# Patient Record
Sex: MALE | Race: WHITE
[De-identification: names, ages, dates, MRNs, and addresses within clinical notes are randomized per-mention and may not be internally consistent; named-entity substitution may affect disease eponyms.]

---

## 2014-06-05 RX ORDER — FLUOXETINE HCL 10 MG
2 CAPSULE ORAL
Qty: 0 | Refills: 0 | COMMUNITY
Start: 2014-06-05

## 2014-06-05 RX ORDER — FLUOXETINE HCL 10 MG
2 CAPSULE ORAL
Qty: 0 | Refills: 0 | DISCHARGE
Start: 2014-06-05

## 2014-06-05 RX ORDER — LEVETIRACETAM 250 MG/1
1 TABLET, FILM COATED ORAL
Qty: 0 | Refills: 0 | DISCHARGE
Start: 2014-06-05

## 2014-06-05 RX ORDER — CARBAMAZEPINE 200 MG
1 TABLET ORAL
Qty: 0 | Refills: 0 | DISCHARGE
Start: 2014-06-05

## 2017-01-13 ENCOUNTER — NON-APPOINTMENT (OUTPATIENT)
Age: 62
End: 2017-01-13

## 2017-01-13 ENCOUNTER — APPOINTMENT (OUTPATIENT)
Dept: ELECTROPHYSIOLOGY | Facility: CLINIC | Age: 62
End: 2017-01-13

## 2017-01-13 VITALS — SYSTOLIC BLOOD PRESSURE: 130 MMHG | DIASTOLIC BLOOD PRESSURE: 86 MMHG | OXYGEN SATURATION: 98 % | HEART RATE: 79 BPM

## 2017-03-02 ENCOUNTER — APPOINTMENT (OUTPATIENT)
Dept: CARDIOLOGY | Facility: HOSPITAL | Age: 62
End: 2017-03-02

## 2017-03-02 ENCOUNTER — OUTPATIENT (OUTPATIENT)
Dept: OUTPATIENT SERVICES | Facility: HOSPITAL | Age: 62
LOS: 1 days | End: 2017-03-02
Payer: MEDICAID

## 2017-03-02 ENCOUNTER — NON-APPOINTMENT (OUTPATIENT)
Age: 62
End: 2017-03-02

## 2017-03-02 VITALS
BODY MASS INDEX: 28.32 KG/M2 | DIASTOLIC BLOOD PRESSURE: 72 MMHG | SYSTOLIC BLOOD PRESSURE: 107 MMHG | HEART RATE: 102 BPM | WEIGHT: 170 LBS | HEIGHT: 65 IN

## 2017-03-02 VITALS — HEART RATE: 74 BPM

## 2017-03-02 DIAGNOSIS — I25.10 ATHEROSCLEROTIC HEART DISEASE OF NATIVE CORONARY ARTERY WITHOUT ANGINA PECTORIS: ICD-10-CM

## 2017-03-02 DIAGNOSIS — Z98.89 OTHER SPECIFIED POSTPROCEDURAL STATES: Chronic | ICD-10-CM

## 2017-03-02 DIAGNOSIS — I20.8 OTHER FORMS OF ANGINA PECTORIS: ICD-10-CM

## 2017-03-02 DIAGNOSIS — Z95.0 PRESENCE OF CARDIAC PACEMAKER: Chronic | ICD-10-CM

## 2017-03-02 PROCEDURE — 93005 ELECTROCARDIOGRAM TRACING: CPT

## 2017-03-02 PROCEDURE — G0463: CPT

## 2017-03-03 DIAGNOSIS — Z01.818 ENCOUNTER FOR OTHER PREPROCEDURAL EXAMINATION: ICD-10-CM

## 2017-04-18 ENCOUNTER — APPOINTMENT (OUTPATIENT)
Dept: ELECTROPHYSIOLOGY | Facility: CLINIC | Age: 62
End: 2017-04-18

## 2017-04-18 ENCOUNTER — NON-APPOINTMENT (OUTPATIENT)
Age: 62
End: 2017-04-18

## 2017-04-18 VITALS
HEIGHT: 65 IN | OXYGEN SATURATION: 99 % | SYSTOLIC BLOOD PRESSURE: 134 MMHG | WEIGHT: 170 LBS | BODY MASS INDEX: 28.32 KG/M2 | HEART RATE: 88 BPM | DIASTOLIC BLOOD PRESSURE: 87 MMHG

## 2017-06-01 ENCOUNTER — APPOINTMENT (OUTPATIENT)
Dept: CARDIOLOGY | Facility: HOSPITAL | Age: 62
End: 2017-06-01

## 2017-06-01 ENCOUNTER — NON-APPOINTMENT (OUTPATIENT)
Age: 62
End: 2017-06-01

## 2017-06-01 ENCOUNTER — OUTPATIENT (OUTPATIENT)
Dept: OUTPATIENT SERVICES | Facility: HOSPITAL | Age: 62
LOS: 1 days | End: 2017-06-01
Payer: MEDICAID

## 2017-06-01 VITALS — SYSTOLIC BLOOD PRESSURE: 148 MMHG | HEART RATE: 102 BPM | DIASTOLIC BLOOD PRESSURE: 84 MMHG

## 2017-06-01 DIAGNOSIS — Z95.0 PRESENCE OF CARDIAC PACEMAKER: Chronic | ICD-10-CM

## 2017-06-01 DIAGNOSIS — I25.10 ATHEROSCLEROTIC HEART DISEASE OF NATIVE CORONARY ARTERY WITHOUT ANGINA PECTORIS: ICD-10-CM

## 2017-06-01 DIAGNOSIS — Z98.89 OTHER SPECIFIED POSTPROCEDURAL STATES: Chronic | ICD-10-CM

## 2017-06-01 PROCEDURE — 93005 ELECTROCARDIOGRAM TRACING: CPT

## 2017-06-01 PROCEDURE — G0463: CPT

## 2017-06-09 DIAGNOSIS — Z01.810 ENCOUNTER FOR PREPROCEDURAL CARDIOVASCULAR EXAMINATION: ICD-10-CM

## 2017-09-07 ENCOUNTER — APPOINTMENT (OUTPATIENT)
Dept: CARDIOLOGY | Facility: HOSPITAL | Age: 62
End: 2017-09-07

## 2017-09-07 ENCOUNTER — NON-APPOINTMENT (OUTPATIENT)
Age: 62
End: 2017-09-07

## 2017-09-07 ENCOUNTER — OUTPATIENT (OUTPATIENT)
Dept: OUTPATIENT SERVICES | Facility: HOSPITAL | Age: 62
LOS: 1 days | End: 2017-09-07
Payer: MEDICAID

## 2017-09-07 ENCOUNTER — APPOINTMENT (OUTPATIENT)
Dept: ELECTROPHYSIOLOGY | Facility: CLINIC | Age: 62
End: 2017-09-07
Payer: MEDICAID

## 2017-09-07 VITALS
HEIGHT: 65 IN | BODY MASS INDEX: 28.32 KG/M2 | SYSTOLIC BLOOD PRESSURE: 106 MMHG | OXYGEN SATURATION: 97 % | DIASTOLIC BLOOD PRESSURE: 71 MMHG | WEIGHT: 170 LBS | HEART RATE: 70 BPM

## 2017-09-07 DIAGNOSIS — I25.10 ATHEROSCLEROTIC HEART DISEASE OF NATIVE CORONARY ARTERY WITHOUT ANGINA PECTORIS: ICD-10-CM

## 2017-09-07 DIAGNOSIS — Z98.89 OTHER SPECIFIED POSTPROCEDURAL STATES: Chronic | ICD-10-CM

## 2017-09-07 DIAGNOSIS — Z95.0 PRESENCE OF CARDIAC PACEMAKER: Chronic | ICD-10-CM

## 2017-09-07 PROCEDURE — 93005 ELECTROCARDIOGRAM TRACING: CPT

## 2017-09-07 PROCEDURE — G0463: CPT

## 2017-09-07 PROCEDURE — 93280 PM DEVICE PROGR EVAL DUAL: CPT

## 2017-09-14 DIAGNOSIS — I45.9 CONDUCTION DISORDER, UNSPECIFIED: ICD-10-CM

## 2017-12-14 ENCOUNTER — OUTPATIENT (OUTPATIENT)
Dept: OUTPATIENT SERVICES | Facility: HOSPITAL | Age: 62
LOS: 1 days | End: 2017-12-14
Payer: MEDICAID

## 2017-12-14 ENCOUNTER — APPOINTMENT (OUTPATIENT)
Dept: CARDIOLOGY | Facility: HOSPITAL | Age: 62
End: 2017-12-14

## 2017-12-14 VITALS
HEART RATE: 83 BPM | BODY MASS INDEX: 28.82 KG/M2 | WEIGHT: 173 LBS | SYSTOLIC BLOOD PRESSURE: 120 MMHG | DIASTOLIC BLOOD PRESSURE: 75 MMHG | OXYGEN SATURATION: 97 % | HEIGHT: 65 IN

## 2017-12-14 DIAGNOSIS — Z98.89 OTHER SPECIFIED POSTPROCEDURAL STATES: Chronic | ICD-10-CM

## 2017-12-14 DIAGNOSIS — I25.10 ATHEROSCLEROTIC HEART DISEASE OF NATIVE CORONARY ARTERY WITHOUT ANGINA PECTORIS: ICD-10-CM

## 2017-12-14 DIAGNOSIS — Z95.0 PRESENCE OF CARDIAC PACEMAKER: Chronic | ICD-10-CM

## 2017-12-14 PROCEDURE — G0463: CPT

## 2017-12-15 DIAGNOSIS — I49.5 SICK SINUS SYNDROME: ICD-10-CM

## 2018-03-08 ENCOUNTER — APPOINTMENT (OUTPATIENT)
Dept: CARDIOLOGY | Facility: HOSPITAL | Age: 63
End: 2018-03-08

## 2018-03-08 ENCOUNTER — APPOINTMENT (OUTPATIENT)
Dept: ELECTROPHYSIOLOGY | Facility: CLINIC | Age: 63
End: 2018-03-08
Payer: MEDICAID

## 2018-03-08 ENCOUNTER — OUTPATIENT (OUTPATIENT)
Dept: OUTPATIENT SERVICES | Facility: HOSPITAL | Age: 63
LOS: 1 days | End: 2018-03-08
Payer: MEDICAID

## 2018-03-08 VITALS
SYSTOLIC BLOOD PRESSURE: 115 MMHG | WEIGHT: 173 LBS | OXYGEN SATURATION: 96 % | BODY MASS INDEX: 28.82 KG/M2 | HEIGHT: 65 IN | HEART RATE: 86 BPM | DIASTOLIC BLOOD PRESSURE: 81 MMHG

## 2018-03-08 DIAGNOSIS — Z95.0 PRESENCE OF CARDIAC PACEMAKER: Chronic | ICD-10-CM

## 2018-03-08 DIAGNOSIS — Z98.89 OTHER SPECIFIED POSTPROCEDURAL STATES: Chronic | ICD-10-CM

## 2018-03-08 DIAGNOSIS — I25.10 ATHEROSCLEROTIC HEART DISEASE OF NATIVE CORONARY ARTERY WITHOUT ANGINA PECTORIS: ICD-10-CM

## 2018-03-08 PROCEDURE — G0463: CPT

## 2018-03-08 PROCEDURE — 93280 PM DEVICE PROGR EVAL DUAL: CPT

## 2018-03-09 DIAGNOSIS — R00.1 BRADYCARDIA, UNSPECIFIED: ICD-10-CM

## 2018-03-09 DIAGNOSIS — I49.5 SICK SINUS SYNDROME: ICD-10-CM

## 2018-04-03 ENCOUNTER — EMERGENCY (EMERGENCY)
Facility: HOSPITAL | Age: 63
LOS: 1 days | Discharge: HOME | End: 2018-04-03
Admitting: EMERGENCY MEDICINE
Payer: MEDICAID

## 2018-04-03 DIAGNOSIS — Z79.899 OTHER LONG TERM (CURRENT) DRUG THERAPY: ICD-10-CM

## 2018-04-03 DIAGNOSIS — Z98.89 OTHER SPECIFIED POSTPROCEDURAL STATES: Chronic | ICD-10-CM

## 2018-04-03 DIAGNOSIS — Z79.82 LONG TERM (CURRENT) USE OF ASPIRIN: ICD-10-CM

## 2018-04-03 DIAGNOSIS — J02.9 ACUTE PHARYNGITIS, UNSPECIFIED: ICD-10-CM

## 2018-04-03 DIAGNOSIS — R56.9 UNSPECIFIED CONVULSIONS: ICD-10-CM

## 2018-04-03 DIAGNOSIS — Z95.0 PRESENCE OF CARDIAC PACEMAKER: Chronic | ICD-10-CM

## 2018-04-03 PROCEDURE — 99283 EMERGENCY DEPT VISIT LOW MDM: CPT

## 2018-07-06 ENCOUNTER — OUTPATIENT (OUTPATIENT)
Dept: OUTPATIENT SERVICES | Facility: HOSPITAL | Age: 63
LOS: 1 days | End: 2018-07-06
Payer: MEDICAID

## 2018-07-06 ENCOUNTER — APPOINTMENT (OUTPATIENT)
Dept: CT IMAGING | Facility: HOSPITAL | Age: 63
End: 2018-07-06

## 2018-07-06 DIAGNOSIS — Z95.0 PRESENCE OF CARDIAC PACEMAKER: Chronic | ICD-10-CM

## 2018-07-06 DIAGNOSIS — Z98.89 OTHER SPECIFIED POSTPROCEDURAL STATES: Chronic | ICD-10-CM

## 2018-07-06 DIAGNOSIS — G93.9 DISORDER OF BRAIN, UNSPECIFIED: ICD-10-CM

## 2018-07-06 DIAGNOSIS — I63.9 CEREBRAL INFARCTION, UNSPECIFIED: ICD-10-CM

## 2018-07-06 PROCEDURE — 70496 CT ANGIOGRAPHY HEAD: CPT | Mod: 26

## 2018-07-06 PROCEDURE — 70496 CT ANGIOGRAPHY HEAD: CPT

## 2018-07-24 ENCOUNTER — APPOINTMENT (OUTPATIENT)
Dept: NEUROSURGERY | Facility: CLINIC | Age: 63
End: 2018-07-24
Payer: MEDICAID

## 2018-07-24 VITALS
HEART RATE: 92 BPM | WEIGHT: 172 LBS | BODY MASS INDEX: 28.66 KG/M2 | HEIGHT: 65 IN | TEMPERATURE: 98.2 F | SYSTOLIC BLOOD PRESSURE: 112 MMHG | DIASTOLIC BLOOD PRESSURE: 72 MMHG | RESPIRATION RATE: 16 BRPM | OXYGEN SATURATION: 95 %

## 2018-07-24 PROCEDURE — 99244 OFF/OP CNSLTJ NEW/EST MOD 40: CPT

## 2018-07-31 ENCOUNTER — OUTPATIENT (OUTPATIENT)
Dept: OUTPATIENT SERVICES | Facility: HOSPITAL | Age: 63
LOS: 1 days | End: 2018-07-31
Payer: MEDICAID

## 2018-07-31 VITALS
OXYGEN SATURATION: 99 % | DIASTOLIC BLOOD PRESSURE: 84 MMHG | HEART RATE: 61 BPM | RESPIRATION RATE: 14 BRPM | WEIGHT: 171.08 LBS | SYSTOLIC BLOOD PRESSURE: 135 MMHG | HEIGHT: 65 IN | TEMPERATURE: 98 F

## 2018-07-31 DIAGNOSIS — Z98.89 OTHER SPECIFIED POSTPROCEDURAL STATES: Chronic | ICD-10-CM

## 2018-07-31 DIAGNOSIS — K21.9 GASTRO-ESOPHAGEAL REFLUX DISEASE WITHOUT ESOPHAGITIS: ICD-10-CM

## 2018-07-31 DIAGNOSIS — H40.9 UNSPECIFIED GLAUCOMA: ICD-10-CM

## 2018-07-31 DIAGNOSIS — Z01.818 ENCOUNTER FOR OTHER PREPROCEDURAL EXAMINATION: ICD-10-CM

## 2018-07-31 DIAGNOSIS — Z78.9 OTHER SPECIFIED HEALTH STATUS: ICD-10-CM

## 2018-07-31 DIAGNOSIS — Z95.0 PRESENCE OF CARDIAC PACEMAKER: Chronic | ICD-10-CM

## 2018-07-31 DIAGNOSIS — G40.909 EPILEPSY, UNSPECIFIED, NOT INTRACTABLE, WITHOUT STATUS EPILEPTICUS: ICD-10-CM

## 2018-07-31 DIAGNOSIS — F32.9 MAJOR DEPRESSIVE DISORDER, SINGLE EPISODE, UNSPECIFIED: ICD-10-CM

## 2018-07-31 DIAGNOSIS — I67.1 CEREBRAL ANEURYSM, NONRUPTURED: ICD-10-CM

## 2018-07-31 DIAGNOSIS — R00.1 BRADYCARDIA, UNSPECIFIED: ICD-10-CM

## 2018-07-31 LAB
ANION GAP SERPL CALC-SCNC: 11 MMOL/L — SIGNIFICANT CHANGE UP (ref 5–17)
BLD GP AB SCN SERPL QL: NEGATIVE — SIGNIFICANT CHANGE UP
BUN SERPL-MCNC: 19 MG/DL — SIGNIFICANT CHANGE UP (ref 7–23)
CALCIUM SERPL-MCNC: 8.7 MG/DL — SIGNIFICANT CHANGE UP (ref 8.4–10.5)
CHLORIDE SERPL-SCNC: 95 MMOL/L — LOW (ref 96–108)
CO2 SERPL-SCNC: 23 MMOL/L — SIGNIFICANT CHANGE UP (ref 22–31)
CREAT SERPL-MCNC: 1.12 MG/DL — SIGNIFICANT CHANGE UP (ref 0.5–1.3)
GLUCOSE SERPL-MCNC: 111 MG/DL — HIGH (ref 70–99)
HCT VFR BLD CALC: 35.9 % — LOW (ref 39–50)
HGB BLD-MCNC: 12.5 G/DL — LOW (ref 13–17)
MCHC RBC-ENTMCNC: 32.6 PG — SIGNIFICANT CHANGE UP (ref 27–34)
MCHC RBC-ENTMCNC: 34.8 GM/DL — SIGNIFICANT CHANGE UP (ref 32–36)
MCV RBC AUTO: 93.7 FL — SIGNIFICANT CHANGE UP (ref 80–100)
PLATELET # BLD AUTO: 174 K/UL — SIGNIFICANT CHANGE UP (ref 150–400)
POTASSIUM SERPL-MCNC: 4.1 MMOL/L — SIGNIFICANT CHANGE UP (ref 3.5–5.3)
POTASSIUM SERPL-SCNC: 4.1 MMOL/L — SIGNIFICANT CHANGE UP (ref 3.5–5.3)
RBC # BLD: 3.83 M/UL — LOW (ref 4.2–5.8)
RBC # FLD: 11.9 % — SIGNIFICANT CHANGE UP (ref 10.3–14.5)
RH IG SCN BLD-IMP: POSITIVE — SIGNIFICANT CHANGE UP
SODIUM SERPL-SCNC: 129 MMOL/L — LOW (ref 135–145)
WBC # BLD: 3.89 K/UL — SIGNIFICANT CHANGE UP (ref 3.8–10.5)
WBC # FLD AUTO: 3.89 K/UL — SIGNIFICANT CHANGE UP (ref 3.8–10.5)

## 2018-07-31 PROCEDURE — 86901 BLOOD TYPING SEROLOGIC RH(D): CPT

## 2018-07-31 PROCEDURE — G0463: CPT

## 2018-07-31 PROCEDURE — 80048 BASIC METABOLIC PNL TOTAL CA: CPT

## 2018-07-31 PROCEDURE — 86850 RBC ANTIBODY SCREEN: CPT

## 2018-07-31 PROCEDURE — 85027 COMPLETE CBC AUTOMATED: CPT

## 2018-07-31 PROCEDURE — 86900 BLOOD TYPING SEROLOGIC ABO: CPT

## 2018-07-31 NOTE — H&P PST ADULT - PSH
Pacemaker  placed in 2007 originally, 2016 most recent placement  S/P bunionectomy    S/P craniotomy  for RCMA 1998

## 2018-07-31 NOTE — H&P PST ADULT - NEGATIVE ALLERGY TYPES
no outdoor environmental allergies/no indoor environmental allergies/no reactions to medicines/no reactions to food/no reactions to animals

## 2018-07-31 NOTE — H&P PST ADULT - PMH
Aneurysm of [place]    Bradycardia    Cerebral aneurysm without rupture  diagnosed 2017 had cerebral angio at [facility]  CVA (cerebral vascular accident)  left arm weakness, no sensation in left side in 1998  Depression    Dysrhythmia, cardiac    Epilepsy    Gastroesophageal reflux disease, esophagitis presence not specified    Glaucoma of both eyes, unspecified glaucoma type    HLD (hyperlipidemia)    Language barrier  Patient has a  who translates as needed.  Renal agenesis, unilateral

## 2018-07-31 NOTE — H&P PST ADULT - PROBLEM SELECTOR PLAN 6
PPM last interrogated on 3/8/2018, report in chart  Last cardio note in chart from 3/8 2018  Echo and Stress test results in chart

## 2018-07-31 NOTE — H&P PST ADULT - MOTOR
RUE 4/5 proximally 5/5 distally,  4/5; RLE 5/5 ileopsoas, 4/5 distally, brace on RLE (states he has foot drop)

## 2018-07-31 NOTE — H&P PST ADULT - HISTORY OF PRESENT ILLNESS
Mr. Rios is a 62 year old [place]ian speaking man with PMH of SAH when he was 32 years old s/p clipping done at [place], he states that he was in a coma for 2 months after suffering a stroke with residual Left hemiplegia.  last year he had brain imaging for unclear reason and an incidental aneurysm was found and he was referred to Dr. Ibarra for evaluation.  CTA revealed superolaterally projection left proximal supraclinoid ICA aneurysm with the dome measuring 7-8mm and the neck measuring 2mm in size, additionally a 2 mm focus of flow related enhancement in the right perisylvian aneurysm clip.  He is now scheduled for cerebral angiography with future plan for endovascular treatment.    Patient came in with a , Juju Damico, who translated in Algerian as needed.

## 2018-07-31 NOTE — H&P PST ADULT - NSANTHOSAYNRD_GEN_A_CORE
No. ASYA screening performed.  STOP BANG Legend: 0-2 = LOW Risk; 3-4 = INTERMEDIATE Risk; 5-8 = HIGH Risk

## 2018-08-01 ENCOUNTER — APPOINTMENT (OUTPATIENT)
Dept: CARDIOLOGY | Facility: HOSPITAL | Age: 63
End: 2018-08-01

## 2018-08-08 ENCOUNTER — APPOINTMENT (OUTPATIENT)
Dept: CARDIOLOGY | Facility: HOSPITAL | Age: 63
End: 2018-08-08

## 2018-08-08 ENCOUNTER — OUTPATIENT (OUTPATIENT)
Dept: OUTPATIENT SERVICES | Facility: HOSPITAL | Age: 63
LOS: 1 days | End: 2018-08-08
Payer: MEDICAID

## 2018-08-08 VITALS
SYSTOLIC BLOOD PRESSURE: 119 MMHG | OXYGEN SATURATION: 98 % | DIASTOLIC BLOOD PRESSURE: 78 MMHG | HEIGHT: 65 IN | HEART RATE: 67 BPM

## 2018-08-08 DIAGNOSIS — Z95.0 PRESENCE OF CARDIAC PACEMAKER: Chronic | ICD-10-CM

## 2018-08-08 DIAGNOSIS — Z98.89 OTHER SPECIFIED POSTPROCEDURAL STATES: Chronic | ICD-10-CM

## 2018-08-08 DIAGNOSIS — I25.10 ATHEROSCLEROTIC HEART DISEASE OF NATIVE CORONARY ARTERY WITHOUT ANGINA PECTORIS: ICD-10-CM

## 2018-08-08 PROBLEM — I67.1 CEREBRAL ANEURYSM, NONRUPTURED: Chronic | Status: ACTIVE | Noted: 2018-07-31

## 2018-08-08 PROBLEM — R00.1 BRADYCARDIA, UNSPECIFIED: Chronic | Status: ACTIVE | Noted: 2018-07-31

## 2018-08-08 PROBLEM — K21.9 GASTRO-ESOPHAGEAL REFLUX DISEASE WITHOUT ESOPHAGITIS: Chronic | Status: ACTIVE | Noted: 2018-07-31

## 2018-08-08 PROBLEM — H40.9 UNSPECIFIED GLAUCOMA: Chronic | Status: ACTIVE | Noted: 2018-07-31

## 2018-08-08 PROCEDURE — 93005 ELECTROCARDIOGRAM TRACING: CPT

## 2018-08-08 PROCEDURE — G0463: CPT

## 2018-08-09 DIAGNOSIS — Z01.818 ENCOUNTER FOR OTHER PREPROCEDURAL EXAMINATION: ICD-10-CM

## 2018-08-09 DIAGNOSIS — I49.5 SICK SINUS SYNDROME: ICD-10-CM

## 2018-08-14 ENCOUNTER — APPOINTMENT (OUTPATIENT)
Dept: ELECTROPHYSIOLOGY | Facility: CLINIC | Age: 63
End: 2018-08-14
Payer: MEDICAID

## 2018-08-14 VITALS
OXYGEN SATURATION: 96 % | SYSTOLIC BLOOD PRESSURE: 100 MMHG | HEIGHT: 65 IN | DIASTOLIC BLOOD PRESSURE: 67 MMHG | HEART RATE: 64 BPM

## 2018-08-14 PROCEDURE — 93280 PM DEVICE PROGR EVAL DUAL: CPT

## 2018-08-14 RX ORDER — METOPROLOL TARTRATE 25 MG/1
25 TABLET, FILM COATED ORAL TWICE DAILY
Qty: 60 | Refills: 2 | Status: ACTIVE | COMMUNITY
Start: 2018-08-14

## 2018-08-28 ENCOUNTER — FORM ENCOUNTER (OUTPATIENT)
Age: 63
End: 2018-08-28

## 2018-08-29 ENCOUNTER — OUTPATIENT (OUTPATIENT)
Dept: OUTPATIENT SERVICES | Facility: HOSPITAL | Age: 63
LOS: 1 days | End: 2018-08-29
Payer: MEDICAID

## 2018-08-29 ENCOUNTER — APPOINTMENT (OUTPATIENT)
Dept: NEUROLOGY | Facility: CLINIC | Age: 63
End: 2018-08-29

## 2018-08-29 ENCOUNTER — RX RENEWAL (OUTPATIENT)
Age: 63
End: 2018-08-29

## 2018-08-29 DIAGNOSIS — Z98.89 OTHER SPECIFIED POSTPROCEDURAL STATES: Chronic | ICD-10-CM

## 2018-08-29 DIAGNOSIS — I67.1 CEREBRAL ANEURYSM, NONRUPTURED: ICD-10-CM

## 2018-08-29 DIAGNOSIS — Z95.0 PRESENCE OF CARDIAC PACEMAKER: Chronic | ICD-10-CM

## 2018-08-29 LAB
ANION GAP SERPL CALC-SCNC: 8 MMOL/L — SIGNIFICANT CHANGE UP (ref 5–17)
BASOPHILS # BLD AUTO: 0 K/UL — SIGNIFICANT CHANGE UP (ref 0–0.2)
BASOPHILS NFR BLD AUTO: 0.8 % — SIGNIFICANT CHANGE UP (ref 0–2)
BLD GP AB SCN SERPL QL: NEGATIVE — SIGNIFICANT CHANGE UP
BUN SERPL-MCNC: 15 MG/DL — SIGNIFICANT CHANGE UP (ref 7–23)
CALCIUM SERPL-MCNC: 8.5 MG/DL — SIGNIFICANT CHANGE UP (ref 8.4–10.5)
CHLORIDE SERPL-SCNC: 96 MMOL/L — SIGNIFICANT CHANGE UP (ref 96–108)
CO2 SERPL-SCNC: 27 MMOL/L — SIGNIFICANT CHANGE UP (ref 22–31)
CREAT SERPL-MCNC: 1.05 MG/DL — SIGNIFICANT CHANGE UP (ref 0.5–1.3)
EOSINOPHIL # BLD AUTO: 0.1 K/UL — SIGNIFICANT CHANGE UP (ref 0–0.5)
EOSINOPHIL NFR BLD AUTO: 2.1 % — SIGNIFICANT CHANGE UP (ref 0–6)
GLUCOSE SERPL-MCNC: 123 MG/DL — HIGH (ref 70–99)
HCT VFR BLD CALC: 39 % — SIGNIFICANT CHANGE UP (ref 39–50)
HGB BLD-MCNC: 13 G/DL — SIGNIFICANT CHANGE UP (ref 13–17)
LYMPHOCYTES # BLD AUTO: 0.9 K/UL — LOW (ref 1–3.3)
LYMPHOCYTES # BLD AUTO: 24.4 % — SIGNIFICANT CHANGE UP (ref 13–44)
MCHC RBC-ENTMCNC: 31.5 PG — SIGNIFICANT CHANGE UP (ref 27–34)
MCHC RBC-ENTMCNC: 33.3 GM/DL — SIGNIFICANT CHANGE UP (ref 32–36)
MCV RBC AUTO: 94.6 FL — SIGNIFICANT CHANGE UP (ref 80–100)
MONOCYTES # BLD AUTO: 0.4 K/UL — SIGNIFICANT CHANGE UP (ref 0–0.9)
MONOCYTES NFR BLD AUTO: 11 % — SIGNIFICANT CHANGE UP (ref 2–14)
NEUTROPHILS # BLD AUTO: 2.3 K/UL — SIGNIFICANT CHANGE UP (ref 1.8–7.4)
NEUTROPHILS NFR BLD AUTO: 61.7 % — SIGNIFICANT CHANGE UP (ref 43–77)
PLATELET # BLD AUTO: 199 K/UL — SIGNIFICANT CHANGE UP (ref 150–400)
POTASSIUM SERPL-MCNC: 4.4 MMOL/L — SIGNIFICANT CHANGE UP (ref 3.5–5.3)
POTASSIUM SERPL-SCNC: 4.4 MMOL/L — SIGNIFICANT CHANGE UP (ref 3.5–5.3)
RBC # BLD: 4.12 M/UL — LOW (ref 4.2–5.8)
RBC # FLD: 11.9 % — SIGNIFICANT CHANGE UP (ref 10.3–14.5)
RH IG SCN BLD-IMP: POSITIVE — SIGNIFICANT CHANGE UP
SODIUM SERPL-SCNC: 131 MMOL/L — LOW (ref 135–145)
WBC # BLD: 3.8 K/UL — SIGNIFICANT CHANGE UP (ref 3.8–10.5)
WBC # FLD AUTO: 3.8 K/UL — SIGNIFICANT CHANGE UP (ref 3.8–10.5)

## 2018-08-29 PROCEDURE — C1769: CPT

## 2018-08-29 PROCEDURE — 85027 COMPLETE CBC AUTOMATED: CPT

## 2018-08-29 PROCEDURE — 76377 3D RENDER W/INTRP POSTPROCES: CPT

## 2018-08-29 PROCEDURE — 36223 PLACE CATH CAROTID/INOM ART: CPT | Mod: 50

## 2018-08-29 PROCEDURE — 36226 PLACE CATH VERTEBRAL ART: CPT | Mod: 50

## 2018-08-29 PROCEDURE — 86850 RBC ANTIBODY SCREEN: CPT

## 2018-08-29 PROCEDURE — 86900 BLOOD TYPING SEROLOGIC ABO: CPT

## 2018-08-29 PROCEDURE — 36226 PLACE CATH VERTEBRAL ART: CPT

## 2018-08-29 PROCEDURE — C1887: CPT

## 2018-08-29 PROCEDURE — 86901 BLOOD TYPING SEROLOGIC RH(D): CPT

## 2018-08-29 PROCEDURE — C1894: CPT

## 2018-08-29 PROCEDURE — 76377 3D RENDER W/INTRP POSTPROCES: CPT | Mod: 26,59

## 2018-08-29 PROCEDURE — 80048 BASIC METABOLIC PNL TOTAL CA: CPT

## 2018-08-29 PROCEDURE — 36223 PLACE CATH CAROTID/INOM ART: CPT

## 2018-08-29 RX ORDER — SODIUM CHLORIDE 9 MG/ML
1000 INJECTION INTRAMUSCULAR; INTRAVENOUS; SUBCUTANEOUS
Qty: 0 | Refills: 0 | Status: DISCONTINUED | OUTPATIENT
Start: 2018-08-29 | End: 2018-09-13

## 2018-08-29 NOTE — CHART NOTE - NSCHARTNOTEFT_GEN_A_CORE
Interventional Neuro- Radiology   Procedure Note ROS    Procedure: Selective Cerebral Angiography   Pre- Procedure Diagnosis:  Post- Procedure Diagnosis:    : Dr Tanner Sinha    Physician Assistant: Claire Polo PA-C    RN:    Anesthesia: (MAC)   (general anesthesia)    Sheath:    I/Os:  Estimated blood loss less than 10cc  IV fluids:     cc     Urine output     cc        Contrast Omnipaque 240      cc         Antibiotics:    Vitals: BP         HR      Sp    Preliminary Report:    Using a 4 Fr short/long sheath to the right groin under MAC sedation via   left vertebral artery,  left common carotid artery, left external carotid artey, right vertebral arter,  right common carotid artery, right external carotid artery  a selective cerebral angiography was performed and  demonstrates ________. ( Official note to follow).  Patient tolerated procedure well, hemodynamically stable, no change in neurological status compared to baseline.  Results discussed with neurosurgery, patient and patient's  family.  Groin sheath was removed,  manual compression held to hemostasis  for  21 minutes, no active bleeding, no hematoma, avitene applied,  quick clot and safeguard balloon dressing applied at _____h.  STAT labs:  CBC BMP  ____h.  Patient transfered to Recovery Room Interventional Neuro- Radiology   Procedure Note PA-C    Procedure: Selective Cerebral Angiography   Pre- Procedure Diagnosis:  Post- Procedure Diagnosis:    : Dr Tanner Sinha    Physician Assistant: Claire Polo PA-C  Nurse:                       Sera Sanz   radiology tech:          Shahriar Goodson  Anesthesiologist:        Dr arlen Vazquez   Sheath:    I/Os: Estimated blood loss less than 10cc  IV fluids:     cc     Urine output due to void Contrast Omnipaque 240      cc         Antibiotics:    Vitals: /79     HR 60    SpO2 100    Preliminary Report:  Using a 4 Thai short sheath to the right groin under MAC sedation via left vertebral artery,  left common carotid artery, left external carotid artery, right vertebral artery  right common carotid artery, right external carotid artery  a selective cerebral angiography was performed and  demonstrates ________. ( Official note to follow).  Patient tolerated procedure well, hemodynamically stable, no change in neurological status compared to baseline.  Results discussed with neurosurgery, patient and patient's  family.  Groin sheath was removed,  manual compression held to hemostasis  for  21 minutes, no active bleeding, no hematoma, quick clot and safeguard balloon dressing applied at _____h. STAT labs:  CBC BMP  ____h. Patient transported  to Recovery Room Interventional Neuro- Radiology   Procedure Note PA-ISABELLA    Procedure: Selective Cerebral Angiography   Pre- Procedure Diagnosis:  Post- Procedure Diagnosis:    : Dr Tanner Sinha    Physician Assistant: Claire Polo PA-C  Nurse:                       Sera Sanz   radiology tech:          Shahriar Goodson  Anesthesiologist:        Dr Ruth Ann Vazquez   Sheath:                       4     I/Os: Estimated blood loss less than 10cc  IV fluids:     cc     Urine output due to void Contrast Omnipaque 240      cc         Antibiotics:    Vitals: /79     HR 60    SpO2 100    Preliminary Report:  Using a 4 Tongan short sheath to the right groin under MAC sedation via left vertebral artery,  left common carotid artery, left external carotid artery, right vertebral artery  right common carotid artery, right external carotid artery  a selective cerebral angiography was performed and  demonstrated             official note to follow  Patient tolerated procedure well, hemodynamically stable, no change in neurological status compared to baseline.  Results discussed with neurosurgery, patient and patient's  family.  Groin sheath was removed, manual compression held to hemostasis for 21 minutes, no active bleeding, no hematoma, quick clot and safeguard balloon dressing applied at _____h. STAT labs:  CBC BMP  ____h. Patient transported  to Recovery Room Interventional Neuro- Radiology   Procedure Note PA-ISABELLA    Procedure: Selective Cerebral Angiography   Pre- Procedure Diagnosis:  Post- Procedure Diagnosis:    : Dr Tanner Sinha    Physician Assistant: Claire Polo PA-C  Nurse:                       Sera Sanz   radiology tech:          Shahriar Goodson  Anesthesiologist:        Dr Ruth Ann Vazquez   Sheath:                       4 Montenegrin short sheath     I/Os: Estimated blood loss less than 10cc IV fluids:     cc     Urine output due to void Contrast Omnipaque 240      cc           Vitals: /79     HR 60    SpO2 100    Preliminary Report:  Using a 4 Montenegrin short sheath to the right groin under MAC sedation via left vertebral artery, right vertebral artery, right common carotid artery,  a selective cerebral angiography was performed and demonstrated                   official note to follow  Patient tolerated procedure well, hemodynamically stable, no change in neurological status compared to baseline. Results discussed with neurosurgery, patient and patient's  family.  Right groin sheath was removed, manual compression held to hemostasis for 21 minutes, no active bleeding, no hematoma, quick clot and safeguard balloon dressing applied at _____h. STAT labs:  CBC BMP  ____h. Patient transported  to Recovery Room Interventional Neuro- Radiology   Procedure Note PA-C    Procedure: Selective Cerebral Angiography   Pre- Procedure Diagnosis:  Post- Procedure Diagnosis:    : Dr Tanner Sinha    Physician Assistant: Claire Polo PA-C  Nurse:                       Sera Sanz   radiology tech:          Shahriar Goodson  Anesthesiologist:       Dr Ruth Ann Chua CRNA  Sheath:                      4 Mohawk short sheath     I/Os: Estimated blood loss less than 10cc IV fluids:100cc Urine output due to void Contrast Omnipaque 240   cc           Vitals: /61   HR 59    SpO2 100    Preliminary Report:  Using a 4 Mohawk short sheath to the right groin under MAC sedation via left vertebral artery, right vertebral artery, right common carotid artery,  a selective cerebral angiography was performed and demonstrated                   official note to follow  Patient tolerated procedure well, hemodynamically stable, no change in neurological status compared to baseline. Results discussed with neurosurgery, patient and patient's  family.  Right groin sheath was removed, manual compression held to hemostasis for 21 minutes, no active bleeding, no hematoma, quick clot and safeguard balloon dressing applied at _____h. STAT labs:  CBC BMP at 1330 hours. Patient transported  to Recovery Room Interventional Neuro- Radiology   Procedure Note PA-ISABELLA    Procedure: Selective Cerebral Angiography   Pre- Procedure Diagnosis:  Post- Procedure Diagnosis:    : Dr Tanner Sinha    Physician Assistant: Claire Polo PA-C  Nurse:                       Sera Sanz   radiology tech:          Shahriar Goodson  Anesthesiologist:       Dr Ruth Ann Chua CRNA  Sheath:                      4 Welsh short sheath     I/Os: Estimated blood loss less than 10cc IV fluids:100cc Urine output due to void Contrast Omnipaque 240 204 cc           Vitals: /61   HR 59    SpO2 100    Preliminary Report:  Using a 4 Welsh short sheath to the right groin under MAC sedation via left vertebral artery, right vertebral artery, right common carotid artery,  a selective cerebral angiography was performed and demonstrated                   official note to follow  Patient tolerated procedure well, hemodynamically stable, no change in neurological status compared to baseline. Results discussed with neurosurgery, patient and patient's  family.  Right groin sheath was removed, manual compression held to hemostasis for 21 minutes, no active bleeding, no hematoma, quick clot and safeguard balloon dressing applied at _____h. STAT labs:  CBC BMP at 1330 hours. 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    : Dr Tanner Sinha    Physician Assistant: Claire Polo PA-C  Nurse:                       Sera Sanz   radiology tech:          Shahriar Goodson  Anesthesiologist:       Dr Ruth Ann Chua CRNA  Sheath:                      4 Turkmen short sheath     I/Os: Estimated blood loss less than 10cc IV fluids:100cc Urine output due to void Contrast Omnipaque 240 204 cc           Vitals: /61  HR 59  SpO2 100    Preliminary Report:  Using a 4 Turkmen short sheath to the right groin under MAC sedation via left vertebral artery, right vertebral artery, right common carotid artery, left common carotid artery, bilateral 3Ds a selective cerebral angiography was performed and demonstrated complete persistent occlusion of right MCA aneurysm. A second aneurysm, a left ophthalmic artery aneurysm measuring approximately 8.2mm by 4.3mm, with a 2.8mm neck. Ophthalmic artery projecting from superior neck. Official note to follow.  Patient tolerated procedure well, hemodynamically stable, no change in neurological status compared to baseline. Results discussed with patient.   Right groin sheath was removed, manual compression held to hemostasis for 21 minutes, no active bleeding, no hematoma, quick clot and safeguard balloon dressing applied at 10am. STAT labs: CBC BMP at 1330 hours. Patient transported to Recovery Room 1st floor. 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    : Dr Tanner Sinha    Physician Assistant: Claire Polo PA-C  Nurse:                       Sera Sanz   radiology tech:          Shahriar Goodson  Anesthesiologist:       Dr Ruth Ann Chua CRNA  Sheath:                      4 Slovenian short sheath     I/Os: Estimated blood loss less than 10cc IV fluids:100cc Urine output due to void Contrast Omnipaque 240 204 cc           Vitals: /61  HR 59  SpO2 100    Preliminary Report:  Using a 4 Slovenian short sheath to the right groin under MAC sedation via left vertebral artery, right vertebral artery, right common carotid artery, left common carotid artery, bilateral 3Ds a selective cerebral angiography was performed and demonstrated complete persistent occlusion of right MCA aneurysm. A second aneurysm, a left ophthalmic artery aneurysm measuring approximately 8.2mm by 4.3mm, with a 2.8mm neck. Ophthalmic artery projecting from superior neck. Official note to follow.  Patient tolerated procedure well, hemodynamically stable, no change in neurological status compared to baseline. Results discussed with patient.   Right groin sheath was removed, manual compression held to hemostasis for 21 minutes, no active bleeding, no hematoma, quick clot and safeguard balloon dressing applied at 10am. STAT labs: CBC BMP at 1330 hours. Patient transported to Recovery Room 1st floor. 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    : Dr Tanner Sinha    Physician Assistant: Claire Polo PA-C  Nurse:                       Sera Sanz   radiology tech:          Shahriar Goodson  Anesthesiologist:       Dr Ruth Ann Chua CRNA  Sheath:                      4 Korean short sheath     I/Os: Estimated blood loss less than 10cc IV fluids:100cc Urine output due to void Contrast Omnipaque 240 204 cc           Vitals: /61  HR 59  SpO2 100    Preliminary Report:  Using a 4 Korean short sheath to the right groin under MAC sedation via left vertebral artery, right vertebral artery, right common carotid artery, left common carotid artery, bilateral 3Ds a selective cerebral angiography was performed and demonstrated complete persistent occlusion of right MCA aneurysm. A second aneurysm, a left ophthalmic artery aneurysm measuring approximately 8.2mm by 4.3mm, with a 2.8mm neck. Ophthalmic artery projecting from superior neck. Official note to follow.  Patient tolerated procedure well, hemodynamically stable, no change in neurological status compared to baseline. Results discussed with patient. Right groin sheath was removed, manual compression held to hemostasis for 21 minutes, no active bleeding, no hematoma, quick clot and safeguard balloon dressing applied at 10am. STAT labs: CBC BMP at 1330 hours. Patient transported to Recovery Room 1st floor. Interventional Neuro- Radiology   Procedure Note PA-C    Procedure: Selective Cerebral Angiography   Pre- Procedure Diagnosis: s/post right craniotomy for microsurgical clipping of right MCA aneurysm  Post- Procedure Diagnosis: persistent occlusion of right MCA aneurysm. 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    : Dr Tanner Sinha    Physician Assistant: Claire Polo PA-C  Nurse:                       Sera Sanz   radiology tech:          Shahriar Goodson  Anesthesiologist:       Dr Ruth Ann Chua CRNA  Sheath:                      4 Jamaican short sheath     I/Os: Estimated blood loss less than 10cc IV fluids:100cc Urine output due to void Contrast Omnipaque 240 204 cc           Vitals: /61  HR 59  SpO2 100    Preliminary Report:  Using a 4 Jamaican short sheath to the right groin under MAC sedation via left vertebral artery, right vertebral artery, right common carotid artery, left common carotid artery, bilateral 3Ds a selective cerebral angiography was performed and demonstrated complete persistent occlusion of right MCA aneurysm. A second aneurysm, a left ophthalmic artery aneurysm measuring approximately 8.2mm by 4.3mm, with a 2.8mm neck. Ophthalmic artery projecting from proximal neck. Official note to follow.  Patient tolerated procedure well, hemodynamically stable, no change in neurological status compared to baseline. Results discussed with patient. Right groin sheath was removed, manual compression held to hemostasis for 21 minutes, no active bleeding, no hematoma, quick clot and safeguard balloon dressing applied at 10am. STAT labs: CBC BMP at 1330 hours. Patient transported to Recovery Room 1st floor.

## 2018-08-29 NOTE — CHART NOTE - NSCHARTNOTEFT_GEN_A_CORE
Interventional Neuro Radiology  Pre-Procedure Note PA-C    This is a 62 year old right hand dominant male          Allergies: No Known Allergies  PMHX: bradycardia glaucoma of both eyes,   Gastroesophageal reflux disease, esophagitis presence not specified  Cerebral aneurysm without rupture: diagnosed 2017 had cerebral angio at [facility]  Depression  Renal agenesis, unilateral  Epilepsy  HLD (hyperlipidemia)  Dysrhythmia, cardiac  Aneurysm of [place] of [place]  CVA (cerebral vascular accident): left arm weakness, no sensation in left side in 1998  S/P bunionectomy  Pacemaker: placed in 2007 originally, 2016 most recent placement  S/P craniotomy: for RCMA 1998  Social History:   FAMILY HISTORY: family history of stomach cancer  Current Medications:     Complete Blood Count     WBC Count: 3.89 K/uL    Hemoglobin: 12.5     Hematocrit: 35.9    Platelet Count  174     Basic Metabolic Panel     Sodium, Serum: 129 mmol/L    Potassium, Serum: 4.1 mmol/L    Chloride, Serum: 95 mmol/L    Carbon Dioxide, Serum: 23 mmol/L    Anion Gap, Serum: 11 mmol/L    Blood Urea Nitrogen, Serum: 19 mg/dL    Creatinine, Serum: 1.12 mg/dL    Glucose, Serum: 111 mg/dL    Blood Bank: O positive available     Assessment/Plan:   This is a 62 year old right hand dominant male with   Procedure, goals, risks, benefits and alternatives were discussed with patient and patient's family. All questions were answered. Risks include but are not limited to stroke, vessel injury, hemorrhage, and or right groin hematoma. Patient demonstrates understanding of all risks involved with this procedure and wishes to continue.   Appropriate  content was obtained from patient and consent is in the patient's chart. Interventional Neuro Radiology  Pre-Procedure Note PA-C    This is a 62 year old right hand dominant male          Allergies: No Known Allergies  PMHX: bradycardia glaucoma of both eyes,   Gastroesophageal reflux disease, esophagitis presence not specified  Cerebral aneurysm without rupture: diagnosed 2017 had cerebral angio at [facility]  depression  renal agenesis  Epilepsy, hyperlipidemia      CVA (cerebral vascular accident): left arm weakness, no sensation in left side in 1998  S/P bunionectomy  Pacemaker: placed in 2007 originally, 2016 most recent placement  S/P craniotomy: for RCMA 1998  Social History:   FAMILY HISTORY: family history of stomach cancer  Current Medications:     Complete Blood Count     WBC Count: 3.89 K/uL    Hemoglobin: 12.5     Hematocrit: 35.9    Platelet Count  174     Basic Metabolic Panel     Sodium, Serum: 129 mmol/L    Potassium, Serum: 4.1 mmol/L    Chloride, Serum: 95 mmol/L    Carbon Dioxide, Serum: 23 mmol/L    Anion Gap, Serum: 11 mmol/L    Blood Urea Nitrogen, Serum: 19 mg/dL    Creatinine, Serum: 1.12 mg/dL    Glucose, Serum: 111 mg/dL    Blood Bank: O positive available     Assessment/Plan:   This is a 62 year old right hand dominant male with   Procedure, goals, risks, benefits and alternatives were discussed with patient and patient's family. All questions were answered. Risks include but are not limited to stroke, vessel injury, hemorrhage, and or right groin hematoma. Patient demonstrates understanding of all risks involved with this procedure and wishes to continue.   Appropriate  content was obtained from patient and consent is in the patient's chart. Interventional Neuro Radiology  Pre-Procedure Note PA-C    This is a 62 year old right hand dominant male s/post right craniotomy for microsurgical clipping of right MCA aneurysm at [place] who presents to Neuro IR for a selective cerebral angiogram to monitor aneurysm.     Allergies: No Known Drug Allergies  PMHX: bradycardia, glaucoma OU, gastroesophageal reflux disease, cerebral aneurysm, depression, renal agenesis, epilepsy, hyperlipidemia, CVA left arm weakness  PSHX: pacemaker: placed in 2007 originally, 2016 most recent placement, last interrogation date 3-8-2018, right craniotomy for microsurgical clipping of right MCA aneurysm 1998  Social History: , non-smoker   FAMILY HISTORY: non-contributory   Current Medications: carbamazepine 200mg, fluoxetine 40mg, ASA 81mg, Lipitor 80mg, pantoprazole 40mg     Complete Blood Count     WBC Count: 3.89     Hemoglobin: 12.5     Hematocrit: 35.9    Platelet Count  174     Basic Metabolic Panel     Sodium, Serum: 129 mmol/L    Potassium, Serum: 4.1 mmol/L    Chloride, Serum: 95 mmol/L    Carbon Dioxide, Serum: 23 mmol/L    Anion Gap, Serum: 11 mmol/L    Blood Urea Nitrogen, Serum: 19 mg/dL    Creatinine, Serum: 1.12 mg/dL    Glucose, Serum: 111 mg/dL    Blood Bank: O positive available     Assessment/Plan:   This is a 62 year old right hand dominant male s/post right craniotomy for microsurgical clipping of right MCA aneurysm. Patient presents to Neuro IR for a selective cerebral angiogram to monitor aneurysm. Procedure, goals, risks, benefits and alternatives were discussed with patient and patient's family. All questions were answered. Risks include but are not limited to stroke, vessel injury, hemorrhage, and or right groin hematoma. Patient demonstrates understanding of all risks involved with this procedure and wishes to continue. Appropriate content was obtained from patient and consent is in the patient's chart. Interventional Neuro Radiology  Pre-Procedure Note PA-C    This is a 62 year old right hand dominant male s/post right craniotomy for microsurgical clipping of right MCA aneurysm at [place] who presents to Neuro IR for a selective cerebral angiogram to monitor aneurysm.     Allergies: No Known Drug Allergies  PMHX: bradycardia, glaucoma OU, gastroesophageal reflux disease, cerebral aneurysm, depression, renal agenesis, epilepsy, hyperlipidemia, CVA left arm weakness  PSHX: pacemaker: placed in 2007 originally, 2016 most recent placement, last interrogation date 3-8-2018, right craniotomy for microsurgical clipping of right MCA aneurysm 1998  Social History: , non-smoker   FAMILY HISTORY: non-contributory   Current Medications: carbamazepine 200mg, fluoxetine 40mg, ASA 81mg, Lipitor 80mg, pantoprazole 40mg     Complete Blood Count     WBC Count: 3.89     Hemoglobin: 12.5     Hematocrit: 35.9    Platelet Count  174     Basic Metabolic Panel     Sodium, Serum: 129 mmol/L    Potassium, Serum: 4.1 mmol/L    Chloride, Serum: 95 mmol/L    Carbon Dioxide, Serum: 23 mmol/L    Anion Gap, Serum: 11 mmol/L    Blood Urea Nitrogen, Serum: 19 mg/dL    Creatinine, Serum: 1.12 mg/dL    Glucose, Serum: 111 mg/dL    Blood Bank: O positive available     Assessment/Plan:   This is a 62 year old right hand dominant male s/post right craniotomy for microsurgical clipping of right MCA aneurysm was found to have a new left ICA aneurysm. Patient presents to Neuro IR for a selective cerebral angiogram to evaluate the new aneurysm. Procedure, goals, risks, benefits and alternatives were discussed with patient and patient's family. All questions were answered. Risks include but are not limited to stroke, vessel injury, hemorrhage, and or right groin hematoma. Patient demonstrates understanding of all risks involved with this procedure and wishes to continue. Appropriate content was obtained from patient and consent is in the patient's chart.

## 2018-08-31 ENCOUNTER — OUTPATIENT (OUTPATIENT)
Dept: OUTPATIENT SERVICES | Facility: HOSPITAL | Age: 63
LOS: 1 days | End: 2018-08-31
Payer: MEDICAID

## 2018-08-31 VITALS
HEIGHT: 65 IN | OXYGEN SATURATION: 99 % | SYSTOLIC BLOOD PRESSURE: 144 MMHG | WEIGHT: 175.05 LBS | DIASTOLIC BLOOD PRESSURE: 82 MMHG | HEART RATE: 61 BPM | TEMPERATURE: 98 F

## 2018-08-31 DIAGNOSIS — I67.1 CEREBRAL ANEURYSM, NONRUPTURED: ICD-10-CM

## 2018-08-31 DIAGNOSIS — Z98.89 OTHER SPECIFIED POSTPROCEDURAL STATES: Chronic | ICD-10-CM

## 2018-08-31 DIAGNOSIS — Z98.49 CATARACT EXTRACTION STATUS, UNSPECIFIED EYE: Chronic | ICD-10-CM

## 2018-08-31 DIAGNOSIS — Z01.818 ENCOUNTER FOR OTHER PREPROCEDURAL EXAMINATION: ICD-10-CM

## 2018-08-31 DIAGNOSIS — G40.909 EPILEPSY, UNSPECIFIED, NOT INTRACTABLE, WITHOUT STATUS EPILEPTICUS: ICD-10-CM

## 2018-08-31 DIAGNOSIS — Z95.0 PRESENCE OF CARDIAC PACEMAKER: Chronic | ICD-10-CM

## 2018-08-31 DIAGNOSIS — I10 ESSENTIAL (PRIMARY) HYPERTENSION: ICD-10-CM

## 2018-08-31 DIAGNOSIS — K21.9 GASTRO-ESOPHAGEAL REFLUX DISEASE WITHOUT ESOPHAGITIS: ICD-10-CM

## 2018-08-31 LAB
ANION GAP SERPL CALC-SCNC: 14 MMOL/L — SIGNIFICANT CHANGE UP (ref 5–17)
BLD GP AB SCN SERPL QL: NEGATIVE — SIGNIFICANT CHANGE UP
BUN SERPL-MCNC: 18 MG/DL — SIGNIFICANT CHANGE UP (ref 7–23)
CALCIUM SERPL-MCNC: 9.6 MG/DL — SIGNIFICANT CHANGE UP (ref 8.4–10.5)
CHLORIDE SERPL-SCNC: 94 MMOL/L — LOW (ref 96–108)
CO2 SERPL-SCNC: 25 MMOL/L — SIGNIFICANT CHANGE UP (ref 22–31)
CREAT SERPL-MCNC: 1.04 MG/DL — SIGNIFICANT CHANGE UP (ref 0.5–1.3)
GLUCOSE SERPL-MCNC: 115 MG/DL — HIGH (ref 70–99)
HCT VFR BLD CALC: 37 % — LOW (ref 39–50)
HGB BLD-MCNC: 12.7 G/DL — LOW (ref 13–17)
MCHC RBC-ENTMCNC: 32.2 PG — SIGNIFICANT CHANGE UP (ref 27–34)
MCHC RBC-ENTMCNC: 34.3 GM/DL — SIGNIFICANT CHANGE UP (ref 32–36)
MCV RBC AUTO: 93.7 FL — SIGNIFICANT CHANGE UP (ref 80–100)
PLATELET # BLD AUTO: 186 K/UL — SIGNIFICANT CHANGE UP (ref 150–400)
POTASSIUM SERPL-MCNC: 4.5 MMOL/L — SIGNIFICANT CHANGE UP (ref 3.5–5.3)
POTASSIUM SERPL-SCNC: 4.5 MMOL/L — SIGNIFICANT CHANGE UP (ref 3.5–5.3)
RBC # BLD: 3.95 M/UL — LOW (ref 4.2–5.8)
RBC # FLD: 12.8 % — SIGNIFICANT CHANGE UP (ref 10.3–14.5)
RH IG SCN BLD-IMP: POSITIVE — SIGNIFICANT CHANGE UP
SODIUM SERPL-SCNC: 133 MMOL/L — LOW (ref 135–145)
WBC # BLD: 3.2 K/UL — LOW (ref 3.8–10.5)
WBC # FLD AUTO: 3.2 K/UL — LOW (ref 3.8–10.5)

## 2018-08-31 PROCEDURE — G0463: CPT

## 2018-08-31 PROCEDURE — 93010 ELECTROCARDIOGRAM REPORT: CPT

## 2018-08-31 PROCEDURE — 86900 BLOOD TYPING SEROLOGIC ABO: CPT

## 2018-08-31 PROCEDURE — 86901 BLOOD TYPING SEROLOGIC RH(D): CPT

## 2018-08-31 PROCEDURE — 80048 BASIC METABOLIC PNL TOTAL CA: CPT

## 2018-08-31 PROCEDURE — 85027 COMPLETE CBC AUTOMATED: CPT

## 2018-08-31 PROCEDURE — 86850 RBC ANTIBODY SCREEN: CPT

## 2018-08-31 PROCEDURE — 93005 ELECTROCARDIOGRAM TRACING: CPT

## 2018-08-31 RX ORDER — SOD,AMMONIUM,POTASSIUM LACTATE
1 CREAM (GRAM) TOPICAL
Qty: 0 | Refills: 0 | COMMUNITY

## 2018-08-31 RX ORDER — PANTOPRAZOLE SODIUM 20 MG/1
1 TABLET, DELAYED RELEASE ORAL
Qty: 0 | Refills: 0 | COMMUNITY

## 2018-08-31 RX ORDER — CICLOPIROX OLAMINE 7.7 MG/G
1 CREAM TOPICAL
Qty: 0 | Refills: 0 | COMMUNITY

## 2018-08-31 RX ORDER — ATORVASTATIN CALCIUM 80 MG/1
1 TABLET, FILM COATED ORAL
Qty: 0 | Refills: 0 | COMMUNITY

## 2018-08-31 NOTE — H&P PST ADULT - HISTORY OF PRESENT ILLNESS
Mr. Rios is a 62 year old Georgian speaking man with PMH of SAH when he was 32 years old s/p clipping done at [facility], he states that he was in a coma for 2 months after suffering a stroke with residual Left hemiplegia.  last year he had brain imaging for unclear reason and an incidental aneurysm was found and he was referred to Dr. Ibarra for evaluation.  CTA revealed superolaterally projection left proximal supraclinoid ICA aneurysm with the dome measuring 7-8mm and the neck measuring 2mm in size, additionally a 2 mm focus of flow related enhancement in the right perisylvian aneurysm clip.  He is now scheduled for cerebral angiography with future plan for endovascular treatment.    Patient came in with a , Juju Damico, who translated in Georgian as needed. 63 yo East Timorese speaking male from [place], [facility] cerebral aneurysm s/p clipping 1998, SAH and was in a coma for 2 months, he suffered a stroke and has left sided weakness, wears a brace on left lower extremity, symptomatic bradycardia s/p PPM 2007 with new generator 2016, HTN, HLD, seizures, last one 2016. A new aneurysm was found about 5 years ago and was being followed at [facility]. Pt. is s/p diagnostic ceebral angiogram on 8/29/18 and presents to PST for Cerebral angiogram with embolization on 9/5/18. Pt. denies recent fever, chills, chest pain, SOB, changes in bowel/urinary habits. Pt. takes care of self, has HHA for 3.5 hours daily.  Pt. will continue aspirin and Plavix during mehreen-op period 61 yo Azerbaijani speaking male from [place], [facility] cerebral aneurysm s/p clipping 1998, SAH and was in a coma for 2 months, he suffered a stroke and has left sided weakness, wears a brace on left lower extremity and cane to ambulate, symptomatic bradycardia s/p PPM 2007 with new generator 2016, HTN, HLD, seizures, last one 2016. A new aneurysm was found about 5 years ago and was being followed at [facility]. Pt. is s/p diagnostic ceebral angiogram on 8/29/18 and presents to PST for Cerebral angiogram with embolization on 9/5/18. Pt. denies recent fever, chills, chest pain, SOB, changes in bowel/urinary habits. Pt. takes care of self, has HHA for 3.5 hours daily.  Pt. will continue aspirin and Plavix during mehreen-op period

## 2018-08-31 NOTE — H&P PST ADULT - PSH
Pacemaker  placed in 2007 originally, 2016 most recent placement  S/P bunionectomy    S/P cataract surgery  with lens implants  S/P craniotomy  for RCMA 1998 Pacemaker  MedPurchext Serial [id number]/Model [id number]  Implant 6/29/07, new generator 12/23/2016  S/P bunionectomy    S/P cataract surgery  with lens implants  S/P craniotomy  for RCMA 1998

## 2018-08-31 NOTE — H&P PST ADULT - PROBLEM SELECTOR PLAN 4
Patient instructed the may take pantoprazole DOS with small sips of water Continue Pepcid as directed, including am of procedure with sip of water

## 2018-08-31 NOTE — H&P PST ADULT - MUSCULOSKELETAL
detailed exam no strength/except noted above in neuro exam, R hemiparesis/ROM intact details… no joint swelling/no strength/no calf tenderness

## 2018-08-31 NOTE — H&P PST ADULT - PROBLEM SELECTOR PLAN 1
Cerebral angiogram  Medical evaluation 8/1/18  Pending cardio appointment (as per PCP) Cerebral angiogram with embolization  Pre-op education provided - all questions answered  Labs repeated at [facility] s/p cerebral angiogram 8/29  pt. will continue Plavix and aspiring during mehreen-op period  EKG done at PST

## 2018-08-31 NOTE — H&P PST ADULT - PMH
Aneurysm of [place] of [place]    Bradycardia    Cerebral aneurysm without rupture  diagnosed 2017 had cerebral angio at [facility]  CVA (cerebral vascular accident)  left arm weakness, no sensation in left side in 1998  Depression    Dysrhythmia, cardiac    Epilepsy    Gastroesophageal reflux disease, esophagitis presence not specified    Glaucoma of both eyes, unspecified glaucoma type    HLD (hyperlipidemia)    Language barrier  Patient has a  who translates as needed.  Renal agenesis, unilateral Aneurysm of [place] of [place]    Bradycardia    Cerebral aneurysm without rupture  diagnosed 2017 had cerebral angio at [facility]  CVA (cerebral vascular accident)  left arm weakness, no sensation in left side in 1998  Depression    Epilepsy  last seizure 2016  Gastroesophageal reflux disease, esophagitis presence not specified    Glaucoma of both eyes, unspecified glaucoma type    HLD (hyperlipidemia)    Language barrier  Lao speaking  Renal agenesis, unilateral

## 2018-08-31 NOTE — H&P PST ADULT - PROBLEM SELECTOR PLAN 3
Instructed to continue taking eye gtts and to take am doses DOS Continue BP meds as directed, including am of procedure with sip of water

## 2018-08-31 NOTE — H&P PST ADULT - PROBLEM SELECTOR PROBLEM 4
Gastroesophageal reflux disease, esophagitis presence not specified GERD (gastroesophageal reflux disease)

## 2018-08-31 NOTE — H&P PST ADULT - MOTOR
RUE 4/5 proximally 5/5 distally,  4/5; RLE 5/5 ileopsoas, 4/5 distally, brace on RLE (states he has foot drop) 4/5 limbs on left side  5/5 limbs on right side

## 2018-08-31 NOTE — H&P PST ADULT - SENSORY
Diminished sensation of R side of face, no extinction Diminished sensation of R side of face, upper and lower extremities

## 2018-08-31 NOTE — H&P PST ADULT - NEUROLOGICAL DETAILS
responds to verbal commands/alert and oriented x 3/responds to pain alert and oriented x 3/responds to verbal commands

## 2018-09-01 PROBLEM — Z78.9 OTHER SPECIFIED HEALTH STATUS: Chronic | Status: ACTIVE | Noted: 2018-07-31

## 2018-09-04 ENCOUNTER — APPOINTMENT (OUTPATIENT)
Dept: NEUROLOGY | Facility: CLINIC | Age: 63
End: 2018-09-04
Payer: MEDICAID

## 2018-09-04 VITALS
BODY MASS INDEX: 28.32 KG/M2 | DIASTOLIC BLOOD PRESSURE: 74 MMHG | TEMPERATURE: 97.9 F | HEIGHT: 65 IN | HEART RATE: 83 BPM | WEIGHT: 170 LBS | SYSTOLIC BLOOD PRESSURE: 112 MMHG | OXYGEN SATURATION: 96 %

## 2018-09-04 LAB — PA ADP PRP-ACNC: 7 PRU

## 2018-09-04 PROCEDURE — 99205 OFFICE O/P NEW HI 60 MIN: CPT

## 2018-09-04 PROCEDURE — 99215 OFFICE O/P EST HI 40 MIN: CPT

## 2018-09-05 ENCOUNTER — TRANSCRIPTION ENCOUNTER (OUTPATIENT)
Age: 63
End: 2018-09-05

## 2018-09-05 ENCOUNTER — INPATIENT (INPATIENT)
Facility: HOSPITAL | Age: 63
LOS: 1 days | Discharge: HOME | DRG: 26 | End: 2018-09-07
Attending: NEUROLOGICAL SURGERY | Admitting: PSYCHIATRY & NEUROLOGY
Payer: MEDICAID

## 2018-09-05 ENCOUNTER — APPOINTMENT (OUTPATIENT)
Dept: NEUROLOGY | Facility: CLINIC | Age: 63
End: 2018-09-05

## 2018-09-05 VITALS
HEART RATE: 61 BPM | HEIGHT: 65 IN | TEMPERATURE: 98 F | WEIGHT: 177.91 LBS | RESPIRATION RATE: 12 BRPM | OXYGEN SATURATION: 100 %

## 2018-09-05 DIAGNOSIS — Z98.49 CATARACT EXTRACTION STATUS, UNSPECIFIED EYE: Chronic | ICD-10-CM

## 2018-09-05 DIAGNOSIS — I67.1 CEREBRAL ANEURYSM, NONRUPTURED: ICD-10-CM

## 2018-09-05 DIAGNOSIS — Z98.89 OTHER SPECIFIED POSTPROCEDURAL STATES: Chronic | ICD-10-CM

## 2018-09-05 DIAGNOSIS — Z95.0 PRESENCE OF CARDIAC PACEMAKER: Chronic | ICD-10-CM

## 2018-09-05 LAB
ANION GAP SERPL CALC-SCNC: 10 MMOL/L — SIGNIFICANT CHANGE UP (ref 5–17)
APTT BLD: 49.2 SEC — HIGH (ref 27.5–37.4)
BASOPHILS # BLD AUTO: 0 K/UL — SIGNIFICANT CHANGE UP (ref 0–0.2)
BASOPHILS NFR BLD AUTO: 0.2 % — SIGNIFICANT CHANGE UP (ref 0–2)
BUN SERPL-MCNC: 12 MG/DL — SIGNIFICANT CHANGE UP (ref 7–23)
CALCIUM SERPL-MCNC: 7.9 MG/DL — LOW (ref 8.4–10.5)
CHLORIDE SERPL-SCNC: 99 MMOL/L — SIGNIFICANT CHANGE UP (ref 96–108)
CO2 SERPL-SCNC: 21 MMOL/L — LOW (ref 22–31)
CREAT SERPL-MCNC: 0.92 MG/DL — SIGNIFICANT CHANGE UP (ref 0.5–1.3)
EOSINOPHIL # BLD AUTO: 0.1 K/UL — SIGNIFICANT CHANGE UP (ref 0–0.5)
EOSINOPHIL NFR BLD AUTO: 2.3 % — SIGNIFICANT CHANGE UP (ref 0–6)
GLUCOSE SERPL-MCNC: 116 MG/DL — HIGH (ref 70–99)
HCT VFR BLD CALC: 35.2 % — LOW (ref 39–50)
HGB BLD-MCNC: 12.1 G/DL — LOW (ref 13–17)
INR BLD: 1.08 RATIO — SIGNIFICANT CHANGE UP (ref 0.88–1.16)
LYMPHOCYTES # BLD AUTO: 1 K/UL — SIGNIFICANT CHANGE UP (ref 1–3.3)
LYMPHOCYTES # BLD AUTO: 22.1 % — SIGNIFICANT CHANGE UP (ref 13–44)
MAGNESIUM SERPL-MCNC: 2 MG/DL — SIGNIFICANT CHANGE UP (ref 1.6–2.6)
MCHC RBC-ENTMCNC: 32.4 PG — SIGNIFICANT CHANGE UP (ref 27–34)
MCHC RBC-ENTMCNC: 34.4 GM/DL — SIGNIFICANT CHANGE UP (ref 32–36)
MCV RBC AUTO: 94.2 FL — SIGNIFICANT CHANGE UP (ref 80–100)
MONOCYTES # BLD AUTO: 0.5 K/UL — SIGNIFICANT CHANGE UP (ref 0–0.9)
MONOCYTES NFR BLD AUTO: 11.2 % — SIGNIFICANT CHANGE UP (ref 2–14)
NEUTROPHILS # BLD AUTO: 3 K/UL — SIGNIFICANT CHANGE UP (ref 1.8–7.4)
NEUTROPHILS NFR BLD AUTO: 64.1 % — SIGNIFICANT CHANGE UP (ref 43–77)
PA ADP PRP-ACNC: 9 PRU — LOW (ref 194–417)
PHOSPHATE SERPL-MCNC: 2.8 MG/DL — SIGNIFICANT CHANGE UP (ref 2.5–4.5)
PLATELET # BLD AUTO: 182 K/UL — SIGNIFICANT CHANGE UP (ref 150–400)
POTASSIUM SERPL-MCNC: 3.5 MMOL/L — SIGNIFICANT CHANGE UP (ref 3.5–5.3)
POTASSIUM SERPL-SCNC: 3.5 MMOL/L — SIGNIFICANT CHANGE UP (ref 3.5–5.3)
PROTHROM AB SERPL-ACNC: 11.7 SEC — SIGNIFICANT CHANGE UP (ref 9.8–12.7)
RBC # BLD: 3.74 M/UL — LOW (ref 4.2–5.8)
RBC # FLD: 11.5 % — SIGNIFICANT CHANGE UP (ref 10.3–14.5)
SODIUM SERPL-SCNC: 130 MMOL/L — LOW (ref 135–145)
WBC # BLD: 4.6 K/UL — SIGNIFICANT CHANGE UP (ref 3.8–10.5)
WBC # FLD AUTO: 4.6 K/UL — SIGNIFICANT CHANGE UP (ref 3.8–10.5)

## 2018-09-05 PROCEDURE — 75898 FOLLOW-UP ANGIOGRAPHY: CPT | Mod: 26,59

## 2018-09-05 PROCEDURE — 99291 CRITICAL CARE FIRST HOUR: CPT

## 2018-09-05 PROCEDURE — 36223 PLACE CATH CAROTID/INOM ART: CPT | Mod: LT

## 2018-09-05 PROCEDURE — 75894 X-RAYS TRANSCATH THERAPY: CPT | Mod: 26

## 2018-09-05 PROCEDURE — 99223 1ST HOSP IP/OBS HIGH 75: CPT

## 2018-09-05 PROCEDURE — 61624 TCAT PERM OCCLS/EMBOLJ CNS: CPT

## 2018-09-05 PROCEDURE — 70496 CT ANGIOGRAPHY HEAD: CPT | Mod: 26

## 2018-09-05 RX ORDER — CARBAMAZEPINE 200 MG
200 TABLET ORAL THREE TIMES A DAY
Qty: 0 | Refills: 0 | Status: DISCONTINUED | OUTPATIENT
Start: 2018-09-05 | End: 2018-09-07

## 2018-09-05 RX ORDER — LEVETIRACETAM 250 MG/1
500 TABLET, FILM COATED ORAL
Qty: 0 | Refills: 0 | Status: DISCONTINUED | OUTPATIENT
Start: 2018-09-05 | End: 2018-09-07

## 2018-09-05 RX ORDER — CLOPIDOGREL BISULFATE 75 MG/1
75 TABLET, FILM COATED ORAL
Qty: 0 | Refills: 0 | Status: DISCONTINUED | OUTPATIENT
Start: 2018-09-06 | End: 2018-09-07

## 2018-09-05 RX ORDER — INFLUENZA VIRUS VACCINE 15; 15; 15; 15 UG/.5ML; UG/.5ML; UG/.5ML; UG/.5ML
0.5 SUSPENSION INTRAMUSCULAR ONCE
Qty: 0 | Refills: 0 | Status: DISCONTINUED | OUTPATIENT
Start: 2018-09-05 | End: 2018-09-07

## 2018-09-05 RX ORDER — ACETAMINOPHEN 500 MG
650 TABLET ORAL EVERY 6 HOURS
Qty: 0 | Refills: 0 | Status: DISCONTINUED | OUTPATIENT
Start: 2018-09-05 | End: 2018-09-07

## 2018-09-05 RX ORDER — ASPIRIN/CALCIUM CARB/MAGNESIUM 324 MG
325 TABLET ORAL
Qty: 0 | Refills: 0 | Status: DISCONTINUED | OUTPATIENT
Start: 2018-09-06 | End: 2018-09-07

## 2018-09-05 RX ORDER — FAMOTIDINE 10 MG/ML
20 INJECTION INTRAVENOUS DAILY
Qty: 0 | Refills: 0 | Status: DISCONTINUED | OUTPATIENT
Start: 2018-09-06 | End: 2018-09-07

## 2018-09-05 RX ORDER — LATANOPROST 0.05 MG/ML
1 SOLUTION/ DROPS OPHTHALMIC; TOPICAL AT BEDTIME
Qty: 0 | Refills: 0 | Status: DISCONTINUED | OUTPATIENT
Start: 2018-09-05 | End: 2018-09-07

## 2018-09-05 RX ORDER — ATORVASTATIN CALCIUM 80 MG/1
80 TABLET, FILM COATED ORAL AT BEDTIME
Qty: 0 | Refills: 0 | Status: DISCONTINUED | OUTPATIENT
Start: 2018-09-05 | End: 2018-09-07

## 2018-09-05 RX ORDER — FLUOXETINE HCL 10 MG
40 CAPSULE ORAL DAILY
Qty: 0 | Refills: 0 | Status: DISCONTINUED | OUTPATIENT
Start: 2018-09-05 | End: 2018-09-07

## 2018-09-05 RX ORDER — EPTIFIBATIDE 2 MG/ML
10 INJECTION, SOLUTION INTRAVENOUS ONCE
Qty: 0 | Refills: 0 | Status: DISCONTINUED | OUTPATIENT
Start: 2018-09-05 | End: 2018-09-05

## 2018-09-05 RX ORDER — SODIUM CHLORIDE 9 MG/ML
1000 INJECTION INTRAMUSCULAR; INTRAVENOUS; SUBCUTANEOUS
Qty: 0 | Refills: 0 | Status: DISCONTINUED | OUTPATIENT
Start: 2018-09-05 | End: 2018-09-06

## 2018-09-05 RX ORDER — DORZOLAMIDE HYDROCHLORIDE 20 MG/ML
1 SOLUTION/ DROPS OPHTHALMIC
Qty: 0 | Refills: 0 | Status: DISCONTINUED | OUTPATIENT
Start: 2018-09-05 | End: 2018-09-07

## 2018-09-05 RX ADMIN — Medication 200 MILLIGRAM(S): at 21:51

## 2018-09-05 RX ADMIN — LEVETIRACETAM 500 MILLIGRAM(S): 250 TABLET, FILM COATED ORAL at 18:47

## 2018-09-05 RX ADMIN — ATORVASTATIN CALCIUM 80 MILLIGRAM(S): 80 TABLET, FILM COATED ORAL at 21:51

## 2018-09-05 RX ADMIN — SODIUM CHLORIDE 70 MILLILITER(S): 9 INJECTION INTRAMUSCULAR; INTRAVENOUS; SUBCUTANEOUS at 18:49

## 2018-09-05 RX ADMIN — LATANOPROST 1 DROP(S): 0.05 SOLUTION/ DROPS OPHTHALMIC; TOPICAL at 21:52

## 2018-09-05 RX ADMIN — DORZOLAMIDE HYDROCHLORIDE 1 DROP(S): 20 SOLUTION/ DROPS OPHTHALMIC at 18:49

## 2018-09-05 NOTE — DISCHARGE NOTE ADULT - HOSPITAL COURSE
61 yo [place] citizen speaking male from [place], [facility] cerebral aneurysm s/p clipping 1998, SAH and was in a coma for 2 months, he suffered a stroke and has left sided weakness, wears a brace on left lower extremity and cane to ambulate, symptomatic bradycardia s/p PPM 2007 with new generator 2016, HTN, HLD, seizures, last one 2016. A new aneurysm was found about 5 years ago and was being followed at [facility]. Patient is s/p diagnostic cerebral angiogram on 8/29/18 and presents to [facility] for Cerebral angiogram with embolization on 9/5/18. Patient denies recent fever, chills, chest pain, SOB, changes in bowel/urinary habits. Patient takes care of self, has HHA for 3.5 hours daily.  Patient will continue aspirin and Plavix during mehreen-op period (31 Aug 2018 11:15)    Patient was admitted via [facility] and underwent as planned on 9/5/18 an elective Cerebral Angiography and embolization with stent placement for unruptured left ophthalmic artery aneurysm. Post procedure he was cared for per protocol in NSCU and then transferred to the floor. He did well without complications. PT/OT evaluated him and recommended subacute rehab, however he refused. Home PT/OT was arranged and his HHA have been reinstated. On the day of discharge he was medically and neurologically stable for discharge.     More than 30 minutes were spent educating the patient and family regarding condition, medications, follow up plans, signs and symptoms to be concerned with, preparing paperwork, and questions answered regarding discharge.

## 2018-09-05 NOTE — DISCHARGE NOTE ADULT - ADDITIONAL INSTRUCTIONS
You have a stent assisted embolization of a cerebral aneurysm for which you are on aspirin and plavix, do not stop these medications for any reason without notifying Dr. Sinha.

## 2018-09-05 NOTE — DISCHARGE NOTE ADULT - PATIENT PORTAL LINK FT
You can access the [facility] Patient Portal, offered by [facility], by registering with the following website: [email or web address]

## 2018-09-05 NOTE — CHART NOTE - NSCHARTNOTEFT_GEN_A_CORE
Interventional Neuro Radiology  Pre-Procedure Note PA-C    This is a 62 year old right hand dominant Cymraes speaking male from [place], [facility] cerebral aneurysm s/p clipping 1998, SAH and was in a coma for 2 months, he suffered a stroke and has left sided weakness, wears a brace on left lower extremity and cane to ambulate, symptomatic bradycardia s/p PPM 2007 with new generator 2016, HTN, HLD, seizures, last one 2016. A new aneurysm was found about 5 years ago and was being followed at [facility]. Pt. is s/p diagnostic ceebral angiogram on 8/29/18 and presents to PST for Cerebral angiogram with embolization on 9/5/18. Pt. denies recent fever, chills, chest pain, SOB, changes in bowel/urinary habits. Pt. takes care of self, has HHA for 3.5 hours daily.  Patient will continue aspirin 325mg and Plavix 75mg    Allergies: No Known Allergies  PMHX: bradycardia, glaucoma of both eyes, Gastroesophageal reflux disease, depression, unilateral renal agenesis epilepsy, hyperlipidemia, CVA  PSHX: dx cerebral angio 8- cataract surgery: with lens implants, bunionectomy, pacemaker placement, right craniotomy for RCMA 1998  Social History: non-smoker  FAMILY HISTORY: non-contributory     Complete Blood Count     WBC Count: 3.20 K/uL    Hemoglobin: 12.7 g/dL    Hematocrit: 37.0     Platelet Count  186 K/uL    Basic Metabolic Panel     Sodium, Serum: 133 mmol/L    Potassium, Serum: 4.5 mmol/L    Chloride, Serum: 94 mmol/L    Carbon Dioxide, Serum: 25 mmol/L    Anion Gap, Serum: 14 mmol/L    Blood Urea Nitrogen, Serum: 18 mg/dL    Creatinine, Serum: 1.04 mg/dL    Glucose, Serum: 115 mg/dL    Blood Bank:  O positive available     Assessment/Plan:   This is a 62 year old right hand dominant male with   Procedure, goals, risks, benefits and alternatives were discussed with patient and patient's family. All questions were answered. Risks include but are not limited to stroke, vessel injury, hemorrhage, and or right  groin hematoma. Patient demonstrates understanding of all risks involved with this procedure and wishes to continue. Appropriate content was obtained from patient and consent is in the patient's chart. Interventional Neuro Radiology  Pre-Procedure Note PA-C    This is a 62 year old right hand dominant Martiniquais speaking male from [place], [facility] cerebral aneurysm s/p clipping 1998, SAH and was in a coma for 2 months, he suffered a stroke and has left sided weakness, wears a brace on left lower extremity and cane to ambulate, symptomatic bradycardia with a pacemaker placed in 2007 with HTN, HLD, seizures, last one 2016. A left ophthalmic artery aneurysm was found about 5 years ago and was being followed at [facility]. Patient is s/post diagnostic cerebral angiogram on 8/29/18 and presents to Neuro IR for a selective cerebral angiogram with embolization.    Allergies: No Known Allergies  PMHX: bradycardia, glaucoma of both eyes, Gastroesophageal reflux disease, depression, unilateral renal agenesis epilepsy, hyperlipidemia, CVA  PSHX: dx cerebral angio 8- cataract surgery: with lens implants, bunionectomy, pacemaker placement, right craniotomy for RCMA 1998  Social History: non-smoker, single   FAMILY HISTORY: non-contributory     Complete Blood Count     WBC Count: 3.20 K/uL    Hemoglobin: 12.7 g/dL    Hematocrit: 37.0     Platelet Count  186 K/uL    Basic Metabolic Panel     Sodium, Serum: 133 mmol/L    Potassium, Serum: 4.5 mmol/L    Chloride, Serum: 94 mmol/L    Carbon Dioxide, Serum: 25 mmol/L    Anion Gap, Serum: 14 mmol/L    Blood Urea Nitrogen, Serum: 18 mg/dL    Creatinine, Serum: 1.04 mg/dL    Glucose, Serum: 115 mg/dL    Blood Bank:  O positive available     Assessment/Plan:   This is a 62 year old right hand dominant male with a left ophthalmic artery aneurysm, in Neuro IR for a selective cerebral angiogram and pipeline stent placement. Procedure, goals, risks, benefits and alternatives were discussed with patient. All questions were answered. Risks include but are not limited to stroke, vessel injury, hemorrhage, and or right  groin hematoma. Patient demonstrates understanding of all risks involved with this procedure and wishes to continue. Appropriate content was obtained from patient and consent is in the patient's chart.

## 2018-09-05 NOTE — DISCHARGE NOTE ADULT - PLAN OF CARE
9/5/18 s/p elective Cerebral Angiography and embolization with stent placement for unruptured left ophthalmic artery aneurysm. Dr Ibarra- neurosurgeon- please call office for appointment in 1-2 weeks.   Dr Sinha- vascular neurologist- please call office for appointment in 1-2 weeks.   Primary care physician and/or cardiologist within 1-2 weeks.   please notify physician if fevers, bleeding, swelling, pain not relieved by medication, increased sluggishness or irritability, increased nausea or vomiting, inability to tolerate foods or liquids.   please do not engage in strenuous activity, heavy lifting, drive, or return to work or school until cleared by surgeon. Please continue medications and follow up with your primary care physician and/or cardiologist within 1-2 weeks. Please continue medications and follow up with your neurologist or primary care physician and/or cardiologist within 1-2 weeks.

## 2018-09-05 NOTE — DISCHARGE NOTE ADULT - NS AS DC STROKE ED MATERIALS
Risk Factors for Stroke/Prescribed Medications/Need for Followup After Discharge/Stroke Education Booklet/Stroke Warning Signs and Symptoms/Call 911 for Stroke Prescribed Medications/Stroke Education Booklet/Need for Followup After Discharge/Risk Factors for Stroke/Stroke Warning Signs and Symptoms/Call 911 for Stroke

## 2018-09-05 NOTE — DISCHARGE NOTE ADULT - HOME CARE AGENCY
referral forwarded to [facility] [phone], for visiting nurse and home physical therapy.  A nurse will call you the day after discharge to schedule your appointment.

## 2018-09-05 NOTE — DISCHARGE NOTE ADULT - NS AS ACTIVITY OBS
Do not make important decisions/Do not drive or operate machinery/Walking-Indoors allowed/Walking-Outdoors allowed/Showering allowed/No Heavy lifting/straining/Stairs allowed/Bathing allowed

## 2018-09-05 NOTE — DISCHARGE NOTE ADULT - MEDICATION SUMMARY - MEDICATIONS TO TAKE
I will START or STAY ON the medications listed below when I get home from the hospital:    acetaminophen 325 mg oral tablet  -- 2 tab(s) by mouth every 6 hours, As needed, Mild Pain (1 - 3)  -- Indication: For pain    aspirin 325 mg oral tablet  -- 1 tab(s) by mouth once a day  -- Indication: For post aneurysm embolization    carBAMazepine 200 mg oral tablet  -- 1 tab(s) by mouth 3 times a day  -- Indication: For anti seizure    levETIRAcetam 500 mg oral tablet  -- 1 tab(s) by mouth 2 times a day  -- Indication: For anti seizure    LORazepam 0.5 mg oral tablet  -- 1 tab(s) by mouth 3 times a day, As Needed  -- Indication: For anxiety    FLUoxetine 20 mg oral capsule  -- 2 cap(s) by mouth once a day  -- Indication: For depression    atorvastatin 80 mg oral tablet  -- 1 tab(s) by mouth once a day (at bedtime)  -- Indication: For cholesterol    clopidogrel 75 mg oral tablet  -- 1 tab(s) by mouth once a day  -- Indication: For post aneurysm embolization    hydrOXYzine pamoate 50 mg oral capsule  -- 1 cap(s) by mouth every 8 hours, As Needed  -- Indication: For anxiety    metoprolol tartrate 25 mg oral tablet  -- 1 tab(s) by mouth once a day  -- Indication: For blood pressure    alendronate 70 mg oral tablet  -- 1 tab(s) by mouth once a week on Mondays  -- Indication: For bone supplement    ammonium lactate topical  -- Apply on skin to affected area once a day  -- Indication: For topical    ciclopirox 8% topical solution  -- Apply on skin to affected area once a day  -- Indication: For topical    famotidine 20 mg oral tablet  -- 1 tab(s) by mouth 2 times a day  -- Indication: For GI    dorzolamide 2% ophthalmic solution  -- 1 drop(s) to each affected eye 2 times a day  -- Indication: For eye drops    Aquasite Artificial Tears ophthalmic solution  -- 1 drop(s) to each affected eye 4 times a day  -- Indication: For eye drops    latanoprost 0.005% ophthalmic solution  -- 1 drop(s) to each affected eye once a day (at bedtime)  -- Indication: For eye drops    pantoprazole 40 mg oral delayed release tablet  -- 1 tab(s) by mouth once a day  -- Indication: For GI    Vitamin B1 100 mg oral tablet  -- 1 tab(s) by mouth once a day  -- Indication: For Supplement

## 2018-09-05 NOTE — DISCHARGE NOTE ADULT - CARE PLAN
Principal Discharge DX:	Cerebral aneurysm without rupture  Goal:	9/5/18 s/p elective Cerebral Angiography and embolization with stent placement for unruptured left ophthalmic artery aneurysm.  Assessment and plan of treatment:	Dr Ibarra- neurosurgeon- please call office for appointment in 1-2 weeks.   Dr Sinha- vascular neurologist- please call office for appointment in 1-2 weeks.   Primary care physician and/or cardiologist within 1-2 weeks.   please notify physician if fevers, bleeding, swelling, pain not relieved by medication, increased sluggishness or irritability, increased nausea or vomiting, inability to tolerate foods or liquids.   please do not engage in strenuous activity, heavy lifting, drive, or return to work or school until cleared by surgeon.  Secondary Diagnosis:	Depression  Assessment and plan of treatment:	Please continue medications and follow up with your primary care physician and/or cardiologist within 1-2 weeks.  Secondary Diagnosis:	HTN (hypertension)  Assessment and plan of treatment:	Please continue medications and follow up with your primary care physician and/or cardiologist within 1-2 weeks.  Secondary Diagnosis:	HLD (hyperlipidemia)  Assessment and plan of treatment:	Please continue medications and follow up with your primary care physician and/or cardiologist within 1-2 weeks.  Secondary Diagnosis:	Seizure disorder  Assessment and plan of treatment:	Please continue medications and follow up with your neurologist or primary care physician and/or cardiologist within 1-2 weeks.  Secondary Diagnosis:	GERD (gastroesophageal reflux disease)  Assessment and plan of treatment:	Please continue medications and follow up with your primary care physician and/or cardiologist within 1-2 weeks.

## 2018-09-05 NOTE — CHART NOTE - NSCHARTNOTEFT_GEN_A_CORE
Interventional Neuro- Radiology   Procedure Note PA-C    Procedure: Selective Cerebral Angiography and embolization with 2 pipeline stents placed  Pre- Procedure Diagnosis:  Post- Procedure Diagnosis:    : Dr Tanner Sinha  Fellow;    Dr Kayleigh Brian  Resident: Dr Milagros Peck    Physician Assistant: Claire Polo PA-C  Nurse:                        Jcarlos Blanco  Radiology Tech:         Som Hughes LRT  Anesthesiologist:        Dr Mikhail Huerta CRNA  Sheath:                       5 German 65 cm arrow     I/Os: EBL less than 10cc  IV fluids:800cc Urine output 800cc Contrast 93cc           Antibiotics: Ancef 2 grams    Vitals: /64     HR 60     Spo2 100%    Preliminary Report:  Using a 5 German arrow 65cm sheath to the right groin under general anesthesia via left common carotid artery, left internal carotid artery, left MCA, left ICA a selective cerebral angiography was performed and again demonstrated official note to follow).  Patient tolerated procedure well, hemodynamically stable, no change in neurological status compared to baseline.  Results discussed with neurosurgery ICU and patient. Right groin sheath was removed, manual compression held to hemostasis for 21 minutes, no active bleeding, no hematoma, quick clot and safeguard balloon dressing applied at 1615. STAT labs: CBC BMP at 2000 hours. Patient transported to Recovery Room 2nd floor. Interventional Neuro- Radiology   Procedure Note PA-C    Procedure: Selective Cerebral Angiography and embolization with 2 pipeline stents placed  Pre- Procedure Diagnosis:   unruptured left ophthalmic artery aneurysm   Post- Procedure Diagnosis:      : Dr Tanner Sinha  Fellow;    Dr Kayleigh Brian  Resident: Dr Milagros Peck    Physician Assistant: Claire Polo PA-C  Nurse:                        Jcarlos Blanco  Radiology Tech:         Som Hughes LRT  Anesthesiologist:        Dr Mikhail Huerta CRNA  Sheath:                       5 Japanese 65 cm arrow     I/Os: EBL less than 10cc  IV fluids:800cc Urine output 800cc Contrast 93cc           Antibiotics: Ancef 2 grams    Vitals: /64    HR 60    Spo2 100%    Preliminary Report:  Using a 5 Japanese arrow 65cm sheath to the right groin under general anesthesia via left common carotid artery, left internal carotid artery, left MCA, left ICA a selective cerebral angiography was performed and again demonstrated an unruptured left ophthalmic artery aneurysm measuring 8.2mm by 4.6mm. with a 2.4mm neck. Today successful occlusion of left ophthalmic artery aneurysm. Official note to follow.  Patient tolerated procedure well, hemodynamically stable, no change in neurological status compared to baseline.  Results discussed with neurosurgery ICU and patient. Right groin sheath was removed, manual compression held to hemostasis for 21 minutes, no active bleeding, no hematoma, quick clot and safeguard balloon dressing applied at 1615. STAT labs: CBC BMP at 2000 hours. Patient transported to Recovery Room 2nd floor. Interventional Neuro- Radiology   Procedure Note PA-C    Procedure: Selective Cerebral Angiography and embolization with 2 pipeline stents placed  Pre- Procedure Diagnosis:   unruptured left ophthalmic artery aneurysm   Post- Procedure Diagnosis: left ophthalmic artery aneurysm s/post successful pipeline stents deployment      : Dr Tanner Sinha  Fellow;     Dr Kayleigh Brian  Resident: Dr Milagros Peck    Physician Assistant: Claire Polo PA-C  Nurse:                        Jcarlos Blanco  Radiology Tech:         Som Hughes LRT  Anesthesiologist:        Dr Mikhail Huerta CRNA  Sheath:                       5 Citizen of [place] 65 cm arrow     I/Os: EBL less than 10cc  IV fluids:800cc Urine output 800cc Contrast 93cc           Antibiotics: Ancef 2 grams    Vitals: /64    HR 60    Spo2 100%    Preliminary Report:  Using a 5 Citizen of [place] arrow 65cm sheath to the right groin under general anesthesia via left common carotid artery, left internal carotid artery, left MCA, left ICA a selective cerebral angiography was performed and again demonstrated an unruptured left ophthalmic artery aneurysm measuring 8.2mm by 4.6mm. with a 2.4mm neck. Today successful occlusion of left ophthalmic artery aneurysm was achieved, using 2 pipeline stents. Official note to follow.  Patient tolerated procedure well, hemodynamically stable, no change in neurological status compared to baseline. Results discussed with neurosurgery ICU and patient. Right groin sheath was removed, manual compression held to hemostasis for 21 minutes, no active bleeding, no hematoma, quick clot and safeguard balloon dressing applied at 1615. STAT labs: CBC BMP at 2000 hours. Patient transported to Recovery Room 2nd floor and later to the Neuro ICU. Interventional Neuro- Radiology   Procedure Note PA-C    Procedure: Selective Cerebral Angiography and aneurysm embolization with 2 pipeline stents  Pre- Procedure Diagnosis:   unruptured left ophthalmic artery aneurysm   Post- Procedure Diagnosis: left ophthalmic artery aneurysm s/post successful pipeline stents deployment      : Dr Tanner Sinha  Fellow;     Dr Kayleigh Brian  Resident: Dr Milagros Peck    Physician Assistant: Claire Polo PA-C  Nurse:                        Jcarlos Blanco  Radiology Tech:         Som Hughes LRT  Anesthesiologist:        Dr Mikhail Huerta CRNA  Sheath:                       5 Icelandic 65 cm arrow     I/Os: EBL less than 10cc  IV fluids:800cc Urine output 800cc Contrast 93cc           Antibiotics: Ancef 2 grams    Vitals: /64    HR 60    Spo2 100%    Preliminary Report:  Using a 5 Icelandic arrow 65cm sheath to the right groin under general anesthesia via left common carotid artery, left internal carotid artery, left MCA, left ICA a selective cerebral angiography was performed and again demonstrated an unruptured left ophthalmic artery aneurysm measuring 8.2mm by 4.6mm. with a 2.4mm neck. Today successful occlusion of left ophthalmic artery aneurysm was achieved, using 2 pipeline stents. Official note to follow.  Patient tolerated procedure well, hemodynamically stable, no change in neurological status compared to baseline. Results discussed with neurosurgery ICU and patient. Right groin sheath was removed, manual compression held to hemostasis for 21 minutes, no active bleeding, no hematoma, quick clot and safeguard balloon dressing applied at 1615. STAT labs: CBC BMP at 2000 hours. Patient transported to Recovery Room 2nd floor and later to the Neuro ICU.

## 2018-09-05 NOTE — DISCHARGE NOTE ADULT - SECONDARY DIAGNOSIS.
Depression HTN (hypertension) HLD (hyperlipidemia) Seizure disorder GERD (gastroesophageal reflux disease)

## 2018-09-05 NOTE — PROGRESS NOTE ADULT - SUBJECTIVE AND OBJECTIVE BOX
SUMMARY:  62 year-old Kazakh-speaking [place] man with history of subarachnoid hemorrhage status post clipping in 1998 with course notable for coma x 2 months, DCI/stroke with residual left sided weakness requiring left leg brace and cane to ambulate, symptomatic bradycardia status post PPM in 2007 with new generator placement in 2016, HTN, HLD, seizure disorder with last seizure in 2016 who presented on 9/5/18 for elective Pipeline stenting of unruptured ophthalmic artery aneurysm found on follow-up.     OVERNIGHT EVENTS:   Admitted.    VITALS/IMAGING/DATA/IVF FLUIDS/MEDICATIONS: [x] Reviewed    ALLERGIES:   No Known Allergies    EXAMINATION:  General: No acute distress  HEENT: Anicteric sclerae  Cardiac: [id number]  Lungs: Clear  Abdomen: Soft, non-tender, +BS  Extremities: No c/c/e, no groin hematoma  Skin/Incision Site: Clean, dry and intact  Neurologic: Awake, alert, fully oriented, follows commands, PERRL, VFFtc, EOMI, face symmetric, tongue midline, full strength on the right, left side 4/5 (baseline) SUMMARY:  62 year-old Turkish-speaking [place] man with history of subarachnoid hemorrhage status post clipping in 1998 with course notable for coma x 2 months, DCI/stroke with residual left sided weakness requiring left leg brace and cane to ambulate, symptomatic bradycardia status post PPM in 2007 with new generator placement in 2016, HTN, HLD, seizure disorder with last seizure in 2016 who presented on 9/5/18 for elective Pipeline stenting of unruptured ophthalmic artery aneurysm found on follow-up.     OVERNIGHT EVENTS:   Admitted.    VITALS/IMAGING/DATA/IVF FLUIDS/MEDICATIONS: [x] Reviewed    ALLERGIES:   No Known Allergies    EXAMINATION:  General: No acute distress  HEENT: Anicteric sclerae  Cardiac: [id number]  Lungs: Clear  Abdomen: Soft, non-tender, +BS  Extremities: No c/c/e, no groin hematoma  Skin/Incision Site: Clean, dry and intact  Neurologic: Awake, alert, fully oriented, follows commands, PERRL, EOMI, face symmetric, tongue midline, full strength on the right, left side 4/5 (baseline)

## 2018-09-05 NOTE — DISCHARGE NOTE ADULT - REASON FOR ADMISSION
"they are treating my cerebral aneurysm" 9/5/18 s/p elective Cerebral Angiography and embolization with stent placement for unruptured left ophthalmic artery aneurysm.

## 2018-09-05 NOTE — PROGRESS NOTE ADULT - ASSESSMENT
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 Sinha  Activity: [x] mobilize as tolerated once groin precautions concluded [] Bedrest [] PT [] OT [] PMNR    PULM:  Incentive spirometry    CV:  SBP goal 100-140mmHg    RENAL:  Fluids: IVF until good POs    GI:  Diet: Advance as tolerated  Bowel regimen [x] colace [x] senna [] other:    ENDO:   Goal euglycemia (-180)    HEME/ONC:  VTE prophylaxis: [x] SCDs [x] chemoprophylaxis [] hold chemoprophylaxis due to: [] high risk of DVT/PE on admission due to:    ID:  Monitor for fever    SOCIAL/FAMILY:  [x] awaiting [] updated at bedside [] family meeting    CODE STATUS:  [x] Full Code [] DNR [] DNI [] Palliative/Comfort Care    DISPOSITION:  [x] ICU [] Stroke Unit [] Floor [] EMU [] RCU [] PCU    [x] Patient is at high risk of neurologic deterioration/death due to: ischemic stroke, intracerebral hemorrhage     Time spent: 35 [x] critical care minutes    Contact: [phone]

## 2018-09-05 NOTE — DISCHARGE NOTE ADULT - CARE PROVIDERS DIRECT ADDRESSES
[email or web address]

## 2018-09-05 NOTE — DISCHARGE NOTE ADULT - CARE PROVIDER_API CALL
Tanner Sinha), Neurology; Vascular Neurology  [address]  [address] [place] [address]  Phone: [phone]  Fax: [phone]    Ben Ibarra), Neurological Surgery  [address]  [address] [place] [address]  Phone: [phone]  Fax: [phone]

## 2018-09-06 DIAGNOSIS — I67.1 CEREBRAL ANEURYSM, NONRUPTURED: ICD-10-CM

## 2018-09-06 LAB — PA ADP PRP-ACNC: 66 PRU — LOW (ref 194–417)

## 2018-09-06 PROCEDURE — 99232 SBSQ HOSP IP/OBS MODERATE 35: CPT

## 2018-09-06 RX ORDER — HYDROXYZINE HCL 10 MG
1 TABLET ORAL
Qty: 0 | Refills: 0 | COMMUNITY

## 2018-09-06 RX ORDER — CALCIUM GLUCONATE 100 MG/ML
1 VIAL (ML) INTRAVENOUS ONCE
Qty: 0 | Refills: 0 | Status: COMPLETED | OUTPATIENT
Start: 2018-09-06 | End: 2018-09-06

## 2018-09-06 RX ORDER — POTASSIUM CHLORIDE 20 MEQ
40 PACKET (EA) ORAL ONCE
Qty: 0 | Refills: 0 | Status: COMPLETED | OUTPATIENT
Start: 2018-09-06 | End: 2018-09-06

## 2018-09-06 RX ORDER — HEPARIN SODIUM 5000 [USP'U]/ML
5000 INJECTION INTRAVENOUS; SUBCUTANEOUS EVERY 12 HOURS
Qty: 0 | Refills: 0 | Status: DISCONTINUED | OUTPATIENT
Start: 2018-09-06 | End: 2018-09-07

## 2018-09-06 RX ADMIN — DORZOLAMIDE HYDROCHLORIDE 1 DROP(S): 20 SOLUTION/ DROPS OPHTHALMIC at 17:55

## 2018-09-06 RX ADMIN — Medication 650 MILLIGRAM(S): at 01:00

## 2018-09-06 RX ADMIN — DORZOLAMIDE HYDROCHLORIDE 1 DROP(S): 20 SOLUTION/ DROPS OPHTHALMIC at 05:17

## 2018-09-06 RX ADMIN — Medication 650 MILLIGRAM(S): at 01:30

## 2018-09-06 RX ADMIN — Medication 200 MILLIGRAM(S): at 05:16

## 2018-09-06 RX ADMIN — LEVETIRACETAM 500 MILLIGRAM(S): 250 TABLET, FILM COATED ORAL at 05:16

## 2018-09-06 RX ADMIN — FAMOTIDINE 20 MILLIGRAM(S): 10 INJECTION INTRAVENOUS at 12:39

## 2018-09-06 RX ADMIN — LEVETIRACETAM 500 MILLIGRAM(S): 250 TABLET, FILM COATED ORAL at 17:54

## 2018-09-06 RX ADMIN — Medication 200 MILLIGRAM(S): at 22:30

## 2018-09-06 RX ADMIN — CLOPIDOGREL BISULFATE 75 MILLIGRAM(S): 75 TABLET, FILM COATED ORAL at 05:16

## 2018-09-06 RX ADMIN — Medication 325 MILLIGRAM(S): at 05:16

## 2018-09-06 RX ADMIN — ATORVASTATIN CALCIUM 80 MILLIGRAM(S): 80 TABLET, FILM COATED ORAL at 22:30

## 2018-09-06 RX ADMIN — LATANOPROST 1 DROP(S): 0.05 SOLUTION/ DROPS OPHTHALMIC; TOPICAL at 22:30

## 2018-09-06 RX ADMIN — HEPARIN SODIUM 5000 UNIT(S): 5000 INJECTION INTRAVENOUS; SUBCUTANEOUS at 17:54

## 2018-09-06 RX ADMIN — Medication 200 MILLIGRAM(S): at 16:46

## 2018-09-06 RX ADMIN — Medication 40 MILLIEQUIVALENT(S): at 01:18

## 2018-09-06 RX ADMIN — Medication 40 MILLIGRAM(S): at 12:33

## 2018-09-06 RX ADMIN — Medication 200 GRAM(S): at 01:06

## 2018-09-06 NOTE — PROGRESS NOTE ADULT - SUBJECTIVE AND OBJECTIVE BOX
The patient was seen and examined by me.    ICU Vital Signs Last 24 Hrs  T(C): 36.4 (06 Sep 2018 03:00), Max: 36.7 (05 Sep 2018 23:00)  T(F): 97.6 (06 Sep 2018 03:00), Max: 98 (05 Sep 2018 23:00)  HR: 60 (06 Sep 2018 06:00) (60 - 63)  ABP: 158/69 (06 Sep 2018 06:00) (116/58 - 158/69)  ABP(mean): 103 (06 Sep 2018 06:00) (77 - 103)  RR: 17 (06 Sep 2018 06:00) (12 - 17)  SpO2: 99% (06 Sep 2018 06:00) (98% - 100%)    Awake and alert, oriented x3, fluent, normal naming and repetition, follows 3 step commands. Extraocular movements intact, no nystagmus, visual fields full, face symmetric, tongue midline. Baseline LUE drift, left foot drop, 4+/5 LUE, LLE, 5/5 RUE/RLE. Baseline left side decreased sensory loss. Normal finger-to-nose and rapid alternating movements.  Ext: R groin, no hematoma, area c/d/i, palpable DP                          12.1   4.6   )-----------( 182      ( 05 Sep 2018 20:16 )             35.2     09-05    130<L>  |  99  |  12  ----------------------------<  116<H>  3.5   |  21<L>  |  0.92    Ca    7.9<L>      05 Sep 2018 20:16  Phos  2.8     09-05  Mg     2.0     09-05

## 2018-09-06 NOTE — PROGRESS NOTE ADULT - ASSESSMENT
62 year old man, post-op day 1 of pipeline stents to left opthalmic artery aneurysm. Doing well, no complaints.  1. discontinue arterial line  2. P2Y12 at 11am.  3. Continue ASA/Plavix daily.  4. OOB/walking  5. adavance diet as tolerated  6. discharge to home tomorrow

## 2018-09-06 NOTE — PROGRESS NOTE ADULT - SUBJECTIVE AND OBJECTIVE BOX
Visit Summary: Patient seen and evaluated at bedside.    Overnight Events: none    Exam:  T(C): 36.7 (09-05-18 @ 23:00), Max: 36.7 (09-05-18 @ 23:00)  HR: 60 (09-05-18 @ 23:00) (60 - 61)  BP: --  RR: 16 (09-05-18 @ 23:00) (12 - 16)  SpO2: 100% (09-05-18 @ 23:00) (100% - 100%)  Wt(kg): --    Awake, alert, fully oriented, follows commands, PERRL, EOMI, face symmetric, tongue midline, full strength on the right, left side 4/5 (baseline)                        12.1   4.6   )-----------( 182      ( 05 Sep 2018 20:16 )             35.2     09-05    130<L>  |  99  |  12  ----------------------------<  116<H>  3.5   |  21<L>  |  0.92    Ca    7.9<L>      05 Sep 2018 20:16  Phos  2.8     09-05  Mg     2.0     09-05    PT/INR - ( 05 Sep 2018 20:16 )   PT: 11.7 sec;   INR: 1.08 ratio         PTT - ( 05 Sep 2018 20:16 )  PTT:49.2 sec

## 2018-09-06 NOTE — CHART NOTE - NSCHARTNOTEFT_GEN_A_CORE
As per patient's pharmacy, patient takes Metoprolol 25 BID at home which was started for ?chest pain/palpitations in August 2018 per patient. Patient's heart rate has been low 60s since he has been in [facility], so will hold Metoprolol at this time.

## 2018-09-06 NOTE — PROGRESS NOTE ADULT - SUBJECTIVE AND OBJECTIVE BOX
SUMMARY:  62 year-old English-speaking English man with history of subarachnoid hemorrhage status post clipping in 1998 with course notable for coma x 2 months, DCI/stroke with residual left sided weakness requiring left leg brace and cane to ambulate, symptomatic bradycardia status post PPM in 2007 with new generator placement in 2016, HTN, HLD, seizure disorder with last seizure in 2016 who presented on 9/5/18 for elective Pipeline stenting of unruptured ophthalmic artery aneurysm found on follow-up.     Overnight Events:     ROS: negative [] unable to obtain as patient is comatose/intubated/aphasic []     VITALS:   T(C): 36.4 (09-06-18 @ 03:00), Max: 36.7 (09-05-18 @ 23:00)  HR: 60 (09-06-18 @ 06:00) (60 - 63)  BP: --  RR: 17 (09-06-18 @ 06:00) (12 - 17)  SpO2: 99% (09-06-18 @ 06:00) (98% - 100%)    09-05-18 @ 07:01  -  09-06-18 @ 07:00  --------------------------------------------------------  IN: 1380 mL / OUT: 1775 mL / NET: -395 mL      LABS:               12.1   4.6   )-----------( 182      ( 05 Sep 2018 20:16 )             35.2     09-05    130<L>  |  99  |  12  ----------------------------<  116<H>  3.5   |  21<L>  |  0.92    Ca    7.9<L>      05 Sep 2018 20:16  Phos  2.8     09-05  Mg     2.0     09-05      PT/INR - ( 05 Sep 2018 20:16 )   PT: 11.7 sec;   INR: 1.08 ratio         PTT - ( 05 Sep 2018 20:16 )  PTT:49.2 sec  MEDS:  MEDICATIONS  (STANDING):  aspirin enteric coated 325 milliGRAM(s) Oral <User Schedule>  atorvastatin 80 milliGRAM(s) Oral at bedtime  carBAMazepine 200 milliGRAM(s) Oral three times a day  clopidogrel Tablet 75 milliGRAM(s) Oral <User Schedule>  dorzolamide 2% Ophthalmic Solution 1 Drop(s) Both EYES <User Schedule>  famotidine    Tablet 20 milliGRAM(s) Oral daily  FLUoxetine 40 milliGRAM(s) Oral daily  influenza   Vaccine 0.5 milliLiter(s) IntraMuscular once  latanoprost 0.005% Ophthalmic Solution 1 Drop(s) Both EYES at bedtime  levETIRAcetam 500 milliGRAM(s) Oral two times a day      EXAMINATION:  General: No acute distress  HEENT: Anicteric sclerae  Cardiac: [id number]  Lungs: Clear  Abdomen: Soft, non-tender, +BS  Extremities: No c/c/e, no groin hematoma  Skin/Incision Site: Clean, dry and intact  Neurologic: Awake, alert, fully oriented, follows commands, PERRL, EOMI, face symmetric, tongue midline, full strength on the right, left side 4/5 (baseline) SUMMARY:  62 year-old Colombian-speaking Mosotho man with history of subarachnoid hemorrhage status post clipping in 1998 with course notable for coma x 2 months, DCI/stroke with residual left sided weakness requiring left leg brace and cane to ambulate, symptomatic bradycardia status post PPM in 2007 with new generator placement in 2016, HTN, HLD, seizure disorder with last seizure in 2016 who presented on 9/5/18 for elective Pipeline stenting of unruptured ophthalmic artery aneurysm found on follow-up.     Overnight Events: Stable neuro exam.     ROS: Negative except mentioned above.     VITALS:   T(C): 36.4 (09-06-18 @ 03:00), Max: 36.7 (09-05-18 @ 23:00)  HR: 60 (09-06-18 @ 06:00) (60 - 63)  BP: --  RR: 17 (09-06-18 @ 06:00) (12 - 17)  SpO2: 99% (09-06-18 @ 06:00) (98% - 100%)    09-05-18 @ 07:01  -  09-06-18 @ 07:00  --------------------------------------------------------  IN: 1380 mL / OUT: 1775 mL / NET: -395 mL      LABS:               12.1   4.6   )-----------( 182      ( 05 Sep 2018 20:16 )             35.2     09-05    130<L>  |  99  |  12  ----------------------------<  116<H>  3.5   |  21<L>  |  0.92    Ca    7.9<L>      05 Sep 2018 20:16  Phos  2.8     09-05  Mg     2.0     09-05      PT/INR - ( 05 Sep 2018 20:16 )   PT: 11.7 sec;   INR: 1.08 ratio         PTT - ( 05 Sep 2018 20:16 )  PTT:49.2 sec  MEDS:  MEDICATIONS  (STANDING):  aspirin enteric coated 325 milliGRAM(s) Oral <User Schedule>  atorvastatin 80 milliGRAM(s) Oral at bedtime  carBAMazepine 200 milliGRAM(s) Oral three times a day  clopidogrel Tablet 75 milliGRAM(s) Oral <User Schedule>  dorzolamide 2% Ophthalmic Solution 1 Drop(s) Both EYES <User Schedule>  famotidine    Tablet 20 milliGRAM(s) Oral daily  FLUoxetine 40 milliGRAM(s) Oral daily  influenza   Vaccine 0.5 milliLiter(s) IntraMuscular once  latanoprost 0.005% Ophthalmic Solution 1 Drop(s) Both EYES at bedtime  levETIRAcetam 500 milliGRAM(s) Oral two times a day      EXAMINATION:  General: No acute distress  HEENT: Anicteric sclerae  Cardiac: [id number]  Lungs: Clear  Abdomen: Soft, non-tender, +BS  Extremities: No c/c/e, no groin hematoma  Skin/Incision Site: Clean, dry and intact  Neurologic: Awake, alert, fully oriented, follows commands, PERRL, EOMI, face symmetric, tongue midline, full strength on the right, left side 4/5 (baseline). Visual fields grossly intact.

## 2018-09-06 NOTE — PROGRESS NOTE ADULT - ASSESSMENT
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 Sinha  Activity: [x] mobilize as tolerated once groin precautions concluded [] Bedrest [] PT [] OT [] PMNR    PULM:  Incentive spirometry    CV:  SBP goal 100-140mmHg    RENAL:  Fluids: IVF until good POs    GI:  Diet: Advance as tolerated  Bowel regimen [x] colace [x] senna [] other:    ENDO:   Goal euglycemia (-180)    HEME/ONC:  VTE prophylaxis: [x] SCDs [x] chemoprophylaxis [] hold chemoprophylaxis due to: [] high risk of DVT/PE on admission due to:    ID:  Monitor for fever    SOCIAL/FAMILY:  [x] awaiting [] updated at bedside [] family meeting    CODE STATUS:  [x] Full Code [] DNR [] DNI [] Palliative/Comfort Care    DISPOSITION:  [x] ICU [] Stroke Unit [] Floor [] EMU [] RCU [] PCU    [x] Patient is at high risk of neurologic deterioration/death due to: ischemic stroke, intracerebral hemorrhage     Time spent: 35 [x] critical care minutes    Contact: [phone] ASSESSMENT/PLAN: 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 Sinha  Activity: [x] mobilize as tolerated once groin precautions concluded [] Bedrest [] PT [] OT [] PMNR    PULM:  Incentive spirometry    CV:  SBP goal 100-140mmHg    RENAL:  Fluids: IVF until good POs    GI:  Diet: Advance as tolerated  Bowel regimen [x] colace [x] senna [] other:    ENDO:   Goal euglycemia (-180)    HEME/ONC:  VTE prophylaxis: [x] SCDs [x] chemoprophylaxis [] hold chemoprophylaxis due to: [] high risk of DVT/PE on admission due to:    ID:  Monitor for fever    SOCIAL/FAMILY:  [x] awaiting [] updated at bedside [] family meeting    CODE STATUS:  [x] Full Code [] DNR [] DNI [] Palliative/Comfort Care    DISPOSITION:  [x] ICU [] Stroke Unit [] Floor [] EMU [] RCU [] PCU    [x] Patient is at high risk of neurologic deterioration/death due to: ischemic stroke, intracerebral hemorrhage     Time spent: 35 [x] critical care minutes    Contact: [phone] ASSESSMENT/PLAN: 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 Sinha  Activity: [x] mobilize as tolerated once groin precautions concluded [] Bedrest [] PT [] OT [] PMNR    PULM:  Incentive spirometry    CV:  SBP goal 100-140mmHg    RENAL:  Fluids: IVF until good POs    GI:  Diet: Advance as tolerated  Bowel regimen [x] colace [x] senna [] other:    ENDO:   Goal euglycemia (-180)    HEME/ONC:  VTE prophylaxis: [x] SCDs [x] chemoprophylaxis [] hold chemoprophylaxis due to: [] high risk of DVT/PE on admission due to:    ID:  Monitor for fever    SOCIAL/FAMILY:  [x] awaiting [] updated at bedside [] family meeting    CODE STATUS:  [x] Full Code [] DNR [] DNI [] Palliative/Comfort Care    DISPOSITION:  [] ICU [] Stroke Unit [x] Floor [] EMU [] RCU [] PCU    not critically ill, but medically complex ASSESSMENT/PLAN: 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 Sinha  Activity: [x] mobilize as tolerated once groin precautions concluded [] Bedrest [X] PT [X] OT [] PMNR    PULM:  Incentive spirometry    CV:  SBP goal 100-140mmHg    RENAL:  Fluids: IVL    GI:  Diet: Regular diet  Bowel regimen [x] colace [x] senna [] other:    ENDO:   Goal euglycemia (-180)    HEME/ONC:  VTE prophylaxis: [x] SCDs [x] chemoprophylaxis - Heparin 5000 units sc. 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 Ernestine Marie of epilepsy - on Keppra 500 BID and Carbamazepine 200 TID  Activity: [x] mobilize as tolerated once groin precautions concluded [] Bedrest [X] PT [X] OT [] PMNR    PULM:  Incentive spirometry    CV:  SBP goal 100-140mmHg    RENAL:  Fluids: IVL    GI:  Diet: Regular diet  Bowel regimen [x] colace [x] senna [] other:    ENDO:   Goal euglycemia (-180)    HEME/ONC:  VTE prophylaxis: [x] SCDs [x] chemoprophylaxis - Heparin 5000 units sc. q12h [] hold chemoprophylaxis due to: [] high risk of DVT/PE on admission due to:    ID:  Monitor for fever    MISC:  Mood disorder - Continue Fluoxetine 40 mg po qHS    SOCIAL/FAMILY:  [x] awaiting [] updated at bedside [] family meeting    CODE STATUS:  [x] Full Code [] DNR [] DNI [] Palliative/Comfort Care    DISPOSITION:  [] ICU [] Stroke Unit [x] Floor [] EMU [] RCU [] PCU    not critically ill, but medically complex

## 2018-09-07 VITALS
DIASTOLIC BLOOD PRESSURE: 70 MMHG | RESPIRATION RATE: 17 BRPM | HEART RATE: 62 BPM | SYSTOLIC BLOOD PRESSURE: 122 MMHG | TEMPERATURE: 98 F | OXYGEN SATURATION: 98 %

## 2018-09-07 LAB
ANION GAP SERPL CALC-SCNC: 10 MMOL/L — SIGNIFICANT CHANGE UP (ref 5–17)
BUN SERPL-MCNC: 17 MG/DL — SIGNIFICANT CHANGE UP (ref 7–23)
CALCIUM SERPL-MCNC: 8.7 MG/DL — SIGNIFICANT CHANGE UP (ref 8.4–10.5)
CHLORIDE SERPL-SCNC: 100 MMOL/L — SIGNIFICANT CHANGE UP (ref 96–108)
CO2 SERPL-SCNC: 24 MMOL/L — SIGNIFICANT CHANGE UP (ref 22–31)
CREAT SERPL-MCNC: 1.21 MG/DL — SIGNIFICANT CHANGE UP (ref 0.5–1.3)
GLUCOSE SERPL-MCNC: 110 MG/DL — HIGH (ref 70–99)
HCT VFR BLD CALC: 34.6 % — LOW (ref 39–50)
HGB BLD-MCNC: 12 G/DL — LOW (ref 13–17)
MCHC RBC-ENTMCNC: 33.1 PG — SIGNIFICANT CHANGE UP (ref 27–34)
MCHC RBC-ENTMCNC: 34.7 GM/DL — SIGNIFICANT CHANGE UP (ref 32–36)
MCV RBC AUTO: 95.3 FL — SIGNIFICANT CHANGE UP (ref 80–100)
PLATELET # BLD AUTO: 179 K/UL — SIGNIFICANT CHANGE UP (ref 150–400)
POTASSIUM SERPL-MCNC: 4.1 MMOL/L — SIGNIFICANT CHANGE UP (ref 3.5–5.3)
POTASSIUM SERPL-SCNC: 4.1 MMOL/L — SIGNIFICANT CHANGE UP (ref 3.5–5.3)
RBC # BLD: 3.63 M/UL — LOW (ref 4.2–5.8)
RBC # FLD: 12.7 % — SIGNIFICANT CHANGE UP (ref 10.3–14.5)
SODIUM SERPL-SCNC: 134 MMOL/L — LOW (ref 135–145)
WBC # BLD: 3.76 K/UL — LOW (ref 3.8–10.5)
WBC # FLD AUTO: 3.76 K/UL — LOW (ref 3.8–10.5)

## 2018-09-07 PROCEDURE — C1889: CPT

## 2018-09-07 PROCEDURE — 36224 PLACE CATH CAROTD ART: CPT

## 2018-09-07 PROCEDURE — 75894 X-RAYS TRANSCATH THERAPY: CPT

## 2018-09-07 PROCEDURE — C1887: CPT

## 2018-09-07 PROCEDURE — 97161 PT EVAL LOW COMPLEX 20 MIN: CPT

## 2018-09-07 PROCEDURE — 85027 COMPLETE CBC AUTOMATED: CPT

## 2018-09-07 PROCEDURE — 85576 BLOOD PLATELET AGGREGATION: CPT

## 2018-09-07 PROCEDURE — 85610 PROTHROMBIN TIME: CPT

## 2018-09-07 PROCEDURE — 97166 OT EVAL MOD COMPLEX 45 MIN: CPT

## 2018-09-07 PROCEDURE — 84100 ASSAY OF PHOSPHORUS: CPT

## 2018-09-07 PROCEDURE — 36228 PLACE CATH INTRACRANIAL ART: CPT

## 2018-09-07 PROCEDURE — C1894: CPT

## 2018-09-07 PROCEDURE — C1760: CPT

## 2018-09-07 PROCEDURE — 80048 BASIC METABOLIC PNL TOTAL CA: CPT

## 2018-09-07 PROCEDURE — 99239 HOSP IP/OBS DSCHRG MGMT >30: CPT

## 2018-09-07 PROCEDURE — C1769: CPT

## 2018-09-07 PROCEDURE — 76377 3D RENDER W/INTRP POSTPROCES: CPT

## 2018-09-07 PROCEDURE — 85730 THROMBOPLASTIN TIME PARTIAL: CPT

## 2018-09-07 PROCEDURE — 75898 FOLLOW-UP ANGIOGRAPHY: CPT

## 2018-09-07 PROCEDURE — 61624 TCAT PERM OCCLS/EMBOLJ CNS: CPT

## 2018-09-07 PROCEDURE — 83735 ASSAY OF MAGNESIUM: CPT

## 2018-09-07 RX ORDER — METOPROLOL TARTRATE 50 MG
25 TABLET ORAL DAILY
Qty: 0 | Refills: 0 | Status: DISCONTINUED | OUTPATIENT
Start: 2018-09-07 | End: 2018-09-07

## 2018-09-07 RX ORDER — METOPROLOL TARTRATE 50 MG
1 TABLET ORAL
Qty: 0 | Refills: 0 | DISCHARGE
Start: 2018-09-07

## 2018-09-07 RX ORDER — METOPROLOL TARTRATE 50 MG
1 TABLET ORAL
Qty: 0 | Refills: 0 | COMMUNITY

## 2018-09-07 RX ORDER — ACETAMINOPHEN 500 MG
2 TABLET ORAL
Qty: 0 | Refills: 0 | DISCHARGE
Start: 2018-09-07

## 2018-09-07 RX ADMIN — Medication 650 MILLIGRAM(S): at 05:00

## 2018-09-07 RX ADMIN — CLOPIDOGREL BISULFATE 75 MILLIGRAM(S): 75 TABLET, FILM COATED ORAL at 06:49

## 2018-09-07 RX ADMIN — FAMOTIDINE 20 MILLIGRAM(S): 10 INJECTION INTRAVENOUS at 13:26

## 2018-09-07 RX ADMIN — DORZOLAMIDE HYDROCHLORIDE 1 DROP(S): 20 SOLUTION/ DROPS OPHTHALMIC at 06:49

## 2018-09-07 RX ADMIN — Medication 200 MILLIGRAM(S): at 06:49

## 2018-09-07 RX ADMIN — HEPARIN SODIUM 5000 UNIT(S): 5000 INJECTION INTRAVENOUS; SUBCUTANEOUS at 06:48

## 2018-09-07 RX ADMIN — LEVETIRACETAM 500 MILLIGRAM(S): 250 TABLET, FILM COATED ORAL at 06:49

## 2018-09-07 RX ADMIN — Medication 200 MILLIGRAM(S): at 13:27

## 2018-09-07 RX ADMIN — Medication 325 MILLIGRAM(S): at 06:49

## 2018-09-07 RX ADMIN — Medication 650 MILLIGRAM(S): at 04:08

## 2018-09-07 RX ADMIN — Medication 40 MILLIGRAM(S): at 13:25

## 2018-09-07 NOTE — PHYSICAL THERAPY INITIAL EVALUATION ADULT - PERTINENT HX OF CURRENT PROBLEM, REHAB EVAL
63 yo Lao speaking male presents for selective embolization. PMH: s/p cerebral aneurysm clipping 1998 h/o seizures. Left ophthalmic artery aneurysm was found about 5 years ago and was being followed at [facility].Pt is s/p diagnostic cerebral angio on 8/29/18 and presents to Neuro IR for selective cerebral angiogram w/ embolization 9/5.

## 2018-09-07 NOTE — PROGRESS NOTE ADULT - ASSESSMENT
63 yo Togolese speaking male from [place], [facility] cerebral aneurysm s/p clipping 1998, SAH and was in a coma for 2 months, he suffered a stroke and has left sided weakness, wears a brace on left lower extremity and cane to ambulate, symptomatic bradycardia s/p PPM 2007 with new generator 2016, HTN, HLD, seizures, last one 2016. A new aneurysm was found about 5 years ago and was being followed at [facility]. Patient is s/p diagnostic cerebral angiogram on 8/29/18 and presents to [facility] for Cerebral angiogram with embolization on 9/5/18. Patient denies recent fever, chills, chest pain, SOB, changes in bowel/urinary habits. Patient takes care of self, has HHA for 3.5 hours daily.  Patient will continue aspirin and Plavix during mehreen-op period (31 Aug 2018 11:15)    Patient was admitted via SDA and underwent as planned on 9/5/18 an elective Cerebral Angiography and embolization with stent placement for unruptured left ophthalmic artery aneurysm. Post procedure he was cared for per protocol in NSCU and then transferred to the floor. He did well without complications. PT/OT evaluated him and recommended subacute rehab, however he refused. Home PT/OT was arranged and his HHA have been reinstated. On the day of discharge he was medically and neurologically stable for discharge.     d/c IVL and d/c home today with VNS  Discussed with patient follow up plans, activity, and medications. Questions answered, and they verbalized understanding.   will discuss with Dr Stacey toney # [id number]

## 2018-09-07 NOTE — PROGRESS NOTE ADULT - REASON FOR ADMISSION
9/5/18 s/p elective Cerebral Angiography and embolization with stent placement for unruptured left ophthalmic artery aneurysm.

## 2018-09-07 NOTE — PHYSICAL THERAPY INITIAL EVALUATION ADULT - ADDITIONAL COMMENTS
Pt lives alone in 2nd floor apt +elevator, no stairs to enter building. HHA 3.5hr/5 days/wk to assist w/ ADL's. Pt owns SAC & L AFO. Per pt, I w/ functional mobility.

## 2018-09-07 NOTE — PROGRESS NOTE ADULT - SUBJECTIVE AND OBJECTIVE BOX
The patient was seen and examined by me.    Vital Signs Last 24 Hrs  T(C): 36.8 (07 Sep 2018 07:16), Max: 37 (07 Sep 2018 05:28)  T(F): 98.2 (07 Sep 2018 07:16), Max: 98.6 (07 Sep 2018 05:28)  HR: 63 (07 Sep 2018 09:07) (58 - 65)  BP: 151/81 (07 Sep 2018 09:07) (123/68 - 151/81)  RR: 18 (07 Sep 2018 07:16) (15 - 23)  SpO2: 97% (07 Sep 2018 09:07) (96% - 100%)    Awake and alert, oriented x3, fluent, normal naming and repetition, follows 3 step commands. Extraocular movements intact, no nystagmus, visual fields full, face symmetric, tongue midline. Baseline LUE drift, left foot drop, 4+/5 LUE, LLE, 5/5 RUE/RLE. Baseline left side decreased sensory loss. Normal finger-to-nose and rapid alternating movements.  Ext: R groin, no hematoma, area c/d/i, palpable DP                          12.0   3.76  )-----------( 179      ( 07 Sep 2018 07:56 )             34.6     09-07    134<L>  |  100  |  17  ----------------------------<  110<H>  4.1   |  24  |  1.21    Ca    8.7      07 Sep 2018 06:13  Phos  2.8     09-05  Mg     2.0     09-05

## 2018-09-07 NOTE — OCCUPATIONAL THERAPY INITIAL EVALUATION ADULT - PERTINENT HX OF CURRENT PROBLEM, REHAB EVAL
61 yo Vietnamese speaking male from [place], PMH cerebral aneurysm s/p clipping 1998, SAH and was in a coma for 2 months, he suffered a stroke and has left sided weakness, wears a brace on left lower extremity and cane to ambulate, symptomatic bradycardia s/p PPM 2007 with new generator 2016, HTN, HLD, seizures, last one 2016.

## 2018-09-07 NOTE — OCCUPATIONAL THERAPY INITIAL EVALUATION ADULT - PRECAUTIONS/LIMITATIONS, REHAB EVAL
fall precautions/surgical precautions/seizure precautions/A new aneurysm was found about 5 years ago and was being followed at [facility]. Pt. is s/p diagnostic ceebral angiogram on 8/29/18 and presents to PST for Cerebral angiogram with embolization on 9/5/18.

## 2018-09-07 NOTE — OCCUPATIONAL THERAPY INITIAL EVALUATION ADULT - ADL RETRAINING, OT EVAL
Patient will dress lower body independently AE as needed in 2 weeks. Patient will dress upper body independently in 2 weeks

## 2018-09-07 NOTE — PROGRESS NOTE ADULT - SUBJECTIVE AND OBJECTIVE BOX
CHIEF COMPLAINT: patient without complaints  refusing to go to rehab as recommended by PT/OT, home services     Vital Signs Last 24 Hrs  T(C): 36.6 (07 Sep 2018 13:32), Max: 37 (07 Sep 2018 05:28)  T(F): 97.8 (07 Sep 2018 13:32), Max: 98.6 (07 Sep 2018 05:28)  HR: 62 (07 Sep 2018 13:32) (58 - 65)  BP: 122/70 (07 Sep 2018 13:32) (122/70 - 151/81)  BP(mean): --  RR: 17 (07 Sep 2018 13:32) (16 - 23)  SpO2: 98% (07 Sep 2018 13:32) (96% - 98%)    PHYSICAL EXAM:    General: No Acute Distress     Neurological:  Awake and alert, oriented x3, fluent, normal naming and repetition, follows 3 step commands. Extraocular movements intact, no nystagmus, visual fields full, face symmetric, tongue midline. Baseline LUE drift, left foot drop, 4+/5 LUE, LLE, 5/5 RUE/RLE. Baseline left side decreased sensory loss. Normal finger-to-nose and rapid alternating movements.  Ext: R groin, no hematoma, area c/d/i, palpable DP    Pulmonary: Clear to Auscultation, No Rales, No Rhonchi, No Wheezes     Cardiovascular: S1, S2, Regular Rate and Rhythm     Gastrointestinal: Soft, Nontender, Nondistended     Incision: groin without hematoma    LABS:                        12.0   3.76  )-----------( 179      ( 07 Sep 2018 07:56 )             34.6    09-07    134<L>  |  100  |  17  ----------------------------<  110<H>  4.1   |  24  |  1.21    Ca    8.7      07 Sep 2018 06:13  Phos  2.8     09-05  Mg     2.0     09-05    PT/INR - ( 05 Sep 2018 20:16 )   PT: 11.7 sec;   INR: 1.08 ratio         PTT - ( 05 Sep 2018 20:16 )  PTT:49.2 sec      09-06 @ 07:01  -  09-07 @ 07:00  --------------------------------------------------------  IN: 240 mL / OUT: 1700 mL / NET: -1460 mL        MEDICATIONS:  Anticoagulation:  aspirin enteric coated 325 milliGRAM(s) Oral <User Schedule>  clopidogrel Tablet 75 milliGRAM(s) Oral <User Schedule>  heparin  Injectable 5000 Unit(s) SubCutaneous every 12 hours    Endo:  atorvastatin 80 milliGRAM(s) Oral at bedtime    Neuro:  acetaminophen   Tablet .. 650 milliGRAM(s) Oral every 6 hours PRN Mild Pain (1 - 3)  carBAMazepine 200 milliGRAM(s) Oral three times a day  FLUoxetine 40 milliGRAM(s) Oral daily  levETIRAcetam 500 milliGRAM(s) Oral two times a day  LORazepam     Tablet 0.5 milliGRAM(s) Oral two times a day PRN Nausea and/or Vomiting    Cardiac:  metoprolol tartrate 25 milliGRAM(s) Oral daily    GI/:  famotidine    Tablet 20 milliGRAM(s) Oral daily    Other:   artificial  tears Solution 1 Drop(s) Both EYES four times a day PRN Dry Eyes  dorzolamide 2% Ophthalmic Solution 1 Drop(s) Both EYES <User Schedule>  influenza   Vaccine 0.5 milliLiter(s) IntraMuscular once  latanoprost 0.005% Ophthalmic Solution 1 Drop(s) Both EYES at bedtime    DIET: [x] Regular [] CCD [] Renal [] Puree [] Dysphagia [] Tube Feeds:     IMAGING:   no new imaging

## 2018-09-07 NOTE — PROGRESS NOTE ADULT - ASSESSMENT
62 year old man, post-op day 2 of pipeline stents to left opthalmic artery aneurysm. Doing well.    1. Continue ASA/Plavix.  2. OOB to chair for all meals/Walk  3. Discharge to [facility] as recommended by PT/OT 62 year old man, post-op day 2 of pipeline stents to left opthalmic artery aneurysm. Doing well.    1. Continue ASA/Plavix.  2. OOB to chair for all meals/Walk  3. Discharge to home. No need for YARELIS as he has baseline hemiparesis for 20 years, despite recommended by PT/OT

## 2018-09-07 NOTE — OCCUPATIONAL THERAPY INITIAL EVALUATION ADULT - LIVES WITH, PROFILE
alone/Pt lives alone in an apt, no steps. +Stall with grab bars and shower chair. Uses straight cane. Owns rolling walker. HHA 3.5 hours/day x 5days/wk

## 2018-09-13 ENCOUNTER — APPOINTMENT (OUTPATIENT)
Dept: CARDIOLOGY | Facility: HOSPITAL | Age: 63
End: 2018-09-13

## 2018-09-13 ENCOUNTER — APPOINTMENT (OUTPATIENT)
Dept: ELECTROPHYSIOLOGY | Facility: CLINIC | Age: 63
End: 2018-09-13

## 2018-10-02 ENCOUNTER — APPOINTMENT (OUTPATIENT)
Dept: NEUROLOGY | Facility: CLINIC | Age: 63
End: 2018-10-02
Payer: MEDICAID

## 2018-10-02 VITALS
OXYGEN SATURATION: 98 % | HEIGHT: 65 IN | HEART RATE: 79 BPM | RESPIRATION RATE: 16 BRPM | DIASTOLIC BLOOD PRESSURE: 57 MMHG | TEMPERATURE: 98.2 F | WEIGHT: 170 LBS | SYSTOLIC BLOOD PRESSURE: 100 MMHG | BODY MASS INDEX: 28.32 KG/M2

## 2018-10-02 LAB — PA ADP PRP-ACNC: 9 PRU

## 2018-10-02 PROCEDURE — 99215 OFFICE O/P EST HI 40 MIN: CPT

## 2018-10-02 RX ORDER — ZOLPIDEM TARTRATE 10 MG/1
10 TABLET, FILM COATED ORAL
Refills: 0 | Status: DISCONTINUED | COMMUNITY
Start: 2017-12-14 | End: 2018-10-02

## 2018-10-02 RX ORDER — FLUOXETINE HYDROCHLORIDE 40 MG/1
40 CAPSULE ORAL
Refills: 0 | Status: DISCONTINUED | COMMUNITY
End: 2018-10-02

## 2018-10-02 RX ORDER — LATANOPROST/PF 0.005 %
0.01 DROPS OPHTHALMIC (EYE)
Refills: 0 | Status: ACTIVE | COMMUNITY

## 2018-10-02 RX ORDER — FLUOXETINE HYDROCHLORIDE 20 MG/1
20 CAPSULE ORAL
Refills: 0 | Status: ACTIVE | COMMUNITY

## 2018-11-12 ENCOUNTER — APPOINTMENT (OUTPATIENT)
Dept: ELECTROPHYSIOLOGY | Facility: CLINIC | Age: 63
End: 2018-11-12

## 2018-11-14 ENCOUNTER — APPOINTMENT (OUTPATIENT)
Dept: CARDIOLOGY | Facility: HOSPITAL | Age: 63
End: 2018-11-14

## 2018-11-29 ENCOUNTER — APPOINTMENT (OUTPATIENT)
Dept: ELECTROPHYSIOLOGY | Facility: CLINIC | Age: 63
End: 2018-11-29
Payer: MEDICAID

## 2018-11-29 VITALS
SYSTOLIC BLOOD PRESSURE: 130 MMHG | OXYGEN SATURATION: 99 % | BODY MASS INDEX: 29.95 KG/M2 | DIASTOLIC BLOOD PRESSURE: 78 MMHG | HEART RATE: 86 BPM | WEIGHT: 180 LBS

## 2018-11-29 PROCEDURE — 93280 PM DEVICE PROGR EVAL DUAL: CPT

## 2019-01-09 ENCOUNTER — APPOINTMENT (OUTPATIENT)
Dept: NEUROLOGY | Facility: CLINIC | Age: 64
End: 2019-01-09

## 2019-01-16 NOTE — PHYSICAL THERAPY INITIAL EVALUATION ADULT - GENERAL OBSERVATIONS, REHAB EVAL
Render Post-Care Instructions In Note?: no Pt found supine +IVL. Post-Care Instructions: I reviewed with the patient in detail post-care instructions. Patient is to wear sunprotection, and avoid picking at any of the treated lesions. Pt may apply Vaseline to crusted or scabbing areas. Duration Of Freeze Thaw-Cycle (Seconds): 4 Detail Level: Detailed Consent: The patient's consent was obtained including but not limited to risks of crusting, scabbing, blistering, scarring, darker or lighter pigmentary change, recurrence, incomplete removal and infection. Number Of Freeze-Thaw Cycles: 3 freeze-thaw cycles

## 2019-02-12 ENCOUNTER — OUTPATIENT (OUTPATIENT)
Dept: OUTPATIENT SERVICES | Facility: HOSPITAL | Age: 64
LOS: 1 days | End: 2019-02-12
Payer: MEDICAID

## 2019-02-12 ENCOUNTER — APPOINTMENT (OUTPATIENT)
Dept: CARDIOLOGY | Facility: CLINIC | Age: 64
End: 2019-02-12

## 2019-02-12 ENCOUNTER — APPOINTMENT (OUTPATIENT)
Dept: CARDIOLOGY | Facility: HOSPITAL | Age: 64
End: 2019-02-12

## 2019-02-12 ENCOUNTER — APPOINTMENT (OUTPATIENT)
Age: 64
End: 2019-02-12
Payer: MEDICAID

## 2019-02-12 VITALS
HEART RATE: 82 BPM | HEIGHT: 65 IN | OXYGEN SATURATION: 97 % | WEIGHT: 180 LBS | BODY MASS INDEX: 29.99 KG/M2 | SYSTOLIC BLOOD PRESSURE: 119 MMHG | DIASTOLIC BLOOD PRESSURE: 80 MMHG

## 2019-02-12 VITALS
BODY MASS INDEX: 29.99 KG/M2 | DIASTOLIC BLOOD PRESSURE: 73 MMHG | HEIGHT: 65 IN | TEMPERATURE: 98 F | SYSTOLIC BLOOD PRESSURE: 109 MMHG | OXYGEN SATURATION: 91 % | WEIGHT: 180 LBS | RESPIRATION RATE: 17 BRPM | HEART RATE: 75 BPM

## 2019-02-12 DIAGNOSIS — Z98.89 OTHER SPECIFIED POSTPROCEDURAL STATES: Chronic | ICD-10-CM

## 2019-02-12 DIAGNOSIS — I67.1 CEREBRAL ANEURYSM, NONRUPTURED: ICD-10-CM

## 2019-02-12 DIAGNOSIS — Z86.79 PERSONAL HISTORY OF OTHER DISEASES OF THE CIRCULATORY SYSTEM: ICD-10-CM

## 2019-02-12 DIAGNOSIS — Z98.49 CATARACT EXTRACTION STATUS, UNSPECIFIED EYE: Chronic | ICD-10-CM

## 2019-02-12 DIAGNOSIS — I25.10 ATHEROSCLEROTIC HEART DISEASE OF NATIVE CORONARY ARTERY WITHOUT ANGINA PECTORIS: ICD-10-CM

## 2019-02-12 DIAGNOSIS — Z95.0 PRESENCE OF CARDIAC PACEMAKER: Chronic | ICD-10-CM

## 2019-02-12 DIAGNOSIS — Z86.69 PERSONAL HISTORY OF OTHER DISEASES OF THE NERVOUS SYSTEM AND SENSE ORGANS: ICD-10-CM

## 2019-02-12 PROCEDURE — G0463: CPT

## 2019-02-12 PROCEDURE — 99215 OFFICE O/P EST HI 40 MIN: CPT

## 2019-02-12 RX ORDER — METFORMIN HYDROCHLORIDE 500 MG/1
500 TABLET, COATED ORAL
Refills: 0 | Status: ACTIVE | COMMUNITY
Start: 2019-02-12

## 2019-02-12 NOTE — DISCUSSION/SUMMARY
[FreeTextEntry1] : Mr. Rios is a 63 year old right handed man now 5 months s/p pipeline stent embolization of an incidental left ophthalmic ICA aneurysm. He comes in today for a follow up appointment, doing well. He will continue the ASA and Plavix. We will obtain MRA head with [facility]OVA and then see him in the office after. Plan for follow-up angiogram in 6/2019. He was referred to Dr. Moses to follow him for his Epilepsy, but has not followed up. The importance of this was emphasized. All of his questions and concerns were addressed. \par

## 2019-02-12 NOTE — PHYSICAL EXAM
[General Appearance - Alert] : alert [General Appearance - Well Nourished] : well nourished [General Appearance - Well Developed] : well developed [Oriented To Time, Place, And Person] : oriented to person, place, and time [Affect] : the affect was normal [Mood] : the mood was normal [Person] : oriented to person [Place] : oriented to place [Time] : oriented to time [Concentration Intact] : normal concentrating ability [Visual Intact] : visual attention was ~T not ~L decreased [Naming Objects] : no difficulty naming common objects [Repeating Phrases] : no difficulty repeating a phrase [Writing A Sentence] : no difficulty writing a sentence [Fluency] : fluency intact [Comprehension] : comprehension intact [Reading] : reading intact [Past History] : adequate knowledge of personal past history [Cranial Nerves Optic (II)] : visual acuity intact bilaterally,  visual fields full to confrontation, pupils equal round and reactive to light [Cranial Nerves Oculomotor (III)] : extraocular motion intact [Cranial Nerves Facial (VII)] : face symmetrical [Cranial Nerves Vestibulocochlear (VIII)] : hearing was intact bilaterally [Cranial Nerves Glossopharyngeal (IX)] : tongue and palate midline [Cranial Nerves Accessory (XI - Cranial And Spinal)] : head turning and shoulder shrug symmetric [Cranial Nerves Hypoglossal (XII)] : there was no tongue deviation with protrusion [Facial Sensation Decrease - V1 Left Only] : decreased over the forehead and anterior scalp [Motor Tone] : muscle tone was normal in all four extremities [No Muscle Atrophy] : normal bulk in all four extremities [Motor Handedness Right-Handed] : the patient is right hand dominant [Hemiplegia Of Left Side] : hemiplegia was present on the left [Motor Strength Upper Extremities Left] : there was weakness of the left upper extremity [Motor Strength Lower Extremities Left] : there was weakness of the left lower extremity [Sensation Tactile Decrease] : light touch was intact [Balance] : balance was intact [Extraocular Movements] : extraocular movements were intact [Full Visual Field] : full visual field [Hearing Threshold Finger Rub Not [place]] : hearing was normal [Neck Appearance] : the appearance of the neck was normal [Heart Rate And Rhythm] : heart rate was normal and rhythm regular [Edema] : there was no peripheral edema [Abdomen Soft] : soft [Abnormal Walk] : normal gait [Involuntary Movements] : no involuntary movements were seen [Musculoskeletal - Swelling] : no joint swelling seen [Skin Color & Pigmentation] : normal skin color and pigmentation [Skin Turgor] : normal skin turgor [Skin Lesions] : no skin lesions [] : no respiratory distress [Paresis Pronator Drift Right-Sided] : no pronator drift on the right [Paresis Pronator Drift Left-Sided] : no pronator drift on the left [Romberg's Sign] : Romberg's sign was negtive [Dysdiadochokinesia Bilaterally] : not present [Coordination - Dysmetria Impaired Finger-to-Nose Bilateral] : not present

## 2019-02-12 NOTE — REASON FOR VISIT
[Follow-Up: _____] : a [unfilled] follow-up visit [[employer] ] : provided by [employer]   [FreeTextEntry1] : [id number] [FreeTextEntry2] : Amy

## 2019-02-12 NOTE — HISTORY OF PRESENT ILLNESS
[FreeTextEntry1] : Mr. Rios is a 63 year old right handed man who was found to have an incidental left ophthalmic ICA aneurysm while at [facility] by CTA. He has a history of a right MCA aneurysm clipped in 1998, the course of which sounds like he had a SAH/ICH at that time. He has post bleed epilepsy and left hemiparesis and wears a leg splint. I performed a pipeline stent embolization of his left ophthalmic ICA aneurysm on 9/5/2018. He has done well post procedure and denies any changes in vision, weakness, headache, nausea, vomiting, or change in neurological status. He reports some mild dizziness at times. He reports compliance with his DAPT.

## 2019-02-12 NOTE — REVIEW OF SYSTEMS
[Arm Weakness] : arm weakness [Hand Weakness] :  hand weakness [Leg Weakness] : leg weakness [Constipation] : constipation [Fever] : no fever [Feeling Poorly] : not feeling poorly [Feeling Tired] : not feeling tired [Suicidal] : not suicidal [Anxiety] : no anxiety [Depression] : no depression [Confused or Disoriented] : no confusion [Memory Lapses or Loss] : no memory loss [Decr. Concentrating Ability] : no decrease in concentrating ability [Difficulty with Language] : no ~M difficulty with language [Changed Thought Patterns] : no change in thought patterns [Repeating Questions] : no repeated questioning about recent events [Facial Weakness] : no facial weakness [Poor Coordination] : good coordination [Difficulty Writing] : no difficulty writing [Difficulties in Speech] : no speech difficulties [Numbness] : no numbness [Tingling] : no tingling [Seizures] : no convulsions [Dizziness] : no dizziness [Fainting] : no fainting [Lightheadedness] : no lightheadedness [Vertigo] : no vertigo [Tension Headache] : no tension-type headache [Difficulty Walking] : no difficulty walking [Inability to Walk] : able to walk [Ataxia] : no ataxia [Eye Pain] : no eye pain [Red Eyes] : eyes not red [Dry Eyes] : no dryness of the eyes [Earache] : no earache [Loss Of Hearing] : no hearing loss [Nosebleeds] : no nosebleeds [Sore Throat] : no sore throat [Chest Pain] : no chest pain [Palpitations] : no palpitations [Shortness Of Breath] : no shortness of breath [Wheezing] : no wheezing [Cough] : no cough [Abdominal Pain] : no abdominal pain [Vomiting] : no vomiting [Heartburn] : no heartburn [Melena] : no melena [Dysuria] : no dysuria [Incontinence] : no incontinence [Joint Pain] : no joint pain [Joint Swelling] : no joint swelling [Joint Stiffness] : no joint stiffness [Easy Bleeding] : no tendency for easy bleeding [Easy Bruising] : no tendency for easy bruising

## 2019-02-12 NOTE — CONSULT LETTER
[Dear  ___] : Dear  [unfilled], [Courtesy Letter:] : I had the pleasure of seeing your patient, [unfilled], in my office today. [Please see my note below.] : Please see my note below. [Consult Closing:] : Thank you very much for allowing me to participate in the care of this patient.  If you have any questions, please do not hesitate to contact me. [Sincerely,] : Sincerely, [FreeTextEntry3] : Tanner Sinha MD\par Chief, Vascular Neurology\par  of Neurology and Radiology\par [facility]\par Director, Comprehensive Stroke Center and Stroke Unit\par [facility]\par Attending, Interventional Neuroradiology\par \par

## 2019-02-14 PROBLEM — Z86.79 HISTORY OF INTRACRANIAL ANEURYSM: Status: RESOLVED | Noted: 2017-12-14 | Resolved: 2019-02-14

## 2019-02-14 NOTE — HISTORY OF PRESENT ILLNESS
[FreeTextEntry1] : 62 yo gentleman w/PMHx SSS s/p PPM 2007 with generator change in 12/2016 ([employer]tr[employer] Advisa) and an incidental LICA aneurysm s/p recent clipping and hx of R. MCA aneurysm s/p clipping '98 course c/b SAH and epilepsy here for routine follow up doing well without complaints.Denies any CP/chest pressure on exertion. Continues to reports left sided weakness from a stroke at that time, but is able to ambulate about two blocks at a time with his cane. Still noted dyspnea at the end of two blocks.

## 2019-02-14 NOTE — PHYSICAL EXAM
[General Appearance - Well Developed] : well developed [General Appearance - Well Nourished] : well nourished [Eyelids - No Xanthelasma] : the eyelids demonstrated no xanthelasmas [No Oral Pallor] : no oral pallor [Exaggerated Use Of Accessory Muscles For Inspiration] : no accessory muscle use [Auscultation Breath Sounds / Voice Sounds] : lungs were clear to auscultation bilaterally [Heart Rate And Rhythm] : heart rate and rhythm were normal [Heart Sounds] : normal S1 and S2 [Murmurs] : no murmurs present [Arterial Pulses Normal] : the arterial pulses were normal [Edema] : no peripheral edema present [Abdomen Soft] : soft [Abdomen Tenderness] : non-tender [Nail Clubbing] : no clubbing of the fingernails [Cyanosis, Localized] : no localized cyanosis [Skin Color & Pigmentation] : normal skin color and pigmentation [Skin Turgor] : normal skin turgor [] : no rash [Oriented To Time, Place, And Person] : oriented to person, place, and time [Affect] : the affect was normal [FreeTextEntry1] : ambulates slowly with a walker

## 2019-02-14 NOTE — DISCUSSION/SUMMARY
[___ Month(s)] : [unfilled] month(s) [FreeTextEntry1] : This is a 63 year old Citizen of [place] speaking gentleman with history of Cereberal Aneurysm and stroke, SSS s/p PPM and HLD here for routine follow up without any  complaints. \par \par # SSS s/p PPM (Medtronic Advisa)\par - First implant in 2007 and then generator change 12/2016; currently doing well. PPM check in EP clinic\par \par #HLD: c/w statin\par \par On DAPT for recent aneurysm and clipping. Following with neurology \par \par RTC in 3 months to follow up with Dr. Barnes

## 2019-02-14 NOTE — REVIEW OF SYSTEMS
[Fever] : no fever [Chills] : no chills [Shortness Of Breath] : no shortness of breath [Dyspnea on exertion] : not dyspnea during exertion [Chest Pain] : no chest pain [Lower Ext Edema] : no extremity edema [Palpitations] : no palpitations [Cough] : no cough [Abdominal Pain] : no abdominal pain [Heartburn] : no heartburn [Dysphagia] : no dysphagia [Joint Pain] : no joint pain [Muscle Cramps] : no muscle cramps [Confusion] : no confusion was observed [Anxiety] : no anxiety [Easy Bleeding] : no tendency for easy bleeding [Easy Bruising] : no tendency for easy bruising

## 2019-02-20 DIAGNOSIS — E78.5 HYPERLIPIDEMIA, UNSPECIFIED: ICD-10-CM

## 2019-02-20 DIAGNOSIS — I49.5 SICK SINUS SYNDROME: ICD-10-CM

## 2019-03-01 ENCOUNTER — RX RENEWAL (OUTPATIENT)
Age: 64
End: 2019-03-01

## 2019-03-04 ENCOUNTER — RX RENEWAL (OUTPATIENT)
Age: 64
End: 2019-03-04

## 2019-05-03 ENCOUNTER — OUTPATIENT (OUTPATIENT)
Dept: OUTPATIENT SERVICES | Facility: HOSPITAL | Age: 64
LOS: 1 days | End: 2019-05-03
Payer: MEDICAID

## 2019-05-03 DIAGNOSIS — Z98.89 OTHER SPECIFIED POSTPROCEDURAL STATES: Chronic | ICD-10-CM

## 2019-05-03 DIAGNOSIS — I67.1 CEREBRAL ANEURYSM, NONRUPTURED: ICD-10-CM

## 2019-05-03 DIAGNOSIS — Z95.0 PRESENCE OF CARDIAC PACEMAKER: Chronic | ICD-10-CM

## 2019-05-03 DIAGNOSIS — E11.9 TYPE 2 DIABETES MELLITUS WITHOUT COMPLICATIONS: ICD-10-CM

## 2019-05-03 DIAGNOSIS — Z98.49 CATARACT EXTRACTION STATUS, UNSPECIFIED EYE: Chronic | ICD-10-CM

## 2019-05-03 DIAGNOSIS — Z01.818 ENCOUNTER FOR OTHER PREPROCEDURAL EXAMINATION: ICD-10-CM

## 2019-05-03 DIAGNOSIS — Z98.890 OTHER SPECIFIED POSTPROCEDURAL STATES: Chronic | ICD-10-CM

## 2019-05-03 LAB
ANION GAP SERPL CALC-SCNC: 13 MMOL/L — SIGNIFICANT CHANGE UP (ref 5–17)
BLD GP AB SCN SERPL QL: NEGATIVE — SIGNIFICANT CHANGE UP
BUN SERPL-MCNC: 20 MG/DL — SIGNIFICANT CHANGE UP (ref 7–23)
CALCIUM SERPL-MCNC: 9.3 MG/DL — SIGNIFICANT CHANGE UP (ref 8.4–10.5)
CHLORIDE SERPL-SCNC: 100 MMOL/L — SIGNIFICANT CHANGE UP (ref 96–108)
CO2 SERPL-SCNC: 23 MMOL/L — SIGNIFICANT CHANGE UP (ref 22–31)
CREAT SERPL-MCNC: 1.18 MG/DL — SIGNIFICANT CHANGE UP (ref 0.5–1.3)
GLUCOSE SERPL-MCNC: 108 MG/DL — HIGH (ref 70–99)
HBA1C BLD-MCNC: 6 % — HIGH (ref 4–5.6)
HCT VFR BLD CALC: 34.6 % — LOW (ref 39–50)
HGB BLD-MCNC: 11.7 G/DL — LOW (ref 13–17)
MCHC RBC-ENTMCNC: 33.2 PG — SIGNIFICANT CHANGE UP (ref 27–34)
MCHC RBC-ENTMCNC: 33.8 GM/DL — SIGNIFICANT CHANGE UP (ref 32–36)
MCV RBC AUTO: 98.3 FL — SIGNIFICANT CHANGE UP (ref 80–100)
PLATELET # BLD AUTO: 175 K/UL — SIGNIFICANT CHANGE UP (ref 150–400)
POTASSIUM SERPL-MCNC: 4 MMOL/L — SIGNIFICANT CHANGE UP (ref 3.5–5.3)
POTASSIUM SERPL-SCNC: 4 MMOL/L — SIGNIFICANT CHANGE UP (ref 3.5–5.3)
RBC # BLD: 3.52 M/UL — LOW (ref 4.2–5.8)
RBC # FLD: 12.7 % — SIGNIFICANT CHANGE UP (ref 10.3–14.5)
RH IG SCN BLD-IMP: POSITIVE — SIGNIFICANT CHANGE UP
SODIUM SERPL-SCNC: 136 MMOL/L — SIGNIFICANT CHANGE UP (ref 135–145)
WBC # BLD: 4.22 K/UL — SIGNIFICANT CHANGE UP (ref 3.8–10.5)
WBC # FLD AUTO: 4.22 K/UL — SIGNIFICANT CHANGE UP (ref 3.8–10.5)

## 2019-05-03 PROCEDURE — 83036 HEMOGLOBIN GLYCOSYLATED A1C: CPT

## 2019-05-03 PROCEDURE — G0463: CPT

## 2019-05-03 PROCEDURE — 86901 BLOOD TYPING SEROLOGIC RH(D): CPT

## 2019-05-03 PROCEDURE — 85027 COMPLETE CBC AUTOMATED: CPT

## 2019-05-03 PROCEDURE — 80048 BASIC METABOLIC PNL TOTAL CA: CPT

## 2019-05-03 PROCEDURE — 86850 RBC ANTIBODY SCREEN: CPT

## 2019-05-03 PROCEDURE — 86900 BLOOD TYPING SEROLOGIC ABO: CPT

## 2019-05-03 RX ORDER — PANTOPRAZOLE SODIUM 20 MG/1
1 TABLET, DELAYED RELEASE ORAL
Qty: 0 | Refills: 0 | COMMUNITY

## 2019-05-03 RX ORDER — CICLOPIROX OLAMINE 7.7 MG/G
1 CREAM TOPICAL
Qty: 0 | Refills: 0 | COMMUNITY

## 2019-05-03 RX ORDER — HYDROXYZINE HCL 10 MG
1 TABLET ORAL
Qty: 0 | Refills: 0 | COMMUNITY

## 2019-05-03 RX ORDER — SOD,AMMONIUM,POTASSIUM LACTATE
1 CREAM (GRAM) TOPICAL
Qty: 0 | Refills: 0 | COMMUNITY

## 2019-05-03 RX ORDER — FAMOTIDINE 10 MG/ML
1 INJECTION INTRAVENOUS
Qty: 0 | Refills: 0 | COMMUNITY

## 2019-05-03 NOTE — H&P PST ADULT - NSICDXPROBLEM_GEN_ALL_CORE_FT
PROBLEM DIAGNOSES  Problem: Cerebral aneurysm without rupture  Assessment and Plan: Cerebral Angio   continue on Plavix and ASA,   continue on Keppra  Pre-op education provided - all questions answered     Problem: T2DM (type 2 diabetes mellitus)  Assessment and Plan: Finger stick on day of surgery   Stop Metformin 24 hrs before surgery

## 2019-05-03 NOTE — H&P PST ADULT - NSICDXPASTSURGICALHX_GEN_ALL_CORE_FT
PAST SURGICAL HISTORY:  Pacemaker Medtronic Serial [id number]/Model [id number]  Implant 6/29/07, new generator 12/23/2016    S/P bunionectomy     S/P cataract surgery with lens implants    S/P craniotomy for RCMA 1998    Status post coil embolization of cerebral aneurysm 9/2018

## 2019-05-03 NOTE — H&P PST ADULT - HISTORY OF PRESENT ILLNESS
61 yo male H/O Cerebral aneurysm s/p clipping 1998, SAH and was in a coma for 2 months, he suffered a stroke and has left sided weakness, wears a brace on left lower extremity and cane to ambulate, symptomatic bradycardia s/p PPM 2007 with new generator 2016, HTN, HLD, Type 2 DM, seizures, last one 2016. A new aneurysm was found about 5 years ago and was being followed at [facility].  S/P cerebral angiogram/embolisation on 9/2018 with stent placement and now scheduled for follow up Cerebral Angio on 5/9/19. Denies any palpitations, SOB, changes in mental status, N/V, fever or chills.

## 2019-05-03 NOTE — H&P PST ADULT - NSICDXPASTMEDICALHX_GEN_ALL_CORE_FT
PAST MEDICAL HISTORY:  Aneurysm of [place] of [place]     Bradycardia     Cerebral aneurysm without rupture diagnosed 2017 had cerebral angio at [facility]    CVA (cerebral vascular accident) left arm weakness, no sensation in left side in 1998    Depression     Epilepsy last seizure 2016    Gastroesophageal reflux disease, esophagitis presence not specified     Glaucoma of both eyes, unspecified glaucoma type     HLD (hyperlipidemia)     Language barrier Latvian speaking    Renal agenesis, unilateral     T2DM (type 2 diabetes mellitus)

## 2019-05-08 ENCOUNTER — FORM ENCOUNTER (OUTPATIENT)
Age: 64
End: 2019-05-08

## 2019-05-09 ENCOUNTER — APPOINTMENT (OUTPATIENT)
Dept: NEUROLOGY | Facility: CLINIC | Age: 64
End: 2019-05-09
Payer: MEDICAID

## 2019-05-09 ENCOUNTER — OUTPATIENT (OUTPATIENT)
Dept: OUTPATIENT SERVICES | Facility: HOSPITAL | Age: 64
LOS: 1 days | End: 2019-05-09
Payer: MEDICAID

## 2019-05-09 DIAGNOSIS — I67.1 CEREBRAL ANEURYSM, NONRUPTURED: ICD-10-CM

## 2019-05-09 DIAGNOSIS — Z98.890 OTHER SPECIFIED POSTPROCEDURAL STATES: Chronic | ICD-10-CM

## 2019-05-09 DIAGNOSIS — Z98.49 CATARACT EXTRACTION STATUS, UNSPECIFIED EYE: Chronic | ICD-10-CM

## 2019-05-09 DIAGNOSIS — Z98.89 OTHER SPECIFIED POSTPROCEDURAL STATES: Chronic | ICD-10-CM

## 2019-05-09 DIAGNOSIS — Z95.0 PRESENCE OF CARDIAC PACEMAKER: Chronic | ICD-10-CM

## 2019-05-09 LAB
ANION GAP SERPL CALC-SCNC: 10 MMOL/L — SIGNIFICANT CHANGE UP (ref 5–17)
BASOPHILS # BLD AUTO: 0 K/UL — SIGNIFICANT CHANGE UP (ref 0–0.2)
BASOPHILS NFR BLD AUTO: 0.3 % — SIGNIFICANT CHANGE UP (ref 0–2)
BUN SERPL-MCNC: 13 MG/DL — SIGNIFICANT CHANGE UP (ref 7–23)
CALCIUM SERPL-MCNC: 8.7 MG/DL — SIGNIFICANT CHANGE UP (ref 8.4–10.5)
CHLORIDE SERPL-SCNC: 99 MMOL/L — SIGNIFICANT CHANGE UP (ref 96–108)
CO2 SERPL-SCNC: 26 MMOL/L — SIGNIFICANT CHANGE UP (ref 22–31)
CREAT SERPL-MCNC: 1.02 MG/DL — SIGNIFICANT CHANGE UP (ref 0.5–1.3)
EOSINOPHIL # BLD AUTO: 0.3 K/UL — SIGNIFICANT CHANGE UP (ref 0–0.5)
EOSINOPHIL NFR BLD AUTO: 6.3 % — HIGH (ref 0–6)
GLUCOSE BLDC GLUCOMTR-MCNC: 104 MG/DL — HIGH (ref 70–99)
GLUCOSE SERPL-MCNC: 127 MG/DL — HIGH (ref 70–99)
HCT VFR BLD CALC: 34.3 % — LOW (ref 39–50)
HGB BLD-MCNC: 12.1 G/DL — LOW (ref 13–17)
LYMPHOCYTES # BLD AUTO: 1.1 K/UL — SIGNIFICANT CHANGE UP (ref 1–3.3)
LYMPHOCYTES # BLD AUTO: 26.2 % — SIGNIFICANT CHANGE UP (ref 13–44)
MCHC RBC-ENTMCNC: 34.6 PG — HIGH (ref 27–34)
MCHC RBC-ENTMCNC: 35.3 GM/DL — SIGNIFICANT CHANGE UP (ref 32–36)
MCV RBC AUTO: 97.9 FL — SIGNIFICANT CHANGE UP (ref 80–100)
MONOCYTES # BLD AUTO: 0.5 K/UL — SIGNIFICANT CHANGE UP (ref 0–0.9)
MONOCYTES NFR BLD AUTO: 11.5 % — SIGNIFICANT CHANGE UP (ref 2–14)
NEUTROPHILS # BLD AUTO: 2.4 K/UL — SIGNIFICANT CHANGE UP (ref 1.8–7.4)
NEUTROPHILS NFR BLD AUTO: 55.8 % — SIGNIFICANT CHANGE UP (ref 43–77)
PLATELET # BLD AUTO: 165 K/UL — SIGNIFICANT CHANGE UP (ref 150–400)
POTASSIUM SERPL-MCNC: 3.8 MMOL/L — SIGNIFICANT CHANGE UP (ref 3.5–5.3)
POTASSIUM SERPL-SCNC: 3.8 MMOL/L — SIGNIFICANT CHANGE UP (ref 3.5–5.3)
RBC # BLD: 3.5 M/UL — LOW (ref 4.2–5.8)
RBC # FLD: 11.3 % — SIGNIFICANT CHANGE UP (ref 10.3–14.5)
SODIUM SERPL-SCNC: 135 MMOL/L — SIGNIFICANT CHANGE UP (ref 135–145)
WBC # BLD: 4.2 K/UL — SIGNIFICANT CHANGE UP (ref 3.8–10.5)
WBC # FLD AUTO: 4.2 K/UL — SIGNIFICANT CHANGE UP (ref 3.8–10.5)

## 2019-05-09 PROCEDURE — C1769: CPT

## 2019-05-09 PROCEDURE — C1887: CPT

## 2019-05-09 PROCEDURE — 36254 INS CATH REN ART 2ND+ BILAT: CPT | Mod: LT

## 2019-05-09 PROCEDURE — C1894: CPT

## 2019-05-09 PROCEDURE — 76377 3D RENDER W/INTRP POSTPROCES: CPT | Mod: 26

## 2019-05-09 PROCEDURE — 82962 GLUCOSE BLOOD TEST: CPT

## 2019-05-09 PROCEDURE — 76377 3D RENDER W/INTRP POSTPROCES: CPT

## 2019-05-09 PROCEDURE — 85027 COMPLETE CBC AUTOMATED: CPT

## 2019-05-09 PROCEDURE — 80048 BASIC METABOLIC PNL TOTAL CA: CPT

## 2019-05-09 PROCEDURE — 36224 PLACE CATH CAROTD ART: CPT

## 2019-05-09 NOTE — CHART NOTE - NSCHARTNOTEFT_GEN_A_CORE
Interventional Neuro Radiology  Pre-Procedure Note    This is a 64yo right hand dominant with histroy of cerebral aneurysm s/p clipping 1998, SAH and was in a coma for 2 months, he suffered a stroke and has left sided weakness, wears a brace on left lower extremity and cane to ambulate. A new aneurysm was found about 5 years ago and was being followed at [facility].  Patient is s/p embolization on 9/2018 with stent placement. APtient presents today to neuro IR for selective cerebral angiography follow up.    Neuro Exam: Awake and alert, oriented x3, fluent, normal naming and repetition, follows 3 step commands. Extraocular movements intact, no nystagmus, visual fields full, face symmetric, tongue midline. No drift, 5/5 power x 4 extremities. Normal sensation to LT. Normal finger-to-nose and rapid alternating movements.    PAST MEDICAL & SURGICAL HISTORY:  T2DM (type 2 diabetes mellitus)  Bradycardia  Glaucoma of both eyes, unspecified glaucoma type  Gastroesophageal reflux disease, esophagitis presence not specified  Cerebral aneurysm without rupture: diagnosed 2017 had cerebral angio at [facility]  Depression  Renal agenesis, unilateral  Epilepsy: last seizure 2016  HLD (hyperlipidemia)  Aneurysm of [place] of [place]  CVA (cerebral vascular accident): left arm weakness, no sensation in left side in 1998  Status post coil embolization of cerebral aneurysm: 9/2018  S/P cataract surgery: with lens implants  S/P bunionectomy  Pacemaker: Medtronic Serial [id number]/Model [id number]  Implant 6/29/07, new generator 12/23/2016  S/P craniotomy: for RCMA 1998    FAMILY HISTORY:  Family history of stomach cancer    Allergies:   No Known Allergies    Current Medications:   · 	metoprolol tartrate 25 mg oral tablet: Last Dose Taken:  , 1 tab(s) orally once a day  · 	FLUoxetine 20 mg oral capsule: Last Dose Taken:  , 2 cap(s) orally once a day  · 	carBAMazepine 200 mg oral tablet: Last Dose Taken:  , 1 tab(s) orally 3 times a day  · 	levETIRAcetam 500 mg oral tablet: Last Dose Taken:  , 1 tab(s) orally 2 times a day  · 	latanoprost 0.005% ophthalmic solution: Last Dose Taken:  , 1 drop(s) to each affected eye once a day (at bedtime)  · 	LORazepam 0.5 mg oral tablet: Last Dose Taken:  , 1 tab(s) orally 3 times a day, As Needed  · 	Vitamin B1 100 mg oral tablet: Last Dose Taken:  , 1 tab(s) orally once a day  · 	alendronate 70 mg oral tablet: Last Dose Taken:  , 1 tab(s) orally once a week on Mondays  · 	dorzolamide 2% ophthalmic solution: Last Dose Taken:  , 1 drop(s) to each affected eye 2 times a day  · 	Aquasite Artificial Tears ophthalmic solution: Last Dose Taken:  , 1 drop(s) to each affected eye 4 times a day  · 	atorvastatin 80 mg oral tablet: Last Dose Taken:  , 1 tab(s) orally once a day (at bedtime)  · 	clopidogrel 75 mg oral tablet: Last Dose Taken:  , 1 tab(s) orally once a day  · 	aspirin 325 mg oral tablet: Last Dose Taken:  , 1 tab(s) orally once a day  · 	metFORMIN 500 mg oral tablet, extended release: 1 tab(s) orally once a day    PMH/PSH/FH/SH:     Labs:                         11.7   4.22  )-----------( 175      ( 03 May 2019 20:35 )             34.6       05-03    136  |  100  |  20  ----------------------------<  108<H>  4.0   |  23  |  1.18            HCG:     Blood Bank:     Assessment/Plan:   This is a 63y ____ hand dominant Male  presents with ______. Patient presents to neuro-IR for selective cerebral angiography. Procedure/ risks/ benefits/ goals/ alternatives were explained. Risks include but are not limited to stroke/ vessel injury/ hemorrhage/ groin hematoma. All questions answered. Informed content obtained from patient____. Consent placed in chart. Interventional Neuro Radiology  Pre-Procedure Note    This is a 64yo right hand dominant with histroy of cerebral aneurysm s/p clipping 1998, SAH and was in a coma for 2 months, he suffered a stroke and has left sided weakness, wears a brace on left lower extremity and cane to ambulate. A new aneurysm was found about 5 years ago and was being followed at [facility].  Patient is s/p embolization of left ophthalmic artery aneurysm on 9/2018 with stent placement. Patient presents today to neuro IR for selective cerebral angiography follow up.    Neuro Exam: Awake and alert, oriented x3, fluent, normal naming and repetition, follows 3 step commands. Extraocular movements intact, no nystagmus, visual fields full, face symmetric, tongue midline. No drift, mild left hemiparesis and hemisensory loss.     PAST MEDICAL & SURGICAL HISTORY:  T2DM (type 2 diabetes mellitus)  Bradycardia  Glaucoma of both eyes, unspecified glaucoma type  Gastroesophageal reflux disease, esophagitis presence not specified  Cerebral aneurysm without rupture: diagnosed 2017 had cerebral angio at [facility]  Depression  Renal agenesis, unilateral  Epilepsy: last seizure 2016  HLD (hyperlipidemia)  Aneurysm of [place] of [place]  CVA (cerebral vascular accident): left arm weakness, no sensation in left side in 1998  Status post coil embolization of cerebral aneurysm: 9/2018  S/P cataract surgery: with lens implants  S/P bunionectomy  Pacemaker: Medtronic Serial [id number]/Model [id number]  Implant 6/29/07, new generator 12/23/2016  S/P craniotomy: for RCMA 1998    FAMILY HISTORY:  Family history of stomach cancer    Allergies:   No Known Allergies    Current Medications:   · 	metoprolol tartrate 25 mg oral tablet: Last Dose Taken:  , 1 tab(s) orally once a day  · 	FLUoxetine 20 mg oral capsule: Last Dose Taken:  , 2 cap(s) orally once a day  · 	carBAMazepine 200 mg oral tablet: Last Dose Taken:  , 1 tab(s) orally 3 times a day  · 	levETIRAcetam 500 mg oral tablet: Last Dose Taken:  , 1 tab(s) orally 2 times a day  · 	latanoprost 0.005% ophthalmic solution: Last Dose Taken:  , 1 drop(s) to each affected eye once a day (at bedtime)  · 	LORazepam 0.5 mg oral tablet: Last Dose Taken:  , 1 tab(s) orally 3 times a day, As Needed  · 	Vitamin B1 100 mg oral tablet: Last Dose Taken:  , 1 tab(s) orally once a day  · 	alendronate 70 mg oral tablet: Last Dose Taken:  , 1 tab(s) orally once a week on Mondays  · 	dorzolamide 2% ophthalmic solution: Last Dose Taken:  , 1 drop(s) to each affected eye 2 times a day  · 	Aquasite Artificial Tears ophthalmic solution: Last Dose Taken:  , 1 drop(s) to each affected eye 4 times a day  · 	atorvastatin 80 mg oral tablet: Last Dose Taken:  , 1 tab(s) orally once a day (at bedtime)  · 	clopidogrel 75 mg oral tablet: Last Dose Taken:  , 1 tab(s) orally once a day  · 	aspirin 325 mg oral tablet: Last Dose Taken:  , 1 tab(s) orally once a day  · 	metFORMIN 500 mg oral tablet, extended release: 1 tab(s) orally once a day    Labs:                         11.7   4.22  )-----------( 175      ( 03 May 2019 20:35 )             34.6       05-03    136  |  100  |  20  ----------------------------<  108<H>  4.0   |  23  |  1.18      Assessment/Plan:   This is a 64yo male  presents with history of embolization of left opthalmic artery aneurysm. Patient presents to neuro-IR for selective cerebral angiography. Procedure/ risks/ benefits/ goals/ alternatives were explained. Risks include but are not limited to stroke/ vessel injury/ hemorrhage/ groin hematoma. All questions answered. Informed content obtained from patient, using Yi translation,  phone. Consent placed in chart.    Bernadine Jaime PA-C  [phone]

## 2019-05-09 NOTE — CHART NOTE - NSCHARTNOTEFT_GEN_A_CORE
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 Ernestine MD  Fellow: Dr. Roc MD   Physician Assistant: Bernadine Jaime PA-C    RN: Sabina     Anesthesia: Dr. 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 ( Official note to follow).    Patient tolerated procedure well, vital signs stable, hemodynamically stable, no change in neurological status compared to baseline. Results discussed with neurosurgery/ patient and their family. Groin sheath d/c'ed, manual compression held to hemostasis, no active bleeding, no hematoma, star close device applied, quick clot and safeguard balloon dressing applied at 15:00h. STAT labs, CBC/BMP at 19:00h. Patient transferred to PACU for further care/ monitoring.     Bernadine Jaime PA-C  [phone]

## 2019-05-12 PROBLEM — E11.9 TYPE 2 DIABETES MELLITUS WITHOUT COMPLICATIONS: Chronic | Status: ACTIVE | Noted: 2019-05-03

## 2019-05-13 DIAGNOSIS — Z09 ENCOUNTER FOR FOLLOW-UP EXAMINATION AFTER COMPLETED TREATMENT FOR CONDITIONS OTHER THAN MALIGNANT NEOPLASM: ICD-10-CM

## 2019-05-13 DIAGNOSIS — I67.1 CEREBRAL ANEURYSM, NONRUPTURED: ICD-10-CM

## 2019-06-01 ENCOUNTER — OUTPATIENT (OUTPATIENT)
Dept: OUTPATIENT SERVICES | Facility: HOSPITAL | Age: 64
LOS: 1 days | End: 2019-06-01
Payer: MEDICAID

## 2019-06-01 ENCOUNTER — APPOINTMENT (OUTPATIENT)
Dept: ULTRASOUND IMAGING | Facility: HOSPITAL | Age: 64
End: 2019-06-01
Payer: MEDICAID

## 2019-06-01 DIAGNOSIS — Z98.89 OTHER SPECIFIED POSTPROCEDURAL STATES: Chronic | ICD-10-CM

## 2019-06-01 DIAGNOSIS — Z98.890 OTHER SPECIFIED POSTPROCEDURAL STATES: Chronic | ICD-10-CM

## 2019-06-01 DIAGNOSIS — Z98.49 CATARACT EXTRACTION STATUS, UNSPECIFIED EYE: Chronic | ICD-10-CM

## 2019-06-01 DIAGNOSIS — R10.84 GENERALIZED ABDOMINAL PAIN: ICD-10-CM

## 2019-06-01 DIAGNOSIS — Z95.0 PRESENCE OF CARDIAC PACEMAKER: Chronic | ICD-10-CM

## 2019-06-01 PROCEDURE — 76700 US EXAM ABDOM COMPLETE: CPT

## 2019-06-01 PROCEDURE — 76700 US EXAM ABDOM COMPLETE: CPT | Mod: 26

## 2019-06-12 ENCOUNTER — APPOINTMENT (OUTPATIENT)
Dept: CARDIOLOGY | Facility: HOSPITAL | Age: 64
End: 2019-06-12

## 2019-06-12 ENCOUNTER — APPOINTMENT (OUTPATIENT)
Dept: ELECTROPHYSIOLOGY | Facility: CLINIC | Age: 64
End: 2019-06-12

## 2019-07-17 ENCOUNTER — APPOINTMENT (OUTPATIENT)
Dept: ELECTROPHYSIOLOGY | Facility: CLINIC | Age: 64
End: 2019-07-17

## 2019-07-17 ENCOUNTER — APPOINTMENT (OUTPATIENT)
Dept: CARDIOLOGY | Facility: HOSPITAL | Age: 64
End: 2019-07-17

## 2019-08-13 ENCOUNTER — APPOINTMENT (OUTPATIENT)
Dept: ELECTROPHYSIOLOGY | Facility: CLINIC | Age: 64
End: 2019-08-13
Payer: MEDICAID

## 2019-08-13 ENCOUNTER — APPOINTMENT (OUTPATIENT)
Dept: CARDIOLOGY | Facility: HOSPITAL | Age: 64
End: 2019-08-13

## 2019-08-13 ENCOUNTER — OUTPATIENT (OUTPATIENT)
Dept: OUTPATIENT SERVICES | Facility: HOSPITAL | Age: 64
LOS: 1 days | End: 2019-08-13
Payer: MEDICAID

## 2019-08-13 ENCOUNTER — NON-APPOINTMENT (OUTPATIENT)
Age: 64
End: 2019-08-13

## 2019-08-13 VITALS
OXYGEN SATURATION: 99 % | WEIGHT: 162 LBS | SYSTOLIC BLOOD PRESSURE: 104 MMHG | HEIGHT: 65 IN | HEART RATE: 80 BPM | DIASTOLIC BLOOD PRESSURE: 69 MMHG | BODY MASS INDEX: 26.99 KG/M2

## 2019-08-13 DIAGNOSIS — Z95.0 PRESENCE OF CARDIAC PACEMAKER: Chronic | ICD-10-CM

## 2019-08-13 DIAGNOSIS — I25.10 ATHEROSCLEROTIC HEART DISEASE OF NATIVE CORONARY ARTERY WITHOUT ANGINA PECTORIS: ICD-10-CM

## 2019-08-13 DIAGNOSIS — Z98.49 CATARACT EXTRACTION STATUS, UNSPECIFIED EYE: Chronic | ICD-10-CM

## 2019-08-13 DIAGNOSIS — I73.9 PERIPHERAL VASCULAR DISEASE, UNSPECIFIED: ICD-10-CM

## 2019-08-13 DIAGNOSIS — Z98.890 OTHER SPECIFIED POSTPROCEDURAL STATES: Chronic | ICD-10-CM

## 2019-08-13 DIAGNOSIS — Z98.89 OTHER SPECIFIED POSTPROCEDURAL STATES: Chronic | ICD-10-CM

## 2019-08-13 DIAGNOSIS — Z87.891 PERSONAL HISTORY OF NICOTINE DEPENDENCE: ICD-10-CM

## 2019-08-13 DIAGNOSIS — Z86.39 PERSONAL HISTORY OF OTHER ENDOCRINE, NUTRITIONAL AND METABOLIC DISEASE: ICD-10-CM

## 2019-08-13 PROCEDURE — G0463: CPT

## 2019-08-13 PROCEDURE — 93280 PM DEVICE PROGR EVAL DUAL: CPT

## 2019-08-13 PROCEDURE — 93005 ELECTROCARDIOGRAM TRACING: CPT

## 2019-08-13 RX ORDER — CLOPIDOGREL BISULFATE 75 MG/1
75 TABLET, FILM COATED ORAL DAILY
Qty: 30 | Refills: 5 | Status: DISCONTINUED | COMMUNITY
Start: 2018-08-29 | End: 2019-08-13

## 2019-08-13 NOTE — REVIEW OF SYSTEMS
[Fever] : no fever [Shortness Of Breath] : no shortness of breath [Chills] : no chills [Sinus Pressure] : no sinus pressure [Dyspnea on exertion] : not dyspnea during exertion [Chest Pain] : no chest pain [Leg Claudication] : intermittent leg claudication [Lower Ext Edema] : no extremity edema [Cough] : no cough [Palpitations] : no palpitations [Heartburn] : no heartburn [Abdominal Pain] : no abdominal pain [Joint Pain] : no joint pain [Dysphagia] : no dysphagia [Muscle Cramps] : no muscle cramps [Confusion] : no confusion was observed [Easy Bleeding] : no tendency for easy bleeding [Anxiety] : no anxiety [Easy Bruising] : no tendency for easy bruising

## 2019-08-13 NOTE — HISTORY OF PRESENT ILLNESS
[FreeTextEntry1] : 64 yo gentleman w/PMHx SSS s/p PPM 2007 with generator change in 12/2016 (Medtronic Advisa) and an incidental LICA aneurysm s/p recent clipping and hx of R. MCA aneurysm s/p clipping '98 course c/b SAH and epilepsy here for routine follow up doing well  since our last visit. He continues to deny any CP/chest pressure on exertion. Continues to reports left sided weakness from a stroke at that time, but is able to ambulate about two blocks at a time with his cane. He did report R. calf burning after 2 blocks that subsides with rest.

## 2019-08-13 NOTE — PHYSICAL EXAM
[General Appearance - Well Developed] : well developed [General Appearance - Well Nourished] : well nourished [Eyelids - No Xanthelasma] : the eyelids demonstrated no xanthelasmas [No Oral Pallor] : no oral pallor [Exaggerated Use Of Accessory Muscles For Inspiration] : no accessory muscle use [Heart Rate And Rhythm] : heart rate and rhythm were normal [Auscultation Breath Sounds / Voice Sounds] : lungs were clear to auscultation bilaterally [Heart Sounds] : normal S1 and S2 [Murmurs] : no murmurs present [Arterial Pulses Normal] : the arterial pulses were normal [Edema] : no peripheral edema present [Abdomen Soft] : soft [Abdomen Tenderness] : non-tender [Nail Clubbing] : no clubbing of the fingernails [Cyanosis, Localized] : no localized cyanosis [Skin Color & Pigmentation] : normal skin color and pigmentation [Skin Turgor] : normal skin turgor [] : no rash [Oriented To Time, Place, And Person] : oriented to person, place, and time [Affect] : the affect was normal [FreeTextEntry1] : ambulates slowly with a walker

## 2019-08-13 NOTE — DISCUSSION/SUMMARY
[___ Month(s)] : [unfilled] month(s) [FreeTextEntry1] : This is a 63 year old Kyrgyz speaking gentleman with history of Cereberal Aneurysm and stroke, SSS s/p PPM and HLD here for routine follow up reporting RLE claudication. \par \par #RLE Claudication: \par - KAMERON and LE Arterial Doppler\par \par # SSS s/p PPM (Medtronic Advisa)\par - First implant in 2007 and then generator change 12/2016; currently doing well. PPM check in EP clinic\par \par #HLD: c/w statin; Pt reports recently checked lipids with pmd, will bring to next appointment. \par \par On ASA since last aneurysm and clipping. Following with neurology \par \par RTC in 3 months

## 2019-08-20 DIAGNOSIS — E78.5 HYPERLIPIDEMIA, UNSPECIFIED: ICD-10-CM

## 2019-08-20 DIAGNOSIS — I73.9 PERIPHERAL VASCULAR DISEASE, UNSPECIFIED: ICD-10-CM

## 2019-09-28 ENCOUNTER — EMERGENCY (EMERGENCY)
Facility: HOSPITAL | Age: 64
LOS: 1 days | Discharge: HOME | End: 2019-09-28
Attending: EMERGENCY MEDICINE | Admitting: EMERGENCY MEDICINE
Payer: MEDICAID

## 2019-09-28 VITALS
HEIGHT: 65 IN | HEART RATE: 66 BPM | RESPIRATION RATE: 17 BRPM | TEMPERATURE: 98 F | OXYGEN SATURATION: 97 % | DIASTOLIC BLOOD PRESSURE: 83 MMHG | WEIGHT: 158.07 LBS | SYSTOLIC BLOOD PRESSURE: 137 MMHG

## 2019-09-28 DIAGNOSIS — Z98.89 OTHER SPECIFIED POSTPROCEDURAL STATES: Chronic | ICD-10-CM

## 2019-09-28 DIAGNOSIS — G40.909 EPILEPSY, UNSPECIFIED, NOT INTRACTABLE, WITHOUT STATUS EPILEPTICUS: ICD-10-CM

## 2019-09-28 DIAGNOSIS — Z98.49 CATARACT EXTRACTION STATUS, UNSPECIFIED EYE: Chronic | ICD-10-CM

## 2019-09-28 DIAGNOSIS — Z95.0 PRESENCE OF CARDIAC PACEMAKER: Chronic | ICD-10-CM

## 2019-09-28 DIAGNOSIS — Z98.890 OTHER SPECIFIED POSTPROCEDURAL STATES: Chronic | ICD-10-CM

## 2019-09-28 LAB
ALBUMIN SERPL ELPH-MCNC: 3.5 G/DL — SIGNIFICANT CHANGE UP (ref 3.3–5)
ALP SERPL-CCNC: 123 U/L — HIGH (ref 40–120)
ALT FLD-CCNC: 55 U/L — HIGH (ref 10–45)
ANION GAP SERPL CALC-SCNC: 8 MMOL/L — SIGNIFICANT CHANGE UP (ref 5–17)
AST SERPL-CCNC: 40 U/L — SIGNIFICANT CHANGE UP (ref 10–40)
BASOPHILS # BLD AUTO: 0.02 K/UL — SIGNIFICANT CHANGE UP (ref 0–0.2)
BASOPHILS NFR BLD AUTO: 0.3 % — SIGNIFICANT CHANGE UP (ref 0–2)
BILIRUB SERPL-MCNC: 0.1 MG/DL — LOW (ref 0.2–1.2)
BUN SERPL-MCNC: 21 MG/DL — SIGNIFICANT CHANGE UP (ref 7–23)
CALCIUM SERPL-MCNC: 8.6 MG/DL — SIGNIFICANT CHANGE UP (ref 8.4–10.5)
CHLORIDE SERPL-SCNC: 101 MMOL/L — SIGNIFICANT CHANGE UP (ref 96–108)
CO2 SERPL-SCNC: 26 MMOL/L — SIGNIFICANT CHANGE UP (ref 22–31)
CREAT SERPL-MCNC: 1.28 MG/DL — SIGNIFICANT CHANGE UP (ref 0.5–1.3)
EOSINOPHIL # BLD AUTO: 0.09 K/UL — SIGNIFICANT CHANGE UP (ref 0–0.5)
EOSINOPHIL NFR BLD AUTO: 1.4 % — SIGNIFICANT CHANGE UP (ref 0–6)
GLUCOSE SERPL-MCNC: 111 MG/DL — HIGH (ref 70–99)
HCT VFR BLD CALC: 32.5 % — LOW (ref 39–50)
HGB BLD-MCNC: 11 G/DL — LOW (ref 13–17)
IMM GRANULOCYTES NFR BLD AUTO: 0.3 % — SIGNIFICANT CHANGE UP (ref 0–1.5)
LYMPHOCYTES # BLD AUTO: 1 K/UL — SIGNIFICANT CHANGE UP (ref 1–3.3)
LYMPHOCYTES # BLD AUTO: 15.3 % — SIGNIFICANT CHANGE UP (ref 13–44)
MCHC RBC-ENTMCNC: 32.4 PG — SIGNIFICANT CHANGE UP (ref 27–34)
MCHC RBC-ENTMCNC: 33.8 GM/DL — SIGNIFICANT CHANGE UP (ref 32–36)
MCV RBC AUTO: 95.9 FL — SIGNIFICANT CHANGE UP (ref 80–100)
MONOCYTES # BLD AUTO: 0.65 K/UL — SIGNIFICANT CHANGE UP (ref 0–0.9)
MONOCYTES NFR BLD AUTO: 9.9 % — SIGNIFICANT CHANGE UP (ref 2–14)
NEUTROPHILS # BLD AUTO: 4.77 K/UL — SIGNIFICANT CHANGE UP (ref 1.8–7.4)
NEUTROPHILS NFR BLD AUTO: 72.8 % — SIGNIFICANT CHANGE UP (ref 43–77)
NRBC # BLD: 0 /100 WBCS — SIGNIFICANT CHANGE UP (ref 0–0)
PLATELET # BLD AUTO: 160 K/UL — SIGNIFICANT CHANGE UP (ref 150–400)
POTASSIUM SERPL-MCNC: 3.9 MMOL/L — SIGNIFICANT CHANGE UP (ref 3.5–5.3)
POTASSIUM SERPL-SCNC: 3.9 MMOL/L — SIGNIFICANT CHANGE UP (ref 3.5–5.3)
PROT SERPL-MCNC: 6.8 G/DL — SIGNIFICANT CHANGE UP (ref 6–8.3)
RBC # BLD: 3.39 M/UL — LOW (ref 4.2–5.8)
RBC # FLD: 12.6 % — SIGNIFICANT CHANGE UP (ref 10.3–14.5)
SODIUM SERPL-SCNC: 135 MMOL/L — SIGNIFICANT CHANGE UP (ref 135–145)
WBC # BLD: 6.55 K/UL — SIGNIFICANT CHANGE UP (ref 3.8–10.5)
WBC # FLD AUTO: 6.55 K/UL — SIGNIFICANT CHANGE UP (ref 3.8–10.5)

## 2019-09-28 PROCEDURE — 99284 EMERGENCY DEPT VISIT MOD MDM: CPT | Mod: 25

## 2019-09-28 PROCEDURE — 80053 COMPREHEN METABOLIC PANEL: CPT

## 2019-09-28 PROCEDURE — 96365 THER/PROPH/DIAG IV INF INIT: CPT

## 2019-09-28 PROCEDURE — 85027 COMPLETE CBC AUTOMATED: CPT

## 2019-09-28 PROCEDURE — 99284 EMERGENCY DEPT VISIT MOD MDM: CPT

## 2019-09-28 PROCEDURE — 70450 CT HEAD/BRAIN W/O DYE: CPT

## 2019-09-28 PROCEDURE — 70450 CT HEAD/BRAIN W/O DYE: CPT | Mod: 26

## 2019-09-28 RX ORDER — LEVETIRACETAM 250 MG/1
500 TABLET, FILM COATED ORAL ONCE
Refills: 0 | Status: COMPLETED | OUTPATIENT
Start: 2019-09-28 | End: 2019-09-28

## 2019-09-28 RX ADMIN — LEVETIRACETAM 500 MILLIGRAM(S): 250 TABLET, FILM COATED ORAL at 23:45

## 2019-09-28 RX ADMIN — LEVETIRACETAM 400 MILLIGRAM(S): 250 TABLET, FILM COATED ORAL at 23:24

## 2019-09-28 NOTE — ED ADULT NURSE NOTE - PSH
Pacemaker  Medtronic Serial [id number]/Model [id number]  Implant 6/29/07, new generator 12/23/2016  S/P bunionectomy    S/P cataract surgery  with lens implants  S/P craniotomy  for RCMA 1998  Status post coil embolization of cerebral aneurysm  9/2018

## 2019-09-28 NOTE — ED PROVIDER NOTE - CLINICAL SUMMARY MEDICAL DECISION MAKING FREE TEXT BOX
pt had brief seizure due to non compliance, was loaded with keppra and was advised to take medication regularly

## 2019-09-28 NOTE — ED PROVIDER NOTE - PMH
Aneurysm of [place] of [place]    Bradycardia    Cerebral aneurysm without rupture  diagnosed 2017 had cerebral angio at [facility]  CVA (cerebral vascular accident)  left arm weakness, no sensation in left side in 1998  Depression    Epilepsy  last seizure 2016  Gastroesophageal reflux disease, esophagitis presence not specified    Glaucoma of both eyes, unspecified glaucoma type    HLD (hyperlipidemia)    Language barrier  Pashto speaking  Renal agenesis, unilateral    T2DM (type 2 diabetes mellitus)

## 2019-09-28 NOTE — ED PROVIDER NOTE - PATIENT PORTAL LINK FT
You can access the FollowMyHealth Patient Portal offered by [facility] by registering at the following website: [email or web address]. By joining [email or web address] FollowMyHealth portal, you will also be able to view your health information using other applications (apps) compatible with our system.

## 2019-09-28 NOTE — ED ADULT TRIAGE NOTE - CHIEF COMPLAINT QUOTE
Pt stated he got off the couch, tripped and fell and hit head around 8p, stated he was confused for 2 hours and possibly had a seizure, h/o brain aneursym 22 years ago

## 2019-09-28 NOTE — ED ADULT NURSE NOTE - PMH
Aneurysm of [place] of [place]    Bradycardia    Cerebral aneurysm without rupture  diagnosed 2017 had cerebral angio at [facility]  CVA (cerebral vascular accident)  left arm weakness, no sensation in left side in 1998  Depression    Epilepsy  last seizure 2016  Gastroesophageal reflux disease, esophagitis presence not specified    Glaucoma of both eyes, unspecified glaucoma type    HLD (hyperlipidemia)    Language barrier  Slovak speaking  Renal agenesis, unilateral    T2DM (type 2 diabetes mellitus)

## 2019-09-28 NOTE — ED ADULT NURSE NOTE - OBJECTIVE STATEMENT
Pt a&ox3 with PMH seizures, cerebral aneurysm, and stroke came to ED c/o fall. Pt states he got up from couch and tripped over his slipper and hit his head and felt confused after. Pt unsure if he had a seizure today. Pt has hx of left sided weakness. Pt also states he has not been taking his keppra and tegretol as prescribed.  Denies any visual changes, dizziness, headache, cp, sob.

## 2019-09-28 NOTE — ED PROVIDER NOTE - OBJECTIVE STATEMENT
62 y/o male  with h/o CVA and seizure on keppra Presents to Ed c/o he had brief seizure while he was silting on couch , fell and hit his head, no incontinanace. pt states he is taking medication less than prescribed

## 2019-09-29 VITALS
SYSTOLIC BLOOD PRESSURE: 134 MMHG | RESPIRATION RATE: 16 BRPM | HEART RATE: 71 BPM | OXYGEN SATURATION: 98 % | DIASTOLIC BLOOD PRESSURE: 81 MMHG

## 2019-10-01 ENCOUNTER — OUTPATIENT (OUTPATIENT)
Dept: OUTPATIENT SERVICES | Facility: HOSPITAL | Age: 64
LOS: 1 days | End: 2019-10-01
Payer: MEDICARE

## 2019-10-01 DIAGNOSIS — Z95.0 PRESENCE OF CARDIAC PACEMAKER: Chronic | ICD-10-CM

## 2019-10-01 DIAGNOSIS — Z98.89 OTHER SPECIFIED POSTPROCEDURAL STATES: Chronic | ICD-10-CM

## 2019-10-01 DIAGNOSIS — Z98.890 OTHER SPECIFIED POSTPROCEDURAL STATES: Chronic | ICD-10-CM

## 2019-10-01 DIAGNOSIS — Z71.89 OTHER SPECIFIED COUNSELING: ICD-10-CM

## 2019-10-01 DIAGNOSIS — Z98.49 CATARACT EXTRACTION STATUS, UNSPECIFIED EYE: Chronic | ICD-10-CM

## 2019-10-02 ENCOUNTER — RX RENEWAL (OUTPATIENT)
Age: 64
End: 2019-10-02

## 2019-11-19 ENCOUNTER — APPOINTMENT (OUTPATIENT)
Dept: CARDIOLOGY | Facility: HOSPITAL | Age: 64
End: 2019-11-19

## 2019-12-01 ENCOUNTER — OUTPATIENT (OUTPATIENT)
Dept: OUTPATIENT SERVICES | Facility: HOSPITAL | Age: 64
LOS: 1 days | End: 2019-12-01
Payer: MEDICAID

## 2019-12-01 DIAGNOSIS — Z98.890 OTHER SPECIFIED POSTPROCEDURAL STATES: Chronic | ICD-10-CM

## 2019-12-01 DIAGNOSIS — Z95.0 PRESENCE OF CARDIAC PACEMAKER: Chronic | ICD-10-CM

## 2019-12-01 DIAGNOSIS — Z98.49 CATARACT EXTRACTION STATUS, UNSPECIFIED EYE: Chronic | ICD-10-CM

## 2019-12-01 DIAGNOSIS — Z98.89 OTHER SPECIFIED POSTPROCEDURAL STATES: Chronic | ICD-10-CM

## 2019-12-01 PROCEDURE — G9005: CPT

## 2020-01-01 ENCOUNTER — OUTPATIENT (OUTPATIENT)
Dept: OUTPATIENT SERVICES | Facility: HOSPITAL | Age: 65
LOS: 1 days | End: 2020-01-01
Payer: MEDICARE

## 2020-01-01 DIAGNOSIS — Z98.890 OTHER SPECIFIED POSTPROCEDURAL STATES: Chronic | ICD-10-CM

## 2020-01-01 DIAGNOSIS — Z98.89 OTHER SPECIFIED POSTPROCEDURAL STATES: Chronic | ICD-10-CM

## 2020-01-01 DIAGNOSIS — Z98.49 CATARACT EXTRACTION STATUS, UNSPECIFIED EYE: Chronic | ICD-10-CM

## 2020-01-01 DIAGNOSIS — Z95.0 PRESENCE OF CARDIAC PACEMAKER: Chronic | ICD-10-CM

## 2020-02-05 DIAGNOSIS — Z71.89 OTHER SPECIFIED COUNSELING: ICD-10-CM

## 2020-02-11 ENCOUNTER — APPOINTMENT (OUTPATIENT)
Dept: ELECTROPHYSIOLOGY | Facility: CLINIC | Age: 65
End: 2020-02-11

## 2020-06-25 ENCOUNTER — TRANSCRIPTION ENCOUNTER (OUTPATIENT)
Age: 65
End: 2020-06-25

## 2020-07-14 ENCOUNTER — APPOINTMENT (OUTPATIENT)
Dept: CARDIOLOGY | Facility: HOSPITAL | Age: 65
End: 2020-07-14

## 2020-07-28 ENCOUNTER — NON-APPOINTMENT (OUTPATIENT)
Age: 65
End: 2020-07-28

## 2020-07-28 ENCOUNTER — APPOINTMENT (OUTPATIENT)
Dept: ELECTROPHYSIOLOGY | Facility: CLINIC | Age: 65
End: 2020-07-28
Payer: MEDICARE

## 2020-07-28 ENCOUNTER — OUTPATIENT (OUTPATIENT)
Dept: OUTPATIENT SERVICES | Facility: HOSPITAL | Age: 65
LOS: 1 days | End: 2020-07-28
Payer: MEDICARE

## 2020-07-28 ENCOUNTER — APPOINTMENT (OUTPATIENT)
Dept: CARDIOLOGY | Facility: HOSPITAL | Age: 65
End: 2020-07-28

## 2020-07-28 VITALS
DIASTOLIC BLOOD PRESSURE: 83 MMHG | SYSTOLIC BLOOD PRESSURE: 130 MMHG | HEIGHT: 65 IN | OXYGEN SATURATION: 96 % | HEART RATE: 74 BPM

## 2020-07-28 DIAGNOSIS — Z98.89 OTHER SPECIFIED POSTPROCEDURAL STATES: Chronic | ICD-10-CM

## 2020-07-28 DIAGNOSIS — I49.5 SICK SINUS SYNDROME: ICD-10-CM

## 2020-07-28 DIAGNOSIS — E78.5 HYPERLIPIDEMIA, UNSPECIFIED: ICD-10-CM

## 2020-07-28 DIAGNOSIS — Z98.49 CATARACT EXTRACTION STATUS, UNSPECIFIED EYE: Chronic | ICD-10-CM

## 2020-07-28 DIAGNOSIS — Z95.0 PRESENCE OF CARDIAC PACEMAKER: Chronic | ICD-10-CM

## 2020-07-28 DIAGNOSIS — I25.10 ATHEROSCLEROTIC HEART DISEASE OF NATIVE CORONARY ARTERY WITHOUT ANGINA PECTORIS: ICD-10-CM

## 2020-07-28 DIAGNOSIS — Z98.890 OTHER SPECIFIED POSTPROCEDURAL STATES: Chronic | ICD-10-CM

## 2020-07-28 PROCEDURE — 93005 ELECTROCARDIOGRAM TRACING: CPT

## 2020-07-28 PROCEDURE — G0463: CPT

## 2020-07-28 PROCEDURE — 93280 PM DEVICE PROGR EVAL DUAL: CPT

## 2020-07-28 NOTE — HISTORY OF PRESENT ILLNESS
[FreeTextEntry1] : 64 yo gentleman w/PMHx SSS s/p PPM 2007 with generator change in 12/2016 (Medtronic Advisa) and an incidental LICA aneurysm s/p recent clipping and hx of R. MCA aneurysm s/p clipping '98 course c/b SAH and epilepsy here for routine follow up doing well since his last visit last year. He continues to deny any CP/chest pressure on exertion. Continues to reports left sided weakness from a stroke at that time, but is able to ambulate about two blocks at a time with his cane. States he would like to have his pacemaker function evaluated (last interrogation was last August). Otherwise has no complaints today

## 2020-07-28 NOTE — ASSESSMENT
[FreeTextEntry1] : Patient is instructed to follow up in 3 month(s). \par This is a 63 year old Belizean speaking gentleman with history of Cereberal Aneurysm and stroke, SSS s/p PPM and HLD here for routine follow up with no complaints today.\par \par # SSS s/p PPM (Medtronic Advisa)\par - First implant in 2007 and then generator change 12/2016; currently doing well. PPM check in EP clinic today \par \par #HLD: c/w statin; Pt reports recently checked lipids with pmd, will bring to next appointment. \par \par On ASA since last aneurysm and clipping. Following with neurology \par \par RTC in 3 months.

## 2020-07-28 NOTE — PHYSICAL EXAM
[General Appearance - Well Developed] : well developed [Normal Jugular Venous V Waves Present] : normal jugular venous V waves present [] : no respiratory distress [Heart Rate And Rhythm] : heart rate and rhythm were normal [Abdomen Soft] : soft

## 2020-08-20 DIAGNOSIS — Z71.89 OTHER SPECIFIED COUNSELING: ICD-10-CM

## 2020-09-22 ENCOUNTER — APPOINTMENT (OUTPATIENT)
Dept: NEUROLOGY | Facility: CLINIC | Age: 65
End: 2020-09-22
Payer: MEDICARE

## 2020-09-22 DIAGNOSIS — G44.319 ACUTE POST-TRAUMATIC HEADACHE, NOT INTRACTABLE: ICD-10-CM

## 2020-09-22 DIAGNOSIS — I72.9 ANEURYSM OF UNSPECIFIED SITE: ICD-10-CM

## 2020-09-22 DIAGNOSIS — G40.909 EPILEPSY, UNSPECIFIED, NOT INTRACTABLE, W/OUT STATUS EPILEPTICUS: ICD-10-CM

## 2020-09-22 PROCEDURE — 99215 OFFICE O/P EST HI 40 MIN: CPT

## 2020-09-22 RX ORDER — HYDROCORTISONE 1 %
CREAM (GRAM) TOPICAL
Refills: 0 | Status: DISCONTINUED | COMMUNITY
End: 2020-09-22

## 2020-09-22 RX ORDER — AMITRIPTYLINE HYDROCHLORIDE 25 MG/1
25 TABLET, FILM COATED ORAL
Refills: 0 | Status: DISCONTINUED | COMMUNITY
End: 2020-09-22

## 2020-09-22 RX ORDER — LORAZEPAM 0.5 MG/1
0.5 TABLET ORAL
Refills: 0 | Status: ACTIVE | COMMUNITY

## 2020-09-22 RX ORDER — HYDROXYZINE PAMOATE 50 MG/1
50 CAPSULE ORAL
Refills: 0 | Status: DISCONTINUED | COMMUNITY
End: 2020-09-22

## 2020-09-22 RX ORDER — LORAZEPAM 0.5 MG/1
0.5 TABLET ORAL
Refills: 0 | Status: DISCONTINUED | COMMUNITY
End: 2020-09-22

## 2020-09-22 RX ORDER — ZOLPIDEM TARTRATE 10 MG/1
10 TABLET, FILM COATED ORAL
Refills: 0 | Status: ACTIVE | COMMUNITY

## 2020-09-22 RX ORDER — HYDROXYZINE HCL 50 MG
TABLET ORAL
Refills: 0 | Status: DISCONTINUED | COMMUNITY
End: 2020-09-22

## 2020-09-22 NOTE — REVIEW OF SYSTEMS
[Migraine Headache] : migraine headaches [Difficulty Walking] : difficulty walking [Fever] : no fever [Chills] : no chills [Feeling Poorly] : not feeling poorly [Feeling Tired] : not feeling tired [Suicidal] : not suicidal [Anxiety] : no anxiety [Depression] : no depression [Confused or Disoriented] : no confusion [Memory Lapses or Loss] : no memory loss [Decr. Concentrating Ability] : no decrease in concentrating ability [Difficulty with Language] : no ~M difficulty with language [Changed Thought Patterns] : no change in thought patterns [Repeating Questions] : no repeated questioning about recent events [Facial Weakness] : no facial weakness [Arm Weakness] : no arm weakness [Hand Weakness] : no hand weakness [Leg Weakness] : no leg weakness [Poor Coordination] : good coordination [Difficulty Writing] : no difficulty writing [Difficulties in Speech] : no speech difficulties [Numbness] : no numbness [Tingling] : no tingling [Seizures] : no convulsions [Dizziness] : no dizziness [Fainting] : no fainting [Lightheadedness] : no lightheadedness [Vertigo] : no vertigo [Inability to Walk] : able to walk [Ataxia] : no ataxia [Frequent Falls] : not falling [Eyesight Problems] : no eyesight problems [Loss Of Hearing] : no hearing loss [Nosebleeds] : no nosebleeds [Chest Pain] : no chest pain [Palpitations] : no palpitations [Shortness Of Breath] : no shortness of breath [Cough] : no cough [Incontinence] : no incontinence [Joint Pain] : no joint pain [Skin Lesions] : no skin lesions [Skin Wound] : no skin wound [Easy Bleeding] : no tendency for easy bleeding [Easy Bruising] : no tendency for easy bruising

## 2020-09-22 NOTE — PHYSICAL EXAM
[General Appearance - Alert] : alert [General Appearance - Well Nourished] : well nourished [General Appearance - Well Developed] : well developed [Oriented To Time, Place, And Person] : oriented to person, place, and time [Affect] : the affect was normal [Mood] : the mood was normal [Person] : oriented to person [Place] : oriented to place [Time] : oriented to time [Concentration Intact] : normal concentrating ability [Visual Intact] : visual attention was ~T not ~L decreased [Naming Objects] : no difficulty naming common objects [Repeating Phrases] : no difficulty repeating a phrase [Writing A Sentence] : no difficulty writing a sentence [Fluency] : fluency intact [Comprehension] : comprehension intact [Reading] : reading intact [Past History] : adequate knowledge of personal past history [Cranial Nerves Optic (II)] : visual acuity intact bilaterally,  visual fields full to confrontation, pupils equal round and reactive to light [Cranial Nerves Oculomotor (III)] : extraocular motion intact [Cranial Nerves Trigeminal (V)] : facial sensation intact symmetrically [Cranial Nerves Facial (VII)] : face symmetrical [Cranial Nerves Vestibulocochlear (VIII)] : hearing was intact bilaterally [Cranial Nerves Glossopharyngeal (IX)] : tongue and palate midline [Cranial Nerves Accessory (XI - Cranial And Spinal)] : head turning and shoulder shrug symmetric [Cranial Nerves Hypoglossal (XII)] : there was no tongue deviation with protrusion [Motor Tone] : muscle tone was normal in all four extremities [Motor Strength] : muscle strength was normal in all four extremities [No Muscle Atrophy] : normal bulk in all four extremities [Motor Handedness Right-Handed] : the patient is right hand dominant [Hand Weakness Left] : the hand  was weak [Sensation Tactile Decrease] : light touch was intact [Optic Disc Abnormality] : the optic disc were normal in size and color [Full Visual Field] : full visual field [Hearing Threshold Finger Rub Not Hardy] : hearing was normal [Neck Appearance] : the appearance of the neck was normal [Edema] : there was no peripheral edema [Abdomen Soft] : soft [Involuntary Movements] : no involuntary movements were seen [Musculoskeletal - Swelling] : no joint swelling seen [Skin Color & Pigmentation] : normal skin color and pigmentation [Skin Turgor] : normal skin turgor [] : no respiratory distress [Heart Rate And Rhythm] : heart rate was normal and rhythm regular [Paresis Pronator Drift Right-Sided] : no pronator drift on the right [Paresis Pronator Drift Left-Sided] : no pronator drift on the left [Motor Strength Upper Extremities Bilaterally] : strength was normal in both upper extremities [Motor Strength Lower Extremities Bilaterally] : strength was normal in both lower extremities [Dysdiadochokinesia Bilaterally] : not present [Coordination - Dysmetria Impaired Finger-to-Nose Bilateral] : not present

## 2020-09-22 NOTE — DISCUSSION/SUMMARY
[FreeTextEntry1] : Mr. Rios is a 64 year old right handed man now 2 years s/p pipeline stent embolization of an incidental left ophthalmic ICA aneurysm. He had a follow up angiogram 05/2019 which showed Interval complete occlusion of left ophthalmic ICA aneurysm. No in-stent stenosis. He comes in today for a follow up appointment and reports hitting his head multiple times on the floor after having 4 seizures in the last month. We will increase the Keppra to 750 mg BID and recommended, again, to follow with Dr. Moses as his epilepsy is not well controlled. Plan for CT head now to rule out any hemorrhage or subdural. He is taking ASA, but if needed this may be stopped. I will see him again in 6 weeks. Tylenol for pain. All of his questions and concerns were addressed.

## 2020-09-22 NOTE — HISTORY OF PRESENT ILLNESS
[FreeTextEntry1] : Mr. Rios is a 64 year old right handed man with a PMHx of SSS s/p PPM 2007 with generator change in 12/2016 who was found to have an incidental left ophthalmic ICA aneurysm while at [facility] by CTA. He has a history of a right MCA aneurysm clipped in 1998, the course of which sounds like he had a SAH/ICH at that time. He has post bleed epilepsy and left hemiparesis and wears a leg splint. He underwent pipeline stent embolization of his left ophthalmic ICA aneurysm on 9/5/2018. He had a follow up angiogram 05/2019 which showed Interval complete occlusion of left ophthalmic ICA aneurysm. No in-stent stenosis.  He comes in today for a follow up visit and reports headaches and nausea everyday all day that started a month ago after he hit his head after a seizure. He has not had any follow up after the seizures and hitting his head.He reports 4 seizures in the last month. He hit his head several times on a hard floor in the past few weeks. Tylenol gives some relief. He reports some photo and phonophobia, and nausea. No history of headaches before this.

## 2020-09-28 ENCOUNTER — APPOINTMENT (OUTPATIENT)
Dept: CT IMAGING | Facility: HOSPITAL | Age: 65
End: 2020-09-28
Payer: MEDICARE

## 2020-09-28 ENCOUNTER — OUTPATIENT (OUTPATIENT)
Dept: OUTPATIENT SERVICES | Facility: HOSPITAL | Age: 65
LOS: 1 days | End: 2020-09-28
Payer: MEDICARE

## 2020-09-28 DIAGNOSIS — Z98.890 OTHER SPECIFIED POSTPROCEDURAL STATES: Chronic | ICD-10-CM

## 2020-09-28 DIAGNOSIS — G40.909 EPILEPSY, UNSPECIFIED, NOT INTRACTABLE, WITHOUT STATUS EPILEPTICUS: ICD-10-CM

## 2020-09-28 DIAGNOSIS — Z98.49 CATARACT EXTRACTION STATUS, UNSPECIFIED EYE: Chronic | ICD-10-CM

## 2020-09-28 DIAGNOSIS — Z95.0 PRESENCE OF CARDIAC PACEMAKER: Chronic | ICD-10-CM

## 2020-09-28 DIAGNOSIS — Z98.89 OTHER SPECIFIED POSTPROCEDURAL STATES: Chronic | ICD-10-CM

## 2020-09-28 PROCEDURE — 70450 CT HEAD/BRAIN W/O DYE: CPT | Mod: 26

## 2020-09-28 PROCEDURE — 70450 CT HEAD/BRAIN W/O DYE: CPT

## 2020-11-10 ENCOUNTER — APPOINTMENT (OUTPATIENT)
Dept: NEUROLOGY | Facility: CLINIC | Age: 65
End: 2020-11-10

## 2021-05-03 ENCOUNTER — APPOINTMENT (OUTPATIENT)
Dept: RADIOLOGY | Facility: HOSPITAL | Age: 66
End: 2021-05-03
Payer: MEDICARE

## 2021-05-03 ENCOUNTER — OUTPATIENT (OUTPATIENT)
Dept: OUTPATIENT SERVICES | Facility: HOSPITAL | Age: 66
LOS: 1 days | End: 2021-05-03
Payer: MEDICARE

## 2021-05-03 DIAGNOSIS — Z00.8 ENCOUNTER FOR OTHER GENERAL EXAMINATION: ICD-10-CM

## 2021-05-03 DIAGNOSIS — Z95.0 PRESENCE OF CARDIAC PACEMAKER: Chronic | ICD-10-CM

## 2021-05-03 DIAGNOSIS — Z98.49 CATARACT EXTRACTION STATUS, UNSPECIFIED EYE: Chronic | ICD-10-CM

## 2021-05-03 DIAGNOSIS — Z98.89 OTHER SPECIFIED POSTPROCEDURAL STATES: Chronic | ICD-10-CM

## 2021-05-03 DIAGNOSIS — Z98.890 OTHER SPECIFIED POSTPROCEDURAL STATES: Chronic | ICD-10-CM

## 2021-05-03 PROCEDURE — 77080 DXA BONE DENSITY AXIAL: CPT | Mod: 26

## 2021-05-03 PROCEDURE — 77080 DXA BONE DENSITY AXIAL: CPT

## 2021-06-14 ENCOUNTER — RX RENEWAL (OUTPATIENT)
Age: 66
End: 2021-06-14

## 2021-06-14 RX ORDER — LEVETIRACETAM 750 MG/1
750 TABLET, FILM COATED ORAL TWICE DAILY
Qty: 180 | Refills: 2 | Status: ACTIVE | COMMUNITY
Start: 2020-09-22 | End: 1900-01-01

## 2021-07-14 ENCOUNTER — NON-APPOINTMENT (OUTPATIENT)
Age: 66
End: 2021-07-14

## 2021-07-14 ENCOUNTER — APPOINTMENT (OUTPATIENT)
Dept: CARDIOLOGY | Facility: CLINIC | Age: 66
End: 2021-07-14
Payer: MEDICARE

## 2021-07-14 VITALS — HEART RATE: 72 BPM | DIASTOLIC BLOOD PRESSURE: 72 MMHG | SYSTOLIC BLOOD PRESSURE: 100 MMHG

## 2021-07-14 VITALS
RESPIRATION RATE: 16 BRPM | HEART RATE: 64 BPM | OXYGEN SATURATION: 98 % | BODY MASS INDEX: 27.99 KG/M2 | TEMPERATURE: 97.4 F | DIASTOLIC BLOOD PRESSURE: 67 MMHG | HEIGHT: 65 IN | WEIGHT: 168 LBS | SYSTOLIC BLOOD PRESSURE: 102 MMHG

## 2021-07-14 DIAGNOSIS — R07.89 OTHER CHEST PAIN: ICD-10-CM

## 2021-07-14 DIAGNOSIS — R06.00 DYSPNEA, UNSPECIFIED: ICD-10-CM

## 2021-07-14 PROCEDURE — 99214 OFFICE O/P EST MOD 30 MIN: CPT | Mod: 25

## 2021-07-14 PROCEDURE — 99072 ADDL SUPL MATRL&STAF TM PHE: CPT

## 2021-07-14 PROCEDURE — 93000 ELECTROCARDIOGRAM COMPLETE: CPT | Mod: 59

## 2021-07-14 PROCEDURE — 93288 INTERROG EVL PM/LDLS PM IP: CPT

## 2021-07-14 RX ORDER — CICLOPIROX 80 MG/ML
8 SOLUTION TOPICAL
Refills: 0 | Status: DISCONTINUED | COMMUNITY
End: 2021-07-14

## 2021-07-14 RX ORDER — DORZOLAMIDE HYDROCHLORIDE 20 MG/ML
2 SOLUTION OPHTHALMIC
Refills: 0 | Status: DISCONTINUED | COMMUNITY
End: 2021-07-14

## 2021-07-14 RX ORDER — ATORVASTATIN CALCIUM 80 MG/1
80 TABLET, FILM COATED ORAL
Refills: 0 | Status: DISCONTINUED | COMMUNITY
End: 2021-07-14

## 2021-07-14 RX ORDER — DOCUSATE SODIUM 100 MG/1
100 CAPSULE, LIQUID FILLED ORAL
Refills: 0 | Status: DISCONTINUED | COMMUNITY
End: 2021-07-14

## 2021-07-14 RX ORDER — FLUOXETINE HYDROCHLORIDE 20 MG/1
20 CAPSULE ORAL
Refills: 0 | Status: DISCONTINUED | COMMUNITY
End: 2021-07-14

## 2021-07-14 RX ORDER — PNV NO.95/FERROUS FUM/FOLIC AC 28MG-0.8MG
TABLET ORAL
Refills: 0 | Status: DISCONTINUED | COMMUNITY
End: 2021-07-14

## 2021-07-14 RX ORDER — FAMOTIDINE 20 MG/1
20 TABLET, FILM COATED ORAL
Qty: 60 | Refills: 5 | Status: DISCONTINUED | COMMUNITY
Start: 2018-08-29 | End: 2021-07-14

## 2021-07-14 RX ORDER — ALENDRONATE SODIUM 70 MG/1
70 TABLET ORAL
Refills: 0 | Status: DISCONTINUED | COMMUNITY
End: 2021-07-14

## 2021-07-14 RX ORDER — HYDROCORTISONE 25 MG/G
2.5 CREAM TOPICAL
Refills: 0 | Status: DISCONTINUED | COMMUNITY
End: 2021-07-14

## 2021-07-14 NOTE — REASON FOR VISIT
[Other: ____] : [unfilled] [FreeTextEntry1] : This is a  65-year-old man who presents for cardiac evaluation. The patient had issues with aneurysm in the brain the past. He has a history of sick sinus syndrome and is status post initial pacemaker in 2007. He had a battery change in 2016.. The patient has a history of hypertension and diabetes. He was a smoker of a pack a day for 20 years stopping 2007 he has a history of hyperlipidemia. He is a nonalcohol user he has no family history of heart disease. The patient does admit to occasional chest discomfort either with exertion or at rest he states that last for 10-15 minutesit does not radiate to the neck back or arms it is not associated with diaphoresis it sometimes is associated with shortness of breath. The patient states he has not had a stress test for many years.Most recent lipid profile revealed total cholesterol 121 HDL 55 LDL 30.

## 2021-07-14 NOTE — PROCEDURE
[Voltage: ___ volts] : Voltage was [unfilled] volts [Longevity: ___ months] : The estimated remaining battery life is [unfilled] months [Lead Imp:  ___ohms] : lead impedance was [unfilled] ohms [Sensing Amplitude ___mv] : sensing amplitude was [unfilled] mv [___V @] : [unfilled] V [___ ms] : [unfilled] ms [Apace-Vsense ___ %] : Apace-Vsense [unfilled]% [de-identified] : a pace v sense [de-identified] : MEDTRONIC [de-identified] : ADVISA  MRI AZDR01 [de-identified] : 12/23/2016 [de-identified] : AAIR-DDDR [de-identified] :  [de-identified] : NO EVENTS\par NORMAL FUNCTION

## 2021-07-14 NOTE — ASSESSMENT
[FreeTextEntry1] : The patient underwent pacemaker interrogation. Pacemaker was functioning normally he is a paced and V. sensed 99.9% of the time. There is approximately 5.5 years of battery life remaining\par \par In summary, the patient is a 65-year-old manwith multiple risk factors for coronary disease and somewhat atypical symptomatology.\par \par He will return for echocardiography to assess LV systolic function and for vasodilator myocardial perfusion imaging as regular stress testing is contraindicated due to the presence of pacing

## 2021-07-21 ENCOUNTER — APPOINTMENT (OUTPATIENT)
Dept: RADIOLOGY | Facility: HOSPITAL | Age: 66
End: 2021-07-21
Payer: MEDICARE

## 2021-07-21 ENCOUNTER — APPOINTMENT (OUTPATIENT)
Dept: CARDIOLOGY | Facility: CLINIC | Age: 66
End: 2021-07-21
Payer: MEDICARE

## 2021-07-21 ENCOUNTER — OUTPATIENT (OUTPATIENT)
Dept: OUTPATIENT SERVICES | Facility: HOSPITAL | Age: 66
LOS: 1 days | End: 2021-07-21
Payer: MEDICARE

## 2021-07-21 DIAGNOSIS — Z00.8 ENCOUNTER FOR OTHER GENERAL EXAMINATION: ICD-10-CM

## 2021-07-21 DIAGNOSIS — Z98.89 OTHER SPECIFIED POSTPROCEDURAL STATES: Chronic | ICD-10-CM

## 2021-07-21 DIAGNOSIS — Z98.890 OTHER SPECIFIED POSTPROCEDURAL STATES: Chronic | ICD-10-CM

## 2021-07-21 DIAGNOSIS — Z98.49 CATARACT EXTRACTION STATUS, UNSPECIFIED EYE: Chronic | ICD-10-CM

## 2021-07-21 DIAGNOSIS — Z95.0 PRESENCE OF CARDIAC PACEMAKER: Chronic | ICD-10-CM

## 2021-07-21 PROCEDURE — 72070 X-RAY EXAM THORAC SPINE 2VWS: CPT | Mod: 26

## 2021-07-21 PROCEDURE — 72100 X-RAY EXAM L-S SPINE 2/3 VWS: CPT | Mod: 26

## 2021-07-21 PROCEDURE — 93306 TTE W/DOPPLER COMPLETE: CPT

## 2021-07-21 PROCEDURE — 72100 X-RAY EXAM L-S SPINE 2/3 VWS: CPT

## 2021-07-21 PROCEDURE — 72040 X-RAY EXAM NECK SPINE 2-3 VW: CPT | Mod: 26

## 2021-07-21 PROCEDURE — 72070 X-RAY EXAM THORAC SPINE 2VWS: CPT

## 2021-07-21 PROCEDURE — 99072 ADDL SUPL MATRL&STAF TM PHE: CPT

## 2021-07-21 PROCEDURE — 72040 X-RAY EXAM NECK SPINE 2-3 VW: CPT

## 2021-08-03 ENCOUNTER — APPOINTMENT (OUTPATIENT)
Dept: CARDIOLOGY | Facility: CLINIC | Age: 66
End: 2021-08-03
Payer: MEDICARE

## 2021-08-03 PROCEDURE — A9500: CPT

## 2021-08-03 PROCEDURE — 78452 HT MUSCLE IMAGE SPECT MULT: CPT

## 2021-08-03 PROCEDURE — 93015 CV STRESS TEST SUPVJ I&R: CPT

## 2021-12-01 PROCEDURE — G9005: CPT

## 2021-12-13 ENCOUNTER — RX RENEWAL (OUTPATIENT)
Age: 66
End: 2021-12-13

## 2021-12-22 NOTE — ED ADULT NURSE NOTE - NSFALLRSKOUTCOME_ED_ALL_ED
DISPLAY PLAN FREE TEXT DISPLAY PLAN FREE TEXT DISPLAY PLAN FREE TEXT DISPLAY PLAN FREE TEXT DISPLAY PLAN FREE TEXT Fall Risk

## 2022-01-26 ENCOUNTER — APPOINTMENT (OUTPATIENT)
Dept: CARDIOLOGY | Facility: CLINIC | Age: 67
End: 2022-01-26
Payer: MEDICARE

## 2022-01-26 DIAGNOSIS — R00.1 BRADYCARDIA, UNSPECIFIED: ICD-10-CM

## 2022-01-26 PROCEDURE — 93288 INTERROG EVL PM/LDLS PM IP: CPT

## 2022-01-26 NOTE — PROCEDURE
[Voltage: ___ volts] : Voltage was [unfilled] volts [Longevity: ___ months] : The estimated remaining battery life is [unfilled] months [Lead Imp:  ___ohms] : lead impedance was [unfilled] ohms [Sensing Amplitude ___mv] : sensing amplitude was [unfilled] mv [___V @] : [unfilled] V [___ ms] : [unfilled] ms [Apace-Vsense ___ %] : Apace-Vsense [unfilled]% [de-identified] : MEDTRONIC [de-identified] : ADVISA  MRI AZDR01 [de-identified] : 12/23/2016 [de-identified] : AAIR-DDDR [de-identified] :  [de-identified] : NO EVENTS\par NORMAL FUNCTION

## 2022-04-19 NOTE — H&P PST ADULT - PRIMARY CARE PROVIDER
Easton Carroll [phone] [employer] Pregnancy And Lactation Text: This medication is Pregnancy Category X and is absolutely contraindicated during pregnancy. It is unknown if it is excreted in breast milk.

## 2022-05-07 ENCOUNTER — APPOINTMENT (OUTPATIENT)
Age: 67
End: 2022-05-07
Payer: MEDICARE

## 2022-05-07 ENCOUNTER — OUTPATIENT (OUTPATIENT)
Dept: OUTPATIENT SERVICES | Facility: HOSPITAL | Age: 67
LOS: 1 days | End: 2022-05-07
Payer: COMMERCIAL

## 2022-05-07 DIAGNOSIS — Z98.49 CATARACT EXTRACTION STATUS, UNSPECIFIED EYE: Chronic | ICD-10-CM

## 2022-05-07 DIAGNOSIS — Z00.8 ENCOUNTER FOR OTHER GENERAL EXAMINATION: ICD-10-CM

## 2022-05-07 DIAGNOSIS — Z98.890 OTHER SPECIFIED POSTPROCEDURAL STATES: Chronic | ICD-10-CM

## 2022-05-07 DIAGNOSIS — Z95.0 PRESENCE OF CARDIAC PACEMAKER: Chronic | ICD-10-CM

## 2022-05-07 DIAGNOSIS — Z98.89 OTHER SPECIFIED POSTPROCEDURAL STATES: Chronic | ICD-10-CM

## 2022-05-07 PROCEDURE — 72100 X-RAY EXAM L-S SPINE 2/3 VWS: CPT

## 2022-05-07 PROCEDURE — 72170 X-RAY EXAM OF PELVIS: CPT | Mod: 26

## 2022-05-07 PROCEDURE — 72100 X-RAY EXAM L-S SPINE 2/3 VWS: CPT | Mod: 26

## 2022-05-07 PROCEDURE — 72170 X-RAY EXAM OF PELVIS: CPT

## 2022-05-07 PROCEDURE — 76775 US EXAM ABDO BACK WALL LIM: CPT | Mod: 26

## 2022-05-07 PROCEDURE — 76775 US EXAM ABDO BACK WALL LIM: CPT

## 2022-07-20 ENCOUNTER — APPOINTMENT (OUTPATIENT)
Dept: CARDIOLOGY | Facility: CLINIC | Age: 67
End: 2022-07-20

## 2022-08-01 ENCOUNTER — APPOINTMENT (OUTPATIENT)
Dept: NEUROLOGY | Facility: CLINIC | Age: 67
End: 2022-08-01

## 2022-10-26 ENCOUNTER — APPOINTMENT (OUTPATIENT)
Dept: GASTROENTEROLOGY | Facility: CLINIC | Age: 67
End: 2022-10-26

## 2022-10-26 VITALS
SYSTOLIC BLOOD PRESSURE: 110 MMHG | HEIGHT: 65 IN | RESPIRATION RATE: 16 BRPM | OXYGEN SATURATION: 97 % | TEMPERATURE: 97.5 F | WEIGHT: 158 LBS | DIASTOLIC BLOOD PRESSURE: 70 MMHG | BODY MASS INDEX: 26.33 KG/M2 | HEART RATE: 76 BPM

## 2022-10-26 DIAGNOSIS — Z80.0 FAMILY HISTORY OF MALIGNANT NEOPLASM OF DIGESTIVE ORGANS: ICD-10-CM

## 2022-10-26 PROCEDURE — 99203 OFFICE O/P NEW LOW 30 MIN: CPT

## 2022-10-26 RX ORDER — ASPIRIN 81 MG/1
81 TABLET ORAL DAILY
Qty: 1 | Refills: 0 | Status: ACTIVE | COMMUNITY
Start: 2022-10-26 | End: 1900-01-01

## 2022-10-26 RX ORDER — ASPIRIN 325 MG/1
325 TABLET, FILM COATED ORAL DAILY
Qty: 30 | Refills: 5 | Status: DISCONTINUED | COMMUNITY
Start: 2018-08-29 | End: 2022-10-26

## 2022-10-26 RX ORDER — FAMOTIDINE 20 MG/1
20 TABLET, FILM COATED ORAL
Refills: 0 | Status: ACTIVE | COMMUNITY

## 2022-10-26 RX ORDER — ZOLPIDEM TARTRATE 5 MG/1
TABLET ORAL
Refills: 0 | Status: ACTIVE | COMMUNITY

## 2022-10-26 RX ORDER — FLUTICASONE PROPIONATE 50 UG/1
50 SPRAY, METERED NASAL
Qty: 48 | Refills: 0 | Status: ACTIVE | COMMUNITY
Start: 2022-10-16

## 2022-10-26 NOTE — ASSESSMENT
[FreeTextEntry1] : Get EGD and colonoscopy.\par \par Explained the risks, benefits, indications, alternatives. The risks include but are not limited to bleeding, infection, perforation, reaction to anesthesia, or missed lesions. The patient verbalized understanding and wishes to proceed.\par \par Split-dose Gavilyte-N.\par \par Advised verbally and in writing to stay on aspirin and to take his antiepileptic meds on the day of the procedure. He agrees.\par \par Pacemaker info saved to chart.

## 2022-10-26 NOTE — HISTORY OF PRESENT ILLNESS
[FreeTextEntry1] :  [facility] #[id number]\par \par Referred for CRC screening.\par Had colonoscopy @[facility] ~10 years ago, no polyps per patient.\par Had EGD in the past also due to reported maternal history of gastric cancer. Does not recall any specific findings on EGD. Unclear H.Pylori status.\par \par Denies rectal bleeding.\par Pain worse after eating, in epigastrium, mid abdomen.\par \par Has a PPM. Placed in [facility].\par Takes daily aspirin 81 mg daily.\par \par Also history of seizure d/o, last was 6 months ago.

## 2022-10-26 NOTE — PHYSICAL EXAM
[Alert] : alert [Normal Voice/Communication] : normal voice/communication [Healthy Appearing] : healthy appearing [No Acute Distress] : no acute distress [Sclera] : the sclera and conjunctiva were normal [Hearing Threshold Finger Rub Not [place]] : hearing was normal [Normal Lips/Gums] : the lips and gums were normal [Oropharynx] : the oropharynx was normal [Normal Appearance] : the appearance of the neck was normal [No Neck Mass] : no neck mass was observed [No Respiratory Distress] : no respiratory distress [No Acc Muscle Use] : no accessory muscle use [Respiration, Rhythm And Depth] : normal respiratory rhythm and effort [Auscultation Breath Sounds / Voice Sounds] : lungs were clear to auscultation bilaterally [Heart Rate And Rhythm] : heart rate was normal and rhythm regular [Normal S1, S2] : normal S1 and S2 [Murmurs] : no murmurs [Pacemaker/Defibrillator] : Patient has pacemaker/defibrillator [Bowel Sounds] : normal bowel sounds [Abdomen Tenderness] : non-tender [No Masses] : no abdominal mass palpated [Abdomen Soft] : soft [No CVA Tenderness] : no CVA  tenderness [No Clubbing, Cyanosis] : no clubbing or cyanosis of the fingernails [Normal Color / Pigmentation] : normal skin color and pigmentation [] : no rash [Oriented To Time, Place, And Person] : oriented to person, place, and time

## 2022-11-09 ENCOUNTER — APPOINTMENT (OUTPATIENT)
Dept: CARDIOLOGY | Facility: CLINIC | Age: 67
End: 2022-11-09

## 2022-11-09 ENCOUNTER — NON-APPOINTMENT (OUTPATIENT)
Age: 67
End: 2022-11-09

## 2022-11-09 VITALS
HEART RATE: 78 BPM | HEIGHT: 65 IN | BODY MASS INDEX: 27.16 KG/M2 | WEIGHT: 163 LBS | OXYGEN SATURATION: 98 % | RESPIRATION RATE: 16 BRPM | TEMPERATURE: 97.8 F

## 2022-11-09 VITALS — HEART RATE: 60 BPM | DIASTOLIC BLOOD PRESSURE: 60 MMHG | SYSTOLIC BLOOD PRESSURE: 100 MMHG

## 2022-11-09 DIAGNOSIS — Z00.00 ENCOUNTER FOR GENERAL ADULT MEDICAL EXAMINATION W/OUT ABNORMAL FINDINGS: ICD-10-CM

## 2022-11-09 PROCEDURE — 99213 OFFICE O/P EST LOW 20 MIN: CPT | Mod: 25

## 2022-11-09 PROCEDURE — 93000 ELECTROCARDIOGRAM COMPLETE: CPT

## 2022-11-09 PROCEDURE — 93288 INTERROG EVL PM/LDLS PM IP: CPT

## 2022-11-09 NOTE — REASON FOR VISIT
[Other: ____] : [unfilled] [FreeTextEntry1] : She presents for reassessment.  He missed his last pacemaker interrogation.  He offers no complaints of chest discomfort shortness of breath palpitations dizziness or syncope.  He has had no recent lipid profile done.  He states he is due for blood work with his primary physician soon.

## 2022-11-09 NOTE — ASSESSMENT
[FreeTextEntry1] : The patient underwent pacemaker interrogation.  Results are pending\par \par Patient otherwise appears cardiac stable.

## 2022-11-14 NOTE — PROCEDURE
[Voltage: ___ volts] : Voltage was [unfilled] volts [Longevity: ___ months] : The estimated remaining battery life is [unfilled] months [Lead Imp:  ___ohms] : lead impedance was [unfilled] ohms [Sensing Amplitude ___mv] : sensing amplitude was [unfilled] mv [___V @] : [unfilled] V [___ ms] : [unfilled] ms [Apace-Vsense ___ %] : Apace-Vsense [unfilled]% [de-identified] : MEDTRONIC [de-identified] : ADVISA  MRI AZDR01 [de-identified] : 12/23/2016 [de-identified] : AAIR-DDDR [de-identified] :  [de-identified] : NO EVENTS\par NORMAL FUNCTION

## 2022-12-05 ENCOUNTER — APPOINTMENT (OUTPATIENT)
Dept: ULTRASOUND IMAGING | Facility: HOSPITAL | Age: 67
End: 2022-12-05

## 2022-12-06 DIAGNOSIS — Z01.812 ENCOUNTER FOR PREPROCEDURAL LABORATORY EXAMINATION: ICD-10-CM

## 2022-12-07 ENCOUNTER — OUTPATIENT (OUTPATIENT)
Dept: OUTPATIENT SERVICES | Facility: HOSPITAL | Age: 67
LOS: 1 days | End: 2022-12-07
Payer: COMMERCIAL

## 2022-12-07 ENCOUNTER — APPOINTMENT (OUTPATIENT)
Age: 67
End: 2022-12-07

## 2022-12-07 DIAGNOSIS — Z98.890 OTHER SPECIFIED POSTPROCEDURAL STATES: Chronic | ICD-10-CM

## 2022-12-07 DIAGNOSIS — Z98.49 CATARACT EXTRACTION STATUS, UNSPECIFIED EYE: Chronic | ICD-10-CM

## 2022-12-07 DIAGNOSIS — Z98.89 OTHER SPECIFIED POSTPROCEDURAL STATES: Chronic | ICD-10-CM

## 2022-12-07 DIAGNOSIS — Z00.8 ENCOUNTER FOR OTHER GENERAL EXAMINATION: ICD-10-CM

## 2022-12-07 DIAGNOSIS — Z95.0 PRESENCE OF CARDIAC PACEMAKER: Chronic | ICD-10-CM

## 2022-12-07 PROCEDURE — 76700 US EXAM ABDOM COMPLETE: CPT | Mod: 26

## 2022-12-07 PROCEDURE — 76700 US EXAM ABDOM COMPLETE: CPT

## 2022-12-09 NOTE — REASON FOR VISIT
allow for swallow between intakes/maintain upright posture during/after eating for 30 mins/no straws/oral hygiene/position upright (90 degrees)/small sips/bites [Follow-Up: _____] : a [unfilled] follow-up visit [Family Member] : family member [[employer] ] : provided by [employer]   [FreeTextEntry1] : [id number] [FreeTextEntry2] : claudia [TWNoteComboBox1] : Swazi

## 2022-12-15 ENCOUNTER — APPOINTMENT (OUTPATIENT)
Dept: GASTROENTEROLOGY | Facility: HOSPITAL | Age: 67
End: 2022-12-15

## 2022-12-15 ENCOUNTER — RESULT REVIEW (OUTPATIENT)
Age: 67
End: 2022-12-15

## 2022-12-15 ENCOUNTER — OUTPATIENT (OUTPATIENT)
Dept: OUTPATIENT SERVICES | Facility: HOSPITAL | Age: 67
LOS: 1 days | End: 2022-12-15
Payer: MEDICARE

## 2022-12-15 DIAGNOSIS — Z98.890 OTHER SPECIFIED POSTPROCEDURAL STATES: Chronic | ICD-10-CM

## 2022-12-15 DIAGNOSIS — Z12.11 ENCOUNTER FOR SCREENING FOR MALIGNANT NEOPLASM OF COLON: ICD-10-CM

## 2022-12-15 DIAGNOSIS — Z98.49 CATARACT EXTRACTION STATUS, UNSPECIFIED EYE: Chronic | ICD-10-CM

## 2022-12-15 DIAGNOSIS — Z95.0 PRESENCE OF CARDIAC PACEMAKER: Chronic | ICD-10-CM

## 2022-12-15 DIAGNOSIS — R10.13 EPIGASTRIC PAIN: ICD-10-CM

## 2022-12-15 DIAGNOSIS — Z98.89 OTHER SPECIFIED POSTPROCEDURAL STATES: Chronic | ICD-10-CM

## 2022-12-15 LAB
GLUCOSE BLDC GLUCOMTR-MCNC: 107 MG/DL — HIGH (ref 70–99)
SARS-COV-2 N GENE NPH QL NAA+PROBE: NOT DETECTED

## 2022-12-15 PROCEDURE — 88312 SPECIAL STAINS GROUP 1: CPT | Mod: 26

## 2022-12-15 PROCEDURE — 82962 GLUCOSE BLOOD TEST: CPT

## 2022-12-15 PROCEDURE — 88312 SPECIAL STAINS GROUP 1: CPT

## 2022-12-15 PROCEDURE — 88305 TISSUE EXAM BY PATHOLOGIST: CPT | Mod: 26

## 2022-12-15 PROCEDURE — 45378 DIAGNOSTIC COLONOSCOPY: CPT | Mod: 52

## 2022-12-15 PROCEDURE — G0121: CPT

## 2022-12-15 PROCEDURE — 88305 TISSUE EXAM BY PATHOLOGIST: CPT

## 2022-12-15 PROCEDURE — 43239 EGD BIOPSY SINGLE/MULTIPLE: CPT

## 2022-12-15 RX ORDER — OMEPRAZOLE 20 MG/1
20 CAPSULE, DELAYED RELEASE ORAL DAILY
Qty: 30 | Refills: 2 | Status: ACTIVE | COMMUNITY
Start: 2022-12-15 | End: 1900-01-01

## 2022-12-15 RX ORDER — SODIUM CHLORIDE 9 MG/ML
500 INJECTION INTRAMUSCULAR; INTRAVENOUS; SUBCUTANEOUS
Refills: 0 | Status: DISCONTINUED | OUTPATIENT
Start: 2022-12-15 | End: 2022-12-30

## 2022-12-20 ENCOUNTER — NON-APPOINTMENT (OUTPATIENT)
Age: 67
End: 2022-12-20

## 2022-12-20 LAB — SURGICAL PATHOLOGY STUDY: SIGNIFICANT CHANGE UP

## 2023-01-25 ENCOUNTER — APPOINTMENT (OUTPATIENT)
Dept: GASTROENTEROLOGY | Facility: CLINIC | Age: 68
End: 2023-01-25
Payer: MEDICARE

## 2023-01-25 VITALS
WEIGHT: 160 LBS | HEIGHT: 65 IN | HEART RATE: 89 BPM | OXYGEN SATURATION: 98 % | SYSTOLIC BLOOD PRESSURE: 102 MMHG | BODY MASS INDEX: 26.66 KG/M2 | RESPIRATION RATE: 16 BRPM | DIASTOLIC BLOOD PRESSURE: 70 MMHG | TEMPERATURE: 97 F

## 2023-01-25 DIAGNOSIS — Z12.11 ENCOUNTER FOR SCREENING FOR MALIGNANT NEOPLASM OF COLON: ICD-10-CM

## 2023-01-25 DIAGNOSIS — R10.13 EPIGASTRIC PAIN: ICD-10-CM

## 2023-01-25 DIAGNOSIS — R07.89 OTHER CHEST PAIN: ICD-10-CM

## 2023-01-25 DIAGNOSIS — K29.60 OTHER GASTRITIS W/OUT BLEEDING: ICD-10-CM

## 2023-01-25 PROCEDURE — 99213 OFFICE O/P EST LOW 20 MIN: CPT

## 2023-01-25 RX ORDER — POLYETHYLENE GLYCOL 3350, SODIUM CHLORIDE, SODIUM BICARBONATE AND POTASSIUM CHLORIDE WITH LEMON FLAVOR 420; 11.2; 5.72; 1.48 G/4L; G/4L; G/4L; G/4L
420 POWDER, FOR SOLUTION ORAL
Qty: 1 | Refills: 0 | Status: COMPLETED | COMMUNITY
Start: 2022-10-26 | End: 2023-01-26

## 2023-01-25 NOTE — HISTORY OF PRESENT ILLNESS
[FreeTextEntry1] : Follow up visit after EGD and colonoscopy.\par EGD biopsies unremarkable.\par Colonoscopy with poor prep. Had solid food on day before test.

## 2023-01-25 NOTE — REASON FOR VISIT
[Follow-up] : a follow-up of an existing diagnosis [Family Member] : family member [Source: ______] : History obtained from [unfilled]

## 2023-01-25 NOTE — ASSESSMENT
[FreeTextEntry1] : Repeat colonoscopy due to poor prep.\par \par Explained the risks, benefits, indications, alternatives. The risks include but are not limited to bleeding, infection, perforation, reaction to anesthesia, or missed lesions. The patient verbalized understanding and wishes to proceed.\par \par Continue current medications.\par

## 2023-01-25 NOTE — PHYSICAL EXAM
[Alert] : alert [Normal Voice/Communication] : normal voice/communication [Healthy Appearing] : healthy appearing [No Acute Distress] : no acute distress [Sclera] : the sclera and conjunctiva were normal [Hearing Threshold Finger Rub Not [place]] : hearing was normal [Normal Lips/Gums] : the lips and gums were normal [Oropharynx] : the oropharynx was normal [Normal Appearance] : the appearance of the neck was normal [No Neck Mass] : no neck mass was observed [No Respiratory Distress] : no respiratory distress [No Acc Muscle Use] : no accessory muscle use [Respiration, Rhythm And Depth] : normal respiratory rhythm and effort [Auscultation Breath Sounds / Voice Sounds] : lungs were clear to auscultation bilaterally [Heart Rate And Rhythm] : heart rate was normal and rhythm regular [Normal S1, S2] : normal S1 and S2 [Murmurs] : no murmurs [Pacemaker/Defibrillator] : Patient has pacemaker/defibrillator [Bowel Sounds] : normal bowel sounds [Abdomen Tenderness] : non-tender [No Masses] : no abdominal mass palpated [Abdomen Soft] : soft [No CVA Tenderness] : no CVA  tenderness [No Clubbing, Cyanosis] : no clubbing or cyanosis of the fingernails [Normal Color / Pigmentation] : normal skin color and pigmentation [] : no rash [Oriented To Time, Place, And Person] : oriented to person, place, and time

## 2023-02-09 ENCOUNTER — APPOINTMENT (OUTPATIENT)
Dept: ENDOCRINOLOGY | Facility: CLINIC | Age: 68
End: 2023-02-09
Payer: MEDICARE

## 2023-02-09 VITALS
HEART RATE: 74 BPM | OXYGEN SATURATION: 98 % | SYSTOLIC BLOOD PRESSURE: 102 MMHG | BODY MASS INDEX: 26.66 KG/M2 | DIASTOLIC BLOOD PRESSURE: 74 MMHG | RESPIRATION RATE: 16 BRPM | HEIGHT: 65 IN | TEMPERATURE: 97.4 F | WEIGHT: 160 LBS

## 2023-02-09 DIAGNOSIS — R79.89 OTHER SPECIFIED ABNORMAL FINDINGS OF BLOOD CHEMISTRY: ICD-10-CM

## 2023-02-09 PROCEDURE — 99204 OFFICE O/P NEW MOD 45 MIN: CPT

## 2023-02-09 NOTE — HISTORY OF PRESENT ILLNESS
[FreeTextEntry1] : Problems:\par 1. Evaluation for hypogonadism\par 2. Prediabete - defer management to PCP\par 3. Osteoporosis - defer management to PCP\par \par Evaluation for hypogonadism\par 1. Patient was diagnosed with hypogonadism in 2020 - he was treated with testosterone gels and injection in the past \par 2. Labs:\par 2015 - Total testosterone level 332.8\par 12/24/2018 - Total testosterone level 271.2 \par 01/05/2023 - Total testosterone level 5.2 (193 to 740), TSH 1.59 N, Free T4 - 0.9 N, HbA1c 6.1%, PSA < 0.01, Cr 1.28 N, AST 38 N,  (10 to 45), serum ferritin 182 N\par 3. 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 Patient does not have children\par 5. Patient has osteoporosis, 02/09/2023 - BMI 26.63

## 2023-02-09 NOTE — ASSESSMENT
[FreeTextEntry1] : I ordered labs to determine if this patient has hypogonadism. \par \par He has a history of childhood testicular disorder.\par \par Plan:\par 1. Labs to be done before next visit:\par Day 1 -  8 am and 10 am - fasting - CBC, CMP, PSA, total and free testosterone level, FSH, LH, SHBG level\par Day 2 - 8 am to 10 am -fasting - total and free testosterone level\par 2. Follow up in 2 weeks to review results.

## 2023-02-09 NOTE — PHYSICAL EXAM
[de-identified] : General: No distress, well nourished\par Eyes: Normal Sclera, EOMI, PERRL\par ENT: Normal appearance of the nose, normal oropharynx\par Neck/Thyroid: No cervical lymphadenopathy, thyroid gland 20 g in size, no thyroid nodules, non-tender\par Respiratory: No use of accessory muscles of respiration, vesicular breath sounds heard bilaterally, no crepitations or ronchi\par Cardiovascular: S1 and S2 heard and normal, no S3 or S4, no murmurs, radial pulse normal bilaterally\par Abdomen: soft, non-tender, no masses, normal bowel sounds\par Musculoskeletal: No swelling or deformities of joints of hands, no pedal edema\par Neurological: Normal range of motion in the hands, Normal brachioradialis reflexes bilaterally\par Psychiatry: Patient converses normally, good judgement and insight\par Skin: No rashes in hands, no nodules palpated in hands

## 2023-02-09 NOTE — DATA REVIEWED
[FreeTextEntry1] : 2015 - Total testosterone level 332.8\par \par 12/24/2018 - Total testosterone level 271.2 \par \par 01/05/2023 - Total testosterone level 5.2 (193 to 740), TSH 1.59 N, Free T4 - 0.9 N, HbA1c 6.1%, PSA < 0.01, Cr 1.28 N, AST 38 N,  (10 to 45), serum ferritin 182 N

## 2023-02-10 ENCOUNTER — TRANSCRIPTION ENCOUNTER (OUTPATIENT)
Age: 68
End: 2023-02-10

## 2023-02-10 ENCOUNTER — INPATIENT (INPATIENT)
Facility: HOSPITAL | Age: 68
LOS: 3 days | Discharge: HOME | DRG: 871 | End: 2023-02-14
Attending: STUDENT IN AN ORGANIZED HEALTH CARE EDUCATION/TRAINING PROGRAM | Admitting: INTERNAL MEDICINE
Payer: COMMERCIAL

## 2023-02-10 ENCOUNTER — INPATIENT (INPATIENT)
Facility: HOSPITAL | Age: 68
LOS: 0 days | Discharge: HOME | DRG: 281 | End: 2023-02-10
Attending: INTERNAL MEDICINE | Admitting: INTERNAL MEDICINE
Payer: MEDICARE

## 2023-02-10 VITALS
HEART RATE: 62 BPM | TEMPERATURE: 99 F | SYSTOLIC BLOOD PRESSURE: 113 MMHG | OXYGEN SATURATION: 99 % | HEIGHT: 65 IN | RESPIRATION RATE: 16 BRPM | WEIGHT: 160.06 LBS | DIASTOLIC BLOOD PRESSURE: 56 MMHG

## 2023-02-10 VITALS
OXYGEN SATURATION: 96 % | DIASTOLIC BLOOD PRESSURE: 52 MMHG | HEART RATE: 62 BPM | RESPIRATION RATE: 20 BRPM | TEMPERATURE: 98 F | SYSTOLIC BLOOD PRESSURE: 102 MMHG

## 2023-02-10 VITALS
OXYGEN SATURATION: 95 % | WEIGHT: 160.06 LBS | HEIGHT: 66 IN | HEART RATE: 67 BPM | TEMPERATURE: 98 F | SYSTOLIC BLOOD PRESSURE: 110 MMHG | DIASTOLIC BLOOD PRESSURE: 72 MMHG | RESPIRATION RATE: 23 BRPM

## 2023-02-10 DIAGNOSIS — Z95.0 PRESENCE OF CARDIAC PACEMAKER: Chronic | ICD-10-CM

## 2023-02-10 DIAGNOSIS — Z98.49 CATARACT EXTRACTION STATUS, UNSPECIFIED EYE: Chronic | ICD-10-CM

## 2023-02-10 DIAGNOSIS — R10.9 UNSPECIFIED ABDOMINAL PAIN: ICD-10-CM

## 2023-02-10 DIAGNOSIS — Z98.89 OTHER SPECIFIED POSTPROCEDURAL STATES: Chronic | ICD-10-CM

## 2023-02-10 DIAGNOSIS — Z98.890 OTHER SPECIFIED POSTPROCEDURAL STATES: Chronic | ICD-10-CM

## 2023-02-10 DIAGNOSIS — I25.10 ATHEROSCLEROTIC HEART DISEASE OF NATIVE CORONARY ARTERY WITHOUT ANGINA PECTORIS: ICD-10-CM

## 2023-02-10 LAB
ALBUMIN SERPL ELPH-MCNC: 3.1 G/DL — LOW (ref 3.3–5)
ALP SERPL-CCNC: 177 U/L — HIGH (ref 40–120)
ALT FLD-CCNC: 468 U/L — HIGH (ref 10–45)
ANION GAP SERPL CALC-SCNC: 10 MMOL/L — SIGNIFICANT CHANGE UP (ref 5–17)
APPEARANCE UR: CLEAR — SIGNIFICANT CHANGE UP
APPEARANCE UR: CLEAR — SIGNIFICANT CHANGE UP
APTT BLD: 176.3 SEC — CRITICAL HIGH (ref 27.5–35.5)
APTT BLD: 29.1 SEC — SIGNIFICANT CHANGE UP (ref 27.5–35.5)
AST SERPL-CCNC: 602 U/L — HIGH (ref 10–40)
BACTERIA # UR AUTO: NEGATIVE /HPF — SIGNIFICANT CHANGE UP
BACTERIA # UR AUTO: NEGATIVE — SIGNIFICANT CHANGE UP
BASOPHILS # BLD AUTO: 0.02 K/UL — SIGNIFICANT CHANGE UP (ref 0–0.2)
BASOPHILS NFR BLD AUTO: 0.5 % — SIGNIFICANT CHANGE UP (ref 0–2)
BILIRUB SERPL-MCNC: 0.9 MG/DL — SIGNIFICANT CHANGE UP (ref 0.2–1.2)
BILIRUB UR-MCNC: NEGATIVE — SIGNIFICANT CHANGE UP
BILIRUB UR-MCNC: NEGATIVE — SIGNIFICANT CHANGE UP
BUN SERPL-MCNC: 21 MG/DL — SIGNIFICANT CHANGE UP (ref 7–23)
CALCIUM SERPL-MCNC: 8 MG/DL — LOW (ref 8.4–10.5)
CHLORIDE SERPL-SCNC: 98 MMOL/L — SIGNIFICANT CHANGE UP (ref 96–108)
CO2 SERPL-SCNC: 28 MMOL/L — SIGNIFICANT CHANGE UP (ref 22–31)
COLOR SPEC: YELLOW — SIGNIFICANT CHANGE UP
COLOR SPEC: YELLOW — SIGNIFICANT CHANGE UP
CREAT SERPL-MCNC: 1.15 MG/DL — SIGNIFICANT CHANGE UP (ref 0.5–1.3)
DIFF PNL FLD: ABNORMAL
DIFF PNL FLD: ABNORMAL
E COLI DNA BLD POS QL NAA+NON-PROBE: SIGNIFICANT CHANGE UP
EGFR: 69 ML/MIN/1.73M2 — SIGNIFICANT CHANGE UP
EOSINOPHIL # BLD AUTO: 0.04 K/UL — SIGNIFICANT CHANGE UP (ref 0–0.5)
EOSINOPHIL NFR BLD AUTO: 1.1 % — SIGNIFICANT CHANGE UP (ref 0–6)
EPI CELLS # UR: 0 /HPF — SIGNIFICANT CHANGE UP
EPI CELLS # UR: SIGNIFICANT CHANGE UP
FLUAV AG NPH QL: SIGNIFICANT CHANGE UP
FLUBV AG NPH QL: SIGNIFICANT CHANGE UP
GLUCOSE BLDC GLUCOMTR-MCNC: 111 MG/DL — HIGH (ref 70–99)
GLUCOSE BLDC GLUCOMTR-MCNC: 117 MG/DL — HIGH (ref 70–99)
GLUCOSE BLDC GLUCOMTR-MCNC: 126 MG/DL — HIGH (ref 70–99)
GLUCOSE BLDC GLUCOMTR-MCNC: 161 MG/DL — HIGH (ref 70–99)
GLUCOSE SERPL-MCNC: 216 MG/DL — HIGH (ref 70–99)
GLUCOSE UR QL: NEGATIVE — SIGNIFICANT CHANGE UP
GLUCOSE UR QL: NEGATIVE — SIGNIFICANT CHANGE UP
GRAM STN FLD: SIGNIFICANT CHANGE UP
HAV IGM SER-ACNC: SIGNIFICANT CHANGE UP
HBV CORE IGM SER-ACNC: SIGNIFICANT CHANGE UP
HBV SURFACE AG SER-ACNC: SIGNIFICANT CHANGE UP
HCT VFR BLD CALC: 28.3 % — LOW (ref 39–50)
HCT VFR BLD CALC: 32.7 % — LOW (ref 39–50)
HCV AB S/CO SERPL IA: 0.06 S/CO — SIGNIFICANT CHANGE UP (ref 0–0.99)
HCV AB SERPL-IMP: SIGNIFICANT CHANGE UP
HGB BLD-MCNC: 10.7 G/DL — LOW (ref 13–17)
HGB BLD-MCNC: 9.5 G/DL — LOW (ref 13–17)
IMM GRANULOCYTES NFR BLD AUTO: 0 % — SIGNIFICANT CHANGE UP (ref 0–0.9)
INR BLD: 1.04 RATIO — SIGNIFICANT CHANGE UP (ref 0.88–1.16)
KETONES UR-MCNC: NEGATIVE — SIGNIFICANT CHANGE UP
KETONES UR-MCNC: NEGATIVE — SIGNIFICANT CHANGE UP
LACTATE SERPL-SCNC: 1.6 MMOL/L — SIGNIFICANT CHANGE UP (ref 0.7–2)
LEUKOCYTE ESTERASE UR-ACNC: NEGATIVE — SIGNIFICANT CHANGE UP
LEUKOCYTE ESTERASE UR-ACNC: NEGATIVE — SIGNIFICANT CHANGE UP
LIDOCAIN IGE QN: 573 U/L — HIGH (ref 73–393)
LYMPHOCYTES # BLD AUTO: 0.35 K/UL — LOW (ref 1–3.3)
LYMPHOCYTES # BLD AUTO: 9.4 % — LOW (ref 13–44)
MCHC RBC-ENTMCNC: 32.7 GM/DL — SIGNIFICANT CHANGE UP (ref 32–36)
MCHC RBC-ENTMCNC: 33.2 PG — SIGNIFICANT CHANGE UP (ref 27–34)
MCHC RBC-ENTMCNC: 33.5 PG — SIGNIFICANT CHANGE UP (ref 27–34)
MCHC RBC-ENTMCNC: 33.6 GM/DL — SIGNIFICANT CHANGE UP (ref 32–36)
MCV RBC AUTO: 101.6 FL — HIGH (ref 80–100)
MCV RBC AUTO: 99.6 FL — SIGNIFICANT CHANGE UP (ref 80–100)
METHOD TYPE: SIGNIFICANT CHANGE UP
MONOCYTES # BLD AUTO: 0.06 K/UL — SIGNIFICANT CHANGE UP (ref 0–0.9)
MONOCYTES NFR BLD AUTO: 1.6 % — LOW (ref 2–14)
NEUTROPHILS # BLD AUTO: 3.24 K/UL — SIGNIFICANT CHANGE UP (ref 1.8–7.4)
NEUTROPHILS NFR BLD AUTO: 87.4 % — HIGH (ref 43–77)
NITRITE UR-MCNC: NEGATIVE — SIGNIFICANT CHANGE UP
NITRITE UR-MCNC: NEGATIVE — SIGNIFICANT CHANGE UP
NRBC # BLD: 0 /100 WBCS — SIGNIFICANT CHANGE UP (ref 0–0)
NRBC # BLD: 0 /100 WBCS — SIGNIFICANT CHANGE UP (ref 0–0)
PH UR: 6.5 — SIGNIFICANT CHANGE UP (ref 5–8)
PH UR: 6.5 — SIGNIFICANT CHANGE UP (ref 5–8)
PLATELET # BLD AUTO: 113 K/UL — LOW (ref 150–400)
PLATELET # BLD AUTO: 132 K/UL — LOW (ref 150–400)
POTASSIUM SERPL-MCNC: 3.7 MMOL/L — SIGNIFICANT CHANGE UP (ref 3.5–5.3)
POTASSIUM SERPL-SCNC: 3.7 MMOL/L — SIGNIFICANT CHANGE UP (ref 3.5–5.3)
PROCALCITONIN SERPL-MCNC: 0.1 NG/ML — HIGH
PROT SERPL-MCNC: 6.5 G/DL — SIGNIFICANT CHANGE UP (ref 6–8.3)
PROT UR-MCNC: 15
PROT UR-MCNC: NEGATIVE — SIGNIFICANT CHANGE UP
PROTHROM AB SERPL-ACNC: 12.1 SEC — SIGNIFICANT CHANGE UP (ref 10.5–13.4)
RBC # BLD: 2.84 M/UL — LOW (ref 4.2–5.8)
RBC # BLD: 3.22 M/UL — LOW (ref 4.2–5.8)
RBC # FLD: 12 % — SIGNIFICANT CHANGE UP (ref 10.3–14.5)
RBC # FLD: 12.3 % — SIGNIFICANT CHANGE UP (ref 10.3–14.5)
RBC CASTS # UR COMP ASSIST: 1 /HPF — SIGNIFICANT CHANGE UP (ref 0–4)
RBC CASTS # UR COMP ASSIST: SIGNIFICANT CHANGE UP /HPF (ref 0–4)
RSV RNA NPH QL NAA+NON-PROBE: SIGNIFICANT CHANGE UP
SARS-COV-2 RNA SPEC QL NAA+PROBE: SIGNIFICANT CHANGE UP
SODIUM SERPL-SCNC: 136 MMOL/L — SIGNIFICANT CHANGE UP (ref 135–145)
SP GR SPEC: 1 — LOW (ref 1.01–1.02)
SP GR SPEC: 1.01 — SIGNIFICANT CHANGE UP (ref 1.01–1.02)
SPECIMEN SOURCE: SIGNIFICANT CHANGE UP
SPECIMEN SOURCE: SIGNIFICANT CHANGE UP
TROPONIN I, HIGH SENSITIVITY RESULT: 1129.7 NG/L — HIGH
TROPONIN I, HIGH SENSITIVITY RESULT: 1276.9 NG/L — HIGH
TROPONIN I, HIGH SENSITIVITY RESULT: 6.2 NG/L — SIGNIFICANT CHANGE UP
TROPONIN I, HIGH SENSITIVITY RESULT: 903.8 NG/L — HIGH
UROBILINOGEN FLD QL: 1
UROBILINOGEN FLD QL: ABNORMAL
WBC # BLD: 3.71 K/UL — LOW (ref 3.8–10.5)
WBC # BLD: 8.42 K/UL — SIGNIFICANT CHANGE UP (ref 3.8–10.5)
WBC # FLD AUTO: 3.71 K/UL — LOW (ref 3.8–10.5)
WBC # FLD AUTO: 8.42 K/UL — SIGNIFICANT CHANGE UP (ref 3.8–10.5)
WBC UR QL: 0 /HPF — SIGNIFICANT CHANGE UP (ref 0–5)
WBC UR QL: NEGATIVE /HPF — SIGNIFICANT CHANGE UP (ref 0–5)

## 2023-02-10 PROCEDURE — 93458 L HRT ARTERY/VENTRICLE ANGIO: CPT | Mod: 26

## 2023-02-10 PROCEDURE — 87040 BLOOD CULTURE FOR BACTERIA: CPT

## 2023-02-10 PROCEDURE — 99285 EMERGENCY DEPT VISIT HI MDM: CPT

## 2023-02-10 PROCEDURE — 99152 MOD SED SAME PHYS/QHP 5/>YRS: CPT

## 2023-02-10 PROCEDURE — 87186 SC STD MICRODIL/AGAR DIL: CPT

## 2023-02-10 PROCEDURE — 93306 TTE W/DOPPLER COMPLETE: CPT | Mod: 26

## 2023-02-10 PROCEDURE — 87150 DNA/RNA AMPLIFIED PROBE: CPT

## 2023-02-10 PROCEDURE — 74174 CTA ABD&PLVS W/CONTRAST: CPT | Mod: MA

## 2023-02-10 PROCEDURE — 99222 1ST HOSP IP/OBS MODERATE 55: CPT

## 2023-02-10 PROCEDURE — 71045 X-RAY EXAM CHEST 1 VIEW: CPT

## 2023-02-10 PROCEDURE — 82962 GLUCOSE BLOOD TEST: CPT

## 2023-02-10 PROCEDURE — 81001 URINALYSIS AUTO W/SCOPE: CPT

## 2023-02-10 PROCEDURE — 87086 URINE CULTURE/COLONY COUNT: CPT

## 2023-02-10 PROCEDURE — 85025 COMPLETE CBC W/AUTO DIFF WBC: CPT

## 2023-02-10 PROCEDURE — 85610 PROTHROMBIN TIME: CPT

## 2023-02-10 PROCEDURE — 93010 ELECTROCARDIOGRAM REPORT: CPT | Mod: 76

## 2023-02-10 PROCEDURE — 74174 CTA ABD&PLVS W/CONTRAST: CPT | Mod: 26,MA

## 2023-02-10 PROCEDURE — 71045 X-RAY EXAM CHEST 1 VIEW: CPT | Mod: 26

## 2023-02-10 PROCEDURE — 93306 TTE W/DOPPLER COMPLETE: CPT

## 2023-02-10 PROCEDURE — 87637 SARSCOV2&INF A&B&RSV AMP PRB: CPT

## 2023-02-10 PROCEDURE — 80074 ACUTE HEPATITIS PANEL: CPT

## 2023-02-10 PROCEDURE — 84145 PROCALCITONIN (PCT): CPT

## 2023-02-10 PROCEDURE — 80053 COMPREHEN METABOLIC PANEL: CPT

## 2023-02-10 PROCEDURE — 87077 CULTURE AEROBIC IDENTIFY: CPT

## 2023-02-10 PROCEDURE — 93005 ELECTROCARDIOGRAM TRACING: CPT

## 2023-02-10 PROCEDURE — 36415 COLL VENOUS BLD VENIPUNCTURE: CPT

## 2023-02-10 PROCEDURE — 84484 ASSAY OF TROPONIN QUANT: CPT

## 2023-02-10 PROCEDURE — 83690 ASSAY OF LIPASE: CPT

## 2023-02-10 PROCEDURE — 85027 COMPLETE CBC AUTOMATED: CPT

## 2023-02-10 PROCEDURE — 99223 1ST HOSP IP/OBS HIGH 75: CPT

## 2023-02-10 PROCEDURE — 85730 THROMBOPLASTIN TIME PARTIAL: CPT

## 2023-02-10 PROCEDURE — 83605 ASSAY OF LACTIC ACID: CPT

## 2023-02-10 RX ORDER — DEXTROSE 50 % IN WATER 50 %
15 SYRINGE (ML) INTRAVENOUS ONCE
Refills: 0 | Status: DISCONTINUED | OUTPATIENT
Start: 2023-02-10 | End: 2023-02-14

## 2023-02-10 RX ORDER — METOPROLOL TARTRATE 50 MG
25 TABLET ORAL
Refills: 0 | Status: DISCONTINUED | OUTPATIENT
Start: 2023-02-10 | End: 2023-02-14

## 2023-02-10 RX ORDER — INSULIN LISPRO 100/ML
VIAL (ML) SUBCUTANEOUS
Refills: 0 | Status: DISCONTINUED | OUTPATIENT
Start: 2023-02-10 | End: 2023-02-10

## 2023-02-10 RX ORDER — SODIUM CHLORIDE 9 MG/ML
1000 INJECTION, SOLUTION INTRAVENOUS
Refills: 0 | Status: DISCONTINUED | OUTPATIENT
Start: 2023-02-10 | End: 2023-02-14

## 2023-02-10 RX ORDER — DEXTROSE 50 % IN WATER 50 %
25 SYRINGE (ML) INTRAVENOUS ONCE
Refills: 0 | Status: DISCONTINUED | OUTPATIENT
Start: 2023-02-10 | End: 2023-02-14

## 2023-02-10 RX ORDER — GLUCAGON INJECTION, SOLUTION 0.5 MG/.1ML
1 INJECTION, SOLUTION SUBCUTANEOUS ONCE
Refills: 0 | Status: DISCONTINUED | OUTPATIENT
Start: 2023-02-10 | End: 2023-02-10

## 2023-02-10 RX ORDER — CLOPIDOGREL BISULFATE 75 MG/1
75 TABLET, FILM COATED ORAL DAILY
Refills: 0 | Status: DISCONTINUED | OUTPATIENT
Start: 2023-02-10 | End: 2023-02-10

## 2023-02-10 RX ORDER — LEVETIRACETAM 250 MG/1
750 TABLET, FILM COATED ORAL
Refills: 0 | Status: DISCONTINUED | OUTPATIENT
Start: 2023-02-10 | End: 2023-02-14

## 2023-02-10 RX ORDER — DEXTROSE 50 % IN WATER 50 %
15 SYRINGE (ML) INTRAVENOUS ONCE
Refills: 0 | Status: DISCONTINUED | OUTPATIENT
Start: 2023-02-10 | End: 2023-02-10

## 2023-02-10 RX ORDER — THIAMINE MONONITRATE (VIT B1) 100 MG
1 TABLET ORAL
Qty: 0 | Refills: 0 | DISCHARGE

## 2023-02-10 RX ORDER — CARBAMAZEPINE 200 MG
300 TABLET ORAL
Refills: 0 | Status: DISCONTINUED | OUTPATIENT
Start: 2023-02-10 | End: 2023-02-14

## 2023-02-10 RX ORDER — SODIUM CHLORIDE 9 MG/ML
1000 INJECTION INTRAMUSCULAR; INTRAVENOUS; SUBCUTANEOUS ONCE
Refills: 0 | Status: COMPLETED | OUTPATIENT
Start: 2023-02-10 | End: 2023-02-10

## 2023-02-10 RX ORDER — HEPARIN SODIUM 5000 [USP'U]/ML
4400 INJECTION INTRAVENOUS; SUBCUTANEOUS EVERY 6 HOURS
Refills: 0 | Status: DISCONTINUED | OUTPATIENT
Start: 2023-02-10 | End: 2023-02-10

## 2023-02-10 RX ORDER — SODIUM CHLORIDE 9 MG/ML
1000 INJECTION, SOLUTION INTRAVENOUS
Refills: 0 | Status: DISCONTINUED | OUTPATIENT
Start: 2023-02-10 | End: 2023-02-10

## 2023-02-10 RX ORDER — SODIUM CHLORIDE 9 MG/ML
1000 INJECTION INTRAMUSCULAR; INTRAVENOUS; SUBCUTANEOUS
Refills: 0 | Status: DISCONTINUED | OUTPATIENT
Start: 2023-02-10 | End: 2023-02-14

## 2023-02-10 RX ORDER — SODIUM CHLORIDE 9 MG/ML
1000 INJECTION, SOLUTION INTRAVENOUS
Refills: 0 | Status: COMPLETED | OUTPATIENT
Start: 2023-02-10 | End: 2023-02-10

## 2023-02-10 RX ORDER — METOPROLOL TARTRATE 50 MG
25 TABLET ORAL
Refills: 0 | Status: CANCELLED | OUTPATIENT
Start: 2023-02-11 | End: 2023-02-10

## 2023-02-10 RX ORDER — FLUOXETINE HCL 10 MG
40 CAPSULE ORAL DAILY
Refills: 0 | Status: DISCONTINUED | OUTPATIENT
Start: 2023-02-10 | End: 2023-02-14

## 2023-02-10 RX ORDER — ONDANSETRON 8 MG/1
4 TABLET, FILM COATED ORAL EVERY 6 HOURS
Refills: 0 | Status: DISCONTINUED | OUTPATIENT
Start: 2023-02-10 | End: 2023-02-10

## 2023-02-10 RX ORDER — LATANOPROST 0.05 MG/ML
1 SOLUTION/ DROPS OPHTHALMIC; TOPICAL AT BEDTIME
Refills: 0 | Status: DISCONTINUED | OUTPATIENT
Start: 2023-02-10 | End: 2023-02-14

## 2023-02-10 RX ORDER — ENOXAPARIN SODIUM 100 MG/ML
40 INJECTION SUBCUTANEOUS EVERY 24 HOURS
Refills: 0 | Status: DISCONTINUED | OUTPATIENT
Start: 2023-02-10 | End: 2023-02-10

## 2023-02-10 RX ORDER — PANTOPRAZOLE SODIUM 20 MG/1
1 TABLET, DELAYED RELEASE ORAL
Qty: 0 | Refills: 0 | DISCHARGE

## 2023-02-10 RX ORDER — INSULIN LISPRO 100/ML
VIAL (ML) SUBCUTANEOUS AT BEDTIME
Refills: 0 | Status: DISCONTINUED | OUTPATIENT
Start: 2023-02-10 | End: 2023-02-10

## 2023-02-10 RX ORDER — FLUOXETINE HCL 10 MG
40 CAPSULE ORAL DAILY
Refills: 0 | Status: DISCONTINUED | OUTPATIENT
Start: 2023-02-10 | End: 2023-02-10

## 2023-02-10 RX ORDER — ALENDRONATE SODIUM 70 MG/1
1 TABLET ORAL
Qty: 0 | Refills: 0 | DISCHARGE

## 2023-02-10 RX ORDER — HEPARIN SODIUM 5000 [USP'U]/ML
2200 INJECTION INTRAVENOUS; SUBCUTANEOUS
Qty: 25000 | Refills: 0 | Status: DISCONTINUED | OUTPATIENT
Start: 2023-02-10 | End: 2023-02-10

## 2023-02-10 RX ORDER — DEXTROSE 50 % IN WATER 50 %
25 SYRINGE (ML) INTRAVENOUS ONCE
Refills: 0 | Status: DISCONTINUED | OUTPATIENT
Start: 2023-02-10 | End: 2023-02-10

## 2023-02-10 RX ORDER — ZOLPIDEM TARTRATE 10 MG/1
1 TABLET ORAL
Qty: 0 | Refills: 0 | DISCHARGE

## 2023-02-10 RX ORDER — ZOLPIDEM TARTRATE 10 MG/1
5 TABLET ORAL AT BEDTIME
Refills: 0 | Status: DISCONTINUED | OUTPATIENT
Start: 2023-02-10 | End: 2023-02-10

## 2023-02-10 RX ORDER — HEPARIN SODIUM 5000 [USP'U]/ML
INJECTION INTRAVENOUS; SUBCUTANEOUS
Qty: 25000 | Refills: 0 | Status: DISCONTINUED | OUTPATIENT
Start: 2023-02-10 | End: 2023-02-10

## 2023-02-10 RX ORDER — INSULIN LISPRO 100/ML
VIAL (ML) SUBCUTANEOUS
Refills: 0 | Status: DISCONTINUED | OUTPATIENT
Start: 2023-02-10 | End: 2023-02-14

## 2023-02-10 RX ORDER — FLUTICASONE PROPIONATE 50 MCG
1 SPRAY, SUSPENSION NASAL
Refills: 0 | Status: DISCONTINUED | OUTPATIENT
Start: 2023-02-10 | End: 2023-02-14

## 2023-02-10 RX ORDER — METFORMIN HYDROCHLORIDE 850 MG/1
1 TABLET ORAL
Qty: 0 | Refills: 0 | DISCHARGE

## 2023-02-10 RX ORDER — ASPIRIN/CALCIUM CARB/MAGNESIUM 324 MG
324 TABLET ORAL ONCE
Refills: 0 | Status: COMPLETED | OUTPATIENT
Start: 2023-02-10 | End: 2023-02-10

## 2023-02-10 RX ORDER — ASPIRIN/CALCIUM CARB/MAGNESIUM 324 MG
1 TABLET ORAL
Qty: 0 | Refills: 0 | DISCHARGE

## 2023-02-10 RX ORDER — PANTOPRAZOLE SODIUM 20 MG/1
40 TABLET, DELAYED RELEASE ORAL
Refills: 0 | Status: CANCELLED | OUTPATIENT
Start: 2023-02-11 | End: 2023-02-10

## 2023-02-10 RX ORDER — PIPERACILLIN AND TAZOBACTAM 4; .5 G/20ML; G/20ML
3.38 INJECTION, POWDER, LYOPHILIZED, FOR SOLUTION INTRAVENOUS ONCE
Refills: 0 | Status: COMPLETED | OUTPATIENT
Start: 2023-02-11 | End: 2023-02-11

## 2023-02-10 RX ORDER — GLUCAGON INJECTION, SOLUTION 0.5 MG/.1ML
1 INJECTION, SOLUTION SUBCUTANEOUS ONCE
Refills: 0 | Status: DISCONTINUED | OUTPATIENT
Start: 2023-02-10 | End: 2023-02-14

## 2023-02-10 RX ORDER — PANTOPRAZOLE SODIUM 20 MG/1
40 TABLET, DELAYED RELEASE ORAL ONCE
Refills: 0 | Status: COMPLETED | OUTPATIENT
Start: 2023-02-10 | End: 2023-02-10

## 2023-02-10 RX ORDER — ASPIRIN/CALCIUM CARB/MAGNESIUM 324 MG
81 TABLET ORAL DAILY
Refills: 0 | Status: DISCONTINUED | OUTPATIENT
Start: 2023-02-10 | End: 2023-02-14

## 2023-02-10 RX ORDER — DORZOLAMIDE HYDROCHLORIDE 20 MG/ML
1 SOLUTION/ DROPS OPHTHALMIC
Qty: 0 | Refills: 0 | DISCHARGE

## 2023-02-10 RX ORDER — HEPARIN SODIUM 5000 [USP'U]/ML
4400 INJECTION INTRAVENOUS; SUBCUTANEOUS ONCE
Refills: 0 | Status: COMPLETED | OUTPATIENT
Start: 2023-02-10 | End: 2023-02-10

## 2023-02-10 RX ORDER — ATORVASTATIN CALCIUM 80 MG/1
80 TABLET, FILM COATED ORAL AT BEDTIME
Refills: 0 | Status: DISCONTINUED | OUTPATIENT
Start: 2023-02-10 | End: 2023-02-10

## 2023-02-10 RX ORDER — ZOLPIDEM TARTRATE 10 MG/1
5 TABLET ORAL AT BEDTIME
Refills: 0 | Status: DISCONTINUED | OUTPATIENT
Start: 2023-02-10 | End: 2023-02-14

## 2023-02-10 RX ORDER — METOPROLOL TARTRATE 50 MG
25 TABLET ORAL DAILY
Refills: 0 | Status: DISCONTINUED | OUTPATIENT
Start: 2023-02-10 | End: 2023-02-10

## 2023-02-10 RX ORDER — PIPERACILLIN AND TAZOBACTAM 4; .5 G/20ML; G/20ML
3.38 INJECTION, POWDER, LYOPHILIZED, FOR SOLUTION INTRAVENOUS ONCE
Refills: 0 | Status: COMPLETED | OUTPATIENT
Start: 2023-02-10 | End: 2023-02-10

## 2023-02-10 RX ORDER — SODIUM CHLORIDE 9 MG/ML
250 INJECTION INTRAMUSCULAR; INTRAVENOUS; SUBCUTANEOUS ONCE
Refills: 0 | Status: DISCONTINUED | OUTPATIENT
Start: 2023-02-10 | End: 2023-02-14

## 2023-02-10 RX ORDER — FAMOTIDINE 10 MG/ML
40 INJECTION INTRAVENOUS AT BEDTIME
Refills: 0 | Status: DISCONTINUED | OUTPATIENT
Start: 2023-02-10 | End: 2023-02-10

## 2023-02-10 RX ORDER — INSULIN LISPRO 100/ML
VIAL (ML) SUBCUTANEOUS AT BEDTIME
Refills: 0 | Status: DISCONTINUED | OUTPATIENT
Start: 2023-02-10 | End: 2023-02-14

## 2023-02-10 RX ORDER — ASPIRIN/CALCIUM CARB/MAGNESIUM 324 MG
81 TABLET ORAL DAILY
Refills: 0 | Status: DISCONTINUED | OUTPATIENT
Start: 2023-02-10 | End: 2023-02-10

## 2023-02-10 RX ORDER — LATANOPROST 0.05 MG/ML
1 SOLUTION/ DROPS OPHTHALMIC; TOPICAL AT BEDTIME
Refills: 0 | Status: DISCONTINUED | OUTPATIENT
Start: 2023-02-10 | End: 2023-02-10

## 2023-02-10 RX ORDER — DEXTROSE 50 % IN WATER 50 %
12.5 SYRINGE (ML) INTRAVENOUS ONCE
Refills: 0 | Status: DISCONTINUED | OUTPATIENT
Start: 2023-02-10 | End: 2023-02-10

## 2023-02-10 RX ORDER — LEVETIRACETAM 250 MG/1
750 TABLET, FILM COATED ORAL
Refills: 0 | Status: DISCONTINUED | OUTPATIENT
Start: 2023-02-10 | End: 2023-02-10

## 2023-02-10 RX ORDER — CARBAMAZEPINE 200 MG
200 TABLET ORAL
Refills: 0 | Status: DISCONTINUED | OUTPATIENT
Start: 2023-02-10 | End: 2023-02-10

## 2023-02-10 RX ORDER — DEXTROSE 50 % IN WATER 50 %
12.5 SYRINGE (ML) INTRAVENOUS ONCE
Refills: 0 | Status: DISCONTINUED | OUTPATIENT
Start: 2023-02-10 | End: 2023-02-14

## 2023-02-10 RX ORDER — PANTOPRAZOLE SODIUM 20 MG/1
40 TABLET, DELAYED RELEASE ORAL
Refills: 0 | Status: DISCONTINUED | OUTPATIENT
Start: 2023-02-10 | End: 2023-02-14

## 2023-02-10 RX ORDER — PIPERACILLIN AND TAZOBACTAM 4; .5 G/20ML; G/20ML
3.38 INJECTION, POWDER, LYOPHILIZED, FOR SOLUTION INTRAVENOUS EVERY 8 HOURS
Refills: 0 | Status: DISCONTINUED | OUTPATIENT
Start: 2023-02-11 | End: 2023-02-13

## 2023-02-10 RX ORDER — CLOPIDOGREL BISULFATE 75 MG/1
1 TABLET, FILM COATED ORAL
Qty: 0 | Refills: 0 | DISCHARGE

## 2023-02-10 RX ORDER — CLOPIDOGREL BISULFATE 75 MG/1
1 TABLET, FILM COATED ORAL
Qty: 0 | Refills: 0 | DISCHARGE
Start: 2023-02-10

## 2023-02-10 RX ADMIN — Medication 324 MILLIGRAM(S): at 08:37

## 2023-02-10 RX ADMIN — HEPARIN SODIUM 900 UNIT(S)/HR: 5000 INJECTION INTRAVENOUS; SUBCUTANEOUS at 09:44

## 2023-02-10 RX ADMIN — Medication 2: at 13:11

## 2023-02-10 RX ADMIN — SODIUM CHLORIDE 1000 MILLILITER(S): 9 INJECTION INTRAMUSCULAR; INTRAVENOUS; SUBCUTANEOUS at 03:56

## 2023-02-10 RX ADMIN — Medication 81 MILLIGRAM(S): at 11:25

## 2023-02-10 RX ADMIN — PANTOPRAZOLE SODIUM 40 MILLIGRAM(S): 20 TABLET, DELAYED RELEASE ORAL at 06:48

## 2023-02-10 RX ADMIN — Medication 300 MILLIGRAM(S): at 21:31

## 2023-02-10 RX ADMIN — Medication 40 MILLIGRAM(S): at 11:25

## 2023-02-10 RX ADMIN — CLOPIDOGREL BISULFATE 75 MILLIGRAM(S): 75 TABLET, FILM COATED ORAL at 11:25

## 2023-02-10 RX ADMIN — PIPERACILLIN AND TAZOBACTAM 200 GRAM(S): 4; .5 INJECTION, POWDER, LYOPHILIZED, FOR SOLUTION INTRAVENOUS at 21:31

## 2023-02-10 RX ADMIN — Medication 25 MILLIGRAM(S): at 21:31

## 2023-02-10 RX ADMIN — SODIUM CHLORIDE 1000 MILLILITER(S): 9 INJECTION INTRAMUSCULAR; INTRAVENOUS; SUBCUTANEOUS at 07:26

## 2023-02-10 RX ADMIN — HEPARIN SODIUM 4400 UNIT(S): 5000 INJECTION INTRAVENOUS; SUBCUTANEOUS at 09:44

## 2023-02-10 RX ADMIN — LEVETIRACETAM 750 MILLIGRAM(S): 250 TABLET, FILM COATED ORAL at 21:31

## 2023-02-10 RX ADMIN — LATANOPROST 1 DROP(S): 0.05 SOLUTION/ DROPS OPHTHALMIC; TOPICAL at 21:32

## 2023-02-10 RX ADMIN — Medication 1 SPRAY(S): at 21:32

## 2023-02-10 RX ADMIN — SODIUM CHLORIDE 100 MILLILITER(S): 9 INJECTION, SOLUTION INTRAVENOUS at 06:48

## 2023-02-10 NOTE — H&P CARDIOLOGY - EF ASSESSMENT
Specimen Description 12/01/2018  5:40 PM ACL   THROAT    CULTURE 12/01/2018  5:40 PM ACL   NO BETA HEMOLYTIC STREPTOCOCCI ISOLATED    REPORT STATUS 12/01/2018  5:40 PM ACL   12/03/2018 FINAL      Attempted reaching pt with neg cx results.  LM with neg cx results and advised to call back with further questions or concerns.    Normal

## 2023-02-10 NOTE — DISCHARGE NOTE PROVIDER - HOSPITAL COURSE
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 Pt was BIBEMS to the [place] ED , because pt states he has not been feeling well since 9 PM due to epigastric pain radiating to the back associated nausea and vomiting x 2.  Initial troponin negative however subsequent troponin elevated NSTEMI . CT abdomen and Pelvis negative. Tx to [facility] today for LCH today with . No complaints of CP no sob no palpitations noted. On Heparin gtt . ASA /Plavix given at OSH.     Patient successfully underwent diagnostic LHC via RRA on 2/10/23 with Dr. Petit  RRA access site stable without bleeding, edema, hematoma 68 y/o Turkmen speaking male with PMH of sinus node disorder s/p PPM, HTN, HLD, cerebral aneurysm s/p coiling, CVA with residual L sided weaknes, seizure disorder, DM2 on Metformin and gastritis who presented to [place] ED for epigastric pain radiating to the back associated nausea and vomiting x 2 which started on thursday 2/9, transferred to [facility] for LHC 2/10 which was diagnostic. Course c/b GNR bacteremia, now admitted to medicine for further management     Problem/Plan - 1:  ·  Problem: Acute chest pain.   ·  Plan: Likely type II NSTEMI in setting of bacteremia  Trops elevated with EKG changes; however s/p LHC 2/10 with clean coronaries  CT angio negative for dissection  TTE reviewed, preserved EF 60-65%, mod TR     Problem/Plan - 2:  ·  Problem: E coli bacteremia.   ·  Plan: Blood cx 2/10 growing Ecoli 4/4 bottles, urine cx negative; subsequent bcx NGTD  CT abd and US abd unremarkable for source  TTE without vegetation  Started on zosyn (2/10 -2/13), switched to ceftriaxone. Can transition to PO upon discharge  F/u ID recs  RUQ US with hepatic steatosis     Problem/Plan - 3:  ·  Problem: Transaminitis.   ·  Plan: Improving  RUQ US with hepatic steatosis  Trend daily.     Problem/Plan - 4:  ·  Problem: Hypertension.   ·  Plan: BP at goal; cont home metoprolol.     Problem/Plan - 5:  ·  Problem: Diabetes.   ·  Plan: A1C at goal  cont current regimen.     Problem/Plan - 6:  ·  Problem: Seizure.   ·  Plan: Cont home keppra and carbamezapine.    Discharge/dispo/med rec discussed with attending Dr. Chakraborty. Patient is medically cleared for discharge with outpatient follow up.

## 2023-02-10 NOTE — H&P ADULT - NSICDXPASTSURGICALHX_GEN_ALL_CORE_FT
PAST SURGICAL HISTORY:  Pacemaker Medtronic Serial [id number]/Model [id number]  Implant 6/29/07, new generator 12/23/2016    S/P bunionectomy     S/P cataract surgery with lens implants    S/P craniotomy for RCMA 1998    Status post coil embolization of cerebral aneurysm 9/2018

## 2023-02-10 NOTE — ED ADULT NURSE REASSESSMENT NOTE - NS ED NURSE REASSESS COMMENT FT1
Assumed pt care for a 67 yr old male alert and oriented x 3 with no current complaint. Awaiting further disposition.

## 2023-02-10 NOTE — H&P ADULT - ASSESSMENT
66 y/o male with HTN, HLD, h/o CVA, seizure disorder, DM2, +PPM, hx of gastritis, BIBEMS to the ED, because pt states he has not been feeling well since 9 PM due to epigastric pain radiating to the back associated nausea and vomiting x 2.  Pt reports his last meal was a late lunch, he was not able to eat dinner.  Last BM was 2 days ago.  He denies fever, chills, dyspnea, dysuria, headache, diarrhea.     Labs significant for elevated LFTS: , SGOT 602, SGPT 468.  Lipase id 573.  CT Abd/pelvis was unremarkable.   Pt had EGD, colonoscopy in Dec 2022, showed gastritis.  66 y/o male with HTN, HLD, h/o CVA, seizure disorder, DM2, +PPM, hx of gastritis, BIBEMS to the ED, because pt states he has not been feeling well since 9 PM due to epigastric pain radiating to the back associated nausea and vomiting x 2.  Pt reports his last meal was a late lunch, he was not able to eat dinner.  Last BM was 2 days ago.  He denies fever, chills, dyspnea, dysuria, headache, diarrhea.     Labs significant for elevated LFTS: , SGOT 602, SGPT 468.  Lipase id 573.  CT Abd/pelvis was unremarkable.   Pt had EGD, colonoscopy in Dec 2022, showed gastritis. He also had abd sono in 12/2022, w/c just showed hepatic steatosis.     Acute gastritis, pancreatitis  Transaminitis - pt with fatty liver (?asso w/ DM2) vs ? drug induced ( pt on Statin, Carbamazepine, Keppra w/c could all potentially increase liver enzymes)    Admit  IV hydration, clear liquids only for now  Cont Pantoprazole 40 mg/day  Zofran prn  Would hold Atorvastatin, Carbamazepine for now   Cont other meds: Metoprolol,  66 y/o male with HTN, HLD, h/o CVA, seizure disorder, DM2, +PPM, hx of gastritis, BIBEMS to the ED, because pt states he has not been feeling well since 9 PM due to epigastric pain radiating to the back associated nausea and vomiting x 2.  Pt reports his last meal was a late lunch, he was not able to eat dinner.  Last BM was 2 days ago.  He denies fever, chills, dyspnea, dysuria, headache, diarrhea.     Labs significant for elevated LFTS: , SGOT 602, SGPT 468.  Lipase id 573.  CT Abd/pelvis was unremarkable.   Pt had EGD, colonoscopy in Dec 2022, showed gastritis. He also had abd sono in 12/2022, w/c just showed hepatic steatosis.     Acute gastritis, pancreatitis  Transaminitis - pt with fatty liver (?asso w/ DM2) vs ? drug induced ( pt on Statin, Carbamazepine, Keppra w/c could all potentially increase liver enzymes)    Admit  IV hydration, clear liquids only for now  Cont Pantoprazole 40 mg/day  Zofran prn  Would hold statin for now  Would decrease Carbamazepine to 200 mg twice daily for now  Cont other meds: Metoprolol, Levetiracetam, Fluoxetine, ASA, Famotidine  Cont to monitor FS, SSinsulin while in hosp  FFup labs, ffup LFTs, Lipase in AM  Will send hepatitis panel, ffup trop now  GI Consult - Dr Hollis - notified  DVT prophylaxis 66 y/o male with HTN, HLD, h/o CVA, seizure disorder, DM2, +PPM, hx of gastritis, BIBEMS to the ED, because pt states he has not been feeling well since 9 PM due to epigastric pain radiating to the back associated nausea and vomiting x 2.  Pt reports his last meal was a late lunch, he was not able to eat dinner.  Last BM was 2 days ago.  He denies fever, chills, dyspnea, dysuria, headache, diarrhea.     Labs significant for elevated LFTS: , SGOT 602, SGPT 468.  Lipase id 573.  CT Abd/pelvis was unremarkable.   Pt had EGD, colonoscopy in Dec 2022, showed gastritis. He also had abd sono in 12/2022, w/c just showed hepatic steatosis.     Acute gastritis, pancreatitis  Transaminitis - pt with fatty liver (?asso w/ DM2) vs ? drug induced ( pt on Statin, Carbamazepine, Keppra w/c could all potentially increase liver enzymes)  Pt with DM2, HLD, hx of CVA, seizure, +PPM    Admit  IV hydration, clear liquids only for now  Cont Pantoprazole 40 mg/day  Zofran prn  Would hold statin for now  Would decrease Carbamazepine to 200 mg twice daily for now  Cont other meds: Metoprolol, Levetiracetam, Fluoxetine, ASA, Famotidine  Cont to monitor FS, SSinsulin while in hosp  FFup labs, ffup LFTs, Lipase in AM  Will send hepatitis panel, ffup trop now  GI Consult - Dr Hollis - notified  DVT prophylaxis

## 2023-02-10 NOTE — ED PROVIDER NOTE - NSICDXPASTSURGICALHX_GEN_ALL_CORE_FT
PAST SURGICAL HISTORY:  Pacemaker Medtronic Serial [id number]/Model [id number]  Implant 6/29/07, new generator 12/23/2016    S/P bunionectomy     S/P cataract surgery with lens implants    S/P craniotomy for RCMA 1998    Status post coil embolization of cerebral aneurysm 9/2018

## 2023-02-10 NOTE — DISCHARGE NOTE PROVIDER - HOSPITAL COURSE
Hospital Course  HPI:  68 y/o male with HTN, HLD, h/o CVA, seizure disorder, DM2, +PPM, hx of gastritis, BIBEMS to the ED, because pt states he has not been feeling well since 9 PM due to epigastric pain radiating to the back associated nausea and vomiting x 2.  Pt reports his last meal was a late lunch, he was not able to eat dinner.  Last BM was 2 days ago.  He denies fever, chills, dyspnea, dysuria, headache, diarrhea.     Labs significant for elevated LFTS: , SGOT 602, SGPT 468.  Lipase id 573.  CT Abd/pelvis was unremarkable.   Pt had EGD, colonoscopy in Dec 2022, showed gastritis.   He also had abd sono in 12/2022, w/c just showed hepatic steatosis.  (10 Feb 2023 05:34)    You were admitted for epigastric pain.  You were diagnosed with NSTEMI and started on a heparin gtt, plavix, and asa.   You were evaluated by the cardiologist who suspected NSTEMI and are being transferred to [facility] for left heart catheterization.      Discharging Provider:  Ben Gonzalez MD  Contact Info: Cell [phone] - Please call with any questions or concerns.

## 2023-02-10 NOTE — DISCHARGE NOTE PROVIDER - NSDCCPTREATMENT_GEN_ALL_CORE_FT
PRINCIPAL PROCEDURE  Procedure: Left heart catheterization  Findings and Treatment: Diagnostic LHC via RRA with Dr. Petit on 2/10/23

## 2023-02-10 NOTE — DISCHARGE NOTE PROVIDER - NSDCFUADDINST_GEN_ALL_CORE_FT
Wound Care:   the day AFTER your procedure remove bandage GENTLY, and clean using  mild soap and gentle warm, water stream, pat dry. leave OPEN to air. YOU MAY SHOWER   DO NOT apply lotions, creams, ointments, powder, perfumes to your incision site  DO NOT SOAK your site for 1 week ( no baths, no pools, no tubs, etc...)  Check  your groin and /or wrist daily. A small amount of bruising, and soreness are normal    ACTIVITY: for 24 hours   - DO NOT DRIVE  - DO NOT make any important decisions or sign legal documents   - DO NOT operate heavy machineries   - you may resume sexual activity in 48 hours, unless otherwise instructed by your cardiologist     Your procedure was done through the WRIST: for the NEXT 3DAYS:  - avoid pushing, pulling, with that affected wrist   - avoid repeated movement of that hand and wrist ( eg: typing, hammering)  - DO NOT LIFT anything more than 5 lbs     MEDICATION:   take your medications as explained ( see discharge paperwork)   If you received a STENT, you will be taking antiplatelet medications to KEEP YOUR STENT OPEN ( eg: Aspirin, Plavix, Brilinta, Effient, etc).  Take as prescribed DO NOT STOP taking them without consulting with your cardiologist first.     Follow heart healthy diet recommended by your doctor, if you smoke STOP SMOKING ( may call [phone] for center of tobacco control if you need assistance)     CALL your doctor to make appointment in 2 WEEKS     ***CALL YOUR DOCTOR***  if you experience: fever, chills, body aches, or severe pain, swelling, redness, heat or yellow discharge at incision site  If you experience bleeding or excruciating pain at the procedural site, swelling ( golf ball size) at your procedural site  If you experience CHEST PAIN  If you experience extremity numbness, tingling, temperature change ( of your procedural site)   If you are unable to reach your doctor, you may contact:   -Cardiology Office at [facility] at [phone] or   - [facility] [phone]  - [facility] [phone]   Wound Care:   the day AFTER your procedure remove bandage GENTLY, and clean using  mild soap and gentle warm, water stream, pat dry. leave OPEN to air. YOU MAY SHOWER   DO NOT apply lotions, creams, ointments, powder, perfumes to your incision site  DO NOT SOAK your site for 1 week ( no baths, no pools, no tubs, etc...)  Please follow up with your PCP within one week for further outpatient management   Please follow up with cardiology for further outpatient management  Check  your groin and /or wrist daily. A small amount of bruising, and soreness are normal    ACTIVITY: for 24 hours   - DO NOT DRIVE  - DO NOT make any important decisions or sign legal documents   - DO NOT operate heavy machineries   - you may resume sexual activity in 48 hours, unless otherwise instructed by your cardiologist     Your procedure was done through the WRIST: for the NEXT 3DAYS:  - avoid pushing, pulling, with that affected wrist   - avoid repeated movement of that hand and wrist ( eg: typing, hammering)  - DO NOT LIFT anything more than 5 lbs     MEDICATION:   take your medications as explained ( see discharge paperwork)   If you received a STENT, you will be taking antiplatelet medications to KEEP YOUR STENT OPEN ( eg: Aspirin, Plavix, Brilinta, Effient, etc).  Take as prescribed DO NOT STOP taking them without consulting with your cardiologist first.     Follow heart healthy diet recommended by your doctor, if you smoke STOP SMOKING ( may call [phone] for center of tobacco control if you need assistance)     CALL your doctor to make appointment in 2 WEEKS     ***CALL YOUR DOCTOR***  if you experience: fever, chills, body aches, or severe pain, swelling, redness, heat or yellow discharge at incision site  If you experience bleeding or excruciating pain at the procedural site, swelling ( golf ball size) at your procedural site  If you experience CHEST PAIN  If you experience extremity numbness, tingling, temperature change ( of your procedural site)   If you are unable to reach your doctor, you may contact:   -Cardiology Office at [facility] at [phone] or   - [facility] [phone]  - [facility] [phone]

## 2023-02-10 NOTE — DISCHARGE NOTE PROVIDER - NSDCFUSCHEDAPPT_GEN_ALL_CORE_FT
Aster Jones  [facility] Physician [facility]  END[address]CRIN [address]  Scheduled Appointment: 03/02/2023    Chandan Hollis  [facility] Physician [facility]  [address]  Scheduled Appointment: 03/09/2023    Hansel Shafer  [facility]  CARDIOLOGY [address]  Scheduled Appointment: 05/10/2023

## 2023-02-10 NOTE — ED ADULT NURSE NOTE - NSIMPLEMENTINTERV_GEN_ALL_ED
Implemented All Fall Risk Interventions:  [place] to call system. Call bell, personal items and telephone within reach. Instruct patient to call for assistance. Room bathroom lighting operational. Non-slip footwear when patient is off stretcher. Physically safe environment: no spills, clutter or unnecessary equipment. Stretcher in lowest position, wheels locked, appropriate side rails in place. Provide visual cue, wrist band, yellow gown, etc. Monitor gait and stability. Monitor for mental status changes and reorient to person, place, and time. Review medications for side effects contributing to fall risk. Reinforce activity limits and safety measures with patient and family.

## 2023-02-10 NOTE — CONSULT NOTE ADULT - ASSESSMENT
67 year old man presents with sudden onset of epigastric and back pain with associated nausea and vomiting.  Initial troponin negative, however, subsequent troponin markedly elevated concerning for NSTEMI.  LFT abnormalities possibly related to hypoperfusion as BP is low.  Reportedly he had normal LFTs just recently   Cardiac history of PPM   Medical issues include prior brain aneurysm and CVA with residual deficits. Seizure disorder.  DM. Fatty liver   No acute EKG changes, though he does have pacemaker  Preliminary echo with normal LV function, no regional wall motion abnormalities   CT angio of abdomen and pelvis negative     Plan   - favor early cardiac cath to evaluate coronary anatomy and possible coronary intervention if it is indicated  - reasonable to start IV heparin, ASA and Plavix in anticipation of cath   - continue IVF   - hold statin due to transaminitis   - discussed with Hospitalist ... patient has been accepted to [facility] for transfer/ cardiac cath .. await bed     discussed with patient and with Hospitalist

## 2023-02-10 NOTE — DISCHARGE NOTE PROVIDER - NSDCCPCAREPLAN_GEN_ALL_CORE_FT
PRINCIPAL DISCHARGE DIAGNOSIS  Diagnosis: NSTEMI (non-ST elevation myocardial infarction)  Assessment and Plan of Treatment: Cardiac catheterization or coronary angiogram is done to understand how the heart is working and to look at the coronary arteries which supply oxygen to the heart muscles, to look at the heart chambers and the valves in the heart.  The doctor used your arm to do the procedure, you did not have any blockage and no stent was required   You will need to see your doctor within one week after discharge  Call your doctor if the insertion site bleeds a lot, if you get a fever or have pain, swelling, or redness where the tube went in, or if your leg feels weak, numb, or cold.  No heavy lifting, strenuous activity, bending, straining or unnecessary stair climbing  for 2 weeks. No sex for 1 week, and no driving for 2 days after cath is recommeded. You may shower 24 hours following procedure but avoid baths and swimming for 1 week. Check the site for bleeding and/or swelling daily following procedure. Call your doctor/cardiologist immediately should it occur or if you have increased/persistent pain at the site. Follow up with your cardiologist in 1- 2 weeks. You may call [facility] at [phone] or [phone] after office hours and weekends  with any questions or concerns following your procedure. Take medications as prescribed.        SECONDARY DISCHARGE DIAGNOSES  Diagnosis: HTN (hypertension)  Assessment and Plan of Treatment: Hypertension, also known simply as "high blood pressure" is very common, however can lead to many significant complications if left uncontrolled. When the blood pressure is elevated, the force the blood puts on the walls of the arteries is high and can lead to artery damage. Also, when the heart muscle has to pump blood against a high blood pressure, it thickens and enlarges, just like any muscle does when it has to do more work (think of a weight ). When the blood pressure is very high, people may feel a headache or tired. Some people can feel pounding in their head or have blurry vision. Hearing the heart beating in the ear especially at night can be a sign of high blood pressure. Eventually, symptoms of stroke, heart attack, heart failure or irregular heartbeats can occur  - Exercise: Doing cardiovascular exercise such as running, biking or swimming at least 30 minutes per day most days of the week is recommended to help keep blood pressure healthy  - Lose weight: Maintaining a normal BMI (body mass index) is very important in keeping blood pressure readings normal   - Avoid salt: Sodium in the diet increases the blood pressure in many ways. Salt comes in many foods, so just because you don't add salt to your food it does not mean that you are eating a low salt diet. Read labels and keep sodium intake to less than 2000 mg per day   - Avoid alcohol: Even 1 or 2 alcoholic drinks can significantly increase blood pressure   - DASH Diet: The DASH diet has been shown to reduce blood pressure   - Take all medication as prescribed.   - Follow up with your medical doctor for routine blood pressure monitoring at your next visit.   - Notify your doctor if you have any of the following symptoms:    - Dizziness, Lightheadedness, Blurry vision, Headache, Chest pain, Shortness of breath      Diagnosis: HLD (hyperlipidemia)  Assessment and Plan of Treatment: Cholesterol is a substance that is found in the blood. Everyone has some. It is needed for good health. The problem is, people sometimes have too much cholesterol. Compared with people with normal cholesterol, people with high cholesterol have a higher risk of heart attack, stroke, and other health problems. The higher your cholesterol, the higher your risk of these problems.  Not everyone who has high cholesterol needs medicines. Your doctor will decide if you need them based on your age, family history, and other health concerns.  The medicines most often used to treat high cholesterol are called "statins."  You should probably take a statin if you:   - Already had a heart attack or stroke   - Have known heart disease   - Have diabetes   - Have a condition called" peripheral artery disease," which makes it painful to walk, and happens when the arteries in your legs get clogged with fatty deposits   - Have an "abdominal aortic aneurysm," which is a widening of the main artery in the belly  Most people with any of the conditions listed above should take a statin no matter what their cholesterol level is.  If your doctor or nurse prescribes a statin, it's important to keep taking it. The medicine might not make you feel any different. But it can help prevent heart attack, stroke, and death  You can help lower your cholesterol by doing these things:   - You can lower your LDL, or "bad," cholesterol by avoiding red meat, butter, fried foods, cheese, and other foods that have a lot of saturated fat.   - You can lower triglycerides by avoiding sugary foods, fried foods, and excess alcohol.   - If you are overweight, it can help to lose weight. Your doctor or nurse can help you do this in a healthy way.   - Try to get regular physical activity. Even gentle forms of exercise, like walking, are good for your health.  Even if these steps do little to change your cholesterol, they can improve your health in many other ways       PRINCIPAL DISCHARGE DIAGNOSIS  Diagnosis: NSTEMI (non-ST elevation myocardial infarction)  Assessment and Plan of Treatment: Cardiac catheterization or coronary angiogram is done to understand how the heart is working and to look at the coronary arteries which supply oxygen to the heart muscles, to look at the heart chambers and the valves in the heart.  The doctor used your arm to do the procedure, you did not have any blockage and no stent was required   You will need to see your doctor within one week after discharge  Call your doctor if the insertion site bleeds a lot, if you get a fever or have pain, swelling, or redness where the tube went in, or if your leg feels weak, numb, or cold.  No heavy lifting, strenuous activity, bending, straining or unnecessary stair climbing  for 2 weeks. No sex for 1 week, and no driving for 2 days after cath is recommeded. You may shower 24 hours following procedure but avoid baths and swimming for 1 week. Check the site for bleeding and/or swelling daily following procedure. Call your doctor/cardiologist immediately should it occur or if you have increased/persistent pain at the site. Follow up with your cardiologist in 1- 2 weeks. You may call [facility] at [phone] or [phone] after office hours and weekends  with any questions or concerns following your procedure. Take medications as prescribed.        SECONDARY DISCHARGE DIAGNOSES  Diagnosis: E coli bacteremia  Assessment and Plan of Treatment: You had blood cultures on 2/10 growing Ecoli   CT abd and US abd unremarkable for source  TTE without vegetation  You were started on zosyn (2/10 -2/13) and switched to ceftriaxone during admission  Start Cefpodoxime 200 mg orally twice a day (End Date: 2/24/23) upon discharge  F/u ID recs    Diagnosis: HTN (hypertension)  Assessment and Plan of Treatment: Hypertension, also known simply as "high blood pressure" is very common, however can lead to many significant complications if left uncontrolled. When the blood pressure is elevated, the force the blood puts on the walls of the arteries is high and can lead to artery damage. Also, when the heart muscle has to pump blood against a high blood pressure, it thickens and enlarges, just like any muscle does when it has to do more work (think of a weight ). When the blood pressure is very high, people may feel a headache or tired. Some people can feel pounding in their head or have blurry vision. Hearing the heart beating in the ear especially at night can be a sign of high blood pressure. Eventually, symptoms of stroke, heart attack, heart failure or irregular heartbeats can occur  - Exercise: Doing cardiovascular exercise such as running, biking or swimming at least 30 minutes per day most days of the week is recommended to help keep blood pressure healthy  - Lose weight: Maintaining a normal BMI (body mass index) is very important in keeping blood pressure readings normal   - Avoid salt: Sodium in the diet increases the blood pressure in many ways. Salt comes in many foods, so just because you don't add salt to your food it does not mean that you are eating a low salt diet. Read labels and keep sodium intake to less than 2000 mg per day   - Avoid alcohol: Even 1 or 2 alcoholic drinks can significantly increase blood pressure   - DASH Diet: The DASH diet has been shown to reduce blood pressure   - Take all medication as prescribed.   - Follow up with your medical doctor for routine blood pressure monitoring at your next visit.   - Notify your doctor if you have any of the following symptoms:    - Dizziness, Lightheadedness, Blurry vision, Headache, Chest pain, Shortness of breath      Diagnosis: Seizure  Assessment and Plan of Treatment: You have a history of seizures   Continue all medications as prescribed    Diagnosis: HLD (hyperlipidemia)  Assessment and Plan of Treatment: Cholesterol is a substance that is found in the blood. Everyone has some. It is needed for good health. The problem is, people sometimes have too much cholesterol. Compared with people with normal cholesterol, people with high cholesterol have a higher risk of heart attack, stroke, and other health problems. The higher your cholesterol, the higher your risk of these problems.  Not everyone who has high cholesterol needs medicines. Your doctor will decide if you need them based on your age, family history, and other health concerns.  The medicines most often used to treat high cholesterol are called "statins."  You should probably take a statin if you:   - Already had a heart attack or stroke   - Have known heart disease   - Have diabetes   - Have a condition called" peripheral artery disease," which makes it painful to walk, and happens when the arteries in your legs get clogged with fatty deposits   - Have an "abdominal aortic aneurysm," which is a widening of the main artery in the belly  Most people with any of the conditions listed above should take a statin no matter what their cholesterol level is.  If your doctor or nurse prescribes a statin, it's important to keep taking it. The medicine might not make you feel any different. But it can help prevent heart attack, stroke, and death  You can help lower your cholesterol by doing these things:   - You can lower your LDL, or "bad," cholesterol by avoiding red meat, butter, fried foods, cheese, and other foods that have a lot of saturated fat.   - You can lower triglycerides by avoiding sugary foods, fried foods, and excess alcohol.   - If you are overweight, it can help to lose weight. Your doctor or nurse can help you do this in a healthy way.   - Try to get regular physical activity. Even gentle forms of exercise, like walking, are good for your health.  Even if these steps do little to change your cholesterol, they can improve your health in many other ways      Diagnosis: Diabetes  Assessment and Plan of Treatment: Make sure you get your HgA1c checked every three months.  If you take oral diabetes medications, check your blood glucose at least two times a day.  If you take short-acting insulin, check your blood glucose before meals and at bedtime.  It's important not to skip any meals.  Keep a log of your blood glucose results and always take it with you to your doctor appointments.  Keep a list of your current medications including over the counter medications and bring this medication list with you to all your doctor appointments.  If you have not seen your ophthalmologist this year, call for appointment.  Check your feet daily for redness, sores, or openings.  Do not self treat.  If there is no improvement in two days, call your primary care physician for an appointment.    Diagnosis: Transaminitis  Assessment and Plan of Treatment: Your liver enzymes were elevated during admission   You had a right upper quadrant ultrasound which was unchanged from your most recent ultrasound with hepatic steatosis     PRINCIPAL DISCHARGE DIAGNOSIS  Diagnosis: NSTEMI (non-ST elevation myocardial infarction)  Assessment and Plan of Treatment: Cardiac catheterization or coronary angiogram is done to understand how the heart is working and to look at the coronary arteries which supply oxygen to the heart muscles, to look at the heart chambers and the valves in the heart.  The doctor used your arm to do the procedure, you did not have any blockage and no stent was required   You will need to see your doctor within one week after discharge  Call your doctor if the insertion site bleeds a lot, if you get a fever or have pain, swelling, or redness where the tube went in, or if your leg feels weak, numb, or cold.  No heavy lifting, strenuous activity, bending, straining or unnecessary stair climbing  for 2 weeks. No sex for 1 week, and no driving for 2 days after cath is recommeded. You may shower 24 hours following procedure but avoid baths and swimming for 1 week. Check the site for bleeding and/or swelling daily following procedure. Call your doctor/cardiologist immediately should it occur or if you have increased/persistent pain at the site. Follow up with your cardiologist in 1- 2 weeks. You may call [facility] at [phone] or [phone] after office hours and weekends  with any questions or concerns following your procedure. Take medications as prescribed.        SECONDARY DISCHARGE DIAGNOSES  Diagnosis: HTN (hypertension)  Assessment and Plan of Treatment: Hypertension, also known simply as "high blood pressure" is very common, however can lead to many significant complications if left uncontrolled. When the blood pressure is elevated, the force the blood puts on the walls of the arteries is high and can lead to artery damage. Also, when the heart muscle has to pump blood against a high blood pressure, it thickens and enlarges, just like any muscle does when it has to do more work (think of a weight ). When the blood pressure is very high, people may feel a headache or tired. Some people can feel pounding in their head or have blurry vision. Hearing the heart beating in the ear especially at night can be a sign of high blood pressure. Eventually, symptoms of stroke, heart attack, heart failure or irregular heartbeats can occur  - Exercise: Doing cardiovascular exercise such as running, biking or swimming at least 30 minutes per day most days of the week is recommended to help keep blood pressure healthy  - Lose weight: Maintaining a normal BMI (body mass index) is very important in keeping blood pressure readings normal   - Avoid salt: Sodium in the diet increases the blood pressure in many ways. Salt comes in many foods, so just because you don't add salt to your food it does not mean that you are eating a low salt diet. Read labels and keep sodium intake to less than 2000 mg per day   - Avoid alcohol: Even 1 or 2 alcoholic drinks can significantly increase blood pressure   - DASH Diet: The DASH diet has been shown to reduce blood pressure   - Take all medication as prescribed.   - Follow up with your medical doctor for routine blood pressure monitoring at your next visit.   - Notify your doctor if you have any of the following symptoms:    - Dizziness, Lightheadedness, Blurry vision, Headache, Chest pain, Shortness of breath      Diagnosis: HLD (hyperlipidemia)  Assessment and Plan of Treatment: Cholesterol is a substance that is found in the blood. Everyone has some. It is needed for good health. The problem is, people sometimes have too much cholesterol. Compared with people with normal cholesterol, people with high cholesterol have a higher risk of heart attack, stroke, and other health problems. The higher your cholesterol, the higher your risk of these problems.  Not everyone who has high cholesterol needs medicines. Your doctor will decide if you need them based on your age, family history, and other health concerns.  The medicines most often used to treat high cholesterol are called "statins."  You should probably take a statin if you:   - Already had a heart attack or stroke   - Have known heart disease   - Have diabetes   - Have a condition called" peripheral artery disease," which makes it painful to walk, and happens when the arteries in your legs get clogged with fatty deposits   - Have an "abdominal aortic aneurysm," which is a widening of the main artery in the belly  Most people with any of the conditions listed above should take a statin no matter what their cholesterol level is.  If your doctor or nurse prescribes a statin, it's important to keep taking it. The medicine might not make you feel any different. But it can help prevent heart attack, stroke, and death  You can help lower your cholesterol by doing these things:   - You can lower your LDL, or "bad," cholesterol by avoiding red meat, butter, fried foods, cheese, and other foods that have a lot of saturated fat.   - You can lower triglycerides by avoiding sugary foods, fried foods, and excess alcohol.   - If you are overweight, it can help to lose weight. Your doctor or nurse can help you do this in a healthy way.   - Try to get regular physical activity. Even gentle forms of exercise, like walking, are good for your health.  Even if these steps do little to change your cholesterol, they can improve your health in many other ways      Diagnosis: E coli bacteremia  Assessment and Plan of Treatment: You had blood cultures on 2/10 growing Ecoli   CT abd and US abd unremarkable for source  TTE without vegetation  You were started on zosyn (2/10 -2/13) and switched to ceftriaxone during admission  Start Cefpodoxime 200 mg orally twice a day until the End Date: 2/24/23 upon discharge  Please follow up with infectious disease to monitor your blood work    Diagnosis: Seizure  Assessment and Plan of Treatment: You have a history of seizures   Continue all medications as prescribed    Diagnosis: Transaminitis  Assessment and Plan of Treatment: Your liver enzymes were elevated during admission   You had a right upper quadrant ultrasound which was unchanged from your most recent ultrasound with hepatic steatosis. Please follow up with your pcp upon discharge    Diagnosis: Diabetes  Assessment and Plan of Treatment: Make sure you get your HgA1c checked every three months.  If you take oral diabetes medications, check your blood glucose at least two times a day.  If you take short-acting insulin, check your blood glucose before meals and at bedtime.  It's important not to skip any meals.  Keep a log of your blood glucose results and always take it with you to your doctor appointments.  Keep a list of your current medications including over the counter medications and bring this medication list with you to all your doctor appointments.  If you have not seen your ophthalmologist this year, call for appointment.  Check your feet daily for redness, sores, or openings.  Do not self treat.  If there is no improvement in two days, call your primary care physician for an appointment.

## 2023-02-10 NOTE — H&P CARDIOLOGY - NSICDXPASTMEDICALHX_GEN_ALL_CORE_FT
PAST MEDICAL HISTORY:  Aneurysm of [place]     Bradycardia     Cerebral aneurysm without rupture diagnosed 2017 had cerebral angio at [facility]    CVA (cerebral vascular accident) left arm weakness, no sensation in left side in 1998    Depression     Epilepsy last seizure 2016    Gastroesophageal reflux disease, esophagitis presence not specified     Glaucoma of both eyes, unspecified glaucoma type     HLD (hyperlipidemia)     Language barrier [place] speaking    Renal agenesis, unilateral     T2DM (type 2 diabetes mellitus)

## 2023-02-10 NOTE — DISCHARGE NOTE NURSING/CASE MANAGEMENT/SOCIAL WORK - PATIENT PORTAL LINK FT
You can access the FollowMyHealth Patient Portal offered by [facility] by registering at the following website: [email or web address]. By joining [facility]’s FollowMyHealth portal, you will also be able to view your health information using other applications (apps) compatible with our system.

## 2023-02-10 NOTE — PATIENT PROFILE ADULT - HAS THE PATIENT RECEIVED THE INFLUENZA VACCINE THIS SEASON?
Patient would like Jeffrey to call him at the number listed above to discuss some pain he is having since his back surgery on 2/14/18.   yes...

## 2023-02-10 NOTE — ED PROVIDER NOTE - CLINICAL SUMMARY MEDICAL DECISION MAKING FREE TEXT BOX
66 y/o male with h/o CVA , DM, pace maker, BIB ems called by pt by pressing his medical alert for pt vomiting and abdominal pain since 9 or10 PM, as per pt he is not feeling well since evening , c/o chest pain, abdominal pain, and headache also. C/o pain is mostly upper abdomen and radiated to back . EMS found pt very hypotensive, systolic  74 , ems gave him zofran 4 mg and lit of IV fluid and BP is 110 now. As per ems even though they found him hypotensive and pale he was able to walk to door and open door for them. pt had elevated LFT and lipase ct scan of abdomen was negative, unsure of cause of his elevated LFT, . will admit for trending LFt and lipase

## 2023-02-10 NOTE — H&P ADULT - GASTROINTESTINAL
soft/nondistended/normal active bowel sounds/no guarding/no rigidity/no organomegaly/no palpable maria elena/tender

## 2023-02-10 NOTE — DISCHARGE NOTE PROVIDER - NSDCMRMEDTOKEN_GEN_ALL_CORE_FT
Aspirin Enteric Coated 81 mg oral delayed release tablet: 1 tab(s) orally once a day  atorvastatin 80 mg oral tablet: 1 tab(s) orally once a day (at bedtime)  carBAMazepine 300 mg oral capsule, extended release: 1 cap(s) orally 2 times a day  clopidogrel 75 mg oral tablet: 1 tab(s) orally once a day  famotidine 20 mg oral tablet: 1 tab(s) orally 2 times a day  FLUoxetine 20 mg oral capsule: 2 cap(s) orally once a day  latanoprost 0.005% ophthalmic solution: 1 drop(s) to each affected eye once a day (at bedtime)  levETIRAcetam 750 mg oral tablet: 1 tab(s) orally 2 times a day  LORazepam 0.5 mg oral tablet: 1 tab(s) orally 3 times a day, As Needed  metoprolol tartrate 25 mg oral tablet: 1 tab(s) orally once a day  omeprazole 20 mg oral delayed release capsule: 1 cap(s) orally once a day  zolpidem 10 mg oral tablet: 1 tab(s) orally once a day (at bedtime)

## 2023-02-10 NOTE — H&P ADULT - NSICDXPASTMEDICALHX_GEN_ALL_CORE_FT
PAST MEDICAL HISTORY:  Aneurysm of [place]     Bradycardia     Cerebral aneurysm without rupture diagnosed 2017 had cerebral angio at [facility]    CVA (cerebral vascular accident) left arm weakness, no sensation in left side in 1998    Depression     Epilepsy last seizure 2016    Gastroesophageal reflux disease, esophagitis presence not specified     Glaucoma of both eyes, unspecified glaucoma type     HLD (hyperlipidemia)     Language barrier [place] speaking    Renal agenesis, unilateral     T2DM (type 2 diabetes mellitus)

## 2023-02-10 NOTE — H&P CARDIOLOGY - HISTORY OF PRESENT ILLNESS
This is a 68 y/o Pashto speaking male with known implantable device PPM COVID 19 negative has been vaccinated but cannot recall with PMHX  HTN, HLD, h/o CVA, seizure disorder ( last seizure 6months ago ) , DM2, +PPM, hx of gastritis. Pt was BIBEMS to the [place] ED , because pt states he has not been feeling well since 9 PM due to epigastric pain radiating to the back associated nausea and vomiting x 2.      < from: TTE Echo Complete w/o Contrast w/ Doppler (02.10.23 @ 08:48) >  Summary:   1. Left ventricular ejection fraction, by visual estimation, is 60 to   65%.   2. Normal global left ventricular systolic function.   3. Normal left ventricular internal cavity size.   4. Mildly enlarged right ventricle.  < from: Nuclear Stress Test-Pharmacologic (12.21.16 @ 11:23) >   Chest Pain: No chest pain with administration of  Regadenoson.  * Symptom: No Symptoms.  * HR Response: Appropriate.  * BP Response: Appropriate.  * Heart Rhythm: Paced Rhythm - 50 BPM.  * Baseline ECG: T wave abnormality in III, aVF, and V2-V6,  inverted at baseline.  * ECG Changes: No significant ischemic ST segment changes  beyond baseline abnormalities.  * Arrhythmia: None.  * Review of raw data shows: The study is of good technical  quality.  * The left ventricle was normal in size. There is a  medium-sized, mild to moderate defect in the inferolateral  wall that is mostly fixed, predominantly corrects with  prone imaging,and demonstrates normal wall motion  suggestive of attenuation artifact.  * Post-stress gated wall motion analysis was performed  (LVEF = 66 %;LVEDV = 75 ml.), revealing normal LV  function.  *** Compared with Nuclear/Stress test report of 6/25/2007,  the results appear similar.  ------------------------------------------------------------------------  Confirmed on  12/21/2016 - 12:34:37 by Alexander Torres M.D.  ------------------------------------------------------------------------    < end of copied text >  < from: CT Angio Abdomen and Pelvis w/ IV Cont (02.10.23 @ 04:39) >  IMPRESSION:    Mild atherosclerotic changes of the aorta. No aortic aneurysm or   dissection or hemorrhage.      --- End of Report ---      < end of copied text >   5. Normal left atrial size.   6. Right atrial enlargement.   7. Trace mitral valve regurgitation.   8. Moderate tricuspid regurgitation.   9. Mild aortic regurgitation.    [id number] Gonzalo Zhou MD, [facility] , Electronically signed on   2/10/2023 at 11:17:57 AM      < end of copied text >  < from: US Abdomen Complete (12.07.22 @ 10:09) >  IMPRESSION: Limited exam secondary to bowel gas.    Hepatic steatosis.    Right kidney not visualized unchanged from the previous exam.        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 Pt was BIBEMS to the [place] ED , because pt states he has not been feeling well since 9 PM due to epigastric pain radiating to the back associated nausea and vomiting x 2.  Initial troponin negative however subsequent troponin elevated NSTEMI . CT abdomen and Pelvis negative. Tx to [facility] today for LCH today with . No complaints of CP no sob no palpitations noted. On Heparin gtt . ASA /Plavix given at OSH.     < from: TTE Echo Complete w/o Contrast w/ Doppler (02.10.23 @ 08:48) >  Summary:   1. Left ventricular ejection fraction, by visual estimation, is 60 to   65%.   2. Normal global left ventricular systolic function.   3. Normal left ventricular internal cavity size.   4. Mildly enlarged right ventricle.  < from: Nuclear Stress Test-Pharmacologic (12.21.16 @ 11:23) >   Chest Pain: No chest pain with administration of  Regadenoson.  * Symptom: No Symptoms.  * HR Response: Appropriate.  * BP Response: Appropriate.  * Heart Rhythm: Paced Rhythm - 50 BPM.  * Baseline ECG: T wave abnormality in III, aVF, and V2-V6,  inverted at baseline.  * ECG Changes: No significant ischemic ST segment changes  beyond baseline abnormalities.  * Arrhythmia: None.  * Review of raw data shows: The study is of good technical  quality.  * The left ventricle was normal in size. There is a  medium-sized, mild to moderate defect in the inferolateral  wall that is mostly fixed, predominantly corrects with  prone imaging,and demonstrates normal wall motion  suggestive of attenuation artifact.  * Post-stress gated wall motion analysis was performed  (LVEF = 66 %;LVEDV = 75 ml.), revealing normal LV  function.  *** Compared with Nuclear/Stress test report of 6/25/2007,  the results appear similar.  ------------------------------------------------------------------------  Confirmed on  12/21/2016 - 12:34:37 by Alexander Torres M.D.  ------------------------------------------------------------------------    < end of copied text >  < from: CT Angio Abdomen and Pelvis w/ IV Cont (02.10.23 @ 04:39) >  IMPRESSION:    Mild atherosclerotic changes of the aorta. No aortic aneurysm or   dissection or hemorrhage.      --- End of Report ---      < end of copied text >   5. Normal left atrial size.   6. Right atrial enlargement.   7. Trace mitral valve regurgitation.   8. Moderate tricuspid regurgitation.   9. Mild aortic regurgitation.    [id number] Gonzalo Zhou MD, [facility] , Electronically signed on   2/10/2023 at 11:17:57 AM      < end of copied text >  < from: US Abdomen Complete (12.07.22 @ 10:09) >  IMPRESSION: Limited exam secondary to bowel gas.    Hepatic steatosis.    Right kidney not visualized unchanged from the previous exam.        --- End of Report ---      < end of copied text >

## 2023-02-10 NOTE — ED PROVIDER NOTE - NSICDXPASTMEDICALHX_GEN_ALL_CORE_FT
PAST MEDICAL HISTORY:  Aneurysm of [place]     Bradycardia     Cerebral aneurysm without rupture diagnosed 2017 had cerebral angio at [facility]    CVA (cerebral vascular accident) left arm weakness, no sensation in left side in 1998    Depression     Epilepsy last seizure 2016    Gastroesophageal reflux disease, esophagitis presence not specified     Glaucoma of both eyes, unspecified glaucoma type     HLD (hyperlipidemia)     Language barrier Thai speaking    Renal agenesis, unilateral     T2DM (type 2 diabetes mellitus)

## 2023-02-10 NOTE — H&P CARDIOLOGY - NSICDXPASTSURGICALHX_GEN_ALL_CORE_FT
PAST SURGICAL HISTORY:  Pacemaker Medtronic Serial [id number]/Model [id number]  Implant 6/29/07, new generator 12/23/2016    S/P bunionectomy     S/P cataract surgery with lens implants    S/P craniotomy for RCMA 1998    Status post coil embolization of cerebral aneurysm 9/2018

## 2023-02-10 NOTE — H&P ADULT - NSHPPHYSICALEXAM_GEN_ALL_CORE
Vital Signs (24 Hrs):  T(C): 36.7 (02-10-23 @ 03:30), Max: 36.7 (02-10-23 @ 03:30)  HR: 80 (02-10-23 @ 04:44) (65 - 80)  BP: 106/48 (02-10-23 @ 04:44) (90/70 - 110/72)  RR: 20 (02-10-23 @ 04:44) (20 - 23)  SpO2: 97% (02-10-23 @ 04:44) (95% - 99%)  Daily Height in cm: 167.64 (10 Feb 2023 03:18)

## 2023-02-10 NOTE — H&P ADULT - HISTORY OF PRESENT ILLNESS
68 y/o male with HTN, HLD, h/o CVA, seizure disorder, DM2, +PPM, hx of gastritis, BIBEMS to the ED, because pt states he has not been feeling well since 9 PM due to epigastric pain radiating to the back associated nausea and vomiting x 2.  Pt reports his last meal was a late lunch, he was not able to eat dinner.  Last BM was 2 days ago.  He denies fever, chills, dyspnea, dysuria, headache, diarrhea.     Labs significant for elevated LFTS: , SGOT 602, SGPT 468.  Lipase id 573.  CT Abd/pelvis was unremarkable.   Pt had EGD, colonoscopy in Dec 2022, showed gastritis.  68 y/o male with HTN, HLD, h/o CVA, seizure disorder, DM2, +PPM, hx of gastritis, BIBEMS to the ED, because pt states he has not been feeling well since 9 PM due to epigastric pain radiating to the back associated nausea and vomiting x 2.  Pt reports his last meal was a late lunch, he was not able to eat dinner.  Last BM was 2 days ago.  He denies fever, chills, dyspnea, dysuria, headache, diarrhea.     Labs significant for elevated LFTS: , SGOT 602, SGPT 468.  Lipase id 573.  CT Abd/pelvis was unremarkable.   Pt had EGD, colonoscopy in Dec 2022, showed gastritis.   He also had abd sono in 12/2022, w/c just showed hepatic steatosis.

## 2023-02-10 NOTE — DISCHARGE NOTE PROVIDER - CARE PROVIDER_API CALL
Hansel Shafer)  Cardiology; Internal Medicine  [address], [address]  [place] [address]  Phone: [phone]  Fax: [phone]  Follow Up Time: 2 weeks

## 2023-02-10 NOTE — DISCHARGE NOTE PROVIDER - NSDCMRMEDTOKEN_GEN_ALL_CORE_FT
Ambien 10 mg oral tablet: 1 tab(s) orally once a day (at bedtime)  HOME  Aspirin Enteric Coated 81 mg oral delayed release tablet: 1 tab(s) orally once a day  Hospital     atorvastatin 80 mg oral tablet: 1 tab(s) orally once a day (at bedtime)  Hospital  carBAMazepine 300 mg oral capsule, extended release: 1 cap(s) orally 2 times a day  Hospital   clopidogrel 75 mg oral tablet: 1 tab(s) orally once a day  Hospital   famotidine 20 mg oral tablet: 1 tab(s) orally 2 times a day  HOME HOSPITAL   FLUoxetine 20 mg oral capsule: 2 cap(s) orally once a day  Hospital   fluticasone 50 mcg/inh nasal spray: 1 spray(s) nasal once a day  HOME  latanoprost 0.005% ophthalmic solution: 1 drop(s) to each affected eye once a day (at bedtime)  HOME   levETIRAcetam 750 mg oral tablet: 1 tab(s) orally 2 times a day  HOME /HOSPITAL   LORazepam 0.5 mg oral tablet: 1 tab(s) orally 3 times a day, As Needed  HOME/Hospital   MetFORMIN (Eqv-Fortamet) 500 mg oral tablet, extended release: 1 tab(s) orally once a day STARTING 2/13/23  metoprolol tartrate 25 mg oral tablet: 1 tab(s) orally once a day  HOME /HOSPITAL  omeprazole 20 mg oral delayed release capsule: 1 cap(s) orally once a day  HOME /Hospital   zolpidem 10 mg oral tablet: 0.5 tab(s) orally once a day (at bedtime)  [facility]ital /HOME    Ambien 10 mg oral tablet: 1 tab(s) orally once a day (at bedtime)  HOME  Aspirin Enteric Coated 81 mg oral delayed release tablet: 1 tab(s) orally once a day  Hospital     atorvastatin 80 mg oral tablet: 1 tab(s) orally once a day (at bedtime)  Hospital  carBAMazepine 300 mg oral capsule, extended release: 1 cap(s) orally 2 times a day  Hospital   cefpodoxime 200 mg oral tablet: 1 tab(s) orally 2 times a day   End Date: 02/24/2023  clopidogrel 75 mg oral tablet: 1 tab(s) orally once a day  Hospital   famotidine 20 mg oral tablet: 1 tab(s) orally 2 times a day  HOME HOSPITAL   FLUoxetine 20 mg oral capsule: 2 cap(s) orally once a day  Hospital   fluticasone 50 mcg/inh nasal spray: 1 spray(s) nasal once a day  HOME  latanoprost 0.005% ophthalmic solution: 1 drop(s) to each affected eye once a day (at bedtime)  HOME   levETIRAcetam 750 mg oral tablet: 1 tab(s) orally 2 times a day  HOME /HOSPITAL   LORazepam 0.5 mg oral tablet: 1 tab(s) orally 3 times a day, As Needed  HOME/Hospital   MetFORMIN (Eqv-Fortamet) 500 mg oral tablet, extended release: 1 tab(s) orally once a day STARTING 2/13/23  metoprolol tartrate 25 mg oral tablet: 1 tab(s) orally once a day  HOME /HOSPITAL  omeprazole 20 mg oral delayed release capsule: 1 cap(s) orally once a day  HOME /Hospital   zolpidem 10 mg oral tablet: 0.5 tab(s) orally once a day (at bedtime)  HO[facility]ital /HOME

## 2023-02-10 NOTE — DISCHARGE NOTE NURSING/CASE MANAGEMENT/SOCIAL WORK - NSDCPEFALRISK_GEN_ALL_CORE
For information on Fall & Injury Prevention, visit: [email or web address] OR  [email or web address] OR  https://www.cdc.gov/steadi/patient.html

## 2023-02-10 NOTE — DISCHARGE NOTE PROVIDER - NSDCCPCAREPLAN_GEN_ALL_CORE_FT
PRINCIPAL DISCHARGE DIAGNOSIS  Diagnosis: NSTEMI (non-ST elevation myocardial infarction)  Assessment and Plan of Treatment: You were admitted for epigastric pain.  You were diagnosed with NSTEMI and started on a heparin gtt, plavix, and asa.   You were evaluated by the cardiologist who suspected NSTEMI and are being transferred to [facility] for left heart catheterization.      SECONDARY DISCHARGE DIAGNOSES  Diagnosis: Elevated LFTs  Assessment and Plan of Treatment:

## 2023-02-10 NOTE — CHART NOTE - NSCHARTNOTEFT_GEN_A_CORE
Notified by GERI Randall - jigna 903.8, previously 6.2    Patient seen and examined at bedside - [place]ian telephone  Shannon ID#[id number]  He denies pain/discomfort. States abdominal pain which he presented with is resolved. Denies sob, nausea/vomiting, diaphoresis.     T(C): 36.7 (02-10-23 @ 03:30), Max: 36.7 (02-10-23 @ 03:30)  HR: 71 (02-10-23 @ 06:46) (65 - 80)  BP: 98/41 (02-10-23 @ 06:46) (90/70 - 110/72)  RR: 21 (02-10-23 @ 06:46) (20 - 23)  SpO2: 94% (02-10-23 @ 06:46) (94% - 99%)  Wt(kg): --Vital Signs Last 24 Hrs  T(C): 36.7 (10 Feb 2023 03:30), Max: 36.7 (10 Feb 2023 03:30)  T(F): 98 (10 Feb 2023 03:30), Max: 98 (10 Feb 2023 03:30)  HR: 71 (10 Feb 2023 06:46) (65 - 80)  BP: 98/41 (10 Feb 2023 06:46) (90/70 - 110/72)  BP(mean): 57 (10 Feb 2023 06:46) (57 - 80)  RR: 21 (10 Feb 2023 06:46) (20 - 23)  SpO2: 94% (10 Feb 2023 06:46) (94% - 99%)    Parameters below as of 10 Feb 2023 06:46  Patient On (Oxygen Delivery Method): room air    PHYSICAL EXAM:  GENERAL: NAD, well-groomed, well-developed  NERVOUS SYSTEM:  Alert & Oriented X3, Good concentration; Motor Strength 5/5 B/L upper and lower extremities; DTRs 2+ intact and symmetric  HEAD:  Atraumatic, Normocephalic  EYES: EOMI, PERRLA, conjunctiva and sclera clear  ENMT: No tonsillar erythema, exudates, or enlargement; Moist mucous membranes, Good dentition, No lesions  NECK: Supple, No JVD, Normal thyroid  CHEST/LUNG: Clear to percussion bilaterally; No rales, rhonchi, wheezing, or rubs  HEART: Regular rate and rhythm; No murmurs, rubs, or gallops  ABDOMEN: Obese, Soft, Nontender, Nondistended; Bowel sounds present  EXTREMITIES:  2+ Peripheral Pulses, No clubbing, cyanosis, or edema  LYMPH: No lymphadenopathy noted  SKIN: No rashes or lesions    Plan:  Elevated troponin in the absence of chest pain. Dissection on the differential however symptoms not consistent with dx  - EKG stat  - Troponin stat  - 1L NS bolus  - Cardiology eval    Patient aware and in agreement with plan of care.   Fransico Franklin MD
Troponin reported 900.  Patient with history of Aortic aneurysm.  Stat TTE as cannot do IV contrast study since recent abd/pelvis with IV contrast  will discuss with Dr. Zhou cardiology

## 2023-02-10 NOTE — CONSULT NOTE ADULT - SUBJECTIVE AND OBJECTIVE BOX
[facility] Consultation    CHIEF COMPLAINT: Patient is a 67y old  Male who presents with a chief complaint of abdominal pain, nausea, vomiting (10 Feb 2023 05:34)  asked to see patient regarding NSTEMI  patient is seen and examined  history is obtained with help of  phone  patient is fair source of the medical history     HPI:  68 y/o male with HTN, HLD, h/o CVA, seizure disorder, DM2, +PPM, hx of gastritis, BIBEMS to the ED, because pt states he has not been feeling well since 9 PM due to epigastric pain radiating to the back associated nausea and vomiting x 2.  Pt reports his last meal was a late lunch, he was not able to eat dinner.  Last BM was 2 days ago.  He denies fever, chills, dyspnea, dysuria, headache, diarrhea.     Labs significant for elevated LFTS: , SGOT 602, SGPT 468.  Lipase id 573.  CT Abd/pelvis was unremarkable.   Pt had EGD, colonoscopy in Dec 2022, showed gastritis.   He also had abd sono in 12/2022, w/c just showed hepatic steatosis.  (10 Feb 2023 05:34)    As above.  Patient currently in ED stretcher, feeling weak.  Abdominal pain has resolved.  No dyspnea or chest pain     PAST MEDICAL & SURGICAL HISTORY:  denies history of CAD, MI or arrhythmias   CVA (cerebral vascular accident)  left arm weakness, no sensation in left side in 1998      Aneurysm of [place] of [place]      HLD (hyperlipidemia)      Epilepsy  last seizure 2016      Renal agenesis, unilateral      Depression      Cerebral aneurysm without rupture  diagnosed 2017 had cerebral angio at [facility]      Gastroesophageal reflux disease, esophagitis presence not specified      Glaucoma of both eyes, unspecified glaucoma type      Bradycardia      Language barrier  Japanese speaking      T2DM (type 2 diabetes mellitus)      S/P craniotomy  for RCMA 1998      Pacemaker  Medtronic Serial [id number]/Model [id number]  Implant 6/29/07, new generator 12/23/2016      S/P bunionectomy      S/P cataract surgery  with lens implants      Status post coil embolization of cerebral aneurysm  9/2018          SOCIAL HISTORY: no alcohol, non smoker     FAMILY HISTORY  Family history of stomach cancer    Home Medications:  alendronate 70 mg oral tablet: 1 tab(s) orally once a week on Mondays (10 Feb 2023 06:27)  Aspirin Enteric Coated 81 mg oral delayed release tablet: 1 tab(s) orally once a day (10 Feb 2023 06:27)  atorvastatin 80 mg oral tablet: 1 tab(s) orally once a day (at bedtime) (10 Feb 2023 06:27)  carBAMazepine 300 mg oral capsule, extended release: 1 cap(s) orally 2 times a day (10 Feb 2023 06:27)  famotidine 20 mg oral tablet: 1 tab(s) orally 2 times a day (10 Feb 2023 06:27)  FLUoxetine 20 mg oral capsule: 2 cap(s) orally once a day (10 Feb 2023 06:27)  latanoprost 0.005% ophthalmic solution: 1 drop(s) to each affected eye once a day (at bedtime) (10 Feb 2023 06:27)  levETIRAcetam 750 mg oral tablet: 1 tab(s) orally 2 times a day (10 Feb 2023 06:27)  LORazepam 0.5 mg oral tablet: 1 tab(s) orally 3 times a day, As Needed (10 Feb 2023 06:27)  metFORMIN 500 mg oral tablet, extended release: 1 tab(s) orally once a day (10 Feb 2023 06:27)  metoprolol tartrate 25 mg oral tablet: 1 tab(s) orally once a day (10 Feb 2023 06:27)  omeprazole 20 mg oral delayed release capsule: 1 cap(s) orally once a day (10 Feb 2023 06:27)  zolpidem 10 mg oral tablet: 1 tab(s) orally once a day (at bedtime) (10 Feb 2023 06:27)      MEDICATIONS  (STANDING):  aspirin enteric coated 81 milliGRAM(s) Oral daily  carBAMazepine ER Capsule 200 milliGRAM(s) Oral two times a day  dextrose 5%. 1000 milliLiter(s) (100 mL/Hr) IV Continuous <Continuous>  dextrose 5%. 1000 milliLiter(s) (50 mL/Hr) IV Continuous <Continuous>  dextrose 50% Injectable 25 Gram(s) IV Push once  dextrose 50% Injectable 12.5 Gram(s) IV Push once  dextrose 50% Injectable 25 Gram(s) IV Push once  enoxaparin Injectable 40 milliGRAM(s) SubCutaneous every 24 hours  famotidine    Tablet 40 milliGRAM(s) Oral at bedtime  FLUoxetine 40 milliGRAM(s) Oral daily  glucagon  Injectable 1 milliGRAM(s) IntraMuscular once  heparin   Injectable 4400 Unit(s) IV Push once  heparin  Infusion.  Unit(s)/Hr (9 mL/Hr) IV Continuous <Continuous>  insulin lispro (ADMELOG) corrective regimen sliding scale   SubCutaneous three times a day before meals  insulin lispro (ADMELOG) corrective regimen sliding scale   SubCutaneous at bedtime  latanoprost 0.005% Ophthalmic Solution 1 Drop(s) Both EYES at bedtime  levETIRAcetam 750 milliGRAM(s) Oral two times a day  sodium chloride 0.9% Bolus 1000 milliLiter(s) IV Bolus once    MEDICATIONS  (PRN):  dextrose Oral Gel 15 Gram(s) Oral once PRN Blood Glucose LESS THAN 70 milliGRAM(s)/deciliter  heparin   Injectable 4400 Unit(s) IV Push every 6 hours PRN For aPTT less than 40  LORazepam     Tablet 0.5 milliGRAM(s) Oral daily PRN Anxiety  ondansetron Injectable 4 milliGRAM(s) IV Push every 6 hours PRN Nausea and/or Vomiting  zolpidem 5 milliGRAM(s) Oral at bedtime PRN Insomnia  zolpidem 5 milliGRAM(s) Oral at bedtime PRN Insomnia      Allergies    No Known Allergies    Intolerances        REVIEW OF SYSTEMS:    CONSTITUTIONAL: weakness   EYES: No visual changes, No diplopia  ENMT: No throat pain , No exudate  NECK: No pain or stiffness  RESPIRATORY: No cough, wheezing, hemoptysis; No shortness of breath  CARDIOVASCULAR: No chest pain or chest pressure.  No shortness of breath or dyspnea on exertion.  No palpitations, dizziness, light headedness, syncope or near syncope.  No edema, no orthopnea.   GASTROINTESTINAL: abdominal pain   GENITOURINARY: No dysuria, frequency or hematuria  NEUROLOGICAL chronic left sided weakness   SKIN: No itching or rash  All other review of systems is negative unless indicated above    VITAL SIGNS:   Vital Signs Last 24 Hrs  T(C): 36.7 (10 Feb 2023 03:30), Max: 36.7 (10 Feb 2023 03:30)  T(F): 98 (10 Feb 2023 03:30), Max: 98 (10 Feb 2023 03:30)  HR: 59 (10 Feb 2023 08:08) (59 - 80)  BP: 90/41 (10 Feb 2023 08:08) (90/41 - 110/72)  BP(mean): 57 (10 Feb 2023 06:46) (57 - 80)  RR: 21 (10 Feb 2023 06:46) (20 - 23)  SpO2: 95% (10 Feb 2023 08:08) (94% - 99%)    Parameters below as of 10 Feb 2023 06:46  Patient On (Oxygen Delivery Method): room air        I&O's Summary      PHYSICAL EXAM:    Constitutional: older and ill appearing man   Eyes:  EOMI,  Pupils round, no lesions  ENMT: no exudate or erythema  Pulmonary: decreased breath sounds at bases   Cardiovascular: PMI not palpable non-displaced Regular S1 and S2 l/Vl systolic murmur   Gastrointestinal: Bowel Sounds present, soft, nontender.   Lymph: No peripheral edema. No cervical lymphadenopathy.  Skin: No rashes. Changes of chronic venous stasis. No cyanosis.  Neuro: mild left sided weakness ... patient states this is his baseline   Psych:  Mood & affect appropriate    LABS: All Labs Reviewed:                        10.7   3.71  )-----------( 132      ( 10 Feb 2023 03:40 )             32.7     10 Feb 2023 03:40    136    |  98     |  21     ----------------------------<  216    3.7     |  28     |  1.15     Ca    8.0        10 Feb 2023 03:40    TPro  6.5    /  Alb  3.1    /  TBili  0.9    /  DBili  x      /  AST  602    /  ALT  468    /  AlkPhos  177    10 Feb 2023 03:40    PT/INR - ( 10 Feb 2023 03:40 )   PT: 12.1 sec;   INR: 1.04 ratio         PTT - ( 10 Feb 2023 03:40 )  PTT:29.1 sec    < from: 12 Lead ECG (02.10.23 @ 07:58) >  Diagnosis Line Sinus rhythm [id number] degree AV block  Left axis deviation  Voltage criteria for left ventricular hypertrophy  Nonspecific T wave abnormality  Abnormal ECG  When compared with ECG of 10-FEB-2023 03:24,  Sinus rhythm has replaced Electronic atrial pacemaker  Inverted Twaves have replaced nonspecific T wave abnormality in Inferior leads    < end of copied text >  < from: Transthoracic Echocardiogram (06.03.14 @ 10:15) >  1. Mitral annular calcification, otherwise normal mitral  valve. Minimal mitral regurgitation.  2. Calcified trileaflet aortic valve with normal opening.  Mild aortic regurgitation.  3. Normal left ventricular internal dimensions and wall  thicknesses.  4. Endocardium not well visualized; grossly normal left  ventricular systolic function.  5. Mild diastolic dysfunction (Stage I).  6. The right ventricle is not well visualized; grossly  normal right ventricular systolic function.  A device wire  is noted in the right heart.  *** Compared with echocardiogram of 1/16/2014, no  significant changes noted.  ------------------------------------------------------------------------  Confirmed on  6/3/2014 - 11:45:33 by Ben Basilio M.D.  ------------------------------------------------------------------------    < end of copied text >    preliminary review of echo done today ... normal LV function, no regional wall motion abnormalities... mild right heart enlargement     c< from: CT Angio Abdomen and Pelvis w/ IV Cont (02.10.23 @ 04:39) >  Mild atherosclerotic changes of the aorta. No aortic aneurysm or   dissection or hemorrhage.    < end of copied text >

## 2023-02-10 NOTE — PATIENT PROFILE ADULT - FALL HARM RISK - HARM RISK INTERVENTIONS

## 2023-02-10 NOTE — ED ADULT NURSE NOTE - NSICDXPASTSURGICALHX_GEN_ALL_CORE_FT
PAST SURGICAL HISTORY:  Pacemaker Medtronic Serial [id number]/Model [id number]  Implant 6/29/07, new generator 12/23/2016    S/P bunionectomy     S/P cataract surgery with lens implants    S/P craniotomy for RCMA 1998    Status post coil embolization of cerebral aneurysm 9/2018

## 2023-02-10 NOTE — PROVIDER CONTACT NOTE (CRITICAL VALUE NOTIFICATION) - PERSON GIVING RESULT:
[facility] ICU VENTILATOR RESPIRATORY NOTE  Date of Admission: 2/3/2020  Date of Intubation (most recent): 2/3/2020  Reason for Mechanical Ventilation: Respiratory Distress  Number of Days on Mechanical Ventilation: 2  ABG Results: No lab results found in last 7 days.  ETT appearance on chest x-ray: Right mainstem intubation with the tip of the endotracheal tube approximately 1 cm beyond the marek (has since been corrected-ETT withdrawn 3cm, pending current CXR results.)    Ventilation Mode: CMV/AC  (Continuous Mandatory Ventilation/ Assist Control)  FiO2 (%): 30 %  Rate Set (breaths/minute): 14 breaths/min  Tidal Volume Set (mL): 450 mL  PEEP (cm H2O): 5 cmH2O  Pressure Support (cm H2O): 5 cmH2O  Oxygen Concentration (%): 30 %  Resp: 14    Temp: 100  F (37.8  C) Temp src: Bladder BP: (!) 167/95 Pulse: 59 Heart Rate: 62 Resp: 14 SpO2: 100 % O2 Device: Mechanical Ventilator Oxygen Delivery: 3 LPM    7.5 ETT secured 22 cm at the lip. BS coarse/diminished. Suctioned moderate amounts of creamy, thick secretions through the ETT. Plan to wean on PS trial in morning and extubate if successful. Will continue to monitor and support the patient's respiratory needs.    Chauncey Amador, RT  2/4/2020 1:07 AM      Sherley Powell

## 2023-02-10 NOTE — ED ADULT NURSE NOTE - NSICDXPASTMEDICALHX_GEN_ALL_CORE_FT
PAST MEDICAL HISTORY:  Aneurysm of [place] of [place]     Bradycardia     Cerebral aneurysm without rupture diagnosed 2017 had cerebral angio at [facility]    CVA (cerebral vascular accident) left arm weakness, no sensation in left side in 1998    Depression     Epilepsy last seizure 2016    Gastroesophageal reflux disease, esophagitis presence not specified     Glaucoma of both eyes, unspecified glaucoma type     HLD (hyperlipidemia)     Language barrier Lao speaking    Renal agenesis, unilateral     T2DM (type 2 diabetes mellitus)

## 2023-02-10 NOTE — DISCHARGE NOTE PROVIDER - NSDCFUSCHEDAPPT_GEN_ALL_CORE_FT
Aster Jones  [facility] Physician [facility]  ENDOCRIN [address] [facility]  Scheduled Appointment: 03/02/2023    Chandan Hollis  [facility] Physician [facility]  GASTRO ABAD [address]  Scheduled Appointment: 03/09/2023    Hansel Shafer  [facility]  CARDIOLOGY [address]  Scheduled Appointment: 05/10/2023

## 2023-02-11 DIAGNOSIS — R78.81 BACTEREMIA: ICD-10-CM

## 2023-02-11 DIAGNOSIS — R56.9 UNSPECIFIED CONVULSIONS: ICD-10-CM

## 2023-02-11 DIAGNOSIS — R07.9 CHEST PAIN, UNSPECIFIED: ICD-10-CM

## 2023-02-11 DIAGNOSIS — R74.01 ELEVATION OF LEVELS OF LIVER TRANSAMINASE LEVELS: ICD-10-CM

## 2023-02-11 DIAGNOSIS — I10 ESSENTIAL (PRIMARY) HYPERTENSION: ICD-10-CM

## 2023-02-11 DIAGNOSIS — E11.9 TYPE 2 DIABETES MELLITUS WITHOUT COMPLICATIONS: ICD-10-CM

## 2023-02-11 DIAGNOSIS — Z79.899 OTHER LONG TERM (CURRENT) DRUG THERAPY: ICD-10-CM

## 2023-02-11 LAB
ALBUMIN SERPL ELPH-MCNC: 3.1 G/DL — LOW (ref 3.3–5)
ALP SERPL-CCNC: 137 U/L — HIGH (ref 40–120)
ALT FLD-CCNC: 380 U/L — HIGH (ref 10–45)
ANION GAP SERPL CALC-SCNC: 12 MMOL/L — SIGNIFICANT CHANGE UP (ref 5–17)
AST SERPL-CCNC: 289 U/L — HIGH (ref 10–40)
BILIRUB SERPL-MCNC: 1.8 MG/DL — HIGH (ref 0.2–1.2)
BUN SERPL-MCNC: 14 MG/DL — SIGNIFICANT CHANGE UP (ref 7–23)
CALCIUM SERPL-MCNC: 8.1 MG/DL — LOW (ref 8.4–10.5)
CHLORIDE SERPL-SCNC: 107 MMOL/L — SIGNIFICANT CHANGE UP (ref 96–108)
CO2 SERPL-SCNC: 21 MMOL/L — LOW (ref 22–31)
CREAT SERPL-MCNC: 1.12 MG/DL — SIGNIFICANT CHANGE UP (ref 0.5–1.3)
CULTURE RESULTS: SIGNIFICANT CHANGE UP
EGFR: 72 ML/MIN/1.73M2 — SIGNIFICANT CHANGE UP
GLUCOSE BLDC GLUCOMTR-MCNC: 106 MG/DL — HIGH (ref 70–99)
GLUCOSE BLDC GLUCOMTR-MCNC: 141 MG/DL — HIGH (ref 70–99)
GLUCOSE BLDC GLUCOMTR-MCNC: 145 MG/DL — HIGH (ref 70–99)
GLUCOSE BLDC GLUCOMTR-MCNC: 149 MG/DL — HIGH (ref 70–99)
GLUCOSE SERPL-MCNC: 114 MG/DL — HIGH (ref 70–99)
HCT VFR BLD CALC: 30.3 % — LOW (ref 39–50)
HGB BLD-MCNC: 10.2 G/DL — LOW (ref 13–17)
MCHC RBC-ENTMCNC: 33.6 PG — SIGNIFICANT CHANGE UP (ref 27–34)
MCHC RBC-ENTMCNC: 33.7 GM/DL — SIGNIFICANT CHANGE UP (ref 32–36)
MCV RBC AUTO: 99.7 FL — SIGNIFICANT CHANGE UP (ref 80–100)
NRBC # BLD: 0 /100 WBCS — SIGNIFICANT CHANGE UP (ref 0–0)
PLATELET # BLD AUTO: 110 K/UL — LOW (ref 150–400)
POTASSIUM SERPL-MCNC: 3.8 MMOL/L — SIGNIFICANT CHANGE UP (ref 3.5–5.3)
POTASSIUM SERPL-SCNC: 3.8 MMOL/L — SIGNIFICANT CHANGE UP (ref 3.5–5.3)
PROT SERPL-MCNC: 5.6 G/DL — LOW (ref 6–8.3)
RBC # BLD: 3.04 M/UL — LOW (ref 4.2–5.8)
RBC # FLD: 12.6 % — SIGNIFICANT CHANGE UP (ref 10.3–14.5)
SODIUM SERPL-SCNC: 140 MMOL/L — SIGNIFICANT CHANGE UP (ref 135–145)
SPECIMEN SOURCE: SIGNIFICANT CHANGE UP
WBC # BLD: 8.53 K/UL — SIGNIFICANT CHANGE UP (ref 3.8–10.5)
WBC # FLD AUTO: 8.53 K/UL — SIGNIFICANT CHANGE UP (ref 3.8–10.5)

## 2023-02-11 PROCEDURE — 99223 1ST HOSP IP/OBS HIGH 75: CPT

## 2023-02-11 PROCEDURE — 99255 IP/OBS CONSLTJ NEW/EST HI 80: CPT

## 2023-02-11 RX ADMIN — PANTOPRAZOLE SODIUM 40 MILLIGRAM(S): 20 TABLET, DELAYED RELEASE ORAL at 05:14

## 2023-02-11 RX ADMIN — Medication 25 MILLIGRAM(S): at 22:01

## 2023-02-11 RX ADMIN — Medication 40 MILLIGRAM(S): at 11:29

## 2023-02-11 RX ADMIN — Medication 81 MILLIGRAM(S): at 05:14

## 2023-02-11 RX ADMIN — Medication 1 SPRAY(S): at 18:22

## 2023-02-11 RX ADMIN — Medication 300 MILLIGRAM(S): at 22:01

## 2023-02-11 RX ADMIN — LEVETIRACETAM 750 MILLIGRAM(S): 250 TABLET, FILM COATED ORAL at 22:01

## 2023-02-11 RX ADMIN — LATANOPROST 1 DROP(S): 0.05 SOLUTION/ DROPS OPHTHALMIC; TOPICAL at 22:01

## 2023-02-11 RX ADMIN — Medication 25 MILLIGRAM(S): at 09:39

## 2023-02-11 RX ADMIN — ZOLPIDEM TARTRATE 5 MILLIGRAM(S): 10 TABLET ORAL at 22:03

## 2023-02-11 RX ADMIN — PIPERACILLIN AND TAZOBACTAM 25 GRAM(S): 4; .5 INJECTION, POWDER, LYOPHILIZED, FOR SOLUTION INTRAVENOUS at 18:22

## 2023-02-11 RX ADMIN — PIPERACILLIN AND TAZOBACTAM 25 GRAM(S): 4; .5 INJECTION, POWDER, LYOPHILIZED, FOR SOLUTION INTRAVENOUS at 09:38

## 2023-02-11 RX ADMIN — LEVETIRACETAM 750 MILLIGRAM(S): 250 TABLET, FILM COATED ORAL at 09:39

## 2023-02-11 RX ADMIN — Medication 300 MILLIGRAM(S): at 09:39

## 2023-02-11 RX ADMIN — PIPERACILLIN AND TAZOBACTAM 25 GRAM(S): 4; .5 INJECTION, POWDER, LYOPHILIZED, FOR SOLUTION INTRAVENOUS at 01:00

## 2023-02-11 NOTE — H&P ADULT - NSHPREVIEWOFSYSTEMS_GEN_ALL_CORE
CONSTITUTIONAL: No weakness, fevers or chills; no weight loss or weight gain  EYES: No visual changes; No blurry vision  ENT: No vertigo or throat pain   NECK: No pain or stiffness  RESPIRATORY: No cough, wheezing, hemoptysis; No shortness of breath  CARDIOVASCULAR: No chest pain or palpitations, no AUGUSTINE, no orthopnea or PND  GASTROINTESTINAL: No abdominal or epigastric pain. No nausea, vomiting, or hematemesis; No diarrhea or constipation. No melena or hematochezia.  GENITOURINARY: No dysuria, frequency or hematuria  NEUROLOGICAL: No numbness or weakness, no syncope  SKIN: No itching, rashes  PSYCH: No insomnia, no depression  IMMUNOLOGY: No gum bleeding, no lymphadenopathy  ENDOCRINE: No polydipsia, no heat or cold intolerance  RHEUM: No joint pain or swelling, no rash

## 2023-02-11 NOTE — H&P ADULT - NSHPADDITIONALINFOADULT_GEN_ALL_CORE
[facility] Division of Hospital Medicine  Isabelle Tang MD  Pager (M-F, 8A-5P): [phone]  Other Times:  [phone]

## 2023-02-11 NOTE — CONSULT NOTE ADULT - SUBJECTIVE AND OBJECTIVE BOX
Patient is a 67y old  Male who presents with a chief complaint of chest pain    HPI:   68 y/o Pakistani speaking PMHx PPM, HTN, HLD, CVA, seizure disorder, DM2, and gastritis, initially presented to [place] ED with epigastric pain radiating to the back associated with nausea and vomiting.  Initial troponin negative however subsequent troponin elevated NSTEMI . CT abdomen and Pelvis negative. Tx to [facility] today for LCH. No complaints of CP no sob no palpitations noted. On Heparin gtt . ASA /Plavix given at OSH.     ID consulted because blood cultures grew e.coli in 4/4 bottles.        REVIEW OF SYSTEMS  [  ] ROS unobtainable because:    [ x ] All other systems negative except as noted below    Constitutional:  [ ] fever [ ] chills  [ ] weight loss  [ ]night sweat  [ ]poor appetite/PO intake [ ]fatigue   Skin:  [ ] rash [ ] phlebitis	  Eyes: [ ] icterus [ ] pain  [ ] discharge	  ENMT: [ ] sore throat  [ ] thrush [ ] ulcers [ ] exudates [ ]anosmia  Respiratory: [ ] dyspnea [ ] hemoptysis [ ] cough [ ] sputum	  Cardiovascular:  [ ] chest pain [ ] palpitations [ ] edema	  Gastrointestinal:  [ ] nausea [ ] vomiting [ ] diarrhea [ ] constipation [ ] pain	  Genitourinary:  [ ] dysuria [ ] frequency [ ] hematuria [ ] discharge [ ] flank pain  [ ] incontinence  Musculoskeletal:  [ ] myalgias [ ] arthralgias [ ] arthritis  [ ] back pain  Neurological:  [ ] headache [ ] weakness [ ] seizures  [ ] confusion/altered mental status    prior hospital charts reviewed [V]  primary team notes reviewed [V]  other consultant notes reviewed [V]    PAST MEDICAL & SURGICAL HISTORY:  CVA (cerebral vascular accident)  left arm weakness, no sensation in left side in     Aneurysm of [place] of [place]    HLD (hyperlipidemia)    Epilepsy  last seizure 2016    Renal agenesis, unilateral    Depression    Cerebral aneurysm without rupture  diagnosed 2017 had cerebral angio at [facility]    Gastroesophageal reflux disease, esophagitis presence not specified    Glaucoma of both eyes, unspecified glaucoma type    Bradycardia    Pakistani speaking    T2DM (type 2 diabetes mellitus)    S/P craniotomy  for RCMA     Pacemaker  Medtronic Serial [id number]/Model [id number]  Implant 07, new generator 2016    S/P bunionectomy    S/P cataract surgery  with lens implants    Status post coil embolization of cerebral aneurysm  2018      FAMILY HISTORY:  Family history of stomach cancer    SOCIAL HISTORY:  Denied smoking    Allergies  No Known Allergies      ANTIMICROBIALS:  piperacillin/tazobactam IVPB.. 3.375 every 8 hours      ANTIMICROBIALS (past 90 days):   MEDICATIONS  (STANDING):  piperacillin/tazobactam IVPB.   200 mL/Hr IV Intermittent (02-10-23 @ 21:31)   25 mL/Hr IV Intermittent (23 @ 01:00)   25 mL/Hr IV Intermittent (23 @ 09:38)    MEDICATIONS  (STANDING):  aspirin enteric coated 81 daily  carBAMazepine ER Capsule 300 two times a day  dextrose 50% Injectable 25 once  dextrose 50% Injectable 12.5 once  dextrose 50% Injectable 25 once  dextrose Oral Gel 15 once PRN  FLUoxetine 40 daily  glucagon  Injectable 1 once  insulin lispro (ADMELOG) corrective regimen sliding scale  three times a day before meals  insulin lispro (ADMELOG) corrective regimen sliding scale  at bedtime  levETIRAcetam 750 two times a day  metoprolol tartrate 25 two times a day  pantoprazole    Tablet 40 before breakfast  zolpidem 5 at bedtime PRN    VITALS:  Vital Signs Last 24 Hrs  T(F): 98.2 (23 @ 08:10), Max: 99.2 (02-10-23 @ 21:00)  Vital Signs Last 24 Hrs  HR: 60 (23 @ 08:10) (60 - 64)  BP: 108/56 (23 @ 08:10) (83/72 - 118/54)  RR: 17 (23 @ 08:10)  SpO2: 97% (23 @ 08:10) (95% - 100%)  Wt(kg): --    PHYSICAL EXAM:  Constitutional: non-toxic, no distress  HEAD/EYES: anicteric, no conjunctival injection  ENT:  supple, no thrush  Cardiovascular:   normal S1/S2  Respiratory:  clear BS bilaterally  GI:  soft, non-tender, +bowel sounds  :  no south, no CVA tenderness  Musculoskeletal:  no synovitis, normal ROM  Neurologic: awake and alert,  no focal findings  Skin:  no rash, no erythema, no phlebitis  Heme/Onc: no cervical lymphadenopathy   Psychiatric:  awake, alert, appropriate mood    Labs:                        10.2   8.53  )-----------( 110      ( 2023 01:25 )             30.3         140  |  107  |  14  ----------------------------<  114<H>  3.8   |  21<L>  |  1.12    Ca    8.1<L>      2023 01:25    TPro  5.6<L>  /  Alb  3.1<L>  /  TBili  1.8<H>  /  DBili  x   /  AST  289<H>  /  ALT  380<H>  /  AlkPhos  137<H>      WBC Trend:  WBC Count: 8.53 (23 @ 01:25)  WBC Count: 8.42 (02-10-23 @ 16:15)  WBC Count: 3.71 (02-10-23 @ 03:40)    Auto Neutrophil #: 3.24 K/uL (02-10-23 @ 03:40)    Auto Eosinophil %: 1.1 % (02-10-23 @ 03:40)    Urinalysis Basic - ( 10 Feb 2023 22:09 )    Color: Yellow / Appearance: Clear / S.011 / pH: x  Gluc: x / Ketone: Negative  / Bili: Negative / Urobili: 2 mg/dL   Blood: x / Protein: Negative / Nitrite: Negative   Leuk Esterase: Negative / RBC: 1 /hpf / WBC 0 /HPF   Sq Epi: x / Non Sq Epi: 0 /hpf / Bacteria: Negative    MICROBIOLOGY:  Culture - Blood (collected 10 Feb 2023 03:40)  Source: .Blood Blood-Peripheral  Preliminary Report:    Growth in aerobic bottle: Gram Negative Rods    Growth in anaerobic bottle: Gram Negative Rods    ***Blood Panel PCR results on this specimen are available    approximately 3 hours after the Gram stain result.***    Gram stain, PCR, and/or culture results may not always    correspond due to difference in methodologies.    ************************************************************    This PCR assay was performed by multiplex PCR. This    Assay tests for 66 bacterial and resistance gene targets.    Please refer to the [facility] test directory    at [email or web address] for details.  Organism: Blood Culture PCR  Organism: Blood Culture PCR    Sensitivities:      -  Escherichia coli: Detec      Method Type: PCR    Culture - Blood (collected 10 Feb 2023 03:34)  Source: .Blood Blood-Peripheral  Preliminary Report:    Growth in aerobic bottle: Gram Negative Rods    Growth in anaerobic bottle: Gram Negative Rods      RADIOLOGY:  imaging below personally reviewed    < from: CT Angio Abdomen and Pelvis w/ IV Cont (02.10.23 @ 04:39) >  IMPRESSION:  Mild atherosclerotic changes of the aorta. No aortic aneurysm or   dissection or hemorrhage.  < end of copied text >   Patient is a 67y old  Male who presents with a chief complaint of chest pain    HPI:   66 y/o Finnish speaking PMHx PPM, HTN, HLD, CVA, seizure disorder, DM2, and gastritis, initially presented to [place] ED with epigastric pain radiating to the back associated with nausea and vomiting.  Initial troponin negative however subsequent troponin elevated NSTEMI . CT abdomen and Pelvis negative. Tx to [facility] today for LCH. No complaints of CP no sob no palpitations noted. On Heparin gtt . ASA /Plavix given at OSH.     ID consulted because blood cultures grew e.coli in 4/4 bottles. Currently denies any N/V/diarrhea, dysuria, minimal epigastric pain.     REVIEW OF SYSTEMS  [  ] ROS unobtainable because:    [ x ] All other systems negative except as noted below    Constitutional:  [ ] fever [ ] chills  [ ] weight loss  [ ]night sweat  [ ]poor appetite/PO intake [ ]fatigue   Skin:  [ ] rash [ ] phlebitis	  Eyes: [ ] icterus [ ] pain  [ ] discharge	  ENMT: [ ] sore throat  [ ] thrush [ ] ulcers [ ] exudates [ ]anosmia  Respiratory: [ ] dyspnea [ ] hemoptysis [ ] cough [ ] sputum	  Cardiovascular:  [ ] chest pain [ ] palpitations [ ] edema	  Gastrointestinal:  [ ] nausea [ ] vomiting [ ] diarrhea [ ] constipation [ ] pain	  Genitourinary:  [ ] dysuria [ ] frequency [ ] hematuria [ ] discharge [ ] flank pain  [ ] incontinence  Musculoskeletal:  [ ] myalgias [ ] arthralgias [ ] arthritis  [ ] back pain  Neurological:  [ ] headache [ ] weakness [ ] seizures  [ ] confusion/altered mental status    prior hospital charts reviewed [V]  primary team notes reviewed [V]  other consultant notes reviewed [V]    PAST MEDICAL & SURGICAL HISTORY:  CVA (cerebral vascular accident)  left arm weakness, no sensation in left side in     Aneurysm of [place] of [place]    HLD (hyperlipidemia)    Epilepsy  last seizure 2016    Renal agenesis, unilateral    Depression    Cerebral aneurysm without rupture  diagnosed 2017 had cerebral angio at [facility]    Gastroesophageal reflux disease, esophagitis presence not specified    Glaucoma of both eyes, unspecified glaucoma type    Bradycardia    Finnish speaking    T2DM (type 2 diabetes mellitus)    S/P craniotomy  for RCMA     Pacemaker  Medtronic Serial [id number]/Model A2DR01  Implant 07, new generator 2016    S/P bunionectomy    S/P cataract surgery  with lens implants    Status post coil embolization of cerebral aneurysm  2018      FAMILY HISTORY:  Family history of stomach cancer    SOCIAL HISTORY:  Denied smoking    Allergies  No Known Allergies      ANTIMICROBIALS:  piperacillin/tazobactam IVPB.. 3.375 every 8 hours      ANTIMICROBIALS (past 90 days):   MEDICATIONS  (STANDING):  piperacillin/tazobactam IVPB.   200 mL/Hr IV Intermittent (02-10-23 @ 21:31)   25 mL/Hr IV Intermittent (23 @ 01:00)   25 mL/Hr IV Intermittent (23 @ 09:38)    MEDICATIONS  (STANDING):  aspirin enteric coated 81 daily  carBAMazepine ER Capsule 300 two times a day  dextrose 50% Injectable 25 once  dextrose 50% Injectable 12.5 once  dextrose 50% Injectable 25 once  dextrose Oral Gel 15 once PRN  FLUoxetine 40 daily  glucagon  Injectable 1 once  insulin lispro (ADMELOG) corrective regimen sliding scale  three times a day before meals  insulin lispro (ADMELOG) corrective regimen sliding scale  at bedtime  levETIRAcetam 750 two times a day  metoprolol tartrate 25 two times a day  pantoprazole    Tablet 40 before breakfast  zolpidem 5 at bedtime PRN    VITALS:  Vital Signs Last 24 Hrs  T(F): 98.2 (23 @ 08:10), Max: 99.2 (02-10-23 @ 21:00)  Vital Signs Last 24 Hrs  HR: 60 (23 @ 08:10) (60 - 64)  BP: 108/56 (23 @ 08:10) (83/72 - 118/54)  RR: 17 (23 @ 08:10)  SpO2: 97% (23 @ 08:10) (95% - 100%)  Wt(kg): --    PHYSICAL EXAM:  Constitutional: non-toxic, no distress  HEAD/EYES: anicteric, no conjunctival injection  ENT:  supple, no thrush  Cardiovascular:   normal S1/S2  Respiratory:  clear BS bilaterally  GI:  soft, non-tender, +bowel sounds  :  no south, no CVA tenderness  Musculoskeletal:  no synovitis, normal ROM  Neurologic: awake and alert,  no focal findings  Skin:  no rash, no erythema, no phlebitis  Heme/Onc: no cervical lymphadenopathy   Psychiatric:  awake, alert, appropriate mood    Labs:                        10.2   8.53  )-----------( 110      ( 2023 01:25 )             30.3         140  |  107  |  14  ----------------------------<  114<H>  3.8   |  21<L>  |  1.12    Ca    8.1<L>      2023 01:25    TPro  5.6<L>  /  Alb  3.1<L>  /  TBili  1.8<H>  /  DBili  x   /  AST  289<H>  /  ALT  380<H>  /  AlkPhos  137<H>      WBC Trend:  WBC Count: 8.53 (23 @ 01:25)  WBC Count: 8.42 (02-10-23 @ 16:15)  WBC Count: 3.71 (02-10-23 @ 03:40)    Auto Neutrophil #: 3.24 K/uL (02-10-23 @ 03:40)    Auto Eosinophil %: 1.1 % (02-10-23 @ 03:40)    Urinalysis Basic - ( 10 Feb 2023 22:09 )    Color: Yellow / Appearance: Clear / S.011 / pH: x  Gluc: x / Ketone: Negative  / Bili: Negative / Urobili: 2 mg/dL   Blood: x / Protein: Negative / Nitrite: Negative   Leuk Esterase: Negative / RBC: 1 /hpf / WBC 0 /HPF   Sq Epi: x / Non Sq Epi: 0 /hpf / Bacteria: Negative    MICROBIOLOGY:  Culture - Blood (collected 10 Feb 2023 03:40)  Source: .Blood Blood-Peripheral  Preliminary Report:    Growth in aerobic bottle: Gram Negative Rods    Growth in anaerobic bottle: Gram Negative Rods    ***Blood Panel PCR results on this specimen are available    approximately 3 hours after the Gram stain result.***    Gram stain, PCR, and/or culture results may not always    correspond due to difference in methodologies.    ************************************************************    This PCR assay was performed by multiplex PCR. This    Assay tests for 66 bacterial and resistance gene targets.    Please refer to the [facility] test directory    at [email or web address] for details.  Organism: Blood Culture PCR  Organism: Blood Culture PCR    Sensitivities:      -  Escherichia coli: Detec      Method Type: PCR    Culture - Blood (collected 10 Feb 2023 03:34)  Source: .Blood Blood-Peripheral  Preliminary Report:    Growth in aerobic bottle: Gram Negative Rods    Growth in anaerobic bottle: Gram Negative Rods      RADIOLOGY:  imaging below personally reviewed    < from: CT Angio Abdomen and Pelvis w/ IV Cont (02.10.23 @ 04:39) >  IMPRESSION:  Mild atherosclerotic changes of the aorta. No aortic aneurysm or   dissection or hemorrhage.  < end of copied text >

## 2023-02-11 NOTE — CONSULT NOTE ADULT - ASSESSMENT
66 y/o Occitan-speaking M PMHx PPM, HTN, HLD, CVA, seizure disorder, DM2, and gastritis, initially presented to [place] ED with epigastric pain radiating to the back associated with nausea and vomiting. Transferred to [facility] for suspected NSTEMI, now s/p LHC without evidence of CAD. Initial blood cultures drawn from ED now growing e.coli in 4/4 bottles. ID consulted for further management.     Afebrile, HD stable  WBC 8.5K today  Urinalysis unremarkable  Blood Cultures (2/10):  e.coli 4.4 bottles  CTA A/P (2/10):  no acute findings  TTE no vegetations  CXR no opacities    Impression:  #E.Coli Bacteremia    INCOMPLETE NOTE 66 y/o Syriac-speaking M PMHx PPM, HTN, HLD, CVA, seizure disorder, DM2, and gastritis, initially presented to [place] ED with epigastric pain radiating to the back associated with nausea and vomiting. Transferred to [facility] for suspected NSTEMI, now s/p LHC without evidence of CAD. Initial blood cultures drawn from ED now growing e.coli in 4/4 bottles. ID consulted for further management.     Afebrile, HD stable  WBC 8.5K today  Urinalysis unremarkable  Blood Cultures (2/10):  e.coli 4.4 bottles  CTA A/P (2/10):  no acute findings  TTE no vegetations  CXR no opacities    Impression:  #E.Coli Bacteremia - unclear source, possible biliary given elevated LFTs/bili  #Elevated LFTs, Hyperbilirubinemia - will need to r/o developing obstruction    Recs:  - c/w Zosyn   - check RUQ US given elevated TB/LFTs  - follow up e.coli sensitivities  - repeat blood cultures sent  - monitor temp, WBCs    Plan discussed with primary team    Vincenzo Ramos MD  Infectious Disease Fellow  Available on [employer] Teams  Before 9AM or after 5PM: [phone]  66 y/o Nepali-speaking M PMHx PPM, HTN, HLD, CVA, seizure disorder, DM2, and gastritis, initially presented to [place] ED with epigastric pain radiating to the back associated with nausea and vomiting. Transferred to [facility] for suspected NSTEMI, now s/p LHC without evidence of CAD. Initial blood cultures drawn from ED now growing e.coli in 4/4 bottles. ID consulted for further management.     Afebrile, HD stable  WBC 8.5K today  Urinalysis unremarkable  Blood Cultures (2/10):  e.coli 4.4 bottles  CTA A/P (2/10):  no acute findings  TTE no vegetations  CXR no opacities    Impression:  #E.Coli Bacteremia - unclear source, possible biliary given elevated LFTs/bili  #Transaminitis, Hyperbilirubinemia - will need to r/o developing obstruction    Recs:  - c/w Zosyn   - check RUQ US given elevated TB/LFTs  - follow up e.coli sensitivities  - repeat blood cultures sent  - monitor temp, WBCs    Plan discussed with primary team    Vincenzo Ramos MD  Infectious Disease Fellow  Available on [employer] Teams  Before 9AM or after 5PM: [phone]

## 2023-02-11 NOTE — H&P ADULT - NSICDXPASTMEDICALHX_GEN_ALL_CORE_FT
PAST MEDICAL HISTORY:  Aneurysm of [place] of [place]     Bradycardia     Cerebral aneurysm without rupture diagnosed 2017 had cerebral angio at [facility]    CVA (cerebral vascular accident) left arm weakness, no sensation in left side in 1998    Depression     Epilepsy last seizure 2016    Gastroesophageal reflux disease, esophagitis presence not specified     Glaucoma of both eyes, unspecified glaucoma type     HLD (hyperlipidemia)     Language barrier Croatian speaking    Renal agenesis, unilateral     T2DM (type 2 diabetes mellitus)

## 2023-02-11 NOTE — PHYSICAL THERAPY INITIAL EVALUATION ADULT - PERTINENT HX OF CURRENT PROBLEM, REHAB EVAL
Pt is a 66 y/o Tamaz[place] speaking male with PMH of sinus node disorder s/p PPM, HTN, HLD, cerebral aneurysm s/p coiling, CVA with residual L sided weakness, seizure disorder, DM2 on Metformin and gastritis who presented to [place] ED for epigastric pain radiating to the back associated nausea and vomiting x 2 which started on thursday 2/9.  Initial troponin negative however subsequent troponin elevated with EKG changes, so was ASA/plavix loaded, started on hep gtt, and transferred to [facility] today for LHC with . LHC 2/10 with clean coronaries/diagnostic via RRA. Pt was incidentally found to have positive blood cultures with gram negative rods, so ID consulted, admitted to medicine for further management. UA was unremarkable, and CT and did not reveal acute findings or source. U/S abd with hepatosteatosis, otherwise also unremarkable. Pt denies any fevers or chills at home, burning on urination, diarrhea, weight loss, night sweats, or recent travel.  CT angio: Mild atherosclerotic changes of the aorta. No aortic aneurysm or  dissection or hemorrhage.  CXR: no acute findings.

## 2023-02-11 NOTE — CHART NOTE - NSCHARTNOTEFT_GEN_A_CORE
no chest pain   s/p [facility] 2/10 with clean coronaries  CT angio negative for dissection  TTE preserved EF 60-65%, mod TR  no further need to monitor on tele  tele dc  above discussed and agreed with Isabelle Villarreal .
HPI: This is a 68 y/o [place]an speaking male Interpretor # [id number] with known implantable device PPM Medtronic implant 6/29/07 , new generator 12/23/2016,  COVID 19 negative has been vaccinated but cannot recall with PMHX  HTN, HLD, h/o CVA gen weakness , seizure disorder ( last seizure 6months ago ) , DM2 on Metformin at home does not recall last Hgb AIC ,and  hx of gastritis. Pt was BIBEMS to the [facility] ED , because pt states he has not been feeling well since 9 PM due to epigastric pain radiating to the back associated nausea and vomiting x 2.  Initial troponin negative however subsequent troponin elevated NSTEMI . CT abdomen and Pelvis negative. Tx to [facility] today for LCH today with . No complaints of CP no sob no palpitations noted. On Heparin gtt . ASA /Plavix given at OSH.     2/10 s/p diagnostic cath via RRA, site without hematoma/bleeding  Patient w/ positive blood cultures x2 w/ gram negative rods  Without complaints- patient feeling well, hemodynamically stable, afebrile  ID consult called- discussed case with Dr. Loya, recs as follows  Zosyn IV Q8 hrs x 7 days  Repeat BC x2 in AM  UA/UC ordered  AM labs ordered- CBC w/ diff, CMP  Formal ID consult to follow in AM  Will continue to monitor overnight    Angus YOUNGBLOOD  Invasive Cardiology  Ext [phone]

## 2023-02-11 NOTE — H&P ADULT - ASSESSMENT
68 y/o English speaking male with PMH of sinus node disorder s/p PPM, HTN, HLD, cerebral aneurysm s/p coiling, CVA with residual L sided weaknes, seizure disorder, DM2 on Metformin and gastritis who presented to [place] ED for epigastric pain radiating to the back associated nausea and vomiting x 2 which started on thursday 2/9, transferred to [facility] for LHC 2/10 which was diagnostic. Course c/b GNR bacteremia, now admitted to medicine for further management

## 2023-02-11 NOTE — H&P ADULT - NSHPPHYSICALEXAM_GEN_ALL_CORE
Vital Signs Last 24 Hrs  T(C): 36.8 (11 Feb 2023 08:10), Max: 37.3 (10 Feb 2023 21:00)  T(F): 98.2 (11 Feb 2023 08:10), Max: 99.2 (10 Feb 2023 21:00)  HR: 60 (11 Feb 2023 08:10) (60 - 64)  BP: 108/56 (11 Feb 2023 08:10) (83/72 - 118/54)  BP(mean): 64 (11 Feb 2023 04:38) (64 - 78)  RR: 17 (11 Feb 2023 08:10) (16 - 21)  SpO2: 97% (11 Feb 2023 08:10) (95% - 100%)    Parameters below as of 11 Feb 2023 08:10  Patient On (Oxygen Delivery Method): room air      GENERAL: Well appearing, in NAD  EYES: EOMI, conjunctiva and sclera clear  ENT: moist mucosa, no pharyngeal erythema  NECK: supple, no JVD  LUNG: Clear to auscultation bilaterally; No rales, rhonchi, wheezing, or rubs.   CVS: Regular rate and rhythm; No murmurs, rubs, or gallops  ABDOMEN: Soft, non tender, nondistended. +Bowel sounds.  EXTREMITIES:  no edema, no cyanosis  NEURO:  Alert & Oriented X3,  No focal deficits  PSYCH: Normal affect, normal mood  MUSCULOSKELETAL: FROM, no joint swelling  Skin: warm and dry, normal color

## 2023-02-11 NOTE — H&P ADULT - PROBLEM SELECTOR PLAN 1
Trops elevated with EKG changes; however s/p C 2/10 with clean coronaries  CT angio negative for dissection  TTE reviewed, preserved EF 60-65%, mod TR  Appreciate cardiology recs  Monitor on tele Trops elevated with EKG changes; however s/p [facility] 2/10 with clean coronaries  CT angio negative for dissection  TTE reviewed, preserved EF 60-65%, mod TR  Appreciate further cardiology recs

## 2023-02-11 NOTE — PHYSICAL THERAPY INITIAL EVALUATION ADULT - GENERAL OBSERVATIONS, REHAB EVAL
Pt received in NAD, with +tele, A&Ox4, VS screened and stable. Pt able to communicate needs in English.

## 2023-02-11 NOTE — H&P ADULT - NSICDXPASTSURGICALHX_GEN_ALL_CORE_FT
PAST SURGICAL HISTORY:  Pacemaker [employer] Serial [id number]/Model [id number]  Implant 6/29/07, new generator 12/23/2016    S/P bunionectomy     S/P cataract surgery with lens implants    S/P craniotomy for RCMA 1998    Status post coil embolization of cerebral aneurysm 9/2018

## 2023-02-11 NOTE — H&P ADULT - TIME BILLING
- preparing to see the patient (eg, review of tests, documents)  - obtaining and/or reviewing separately obtained history  - performing a medically appropriate examination and/or evaluation  - counseling and educating the patient/family/caregiver  - ordering medications, tests, or procedures  - referring and communicating with other health care professionals  - documenting clinical information in the electronic or other health record  - independently interpreting results and communicating results to the patient/family/caregiver  - care coordination

## 2023-02-11 NOTE — H&P ADULT - PROBLEM SELECTOR PLAN 2
Blood cx 2/10 growing Ecoli 4/4 bottles, urine cx negative  CT abd and US abd unremarkable for source  TTE without vegetation  Started on zosyn (2/10 -)  F/u ID recs  F/u repeat bcx for clearance

## 2023-02-11 NOTE — PHYSICAL THERAPY INITIAL EVALUATION ADULT - ACTIVE RANGE OF MOTION EXAMINATION, REHAB EVAL
L UE and LE AROM ~75%/bilateral upper extremity Active ROM was WFL (within functional limits)/bilateral  lower extremity Active ROM was WFL (within functional limits)/deficits as listed below

## 2023-02-11 NOTE — PHYSICAL THERAPY INITIAL EVALUATION ADULT - ADDITIONAL COMMENTS
Pt lives alone in apt, no stairs and elevator access. Has Home Health Aide 4 hours/7days. Pt performs ADLs and ambulates with assistance from aide. Per pt, when aide is not there he typically doesn't perform much mobility, uses commode for toileting. Owns DME: quad cane, commode, shower chair.

## 2023-02-11 NOTE — PHYSICAL THERAPY INITIAL EVALUATION ADULT - GAIT TRAINING, PT EVAL
GOAL: Pt will independently ambulate x200 ft using least restrictive device without loss of balance, within 2 weeks.

## 2023-02-11 NOTE — CONSULT NOTE ADULT - ATTENDING COMMENTS
66 y/o Upper sorbian-speaking M PMHx PPM, HTN, HLD, CVA, seizure disorder, DM2, and gastritis, initially presented to [place] ED with epigastric pain radiating to the back associated with nausea and vomiting.    Blood cultures with E coli  CT A/P with no infectious focus  Transaminitis noted  Favor checking RUQ US  Follow up on E coli susceptibilities  Can continue Zosyn for now    I will continue to follow. Please feel free to contact me with any further questions.    Robert Covington M.D.  [facility] Division of Infectious Disease  8AM-5PM Monday - Friday: Available on [employer] Teams  After Hours and Holidays (or if no response on [employer] Teams): Please contact the Infectious Diseases Office at [phone]

## 2023-02-11 NOTE — H&P ADULT - HISTORY OF PRESENT ILLNESS
68 y/o Sinhala speaking male with PMH of sinus node disorder s/p PPM, HTN, HLD, cerebral aneurysm s/p coiling, CVA with residual L sided weaknes, seizure disorder, DM2 on Metformin and gastritis who presented to [place] ED for epigastric pain radiating to the back associated nausea and vomiting x 2 which started on thursday 2/9.  Initial troponin negative however subsequent troponin elevated with EKG changes, so was ASA/plavix loaded, started on hep gtt, and transferred to [facility] today for LCH with . [facility] 2/10 with clean coronaries/diagnostic via RRA. Pt was incidentally found to have positive blood cultures with gram negative rods, so ID consulted, admitted to medicine for further management. UA was unremarkable, and CT and did not reveal acute findings or source. U/S abd with hepatosteatosis, otherwise also unremarkable. Pt denies any fevers or chills at home, burning on urination, diarrhea, weight loss, night sweats, or recent travel.

## 2023-02-12 LAB
-  AMIKACIN: SIGNIFICANT CHANGE UP
-  AMPICILLIN/SULBACTAM: SIGNIFICANT CHANGE UP
-  AMPICILLIN: SIGNIFICANT CHANGE UP
-  AZTREONAM: SIGNIFICANT CHANGE UP
-  CEFAZOLIN: SIGNIFICANT CHANGE UP
-  CEFEPIME: SIGNIFICANT CHANGE UP
-  CEFOXITIN: SIGNIFICANT CHANGE UP
-  CEFTRIAXONE: SIGNIFICANT CHANGE UP
-  CIPROFLOXACIN: SIGNIFICANT CHANGE UP
-  ERTAPENEM: SIGNIFICANT CHANGE UP
-  GENTAMICIN: SIGNIFICANT CHANGE UP
-  IMIPENEM: SIGNIFICANT CHANGE UP
-  LEVOFLOXACIN: SIGNIFICANT CHANGE UP
-  MEROPENEM: SIGNIFICANT CHANGE UP
-  PIPERACILLIN/TAZOBACTAM: SIGNIFICANT CHANGE UP
-  TOBRAMYCIN: SIGNIFICANT CHANGE UP
-  TRIMETHOPRIM/SULFAMETHOXAZOLE: SIGNIFICANT CHANGE UP
A1C WITH ESTIMATED AVERAGE GLUCOSE RESULT: 6.2 % — HIGH (ref 4–5.6)
ALBUMIN SERPL ELPH-MCNC: 3.4 G/DL — SIGNIFICANT CHANGE UP (ref 3.3–5)
ALP SERPL-CCNC: 123 U/L — HIGH (ref 40–120)
ALT FLD-CCNC: 250 U/L — HIGH (ref 10–45)
ANION GAP SERPL CALC-SCNC: 11 MMOL/L — SIGNIFICANT CHANGE UP (ref 5–17)
AST SERPL-CCNC: 125 U/L — HIGH (ref 10–40)
BILIRUB SERPL-MCNC: 0.5 MG/DL — SIGNIFICANT CHANGE UP (ref 0.2–1.2)
BUN SERPL-MCNC: 15 MG/DL — SIGNIFICANT CHANGE UP (ref 7–23)
CALCIUM SERPL-MCNC: 8.2 MG/DL — LOW (ref 8.4–10.5)
CHLORIDE SERPL-SCNC: 104 MMOL/L — SIGNIFICANT CHANGE UP (ref 96–108)
CHOLEST SERPL-MCNC: 96 MG/DL — SIGNIFICANT CHANGE UP
CO2 SERPL-SCNC: 23 MMOL/L — SIGNIFICANT CHANGE UP (ref 22–31)
CREAT SERPL-MCNC: 1.12 MG/DL — SIGNIFICANT CHANGE UP (ref 0.5–1.3)
CULTURE RESULTS: SIGNIFICANT CHANGE UP
CULTURE RESULTS: SIGNIFICANT CHANGE UP
EGFR: 72 ML/MIN/1.73M2 — SIGNIFICANT CHANGE UP
ESTIMATED AVERAGE GLUCOSE: 131 MG/DL — HIGH (ref 68–114)
GLUCOSE BLDC GLUCOMTR-MCNC: 107 MG/DL — HIGH (ref 70–99)
GLUCOSE BLDC GLUCOMTR-MCNC: 125 MG/DL — HIGH (ref 70–99)
GLUCOSE BLDC GLUCOMTR-MCNC: 130 MG/DL — HIGH (ref 70–99)
GLUCOSE BLDC GLUCOMTR-MCNC: 158 MG/DL — HIGH (ref 70–99)
GLUCOSE SERPL-MCNC: 110 MG/DL — HIGH (ref 70–99)
HCT VFR BLD CALC: 32.2 % — LOW (ref 39–50)
HCV AB S/CO SERPL IA: 0.06 S/CO — SIGNIFICANT CHANGE UP (ref 0–0.99)
HCV AB SERPL-IMP: SIGNIFICANT CHANGE UP
HDLC SERPL-MCNC: 34 MG/DL — LOW
HGB BLD-MCNC: 10.4 G/DL — LOW (ref 13–17)
LIPID PNL WITH DIRECT LDL SERPL: 42 MG/DL — SIGNIFICANT CHANGE UP
MAGNESIUM SERPL-MCNC: 2.2 MG/DL — SIGNIFICANT CHANGE UP (ref 1.6–2.6)
MCHC RBC-ENTMCNC: 32.3 GM/DL — SIGNIFICANT CHANGE UP (ref 32–36)
MCHC RBC-ENTMCNC: 32.9 PG — SIGNIFICANT CHANGE UP (ref 27–34)
MCV RBC AUTO: 101.9 FL — HIGH (ref 80–100)
METHOD TYPE: SIGNIFICANT CHANGE UP
NON HDL CHOLESTEROL: 62 MG/DL — SIGNIFICANT CHANGE UP
NRBC # BLD: 0 /100 WBCS — SIGNIFICANT CHANGE UP (ref 0–0)
ORGANISM # SPEC MICROSCOPIC CNT: SIGNIFICANT CHANGE UP
PHOSPHATE SERPL-MCNC: 2.5 MG/DL — SIGNIFICANT CHANGE UP (ref 2.5–4.5)
PLATELET # BLD AUTO: 112 K/UL — LOW (ref 150–400)
POTASSIUM SERPL-MCNC: 3.5 MMOL/L — SIGNIFICANT CHANGE UP (ref 3.5–5.3)
POTASSIUM SERPL-SCNC: 3.5 MMOL/L — SIGNIFICANT CHANGE UP (ref 3.5–5.3)
PROT SERPL-MCNC: 6.2 G/DL — SIGNIFICANT CHANGE UP (ref 6–8.3)
RBC # BLD: 3.16 M/UL — LOW (ref 4.2–5.8)
RBC # FLD: 12.4 % — SIGNIFICANT CHANGE UP (ref 10.3–14.5)
SODIUM SERPL-SCNC: 138 MMOL/L — SIGNIFICANT CHANGE UP (ref 135–145)
SPECIMEN SOURCE: SIGNIFICANT CHANGE UP
SPECIMEN SOURCE: SIGNIFICANT CHANGE UP
TRIGL SERPL-MCNC: 100 MG/DL — SIGNIFICANT CHANGE UP
TSH SERPL-MCNC: 2.15 UIU/ML — SIGNIFICANT CHANGE UP (ref 0.27–4.2)
WBC # BLD: 7.28 K/UL — SIGNIFICANT CHANGE UP (ref 3.8–10.5)
WBC # FLD AUTO: 7.28 K/UL — SIGNIFICANT CHANGE UP (ref 3.8–10.5)

## 2023-02-12 PROCEDURE — 99232 SBSQ HOSP IP/OBS MODERATE 35: CPT

## 2023-02-12 RX ADMIN — Medication 25 MILLIGRAM(S): at 21:37

## 2023-02-12 RX ADMIN — Medication 1: at 12:39

## 2023-02-12 RX ADMIN — Medication 25 MILLIGRAM(S): at 12:02

## 2023-02-12 RX ADMIN — PIPERACILLIN AND TAZOBACTAM 25 GRAM(S): 4; .5 INJECTION, POWDER, LYOPHILIZED, FOR SOLUTION INTRAVENOUS at 10:36

## 2023-02-12 RX ADMIN — PIPERACILLIN AND TAZOBACTAM 25 GRAM(S): 4; .5 INJECTION, POWDER, LYOPHILIZED, FOR SOLUTION INTRAVENOUS at 03:11

## 2023-02-12 RX ADMIN — PIPERACILLIN AND TAZOBACTAM 25 GRAM(S): 4; .5 INJECTION, POWDER, LYOPHILIZED, FOR SOLUTION INTRAVENOUS at 17:44

## 2023-02-12 RX ADMIN — Medication 81 MILLIGRAM(S): at 12:02

## 2023-02-12 RX ADMIN — Medication 300 MILLIGRAM(S): at 21:37

## 2023-02-12 RX ADMIN — LEVETIRACETAM 750 MILLIGRAM(S): 250 TABLET, FILM COATED ORAL at 12:03

## 2023-02-12 RX ADMIN — LEVETIRACETAM 750 MILLIGRAM(S): 250 TABLET, FILM COATED ORAL at 21:37

## 2023-02-12 RX ADMIN — Medication 40 MILLIGRAM(S): at 12:02

## 2023-02-12 RX ADMIN — LATANOPROST 1 DROP(S): 0.05 SOLUTION/ DROPS OPHTHALMIC; TOPICAL at 21:37

## 2023-02-12 RX ADMIN — Medication 1 SPRAY(S): at 17:42

## 2023-02-12 RX ADMIN — PANTOPRAZOLE SODIUM 40 MILLIGRAM(S): 20 TABLET, DELAYED RELEASE ORAL at 06:43

## 2023-02-12 RX ADMIN — Medication 1 SPRAY(S): at 06:43

## 2023-02-12 RX ADMIN — Medication 300 MILLIGRAM(S): at 10:37

## 2023-02-12 NOTE — PROGRESS NOTE ADULT - ASSESSMENT
66 y/o Sinhala speaking male with PMH of sinus node disorder s/p PPM, HTN, HLD, cerebral aneurysm s/p coiling, CVA with residual L sided weaknes, seizure disorder, DM2 on Metformin and gastritis who presented to [place] ED for epigastric pain radiating to the back associated nausea and vomiting x 2 which started on thursday 2/9, transferred to [facility] for LHC 2/10 which was diagnostic. Course c/b GNR bacteremia, now admitted to medicine for further management

## 2023-02-13 LAB
ALBUMIN SERPL ELPH-MCNC: 3.6 G/DL — SIGNIFICANT CHANGE UP (ref 3.3–5)
ALP SERPL-CCNC: 119 U/L — SIGNIFICANT CHANGE UP (ref 40–120)
ALT FLD-CCNC: 182 U/L — HIGH (ref 10–45)
ANION GAP SERPL CALC-SCNC: 10 MMOL/L — SIGNIFICANT CHANGE UP (ref 5–17)
AST SERPL-CCNC: 66 U/L — HIGH (ref 10–40)
BILIRUB SERPL-MCNC: 0.4 MG/DL — SIGNIFICANT CHANGE UP (ref 0.2–1.2)
BUN SERPL-MCNC: 16 MG/DL — SIGNIFICANT CHANGE UP (ref 7–23)
CALCIUM SERPL-MCNC: 8.8 MG/DL — SIGNIFICANT CHANGE UP (ref 8.4–10.5)
CHLORIDE SERPL-SCNC: 102 MMOL/L — SIGNIFICANT CHANGE UP (ref 96–108)
CO2 SERPL-SCNC: 25 MMOL/L — SIGNIFICANT CHANGE UP (ref 22–31)
CREAT SERPL-MCNC: 1.3 MG/DL — SIGNIFICANT CHANGE UP (ref 0.5–1.3)
EGFR: 60 ML/MIN/1.73M2 — SIGNIFICANT CHANGE UP
GLUCOSE BLDC GLUCOMTR-MCNC: 104 MG/DL — HIGH (ref 70–99)
GLUCOSE BLDC GLUCOMTR-MCNC: 111 MG/DL — HIGH (ref 70–99)
GLUCOSE BLDC GLUCOMTR-MCNC: 120 MG/DL — HIGH (ref 70–99)
GLUCOSE BLDC GLUCOMTR-MCNC: 182 MG/DL — HIGH (ref 70–99)
GLUCOSE SERPL-MCNC: 113 MG/DL — HIGH (ref 70–99)
POTASSIUM SERPL-MCNC: 3.8 MMOL/L — SIGNIFICANT CHANGE UP (ref 3.5–5.3)
POTASSIUM SERPL-SCNC: 3.8 MMOL/L — SIGNIFICANT CHANGE UP (ref 3.5–5.3)
PROT SERPL-MCNC: 6.6 G/DL — SIGNIFICANT CHANGE UP (ref 6–8.3)
SODIUM SERPL-SCNC: 137 MMOL/L — SIGNIFICANT CHANGE UP (ref 135–145)

## 2023-02-13 PROCEDURE — 99232 SBSQ HOSP IP/OBS MODERATE 35: CPT

## 2023-02-13 PROCEDURE — 76705 ECHO EXAM OF ABDOMEN: CPT | Mod: 26

## 2023-02-13 RX ORDER — CEFTRIAXONE 500 MG/1
1000 INJECTION, POWDER, FOR SOLUTION INTRAMUSCULAR; INTRAVENOUS EVERY 24 HOURS
Refills: 0 | Status: DISCONTINUED | OUTPATIENT
Start: 2023-02-13 | End: 2023-02-14

## 2023-02-13 RX ADMIN — Medication 81 MILLIGRAM(S): at 11:29

## 2023-02-13 RX ADMIN — Medication 1: at 12:19

## 2023-02-13 RX ADMIN — LEVETIRACETAM 750 MILLIGRAM(S): 250 TABLET, FILM COATED ORAL at 10:45

## 2023-02-13 RX ADMIN — LATANOPROST 1 DROP(S): 0.05 SOLUTION/ DROPS OPHTHALMIC; TOPICAL at 22:10

## 2023-02-13 RX ADMIN — CEFTRIAXONE 100 MILLIGRAM(S): 500 INJECTION, POWDER, FOR SOLUTION INTRAMUSCULAR; INTRAVENOUS at 17:10

## 2023-02-13 RX ADMIN — Medication 300 MILLIGRAM(S): at 22:10

## 2023-02-13 RX ADMIN — Medication 25 MILLIGRAM(S): at 10:44

## 2023-02-13 RX ADMIN — PIPERACILLIN AND TAZOBACTAM 25 GRAM(S): 4; .5 INJECTION, POWDER, LYOPHILIZED, FOR SOLUTION INTRAVENOUS at 10:45

## 2023-02-13 RX ADMIN — Medication 1 SPRAY(S): at 17:11

## 2023-02-13 RX ADMIN — Medication 25 MILLIGRAM(S): at 22:09

## 2023-02-13 RX ADMIN — ZOLPIDEM TARTRATE 5 MILLIGRAM(S): 10 TABLET ORAL at 22:13

## 2023-02-13 RX ADMIN — Medication 40 MILLIGRAM(S): at 11:28

## 2023-02-13 RX ADMIN — LEVETIRACETAM 750 MILLIGRAM(S): 250 TABLET, FILM COATED ORAL at 22:09

## 2023-02-13 RX ADMIN — PIPERACILLIN AND TAZOBACTAM 25 GRAM(S): 4; .5 INJECTION, POWDER, LYOPHILIZED, FOR SOLUTION INTRAVENOUS at 01:03

## 2023-02-13 RX ADMIN — PANTOPRAZOLE SODIUM 40 MILLIGRAM(S): 20 TABLET, DELAYED RELEASE ORAL at 06:06

## 2023-02-13 RX ADMIN — Medication 1 SPRAY(S): at 06:06

## 2023-02-13 RX ADMIN — Medication 300 MILLIGRAM(S): at 10:44

## 2023-02-13 NOTE — PROGRESS NOTE ADULT - PROBLEM SELECTOR PLAN 2
Blood cx 2/10 growing Ecoli 4/4 bottles, urine cx negative; subsequent bcx NGTD  CT abd and US abd unremarkable for source  TTE without vegetation  Started on zosyn (2/10 -2/13), switched to ceftriaxone. Will eventually transition to po  F/u ID recs  RUQ US
Blood cx 2/10 growing Ecoli 4/4 bottles, urine cx negative; subsequent bcx NGTD  CT abd and US abd unremarkable for source  TTE without vegetation  Started on zosyn (2/10 -)  F/u ID recs  F/u sensitivities

## 2023-02-13 NOTE — PROGRESS NOTE ADULT - PROBLEM SELECTOR PLAN 1
Likely type II NSTEMI in setting of bacteremia  Trops elevated with EKG changes; however s/p [facility] 2/10 with clean coronaries  CT angio negative for dissection  TTE reviewed, preserved EF 60-65%, mod TR  Appreciate further cardiology recs.
Likely type II NSTEMI in setting of bacteremia  Trops elevated with EKG changes; however s/p [facility] 2/10 with clean coronaries  CT angio negative for dissection  TTE reviewed, preserved EF 60-65%, mod TR  Appreciate further cardiology recs.

## 2023-02-13 NOTE — PROGRESS NOTE ADULT - ASSESSMENT
66 y/o Armenian speaking male with PMH of sinus node disorder s/p PPM, HTN, HLD, cerebral aneurysm s/p coiling, CVA with residual L sided weaknes, seizure disorder, DM2 on Metformin and gastritis who presented to [place] ED for epigastric pain radiating to the back associated nausea and vomiting x 2 which started on thursday 2/9, transferred to [facility] for LHC 2/10 which was diagnostic. Course c/b GNR bacteremia, now admitted to medicine for further management

## 2023-02-13 NOTE — PROGRESS NOTE ADULT - ASSESSMENT
68 y/o Maori-speaking M PMHx PPM, HTN, HLD, CVA, seizure disorder, DM2, and gastritis, initially presented to [place] ED with epigastric pain radiating to the back associated with nausea and vomiting. Transferred to [facility] for suspected NSTEMI, now s/p LHC without evidence of CAD. Initial blood cultures drawn from ED now growing e.coli in 4/4 bottles. ID consulted for further management.     Afebrile, HD stable  WBC 8.5K today  Urinalysis unremarkable  Blood Cultures (2/10):  e.coli 4.4 bottles  CTA A/P (2/10):  no acute findings  TTE no vegetations  CXR no opacities  RUQ US without infectious focus    Impression:  #E.Coli Bacteremia - unclear source, possible biliary given elevated LFTs/bili  #Transaminitis, Hyperbilirubinemia - will need to r/o developing obstruction    Recs:  - Stop Zosyn; will treat with Ceftriaxone 1g IV Q24H while admitted  - Can discharge on Cefpodoxime 200 mg PO BID (End Date: 2/24/23)    I will follow the patient as needed. Please feel free to contact me with any further questions or concerns.    Robert Covington M.D.  [facility] Division of Infectious Disease  8AM-5PM Monday - Friday: Available on [employer] Teams  After Hours and Holidays (or if no response on [employer] Teams): Please contact the Infectious Diseases Office at [phone]     The above assessment and plan were discussed with medicine PA No

## 2023-02-13 NOTE — PROGRESS NOTE ADULT - SUBJECTIVE AND OBJECTIVE BOX
PROGRESS NOTE:   Authoted by Dr. Javier Chakraborty MD, Available on MS Teams    Patient is a 67y old  Male who presents with a chief complaint of Chest pain (12 Feb 2023 13:44)      SUBJECTIVE / OVERNIGHT EVENTS: Patient feels well. No chest pain or shortness of breath.     ADDITIONAL REVIEW OF SYSTEMS:    MEDICATIONS  (STANDING):  aspirin enteric coated 81 milliGRAM(s) Oral daily  carBAMazepine ER Capsule 300 milliGRAM(s) Oral two times a day  cefTRIAXone   IVPB 1000 milliGRAM(s) IV Intermittent every 24 hours  dextrose 5%. 1000 milliLiter(s) (100 mL/Hr) IV Continuous <Continuous>  dextrose 5%. 1000 milliLiter(s) (50 mL/Hr) IV Continuous <Continuous>  dextrose 50% Injectable 25 Gram(s) IV Push once  dextrose 50% Injectable 12.5 Gram(s) IV Push once  dextrose 50% Injectable 25 Gram(s) IV Push once  FLUoxetine 40 milliGRAM(s) Oral daily  fluticasone propionate 50 MICROgram(s)/spray Nasal Spray 1 Spray(s) Both Nostrils two times a day  glucagon  Injectable 1 milliGRAM(s) IntraMuscular once  insulin lispro (ADMELOG) corrective regimen sliding scale   SubCutaneous three times a day before meals  insulin lispro (ADMELOG) corrective regimen sliding scale   SubCutaneous at bedtime  latanoprost 0.005% Ophthalmic Solution 1 Drop(s) Both EYES at bedtime  levETIRAcetam 750 milliGRAM(s) Oral two times a day  metoprolol tartrate 25 milliGRAM(s) Oral two times a day  pantoprazole    Tablet 40 milliGRAM(s) Oral before breakfast  sodium chloride 0.9% Bolus 250 milliLiter(s) IV Bolus once  sodium chloride 0.9%. 1000 milliLiter(s) (75 mL/Hr) IV Continuous <Continuous>    MEDICATIONS  (PRN):  dextrose Oral Gel 15 Gram(s) Oral once PRN Blood Glucose LESS THAN 70 milliGRAM(s)/deciliter  zolpidem 5 milliGRAM(s) Oral at bedtime PRN Insomnia      CAPILLARY BLOOD GLUCOSE      POCT Blood Glucose.: 182 mg/dL (13 Feb 2023 11:52)  POCT Blood Glucose.: 111 mg/dL (13 Feb 2023 07:58)  POCT Blood Glucose.: 130 mg/dL (12 Feb 2023 21:28)  POCT Blood Glucose.: 125 mg/dL (12 Feb 2023 17:16)    I&O's Summary    12 Feb 2023 07:01  -  13 Feb 2023 07:00  --------------------------------------------------------  IN: 0 mL / OUT: 800 mL / NET: -800 mL    13 Feb 2023 07:01  -  13 Feb 2023 12:36  --------------------------------------------------------  IN: 240 mL / OUT: 0 mL / NET: 240 mL        PHYSICAL EXAM:  Vital Signs Last 24 Hrs  T(C): 36.8 (13 Feb 2023 11:19), Max: 36.8 (12 Feb 2023 21:08)  T(F): 98.3 (13 Feb 2023 11:19), Max: 98.3 (12 Feb 2023 21:08)  HR: 59 (13 Feb 2023 11:19) (59 - 61)  BP: 117/68 (13 Feb 2023 11:19) (109/69 - 138/73)  BP(mean): --  RR: 18 (13 Feb 2023 11:19) (18 - 18)  SpO2: 98% (13 Feb 2023 11:19) (97% - 98%)    Parameters below as of 13 Feb 2023 11:19  Patient On (Oxygen Delivery Method): room air        CONSTITUTIONAL: NAD, well-developed  RESPIRATORY: Normal respiratory effort; lungs are clear to auscultation bilaterally  CARDIOVASCULAR: Regular rate and rhythm, normal S1 and S2, no murmur/rub/gallop; No lower extremity edema  ABDOMEN: Nontender to palpation, normoactive bowel sounds, no rebound/guarding  MUSCLOSKELETAL: no clubbing or cyanosis of digits; no joint swelling or tenderness to palpation  PSYCH: A+O to person, place, and time; affect appropriate    LABS:                        10.4   7.28  )-----------( 112      ( 12 Feb 2023 07:05 )             32.2     02-13    137  |  102  |  16  ----------------------------<  113<H>  3.8   |  25  |  1.30    Ca    8.8      13 Feb 2023 07:18  Phos  2.5     02-12  Mg     2.2     02-12    TPro  6.6  /  Alb  3.6  /  TBili  0.4  /  DBili  x   /  AST  66<H>  /  ALT  182<H>  /  AlkPhos  119  02-13              Culture - Blood (collected 11 Feb 2023 01:22)  Source: .Blood Blood-Peripheral  Preliminary Report (12 Feb 2023 04:01):    No growth to date.    Culture - Blood (collected 11 Feb 2023 01:22)  Source: .Blood Blood-Peripheral  Preliminary Report (12 Feb 2023 04:01):    No growth to date.
[facility] Division of Hospital Medicine  Isabelle Tang MD  Pager (M-F, 3M-3G): [phone]  Other Times:  [phone]    Patient is a 67y old  Male who presents with a chief complaint of Chest pain (2023 11:29)      SUBJECTIVE / OVERNIGHT EVENTS: No acute events  ADDITIONAL REVIEW OF SYSTEMS: negative    MEDICATIONS  (STANDING):  aspirin enteric coated 81 milliGRAM(s) Oral daily  carBAMazepine ER Capsule 300 milliGRAM(s) Oral two times a day  dextrose 5%. 1000 milliLiter(s) (100 mL/Hr) IV Continuous <Continuous>  dextrose 5%. 1000 milliLiter(s) (50 mL/Hr) IV Continuous <Continuous>  dextrose 50% Injectable 25 Gram(s) IV Push once  dextrose 50% Injectable 12.5 Gram(s) IV Push once  dextrose 50% Injectable 25 Gram(s) IV Push once  FLUoxetine 40 milliGRAM(s) Oral daily  fluticasone propionate 50 MICROgram(s)/spray Nasal Spray 1 Spray(s) Both Nostrils two times a day  glucagon  Injectable 1 milliGRAM(s) IntraMuscular once  insulin lispro (ADMELOG) corrective regimen sliding scale   SubCutaneous three times a day before meals  insulin lispro (ADMELOG) corrective regimen sliding scale   SubCutaneous at bedtime  latanoprost 0.005% Ophthalmic Solution 1 Drop(s) Both EYES at bedtime  levETIRAcetam 750 milliGRAM(s) Oral two times a day  metoprolol tartrate 25 milliGRAM(s) Oral two times a day  pantoprazole    Tablet 40 milliGRAM(s) Oral before breakfast  piperacillin/tazobactam IVPB.. 3.375 Gram(s) IV Intermittent every 8 hours  sodium chloride 0.9% Bolus 250 milliLiter(s) IV Bolus once  sodium chloride 0.9%. 1000 milliLiter(s) (75 mL/Hr) IV Continuous <Continuous>    MEDICATIONS  (PRN):  dextrose Oral Gel 15 Gram(s) Oral once PRN Blood Glucose LESS THAN 70 milliGRAM(s)/deciliter  zolpidem 5 milliGRAM(s) Oral at bedtime PRN Insomnia      CAPILLARY BLOOD GLUCOSE      POCT Blood Glucose.: 158 mg/dL (2023 12:19)  POCT Blood Glucose.: 107 mg/dL (2023 08:11)  POCT Blood Glucose.: 106 mg/dL (2023 21:21)  POCT Blood Glucose.: 141 mg/dL (2023 18:16)    I&O's Summary    2023 07:01  -  2023 07:00  --------------------------------------------------------  IN: 480 mL / OUT: 300 mL / NET: 180 mL        PHYSICAL EXAM:  Vital Signs Last 24 Hrs  T(C): 36.6 (2023 11:28), Max: 37.2 (2023 20:32)  T(F): 97.8 (2023 11:28), Max: 99 (2023 20:32)  HR: 60 (2023 11:28) (60 - 60)  BP: 106/68 (2023 11:28) (106/68 - 125/68)  BP(mean): 74 (2023 16:10) (74 - 74)  RR: 18 (2023 11:28) (16 - 18)  SpO2: 98% (2023 11:28) (96% - 99%)    Parameters below as of 2023 11:28  Patient On (Oxygen Delivery Method): room air      CONSTITUTIONAL: NAD, well-developed, well-groomed  EYES: PERRLA; conjunctiva and sclera clear  ENMT: Moist oral mucosa, no pharyngeal injection or exudates; normal dentition  NECK: Supple, no palpable masses; no thyromegaly  RESPIRATORY: Normal respiratory effort; lungs are clear to auscultation bilaterally  CARDIOVASCULAR: Regular rate and rhythm, normal S1 and S2, no murmur/rub/gallop; No lower extremity edema; Peripheral pulses are 2+ bilaterally  ABDOMEN: Nontender to palpation, normoactive bowel sounds, no rebound/guarding; No hepatosplenomegaly  MUSCULOSKELETAL:  no clubbing or cyanosis of digits; no joint swelling or tenderness to palpation  PSYCH: A+O to person, place, and time; affect appropriate  NEUROLOGY: CN 2-12 are intact and symmetric; no gross sensory deficits   SKIN: No rashes; no palpable lesions    LABS: reviewed                        10.4   7.28  )-----------( 112      ( 2023 07:05 )             32.2     02-12    138  |  104  |  15  ----------------------------<  110<H>  3.5   |  23  |  1.12    Ca    8.2<L>      2023 07:04  Phos  2.5       Mg     2.2         TPro  6.2  /  Alb  3.4  /  TBili  0.5  /  DBili  x   /  AST  125<H>  /  ALT  250<H>  /  AlkPhos  123<H>  12    PTT - ( 10 Feb 2023 16:15 )  PTT:176.3 sec      Urinalysis Basic - ( 10 Feb 2023 22:09 )    Color: Yellow / Appearance: Clear / S.011 / pH: x  Gluc: x / Ketone: Negative  / Bili: Negative / Urobili: 2 mg/dL   Blood: x / Protein: Negative / Nitrite: Negative   Leuk Esterase: Negative / RBC: 1 /hpf / WBC 0 /HPF   Sq Epi: x / Non Sq Epi: 0 /hpf / Bacteria: Negative        Culture - Blood (collected 2023 01:22)  Source: .Blood Blood-Peripheral  Preliminary Report (2023 04:01):    No growth to date.    Culture - Blood (collected 2023 01:22)  Source: .Blood Blood-Peripheral  Preliminary Report (2023 04:01):    No growth to date.    Culture - Urine (collected 10 Feb 2023 08:30)  Source: Clean Catch Clean Catch (Midstream)  Final Report (2023 10:39):    <10,000 CFU/mL Normal Urogenital Chantale    Culture - Blood (collected 10 Feb 2023 03:40)  Source: .Blood Blood-Peripheral  Gram Stain (10 Feb 2023 18:48):    Growth in aerobic bottle: Gram Negative Rods    Growth in anaerobic bottle: Gram Negative Rods  Final Report (2023 10:03):    Growth in aerobic and anaerobic bottles: Escherichia coli    ***Blood Panel PCR results on this specimen are available    approximately 3 hours after the Gram stain result.***    Gram stain, PCR, and/or culture results may not always    correspond due to difference in methodologies.    ************************************************************    This PCR assay was performed by multiplex PCR. This    Assay tests for 66 bacterial and resistance gene targets.    Please refer to the [facility] test directory    at [email or web address] for details.  Organism: Blood Culture PCR  Escherichia coli (2023 10:03)  Organism: Escherichia coli (2023 10:03)  Organism: Blood Culture PCR (2023 10:03)    Culture - Blood (collected 10 Feb 2023 03:34)  Source: .Blood Blood-Peripheral  Gram Stain (10 Feb 2023 19:39):    Growth in aerobic bottle: Gram Negative Rods    Growth in anaerobic bottle: Gram Negative Rods  Final Report (2023 10:04):    Growth in aerobic and anaerobic bottles: Escherichia coli    See previous culture  [id number]    
Follow Up:  e coli bacteremia    Interval History: afebrile. no acute complaints.     REVIEW OF SYSTEMS  [  ] ROS unobtainable because:    [ x ] All other systems negative except as noted below    Constitutional:  [ ] fever [ ] chills  [ ] weight loss  [ ] weakness  Skin:  [ ] rash [ ] phlebitis	  Eyes: [ ] icterus [ ] pain  [ ] discharge	  ENMT: [ ] sore throat  [ ] thrush [ ] ulcers [ ] exudates  Respiratory: [ ] dyspnea [ ] hemoptysis [ ] cough [ ] sputum	  Cardiovascular:  [ ] chest pain [ ] palpitations [ ] edema	  Gastrointestinal:  [ ] nausea [ ] vomiting [ ] diarrhea [ ] constipation [ ] pain	  Genitourinary:  [ ] dysuria [ ] frequency [ ] hematuria [ ] discharge [ ] flank pain  [ ] incontinence  Musculoskeletal:  [ ] myalgias [ ] arthralgias [ ] arthritis  [ ] back pain  Neurological:  [ ] headache [ ] seizures  [ ] confusion/altered mental status    Allergies  No Known Allergies        ANTIMICROBIALS:  cefTRIAXone   IVPB 1000 every 24 hours      OTHER MEDS:  MEDICATIONS  (STANDING):  aspirin enteric coated 81 daily  carBAMazepine ER Capsule 300 two times a day  dextrose 50% Injectable 25 once  dextrose 50% Injectable 12.5 once  dextrose 50% Injectable 25 once  dextrose Oral Gel 15 once PRN  FLUoxetine 40 daily  glucagon  Injectable 1 once  insulin lispro (ADMELOG) corrective regimen sliding scale  three times a day before meals  insulin lispro (ADMELOG) corrective regimen sliding scale  at bedtime  levETIRAcetam 750 two times a day  metoprolol tartrate 25 two times a day  pantoprazole    Tablet 40 before breakfast  zolpidem 5 at bedtime PRN      Vital Signs Last 24 Hrs  T(C): 36.8 (13 Feb 2023 11:19), Max: 36.8 (12 Feb 2023 21:08)  T(F): 98.3 (13 Feb 2023 11:19), Max: 98.3 (12 Feb 2023 21:08)  HR: 59 (13 Feb 2023 11:19) (59 - 61)  BP: 117/68 (13 Feb 2023 11:19) (109/69 - 138/73)  BP(mean): --  RR: 18 (13 Feb 2023 11:19) (18 - 18)  SpO2: 98% (13 Feb 2023 11:19) (97% - 98%)    Parameters below as of 13 Feb 2023 11:19  Patient On (Oxygen Delivery Method): room air        PHYSICAL EXAMINATION:  General: Alert and Awake, NAD  Cardiac: RRR, No M/R/G  Resp: CTAB, No Wh/Rh/Ra  Abdomen: NBS, NT/ND, No HSM, No rigidity or guarding  MSK: No LE edema. No Calf tenderness  Skin: No rashes or lesions. Skin is warm and dry to the touch.   Neuro: Alert and Awake. CN 2-12 Grossly intact. Moves all four extremities spontaneously.  Psych: Calm, Pleasant, Cooperative                          10.4   7.28  )-----------( 112      ( 12 Feb 2023 07:05 )             32.2       02-13    137  |  102  |  16  ----------------------------<  113<H>  3.8   |  25  |  1.30    Ca    8.8      13 Feb 2023 07:18  Phos  2.5     02-12  Mg     2.2     02-12    TPro  6.6  /  Alb  3.6  /  TBili  0.4  /  DBili  x   /  AST  66<H>  /  ALT  182<H>  /  AlkPhos  119  02-13          MICROBIOLOGY:  v  .Blood Blood-Peripheral  02-11-23   No growth to date.  --  --      Clean Catch Clean Catch (Midstream)  02-10-23   <10,000 CFU/mL Normal Urogenital Chantale  --  --      .Blood Blood-Peripheral  02-10-23   Growth in aerobic and anaerobic bottles: Escherichia coli  ***Blood Panel PCR results on this specimen are available  approximately 3 hours after the Gram stain result.***  Gram stain, PCR, and/or culture results may not always  correspond due to difference in methodologies.  ************************************************************  This PCR assay was performed by multiplex PCR. This  Assay tests for 66 bacterial and resistance gene targets.  Please refer to the [facility] test directory  at [email or web address] for details.  --  Blood Culture PCR  Escherichia coli      .Blood Blood-Peripheral  02-10-23   Growth in aerobic and anaerobic bottles: Escherichia coli  See previous culture  [id number]  --    Growth in aerobic bottle: Gram Negative Rods  Growth in anaerobic bottle: Gram Negative Rods    RADIOLOGY:    <The imaging below has been reviewed and visualized by me independently. Findings as detailed in report below>    < from: US Abdomen Upper Quadrant Right (02.13.23 @ 15:47) >  IMPRESSION:  Suggestion of mild hepatic steatosis as was mentioned on previous   ultrasound examination.    < end of copied text >

## 2023-02-14 ENCOUNTER — TRANSCRIPTION ENCOUNTER (OUTPATIENT)
Age: 68
End: 2023-02-14

## 2023-02-14 VITALS
OXYGEN SATURATION: 96 % | RESPIRATION RATE: 18 BRPM | DIASTOLIC BLOOD PRESSURE: 73 MMHG | TEMPERATURE: 98 F | SYSTOLIC BLOOD PRESSURE: 146 MMHG | HEART RATE: 63 BPM

## 2023-02-14 LAB
ALBUMIN SERPL ELPH-MCNC: 3.7 G/DL — SIGNIFICANT CHANGE UP (ref 3.3–5)
ALP SERPL-CCNC: 119 U/L — SIGNIFICANT CHANGE UP (ref 40–120)
ALT FLD-CCNC: 147 U/L — HIGH (ref 10–45)
ANION GAP SERPL CALC-SCNC: 10 MMOL/L — SIGNIFICANT CHANGE UP (ref 5–17)
AST SERPL-CCNC: 57 U/L — HIGH (ref 10–40)
BILIRUB SERPL-MCNC: 0.3 MG/DL — SIGNIFICANT CHANGE UP (ref 0.2–1.2)
BUN SERPL-MCNC: 17 MG/DL — SIGNIFICANT CHANGE UP (ref 7–23)
CALCIUM SERPL-MCNC: 8.8 MG/DL — SIGNIFICANT CHANGE UP (ref 8.4–10.5)
CHLORIDE SERPL-SCNC: 101 MMOL/L — SIGNIFICANT CHANGE UP (ref 96–108)
CO2 SERPL-SCNC: 25 MMOL/L — SIGNIFICANT CHANGE UP (ref 22–31)
CREAT SERPL-MCNC: 1.13 MG/DL — SIGNIFICANT CHANGE UP (ref 0.5–1.3)
EGFR: 71 ML/MIN/1.73M2 — SIGNIFICANT CHANGE UP
GLUCOSE BLDC GLUCOMTR-MCNC: 127 MG/DL — HIGH (ref 70–99)
GLUCOSE BLDC GLUCOMTR-MCNC: 128 MG/DL — HIGH (ref 70–99)
GLUCOSE SERPL-MCNC: 108 MG/DL — HIGH (ref 70–99)
HCT VFR BLD CALC: 34.4 % — LOW (ref 39–50)
HGB BLD-MCNC: 11.1 G/DL — LOW (ref 13–17)
MCHC RBC-ENTMCNC: 32.1 PG — SIGNIFICANT CHANGE UP (ref 27–34)
MCHC RBC-ENTMCNC: 32.3 GM/DL — SIGNIFICANT CHANGE UP (ref 32–36)
MCV RBC AUTO: 99.4 FL — SIGNIFICANT CHANGE UP (ref 80–100)
NRBC # BLD: 0 /100 WBCS — SIGNIFICANT CHANGE UP (ref 0–0)
PLATELET # BLD AUTO: 152 K/UL — SIGNIFICANT CHANGE UP (ref 150–400)
POTASSIUM SERPL-MCNC: 3.9 MMOL/L — SIGNIFICANT CHANGE UP (ref 3.5–5.3)
POTASSIUM SERPL-SCNC: 3.9 MMOL/L — SIGNIFICANT CHANGE UP (ref 3.5–5.3)
PROT SERPL-MCNC: 6.6 G/DL — SIGNIFICANT CHANGE UP (ref 6–8.3)
RBC # BLD: 3.46 M/UL — LOW (ref 4.2–5.8)
RBC # FLD: 12.3 % — SIGNIFICANT CHANGE UP (ref 10.3–14.5)
SODIUM SERPL-SCNC: 136 MMOL/L — SIGNIFICANT CHANGE UP (ref 135–145)
WBC # BLD: 4.68 K/UL — SIGNIFICANT CHANGE UP (ref 3.8–10.5)
WBC # FLD AUTO: 4.68 K/UL — SIGNIFICANT CHANGE UP (ref 3.8–10.5)

## 2023-02-14 PROCEDURE — 85025 COMPLETE CBC W/AUTO DIFF WBC: CPT

## 2023-02-14 PROCEDURE — 86803 HEPATITIS C AB TEST: CPT

## 2023-02-14 PROCEDURE — C1769: CPT

## 2023-02-14 PROCEDURE — 84100 ASSAY OF PHOSPHORUS: CPT

## 2023-02-14 PROCEDURE — 93458 L HRT ARTERY/VENTRICLE ANGIO: CPT

## 2023-02-14 PROCEDURE — 81001 URINALYSIS AUTO W/SCOPE: CPT

## 2023-02-14 PROCEDURE — 36415 COLL VENOUS BLD VENIPUNCTURE: CPT

## 2023-02-14 PROCEDURE — 84443 ASSAY THYROID STIM HORMONE: CPT

## 2023-02-14 PROCEDURE — 97161 PT EVAL LOW COMPLEX 20 MIN: CPT

## 2023-02-14 PROCEDURE — 94640 AIRWAY INHALATION TREATMENT: CPT

## 2023-02-14 PROCEDURE — 83036 HEMOGLOBIN GLYCOSYLATED A1C: CPT

## 2023-02-14 PROCEDURE — 83735 ASSAY OF MAGNESIUM: CPT

## 2023-02-14 PROCEDURE — C1887: CPT

## 2023-02-14 PROCEDURE — C1894: CPT

## 2023-02-14 PROCEDURE — 87040 BLOOD CULTURE FOR BACTERIA: CPT

## 2023-02-14 PROCEDURE — 85027 COMPLETE CBC AUTOMATED: CPT

## 2023-02-14 PROCEDURE — 80061 LIPID PANEL: CPT

## 2023-02-14 PROCEDURE — 82962 GLUCOSE BLOOD TEST: CPT

## 2023-02-14 PROCEDURE — 76705 ECHO EXAM OF ABDOMEN: CPT

## 2023-02-14 PROCEDURE — 99239 HOSP IP/OBS DSCHRG MGMT >30: CPT

## 2023-02-14 PROCEDURE — 80053 COMPREHEN METABOLIC PANEL: CPT

## 2023-02-14 RX ORDER — CEFPODOXIME PROXETIL 100 MG
1 TABLET ORAL
Qty: 22 | Refills: 0
Start: 2023-02-14 | End: 2023-02-24

## 2023-02-14 RX ORDER — CEFPODOXIME PROXETIL 100 MG
1 TABLET ORAL
Qty: 20 | Refills: 0
Start: 2023-02-14 | End: 2023-02-23

## 2023-02-14 RX ADMIN — Medication 1 SPRAY(S): at 06:06

## 2023-02-14 RX ADMIN — Medication 81 MILLIGRAM(S): at 12:51

## 2023-02-14 RX ADMIN — LEVETIRACETAM 750 MILLIGRAM(S): 250 TABLET, FILM COATED ORAL at 12:53

## 2023-02-14 RX ADMIN — Medication 40 MILLIGRAM(S): at 12:51

## 2023-02-14 RX ADMIN — Medication 25 MILLIGRAM(S): at 12:51

## 2023-02-14 RX ADMIN — Medication 1 SPRAY(S): at 16:08

## 2023-02-14 RX ADMIN — Medication 300 MILLIGRAM(S): at 12:54

## 2023-02-14 RX ADMIN — CEFTRIAXONE 100 MILLIGRAM(S): 500 INJECTION, POWDER, FOR SOLUTION INTRAMUSCULAR; INTRAVENOUS at 16:08

## 2023-02-14 RX ADMIN — PANTOPRAZOLE SODIUM 40 MILLIGRAM(S): 20 TABLET, DELAYED RELEASE ORAL at 06:06

## 2023-02-14 NOTE — DISCHARGE NOTE NURSING/CASE MANAGEMENT/SOCIAL WORK - PATIENT PORTAL LINK FT
You can access the FollowMyHealth Patient Portal offered by [facility] by registering at the following website: [email or web address]. By joining [employer]’s FollowMyHealth portal, you will also be able to view your health information using other applications (apps) compatible with our system.

## 2023-02-14 NOTE — DISCHARGE NOTE NURSING/CASE MANAGEMENT/SOCIAL WORK - NSDCPEFALRISK_GEN_ALL_CORE
For information on Fall & Injury Prevention, visit: [email or web address] OR  [email or web address] OR  https://www.cdc.gov/steadi/patient.html

## 2023-02-16 LAB
CULTURE RESULTS: SIGNIFICANT CHANGE UP
CULTURE RESULTS: SIGNIFICANT CHANGE UP
SPECIMEN SOURCE: SIGNIFICANT CHANGE UP
SPECIMEN SOURCE: SIGNIFICANT CHANGE UP

## 2023-02-27 LAB
ALBUMIN SERPL ELPH-MCNC: 4.4 G/DL
ALP BLD-CCNC: 149 U/L
ALT SERPL-CCNC: 34 U/L
ANION GAP SERPL CALC-SCNC: 13 MMOL/L
AST SERPL-CCNC: 27 U/L
BASOPHILS # BLD AUTO: 0.03 K/UL
BASOPHILS NFR BLD AUTO: 0.7 %
BILIRUB SERPL-MCNC: 0.3 MG/DL
BUN SERPL-MCNC: 14 MG/DL
CALCIUM SERPL-MCNC: 8.9 MG/DL
CHLORIDE SERPL-SCNC: 95 MMOL/L
CO2 SERPL-SCNC: 24 MMOL/L
CREAT SERPL-MCNC: 1.04 MG/DL
EGFR: 78 ML/MIN/1.73M2
EOSINOPHIL # BLD AUTO: 0.15 K/UL
EOSINOPHIL NFR BLD AUTO: 3.7 %
FSH SERPL-MCNC: 54.2 IU/L
GLUCOSE SERPL-MCNC: 106 MG/DL
HCT VFR BLD CALC: 34.2 %
HGB BLD-MCNC: 11.8 G/DL
IMM GRANULOCYTES NFR BLD AUTO: 0.2 %
LH SERPL-ACNC: 27 IU/L
LYMPHOCYTES # BLD AUTO: 1.67 K/UL
LYMPHOCYTES NFR BLD AUTO: 41.5 %
MAN DIFF?: NORMAL
MCHC RBC-ENTMCNC: 33.3 PG
MCHC RBC-ENTMCNC: 34.5 GM/DL
MCV RBC AUTO: 96.6 FL
MONOCYTES # BLD AUTO: 0.5 K/UL
MONOCYTES NFR BLD AUTO: 12.4 %
NEUTROPHILS # BLD AUTO: 1.66 K/UL
NEUTROPHILS NFR BLD AUTO: 41.5 %
PLATELET # BLD AUTO: 255 K/UL
POTASSIUM SERPL-SCNC: 3.9 MMOL/L
PROT SERPL-MCNC: 7 G/DL
RBC # BLD: 3.54 M/UL
RBC # FLD: 12.8 %
SODIUM SERPL-SCNC: 133 MMOL/L
WBC # FLD AUTO: 4.02 K/UL

## 2023-03-06 LAB
SHBG-ESOTERIX: 96.9 NMOL/L
TESTOST FREE SERPL-MCNC: 0.2 PG/ML
TESTOST FREE SERPL-MCNC: 0.3 PG/ML
TESTOST SERPL-MCNC: 3.6 NG/DL
TESTOST SERPL-MCNC: <2.5 NG/DL

## 2023-03-17 ENCOUNTER — APPOINTMENT (OUTPATIENT)
Dept: ENDOCRINOLOGY | Facility: CLINIC | Age: 68
End: 2023-03-17
Payer: MEDICARE

## 2023-03-17 PROCEDURE — 99214 OFFICE O/P EST MOD 30 MIN: CPT | Mod: 95

## 2023-03-17 NOTE — ASSESSMENT
[FreeTextEntry1] : Target - Free Testosterone level in the mid normal range \par The SHBG level in Feb 2023 was elevated so I will monitor his free testosterone level.\par \par This patient has primary hypogonadism based on labs from Feb/March 2023 - in Jan 2023 his serum ferritin was normal. Of note, the patient has a history of childhood testicular disorder.\par \par I ordered a testicular US to evaluate for testicular masses.\par \par I prescribed testosterone replacement therapy on 03/17/2023.\par \par \par Plan:\par 1. Start androgel 2 pumps topically daily\par 2. Testicular US\par 3. Labs to be done in 4 weeks - CBC, CMP, Free Testosterone level\par 4. Follow up in 4 weeks to review results - telehealth visit ok

## 2023-03-17 NOTE — REASON FOR VISIT
[Home] : at home, [unfilled] , at the time of the visit. [Medical Office: ([facility])___] : at the medical office located in  [[employer] ] : provided by [employer]   [Patient] : the patient [Interpreters_IDNumber] : [id number]

## 2023-03-17 NOTE — HISTORY OF PRESENT ILLNESS
[FreeTextEntry1] : Problems:\par 1. Primary hypogonadism\par 2. Prediabetes - defer management to PCP\par 3. Osteoporosis - defer management to PCP\par \par Primary hypogonadism\par 1. Patient was diagnosed with hypogonadism in 2020 - he was treated with testosterone gels and injection in the past \par 2. Labs:\par 2015 - Total testosterone level 332.8\par 12/24/2018 - Total testosterone level 271.2 \par 01/05/2023 - Total testosterone level 5.2 (193 to 740), TSH 1.59 N, Free T4 - 0.9 N, HbA1c 6.1%, PSA < 0.01, Cr 1.28 N, AST 38 N,  (10 to 45), serum ferritin 182 N\par 02/27/2023 - AST N, ALT N, FSH 54.2 (1.5 to 12.4), LH 27 (1.7 to 8.6), Free testosterone level 0.3 (5.9 to 27), Total Testosterone level 3.6 ng/dl, SHBG level 96.9 (19.3 to 76.4), Hb 11.8 ((13 to 17), MCV 96.6 N\par 03/01/2023 - 7:23 am - Total testosterone level < 2.5 ng/dl, Free Testosterone level - 0.2 (5.9 to 27), \par 3. 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 Patient does not have children\par 5. Patient has osteoporosis, 02/09/2023 - BMI 26.63\par 6. Meds:\par I prescribed testosterone replacement on 03/17/2023

## 2023-03-17 NOTE — PHYSICAL EXAM
[de-identified] : General: No distress, well nourished\par Eyes: Normal external appearance\par ENT: Normal appearance of the nose\par Neck/Thyroid: No visible neck swelling\par Respiratory: No use of accessory muscles of respiration\par Psychiatry: Patient converses normally, good judgement and insight\par Skin: No rashes seen on face

## 2023-03-30 ENCOUNTER — APPOINTMENT (OUTPATIENT)
Dept: GASTROENTEROLOGY | Facility: HOSPITAL | Age: 68
End: 2023-03-30

## 2023-03-30 ENCOUNTER — OUTPATIENT (OUTPATIENT)
Dept: OUTPATIENT SERVICES | Facility: HOSPITAL | Age: 68
LOS: 1 days | End: 2023-03-30
Payer: MEDICARE

## 2023-03-30 DIAGNOSIS — Z12.11 ENCOUNTER FOR SCREENING FOR MALIGNANT NEOPLASM OF COLON: ICD-10-CM

## 2023-03-30 DIAGNOSIS — Z98.89 OTHER SPECIFIED POSTPROCEDURAL STATES: Chronic | ICD-10-CM

## 2023-03-30 DIAGNOSIS — Z98.49 CATARACT EXTRACTION STATUS, UNSPECIFIED EYE: Chronic | ICD-10-CM

## 2023-03-30 DIAGNOSIS — K29.60 OTHER GASTRITIS WITHOUT BLEEDING: ICD-10-CM

## 2023-03-30 DIAGNOSIS — R10.13 EPIGASTRIC PAIN: ICD-10-CM

## 2023-03-30 DIAGNOSIS — Z98.890 OTHER SPECIFIED POSTPROCEDURAL STATES: Chronic | ICD-10-CM

## 2023-03-30 DIAGNOSIS — Z95.0 PRESENCE OF CARDIAC PACEMAKER: Chronic | ICD-10-CM

## 2023-03-30 LAB
GLUCOSE BLDC GLUCOMTR-MCNC: 101 MG/DL — HIGH (ref 70–99)
GLUCOSE BLDC GLUCOMTR-MCNC: 65 MG/DL — LOW (ref 70–99)

## 2023-03-30 PROCEDURE — 45378 DIAGNOSTIC COLONOSCOPY: CPT

## 2023-03-30 PROCEDURE — G0121: CPT

## 2023-03-30 PROCEDURE — 82962 GLUCOSE BLOOD TEST: CPT

## 2023-03-30 RX ORDER — SODIUM CHLORIDE 9 MG/ML
500 INJECTION INTRAMUSCULAR; INTRAVENOUS; SUBCUTANEOUS
Refills: 0 | Status: COMPLETED | OUTPATIENT
Start: 2023-03-30 | End: 2023-03-30

## 2023-03-30 RX ADMIN — SODIUM CHLORIDE 75 MILLILITER(S): 9 INJECTION INTRAMUSCULAR; INTRAVENOUS; SUBCUTANEOUS at 15:35

## 2023-05-10 ENCOUNTER — APPOINTMENT (OUTPATIENT)
Dept: CARDIOLOGY | Facility: CLINIC | Age: 68
End: 2023-05-10
Payer: MEDICARE

## 2023-05-10 ENCOUNTER — NON-APPOINTMENT (OUTPATIENT)
Age: 68
End: 2023-05-10

## 2023-05-10 VITALS — DIASTOLIC BLOOD PRESSURE: 74 MMHG | HEART RATE: 72 BPM | SYSTOLIC BLOOD PRESSURE: 110 MMHG

## 2023-05-10 VITALS — BODY MASS INDEX: 26.33 KG/M2 | WEIGHT: 158 LBS | OXYGEN SATURATION: 95 % | HEART RATE: 61 BPM | HEIGHT: 65 IN

## 2023-05-10 PROCEDURE — 93000 ELECTROCARDIOGRAM COMPLETE: CPT | Mod: 59

## 2023-05-10 PROCEDURE — 93288 INTERROG EVL PM/LDLS PM IP: CPT

## 2023-05-10 PROCEDURE — 99213 OFFICE O/P EST LOW 20 MIN: CPT | Mod: 25

## 2023-05-10 NOTE — PHYSICAL EXAM
[Well Developed] : well developed [Well Nourished] : well nourished [Normal Conjunctiva] : normal conjunctiva [No Acute Distress] : no acute distress [Normal Venous Pressure] : normal venous pressure [No Carotid Bruit] : no carotid bruit [Normal S1, S2] : normal S1, S2 [No Murmur] : no murmur [No Rub] : no rub [No Gallop] : no gallop [Clear Lung Fields] : clear lung fields [Good Air Entry] : good air entry [No Respiratory Distress] : no respiratory distress  [Soft] : abdomen soft [Non Tender] : non-tender [No Masses/organomegaly] : no masses/organomegaly [Normal Bowel Sounds] : normal bowel sounds [Normal Gait] : normal gait [No Edema] : no edema [No Cyanosis] : no cyanosis [No Clubbing] : no clubbing [No Varicosities] : no varicosities [No Rash] : no rash [No Skin Lesions] : no skin lesions [Moves all extremities] : moves all extremities [No Focal Deficits] : no focal deficits [Normal Speech] : normal speech [Alert and Oriented] : alert and oriented [Normal memory] : normal memory follow up with PMD/ signs and symptoms to return to ED/ Benadryl Q6H

## 2023-05-10 NOTE — PROCEDURE
[Voltage: ___ volts] : Voltage was [unfilled] volts [Longevity: ___ months] : The estimated remaining battery life is [unfilled] months [Lead Imp:  ___ohms] : lead impedance was [unfilled] ohms [Sensing Amplitude ___mv] : sensing amplitude was [unfilled] mv [___V @] : [unfilled] V [___ ms] : [unfilled] ms [Asense-Vsense ___ %] : Asense-Vsense [unfilled]% [Apace-Vsense ___ %] : Apace-Vsense [unfilled]% [de-identified] : MEDTRONIC [de-identified] : ADVISA  MRI [id number] [de-identified] : 12/23/2016 [de-identified] : AAIR-DDDR [de-identified] :  [de-identified] : NO EVENTS\par NORMAL FUNCTION

## 2023-05-10 NOTE — REASON FOR VISIT
[Other: ____] : [unfilled] [FreeTextEntry1] : Patient returns for followup. Feeling well. Offers no complaints of chest discomfort shortness of breath palpitations dizziness or syncope.  She was last seen here he was hospitalized and ultimately underwent cardiac catheterization which revealed no significant disease.  He states there have been no changes in his medication.\par

## 2023-05-23 LAB
ALBUMIN SERPL ELPH-MCNC: 4.5 G/DL
ALP BLD-CCNC: 146 U/L
ALT SERPL-CCNC: 38 U/L
ANION GAP SERPL CALC-SCNC: 11 MMOL/L
AST SERPL-CCNC: 32 U/L
BILIRUB SERPL-MCNC: 0.2 MG/DL
BUN SERPL-MCNC: 20 MG/DL
CALCIUM SERPL-MCNC: 9.5 MG/DL
CHLORIDE SERPL-SCNC: 97 MMOL/L
CO2 SERPL-SCNC: 28 MMOL/L
CREAT SERPL-MCNC: 1.12 MG/DL
EGFR: 72 ML/MIN/1.73M2
GLUCOSE SERPL-MCNC: 111 MG/DL
POTASSIUM SERPL-SCNC: 4.5 MMOL/L
PROT SERPL-MCNC: 7 G/DL
SODIUM SERPL-SCNC: 135 MMOL/L

## 2023-05-24 LAB
TESTOST FREE SERPL-MCNC: 0.9 PG/ML
TESTOST SERPL-MCNC: 235 NG/DL

## 2023-05-30 ENCOUNTER — APPOINTMENT (OUTPATIENT)
Dept: ULTRASOUND IMAGING | Facility: HOSPITAL | Age: 68
End: 2023-05-30
Payer: MEDICARE

## 2023-05-30 ENCOUNTER — OUTPATIENT (OUTPATIENT)
Dept: OUTPATIENT SERVICES | Facility: HOSPITAL | Age: 68
LOS: 1 days | End: 2023-05-30
Payer: MEDICARE

## 2023-05-30 DIAGNOSIS — Z95.0 PRESENCE OF CARDIAC PACEMAKER: Chronic | ICD-10-CM

## 2023-05-30 DIAGNOSIS — Z98.890 OTHER SPECIFIED POSTPROCEDURAL STATES: Chronic | ICD-10-CM

## 2023-05-30 DIAGNOSIS — E29.1 TESTICULAR HYPOFUNCTION: ICD-10-CM

## 2023-05-30 DIAGNOSIS — Z98.49 CATARACT EXTRACTION STATUS, UNSPECIFIED EYE: Chronic | ICD-10-CM

## 2023-05-30 PROCEDURE — 76870 US EXAM SCROTUM: CPT

## 2023-05-30 PROCEDURE — 76870 US EXAM SCROTUM: CPT | Mod: 26

## 2023-06-20 ENCOUNTER — APPOINTMENT (OUTPATIENT)
Dept: ENDOCRINOLOGY | Facility: CLINIC | Age: 68
End: 2023-06-20
Payer: MEDICARE

## 2023-06-20 VITALS
BODY MASS INDEX: 25.99 KG/M2 | TEMPERATURE: 97.1 F | DIASTOLIC BLOOD PRESSURE: 70 MMHG | HEIGHT: 65 IN | OXYGEN SATURATION: 97 % | SYSTOLIC BLOOD PRESSURE: 110 MMHG | HEART RATE: 85 BPM | WEIGHT: 156 LBS

## 2023-06-20 DIAGNOSIS — Q53.112 UNILATERAL INGUINAL TESTIS: ICD-10-CM

## 2023-06-20 PROCEDURE — 99214 OFFICE O/P EST MOD 30 MIN: CPT

## 2023-06-20 NOTE — REVIEW OF SYSTEMS
[FreeTextEntry2] : Constitutional: No Fever\par Eyes: No visual difficulty\par ENT: no difficulty hearing\par Cardiovascular: No palpitations\par Respiratory: No Cough\par Gastrointestinal: No nausea\par Genitourinary: No dysuria\par Musculoskeletal: No joint pain\par Neurological: No headaches\par Psychiatry: No sleep abnormalities\par Skin: No rashes\par Hematology: No bleeding 
normal

## 2023-06-20 NOTE — PHYSICAL EXAM
[de-identified] : General: No distress, well nourished\par Eyes: Normal Sclera, EOMI, PERRL\par ENT: Normal appearance of the nose, normal oropharynx\par Neck/Thyroid: No cervical lymphadenopathy, thyroid gland 20 g in size, no thyroid nodules, non-tender\par Respiratory: No use of accessory muscles of respiration, vesicular breath sounds heard bilaterally, no crepitations or ronchi\par Cardiovascular: S1 and S2 heard and normal, no S3 or S4, no murmurs, radial pulse normal bilaterally\par Abdomen: soft, non-tender, no masses, normal bowel sounds\par Musculoskeletal: No swelling or deformities of joints of hands, no pedal edema\par Neurological: Normal range of motion in the hands, Normal brachioradialis reflexes bilaterally\par Psychiatry: Patient converses normally, good judgement and insight\par Skin: No rashes in hands, no nodules palpated in hands

## 2023-06-20 NOTE — ASSESSMENT
[FreeTextEntry1] : Target - Free Testosterone level in the mid normal range \par The SHBG level in Feb 2023 was elevated so I will monitor his free testosterone level.\par \par Last free testosterone level was below goal so I increased the dose of testosterone. \par \par This patient has primary hypogonadism based on labs from Feb/March 2023 - in Jan 2023 his serum ferritin was normal. Of note, the patient has a history of childhood testicular disorder.\par \par Testicular US done in May 2023 revealed  a right inguinal testis and a 3 mm hyperechoic lesion in the right testis of uncertain etiology. I referred the patient to Urology for further evaluation of this. \par \par \par \par Plan:\par 1. Increase androgel to 4 pumps topically daily\par 2. Refer to Urology re abnormal testicular US\par 3. Labs to be done in 4 weeks - CBC, CMP, Free Testosterone level\par 4. Follow up in 4 weeks to review results - telehealth visit ok

## 2023-06-20 NOTE — HISTORY OF PRESENT ILLNESS
[FreeTextEntry1] : Problems:\par 1. Primary hypogonadism\par 2. Prediabetes - defer management to PCP\par 3. Osteoporosis - defer management to PCP\par \par Primary hypogonadism\par 1. Patient was diagnosed with hypogonadism in 2020 - he was treated with testosterone gels and injection in the past \par 2. Labs:\par 2015 - Total testosterone level 332.8\par 12/24/2018 - Total testosterone level 271.2 \par 01/05/2023 - Total testosterone level 5.2 (193 to 740), TSH 1.59 N, Free T4 - 0.9 N, HbA1c 6.1%, PSA < 0.01, Cr 1.28 N, AST 38 N,  (10 to 45), serum ferritin 182 N\par 02/27/2023 - AST N, ALT N, FSH 54.2 (1.5 to 12.4), LH 27 (1.7 to 8.6), Free testosterone level 0.3 (5.9 to 27), Total Testosterone level 3.6 ng/dl, SHBG level 96.9 (19.3 to 76.4), Hb 11.8 ((13 to 17), MCV 96.6 N\par 03/01/2023 - 7:23 am - Total testosterone level < 2.5 ng/dl, Free Testosterone level - 0.2 (5.9 to 27), \par 3. Radiology:\par 05/30/2023 - US testes - There is a right inguinal testis. There is a 3 mm hyperechoic lesion in the right testis of uncertain etiology. \par 4. No personal or family history of prostate disorder or prostate cancer, no obstructive sleep apnea, no congestive heart failure\par Patient says he had abnormal testicular development as a child, patient's ALT was elevated in Jan 2023 at 100 (10 to 45), with normal AST\par 5. Patient does not have children\par 6. Patient has osteoporosis, 02/09/2023 - BMI 26.63\par 7. Meds:\par I prescribed testosterone replacement on 03/17/2023\par Androgel 2 pump daily

## 2023-06-28 ENCOUNTER — APPOINTMENT (OUTPATIENT)
Dept: ENDOCRINOLOGY | Facility: CLINIC | Age: 68
End: 2023-06-28

## 2023-07-12 LAB
ALBUMIN SERPL ELPH-MCNC: 4.3 G/DL
ALP BLD-CCNC: 133 U/L
ALT SERPL-CCNC: 38 U/L
ANION GAP SERPL CALC-SCNC: 12 MMOL/L
AST SERPL-CCNC: 27 U/L
BILIRUB SERPL-MCNC: 0.2 MG/DL
BUN SERPL-MCNC: 13 MG/DL
CALCIUM SERPL-MCNC: 8.8 MG/DL
CHLORIDE SERPL-SCNC: 95 MMOL/L
CO2 SERPL-SCNC: 22 MMOL/L
CREAT SERPL-MCNC: 1.08 MG/DL
EGFR: 75 ML/MIN/1.73M2
GLUCOSE SERPL-MCNC: 99 MG/DL
POTASSIUM SERPL-SCNC: 4.5 MMOL/L
PROT SERPL-MCNC: 6.4 G/DL
PSA SERPL-MCNC: 0.05 NG/ML
SODIUM SERPL-SCNC: 129 MMOL/L

## 2023-07-13 LAB
TESTOST FREE SERPL-MCNC: 4.9 PG/ML
TESTOST SERPL-MCNC: 529 NG/DL

## 2023-07-18 ENCOUNTER — APPOINTMENT (OUTPATIENT)
Dept: UROLOGY | Facility: CLINIC | Age: 68
End: 2023-07-18
Payer: MEDICARE

## 2023-07-18 VITALS
TEMPERATURE: 97.2 F | HEIGHT: 65 IN | OXYGEN SATURATION: 98 % | DIASTOLIC BLOOD PRESSURE: 68 MMHG | BODY MASS INDEX: 25.99 KG/M2 | WEIGHT: 156 LBS | SYSTOLIC BLOOD PRESSURE: 112 MMHG | HEART RATE: 86 BPM

## 2023-07-18 DIAGNOSIS — N50.9 DISORDER OF MALE GENITAL ORGANS, UNSPECIFIED: ICD-10-CM

## 2023-07-18 LAB
% FREE TESTOSTERONE - ESO: 1 %
FREE TESTOSTERONE - ESO: 43 PG/ML
SHBG-ESOTERIX: 71 NMOL/L
TESTOSTERONE SERUM - ESO: 425 NG/DL

## 2023-07-18 PROCEDURE — 99204 OFFICE O/P NEW MOD 45 MIN: CPT

## 2023-07-18 NOTE — HISTORY OF PRESENT ILLNESS
[FreeTextEntry1] : Mr. ISLAS is a 67 year  White  M who comes today to clinic referred by endocrinologist office for right testicular lesion. History of primary hypogonadism with undescended testes. US: Right inguinal testis. 3 mm hyperechoic lesion is seen within the right testis of uncertain etiology.\par \par Denies LUTS, fevers, chills, hematuria, flank pain. He has never smoked. \par No family history of Prostate cancer.\par

## 2023-07-18 NOTE — PHYSICAL EXAM
[General Appearance - Well Developed] : well developed [General Appearance - Well Nourished] : well nourished [Normal Appearance] : normal appearance [Well Groomed] : well groomed [General Appearance - In No Acute Distress] : no acute distress [Edema] : no peripheral edema [Respiration, Rhythm And Depth] : normal respiratory rhythm and effort [Exaggerated Use Of Accessory Muscles For Inspiration] : no accessory muscle use [Abdomen Soft] : soft [Abdomen Tenderness] : non-tender [Costovertebral Angle Tenderness] : no ~M costovertebral angle tenderness [Urethral Meatus] : meatus normal [Urinary Bladder Findings] : the bladder was normal on palpation [Testes Mass (___cm)] : there were no testicular masses [FreeTextEntry1] : non-palpable testicles bilateral [Normal Station and Gait] : the gait and station were normal for the patient's age [] : no rash [No Focal Deficits] : no focal deficits [Oriented To Time, Place, And Person] : oriented to person, place, and time [Affect] : the affect was normal [Mood] : the mood was normal [Not Anxious] : not anxious [No Palpable Adenopathy] : no palpable adenopathy

## 2023-07-18 NOTE — ASSESSMENT
[FreeTextEntry1] : Mr. ISLAS is a 67 year  White  M who comes today to clinic referred by endocrinologist office for right testicular lesion. History of primary hypogonadism with undescended testes. US: Right inguinal testis. 3 mm hyperechoic lesion is seen within the right testis of uncertain etiology. Discussed with the patient the need for right radical orchiectomy given increased risk of malignancy. Discussed most common complications Bleeding, Infection, Wound dehiscence. After understanding these risks and benefits the patient opted to proceed with surgery.\par \par POSTOPERATIVE CARE\par \par Dressings and wound care - The circumcision incision is covered by either a petrolatum-based (Xeroform) or a paraffin-based (Bactigras) dressing. This is kept on for 48 to 72 hours or until the dressing falls off on its own. Once the dressing is removed, topical antibiotic ointment may be applied a few times a day until the incision is completely healed.\par \par Patients may continue to shower daily and should gently pat the penis dry, until the incision is healed. Patients should avoid swimming and soaking in hot tubs until the incision is completely healed to prevent wound infections.\par \par Recovery and healing - Healing occurs over three to four weeks, which is usually the time it takes for the sutures to resorb. Light activities are recommended for the first 48 to 72 hours, after which normal activities may be resumed, including work and school. Sexual activity may resume once the sutures are completely resorbed.\par \par \par \par \par

## 2023-07-19 LAB
AFP-TM SERPL-MCNC: <1.8 NG/ML
ALBUMIN SERPL ELPH-MCNC: 4.4 G/DL
ALP BLD-CCNC: 144 U/L
ALT SERPL-CCNC: 32 U/L
ANION GAP SERPL CALC-SCNC: 11 MMOL/L
APTT BLD: 33.4 SEC
AST SERPL-CCNC: 30 U/L
BILIRUB SERPL-MCNC: <0.2 MG/DL
BUN SERPL-MCNC: 15 MG/DL
CALCIUM SERPL-MCNC: 8.8 MG/DL
CHLORIDE SERPL-SCNC: 95 MMOL/L
CO2 SERPL-SCNC: 24 MMOL/L
CREAT SERPL-MCNC: 1.1 MG/DL
EGFR: 73 ML/MIN/1.73M2
ESTIMATED AVERAGE GLUCOSE: 120 MG/DL
GLUCOSE SERPL-MCNC: 96 MG/DL
HBA1C MFR BLD HPLC: 5.8 %
HCG-TM SERPL-MCNC: <1 MIU/ML
INR PPP: 0.97 RATIO
LDH SERPL-CCNC: 190 U/L
POTASSIUM SERPL-SCNC: 4.5 MMOL/L
PROT SERPL-MCNC: 7 G/DL
PT BLD: 11.3 SEC
SODIUM SERPL-SCNC: 129 MMOL/L

## 2023-07-20 ENCOUNTER — APPOINTMENT (OUTPATIENT)
Dept: ENDOCRINOLOGY | Facility: CLINIC | Age: 68
End: 2023-07-20
Payer: MEDICARE

## 2023-07-20 PROCEDURE — 99214 OFFICE O/P EST MOD 30 MIN: CPT | Mod: 95

## 2023-07-20 NOTE — PHYSICAL EXAM
[de-identified] : General: No distress, well nourished\par Eyes: Normal external appearance\par ENT: Normal appearance of the nose\par Neck/Thyroid: No visible neck swelling\par Respiratory: No use of accessory muscles of respiration\par Psychiatry: Patient converses normally, good judgement and insight\par Skin: No rashes seen on face

## 2023-07-20 NOTE — REASON FOR VISIT
[Home] : at home, [unfilled] , at the time of the visit. [Medical Office: ([facility])___] : at the medical office located in  [Patient] : the patient [Follow - Up] : a follow-up visit [Hypogonadism] : hypogonadism [[employer] ] : provided by [employer]   [Interpreters_IDNumber] : [id number]

## 2023-07-20 NOTE — HISTORY OF PRESENT ILLNESS
[FreeTextEntry1] : Problems:\par 1. Primary hypogonadism\par 2. Prediabetes - defer management to PCP\par 3. Osteoporosis - defer management to PCP\par \par Primary hypogonadism\par 1. Patient was diagnosed with hypogonadism in 2020 - he was treated with testosterone gels and injection in the past \par 2. Labs:\par 2015 - Total testosterone level 332.8\par 12/24/2018 - Total testosterone level 271.2 \par 01/05/2023 - Total testosterone level 5.2 (193 to 740), TSH 1.59 N, Free T4 - 0.9 N, HbA1c 6.1%, PSA < 0.01, Cr 1.28 N, AST 38 N,  (10 to 45), serum ferritin 182 N\par 02/27/2023 - AST N, ALT N, FSH 54.2 (1.5 to 12.4), LH 27 (1.7 to 8.6), Free testosterone level 0.3 (5.9 to 27), Total Testosterone level 3.6 ng/dl, SHBG level 96.9 (19.3 to 76.4), Hb 11.8 ((13 to 17), MCV 96.6 N\par 03/01/2023 - 7:23 am - Total testosterone level < 2.5 ng/dl, Free Testosterone level - 0.2 (5.9 to 27), \par 3. Radiology:\par 05/30/2023 - US testes - There is a right inguinal testis. There is a 3 mm hyperechoic lesion in the right testis of uncertain etiology. PATIENT FOLLOWED UP WITH UROLOGY FOR THIS.  \par 4. No personal or family history of prostate disorder or prostate cancer, no obstructive sleep apnea, no congestive heart failure\par Patient says he had abnormal testicular development as a child, patient's ALT was elevated in Jan 2023 at 100 (10 to 45), with normal AST\par 5. Patient does not have children\par 6. Patient has osteoporosis, 02/09/2023 - BMI 26.63\par 7. Meds:\par I prescribed testosterone replacement on 03/17/2023\par Current Meds: Androgel 4 pumps daily

## 2023-07-20 NOTE — ASSESSMENT
[FreeTextEntry1] : Target - Free Testosterone level in the mid normal range \par The SHBG level in Feb 2023 was elevated so I will monitor his free testosterone level.\par \par Last free testosterone level was below goal so I increased the dose of testosterone. \par \par This patient has primary hypogonadism based on labs from Feb/March 2023 - in Jan 2023 his serum ferritin was normal. Of note, the patient has a history of childhood testicular disorder.\par \par Testicular US done in May 2023 revealed a right inguinal testis and a 3 mm hyperechoic lesion in the right testis of uncertain etiology. He follows up with Urology for this. \par \par \par Plan:\par 1. Increase androgel to 6 pumps topically daily\par 2. Labs to be done in 4 weeks - CBC, CMP, Free Testosterone level\par 3. Follow up in 4 weeks to review results - patient requires an  so in-person visit is preferred.

## 2023-07-24 LAB
ALP BLD-CCNC: 151 IU/L
ALP BONE CFR SERPL: 12 %
ALP INTEST CFR SERPL: 40 %
ALP LIVER CFR SERPL: 48 %

## 2023-08-08 ENCOUNTER — APPOINTMENT (OUTPATIENT)
Dept: UROLOGY | Facility: HOSPITAL | Age: 68
End: 2023-08-08

## 2023-08-23 ENCOUNTER — LABORATORY RESULT (OUTPATIENT)
Age: 68
End: 2023-08-23

## 2023-08-24 LAB
ALBUMIN SERPL ELPH-MCNC: 4.2 G/DL
ALP BLD-CCNC: 107 U/L
ALT SERPL-CCNC: 34 U/L
ANION GAP SERPL CALC-SCNC: 12 MMOL/L
AST SERPL-CCNC: 27 U/L
BILIRUB SERPL-MCNC: 0.2 MG/DL
BUN SERPL-MCNC: 13 MG/DL
CALCIUM SERPL-MCNC: 8.6 MG/DL
CHLORIDE SERPL-SCNC: 93 MMOL/L
CO2 SERPL-SCNC: 24 MMOL/L
CREAT SERPL-MCNC: 1.13 MG/DL
EGFR: 71 ML/MIN/1.73M2
GLUCOSE SERPL-MCNC: 109 MG/DL
POTASSIUM SERPL-SCNC: 3.8 MMOL/L
PROT SERPL-MCNC: 6.5 G/DL
SODIUM SERPL-SCNC: 129 MMOL/L

## 2023-08-25 LAB
TESTOST FREE SERPL-MCNC: 7.6 PG/ML
TESTOST SERPL-MCNC: 731 NG/DL

## 2023-08-28 ENCOUNTER — APPOINTMENT (OUTPATIENT)
Dept: CT IMAGING | Facility: HOSPITAL | Age: 68
End: 2023-08-28
Payer: MEDICARE

## 2023-08-28 ENCOUNTER — OUTPATIENT (OUTPATIENT)
Dept: OUTPATIENT SERVICES | Facility: HOSPITAL | Age: 68
LOS: 1 days | End: 2023-08-28
Payer: MEDICARE

## 2023-08-28 DIAGNOSIS — Z95.0 PRESENCE OF CARDIAC PACEMAKER: Chronic | ICD-10-CM

## 2023-08-28 DIAGNOSIS — Z98.890 OTHER SPECIFIED POSTPROCEDURAL STATES: Chronic | ICD-10-CM

## 2023-08-28 DIAGNOSIS — Z98.89 OTHER SPECIFIED POSTPROCEDURAL STATES: Chronic | ICD-10-CM

## 2023-08-28 DIAGNOSIS — Z00.8 ENCOUNTER FOR OTHER GENERAL EXAMINATION: ICD-10-CM

## 2023-08-28 DIAGNOSIS — Z98.49 CATARACT EXTRACTION STATUS, UNSPECIFIED EYE: Chronic | ICD-10-CM

## 2023-08-28 PROCEDURE — 70450 CT HEAD/BRAIN W/O DYE: CPT

## 2023-08-28 PROCEDURE — 70450 CT HEAD/BRAIN W/O DYE: CPT | Mod: 26

## 2023-08-31 ENCOUNTER — APPOINTMENT (OUTPATIENT)
Dept: ENDOCRINOLOGY | Facility: CLINIC | Age: 68
End: 2023-08-31
Payer: MEDICARE

## 2023-08-31 VITALS
DIASTOLIC BLOOD PRESSURE: 70 MMHG | WEIGHT: 156 LBS | SYSTOLIC BLOOD PRESSURE: 122 MMHG | TEMPERATURE: 96.8 F | HEART RATE: 75 BPM | BODY MASS INDEX: 25.99 KG/M2 | RESPIRATION RATE: 16 BRPM | OXYGEN SATURATION: 98 % | HEIGHT: 65 IN

## 2023-08-31 PROCEDURE — 99214 OFFICE O/P EST MOD 30 MIN: CPT

## 2023-08-31 NOTE — ASSESSMENT
[FreeTextEntry1] : Target - Free Testosterone level in the mid normal range  The SHBG level in Feb 2023 was elevated so I will monitor his free testosterone level.  Last free testosterone level IN AUGUST 2023 was at goal.   This patient has primary hypogonadism based on labs from Feb/March 2023 - in Jan 2023 his serum ferritin was normal. Of note, the patient has a history of childhood testicular disorder.  Testicular US done in May 2023 revealed a right inguinal testis and a 3 mm hyperechoic lesion in the right testis of uncertain etiology. He followed up with Urology for this.  LABS FROM AUGUST 2023 REVEALED HYPONATREMIA AND PATIENT SAYS HE DRINKS A LOT OF FLUID  - I ASKED HIM TO DECREASE HIS FLUID INTAKE   Plan: 1. Continue androgel 5 pumps topically daily 2. Labs to be done in 4 weeks - CBC, CMP, free testosterone level 3. Follow up in 4 weeks - patient requires an  so in-person visit is preferred.

## 2023-08-31 NOTE — REASON FOR VISIT
[Follow - Up] : a follow-up visit [Hypogonadism] : hypogonadism [[employer] ] : provided by [employer]   [Interpreters_IDNumber] : [id number]

## 2023-08-31 NOTE — HISTORY OF PRESENT ILLNESS
[FreeTextEntry1] : Problems: 1. Primary hypogonadism 2. Prediabetes - defer management to PCP 3. Osteoporosis - defer management to PCP  Primary hypogonadism 1. Patient was diagnosed with hypogonadism in 2020 - he was treated with testosterone gels and injection in the past  2. Labs: 2015 - Total testosterone level 332.8 12/24/2018 - Total testosterone level 271.2  01/05/2023 - Total testosterone level 5.2 (193 to 740), TSH 1.59 N, Free T4 - 0.9 N, HbA1c 6.1%, PSA < 0.01, Cr 1.28 N, AST 38 N,  (10 to 45), serum ferritin 182 N 02/27/2023 - AST N, ALT N, FSH 54.2 (1.5 to 12.4), LH 27 (1.7 to 8.6), Free testosterone level 0.3 (5.9 to 27), Total Testosterone level 3.6 ng/dl, SHBG level 96.9 (19.3 to 76.4), Hb 11.8 ((13 to 17), MCV 96.6 N 03/01/2023 - 7:23 am - Total testosterone level < 2.5 ng/dl, Free Testosterone level - 0.2 (5.9 to 27),  3. Radiology: 05/30/2023 - US testes - There is a right inguinal testis. There is a 3 mm hyperechoic lesion in the right testis of uncertain etiology. PATIENT FOLLOWED UP WITH UROLOGY FOR THIS.   4. No personal or family history of prostate disorder or prostate cancer, no obstructive sleep apnea, no congestive heart failure Patient says he had abnormal testicular development as a child, patient's ALT was elevated in Jan 2023 at 100 (10 to 45), with normal AST 5. Patient does not have children 6. Patient has osteoporosis, 02/09/2023 - BMI 26.63 7. Meds: I prescribed testosterone replacement on 03/17/2023 Current Meds: Androgel 5 pumps daily (THE PATIENT'S INSURANCE WILL NOT PAY FOR MORE THAN 5 PUMPS OF ANDROGEL PER DAY)

## 2023-08-31 NOTE — PHYSICAL EXAM
[de-identified] : General: No distress, well nourished\par  Eyes: Normal external appearance\par  ENT: Normal appearance of the nose\par  Neck/Thyroid: No visible neck swelling\par  Respiratory: No use of accessory muscles of respiration\par  Psychiatry: Patient converses normally, good judgement and insight\par  Skin: No rashes seen on face

## 2023-10-11 LAB
ALBUMIN SERPL ELPH-MCNC: 4.5 G/DL
ALP BLD-CCNC: 122 U/L
ALT SERPL-CCNC: 25 U/L
ANION GAP SERPL CALC-SCNC: 10 MMOL/L
AST SERPL-CCNC: 24 U/L
BILIRUB SERPL-MCNC: 0.2 MG/DL
BUN SERPL-MCNC: 14 MG/DL
CALCIUM SERPL-MCNC: 8.7 MG/DL
CHLORIDE SERPL-SCNC: 97 MMOL/L
CO2 SERPL-SCNC: 25 MMOL/L
CREAT SERPL-MCNC: 1.08 MG/DL
EGFR: 75 ML/MIN/1.73M2
GLUCOSE SERPL-MCNC: 105 MG/DL
HCT VFR BLD CALC: 37.8 %
HGB BLD-MCNC: 12.7 G/DL
MCHC RBC-ENTMCNC: 33.6 GM/DL
MCHC RBC-ENTMCNC: 33.6 PG
MCV RBC AUTO: 100 FL
PLATELET # BLD AUTO: 190 K/UL
POTASSIUM SERPL-SCNC: 4.5 MMOL/L
PROT SERPL-MCNC: 6.9 G/DL
RBC # BLD: 3.78 M/UL
RBC # FLD: 12.2 %
SODIUM SERPL-SCNC: 133 MMOL/L
WBC # FLD AUTO: 4.66 K/UL

## 2023-10-12 LAB
TESTOST FREE SERPL-MCNC: 6.2 PG/ML
TESTOST SERPL-MCNC: 572 NG/DL

## 2023-10-19 ENCOUNTER — APPOINTMENT (OUTPATIENT)
Dept: ENDOCRINOLOGY | Facility: CLINIC | Age: 68
End: 2023-10-19
Payer: MEDICARE

## 2023-10-19 VITALS
TEMPERATURE: 97.3 F | BODY MASS INDEX: 26.99 KG/M2 | WEIGHT: 162 LBS | HEIGHT: 65 IN | SYSTOLIC BLOOD PRESSURE: 120 MMHG | RESPIRATION RATE: 17 BRPM | HEART RATE: 76 BPM | DIASTOLIC BLOOD PRESSURE: 60 MMHG | OXYGEN SATURATION: 97 %

## 2023-10-19 PROCEDURE — 99214 OFFICE O/P EST MOD 30 MIN: CPT

## 2023-11-13 ENCOUNTER — APPOINTMENT (OUTPATIENT)
Dept: CARDIOLOGY | Facility: CLINIC | Age: 68
End: 2023-11-13
Payer: MEDICARE

## 2023-11-13 ENCOUNTER — NON-APPOINTMENT (OUTPATIENT)
Age: 68
End: 2023-11-13

## 2023-11-13 VITALS
WEIGHT: 160 LBS | HEART RATE: 76 BPM | BODY MASS INDEX: 26.66 KG/M2 | OXYGEN SATURATION: 98 % | RESPIRATION RATE: 17 BRPM | HEIGHT: 65 IN

## 2023-11-13 VITALS — HEART RATE: 72 BPM | DIASTOLIC BLOOD PRESSURE: 72 MMHG | SYSTOLIC BLOOD PRESSURE: 120 MMHG

## 2023-11-13 DIAGNOSIS — I49.5 SICK SINUS SYNDROME: ICD-10-CM

## 2023-11-13 DIAGNOSIS — E78.5 HYPERLIPIDEMIA, UNSPECIFIED: ICD-10-CM

## 2023-11-13 PROCEDURE — 93000 ELECTROCARDIOGRAM COMPLETE: CPT | Mod: 59

## 2023-11-13 PROCEDURE — 99213 OFFICE O/P EST LOW 20 MIN: CPT | Mod: 25

## 2023-11-13 PROCEDURE — 93288 INTERROG EVL PM/LDLS PM IP: CPT

## 2023-11-21 ENCOUNTER — APPOINTMENT (OUTPATIENT)
Dept: ENDOCRINOLOGY | Facility: CLINIC | Age: 68
End: 2023-11-21
Payer: MEDICARE

## 2023-11-21 VITALS
DIASTOLIC BLOOD PRESSURE: 70 MMHG | RESPIRATION RATE: 17 BRPM | WEIGHT: 160 LBS | BODY MASS INDEX: 26.66 KG/M2 | HEIGHT: 65 IN | TEMPERATURE: 97.3 F | OXYGEN SATURATION: 97 % | SYSTOLIC BLOOD PRESSURE: 118 MMHG | HEART RATE: 85 BPM

## 2023-11-21 PROCEDURE — 99214 OFFICE O/P EST MOD 30 MIN: CPT

## 2023-11-24 ENCOUNTER — EMERGENCY (EMERGENCY)
Facility: HOSPITAL | Age: 68
LOS: 1 days | Discharge: HOME | End: 2023-11-24
Attending: EMERGENCY MEDICINE | Admitting: EMERGENCY MEDICINE
Payer: MEDICARE

## 2023-11-24 VITALS
SYSTOLIC BLOOD PRESSURE: 128 MMHG | HEART RATE: 68 BPM | OXYGEN SATURATION: 99 % | DIASTOLIC BLOOD PRESSURE: 72 MMHG | RESPIRATION RATE: 18 BRPM

## 2023-11-24 VITALS
WEIGHT: 166.01 LBS | OXYGEN SATURATION: 98 % | HEART RATE: 84 BPM | DIASTOLIC BLOOD PRESSURE: 73 MMHG | RESPIRATION RATE: 18 BRPM | TEMPERATURE: 98 F | SYSTOLIC BLOOD PRESSURE: 131 MMHG | HEIGHT: 61 IN

## 2023-11-24 DIAGNOSIS — Z98.890 OTHER SPECIFIED POSTPROCEDURAL STATES: Chronic | ICD-10-CM

## 2023-11-24 DIAGNOSIS — Z95.0 PRESENCE OF CARDIAC PACEMAKER: Chronic | ICD-10-CM

## 2023-11-24 DIAGNOSIS — Z98.89 OTHER SPECIFIED POSTPROCEDURAL STATES: Chronic | ICD-10-CM

## 2023-11-24 DIAGNOSIS — Z98.49 CATARACT EXTRACTION STATUS, UNSPECIFIED EYE: Chronic | ICD-10-CM

## 2023-11-24 LAB
ALBUMIN SERPL ELPH-MCNC: 3.3 G/DL — SIGNIFICANT CHANGE UP (ref 3.3–5)
ALBUMIN SERPL ELPH-MCNC: 3.3 G/DL — SIGNIFICANT CHANGE UP (ref 3.3–5)
ALP SERPL-CCNC: 109 U/L — SIGNIFICANT CHANGE UP (ref 40–120)
ALP SERPL-CCNC: 109 U/L — SIGNIFICANT CHANGE UP (ref 40–120)
ALT FLD-CCNC: 33 U/L — SIGNIFICANT CHANGE UP (ref 10–45)
ALT FLD-CCNC: 33 U/L — SIGNIFICANT CHANGE UP (ref 10–45)
ANION GAP SERPL CALC-SCNC: 11 MMOL/L — SIGNIFICANT CHANGE UP (ref 5–17)
ANION GAP SERPL CALC-SCNC: 11 MMOL/L — SIGNIFICANT CHANGE UP (ref 5–17)
AST SERPL-CCNC: 30 U/L — SIGNIFICANT CHANGE UP (ref 10–40)
AST SERPL-CCNC: 30 U/L — SIGNIFICANT CHANGE UP (ref 10–40)
BASOPHILS # BLD AUTO: 0.04 K/UL — SIGNIFICANT CHANGE UP (ref 0–0.2)
BASOPHILS # BLD AUTO: 0.04 K/UL — SIGNIFICANT CHANGE UP (ref 0–0.2)
BASOPHILS NFR BLD AUTO: 0.6 % — SIGNIFICANT CHANGE UP (ref 0–2)
BASOPHILS NFR BLD AUTO: 0.6 % — SIGNIFICANT CHANGE UP (ref 0–2)
BILIRUB SERPL-MCNC: 0.2 MG/DL — SIGNIFICANT CHANGE UP (ref 0.2–1.2)
BILIRUB SERPL-MCNC: 0.2 MG/DL — SIGNIFICANT CHANGE UP (ref 0.2–1.2)
BUN SERPL-MCNC: 13 MG/DL — SIGNIFICANT CHANGE UP (ref 7–23)
BUN SERPL-MCNC: 13 MG/DL — SIGNIFICANT CHANGE UP (ref 7–23)
CALCIUM SERPL-MCNC: 9.1 MG/DL — SIGNIFICANT CHANGE UP (ref 8.4–10.5)
CALCIUM SERPL-MCNC: 9.1 MG/DL — SIGNIFICANT CHANGE UP (ref 8.4–10.5)
CHLORIDE SERPL-SCNC: 95 MMOL/L — LOW (ref 96–108)
CHLORIDE SERPL-SCNC: 95 MMOL/L — LOW (ref 96–108)
CO2 SERPL-SCNC: 29 MMOL/L — SIGNIFICANT CHANGE UP (ref 22–31)
CO2 SERPL-SCNC: 29 MMOL/L — SIGNIFICANT CHANGE UP (ref 22–31)
CREAT SERPL-MCNC: 1.31 MG/DL — HIGH (ref 0.5–1.3)
CREAT SERPL-MCNC: 1.31 MG/DL — HIGH (ref 0.5–1.3)
EGFR: 59 ML/MIN/1.73M2 — LOW
EGFR: 59 ML/MIN/1.73M2 — LOW
EOSINOPHIL # BLD AUTO: 0.12 K/UL — SIGNIFICANT CHANGE UP (ref 0–0.5)
EOSINOPHIL # BLD AUTO: 0.12 K/UL — SIGNIFICANT CHANGE UP (ref 0–0.5)
EOSINOPHIL NFR BLD AUTO: 1.8 % — SIGNIFICANT CHANGE UP (ref 0–6)
EOSINOPHIL NFR BLD AUTO: 1.8 % — SIGNIFICANT CHANGE UP (ref 0–6)
GLUCOSE BLDC GLUCOMTR-MCNC: 141 MG/DL — HIGH (ref 70–99)
GLUCOSE BLDC GLUCOMTR-MCNC: 141 MG/DL — HIGH (ref 70–99)
GLUCOSE SERPL-MCNC: 143 MG/DL — HIGH (ref 70–99)
GLUCOSE SERPL-MCNC: 143 MG/DL — HIGH (ref 70–99)
HCT VFR BLD CALC: 34.1 % — LOW (ref 39–50)
HCT VFR BLD CALC: 34.1 % — LOW (ref 39–50)
HGB BLD-MCNC: 11.5 G/DL — LOW (ref 13–17)
HGB BLD-MCNC: 11.5 G/DL — LOW (ref 13–17)
IMM GRANULOCYTES NFR BLD AUTO: 0.3 % — SIGNIFICANT CHANGE UP (ref 0–0.9)
IMM GRANULOCYTES NFR BLD AUTO: 0.3 % — SIGNIFICANT CHANGE UP (ref 0–0.9)
LYMPHOCYTES # BLD AUTO: 0.99 K/UL — LOW (ref 1–3.3)
LYMPHOCYTES # BLD AUTO: 0.99 K/UL — LOW (ref 1–3.3)
LYMPHOCYTES # BLD AUTO: 14.5 % — SIGNIFICANT CHANGE UP (ref 13–44)
LYMPHOCYTES # BLD AUTO: 14.5 % — SIGNIFICANT CHANGE UP (ref 13–44)
MAGNESIUM SERPL-MCNC: 1.8 MG/DL — SIGNIFICANT CHANGE UP (ref 1.6–2.6)
MAGNESIUM SERPL-MCNC: 1.8 MG/DL — SIGNIFICANT CHANGE UP (ref 1.6–2.6)
MCHC RBC-ENTMCNC: 33.3 PG — SIGNIFICANT CHANGE UP (ref 27–34)
MCHC RBC-ENTMCNC: 33.3 PG — SIGNIFICANT CHANGE UP (ref 27–34)
MCHC RBC-ENTMCNC: 33.7 GM/DL — SIGNIFICANT CHANGE UP (ref 32–36)
MCHC RBC-ENTMCNC: 33.7 GM/DL — SIGNIFICANT CHANGE UP (ref 32–36)
MCV RBC AUTO: 98.8 FL — SIGNIFICANT CHANGE UP (ref 80–100)
MCV RBC AUTO: 98.8 FL — SIGNIFICANT CHANGE UP (ref 80–100)
MONOCYTES # BLD AUTO: 0.75 K/UL — SIGNIFICANT CHANGE UP (ref 0–0.9)
MONOCYTES # BLD AUTO: 0.75 K/UL — SIGNIFICANT CHANGE UP (ref 0–0.9)
MONOCYTES NFR BLD AUTO: 11 % — SIGNIFICANT CHANGE UP (ref 2–14)
MONOCYTES NFR BLD AUTO: 11 % — SIGNIFICANT CHANGE UP (ref 2–14)
NEUTROPHILS # BLD AUTO: 4.89 K/UL — SIGNIFICANT CHANGE UP (ref 1.8–7.4)
NEUTROPHILS # BLD AUTO: 4.89 K/UL — SIGNIFICANT CHANGE UP (ref 1.8–7.4)
NEUTROPHILS NFR BLD AUTO: 71.8 % — SIGNIFICANT CHANGE UP (ref 43–77)
NEUTROPHILS NFR BLD AUTO: 71.8 % — SIGNIFICANT CHANGE UP (ref 43–77)
NRBC # BLD: 0 /100 WBCS — SIGNIFICANT CHANGE UP (ref 0–0)
NRBC # BLD: 0 /100 WBCS — SIGNIFICANT CHANGE UP (ref 0–0)
PHOSPHATE SERPL-MCNC: 2.8 MG/DL — SIGNIFICANT CHANGE UP (ref 2.5–4.5)
PHOSPHATE SERPL-MCNC: 2.8 MG/DL — SIGNIFICANT CHANGE UP (ref 2.5–4.5)
PLATELET # BLD AUTO: 212 K/UL — SIGNIFICANT CHANGE UP (ref 150–400)
PLATELET # BLD AUTO: 212 K/UL — SIGNIFICANT CHANGE UP (ref 150–400)
POTASSIUM SERPL-MCNC: 3.7 MMOL/L — SIGNIFICANT CHANGE UP (ref 3.5–5.3)
POTASSIUM SERPL-MCNC: 3.7 MMOL/L — SIGNIFICANT CHANGE UP (ref 3.5–5.3)
POTASSIUM SERPL-SCNC: 3.7 MMOL/L — SIGNIFICANT CHANGE UP (ref 3.5–5.3)
POTASSIUM SERPL-SCNC: 3.7 MMOL/L — SIGNIFICANT CHANGE UP (ref 3.5–5.3)
PROT SERPL-MCNC: 7 G/DL — SIGNIFICANT CHANGE UP (ref 6–8.3)
PROT SERPL-MCNC: 7 G/DL — SIGNIFICANT CHANGE UP (ref 6–8.3)
RBC # BLD: 3.45 M/UL — LOW (ref 4.2–5.8)
RBC # BLD: 3.45 M/UL — LOW (ref 4.2–5.8)
RBC # FLD: 12.2 % — SIGNIFICANT CHANGE UP (ref 10.3–14.5)
RBC # FLD: 12.2 % — SIGNIFICANT CHANGE UP (ref 10.3–14.5)
SODIUM SERPL-SCNC: 135 MMOL/L — SIGNIFICANT CHANGE UP (ref 135–145)
SODIUM SERPL-SCNC: 135 MMOL/L — SIGNIFICANT CHANGE UP (ref 135–145)
TROPONIN I, HIGH SENSITIVITY RESULT: 14.4 NG/L — SIGNIFICANT CHANGE UP
TROPONIN I, HIGH SENSITIVITY RESULT: 14.4 NG/L — SIGNIFICANT CHANGE UP
TROPONIN I, HIGH SENSITIVITY RESULT: 23.4 NG/L — SIGNIFICANT CHANGE UP
TROPONIN I, HIGH SENSITIVITY RESULT: 23.4 NG/L — SIGNIFICANT CHANGE UP
TROPONIN I, HIGH SENSITIVITY RESULT: 23.7 NG/L — SIGNIFICANT CHANGE UP
TROPONIN I, HIGH SENSITIVITY RESULT: 23.7 NG/L — SIGNIFICANT CHANGE UP
WBC # BLD: 6.81 K/UL — SIGNIFICANT CHANGE UP (ref 3.8–10.5)
WBC # BLD: 6.81 K/UL — SIGNIFICANT CHANGE UP (ref 3.8–10.5)
WBC # FLD AUTO: 6.81 K/UL — SIGNIFICANT CHANGE UP (ref 3.8–10.5)
WBC # FLD AUTO: 6.81 K/UL — SIGNIFICANT CHANGE UP (ref 3.8–10.5)

## 2023-11-24 PROCEDURE — 82962 GLUCOSE BLOOD TEST: CPT

## 2023-11-24 PROCEDURE — 99285 EMERGENCY DEPT VISIT HI MDM: CPT | Mod: 25

## 2023-11-24 PROCEDURE — 71045 X-RAY EXAM CHEST 1 VIEW: CPT | Mod: 26

## 2023-11-24 PROCEDURE — 96374 THER/PROPH/DIAG INJ IV PUSH: CPT

## 2023-11-24 PROCEDURE — 84484 ASSAY OF TROPONIN QUANT: CPT

## 2023-11-24 PROCEDURE — 83735 ASSAY OF MAGNESIUM: CPT

## 2023-11-24 PROCEDURE — 36415 COLL VENOUS BLD VENIPUNCTURE: CPT

## 2023-11-24 PROCEDURE — 84100 ASSAY OF PHOSPHORUS: CPT

## 2023-11-24 PROCEDURE — 85025 COMPLETE CBC W/AUTO DIFF WBC: CPT

## 2023-11-24 PROCEDURE — 93005 ELECTROCARDIOGRAM TRACING: CPT

## 2023-11-24 PROCEDURE — 93010 ELECTROCARDIOGRAM REPORT: CPT

## 2023-11-24 PROCEDURE — 80053 COMPREHEN METABOLIC PANEL: CPT

## 2023-11-24 PROCEDURE — 96361 HYDRATE IV INFUSION ADD-ON: CPT

## 2023-11-24 PROCEDURE — 71045 X-RAY EXAM CHEST 1 VIEW: CPT

## 2023-11-24 RX ORDER — SODIUM CHLORIDE 9 MG/ML
1000 INJECTION INTRAMUSCULAR; INTRAVENOUS; SUBCUTANEOUS ONCE
Refills: 0 | Status: COMPLETED | OUTPATIENT
Start: 2023-11-24 | End: 2023-11-24

## 2023-11-24 RX ORDER — KETOROLAC TROMETHAMINE 30 MG/ML
30 SYRINGE (ML) INJECTION ONCE
Refills: 0 | Status: DISCONTINUED | OUTPATIENT
Start: 2023-11-24 | End: 2023-11-24

## 2023-11-24 RX ADMIN — Medication 30 MILLIGRAM(S): at 03:44

## 2023-11-24 RX ADMIN — Medication 30 MILLIGRAM(S): at 04:10

## 2023-11-24 RX ADMIN — SODIUM CHLORIDE 1000 MILLILITER(S): 9 INJECTION INTRAMUSCULAR; INTRAVENOUS; SUBCUTANEOUS at 03:26

## 2023-11-24 RX ADMIN — SODIUM CHLORIDE 2000 MILLILITER(S): 9 INJECTION INTRAMUSCULAR; INTRAVENOUS; SUBCUTANEOUS at 02:26

## 2023-11-24 NOTE — ED PROVIDER NOTE - NSICDXPASTMEDICALHX_GEN_ALL_CORE_FT
PAST MEDICAL HISTORY:  Aneurysm of [place] of [place]     Bradycardia     Cerebral aneurysm without rupture diagnosed 2017 had cerebral angio at [facility]    CVA (cerebral vascular accident) left arm weakness, no sensation in left side in 1998    Depression     Epilepsy last seizure 2016    Gastroesophageal reflux disease, esophagitis presence not specified     Glaucoma of both eyes, unspecified glaucoma type     HLD (hyperlipidemia)     Language barrier [place] speaking    Renal agenesis, unilateral     T2DM (type 2 diabetes mellitus)

## 2023-11-24 NOTE — ED PROVIDER NOTE - PATIENT PORTAL LINK FT
You can access the FollowMyHealth Patient Portal offered by [facility] by registering at the following website: [email or web address]. By joining [facility]’s FollowMyHealth portal, you will also be able to view your health information using other applications (apps) compatible with our system.

## 2023-11-24 NOTE — ED ADULT NURSE NOTE - NSFALLUNIVINTERV_ED_ALL_ED
Bed/Stretcher in lowest position, wheels locked, appropriate side rails in place/Call bell, personal items and telephone in reach/Instruct patient to call for assistance before getting out of bed/chair/stretcher/Non-slip footwear applied when patient is off stretcher/[place] to call system/Physically safe environment - no spills, clutter or unnecessary equipment/Purposeful proactive rounding/Room/bathroom lighting operational, light cord in reach

## 2023-11-24 NOTE — ED PROVIDER NOTE - CLINICAL SUMMARY MEDICAL DECISION MAKING FREE TEXT BOX
67 y/o Male with h/o recent covid 2 weeks ago presents to ED generalized weakness, cough and chest pain for few days, no fever now, pt no acute findings on exam, EKG , normal pacemaker, labs normal, trop were done 3 time , all wnl , cxr negative  unlikely pt has cardiac pain, pt advised to f/u his PMD

## 2023-11-24 NOTE — ED ADULT NURSE NOTE - OBJECTIVE STATEMENT
Pt presents to ER complaining of chest pain, cough and generalized weakness for a couple day. Pt states he was diagnosed with COVID 2 weeks ago. A&OX4. Pt denies SOB, nausea, vomiting, diarrhea, or dizziness.

## 2023-11-24 NOTE — ED PROVIDER NOTE - NSICDXPASTSURGICALHX_GEN_ALL_CORE_FT
PAST SURGICAL HISTORY:  Pacemaker Medtronic Serial [id number]/Model [id number]  Implant 6/29/07, new generator 12/23/2016    S/P bunionectomy     S/P cataract surgery with lens implants    S/P craniotomy for RCMA 1998    Status post coil embolization of cerebral aneurysm 9/2018

## 2023-11-24 NOTE — ED ADULT NURSE NOTE - NSICDXPASTMEDICALHX_GEN_ALL_CORE_FT
PAST MEDICAL HISTORY:  Aneurysm of [place] of [place]     Bradycardia     Cerebral aneurysm without rupture diagnosed 2017 had cerebral angio at [facility]    CVA (cerebral vascular accident) left arm weakness, no sensation in left side in 1998    Depression     Epilepsy last seizure 2016    Gastroesophageal reflux disease, esophagitis presence not specified     Glaucoma of both eyes, unspecified glaucoma type     HLD (hyperlipidemia)     Language barrier [place] speaking    Renal agenesis, unilateral     T2DM (type 2 diabetes mellitus)

## 2023-11-24 NOTE — ED ADULT TRIAGE NOTE - CHIEF COMPLAINT QUOTE
Patient presents to ED with complaint of chest pain and abdominal pain and generalized weakness x 1 day. PAtient states he was diagnosed with COVID 2 weeks prior. Alert and oriented x 4. No signs or symptoms of vomiting, dizziness, SOb, or diarrhea.

## 2023-11-24 NOTE — ED ADULT NURSE NOTE - NSICDXPASTSURGICALHX_GEN_ALL_CORE_FT
PAST SURGICAL HISTORY:  Pacemaker Medtronic Serial [id number]/Model [id number]  Implant 6/29/07, new generator 12/23/2016    S/P bunionectomy     S/P cataract surgery with lens implants    S/P craniotomy for RCMA 1998    Status post coil embolization of cerebral aneurysm 9/2018

## 2023-11-24 NOTE — ED PROVIDER NOTE - OBJECTIVE STATEMENT
69 y/o Male with h/o recent covid 2 weeks ago presents to ED generalized weakness, cough and chest pain for few days, no fever now

## 2023-11-28 ENCOUNTER — INPATIENT (INPATIENT)
Facility: HOSPITAL | Age: 68
LOS: 8 days | Discharge: NURSING, REHAB OR LONG-TERM CARE | DRG: 414 | End: 2023-12-07
Attending: FAMILY MEDICINE | Admitting: INTERNAL MEDICINE
Payer: MEDICARE

## 2023-11-28 VITALS
OXYGEN SATURATION: 98 % | TEMPERATURE: 98 F | HEIGHT: 61 IN | SYSTOLIC BLOOD PRESSURE: 113 MMHG | HEART RATE: 71 BPM | DIASTOLIC BLOOD PRESSURE: 73 MMHG | WEIGHT: 166.01 LBS | RESPIRATION RATE: 18 BRPM

## 2023-11-28 DIAGNOSIS — Z98.89 OTHER SPECIFIED POSTPROCEDURAL STATES: Chronic | ICD-10-CM

## 2023-11-28 DIAGNOSIS — E87.6 HYPOKALEMIA: ICD-10-CM

## 2023-11-28 DIAGNOSIS — Z98.890 OTHER SPECIFIED POSTPROCEDURAL STATES: Chronic | ICD-10-CM

## 2023-11-28 DIAGNOSIS — Z98.49 CATARACT EXTRACTION STATUS, UNSPECIFIED EYE: Chronic | ICD-10-CM

## 2023-11-28 DIAGNOSIS — E11.9 TYPE 2 DIABETES MELLITUS WITHOUT COMPLICATIONS: ICD-10-CM

## 2023-11-28 DIAGNOSIS — I10 ESSENTIAL (PRIMARY) HYPERTENSION: ICD-10-CM

## 2023-11-28 DIAGNOSIS — Z95.0 PRESENCE OF CARDIAC PACEMAKER: Chronic | ICD-10-CM

## 2023-11-28 DIAGNOSIS — K81.0 ACUTE CHOLECYSTITIS: ICD-10-CM

## 2023-11-28 DIAGNOSIS — R74.01 ELEVATION OF LEVELS OF LIVER TRANSAMINASE LEVELS: ICD-10-CM

## 2023-11-28 DIAGNOSIS — N17.9 ACUTE KIDNEY FAILURE, UNSPECIFIED: ICD-10-CM

## 2023-11-28 DIAGNOSIS — E78.5 HYPERLIPIDEMIA, UNSPECIFIED: ICD-10-CM

## 2023-11-28 DIAGNOSIS — G40.909 EPILEPSY, UNSPECIFIED, NOT INTRACTABLE, WITHOUT STATUS EPILEPTICUS: ICD-10-CM

## 2023-11-28 LAB
ALBUMIN SERPL ELPH-MCNC: 2.4 G/DL — LOW (ref 3.3–5)
ALBUMIN SERPL ELPH-MCNC: 2.4 G/DL — LOW (ref 3.3–5)
ALP SERPL-CCNC: 131 U/L — HIGH (ref 40–120)
ALP SERPL-CCNC: 131 U/L — HIGH (ref 40–120)
ALT FLD-CCNC: 256 U/L — HIGH (ref 10–45)
ALT FLD-CCNC: 256 U/L — HIGH (ref 10–45)
ANION GAP SERPL CALC-SCNC: 9 MMOL/L — SIGNIFICANT CHANGE UP (ref 5–17)
ANION GAP SERPL CALC-SCNC: 9 MMOL/L — SIGNIFICANT CHANGE UP (ref 5–17)
APPEARANCE UR: CLEAR — SIGNIFICANT CHANGE UP
APPEARANCE UR: CLEAR — SIGNIFICANT CHANGE UP
AST SERPL-CCNC: 272 U/L — HIGH (ref 10–40)
AST SERPL-CCNC: 272 U/L — HIGH (ref 10–40)
BACTERIA # UR AUTO: ABNORMAL /HPF
BACTERIA # UR AUTO: ABNORMAL /HPF
BASOPHILS # BLD AUTO: 0.05 K/UL — SIGNIFICANT CHANGE UP (ref 0–0.2)
BASOPHILS # BLD AUTO: 0.05 K/UL — SIGNIFICANT CHANGE UP (ref 0–0.2)
BASOPHILS NFR BLD AUTO: 0.5 % — SIGNIFICANT CHANGE UP (ref 0–2)
BASOPHILS NFR BLD AUTO: 0.5 % — SIGNIFICANT CHANGE UP (ref 0–2)
BILIRUB SERPL-MCNC: 0.9 MG/DL — SIGNIFICANT CHANGE UP (ref 0.2–1.2)
BILIRUB SERPL-MCNC: 0.9 MG/DL — SIGNIFICANT CHANGE UP (ref 0.2–1.2)
BILIRUB UR-MCNC: NEGATIVE — SIGNIFICANT CHANGE UP
BILIRUB UR-MCNC: NEGATIVE — SIGNIFICANT CHANGE UP
BUN SERPL-MCNC: 23 MG/DL — SIGNIFICANT CHANGE UP (ref 7–23)
BUN SERPL-MCNC: 23 MG/DL — SIGNIFICANT CHANGE UP (ref 7–23)
CALCIUM SERPL-MCNC: 8.8 MG/DL — SIGNIFICANT CHANGE UP (ref 8.4–10.5)
CALCIUM SERPL-MCNC: 8.8 MG/DL — SIGNIFICANT CHANGE UP (ref 8.4–10.5)
CHLORIDE SERPL-SCNC: 99 MMOL/L — SIGNIFICANT CHANGE UP (ref 96–108)
CHLORIDE SERPL-SCNC: 99 MMOL/L — SIGNIFICANT CHANGE UP (ref 96–108)
CO2 SERPL-SCNC: 27 MMOL/L — SIGNIFICANT CHANGE UP (ref 22–31)
CO2 SERPL-SCNC: 27 MMOL/L — SIGNIFICANT CHANGE UP (ref 22–31)
COLOR SPEC: SIGNIFICANT CHANGE UP
COLOR SPEC: SIGNIFICANT CHANGE UP
COMMENT - URINE: SIGNIFICANT CHANGE UP
COMMENT - URINE: SIGNIFICANT CHANGE UP
CREAT SERPL-MCNC: 1.5 MG/DL — HIGH (ref 0.5–1.3)
CREAT SERPL-MCNC: 1.5 MG/DL — HIGH (ref 0.5–1.3)
DIFF PNL FLD: ABNORMAL
DIFF PNL FLD: ABNORMAL
EGFR: 50 ML/MIN/1.73M2 — LOW
EGFR: 50 ML/MIN/1.73M2 — LOW
EOSINOPHIL # BLD AUTO: 0.03 K/UL — SIGNIFICANT CHANGE UP (ref 0–0.5)
EOSINOPHIL # BLD AUTO: 0.03 K/UL — SIGNIFICANT CHANGE UP (ref 0–0.5)
EOSINOPHIL NFR BLD AUTO: 0.3 % — SIGNIFICANT CHANGE UP (ref 0–6)
EOSINOPHIL NFR BLD AUTO: 0.3 % — SIGNIFICANT CHANGE UP (ref 0–6)
EPI CELLS # UR: 2 — SIGNIFICANT CHANGE UP
EPI CELLS # UR: 2 — SIGNIFICANT CHANGE UP
GLUCOSE BLDC GLUCOMTR-MCNC: 150 MG/DL — HIGH (ref 70–99)
GLUCOSE BLDC GLUCOMTR-MCNC: 150 MG/DL — HIGH (ref 70–99)
GLUCOSE SERPL-MCNC: 189 MG/DL — HIGH (ref 70–99)
GLUCOSE SERPL-MCNC: 189 MG/DL — HIGH (ref 70–99)
GLUCOSE UR QL: NEGATIVE MG/DL — SIGNIFICANT CHANGE UP
GLUCOSE UR QL: NEGATIVE MG/DL — SIGNIFICANT CHANGE UP
HCT VFR BLD CALC: 30.4 % — LOW (ref 39–50)
HCT VFR BLD CALC: 30.4 % — LOW (ref 39–50)
HGB BLD-MCNC: 10.3 G/DL — LOW (ref 13–17)
HGB BLD-MCNC: 10.3 G/DL — LOW (ref 13–17)
IMM GRANULOCYTES NFR BLD AUTO: 0.7 % — SIGNIFICANT CHANGE UP (ref 0–0.9)
IMM GRANULOCYTES NFR BLD AUTO: 0.7 % — SIGNIFICANT CHANGE UP (ref 0–0.9)
INR BLD: 1.16 RATIO — SIGNIFICANT CHANGE UP (ref 0.85–1.18)
INR BLD: 1.16 RATIO — SIGNIFICANT CHANGE UP (ref 0.85–1.18)
KETONES UR-MCNC: NEGATIVE MG/DL — SIGNIFICANT CHANGE UP
KETONES UR-MCNC: NEGATIVE MG/DL — SIGNIFICANT CHANGE UP
LEUKOCYTE ESTERASE UR-ACNC: NEGATIVE — SIGNIFICANT CHANGE UP
LEUKOCYTE ESTERASE UR-ACNC: NEGATIVE — SIGNIFICANT CHANGE UP
LIDOCAIN IGE QN: 136 U/L — HIGH (ref 16–77)
LIDOCAIN IGE QN: 136 U/L — HIGH (ref 16–77)
LYMPHOCYTES # BLD AUTO: 0.45 K/UL — LOW (ref 1–3.3)
LYMPHOCYTES # BLD AUTO: 0.45 K/UL — LOW (ref 1–3.3)
LYMPHOCYTES # BLD AUTO: 4.9 % — LOW (ref 13–44)
LYMPHOCYTES # BLD AUTO: 4.9 % — LOW (ref 13–44)
MCHC RBC-ENTMCNC: 33.1 PG — SIGNIFICANT CHANGE UP (ref 27–34)
MCHC RBC-ENTMCNC: 33.1 PG — SIGNIFICANT CHANGE UP (ref 27–34)
MCHC RBC-ENTMCNC: 33.9 GM/DL — SIGNIFICANT CHANGE UP (ref 32–36)
MCHC RBC-ENTMCNC: 33.9 GM/DL — SIGNIFICANT CHANGE UP (ref 32–36)
MCV RBC AUTO: 97.7 FL — SIGNIFICANT CHANGE UP (ref 80–100)
MCV RBC AUTO: 97.7 FL — SIGNIFICANT CHANGE UP (ref 80–100)
MONOCYTES # BLD AUTO: 0.76 K/UL — SIGNIFICANT CHANGE UP (ref 0–0.9)
MONOCYTES # BLD AUTO: 0.76 K/UL — SIGNIFICANT CHANGE UP (ref 0–0.9)
MONOCYTES NFR BLD AUTO: 8.3 % — SIGNIFICANT CHANGE UP (ref 2–14)
MONOCYTES NFR BLD AUTO: 8.3 % — SIGNIFICANT CHANGE UP (ref 2–14)
NEUTROPHILS # BLD AUTO: 7.81 K/UL — HIGH (ref 1.8–7.4)
NEUTROPHILS # BLD AUTO: 7.81 K/UL — HIGH (ref 1.8–7.4)
NEUTROPHILS NFR BLD AUTO: 85.3 % — HIGH (ref 43–77)
NEUTROPHILS NFR BLD AUTO: 85.3 % — HIGH (ref 43–77)
NITRITE UR-MCNC: NEGATIVE — SIGNIFICANT CHANGE UP
NITRITE UR-MCNC: NEGATIVE — SIGNIFICANT CHANGE UP
NRBC # BLD: 0 /100 WBCS — SIGNIFICANT CHANGE UP (ref 0–0)
NRBC # BLD: 0 /100 WBCS — SIGNIFICANT CHANGE UP (ref 0–0)
PH UR: 6.5 — SIGNIFICANT CHANGE UP (ref 5–8)
PH UR: 6.5 — SIGNIFICANT CHANGE UP (ref 5–8)
PLATELET # BLD AUTO: 102 K/UL — LOW (ref 150–400)
PLATELET # BLD AUTO: 102 K/UL — LOW (ref 150–400)
POTASSIUM SERPL-MCNC: 3.3 MMOL/L — LOW (ref 3.5–5.3)
POTASSIUM SERPL-MCNC: 3.3 MMOL/L — LOW (ref 3.5–5.3)
POTASSIUM SERPL-SCNC: 3.3 MMOL/L — LOW (ref 3.5–5.3)
POTASSIUM SERPL-SCNC: 3.3 MMOL/L — LOW (ref 3.5–5.3)
PROT SERPL-MCNC: 6.9 G/DL — SIGNIFICANT CHANGE UP (ref 6–8.3)
PROT SERPL-MCNC: 6.9 G/DL — SIGNIFICANT CHANGE UP (ref 6–8.3)
PROT UR-MCNC: 100 MG/DL
PROT UR-MCNC: 100 MG/DL
PROTHROM AB SERPL-ACNC: 13.2 SEC — HIGH (ref 9.5–13)
PROTHROM AB SERPL-ACNC: 13.2 SEC — HIGH (ref 9.5–13)
RBC # BLD: 3.11 M/UL — LOW (ref 4.2–5.8)
RBC # BLD: 3.11 M/UL — LOW (ref 4.2–5.8)
RBC # FLD: 13.2 % — SIGNIFICANT CHANGE UP (ref 10.3–14.5)
RBC # FLD: 13.2 % — SIGNIFICANT CHANGE UP (ref 10.3–14.5)
RBC CASTS # UR COMP ASSIST: 9 /HPF — HIGH (ref 0–4)
RBC CASTS # UR COMP ASSIST: 9 /HPF — HIGH (ref 0–4)
SODIUM SERPL-SCNC: 135 MMOL/L — SIGNIFICANT CHANGE UP (ref 135–145)
SODIUM SERPL-SCNC: 135 MMOL/L — SIGNIFICANT CHANGE UP (ref 135–145)
SP GR SPEC: 1.02 — SIGNIFICANT CHANGE UP (ref 1–1.03)
SP GR SPEC: 1.02 — SIGNIFICANT CHANGE UP (ref 1–1.03)
TROPONIN I, HIGH SENSITIVITY RESULT: 19.7 NG/L — SIGNIFICANT CHANGE UP
TROPONIN I, HIGH SENSITIVITY RESULT: 19.7 NG/L — SIGNIFICANT CHANGE UP
UROBILINOGEN FLD QL: 2 MG/DL (ref 0.2–1)
UROBILINOGEN FLD QL: 2 MG/DL (ref 0.2–1)
WBC # BLD: 9.16 K/UL — SIGNIFICANT CHANGE UP (ref 3.8–10.5)
WBC # BLD: 9.16 K/UL — SIGNIFICANT CHANGE UP (ref 3.8–10.5)
WBC # FLD AUTO: 9.16 K/UL — SIGNIFICANT CHANGE UP (ref 3.8–10.5)
WBC # FLD AUTO: 9.16 K/UL — SIGNIFICANT CHANGE UP (ref 3.8–10.5)
WBC UR QL: 2 /HPF — SIGNIFICANT CHANGE UP (ref 0–5)
WBC UR QL: 2 /HPF — SIGNIFICANT CHANGE UP (ref 0–5)

## 2023-11-28 PROCEDURE — 99223 1ST HOSP IP/OBS HIGH 75: CPT

## 2023-11-28 PROCEDURE — 71045 X-RAY EXAM CHEST 1 VIEW: CPT | Mod: 26

## 2023-11-28 PROCEDURE — 76705 ECHO EXAM OF ABDOMEN: CPT | Mod: 26

## 2023-11-28 PROCEDURE — 71250 CT THORAX DX C-: CPT | Mod: 26,MA

## 2023-11-28 PROCEDURE — 99285 EMERGENCY DEPT VISIT HI MDM: CPT

## 2023-11-28 PROCEDURE — 93010 ELECTROCARDIOGRAM REPORT: CPT

## 2023-11-28 PROCEDURE — 74176 CT ABD & PELVIS W/O CONTRAST: CPT | Mod: 26,MA

## 2023-11-28 RX ORDER — INSULIN LISPRO 100/ML
VIAL (ML) SUBCUTANEOUS
Refills: 0 | Status: DISCONTINUED | OUTPATIENT
Start: 2023-11-28 | End: 2023-11-30

## 2023-11-28 RX ORDER — FLUOXETINE HCL 10 MG
20 CAPSULE ORAL DAILY
Refills: 0 | Status: DISCONTINUED | OUTPATIENT
Start: 2023-11-28 | End: 2023-11-30

## 2023-11-28 RX ORDER — SODIUM CHLORIDE 9 MG/ML
1000 INJECTION, SOLUTION INTRAVENOUS
Refills: 0 | Status: DISCONTINUED | OUTPATIENT
Start: 2023-11-28 | End: 2023-11-30

## 2023-11-28 RX ORDER — SODIUM CHLORIDE 9 MG/ML
1000 INJECTION INTRAMUSCULAR; INTRAVENOUS; SUBCUTANEOUS
Refills: 0 | Status: DISCONTINUED | OUTPATIENT
Start: 2023-11-28 | End: 2023-11-30

## 2023-11-28 RX ORDER — POTASSIUM CHLORIDE 20 MEQ
10 PACKET (EA) ORAL
Refills: 0 | Status: DISCONTINUED | OUTPATIENT
Start: 2023-11-28 | End: 2023-11-29

## 2023-11-28 RX ORDER — ONDANSETRON 8 MG/1
4 TABLET, FILM COATED ORAL EVERY 8 HOURS
Refills: 0 | Status: DISCONTINUED | OUTPATIENT
Start: 2023-11-28 | End: 2023-11-30

## 2023-11-28 RX ORDER — OMEPRAZOLE 10 MG/1
1 CAPSULE, DELAYED RELEASE ORAL
Qty: 0 | Refills: 0 | DISCHARGE

## 2023-11-28 RX ORDER — GLUCAGON INJECTION, SOLUTION 0.5 MG/.1ML
1 INJECTION, SOLUTION SUBCUTANEOUS ONCE
Refills: 0 | Status: DISCONTINUED | OUTPATIENT
Start: 2023-11-28 | End: 2023-11-30

## 2023-11-28 RX ORDER — ACETAMINOPHEN 500 MG
1000 TABLET ORAL ONCE
Refills: 0 | Status: COMPLETED | OUTPATIENT
Start: 2023-11-28 | End: 2023-11-28

## 2023-11-28 RX ORDER — CARBAMAZEPINE 200 MG
300 TABLET ORAL
Refills: 0 | Status: DISCONTINUED | OUTPATIENT
Start: 2023-11-28 | End: 2023-11-28

## 2023-11-28 RX ORDER — DEXTROSE 50 % IN WATER 50 %
15 SYRINGE (ML) INTRAVENOUS ONCE
Refills: 0 | Status: DISCONTINUED | OUTPATIENT
Start: 2023-11-28 | End: 2023-11-30

## 2023-11-28 RX ORDER — ATORVASTATIN CALCIUM 80 MG/1
80 TABLET, FILM COATED ORAL AT BEDTIME
Refills: 0 | Status: DISCONTINUED | OUTPATIENT
Start: 2023-11-28 | End: 2023-11-30

## 2023-11-28 RX ORDER — ZOLPIDEM TARTRATE 10 MG/1
1 TABLET ORAL
Qty: 0 | Refills: 0 | DISCHARGE

## 2023-11-28 RX ORDER — MORPHINE SULFATE 50 MG/1
2 CAPSULE, EXTENDED RELEASE ORAL EVERY 8 HOURS
Refills: 0 | Status: DISCONTINUED | OUTPATIENT
Start: 2023-11-28 | End: 2023-11-30

## 2023-11-28 RX ORDER — DEXTROSE 50 % IN WATER 50 %
25 SYRINGE (ML) INTRAVENOUS ONCE
Refills: 0 | Status: DISCONTINUED | OUTPATIENT
Start: 2023-11-28 | End: 2023-11-30

## 2023-11-28 RX ORDER — CARBAMAZEPINE 200 MG
300 TABLET ORAL
Refills: 0 | Status: DISCONTINUED | OUTPATIENT
Start: 2023-11-28 | End: 2023-11-30

## 2023-11-28 RX ORDER — ASPIRIN/CALCIUM CARB/MAGNESIUM 324 MG
81 TABLET ORAL DAILY
Refills: 0 | Status: DISCONTINUED | OUTPATIENT
Start: 2023-11-28 | End: 2023-11-30

## 2023-11-28 RX ORDER — PIPERACILLIN AND TAZOBACTAM 4; .5 G/20ML; G/20ML
3.38 INJECTION, POWDER, LYOPHILIZED, FOR SOLUTION INTRAVENOUS ONCE
Refills: 0 | Status: COMPLETED | OUTPATIENT
Start: 2023-11-28 | End: 2023-11-28

## 2023-11-28 RX ORDER — PIPERACILLIN AND TAZOBACTAM 4; .5 G/20ML; G/20ML
3.38 INJECTION, POWDER, LYOPHILIZED, FOR SOLUTION INTRAVENOUS EVERY 8 HOURS
Refills: 0 | Status: DISCONTINUED | OUTPATIENT
Start: 2023-11-28 | End: 2023-11-30

## 2023-11-28 RX ORDER — SODIUM CHLORIDE 9 MG/ML
1000 INJECTION INTRAMUSCULAR; INTRAVENOUS; SUBCUTANEOUS ONCE
Refills: 0 | Status: COMPLETED | OUTPATIENT
Start: 2023-11-28 | End: 2023-11-28

## 2023-11-28 RX ORDER — INSULIN LISPRO 100/ML
VIAL (ML) SUBCUTANEOUS AT BEDTIME
Refills: 0 | Status: DISCONTINUED | OUTPATIENT
Start: 2023-11-28 | End: 2023-11-30

## 2023-11-28 RX ORDER — DEXTROSE 50 % IN WATER 50 %
12.5 SYRINGE (ML) INTRAVENOUS ONCE
Refills: 0 | Status: DISCONTINUED | OUTPATIENT
Start: 2023-11-28 | End: 2023-11-30

## 2023-11-28 RX ORDER — HEPARIN SODIUM 5000 [USP'U]/ML
5000 INJECTION INTRAVENOUS; SUBCUTANEOUS EVERY 12 HOURS
Refills: 0 | Status: DISCONTINUED | OUTPATIENT
Start: 2023-11-28 | End: 2023-11-30

## 2023-11-28 RX ORDER — FAMOTIDINE 10 MG/ML
20 INJECTION INTRAVENOUS DAILY
Refills: 0 | Status: DISCONTINUED | OUTPATIENT
Start: 2023-11-28 | End: 2023-11-30

## 2023-11-28 RX ORDER — METOPROLOL TARTRATE 50 MG
25 TABLET ORAL
Refills: 0 | Status: DISCONTINUED | OUTPATIENT
Start: 2023-11-28 | End: 2023-11-30

## 2023-11-28 RX ADMIN — Medication 1000 MILLIGRAM(S): at 20:31

## 2023-11-28 RX ADMIN — SODIUM CHLORIDE 70 MILLILITER(S): 9 INJECTION INTRAMUSCULAR; INTRAVENOUS; SUBCUTANEOUS at 23:34

## 2023-11-28 RX ADMIN — Medication 0.5 MILLIGRAM(S): at 23:16

## 2023-11-28 RX ADMIN — PIPERACILLIN AND TAZOBACTAM 25 GRAM(S): 4; .5 INJECTION, POWDER, LYOPHILIZED, FOR SOLUTION INTRAVENOUS at 23:16

## 2023-11-28 RX ADMIN — Medication 400 MILLIGRAM(S): at 16:21

## 2023-11-28 RX ADMIN — PIPERACILLIN AND TAZOBACTAM 3.38 GRAM(S): 4; .5 INJECTION, POWDER, LYOPHILIZED, FOR SOLUTION INTRAVENOUS at 21:16

## 2023-11-28 RX ADMIN — ATORVASTATIN CALCIUM 80 MILLIGRAM(S): 80 TABLET, FILM COATED ORAL at 23:16

## 2023-11-28 RX ADMIN — PIPERACILLIN AND TAZOBACTAM 200 GRAM(S): 4; .5 INJECTION, POWDER, LYOPHILIZED, FOR SOLUTION INTRAVENOUS at 18:58

## 2023-11-28 RX ADMIN — SODIUM CHLORIDE 1000 MILLILITER(S): 9 INJECTION INTRAMUSCULAR; INTRAVENOUS; SUBCUTANEOUS at 16:06

## 2023-11-28 NOTE — ED PROVIDER NOTE - PHYSICAL EXAMINATION
General:     NAD, well-nourished, well-appearing  Head:     NC/AT, EOMI, oral mucosa moist  Neck:     trachea midline  Lungs:     CTA b/l, no w/r/r Ecchymosis of bilateral chest wall tenderness in the bilateral chest wall  CVS:     S1S2, RRR, no m/g/r  Abd:     +BS, s/Generalized abdominal tenderness no rebound no guarding/nd, no organomegaly  Ext:    2+ radial and pedal pulses, no c/c/e  Neuro: AAOx3, no sensory/motor deficits

## 2023-11-28 NOTE — ED ADULT NURSE NOTE - NSFALLRISKINTERV_ED_ALL_ED

## 2023-11-28 NOTE — ED PROVIDER NOTE - CARE PLAN
Principal Discharge DX:	Acute cholecystitis  Secondary Diagnosis:	Recurrent falls while walking  Secondary Diagnosis:	Chest wall contusion   1

## 2023-11-28 NOTE — PATIENT PROFILE ADULT - FALL HARM RISK - HARM RISK INTERVENTIONS

## 2023-11-28 NOTE — PATIENT PROFILE ADULT - HAVE YOU RECENTLY LOST WEIGHT WITHOUT TRYING?
1951 Agitated. Pulling bipap off. Trying to get out of bed. Ativan 1 mg IVP given. 20:30  Remains restless. Continues to pull at bipap.    21:45  Bipap off. Unwilling to put back on. Sats 96%  RR 30. Switched to [address] [place] [address] 4 L.    05:30  Attempted to jump out of bed. Swinging at nurse. Cursing at nurse and tech. Ativan IV given.   Restraints applied No (0)

## 2023-11-28 NOTE — H&P ADULT - NSHPREVIEWOFSYSTEMS_GEN_ALL_CORE
REVIEW OF SYSTEMS:  CONSTITUTIONAL: No weakness, fevers or chills  EYES/ENT: No visual changes;  No vertigo or throat pain   NECK: No pain or stiffness  RESPIRATORY: No cough, wheezing, hemoptysis; No shortness of breath  CARDIOVASCULAR: No chest pain or palpitations  GASTROINTESTINAL: + abdominal pain, constipation. No nausea, vomiting, or hematemesis; No diarrhea. No melena or hematochezia.  GENITOURINARY: No dysuria, frequency or hematuria  NEUROLOGICAL: No numbness or weakness  SKIN: No itching, burning, rashes, or lesions. + bruise on abdomen   All other review of systems is negative unless indicated above

## 2023-11-28 NOTE — ED ADULT NURSE NOTE - OBJECTIVE STATEMENT
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 Pt reports he has been feeling like his for about one week. Pt denies any further complaints.

## 2023-11-28 NOTE — ED PROVIDER NOTE - OBJECTIVE STATEMENT
Pt is a 69 y/o male presenting to the ER fo dizziness, SOB, abdominal pain x 6 days. Pt's aid states he was at [facility] on 11/23 for the same symptoms. Aid states he has not been eating and cant stand up due to the dizziness. On Sunday, pt fell two times and since them has been crawling around his house. Pt denies LOC. Every time pt stands up he looses balance and experiences dizziness. Pt is not dizzy at rest.  called pt today to see if he was better and he said he was not so  told him to come back in.  Pt also has had fevers and chills every night. Pt denies chest pain, nausea, vomiting, or diarrhea. Used  to communicate with patient and aid - [id number] Kalani Pt is a 67 y/o male presenting to the ER fo dizziness, SOB, abdominal pain x 6 days. Pt's aid states he was at [facility] on 11/23 for the same symptoms. Aid states he has not been eating and cant stand up due to the dizziness. On Sunday, pt fell two times and since them has been crawling around his house. Pt denies LOC. Every time pt stands up he looses balance and experiences dizziness. Pt is not dizzy at rest.  called pt today to see if he was better and he said he was not so  told him to come back in.  Pt also has had fevers and chills every night. Pt denies chest pain, nausea, vomiting, or diarrhea. Used  to communicate with patient and aid - [id number] Kalani Pt is a 69 y/o male presenting to the ER fo dizziness, SOB, abdominal pain x 6 days. Pt's aid states he was at [facility] on 11/23 for the same symptoms. Aid states he has not been eating and cant stand up due to the dizziness. On Sunday, pt fell two times and since them has been crawling around his house. Pt denies LOC. Every time pt stands up he looses balance and experiences dizziness. Pt is not dizzy at rest.  called pt today to see if he was better and he said he was not so  told him to come back in.  Pt also has had fevers and chills every night. Pt denies chest pain, nausea, vomiting, or diarrhea. Used  to communicate with patient and aid - [id number] Kalani Pt is a 69 y/o male presenting to the ER fo dizziness, SOB, abdominal pain x 6 days. Pt's aid states he was at [facility] on 11/23 for the same symptoms. Aid states he has not been eating and cant stand up due to the dizziness. On Sunday, pt fell two times and since then has been crawling around his house. Pt denies LOC. Every time pt stands up he looses balance and experiences dizziness. Pt is not dizzy at rest.  called pt today to see if he was better and he said he was not so  told him to come back in.  Pt also has had fevers and chills every night. Pt denies chest pain, nausea, vomiting, or diarrhea. Used  to communicate with patient and aid - [id number] Kalani Pt is a 69 y/o male presenting to the ER fo dizziness, SOB, abdominal pain x 6 days. Pt's aid states he was at [facility] on 11/23 for the same symptoms. Aid states he has not been eating and cant stand up due to the dizziness. On Sunday, pt fell two times and since then has been crawling around his house. Pt denies LOC. Every time pt stands up he looses balance and experiences dizziness. Pt is not dizzy at rest.  called pt today to see if he was better and he said he was not so  told him to come back in.  Pt also has had fevers and chills every night. Pt denies chest pain, nausea, vomiting, or diarrhea. Used  to communicate with patient and aid - [id number] Kalani Pt is a 67 y/o male presenting to the ER fo dizziness, SOB, abdominal pain x 6 days. Pt's aid states he was at [facility] on 11/23 for the same symptoms. Aid states he has not been eating and cant stand up due to the dizziness. On Sunday, pt fell two times and since then has been crawling around his house. Pt denies LOC. Every time pt stands up he looses balance and experiences dizziness. Pt is not dizzy at rest.  called pt today to see if he was better and he said he was not so  told him to come back in.  Pt also has had fevers and chills every night. Pt denies chest pain, nausea, vomiting, or diarrhea. Used  to communicate with patient and aid - [id number] Kalani

## 2023-11-28 NOTE — H&P ADULT - ASSESSMENT
66 y/o Ukrainian speaking male with PMH of sinus node disorder s/p PPM, HTN, HLD, cerebral aneurysm s/p coiling, CVA with residual L sided weakness seizure disorder, DM2 on Metformin and gastritis who presented to [facility] ED with complaint of abdominal pain that started 11/24. Pt being admitted for:    [] Acute Cholecystitis   - admit to medicine   - Sx treatment:   - IVF, NS @75cc/hr  - Antibiotics : Started on Zosyn 3.375mg   - Symptom management with morphine Q8 for pain, Zofran for nausea,   - f/u Blood culture  - Surgical consult placed  - Monitor LFTs    [] Transaminitis  -, , Alk phos 131  -History suspicious for NAFLD   -Trend CMP  -Continue to monitor, lifestyle modification counseling    [] Hx of HTN  - c/w Metoprolol 25mg BID  - monitor BP    [] Hx of seizure disorders  - c/w Carbamezapine 300mg BID  - c/w Lorazepam 0.5 mg TID    [] HX of CVA and HLD  - c/w Atorvastatin 80mg QD  - c/w aspirin 81mg    [] Hx of DM2  - Hold home metformim 500mg  - c/w ISS  - NPO    DVT ppx: sub q hep    - code status: full code    -d/w Dr. Laws 66 y/o English speaking male with PMH of sinus node disorder s/p PPM, HTN, HLD, cerebral aneurysm s/p coiling, CVA with residual L sided weakness seizure disorder, DM2 on Metformin and gastritis who presented to [facility] ED with complaint of abdominal pain that started 11/24. Pt being admitted for:    [] Acute Cholecystitis   - admit to medicine   - Sx treatment:   - IVF, NS @75cc/hr  - Antibiotics : Started on Zosyn 3.375mg   - Symptom management with morphine Q8 for pain, Zofran for nausea,   - f/u Blood culture  - Surgical consult placed  - Monitor LFTs    [] Transaminitis  -, , Alk phos 131  -History suspicious for NAFLD   -Trend CMP  -Continue to monitor, lifestyle modification counseling    [] CHRISTOPHER  - Cr 1.50 with Baseline around 1.13  - likely pre-renal  - c/w IVF at 75cc/hr    [] Hypokalemia  - K: 3.3  - EKG: normal sinus  - IV potassium ordered  - f/u AM CMP    [] Hx of HTN  - c/w Metoprolol 25mg BID  - monitor BP    [] Hx of seizure disorders  - c/w Carbamezapine 300mg BID  - c/w Lorazepam 0.5 mg TID    [] HX of CVA and HLD  - c/w Atorvastatin 80mg QD  - c/w aspirin 81mg    [] Hx of DM2  - Hold home metformim 500mg  - c/w ISS  - NPO    DVT ppx: sub q hep    - code status: full code    -d/w Dr. Laws

## 2023-11-28 NOTE — ED PROVIDER NOTE - NS ED ATTENDING STATEMENT MOD
I have seen and examined this patient and fully participated in the care of this patient as the teaching attending.  The service was shared with the ADRIÁN.  I reviewed and verified the documentation and independently performed the documented:

## 2023-11-28 NOTE — ED PROVIDER NOTE - CLINICAL SUMMARY MEDICAL DECISION MAKING FREE TEXT BOX
Pt is a 69 y/o male presenting to the ER fo dizziness, SOB, abdominal pain x 6 days. Pt's aid states he was at [facility] on 11/23 for the same symptoms. Aid states he has not been eating and cant stand up due to the dizziness. On Sunday, pt fell two times and since then has been crawling around his house. Pt denies LOC. Every time pt stands up he looses balance and experiences dizziness. Pt is not dizzy at rest.  called pt today to see if he was better and he said he was not so  told him to come back in.  Pt also has had fevers and chills every night. Pt denies chest pain, nausea, vomiting, or diarrhea. Used  to communicate with patient and aid - [id number] Kalani  CT abdomen is consistent with acute cholecystitis Case discussed with Dr. Bro plan to admit the patient Pt is a 69 y/o male presenting to the ER fo dizziness, SOB, abdominal pain x 6 days. Pt's aid states he was at [facility] on 11/23 for the same symptoms. Aid states he has not been eating and cant stand up due to the dizziness. On Sunday, pt fell two times and since then has been crawling around his house. Pt denies LOC. Every time pt stands up he looses balance and experiences dizziness. Pt is not dizzy at rest.  called pt today to see if he was better and he said he was not so  told him to come back in.  Pt also has had fevers and chills every night. Pt denies chest pain, nausea, vomiting, or diarrhea. Used  to communicate with patient and aid - [id number] Kalani  CT abdomen is consistent with acute cholecystitis Case discussed with Dr. Bro plan to admit the patient Pt is a 67 y/o male presenting to the ER fo dizziness, SOB, abdominal pain x 6 days. Pt's aid states he was at [facility] on 11/23 for the same symptoms. Aid states he has not been eating and cant stand up due to the dizziness. On Sunday, pt fell two times and since then has been crawling around his house. Pt denies LOC. Every time pt stands up he looses balance and experiences dizziness. Pt is not dizzy at rest.  called pt today to see if he was better and he said he was not so  told him to come back in.  Pt also has had fevers and chills every night. Pt denies chest pain, nausea, vomiting, or diarrhea. Used  to communicate with patient and aid - [id number] Kalani  CT abdomen is consistent with acute cholecystitis Case discussed with Dr. Bro plan to admit the patient

## 2023-11-28 NOTE — ED PROVIDER NOTE - ADMIT DISPOSITION PRESENT ON ADMISSION SEPSIS
"[facility]:    [facility] [address] [facility] - [phone]  [facility] - [address]    Clinical concerns: Lower thigh abrasion infection. Currently on abx. Provider evaluated.   8 minutes for nursing intake (face to face time)     Ganesh Hoang RN                " No

## 2023-11-28 NOTE — H&P ADULT - NSICDXPASTSURGICALHX_GEN_ALL_CORE_FT
PAST SURGICAL HISTORY:  Pacemaker Medtronic Serial [id number]/Model [id number]  Implant 6/29/07, new generator 12/23/2016    S/P bunionectomy     S/P cataract surgery with lens implants    S/P craniotomy for RCMA 1998    Status post coil embolization of cerebral aneurysm 9/2018

## 2023-11-28 NOTE — H&P ADULT - ATTENDING COMMENTS
66 y/o male presented to ED c/o generalized abdominal pain associated with nausea and dizziness  Lab with transaminitis  CT with acute rajeev  surgery consulted from ED    Plan:  NPO w IVF  Morphine PRN for pain  obtain RUQ US  C/w zosyn for now

## 2023-11-28 NOTE — H&P ADULT - NSICDXPASTMEDICALHX_GEN_ALL_CORE_FT
PAST MEDICAL HISTORY:  Aneurysm of [place] of [place]     Bradycardia     Cerebral aneurysm without rupture diagnosed 2017 had cerebral angio at [facility]    CVA (cerebral vascular accident) left arm weakness, no sensation in left side in 1998    Depression     Epilepsy last seizure 2016    Gastroesophageal reflux disease, esophagitis presence not specified     Glaucoma of both eyes, unspecified glaucoma type     HLD (hyperlipidemia)     Language barrier Yoruba speaking    Renal agenesis, unilateral     T2DM (type 2 diabetes mellitus)

## 2023-11-28 NOTE — H&P ADULT - HISTORY OF PRESENT ILLNESS
66 y/o Hebrew speaking male with PMH of sinus node disorder s/p PPM, HTN, HLD, cerebral aneurysm s/p coiling, CVA with residual L sided weaknes, seizure disorder, DM2 on Metformin and gastritis who presented to [place] ED with complaint of abdominal pain that started 11/24. Pt  reports pain has gotten progressively worse since Friday. Pt states the pain is associated with dizziness and reports one episode of fall from seated position on Friday. Pt denies LOC and hitting his head. Pt also states he has not had an appetite to eat because of the abdominal pain. Pt states he also in experiencing constipation with last bowel movement on Saturday 11/25. Pt denies diarrhea, N/V, chest pain, fevers, chills.     In the ED /73, HR 71, Temp 98.2, RR 18 and SpO2 98 on RA, CT chest abd/pelvis showed Distended gallbladder with gallbladder wall thickening and pericholecystic fat stranding and hemangioma in T7 vertebrae. Pt received x1 dose of Zosyn.

## 2023-11-28 NOTE — ED ADULT NURSE NOTE - NSICDXPASTMEDICALHX_GEN_ALL_CORE_FT
PAST MEDICAL HISTORY:  Aneurysm of [place] of [place]     Bradycardia     Cerebral aneurysm without rupture diagnosed 2017 had cerebral angio at [facility]    CVA (cerebral vascular accident) left arm weakness, no sensation in left side in 1998    Depression     Epilepsy last seizure 2016    Gastroesophageal reflux disease, esophagitis presence not specified     Glaucoma of both eyes, unspecified glaucoma type     HLD (hyperlipidemia)     Language barrier Sami speaking    Renal agenesis, unilateral     T2DM (type 2 diabetes mellitus)

## 2023-11-28 NOTE — H&P ADULT - NSHPPHYSICALEXAM_GEN_ALL_CORE
Vital Signs Last 24 Hrs  T(C): 36.8 (28 Nov 2023 19:35), Max: 36.8 (28 Nov 2023 15:01)  T(F): 98.2 (28 Nov 2023 19:35), Max: 98.2 (28 Nov 2023 15:01)  HR: 67 (28 Nov 2023 19:35) (67 - 71)  BP: 120/60 (28 Nov 2023 19:35) (113/73 - 120/60)  BP(mean): 76 (28 Nov 2023 16:32) (76 - 76)  RR: 18 (28 Nov 2023 19:35) (18 - 19)  SpO2: 98% (28 Nov 2023 19:35) (98% - 98%)    Parameters below as of 28 Nov 2023 19:35  Patient On (Oxygen Delivery Method): room air      PHYSICAL EXAM:  Constitutional: NAD, awake and alert, well-developed  HEENT: EOMI, Normal Hearing, MMM  Neck: Soft and supple, No LAD,   Respiratory: Breath sounds are clear bilaterally, No wheezing, rales or rhonchi  Cardiovascular: S1 and S2, regular rate and rhythm, no Murmurs, gallops or rubs  Gastrointestinal: Bowel Sounds present, soft, + diffuse abdominal tenderness on palpation  Extremities: No peripheral edema  Vascular: 2+ peripheral pulses  Neurological: A/O x 3, no focal deficits  Musculoskeletal: Moving all extremities   Skin: No rashes

## 2023-11-28 NOTE — ED PROVIDER NOTE - NSICDXPASTSURGICALHX_GEN_ALL_CORE_FT
PAST SURGICAL HISTORY:  Pacemaker Medtronic Serial [id number]/Model [id number]  Implant 6/29/07, new generator 12/23/2016    S/P bunionectomy     S/P cataract surgery with lens implants    S/P craniotomy for RCMA 1998    Status post coil embolization of cerebral aneurysm 9/2018     PAST SURGICAL HISTORY:  Pacemaker Medtronic Serial [id number]/Model [id number]  Implant 6/29/07, new generator 12/23/2016    S/P bunionectomy     S/P cataract surgery with lens implants    S/P craniotomy for RCMA 1998    Status post coil embolization of cerebral aneurysm 9/2018     PAST SURGICAL HISTORY:  Pacemaker Medtronic Serial [id number]/Model [id number]  Implant 6/29/07, new generator 12/23/2016    S/P bunionectomy     S/P cataract surgery with lens implants    S/P craniotomy for RCMA 1998    Status post coil embolization of cerebral aneurysm 9/2018

## 2023-11-28 NOTE — ED PROVIDER NOTE - ATTENDING CONTRIBUTION TO CARE
Pt is a 69 y/o male presenting to the ER fo dizziness, SOB, abdominal pain x 6 days. Pt's aid states he was at [facility] on 11/23 for the same symptoms. Aid states he has not been eating and cant stand up due to the dizziness. On Sunday, pt fell two times and since then has been crawling around his house. Pt denies LOC. Every time pt stands up he looses balance and experiences dizziness. Pt is not dizzy at rest.  called pt today to see if he was better and he said he was not so  told him to come back in.  Pt also has had fevers and chills every night. Pt denies chest pain, nausea, vomiting, or diarrhea. Used  to communicate with patient and aid - [id number] Kalani  CT abdomen is consistent with acute cholecystitis Case discussed with Dr. Bro plan to admit the patient  Dr. Johnson:  I have reviewed and discussed with the PA/ student  the case specifics, including the history, physical assessment, evaluation, conclusion, laboratory results, and medical plan. I agree with the contents, and conclusions. I have personally examined, and interviewed the patient. Pt is a 69 y/o male presenting to the ER fo dizziness, SOB, abdominal pain x 6 days. Pt's aid states he was at [facility] on 11/23 for the same symptoms. Aid states he has not been eating and cant stand up due to the dizziness. On Sunday, pt fell two times and since then has been crawling around his house. Pt denies LOC. Every time pt stands up he looses balance and experiences dizziness. Pt is not dizzy at rest.  called pt today to see if he was better and he said he was not so  told him to come back in.  Pt also has had fevers and chills every night. Pt denies chest pain, nausea, vomiting, or diarrhea. Used  to communicate with patient and aid - [id number] Kalani  CT abdomen is consistent with acute cholecystitis Case discussed with Dr. Bro plan to admit the patient  Dr. Johnson:  I have reviewed and discussed with the PA/ student  the case specifics, including the history, physical assessment, evaluation, conclusion, laboratory results, and medical plan. I agree with the contents, and conclusions. I have personally examined, and interviewed the patient. Pt is a 69 y/o male presenting to the ER fo dizziness, SOB, abdominal pain x 6 days. Pt's aid states he was at [facility] on 11/23 for the same symptoms. Aid states he has not been eating and cant stand up due to the dizziness. On Sunday, pt fell two times and since then has been crawling around his house. Pt denies LOC. Every time pt stands up he looses balance and experiences dizziness. Pt is not dizzy at rest.  called pt today to see if he was better and he said he was not so  told him to come back in.  Pt also has had fevers and chills every night. Pt denies chest pain, nausea, vomiting, or diarrhea. Used  to communicate with patient and aid - [id number] Kalani  CT abdomen is consistent with acute cholecystitis Case discussed with Dr. Bro plan to admit the patient  Dr. Johnson:  I have reviewed and discussed with the PA/ student  the case specifics, including the history, physical assessment, evaluation, conclusion, laboratory results, and medical plan. I agree with the contents, and conclusions. I have personally examined, and interviewed the patient.

## 2023-11-28 NOTE — ED PROVIDER NOTE - NSICDXPASTMEDICALHX_GEN_ALL_CORE_FT
PAST MEDICAL HISTORY:  Aneurysm of [place] of [place]     Bradycardia     Cerebral aneurysm without rupture diagnosed 2017 had cerebral angio at [facility]    CVA (cerebral vascular accident) left arm weakness, no sensation in left side in 1998    Depression     Epilepsy last seizure 2016    Gastroesophageal reflux disease, esophagitis presence not specified     Glaucoma of both eyes, unspecified glaucoma type     HLD (hyperlipidemia)     Language barrier Swedish speaking    Renal agenesis, unilateral     T2DM (type 2 diabetes mellitus)      PAST MEDICAL HISTORY:  Aneurysm of [place] of [place]     Bradycardia     Cerebral aneurysm without rupture diagnosed 2017 had cerebral angio at [facility]    CVA (cerebral vascular accident) left arm weakness, no sensation in left side in 1998    Depression     Epilepsy last seizure 2016    Gastroesophageal reflux disease, esophagitis presence not specified     Glaucoma of both eyes, unspecified glaucoma type     HLD (hyperlipidemia)     Language barrier Turkmen speaking    Renal agenesis, unilateral     T2DM (type 2 diabetes mellitus)      PAST MEDICAL HISTORY:  Aneurysm of [place] of [place]     Bradycardia     Cerebral aneurysm without rupture diagnosed 2017 had cerebral angio at [facility]    CVA (cerebral vascular accident) left arm weakness, no sensation in left side in 1998    Depression     Epilepsy last seizure 2016    Gastroesophageal reflux disease, esophagitis presence not specified     Glaucoma of both eyes, unspecified glaucoma type     HLD (hyperlipidemia)     Language barrier German speaking    Renal agenesis, unilateral     T2DM (type 2 diabetes mellitus)

## 2023-11-28 NOTE — ED ADULT NURSE NOTE - NSFALLOOBATTEMPT_ED_ALL_ED
No Consent (Ear)/Introductory Paragraph: The rationale for Mohs was explained to the patient and consent was obtained. The risks, benefits and alternatives to therapy were discussed in detail. Specifically, the risks of ear deformity, infection, scarring, bleeding, prolonged wound healing, incomplete removal, allergy to anesthesia, nerve injury and recurrence were addressed. Prior to beginning the procedure, the surgical site was identified with the assistance of the patient and the medical record, including photographs and/or maps if available. The site was then clearly confirmed by the patient by direct visualization and/or the use of a hand mirror and a cotton-tipped applicator stick. All components of Universal Protocol/PAUSE Rule were completed: Prior to beginning the procedure, a pause was taken during which the surgeon, his assistant, and the patient verbally confirmed the patient's identification, the intended procedure, and the correct surgical site. Pause time:

## 2023-11-28 NOTE — ED ADULT NURSE NOTE - NSICDXPASTSURGICALHX_GEN_ALL_CORE_FT
PAST SURGICAL HISTORY:  Pacemaker Medtronic Serial [id number]/Model [id number]  Implant 6/29/07, new generator 12/23/2016    S/P bunionectomy     S/P cataract surgery with lens implants    S/P craniotomy for RCMA 1998    Status post coil embolization of cerebral aneurysm 9/2018

## 2023-11-28 NOTE — CHART NOTE - NSCHARTNOTEFT_GEN_A_CORE
68 y o male presenting to the ED on 11/24 complaining of chest pain.  SW made a follow up call to assist with scheduling the recommended pcp appointment and spoke to caregiver.  The patient was not feeling and the caregiver was agreeable to assistance in scheduling a today's 11/28 appointment with Dr. Carroll's office.  GLORIA contacted Dr. Carroll's office and confirmed a 4:30 pm 11/28  appointment, however, Dr. Carroll's office will call the patient to discuss symptoms prior to the appointment. 68 y o male presenting to the ED on 11/24 complaining of chest pain.  SW made a follow up call to assist with scheduling the recommended pcp appointment and spoke to caregiver.  The patient was not feeling well and the caregiver was agreeable to assistance in scheduling a today's 11/28 appointment with Dr. Carroll's office.  GLORIA contacted Dr. Carroll's office and confirmed a 4:30 pm 11/28  appointment, however, Dr. Carroll's office will call the patient to discuss symptoms prior to the appointment.

## 2023-11-29 ENCOUNTER — TRANSCRIPTION ENCOUNTER (OUTPATIENT)
Age: 68
End: 2023-11-29

## 2023-11-29 DIAGNOSIS — Z01.818 ENCOUNTER FOR OTHER PREPROCEDURAL EXAMINATION: ICD-10-CM

## 2023-11-29 DIAGNOSIS — Z29.9 ENCOUNTER FOR PROPHYLACTIC MEASURES, UNSPECIFIED: ICD-10-CM

## 2023-11-29 LAB
A1C WITH ESTIMATED AVERAGE GLUCOSE RESULT: 6.1 % — HIGH (ref 4–5.6)
A1C WITH ESTIMATED AVERAGE GLUCOSE RESULT: 6.1 % — HIGH (ref 4–5.6)
ALBUMIN SERPL ELPH-MCNC: 2.2 G/DL — LOW (ref 3.3–5)
ALBUMIN SERPL ELPH-MCNC: 2.2 G/DL — LOW (ref 3.3–5)
ALP SERPL-CCNC: 124 U/L — HIGH (ref 40–120)
ALP SERPL-CCNC: 124 U/L — HIGH (ref 40–120)
ALT FLD-CCNC: 178 U/L — HIGH (ref 10–45)
ALT FLD-CCNC: 178 U/L — HIGH (ref 10–45)
ANION GAP SERPL CALC-SCNC: 10 MMOL/L — SIGNIFICANT CHANGE UP (ref 5–17)
ANION GAP SERPL CALC-SCNC: 10 MMOL/L — SIGNIFICANT CHANGE UP (ref 5–17)
AST SERPL-CCNC: 149 U/L — HIGH (ref 10–40)
AST SERPL-CCNC: 149 U/L — HIGH (ref 10–40)
BASOPHILS # BLD AUTO: 0.03 K/UL — SIGNIFICANT CHANGE UP (ref 0–0.2)
BASOPHILS # BLD AUTO: 0.03 K/UL — SIGNIFICANT CHANGE UP (ref 0–0.2)
BASOPHILS NFR BLD AUTO: 0.4 % — SIGNIFICANT CHANGE UP (ref 0–2)
BASOPHILS NFR BLD AUTO: 0.4 % — SIGNIFICANT CHANGE UP (ref 0–2)
BILIRUB SERPL-MCNC: 1 MG/DL — SIGNIFICANT CHANGE UP (ref 0.2–1.2)
BILIRUB SERPL-MCNC: 1 MG/DL — SIGNIFICANT CHANGE UP (ref 0.2–1.2)
BUN SERPL-MCNC: 17 MG/DL — SIGNIFICANT CHANGE UP (ref 7–23)
BUN SERPL-MCNC: 17 MG/DL — SIGNIFICANT CHANGE UP (ref 7–23)
CALCIUM SERPL-MCNC: 8.4 MG/DL — SIGNIFICANT CHANGE UP (ref 8.4–10.5)
CALCIUM SERPL-MCNC: 8.4 MG/DL — SIGNIFICANT CHANGE UP (ref 8.4–10.5)
CHLORIDE SERPL-SCNC: 102 MMOL/L — SIGNIFICANT CHANGE UP (ref 96–108)
CHLORIDE SERPL-SCNC: 102 MMOL/L — SIGNIFICANT CHANGE UP (ref 96–108)
CO2 SERPL-SCNC: 25 MMOL/L — SIGNIFICANT CHANGE UP (ref 22–31)
CO2 SERPL-SCNC: 25 MMOL/L — SIGNIFICANT CHANGE UP (ref 22–31)
CREAT SERPL-MCNC: 1.35 MG/DL — HIGH (ref 0.5–1.3)
CREAT SERPL-MCNC: 1.35 MG/DL — HIGH (ref 0.5–1.3)
EGFR: 57 ML/MIN/1.73M2 — LOW
EGFR: 57 ML/MIN/1.73M2 — LOW
EOSINOPHIL # BLD AUTO: 0.04 K/UL — SIGNIFICANT CHANGE UP (ref 0–0.5)
EOSINOPHIL # BLD AUTO: 0.04 K/UL — SIGNIFICANT CHANGE UP (ref 0–0.5)
EOSINOPHIL NFR BLD AUTO: 0.5 % — SIGNIFICANT CHANGE UP (ref 0–6)
EOSINOPHIL NFR BLD AUTO: 0.5 % — SIGNIFICANT CHANGE UP (ref 0–6)
ESTIMATED AVERAGE GLUCOSE: 128 MG/DL — HIGH (ref 68–114)
ESTIMATED AVERAGE GLUCOSE: 128 MG/DL — HIGH (ref 68–114)
GLUCOSE BLDC GLUCOMTR-MCNC: 118 MG/DL — HIGH (ref 70–99)
GLUCOSE BLDC GLUCOMTR-MCNC: 118 MG/DL — HIGH (ref 70–99)
GLUCOSE BLDC GLUCOMTR-MCNC: 138 MG/DL — HIGH (ref 70–99)
GLUCOSE BLDC GLUCOMTR-MCNC: 138 MG/DL — HIGH (ref 70–99)
GLUCOSE BLDC GLUCOMTR-MCNC: 216 MG/DL — HIGH (ref 70–99)
GLUCOSE BLDC GLUCOMTR-MCNC: 216 MG/DL — HIGH (ref 70–99)
GLUCOSE SERPL-MCNC: 149 MG/DL — HIGH (ref 70–99)
GLUCOSE SERPL-MCNC: 149 MG/DL — HIGH (ref 70–99)
HCT VFR BLD CALC: 32.4 % — LOW (ref 39–50)
HCT VFR BLD CALC: 32.4 % — LOW (ref 39–50)
HGB BLD-MCNC: 10.8 G/DL — LOW (ref 13–17)
HGB BLD-MCNC: 10.8 G/DL — LOW (ref 13–17)
IMM GRANULOCYTES NFR BLD AUTO: 1 % — HIGH (ref 0–0.9)
IMM GRANULOCYTES NFR BLD AUTO: 1 % — HIGH (ref 0–0.9)
LYMPHOCYTES # BLD AUTO: 0.42 K/UL — LOW (ref 1–3.3)
LYMPHOCYTES # BLD AUTO: 0.42 K/UL — LOW (ref 1–3.3)
LYMPHOCYTES # BLD AUTO: 5.1 % — LOW (ref 13–44)
LYMPHOCYTES # BLD AUTO: 5.1 % — LOW (ref 13–44)
MAGNESIUM SERPL-MCNC: 2.1 MG/DL — SIGNIFICANT CHANGE UP (ref 1.6–2.6)
MAGNESIUM SERPL-MCNC: 2.1 MG/DL — SIGNIFICANT CHANGE UP (ref 1.6–2.6)
MCHC RBC-ENTMCNC: 32.5 PG — SIGNIFICANT CHANGE UP (ref 27–34)
MCHC RBC-ENTMCNC: 32.5 PG — SIGNIFICANT CHANGE UP (ref 27–34)
MCHC RBC-ENTMCNC: 33.3 GM/DL — SIGNIFICANT CHANGE UP (ref 32–36)
MCHC RBC-ENTMCNC: 33.3 GM/DL — SIGNIFICANT CHANGE UP (ref 32–36)
MCV RBC AUTO: 97.6 FL — SIGNIFICANT CHANGE UP (ref 80–100)
MCV RBC AUTO: 97.6 FL — SIGNIFICANT CHANGE UP (ref 80–100)
MONOCYTES # BLD AUTO: 0.89 K/UL — SIGNIFICANT CHANGE UP (ref 0–0.9)
MONOCYTES # BLD AUTO: 0.89 K/UL — SIGNIFICANT CHANGE UP (ref 0–0.9)
MONOCYTES NFR BLD AUTO: 10.9 % — SIGNIFICANT CHANGE UP (ref 2–14)
MONOCYTES NFR BLD AUTO: 10.9 % — SIGNIFICANT CHANGE UP (ref 2–14)
NEUTROPHILS # BLD AUTO: 6.7 K/UL — SIGNIFICANT CHANGE UP (ref 1.8–7.4)
NEUTROPHILS # BLD AUTO: 6.7 K/UL — SIGNIFICANT CHANGE UP (ref 1.8–7.4)
NEUTROPHILS NFR BLD AUTO: 82.1 % — HIGH (ref 43–77)
NEUTROPHILS NFR BLD AUTO: 82.1 % — HIGH (ref 43–77)
NRBC # BLD: 0 /100 WBCS — SIGNIFICANT CHANGE UP (ref 0–0)
NRBC # BLD: 0 /100 WBCS — SIGNIFICANT CHANGE UP (ref 0–0)
PLATELET # BLD AUTO: 102 K/UL — LOW (ref 150–400)
PLATELET # BLD AUTO: 102 K/UL — LOW (ref 150–400)
POTASSIUM SERPL-MCNC: 3.4 MMOL/L — LOW (ref 3.5–5.3)
POTASSIUM SERPL-MCNC: 3.4 MMOL/L — LOW (ref 3.5–5.3)
POTASSIUM SERPL-SCNC: 3.4 MMOL/L — LOW (ref 3.5–5.3)
POTASSIUM SERPL-SCNC: 3.4 MMOL/L — LOW (ref 3.5–5.3)
PROT SERPL-MCNC: 6.6 G/DL — SIGNIFICANT CHANGE UP (ref 6–8.3)
PROT SERPL-MCNC: 6.6 G/DL — SIGNIFICANT CHANGE UP (ref 6–8.3)
RBC # BLD: 3.32 M/UL — LOW (ref 4.2–5.8)
RBC # BLD: 3.32 M/UL — LOW (ref 4.2–5.8)
RBC # FLD: 13.4 % — SIGNIFICANT CHANGE UP (ref 10.3–14.5)
RBC # FLD: 13.4 % — SIGNIFICANT CHANGE UP (ref 10.3–14.5)
SODIUM SERPL-SCNC: 137 MMOL/L — SIGNIFICANT CHANGE UP (ref 135–145)
SODIUM SERPL-SCNC: 137 MMOL/L — SIGNIFICANT CHANGE UP (ref 135–145)
WBC # BLD: 8.16 K/UL — SIGNIFICANT CHANGE UP (ref 3.8–10.5)
WBC # BLD: 8.16 K/UL — SIGNIFICANT CHANGE UP (ref 3.8–10.5)
WBC # FLD AUTO: 8.16 K/UL — SIGNIFICANT CHANGE UP (ref 3.8–10.5)
WBC # FLD AUTO: 8.16 K/UL — SIGNIFICANT CHANGE UP (ref 3.8–10.5)

## 2023-11-29 PROCEDURE — 99222 1ST HOSP IP/OBS MODERATE 55: CPT | Mod: 57

## 2023-11-29 PROCEDURE — 78227 HEPATOBIL SYST IMAGE W/DRUG: CPT | Mod: 26

## 2023-11-29 PROCEDURE — 99233 SBSQ HOSP IP/OBS HIGH 50: CPT | Mod: GC

## 2023-11-29 RX ORDER — POTASSIUM CHLORIDE 20 MEQ
40 PACKET (EA) ORAL ONCE
Refills: 0 | Status: COMPLETED | OUTPATIENT
Start: 2023-11-29 | End: 2023-11-29

## 2023-11-29 RX ORDER — POTASSIUM CHLORIDE 20 MEQ
20 PACKET (EA) ORAL
Refills: 0 | Status: COMPLETED | OUTPATIENT
Start: 2023-11-29 | End: 2023-11-29

## 2023-11-29 RX ORDER — MORPHINE SULFATE 50 MG/1
3 CAPSULE, EXTENDED RELEASE ORAL ONCE
Refills: 0 | Status: DISCONTINUED | OUTPATIENT
Start: 2023-11-29 | End: 2023-11-29

## 2023-11-29 RX ORDER — POTASSIUM CHLORIDE 20 MEQ
20 PACKET (EA) ORAL
Refills: 0 | Status: DISCONTINUED | OUTPATIENT
Start: 2023-11-29 | End: 2023-11-29

## 2023-11-29 RX ADMIN — HEPARIN SODIUM 5000 UNIT(S): 5000 INJECTION INTRAVENOUS; SUBCUTANEOUS at 18:37

## 2023-11-29 RX ADMIN — MORPHINE SULFATE 3 MILLIGRAM(S): 50 CAPSULE, EXTENDED RELEASE ORAL at 13:15

## 2023-11-29 RX ADMIN — Medication 0.5 MILLIGRAM(S): at 16:13

## 2023-11-29 RX ADMIN — HEPARIN SODIUM 5000 UNIT(S): 5000 INJECTION INTRAVENOUS; SUBCUTANEOUS at 06:17

## 2023-11-29 RX ADMIN — Medication 81 MILLIGRAM(S): at 16:01

## 2023-11-29 RX ADMIN — Medication 0.5 MILLIGRAM(S): at 06:20

## 2023-11-29 RX ADMIN — Medication 20 MILLIEQUIVALENT(S): at 01:48

## 2023-11-29 RX ADMIN — Medication 300 MILLIGRAM(S): at 06:16

## 2023-11-29 RX ADMIN — Medication 20 MILLIEQUIVALENT(S): at 04:48

## 2023-11-29 RX ADMIN — PIPERACILLIN AND TAZOBACTAM 25 GRAM(S): 4; .5 INJECTION, POWDER, LYOPHILIZED, FOR SOLUTION INTRAVENOUS at 22:26

## 2023-11-29 RX ADMIN — PIPERACILLIN AND TAZOBACTAM 25 GRAM(S): 4; .5 INJECTION, POWDER, LYOPHILIZED, FOR SOLUTION INTRAVENOUS at 06:20

## 2023-11-29 RX ADMIN — Medication 300 MILLIGRAM(S): at 18:37

## 2023-11-29 RX ADMIN — Medication 20 MILLIGRAM(S): at 16:02

## 2023-11-29 RX ADMIN — Medication 25 MILLIGRAM(S): at 06:21

## 2023-11-29 RX ADMIN — Medication 0.5 MILLIGRAM(S): at 22:25

## 2023-11-29 RX ADMIN — ATORVASTATIN CALCIUM 80 MILLIGRAM(S): 80 TABLET, FILM COATED ORAL at 22:26

## 2023-11-29 RX ADMIN — FAMOTIDINE 20 MILLIGRAM(S): 10 INJECTION INTRAVENOUS at 16:02

## 2023-11-29 RX ADMIN — MORPHINE SULFATE 3 MILLIGRAM(S): 50 CAPSULE, EXTENDED RELEASE ORAL at 12:15

## 2023-11-29 RX ADMIN — PIPERACILLIN AND TAZOBACTAM 25 GRAM(S): 4; .5 INJECTION, POWDER, LYOPHILIZED, FOR SOLUTION INTRAVENOUS at 16:01

## 2023-11-29 NOTE — PROGRESS NOTE ADULT - PROBLEM SELECTOR PLAN 5
- c/w Metoprolol 25mg BID  - monitor BP - K: 3.3> 3.4  - EKG: normal sinus  - continue to monitor and replete as needed

## 2023-11-29 NOTE — PROGRESS NOTE ADULT - PROBLEM SELECTOR PLAN 1
- IVF, NS @75cc/hr  - c/w Zosyn 3.375mg -day:  - Symptom management with morphine Q8 for pain, Zofran for nausea,   - f/u Blood culture  - follow up surgery recommendations-  - Monitor LFTs - IVF, NS @75cc/hr  - c/w Zosyn 3.375mg -day: 2  - Symptom management with morphine Q8 for pain, Zofran for nausea  - f/u Blood culture  - HIDA scan: Abnormal morphine-augmented hepatobiliary scan. Findings are compatible with acute cholecystitis.  - surgery recommendations appreciated- surgery 11/30

## 2023-11-29 NOTE — PROGRESS NOTE ADULT - PROBLEM SELECTOR PLAN 8
- c/w Carbamezapine 300mg BID  - c/w Lorazepam 0.5 mg TID - Hold home metformin 500mg  - c/w ISS  - NPO

## 2023-11-29 NOTE — CONSULT NOTE ADULT - ASSESSMENT
IMPRESSION: Possible acute cholecystitis (acalculous?)    PLAN: Recommend HIDA scan           POSSIBLE LAP GB tomorrow           Monitor LFT's

## 2023-11-29 NOTE — PROGRESS NOTE ADULT - SUBJECTIVE AND OBJECTIVE BOX
Patient is a 68y old  Male who presents with a chief complaint of Acute Cholecystitis (28 Nov 2023 20:10)      INTERVAL HPI/OVERNIGHT EVENTS: Patient seen and examined at bedside. No overnight events.      MEDICATIONS  (STANDING):  aspirin enteric coated 81 milliGRAM(s) Oral daily  atorvastatin 80 milliGRAM(s) Oral at bedtime  carBAMazepine ER Tablet 300 milliGRAM(s) Oral two times a day  dextrose 5%. 1000 milliLiter(s) (50 mL/Hr) IV Continuous <Continuous>  dextrose 5%. 1000 milliLiter(s) (100 mL/Hr) IV Continuous <Continuous>  dextrose 50% Injectable 25 Gram(s) IV Push once  dextrose 50% Injectable 12.5 Gram(s) IV Push once  dextrose 50% Injectable 25 Gram(s) IV Push once  famotidine    Tablet 20 milliGRAM(s) Oral daily  FLUoxetine 20 milliGRAM(s) Oral daily  glucagon  Injectable 1 milliGRAM(s) IntraMuscular once  heparin   Injectable 5000 Unit(s) SubCutaneous every 12 hours  insulin lispro (ADMELOG) corrective regimen sliding scale   SubCutaneous three times a day before meals  insulin lispro (ADMELOG) corrective regimen sliding scale   SubCutaneous at bedtime  LORazepam     Tablet 0.5 milliGRAM(s) Oral three times a day  metoprolol tartrate 25 milliGRAM(s) Oral two times a day  piperacillin/tazobactam IVPB.. 3.375 Gram(s) IV Intermittent every 8 hours  sodium chloride 0.9%. 1000 milliLiter(s) (70 mL/Hr) IV Continuous <Continuous>    MEDICATIONS  (PRN):  dextrose Oral Gel 15 Gram(s) Oral once PRN Blood Glucose LESS THAN 70 milliGRAM(s)/deciliter  morphine  - Injectable 2 milliGRAM(s) IV Push every 8 hours PRN Severe Pain (7 - 10)  ondansetron    Tablet 4 milliGRAM(s) Oral every 8 hours PRN Nausea and/or Vomiting      Allergies    No Known Allergies    Intolerances        REVIEW OF SYSTEMS:  CONSTITUTIONAL: No fever or chills  HEENT:  No headache, no sore throat  RESPIRATORY: No cough, wheezing, or shortness of breath  CARDIOVASCULAR: No chest pain, palpitations  GASTROINTESTINAL: +abd pain, nausea, vomiting, or diarrhea  GENITOURINARY: No dysuria, frequency, or hematuria  NEUROLOGICAL: no focal weakness or dizziness  MUSCULOSKELETAL: no myalgias   SKIN: +bruise on abdomen    Vital Signs Last 24 Hrs  T(C): 37.7 (29 Nov 2023 05:08), Max: 37.7 (29 Nov 2023 05:08)  T(F): 99.9 (29 Nov 2023 05:08), Max: 99.9 (29 Nov 2023 05:08)  HR: 79 (29 Nov 2023 05:08) (63 - 79)  BP: 118/68 (29 Nov 2023 05:08) (113/73 - 122/78)  BP(mean): 76 (28 Nov 2023 16:32) (76 - 76)  RR: 18 (29 Nov 2023 05:08) (18 - 19)  SpO2: 96% (29 Nov 2023 05:08) (96% - 99%)    Parameters below as of 29 Nov 2023 05:08  Patient On (Oxygen Delivery Method): room air      I&O's Summary    BMI (kg/m2): 31.4 (11-28-23 @ 15:01)    PHYSICAL EXAM:        LABS: Personally reviewed  CBC                        10.8   8.16  )-----------( 102      ( 29 Nov 2023 05:53 )             32.4     CMP  11-29    137  |  102  |  17  ----------------------------<  149  3.4   |  25  |  1.35    Ca    8.4      29 Nov 2023 05:53  Mg     2.1     11-29    TPro  6.6  /  Alb  2.2  /  TBili  1.0  /  DBili  x   /  AST  149  /  ALT  178  /  AlkPhos  124  11-29      Lipase: 136 U/L (11-28-23 @ 16:00)      PT/INR - ( 28 Nov 2023 16:00 )   PT: 13.2 sec;   INR: 1.16 ratio               CARDIAC MARKERS ( 28 Nov 2023 16:00 )  x     / 19.7 ng/L / x     / x     / x                        POCT Blood Glucose.: 150 mg/dL (28 Nov 2023 23:19)      Urinalysis Basic - ( 29 Nov 2023 05:53 )    Color: x / Appearance: x / SG: x / pH: x  Gluc: 149 mg/dL / Ketone: x  / Bili: x / Urobili: x   Blood: x / Protein: x / Nitrite: x   Leuk Esterase: x / RBC: x / WBC x   Sq Epi: x / Non Sq Epi: x / Bacteria: x                RADIOLOGY & ADDITIONAL TESTS: Personally reviewed.     Consultant(s) Notes Reviewed:  [x] YES  [ ] NO   Discussed with GLORIA/EMIL, RN     Patient is a 68y old  Male who presents with a chief complaint of Acute Cholecystitis (28 Nov 2023 20:10)      INTERVAL HPI/ OVERNIGHT EVENTS: Patient seen and examined at bedside. No overnight events. Pt mentions he has no pain right now.      MEDICATIONS  (STANDING):  aspirin enteric coated 81 milliGRAM(s) Oral daily  atorvastatin 80 milliGRAM(s) Oral at bedtime  carBAMazepine ER Tablet 300 milliGRAM(s) Oral two times a day  dextrose 5%. 1000 milliLiter(s) (50 mL/Hr) IV Continuous <Continuous>  dextrose 5%. 1000 milliLiter(s) (100 mL/Hr) IV Continuous <Continuous>  dextrose 50% Injectable 25 Gram(s) IV Push once  dextrose 50% Injectable 12.5 Gram(s) IV Push once  dextrose 50% Injectable 25 Gram(s) IV Push once  famotidine    Tablet 20 milliGRAM(s) Oral daily  FLUoxetine 20 milliGRAM(s) Oral daily  glucagon  Injectable 1 milliGRAM(s) IntraMuscular once  heparin   Injectable 5000 Unit(s) SubCutaneous every 12 hours  insulin lispro (ADMELOG) corrective regimen sliding scale   SubCutaneous three times a day before meals  insulin lispro (ADMELOG) corrective regimen sliding scale   SubCutaneous at bedtime  LORazepam     Tablet 0.5 milliGRAM(s) Oral three times a day  metoprolol tartrate 25 milliGRAM(s) Oral two times a day  piperacillin/tazobactam IVPB.. 3.375 Gram(s) IV Intermittent every 8 hours  sodium chloride 0.9%. 1000 milliLiter(s) (70 mL/Hr) IV Continuous <Continuous>    MEDICATIONS  (PRN):  dextrose Oral Gel 15 Gram(s) Oral once PRN Blood Glucose LESS THAN 70 milliGRAM(s)/deciliter  morphine  - Injectable 2 milliGRAM(s) IV Push every 8 hours PRN Severe Pain (7 - 10)  ondansetron    Tablet 4 milliGRAM(s) Oral every 8 hours PRN Nausea and/or Vomiting      Allergies    No Known Allergies    Intolerances        REVIEW OF SYSTEMS:  CONSTITUTIONAL: No fever or chills  HEENT:  No headache, no sore throat  RESPIRATORY: No cough, wheezing, or shortness of breath  CARDIOVASCULAR: No chest pain, palpitations  GASTROINTESTINAL: +abd pain, nausea, vomiting, or diarrhea  GENITOURINARY: No dysuria, frequency, or hematuria  NEUROLOGICAL: no focal weakness or dizziness  MUSCULOSKELETAL: no myalgias   SKIN: +bruise on abdomen    Vital Signs Last 24 Hrs  T(C): 37.7 (29 Nov 2023 05:08), Max: 37.7 (29 Nov 2023 05:08)  T(F): 99.9 (29 Nov 2023 05:08), Max: 99.9 (29 Nov 2023 05:08)  HR: 79 (29 Nov 2023 05:08) (63 - 79)  BP: 118/68 (29 Nov 2023 05:08) (113/73 - 122/78)  BP(mean): 76 (28 Nov 2023 16:32) (76 - 76)  RR: 18 (29 Nov 2023 05:08) (18 - 19)  SpO2: 96% (29 Nov 2023 05:08) (96% - 99%)    Parameters below as of 29 Nov 2023 05:08  Patient On (Oxygen Delivery Method): room air      I&O's Summary    BMI (kg/m2): 31.4 (11-28-23 @ 15:01)    PHYSICAL EXAM:  Constitutional: NAD, awake and alert, well-developed  HEENT: EOMI, Normal Hearing, MMM  Neck: Soft and supple, No LAD,   Respiratory: Breath sounds are clear bilaterally, No wheezing, rales or rhonchi  Cardiovascular: S1 and S2, regular rate and rhythm, no Murmurs, gallops or rubs  Gastrointestinal: Bowel Sounds present, soft, diffuse abdominal tenderness on palpation  Extremities: No peripheral edema  Vascular: 2+ peripheral pulses  Neurological: A/O x 3, no focal deficits  Musculoskeletal: Moving all extremities   Skin: No rashes      LABS: Personally reviewed  CBC                        10.8   8.16  )-----------( 102      ( 29 Nov 2023 05:53 )             32.4     CMP  11-29    137  |  102  |  17  ----------------------------<  149  3.4   |  25  |  1.35    Ca    8.4      29 Nov 2023 05:53  Mg     2.1     11-29    TPro  6.6  /  Alb  2.2  /  TBili  1.0  /  DBili  x   /  AST  149  /  ALT  178  /  AlkPhos  124  11-29      Lipase: 136 U/L (11-28-23 @ 16:00)      PT/INR - ( 28 Nov 2023 16:00 )   PT: 13.2 sec;   INR: 1.16 ratio               CARDIAC MARKERS ( 28 Nov 2023 16:00 )  x     / 19.7 ng/L / x     / x     / x                        POCT Blood Glucose.: 150 mg/dL (28 Nov 2023 23:19)      Urinalysis Basic - ( 29 Nov 2023 05:53 )    Color: x / Appearance: x / SG: x / pH: x  Gluc: 149 mg/dL / Ketone: x  / Bili: x / Urobili: x   Blood: x / Protein: x / Nitrite: x   Leuk Esterase: x / RBC: x / WBC x   Sq Epi: x / Non Sq Epi: x / Bacteria: x                RADIOLOGY & ADDITIONAL TESTS: Personally reviewed.     Consultant(s) Notes Reviewed:  [x] YES  [ ] NO   Discussed with SW/EMIL, RN

## 2023-11-29 NOTE — PROGRESS NOTE ADULT - ASSESSMENT
68 y/o Irish speaking male with PMH of sinus node disorder s/p PPM, HTN, HLD, cerebral aneurysm s/p coiling, CVA with residual L sided weakness seizure disorder, DM2 on Metformin and gastritis who presented to [facility] ED with complaint of abdominal pain that started 11/24. Pt being admitted for:      #Full code

## 2023-11-29 NOTE — PROGRESS NOTE ADULT - PROBLEM SELECTOR PLAN 7
- Hold home metformin 500mg  - c/w ISS  - NPO - hx of CVA and HLD  - c/w Atorvastatin 80mg QD  - c/w aspirin 81mg

## 2023-11-29 NOTE — PROGRESS NOTE ADULT - PROBLEM SELECTOR PLAN 6
- hx of CVA and HLD  - c/w Atorvastatin 80mg QD  - c/w aspirin 81mg - c/w Metoprolol 25mg BID  - monitor BP

## 2023-11-29 NOTE — CONSULT NOTE ADULT - SUBJECTIVE AND OBJECTIVE BOX
This 68 year old man developed upper abdominal pain three days ago. The pain increased in intensity, and he presented to the ED last night. Imaging studies demonstrated a thickened GB with surrounding fluid - but no gallstones or CBD dilatation. The LFT's were elevated with a normal bilirubin. This morning, the patient stated that he no longer had any abdominal pain and denied nausea or vomiting; the LFT's were all improved.        PAST MEDICAL & SURGICAL HISTORY:  No pertinent past medical history.  No pertinent surgical procedures.    PFSH: All noncontributory    MEDICATIONS REVIEWED:aspirin enteric coated 81 milliGRAM(s) Oral daily  atorvastatin 80 milliGRAM(s) Oral at bedtime  carBAMazepine ER Tablet 300 milliGRAM(s) Oral two times a day  dextrose 5%. 1000 milliLiter(s) IV Continuous <Continuous>  dextrose 5%. 1000 milliLiter(s) IV Continuous <Continuous>  dextrose 50% Injectable 25 Gram(s) IV Push once  dextrose 50% Injectable 12.5 Gram(s) IV Push once  dextrose 50% Injectable 25 Gram(s) IV Push once  dextrose Oral Gel 15 Gram(s) Oral once PRN  famotidine    Tablet 20 milliGRAM(s) Oral daily  FLUoxetine 20 milliGRAM(s) Oral daily  glucagon  Injectable 1 milliGRAM(s) IntraMuscular once  heparin   Injectable 5000 Unit(s) SubCutaneous every 12 hours  insulin lispro (ADMELOG) corrective regimen sliding scale   SubCutaneous three times a day before meals  insulin lispro (ADMELOG) corrective regimen sliding scale   SubCutaneous at bedtime  LORazepam     Tablet 0.5 milliGRAM(s) Oral three times a day  metoprolol tartrate 25 milliGRAM(s) Oral two times a day  morphine  - Injectable 2 milliGRAM(s) IV Push every 8 hours PRN  ondansetron    Tablet 4 milliGRAM(s) Oral every 8 hours PRN  piperacillin/tazobactam IVPB.. 3.375 Gram(s) IV Intermittent every 8 hours  potassium chloride    Tablet ER 40 milliEquivalent(s) Oral once  sodium chloride 0.9%. 1000 milliLiter(s) IV Continuous <Continuous>      ALLERGIES:No Known Allergies      REVIEW OF SYSTEMS:  Comprehensive Review of Systems negative except as noted in HPI      PHYSICAL EXAMINATION:  RESPIRATORY: Clear to auscultation bilaterally, respirations non-labored.  CARDIAC: RR, normal S1S2, no murmurs.  ABDOMEN: soft, BS present, no masses, no hernias, no peritoneal signs, no KLS, nontender.  VASCULAR: Equal and normal pulses.  MUSCULOSKELETAL: Left sided weakness - S/P CVA      T(C): 37.7 (11-29-23 @ 05:08), Max: 37.7 (11-29-23 @ 05:08)  HR: 79 (11-29-23 @ 05:08) (63 - 79)  BP: 118/68 (11-29-23 @ 05:08) (113/73 - 122/78)  RR: 18 (11-29-23 @ 05:08) (18 - 19)  SpO2: 96% (11-29-23 @ 05:08) (96% - 99%)                          10.8   8.16  )-----------( 102      ( 29 Nov 2023 05:53 )             32.4       11-29    137  |  102  |  17  ----------------------------<  149<H>  3.4<L>   |  25  |  1.35<H>    Ca    8.4      29 Nov 2023 05:53  Mg     2.1     11-29    TPro  6.6  /  Alb  2.2<L>  /  TBili  1.0  /  DBili  x   /  AST  149<H>  /  ALT  178<H>  /  AlkPhos  124<H>  11-29

## 2023-11-29 NOTE — PROGRESS NOTE ADULT - PROBLEM SELECTOR PLAN 2
- Alk phos 131>124  - >149  - >178  - History suspicious for NAFLD   - Continue to monitor, lifestyle modification counseling - RCRI score 1  - Pt is medically optimized for lap cholecystectomy as long as he remains hemodynamically stable.

## 2023-11-29 NOTE — PROGRESS NOTE ADULT - PROBLEM SELECTOR PLAN 4
- K: 3.3> 3.4  - EKG: normal sinus  - continue to monitor and replete as needed - Cr 1.50> 1.35 (baseline around 1.13)  - likely pre-renal  - c/w IVF at 75cc/hr

## 2023-11-29 NOTE — PROGRESS NOTE ADULT - PROBLEM SELECTOR PLAN 3
- Cr 1.50> 1.35 (baseline around 1.13)  - likely pre-renal  - c/w IVF at 75cc/hr - Alk phos 131>124  - >149  - >178  - History suspicious for NAFLD   - Continue to monitor, lifestyle modification counseling

## 2023-11-29 NOTE — PROGRESS NOTE ADULT - ATTENDING COMMENTS
66 y/o Slovak speaking male with PMH of sinus node disorder s/p PPM, HTN, HLD, cerebral aneurysm s/p coiling, CVA with residual L sided weakness seizure disorder, DM2 on Metformin and gastritis who presented to [facility] ED with complaint of abdominal pain that started 11/24. Pt being admitted for acute cholecystitis. Surgery consulted recommendations appreciated. discussed with dr guy surgery, HIDA scan performed. Plan for lap rajeev tomorrow.  hypokalemia - replete KCl 40mEq x 1 follow up AM BMP

## 2023-11-29 NOTE — PROGRESS NOTE ADULT - TIME BILLING
Reviewing chart notes and data, reviewing telemetry monitor records, face to face time counseling the patient, communicating with Dr. Bro surgery, coordinating care with SW/CM at [facility].   Pt seen and examined. Case discussed with resident. Agree with assessment and plan above (edited by me in detail)

## 2023-11-30 ENCOUNTER — RESULT REVIEW (OUTPATIENT)
Age: 68
End: 2023-11-30

## 2023-11-30 LAB
ABO RH CONFIRMATION: SIGNIFICANT CHANGE UP
ABO RH CONFIRMATION: SIGNIFICANT CHANGE UP
ALBUMIN SERPL ELPH-MCNC: 1.8 G/DL — LOW (ref 3.3–5)
ALBUMIN SERPL ELPH-MCNC: 1.8 G/DL — LOW (ref 3.3–5)
ALBUMIN SERPL ELPH-MCNC: 2 G/DL — LOW (ref 3.3–5)
ALBUMIN SERPL ELPH-MCNC: 2 G/DL — LOW (ref 3.3–5)
ALP SERPL-CCNC: 117 U/L — SIGNIFICANT CHANGE UP (ref 40–120)
ALP SERPL-CCNC: 117 U/L — SIGNIFICANT CHANGE UP (ref 40–120)
ALP SERPL-CCNC: 128 U/L — HIGH (ref 40–120)
ALP SERPL-CCNC: 128 U/L — HIGH (ref 40–120)
ALT FLD-CCNC: 126 U/L — HIGH (ref 10–45)
ALT FLD-CCNC: 126 U/L — HIGH (ref 10–45)
ALT FLD-CCNC: 131 U/L — HIGH (ref 10–45)
ALT FLD-CCNC: 131 U/L — HIGH (ref 10–45)
ANION GAP SERPL CALC-SCNC: 10 MMOL/L — SIGNIFICANT CHANGE UP (ref 5–17)
ANION GAP SERPL CALC-SCNC: 10 MMOL/L — SIGNIFICANT CHANGE UP (ref 5–17)
ANION GAP SERPL CALC-SCNC: 9 MMOL/L — SIGNIFICANT CHANGE UP (ref 5–17)
ANION GAP SERPL CALC-SCNC: 9 MMOL/L — SIGNIFICANT CHANGE UP (ref 5–17)
AST SERPL-CCNC: 104 U/L — HIGH (ref 10–40)
AST SERPL-CCNC: 104 U/L — HIGH (ref 10–40)
AST SERPL-CCNC: 131 U/L — HIGH (ref 10–40)
AST SERPL-CCNC: 131 U/L — HIGH (ref 10–40)
BASOPHILS # BLD AUTO: 0.02 K/UL — SIGNIFICANT CHANGE UP (ref 0–0.2)
BASOPHILS # BLD AUTO: 0.02 K/UL — SIGNIFICANT CHANGE UP (ref 0–0.2)
BASOPHILS NFR BLD AUTO: 0.3 % — SIGNIFICANT CHANGE UP (ref 0–2)
BASOPHILS NFR BLD AUTO: 0.3 % — SIGNIFICANT CHANGE UP (ref 0–2)
BILIRUB DIRECT SERPL-MCNC: 0.5 MG/DL — HIGH (ref 0–0.3)
BILIRUB DIRECT SERPL-MCNC: 0.5 MG/DL — HIGH (ref 0–0.3)
BILIRUB INDIRECT FLD-MCNC: 0.4 MG/DL — SIGNIFICANT CHANGE UP (ref 0.2–1)
BILIRUB INDIRECT FLD-MCNC: 0.4 MG/DL — SIGNIFICANT CHANGE UP (ref 0.2–1)
BILIRUB SERPL-MCNC: 0.9 MG/DL — SIGNIFICANT CHANGE UP (ref 0.2–1.2)
BLD GP AB SCN SERPL QL: SIGNIFICANT CHANGE UP
BLD GP AB SCN SERPL QL: SIGNIFICANT CHANGE UP
BUN SERPL-MCNC: 14 MG/DL — SIGNIFICANT CHANGE UP (ref 7–23)
BUN SERPL-MCNC: 14 MG/DL — SIGNIFICANT CHANGE UP (ref 7–23)
BUN SERPL-MCNC: 15 MG/DL — SIGNIFICANT CHANGE UP (ref 7–23)
BUN SERPL-MCNC: 15 MG/DL — SIGNIFICANT CHANGE UP (ref 7–23)
CALCIUM SERPL-MCNC: 7.9 MG/DL — LOW (ref 8.4–10.5)
CALCIUM SERPL-MCNC: 7.9 MG/DL — LOW (ref 8.4–10.5)
CALCIUM SERPL-MCNC: 8.3 MG/DL — LOW (ref 8.4–10.5)
CALCIUM SERPL-MCNC: 8.3 MG/DL — LOW (ref 8.4–10.5)
CHLORIDE SERPL-SCNC: 105 MMOL/L — SIGNIFICANT CHANGE UP (ref 96–108)
CO2 SERPL-SCNC: 25 MMOL/L — SIGNIFICANT CHANGE UP (ref 22–31)
CO2 SERPL-SCNC: 25 MMOL/L — SIGNIFICANT CHANGE UP (ref 22–31)
CO2 SERPL-SCNC: 26 MMOL/L — SIGNIFICANT CHANGE UP (ref 22–31)
CO2 SERPL-SCNC: 26 MMOL/L — SIGNIFICANT CHANGE UP (ref 22–31)
CREAT SERPL-MCNC: 1.45 MG/DL — HIGH (ref 0.5–1.3)
CREAT SERPL-MCNC: 1.45 MG/DL — HIGH (ref 0.5–1.3)
CREAT SERPL-MCNC: 1.56 MG/DL — HIGH (ref 0.5–1.3)
CREAT SERPL-MCNC: 1.56 MG/DL — HIGH (ref 0.5–1.3)
EGFR: 48 ML/MIN/1.73M2 — LOW
EGFR: 48 ML/MIN/1.73M2 — LOW
EGFR: 52 ML/MIN/1.73M2 — LOW
EGFR: 52 ML/MIN/1.73M2 — LOW
EOSINOPHIL # BLD AUTO: 0 K/UL — SIGNIFICANT CHANGE UP (ref 0–0.5)
EOSINOPHIL # BLD AUTO: 0 K/UL — SIGNIFICANT CHANGE UP (ref 0–0.5)
EOSINOPHIL NFR BLD AUTO: 0 % — SIGNIFICANT CHANGE UP (ref 0–6)
EOSINOPHIL NFR BLD AUTO: 0 % — SIGNIFICANT CHANGE UP (ref 0–6)
GLUCOSE BLDC GLUCOMTR-MCNC: 133 MG/DL — HIGH (ref 70–99)
GLUCOSE BLDC GLUCOMTR-MCNC: 133 MG/DL — HIGH (ref 70–99)
GLUCOSE BLDC GLUCOMTR-MCNC: 149 MG/DL — HIGH (ref 70–99)
GLUCOSE BLDC GLUCOMTR-MCNC: 149 MG/DL — HIGH (ref 70–99)
GLUCOSE BLDC GLUCOMTR-MCNC: 185 MG/DL — HIGH (ref 70–99)
GLUCOSE BLDC GLUCOMTR-MCNC: 185 MG/DL — HIGH (ref 70–99)
GLUCOSE BLDC GLUCOMTR-MCNC: 218 MG/DL — HIGH (ref 70–99)
GLUCOSE BLDC GLUCOMTR-MCNC: 218 MG/DL — HIGH (ref 70–99)
GLUCOSE SERPL-MCNC: 160 MG/DL — HIGH (ref 70–99)
GLUCOSE SERPL-MCNC: 160 MG/DL — HIGH (ref 70–99)
GLUCOSE SERPL-MCNC: 211 MG/DL — HIGH (ref 70–99)
GLUCOSE SERPL-MCNC: 211 MG/DL — HIGH (ref 70–99)
HAV IGM SER-ACNC: SIGNIFICANT CHANGE UP
HAV IGM SER-ACNC: SIGNIFICANT CHANGE UP
HBV CORE IGM SER-ACNC: SIGNIFICANT CHANGE UP
HBV CORE IGM SER-ACNC: SIGNIFICANT CHANGE UP
HBV SURFACE AG SER-ACNC: SIGNIFICANT CHANGE UP
HBV SURFACE AG SER-ACNC: SIGNIFICANT CHANGE UP
HCT VFR BLD CALC: 27.5 % — LOW (ref 39–50)
HCT VFR BLD CALC: 27.5 % — LOW (ref 39–50)
HCT VFR BLD CALC: 29.3 % — LOW (ref 39–50)
HCT VFR BLD CALC: 29.3 % — LOW (ref 39–50)
HCV AB S/CO SERPL IA: 0.06 S/CO — SIGNIFICANT CHANGE UP (ref 0–0.99)
HCV AB S/CO SERPL IA: 0.06 S/CO — SIGNIFICANT CHANGE UP (ref 0–0.99)
HCV AB SERPL-IMP: SIGNIFICANT CHANGE UP
HCV AB SERPL-IMP: SIGNIFICANT CHANGE UP
HGB BLD-MCNC: 9.2 G/DL — LOW (ref 13–17)
HGB BLD-MCNC: 9.2 G/DL — LOW (ref 13–17)
HGB BLD-MCNC: 9.7 G/DL — LOW (ref 13–17)
HGB BLD-MCNC: 9.7 G/DL — LOW (ref 13–17)
IMM GRANULOCYTES NFR BLD AUTO: 0.5 % — SIGNIFICANT CHANGE UP (ref 0–0.9)
IMM GRANULOCYTES NFR BLD AUTO: 0.5 % — SIGNIFICANT CHANGE UP (ref 0–0.9)
LACTATE SERPL-SCNC: 2.2 MMOL/L — HIGH (ref 0.7–2)
LACTATE SERPL-SCNC: 2.2 MMOL/L — HIGH (ref 0.7–2)
LACTATE SERPL-SCNC: 3.4 MMOL/L — HIGH (ref 0.7–2)
LACTATE SERPL-SCNC: 3.4 MMOL/L — HIGH (ref 0.7–2)
LYMPHOCYTES # BLD AUTO: 0.36 K/UL — LOW (ref 1–3.3)
LYMPHOCYTES # BLD AUTO: 0.36 K/UL — LOW (ref 1–3.3)
LYMPHOCYTES # BLD AUTO: 4.6 % — LOW (ref 13–44)
LYMPHOCYTES # BLD AUTO: 4.6 % — LOW (ref 13–44)
MAGNESIUM SERPL-MCNC: 1.8 MG/DL — SIGNIFICANT CHANGE UP (ref 1.6–2.6)
MAGNESIUM SERPL-MCNC: 1.8 MG/DL — SIGNIFICANT CHANGE UP (ref 1.6–2.6)
MCHC RBC-ENTMCNC: 32.7 PG — SIGNIFICANT CHANGE UP (ref 27–34)
MCHC RBC-ENTMCNC: 32.7 PG — SIGNIFICANT CHANGE UP (ref 27–34)
MCHC RBC-ENTMCNC: 33.1 GM/DL — SIGNIFICANT CHANGE UP (ref 32–36)
MCHC RBC-ENTMCNC: 33.1 GM/DL — SIGNIFICANT CHANGE UP (ref 32–36)
MCHC RBC-ENTMCNC: 33.3 PG — SIGNIFICANT CHANGE UP (ref 27–34)
MCHC RBC-ENTMCNC: 33.3 PG — SIGNIFICANT CHANGE UP (ref 27–34)
MCHC RBC-ENTMCNC: 33.5 GM/DL — SIGNIFICANT CHANGE UP (ref 32–36)
MCHC RBC-ENTMCNC: 33.5 GM/DL — SIGNIFICANT CHANGE UP (ref 32–36)
MCV RBC AUTO: 98.7 FL — SIGNIFICANT CHANGE UP (ref 80–100)
MCV RBC AUTO: 98.7 FL — SIGNIFICANT CHANGE UP (ref 80–100)
MCV RBC AUTO: 99.6 FL — SIGNIFICANT CHANGE UP (ref 80–100)
MCV RBC AUTO: 99.6 FL — SIGNIFICANT CHANGE UP (ref 80–100)
MONOCYTES # BLD AUTO: 0.71 K/UL — SIGNIFICANT CHANGE UP (ref 0–0.9)
MONOCYTES # BLD AUTO: 0.71 K/UL — SIGNIFICANT CHANGE UP (ref 0–0.9)
MONOCYTES NFR BLD AUTO: 9.1 % — SIGNIFICANT CHANGE UP (ref 2–14)
MONOCYTES NFR BLD AUTO: 9.1 % — SIGNIFICANT CHANGE UP (ref 2–14)
NEUTROPHILS # BLD AUTO: 6.71 K/UL — SIGNIFICANT CHANGE UP (ref 1.8–7.4)
NEUTROPHILS # BLD AUTO: 6.71 K/UL — SIGNIFICANT CHANGE UP (ref 1.8–7.4)
NEUTROPHILS NFR BLD AUTO: 85.5 % — HIGH (ref 43–77)
NEUTROPHILS NFR BLD AUTO: 85.5 % — HIGH (ref 43–77)
NRBC # BLD: 0 /100 WBCS — SIGNIFICANT CHANGE UP (ref 0–0)
PHOSPHATE SERPL-MCNC: 4.5 MG/DL — SIGNIFICANT CHANGE UP (ref 2.5–4.5)
PHOSPHATE SERPL-MCNC: 4.5 MG/DL — SIGNIFICANT CHANGE UP (ref 2.5–4.5)
PLATELET # BLD AUTO: 100 K/UL — LOW (ref 150–400)
PLATELET # BLD AUTO: 100 K/UL — LOW (ref 150–400)
PLATELET # BLD AUTO: 112 K/UL — LOW (ref 150–400)
PLATELET # BLD AUTO: 112 K/UL — LOW (ref 150–400)
POTASSIUM SERPL-MCNC: 3.7 MMOL/L — SIGNIFICANT CHANGE UP (ref 3.5–5.3)
POTASSIUM SERPL-MCNC: 3.7 MMOL/L — SIGNIFICANT CHANGE UP (ref 3.5–5.3)
POTASSIUM SERPL-MCNC: 3.8 MMOL/L — SIGNIFICANT CHANGE UP (ref 3.5–5.3)
POTASSIUM SERPL-MCNC: 3.8 MMOL/L — SIGNIFICANT CHANGE UP (ref 3.5–5.3)
POTASSIUM SERPL-SCNC: 3.7 MMOL/L — SIGNIFICANT CHANGE UP (ref 3.5–5.3)
POTASSIUM SERPL-SCNC: 3.7 MMOL/L — SIGNIFICANT CHANGE UP (ref 3.5–5.3)
POTASSIUM SERPL-SCNC: 3.8 MMOL/L — SIGNIFICANT CHANGE UP (ref 3.5–5.3)
POTASSIUM SERPL-SCNC: 3.8 MMOL/L — SIGNIFICANT CHANGE UP (ref 3.5–5.3)
PROT SERPL-MCNC: 6 G/DL — SIGNIFICANT CHANGE UP (ref 6–8.3)
PROT SERPL-MCNC: 6 G/DL — SIGNIFICANT CHANGE UP (ref 6–8.3)
PROT SERPL-MCNC: 6.4 G/DL — SIGNIFICANT CHANGE UP (ref 6–8.3)
PROT SERPL-MCNC: 6.4 G/DL — SIGNIFICANT CHANGE UP (ref 6–8.3)
RBC # BLD: 2.76 M/UL — LOW (ref 4.2–5.8)
RBC # BLD: 2.76 M/UL — LOW (ref 4.2–5.8)
RBC # BLD: 2.97 M/UL — LOW (ref 4.2–5.8)
RBC # BLD: 2.97 M/UL — LOW (ref 4.2–5.8)
RBC # FLD: 13.9 % — SIGNIFICANT CHANGE UP (ref 10.3–14.5)
SODIUM SERPL-SCNC: 139 MMOL/L — SIGNIFICANT CHANGE UP (ref 135–145)
SODIUM SERPL-SCNC: 139 MMOL/L — SIGNIFICANT CHANGE UP (ref 135–145)
SODIUM SERPL-SCNC: 141 MMOL/L — SIGNIFICANT CHANGE UP (ref 135–145)
SODIUM SERPL-SCNC: 141 MMOL/L — SIGNIFICANT CHANGE UP (ref 135–145)
TROPONIN I, HIGH SENSITIVITY RESULT: 13 NG/L — SIGNIFICANT CHANGE UP
TROPONIN I, HIGH SENSITIVITY RESULT: 13 NG/L — SIGNIFICANT CHANGE UP
WBC # BLD: 7.22 K/UL — SIGNIFICANT CHANGE UP (ref 3.8–10.5)
WBC # BLD: 7.22 K/UL — SIGNIFICANT CHANGE UP (ref 3.8–10.5)
WBC # BLD: 7.84 K/UL — SIGNIFICANT CHANGE UP (ref 3.8–10.5)
WBC # BLD: 7.84 K/UL — SIGNIFICANT CHANGE UP (ref 3.8–10.5)
WBC # FLD AUTO: 7.22 K/UL — SIGNIFICANT CHANGE UP (ref 3.8–10.5)
WBC # FLD AUTO: 7.22 K/UL — SIGNIFICANT CHANGE UP (ref 3.8–10.5)
WBC # FLD AUTO: 7.84 K/UL — SIGNIFICANT CHANGE UP (ref 3.8–10.5)
WBC # FLD AUTO: 7.84 K/UL — SIGNIFICANT CHANGE UP (ref 3.8–10.5)

## 2023-11-30 PROCEDURE — 93280 PM DEVICE PROGR EVAL DUAL: CPT | Mod: 26

## 2023-11-30 PROCEDURE — 99233 SBSQ HOSP IP/OBS HIGH 50: CPT

## 2023-11-30 PROCEDURE — 88304 TISSUE EXAM BY PATHOLOGIST: CPT | Mod: 26

## 2023-11-30 PROCEDURE — 93306 TTE W/DOPPLER COMPLETE: CPT | Mod: 26

## 2023-11-30 PROCEDURE — 99222 1ST HOSP IP/OBS MODERATE 55: CPT | Mod: 25

## 2023-11-30 PROCEDURE — 99233 SBSQ HOSP IP/OBS HIGH 50: CPT | Mod: 25

## 2023-11-30 PROCEDURE — 47600 CHOLECYSTECTOMY: CPT

## 2023-11-30 DEVICE — CLIP APPLIER COVIDIEN ENDOCLIP II 10MM MED/LG: Type: IMPLANTABLE DEVICE | Status: FUNCTIONAL

## 2023-11-30 RX ORDER — PIPERACILLIN AND TAZOBACTAM 4; .5 G/20ML; G/20ML
3.38 INJECTION, POWDER, LYOPHILIZED, FOR SOLUTION INTRAVENOUS ONCE
Refills: 0 | Status: COMPLETED | OUTPATIENT
Start: 2023-11-30 | End: 2023-11-30

## 2023-11-30 RX ORDER — DEXTROSE 50 % IN WATER 50 %
25 SYRINGE (ML) INTRAVENOUS ONCE
Refills: 0 | Status: DISCONTINUED | OUTPATIENT
Start: 2023-11-30 | End: 2023-12-07

## 2023-11-30 RX ORDER — HYDROMORPHONE HYDROCHLORIDE 2 MG/ML
0.5 INJECTION INTRAMUSCULAR; INTRAVENOUS; SUBCUTANEOUS
Refills: 0 | Status: DISCONTINUED | OUTPATIENT
Start: 2023-11-30 | End: 2023-12-01

## 2023-11-30 RX ORDER — HYDROMORPHONE HYDROCHLORIDE 2 MG/ML
0.5 INJECTION INTRAMUSCULAR; INTRAVENOUS; SUBCUTANEOUS
Refills: 0 | Status: DISCONTINUED | OUTPATIENT
Start: 2023-11-30 | End: 2023-11-30

## 2023-11-30 RX ORDER — PHENYLEPHRINE HYDROCHLORIDE 10 MG/ML
0.5 INJECTION INTRAVENOUS
Qty: 40 | Refills: 0 | Status: DISCONTINUED | OUTPATIENT
Start: 2023-11-30 | End: 2023-12-01

## 2023-11-30 RX ORDER — SODIUM CHLORIDE 9 MG/ML
1000 INJECTION, SOLUTION INTRAVENOUS
Refills: 0 | Status: DISCONTINUED | OUTPATIENT
Start: 2023-11-30 | End: 2023-12-02

## 2023-11-30 RX ORDER — SODIUM CHLORIDE 9 MG/ML
1000 INJECTION, SOLUTION INTRAVENOUS
Refills: 0 | Status: DISCONTINUED | OUTPATIENT
Start: 2023-11-30 | End: 2023-12-07

## 2023-11-30 RX ORDER — ATORVASTATIN CALCIUM 80 MG/1
80 TABLET, FILM COATED ORAL AT BEDTIME
Refills: 0 | Status: DISCONTINUED | OUTPATIENT
Start: 2023-11-30 | End: 2023-12-07

## 2023-11-30 RX ORDER — SODIUM CHLORIDE 9 MG/ML
1000 INJECTION, SOLUTION INTRAVENOUS
Refills: 0 | Status: DISCONTINUED | OUTPATIENT
Start: 2023-11-30 | End: 2023-11-30

## 2023-11-30 RX ORDER — ONDANSETRON 8 MG/1
4 TABLET, FILM COATED ORAL ONCE
Refills: 0 | Status: DISCONTINUED | OUTPATIENT
Start: 2023-11-30 | End: 2023-11-30

## 2023-11-30 RX ORDER — FAMOTIDINE 10 MG/ML
20 INJECTION INTRAVENOUS DAILY
Refills: 0 | Status: DISCONTINUED | OUTPATIENT
Start: 2023-11-30 | End: 2023-11-30

## 2023-11-30 RX ORDER — SODIUM CHLORIDE 9 MG/ML
1000 INJECTION, SOLUTION INTRAVENOUS ONCE
Refills: 0 | Status: COMPLETED | OUTPATIENT
Start: 2023-11-30 | End: 2023-11-30

## 2023-11-30 RX ORDER — FAMOTIDINE 10 MG/ML
20 INJECTION INTRAVENOUS
Refills: 0 | Status: DISCONTINUED | OUTPATIENT
Start: 2023-11-30 | End: 2023-11-30

## 2023-11-30 RX ORDER — ONDANSETRON 8 MG/1
4 TABLET, FILM COATED ORAL EVERY 6 HOURS
Refills: 0 | Status: DISCONTINUED | OUTPATIENT
Start: 2023-11-30 | End: 2023-12-07

## 2023-11-30 RX ORDER — LEVETIRACETAM 250 MG/1
500 TABLET, FILM COATED ORAL EVERY 12 HOURS
Refills: 0 | Status: DISCONTINUED | OUTPATIENT
Start: 2023-11-30 | End: 2023-12-01

## 2023-11-30 RX ORDER — GLUCAGON INJECTION, SOLUTION 0.5 MG/.1ML
1 INJECTION, SOLUTION SUBCUTANEOUS ONCE
Refills: 0 | Status: DISCONTINUED | OUTPATIENT
Start: 2023-11-30 | End: 2023-12-01

## 2023-11-30 RX ORDER — DEXTROSE 50 % IN WATER 50 %
15 SYRINGE (ML) INTRAVENOUS ONCE
Refills: 0 | Status: DISCONTINUED | OUTPATIENT
Start: 2023-11-30 | End: 2023-12-01

## 2023-11-30 RX ORDER — INSULIN LISPRO 100/ML
VIAL (ML) SUBCUTANEOUS EVERY 6 HOURS
Refills: 0 | Status: DISCONTINUED | OUTPATIENT
Start: 2023-11-30 | End: 2023-12-01

## 2023-11-30 RX ORDER — LATANOPROST 0.05 MG/ML
1 SOLUTION/ DROPS OPHTHALMIC; TOPICAL AT BEDTIME
Refills: 0 | Status: DISCONTINUED | OUTPATIENT
Start: 2023-11-30 | End: 2023-12-07

## 2023-11-30 RX ORDER — FAMOTIDINE 10 MG/ML
20 INJECTION INTRAVENOUS DAILY
Refills: 0 | Status: DISCONTINUED | OUTPATIENT
Start: 2023-11-30 | End: 2023-12-01

## 2023-11-30 RX ORDER — PIPERACILLIN AND TAZOBACTAM 4; .5 G/20ML; G/20ML
3.38 INJECTION, POWDER, LYOPHILIZED, FOR SOLUTION INTRAVENOUS EVERY 8 HOURS
Refills: 0 | Status: DISCONTINUED | OUTPATIENT
Start: 2023-12-01 | End: 2023-12-06

## 2023-11-30 RX ORDER — DEXTROSE 50 % IN WATER 50 %
12.5 SYRINGE (ML) INTRAVENOUS ONCE
Refills: 0 | Status: DISCONTINUED | OUTPATIENT
Start: 2023-11-30 | End: 2023-12-07

## 2023-11-30 RX ADMIN — ATORVASTATIN CALCIUM 80 MILLIGRAM(S): 80 TABLET, FILM COATED ORAL at 22:02

## 2023-11-30 RX ADMIN — LEVETIRACETAM 400 MILLIGRAM(S): 250 TABLET, FILM COATED ORAL at 19:56

## 2023-11-30 RX ADMIN — Medication 0.5 MILLIGRAM(S): at 06:34

## 2023-11-30 RX ADMIN — PIPERACILLIN AND TAZOBACTAM 25 GRAM(S): 4; .5 INJECTION, POWDER, LYOPHILIZED, FOR SOLUTION INTRAVENOUS at 06:27

## 2023-11-30 RX ADMIN — HYDROMORPHONE HYDROCHLORIDE 0.5 MILLIGRAM(S): 2 INJECTION INTRAMUSCULAR; INTRAVENOUS; SUBCUTANEOUS at 19:38

## 2023-11-30 RX ADMIN — PIPERACILLIN AND TAZOBACTAM 200 GRAM(S): 4; .5 INJECTION, POWDER, LYOPHILIZED, FOR SOLUTION INTRAVENOUS at 20:20

## 2023-11-30 RX ADMIN — LATANOPROST 1 DROP(S): 0.05 SOLUTION/ DROPS OPHTHALMIC; TOPICAL at 22:02

## 2023-11-30 RX ADMIN — PIPERACILLIN AND TAZOBACTAM 25 GRAM(S): 4; .5 INJECTION, POWDER, LYOPHILIZED, FOR SOLUTION INTRAVENOUS at 22:03

## 2023-11-30 RX ADMIN — Medication 2: at 23:02

## 2023-11-30 RX ADMIN — SODIUM CHLORIDE 1000 MILLILITER(S): 9 INJECTION, SOLUTION INTRAVENOUS at 20:34

## 2023-11-30 RX ADMIN — Medication 300 MILLIGRAM(S): at 06:27

## 2023-11-30 NOTE — BRIEF OPERATIVE NOTE - OPERATION/FINDINGS
Laparoscopic cholecystectomy converted open due to severe inflammatory process occluding safe visualization of the critical structures. Gall bladder disintegrated on retraction, releasing pus from the gall bladder lumen. Top down open cholecystectomy performed with piecemeal extraction of the gall bladder. Two NIKHIL drains left, subcostal incision closed with staples

## 2023-11-30 NOTE — PROGRESS NOTE ADULT - ASSESSMENT
66 y/o Vietnamese speaking male with PMH of sinus node disorder s/p PPM, HTN, HLD, cerebral aneurysm s/p coiling, CVA with residual L sided weaknes, seizure disorder, DM2 on Metformin and gastritis admitted to the ICU for    # Acute Cholecystitis s/p lap converted to open cholecystectomy   # Septic v Hypovolemic Shock  # CHRISTOPHER, Prerenal    Neuro:  - ***** Sedation, Daily sedation vacation   - Avoid Neuro Deliriogenic / sedative medications    CV:  - ****Pressors  - Avoiding fluid overload   - 2 pressor shock actively titrating Levo to maintain MAP >65 with Vaso as Adjunct therapy    Pulm:  - continue Decadron for enhanced anti-inflammatory effect  - Low TV lung protective strategies 4-6mL/kg,  - will utilize PEEP for alveolar recruitment is pt desats  - Actively titrating ventilator settings to maintain SpO2 > 92%,  - Daily spontaneous breathing trial if clinical condition warrants  - Incentive spirometry  - Vent Bundle in SITU  - End Tidal CO2 monitoring                  GI:  - Cont IV Protonix 40mg IVP Daily,   - Enteral feeds as tolerated to avoid Stress ulceration and optimize nutritional state when indicated    Renal:  - Even to net negative fluid balance as tolerated by hemodynamics and renal fx.    - Preserved Renal Function Continue to monitor Bun/Cr and UOP  - Replacing electrolytes as needed with Goal K> 4, PO> 3, Mg> 2               - Strict I&O's  - Avoid Nephro toxic medication  - Renally dose meds    Heme:  - SCDs for DVT/PE ppx   - *** AC    ID:  - WBC **,  remains afebrile with no antipyretics administered   - Continue ABX  ****  - Past Cultures negative, no new Cx pending *****  - Microbiology and Radiology reviewed   - trend CBC with diff, CMP  and fever curve  - Avoiding antibiotics unless there is a concern for a bacterial/fungal infection    Endo:  - ISS for aggressive glycemic control to limit FS glucose to < 180mg/dl.  - Keep Euthyroid    COVID 19 specific considerations and therpeutic options based on the available and rapidly changing literature***    Critical Care Time (EXCLUSIVE of any non bundled procedures) :  **minutes were spent assessing the patient's presenting problems of acute illness that pose a high probability of life threatening  deterioration or end organ damage / dysfunction.  Medical desicion making includes initiation / continuation of plan or care review data/ labwork/ radiographic study, direct patient care bedside ,  discussions with  consultants regarding care,  evaluation and interpretation of vital signs, any necessary ventilator management,   NIV or BIPAP changes  or initiation,    discussions with multidisipliary team,  am or pm rounds, discussions of goals of care with patient and family all non-inclusive of procedures.    Date of entry of this note is equal to the date of services rendered    Plan discussed with  **** 68 y/o Japanese speaking male with PMH of sinus node disorder s/p PPM, HTN, HLD, cerebral aneurysm s/p coiling, CVA with residual L sided weaknes, seizure disorder, DM2 on Metformin and gastritis admitted to the ICU for    # Acute Cholecystitis s/p lap converted to open cholecystectomy   # Septic v Hypovolemic Shock  # CHRISTOPHER, Prerenal    Neuro:  - Avoid Neuro Deliriogenic / sedative medications  -    CV:  - Actively titrating Sami to maintain MAP > 65, s/p @L IVF in OR, unable to POCUS IVC 2/2 surgical dressing, repeat Hgb additional Liter LR ordered    Pulm:  - continue Decadron for enhanced anti-inflammatory effect  - Low TV lung protective strategies 4-6mL/kg,  - will utilize PEEP for alveolar recruitment is pt desats  - Actively titrating ventilator settings to maintain SpO2 > 92%,  - Daily spontaneous breathing trial if clinical condition warrants  - Incentive spirometry  - Vent Bundle in SITU  - End Tidal CO2 monitoring                  GI:  - Cont IV Protonix 40mg IVP Daily,   - Enteral feeds as tolerated to avoid Stress ulceration and optimize nutritional state when indicated    Renal:  - Even to net negative fluid balance as tolerated by hemodynamics and renal fx.    - Preserved Renal Function Continue to monitor Bun/Cr and UOP  - Replacing electrolytes as needed with Goal K> 4, PO> 3, Mg> 2               - Strict I&O's  - Avoid Nephro toxic medication  - Renally dose meds    Heme:  - SCDs for DVT/PE ppx   - *** AC    ID:  - WBC **,  remains afebrile with no antipyretics administered   - Continue ABX  ****  - Past Cultures negative, no new Cx pending *****  - Microbiology and Radiology reviewed   - trend CBC with diff, CMP  and fever curve  - Avoiding antibiotics unless there is a concern for a bacterial/fungal infection    Endo:  - ISS for aggressive glycemic control to limit FS glucose to < 180mg/dl.  - Keep Euthyroid    COVID 19 specific considerations and therpeutic options based on the available and rapidly changing literature***    Critical Care Time (EXCLUSIVE of any non bundled procedures) :  **minutes were spent assessing the patient's presenting problems of acute illness that pose a high probability of life threatening  deterioration or end organ damage / dysfunction.  Medical desicion making includes initiation / continuation of plan or care review data/ labwork/ radiographic study, direct patient care bedside ,  discussions with  consultants regarding care,  evaluation and interpretation of vital signs, any necessary ventilator management,   NIV or BIPAP changes  or initiation,    discussions with multidisipliary team,  am or pm rounds, discussions of goals of care with patient and family all non-inclusive of procedures.    Date of entry of this note is equal to the date of services rendered    Plan discussed with  **** 68 y/o Yakut speaking male with PMH of sinus node disorder s/p PPM, HTN, HLD, cerebral aneurysm s/p coiling, CVA with residual L sided weakness seizure disorder, DM2 on Metformin and gastritis admitted to the ICU for    # Acute Cholecystitis s/p lap converted to open cholecystectomy   # Septic v Hypovolemic Shock  # CHRISTOPHER, Prerenal    Neuro:  - Avoid Neuro Deliriogenic / sedative medications  - Keppra IVPB as Pt NPO, convert to carbamazepine 300mg BID when able to tolerate PO  - Dilaudid for pain control    CV:  - Actively titrating Sami to maintain MAP > 65, s/p @L IVF in OR, unable to POCUS IVC 2/2 surgical dressing, repeat Hgb additional Liter LR ordered  - Statin    Pulm:  - Supplemental oxygen as needed to maintain spo2 > 94%  - Incentive spirometry                  GI:  - Pepcid  - NPO, Zofran added for N/V    Renal:  - Continue to monitor Bun/Cr and UOP  - Replacing electrolytes as needed with Goal K> 4, PO> 3, Mg> 2               - Strict I&O's, Cardenas/NIKHIL  - Avoid Nephro toxic medication  - Renally dose meds  - LR @ 100 added    Heme:  - SCDs for DVT/PE ppx     ID:  - Zosyn for empiric coverage for acute rajeev and peritonitis Procal ordered for am  - Microbiology and Radiology reviewed   - trend CBC with diff, CMP  and fever curve    Endo:  - ISS for aggressive glycemic control to limit FS glucose to < 180mg/dl.    Critical Care Time (EXCLUSIVE of any non bundled procedures) 38 minutes were spent assessing the patient's presenting problems of acute illness that pose a high probability of life threatening  deterioration or end organ damage / dysfunction.  Medical desicion making includes initiation / continuation of plan or care review data/ labwork/ radiographic study, direct patient care bedside ,  discussions with  consultants regarding care,  evaluation and interpretation of vital signs, any necessary ventilator management,   NIV or BIPAP changes  or initiation,    discussions with multidisipliary team,  am or pm rounds, discussions of goals of care with patient and family all non-inclusive of procedures.    Date of entry of this note is equal to the date of services rendered 66 y/o Occitan speaking male with PMH of sinus node disorder s/p PPM, HTN, HLD, cerebral aneurysm s/p coiling, CVA with residual L sided weakness seizure disorder, DM2 on Metformin and gastritis admitted to the ICU for    # Acute Cholecystitis s/p lap converted to open cholecystectomy   # Septic v Hypovolemic Shock  # CHRISTOPHER, Prerenal    Neuro:  - Avoid Neuro Deliriogenic / sedative medications  - Keppra IVPB as Pt NPO, convert to carbamazepine 300mg BID when able to tolerate PO  - Dilaudid for pain control    CV:  - Actively titrating Sami to maintain MAP > 65, s/p 2L IVF in OR, unable to POCUS IVC 2/2 surgical dressing, repeat Hgb stable, additional Liter LR ordered  - Statin    Pulm:  - Supplemental oxygen as needed to maintain spo2 > 94%  - Incentive spirometry                  GI:  - Pepcid  - NPO, Zofran added for N/V    Renal:  - Continue to monitor Bun/Cr and UOP  - Replacing electrolytes as needed with Goal K> 4, PO> 3, Mg> 2               - Strict I&O's, Cardenas/NIKHIL  - Avoid Nephro toxic medication  - Renally dose meds  - LR @ 100 added    Heme:  - SCDs for DVT/PE ppx     ID:  - Zosyn for empiric coverage for acute rajeev and peritonitis Procal ordered for am  - Microbiology and Radiology reviewed   - trend CBC with diff, CMP  and fever curve    Endo:  - ISS for aggressive glycemic control to limit FS glucose to < 180mg/dl.    Critical Care Time (EXCLUSIVE of any non bundled procedures) 38 minutes were spent assessing the patient's presenting problems of acute illness that pose a high probability of life threatening  deterioration or end organ damage / dysfunction.  Medical desicion making includes initiation / continuation of plan or care review data/ labwork/ radiographic study, direct patient care bedside ,  discussions with  consultants regarding care,  evaluation and interpretation of vital signs, any necessary ventilator management,   NIV or BIPAP changes  or initiation,    discussions with multidisipliary team,  am or pm rounds, discussions of goals of care with patient and family all non-inclusive of procedures.    Date of entry of this note is equal to the date of services rendered

## 2023-11-30 NOTE — PROGRESS NOTE ADULT - SUBJECTIVE AND OBJECTIVE BOX
Patient is a 68y old  Male who presents with a chief complaint of Acute Cholecystitis (30 Nov 2023 19:56)      BRIEF HOSPITAL COURSE: 66 y/o Palestinian speaking male with PMH of sinus node disorder s/p PPM, HTN, HLD, cerebral aneurysm s/p coiling, CVA with residual L sided weaknes, seizure disorder, DM2 on Metformin and gastritis who presented to [place] ED with complaint of worsening abdominal pain that started 11/24.  Pt states the pain is associated with dizziness and reports one episode of fall from seated position on Friday. Pt denies LOC and hitting his head. Pt also states he has not had an appetite to eat because of the abdominal pain. Noted to have acute cholecystitis and is now s/p Laparoscopic cholecystectomy converted open due to severe inflammatory process occluding safe visualization of the critical structures. Gall bladder disintegrated on retraction, releasing pus from the gall bladder lumen. ICU consulted for post op hypotension requiring pressor support.      Events last 24 hours: Remains in shock state 2/2 Hypovolemia v septic, Zosyn added and addition IVF resucitiation    PAST MEDICAL & SURGICAL HISTORY:  CVA (cerebral vascular accident)  left arm weakness, no sensation in left side in 1998      Aneurysm of [place]      HLD (hyperlipidemia)      Epilepsy  last seizure 2016      Renal agenesis, unilateral      Depression      Cerebral aneurysm without rupture  diagnosed 2017 had cerebral angio at [facility]      Gastroesophageal reflux disease, esophagitis presence not specified      Glaucoma of both eyes, unspecified glaucoma type      Bradycardia      Language barrier  Palestinian speaking      T2DM (type 2 diabetes mellitus)      S/P craniotomy  for RCMA 1998      Pacemaker  Medtronic Serial [id number]/Model [id number]  Implant 6/29/07, new generator 12/23/2016      S/P bunionectomy      S/P cataract surgery  with lens implants      Status post coil embolization of cerebral aneurysm  9/2018          Review of Systems:  CONSTITUTIONAL: No fever, chills, or fatigue.  EYES: No eye pain, visual disturbances, or discharge.  ENMT:  No difficulty hearing, tinnitus, or vertigo. No sinus or throat pain.  NECK: No pain or stiffness.  RESPIRATORY: No shortness of breath, cough, or wheezing.  CARDIOVASCULAR: No chest pain, palpitations, dizziness, or leg swelling.  GASTROINTESTINAL: Mildabdominal/epigastric pain. No nausea, vomiting, diarrhea, or constipation. No hematemesis, melena, or hematochezia.  GENITOURINARY: No dysuria, increased frequency, hematuria, or incontinence.  NEUROLOGICAL: No headaches, memory loss, loss of strength, numbness, or tremors.  SKIN: No itching, burning, rashes, or lesions.  MUSCULOSKELETAL: No joint pain or swelling. No muscle, back, or extremity pain.  PSYCHIATRIC: No depression, anxiety, mood swings, or difficulty sleeping.    Medications:  piperacillin/tazobactam IVPB. 3.375 Gram(s) IV Intermittent once  piperacillin/tazobactam IVPB.- 3.375 Gram(s) IV Intermittent once    phenylephrine    Infusion 0.5 MICROgram(s)/kG/Min IV Continuous <Continuous>      HYDROmorphone  Injectable 0.5 milliGRAM(s) IV Push every 3 hours PRN  levETIRAcetam  IVPB 500 milliGRAM(s) IV Intermittent every 12 hours  ondansetron Injectable 4 milliGRAM(s) IV Push every 6 hours PRN        famotidine  IVPB 20 milliGRAM(s) IV Intermittent daily      atorvastatin 80 milliGRAM(s) Oral at bedtime  dextrose 50% Injectable 25 Gram(s) IV Push once  dextrose 50% Injectable 12.5 Gram(s) IV Push once  dextrose 50% Injectable 25 Gram(s) IV Push once  dextrose Oral Gel 15 Gram(s) Oral once PRN  glucagon  Injectable 1 milliGRAM(s) IntraMuscular once  insulin lispro (ADMELOG) corrective regimen sliding scale   SubCutaneous every 6 hours    dextrose 5%. 1000 milliLiter(s) IV Continuous <Continuous>  dextrose 5%. 1000 milliLiter(s) IV Continuous <Continuous>  lactated ringers Bolus 1000 milliLiter(s) IV Bolus once  lactated ringers. 1000 milliLiter(s) IV Continuous <Continuous>      latanoprost 0.005% Ophthalmic Solution 1 Drop(s) Both EYES at bedtime            ICU Vital Signs Last 24 Hrs  T(C): 37.1 (30 Nov 2023 18:30), Max: 37.3 (29 Nov 2023 20:50)  T(F): 98.8 (30 Nov 2023 18:30), Max: 99.1 (29 Nov 2023 20:50)  HR: 69 (30 Nov 2023 19:00) (60 - 74)  BP: 109/62 (30 Nov 2023 19:00) (77/49 - 146/71)  BP(mean): 76 (30 Nov 2023 19:00) (76 - 76)  ABP: --  ABP(mean): --  RR: 20 (30 Nov 2023 19:00) (16 - 21)  SpO2: 100% (30 Nov 2023 19:00) (94% - 100%)    O2 Parameters below as of 30 Nov 2023 19:00  Patient On (Oxygen Delivery Method): nasal cannula  O2 Flow (L/min): 2              I&O's Detail    30 Nov 2023 07:01  -  30 Nov 2023 20:11  --------------------------------------------------------  IN:    Lactated Ringers: 100 mL    Phenylephrine: 5.6 mL  Total IN: 105.6 mL    OUT:    Bulb (mL): 30 mL    Bulb (mL): 5 mL    Indwelling Catheter - Urethral (mL): 225 mL  Total OUT: 260 mL    Total NET: -154.4 mL          LABS:                        9.2    7.84  )-----------( 112      ( 30 Nov 2023 19:42 )             27.5     11-30    139  |  105  |  14  ----------------------------<  211<H>  3.8   |  25  |  1.45<H>    Ca    7.9<L>      30 Nov 2023 19:42  Phos  4.5     11-30  Mg     1.8     11-30    TPro  6.0  /  Alb  1.8<L>  /  TBili  0.9  /  DBili  x   /  AST  131<H>  /  ALT  126<H>  /  AlkPhos  117  11-30          CAPILLARY BLOOD GLUCOSE      POCT Blood Glucose.: 185 mg/dL (30 Nov 2023 17:32)      Urinalysis Basic - ( 30 Nov 2023 19:42 )    Color: x / Appearance: x / SG: x / pH: x  Gluc: 211 mg/dL / Ketone: x  / Bili: x / Urobili: x   Blood: x / Protein: x / Nitrite: x   Leuk Esterase: x / RBC: x / WBC x   Sq Epi: x / Non Sq Epi: x / Bacteria: x      CULTURES:  Culture Results:   No growth at 24 hours (11-28 @ 22:39)  Culture Results:   No growth at 24 hours (11-28 @ 22:39)      Physical Examination:    General: Well appearing, lying in bed in NAD.      HEENT: Pupils equal, reactive to light. Symmetric. No scleral icterus or injection.    PULM: Clear to auscultation B/L. No wheezes, rales, or rhonchi apprecaited. No significant sputum production or increased respiratory effort.    NECK: Supple, no lymphadenopathy, trachea midline.    CVS: Regular rate and rhythm, no murmurs appreciated, +s1/s2.    ABD: Soft, nondistended, tender,Hypoactive bowel sounds. NIKHIL x2    EXT: No edema, nontender.    SKIN: Warm and well perfused, no rashes noted.    NEURO: Alert, oriented, interactive, nonfocal.    RADIOLOGY: < from: NM Hepatobiliary Imaging w/ RX (11.29.23 @ 14:20) >  ACC: [id number] EXAM:  NM HEPATOBILIARY IMG W RX   ORDERED BY: ELIGIO BROWN     PROCEDURE DATE:  11/29/2023          INTERPRETATION:  CLINICAL INFORMATION: Right upper quadrant pain;   evaluate for acute cholecystitis    DURATION of DYNAMIC SERIES: 120 minutes  RADIOPHARMACEUTICAL: 3.2 mCi Tc-99m-Mebrofenin, I.V.  PHARMACOLOGIC INTERVENTION: Morphine 3 mg IV    TECHNIQUE:  Dynamic imaging of the anterior abdomen was performed following   radiopharmaceutical injection. Morphine was administered IVat 60 minutes   post radiopharmaceutical injection. Static images in the anterior, right   lateral, and right anterior oblique views were obtained at 120 minutes.    COMPARISON: None    OTHER STUDIES USED FOR CORRELATION: US and CT 11/28/2023    FINDINGS: There is prompt, homogeneous uptake of radiopharmaceutical by   the hepatocytes. Activity is first seen in the bowel at about 35 minutes.   The gallbladder is not visualized at any time during the study, despite   morphine administration. There is good clearance of activity from the   liver at the end of the study.    IMPRESSION: Abnormal morphine-augmented hepatobiliary scan.    Findings are compatible with acute cholecystitis.    Findings were discussed via Teams message with Dr. ELIGIO BROWN   [id number] 11/29/2023 1:46 PM by Dr. Digna Bob with read back   confirmation.    --- End of Report ---            CARLOZ BOB MD; Attending Radiologist  This document has been electronically signed. Nov 29 2023  2:26PM    < end of copied text >

## 2023-11-30 NOTE — PROVIDER CONTACT NOTE (EICU) - ASSESSMENT
Formerly [facility], [facility]–1983  Patient is active with [facility] for nurse, PT/OT. Will follow for discharge to home with orders to resume care.       Shahriar Gilbert RN, BSN CTN  Formerly [facility], [facility]1983 [phone] patient in mild shock following procedure

## 2023-11-30 NOTE — PROGRESS NOTE ADULT - SUBJECTIVE AND OBJECTIVE BOX
S:  Patient is a 66 y/o Iranian speaking male with PMH of sinus node disorder s/p PPM, HTN, HLD, cerebral aneurysm s/p coiling, CVA with residual L sided weakness, seizure disorder, and T2DM who presented to [facility] on 11/24/23 with complaint of abdominal pain and decreased appetite secondary to the pain he had been experiencing. Currently admitted with acute cholecystitis.     Today is hospital day 2. Patient awaiting cardio clearance prior to pursuing cholecystectomy with surgery.     O:  Vital Signs Last 24 Hrs  T(C): 36.8 (30 Nov 2023 04:45), Max: 37.3 (29 Nov 2023 20:50)  T(F): 98.3 (30 Nov 2023 04:45), Max: 99.1 (29 Nov 2023 20:50)  HR: 64 (30 Nov 2023 04:45) (64 - 73)  BP: 91/54 (30 Nov 2023 04:45) (91/54 - 103/61)  RR: 17 (30 Nov 2023 04:45) (17 - 17)  SpO2: 94% (30 Nov 2023 04:45) (94% - 95%)    Parameters below as of 30 Nov 2023 04:45  Patient On (Oxygen Delivery Method): room air    Physical Exam:  Constitutional: NAD, awake and alert, well-developed  HEENT: EOMI, Normal Hearing, MMM  Neck: Soft and supple, No LAD,   Respiratory: Breath sounds are clear bilaterally, No wheezing, rales or rhonchi  Cardiovascular: S1 and S2, regular rate and rhythm, no Murmurs, gallops or rubs  Gastrointestinal: Bowel Sounds present, soft, diffuse abdominal tenderness on palpation  Extremities: No peripheral edema  Vascular: 2+ peripheral pulses  Neurological: A/O x 3, no focal deficits  Musculoskeletal: Moving all extremities   Skin: No rashes      LABS:                        9.7    7.22  )-----------( 100      ( 30 Nov 2023 07:05 )             29.3     11-30    141  |  105  |  15  ----------------------------<  160<H>  3.7   |  26  |  1.56<H>    Ca    8.3<L>      30 Nov 2023 07:05  Mg     2.1     11-29    TPro  6.4  /  Alb  2.0<L>  /  TBili  0.9  /  DBili  0.5<H>  /  AST  104<H>  /  ALT  131<H>  /  AlkPhos  128<H>  11-30    PT/INR - ( 28 Nov 2023 16:00 )   PT: 13.2 sec;   INR: 1.16 ratio                      S:  Patient is a 68 y/o Faroese speaking male with PMH of sinus node disorder s/p PPM, HTN, HLD, cerebral aneurysm s/p coiling, CVA with residual L sided weakness, seizure disorder, and T2DM who presented to [facility] ED on 11/24/23 with complaint of abdominal pain and decreased appetite secondary to the pain he had been experiencing. Currently admitted with acute cholecystitis.     Today is hospital day 2. Patient awaiting cardio clearance prior to pursuing cholecystectomy with surgery.     O:  Vital Signs Last 24 Hrs  T(C): 36.8 (30 Nov 2023 04:45), Max: 37.3 (29 Nov 2023 20:50)  T(F): 98.3 (30 Nov 2023 04:45), Max: 99.1 (29 Nov 2023 20:50)  HR: 64 (30 Nov 2023 04:45) (64 - 73)  BP: 91/54 (30 Nov 2023 04:45) (91/54 - 103/61)  RR: 17 (30 Nov 2023 04:45) (17 - 17)  SpO2: 94% (30 Nov 2023 04:45) (94% - 95%)    Parameters below as of 30 Nov 2023 04:45  Patient On (Oxygen Delivery Method): room air    Physical Exam:  Constitutional: NAD, awake and alert, well-developed  HEENT: EOMI, Normal Hearing, MMM  Neck: Soft and supple, No LAD,   Respiratory: Breath sounds are clear bilaterally, No wheezing, rales or rhonchi  Cardiovascular: S1 and S2, regular rate and rhythm, no Murmurs, gallops or rubs  Gastrointestinal: Bowel Sounds present, soft, diffuse abdominal tenderness on palpation  Extremities: No peripheral edema  Vascular: 2+ peripheral pulses  Neurological: A/O x 3, no focal deficits  Musculoskeletal: Moving all extremities   Skin: No rashes      LABS:                        9.7    7.22  )-----------( 100      ( 30 Nov 2023 07:05 )             29.3     11-30    141  |  105  |  15  ----------------------------<  160<H>  3.7   |  26  |  1.56<H>    Ca    8.3<L>      30 Nov 2023 07:05  Mg     2.1     11-29    TPro  6.4  /  Alb  2.0<L>  /  TBili  0.9  /  DBili  0.5<H>  /  AST  104<H>  /  ALT  131<H>  /  AlkPhos  128<H>  11-30    PT/INR - ( 28 Nov 2023 16:00 )   PT: 13.2 sec;   INR: 1.16 ratio      IMAGING:  < from: Xray Chest 1 View AP/PA (11.28.23 @ 15:49) >  Impression:  No acute pulmonary disease.      < from: CT Chest No Cont (11.28.23 @ 16:51) >  IMPRESSION:  Findings suspicious for acute cholecystitis. Correlate with patient's   symptoms. Consider right upper quadrant ultrasound if clinically   indicated.      < from: US Abdomen Limited (11.28.23 @ 21:21) >  IMPRESSION:  Distended thick-walled gallbladder containing sludge.  Borderline CBD   measuring 6 mm.  Findings equivocal for acute cholecystitis. If   clinically indicated, further evaluation with nuclear HIDA scan may be   obtained.      < from: NM Hepatobiliary Imaging w/ RX (11.29.23 @ 14:20) >  IMPRESSION: Abnormal morphine-augmented hepatobiliary scan.  Findings are compatible with acute cholecystitis.  Findings were discussed via Teams message with Dr. ELIGIO BROWN   [phone] 11/29/2023 1:46 PM by Dr. Digna Bob with read back   confirmation.    MEDICATIONS  (STANDING):  aspirin enteric coated 81 milliGRAM(s) Oral daily  atorvastatin 80 milliGRAM(s) Oral at bedtime  carBAMazepine ER Tablet 300 milliGRAM(s) Oral two times a day  dextrose 5%. 1000 milliLiter(s) (50 mL/Hr) IV Continuous <Continuous>  dextrose 5%. 1000 milliLiter(s) (100 mL/Hr) IV Continuous <Continuous>  dextrose 50% Injectable 25 Gram(s) IV Push once  dextrose 50% Injectable 12.5 Gram(s) IV Push once  dextrose 50% Injectable 25 Gram(s) IV Push once  famotidine    Tablet 20 milliGRAM(s) Oral daily  FLUoxetine 20 milliGRAM(s) Oral daily  glucagon  Injectable 1 milliGRAM(s) IntraMuscular once  heparin   Injectable 5000 Unit(s) SubCutaneous every 12 hours  insulin lispro (ADMELOG) corrective regimen sliding scale   SubCutaneous three times a day before meals  insulin lispro (ADMELOG) corrective regimen sliding scale   SubCutaneous at bedtime  metoprolol tartrate 25 milliGRAM(s) Oral two times a day  piperacillin/tazobactam IVPB.. 3.375 Gram(s) IV Intermittent every 8 hours  sodium chloride 0.9%. 1000 milliLiter(s) (70 mL/Hr) IV Continuous <Continuous>    MEDICATIONS  (PRN):  dextrose Oral Gel 15 Gram(s) Oral once PRN Blood Glucose LESS THAN 70 milliGRAM(s)/deciliter  LORazepam     Tablet 0.5 milliGRAM(s) Oral three times a day PRN Anxiety  ondansetron    Tablet 4 milliGRAM(s) Oral every 8 hours PRN Nausea and/or Vomiting                          S:  Patient is a 66 y/o Polish speaking male with PMH of sinus node disorder s/p PPM, HTN, HLD, cerebral aneurysm s/p coiling, CVA with residual L sided weakness, seizure disorder, and T2DM who presented to [facility] ED on 11/24/23 with complaint of abdominal pain and decreased appetite secondary to the pain he had been experiencing. Currently admitted with acute cholecystitis.     Today is hospital day 2. Patient awaiting cardio clearance prior to pursuing cholecystectomy with surgery. Pt denies any abdominal pain or concerns at this time.    O:  Vital Signs Last 24 Hrs  T(C): 36.8 (30 Nov 2023 04:45), Max: 37.3 (29 Nov 2023 20:50)  T(F): 98.3 (30 Nov 2023 04:45), Max: 99.1 (29 Nov 2023 20:50)  HR: 64 (30 Nov 2023 04:45) (64 - 73)  BP: 91/54 (30 Nov 2023 04:45) (91/54 - 103/61)  RR: 17 (30 Nov 2023 04:45) (17 - 17)  SpO2: 94% (30 Nov 2023 04:45) (94% - 95%)    Parameters below as of 30 Nov 2023 04:45  Patient On (Oxygen Delivery Method): room air    Physical Exam:  Constitutional: NAD, awake and alert, well-developed  HEENT: EOMI, Normal Hearing, MMM  Neck: Soft and supple, No LAD,   Respiratory: Breath sounds are clear bilaterally, No wheezing, rales or rhonchi  Cardiovascular: S1 and S2, regular rate and rhythm, no Murmurs, gallops or rubs  Gastrointestinal: Bowel Sounds present, soft, diffuse abdominal tenderness on palpation  Extremities: No peripheral edema  Vascular: 2+ peripheral pulses  Neurological: A/O x 3, no focal deficits  Musculoskeletal: Moving all extremities   Skin: No rashes      LABS:                        9.7    7.22  )-----------( 100      ( 30 Nov 2023 07:05 )             29.3     11-30    141  |  105  |  15  ----------------------------<  160<H>  3.7   |  26  |  1.56<H>    Ca    8.3<L>      30 Nov 2023 07:05  Mg     2.1     11-29    TPro  6.4  /  Alb  2.0<L>  /  TBili  0.9  /  DBili  0.5<H>  /  AST  104<H>  /  ALT  131<H>  /  AlkPhos  128<H>  11-30    PT/INR - ( 28 Nov 2023 16:00 )   PT: 13.2 sec;   INR: 1.16 ratio      IMAGING:  < from: Xray Chest 1 View AP/PA (11.28.23 @ 15:49) >  Impression:  No acute pulmonary disease.      < from: CT Chest No Cont (11.28.23 @ 16:51) >  IMPRESSION:  Findings suspicious for acute cholecystitis. Correlate with patient's   symptoms. Consider right upper quadrant ultrasound if clinically   indicated.      < from: US Abdomen Limited (11.28.23 @ 21:21) >  IMPRESSION:  Distended thick-walled gallbladder containing sludge.  Borderline CBD   measuring 6 mm.  Findings equivocal for acute cholecystitis. If   clinically indicated, further evaluation with nuclear HIDA scan may be   obtained.      < from: NM Hepatobiliary Imaging w/ RX (11.29.23 @ 14:20) >  IMPRESSION: Abnormal morphine-augmented hepatobiliary scan.  Findings are compatible with acute cholecystitis.  Findings were discussed via Teams message with Dr. ELIGIO BROWN   [phone] 11/29/2023 1:46 PM by Dr. Digna Bob with read back   confirmation.    MEDICATIONS  (STANDING):  aspirin enteric coated 81 milliGRAM(s) Oral daily  atorvastatin 80 milliGRAM(s) Oral at bedtime  carBAMazepine ER Tablet 300 milliGRAM(s) Oral two times a day  dextrose 5%. 1000 milliLiter(s) (50 mL/Hr) IV Continuous <Continuous>  dextrose 5%. 1000 milliLiter(s) (100 mL/Hr) IV Continuous <Continuous>  dextrose 50% Injectable 25 Gram(s) IV Push once  dextrose 50% Injectable 12.5 Gram(s) IV Push once  dextrose 50% Injectable 25 Gram(s) IV Push once  famotidine    Tablet 20 milliGRAM(s) Oral daily  FLUoxetine 20 milliGRAM(s) Oral daily  glucagon  Injectable 1 milliGRAM(s) IntraMuscular once  heparin   Injectable 5000 Unit(s) SubCutaneous every 12 hours  insulin lispro (ADMELOG) corrective regimen sliding scale   SubCutaneous three times a day before meals  insulin lispro (ADMELOG) corrective regimen sliding scale   SubCutaneous at bedtime  metoprolol tartrate 25 milliGRAM(s) Oral two times a day  piperacillin/tazobactam IVPB.. 3.375 Gram(s) IV Intermittent every 8 hours  sodium chloride 0.9%. 1000 milliLiter(s) (70 mL/Hr) IV Continuous <Continuous>    MEDICATIONS  (PRN):  dextrose Oral Gel 15 Gram(s) Oral once PRN Blood Glucose LESS THAN 70 milliGRAM(s)/deciliter  LORazepam     Tablet 0.5 milliGRAM(s) Oral three times a day PRN Anxiety  ondansetron    Tablet 4 milliGRAM(s) Oral every 8 hours PRN Nausea and/or Vomiting

## 2023-11-30 NOTE — BRIEF OPERATIVE NOTE - NSICDXBRIEFPROCEDURE_GEN_ALL_CORE_FT
October 11, 2019     Blaire[facility]chelle [facility]  [address] [facility] [address]      Colonoscopy Instructions - Suprep with MAC sedation       Florin Grace MD  [phone]    Date of Procedure: November 11, 2019  Arrive to register at: 7:45 AM  Place: [facility] [place]- 4th floor  [address] [place] [address], [address]  [place] [address]  [phone]  Please enter through the main doors near the [facility] [place] and check in on the 4th floor.  Pre-Admit :  Dunia ORTIZ [phone]    Please read the entire instructions as soon as you receive them.    Patient Checklist  7 Days prior to your procedure  · If possible, try to avoid non-steroidal anti-inflammatory drugs (ibuprofen, Advil, Motrin, etc.).   · If you are on Plavix, please follow the instructions discussed at your clinic visit. If any questions call the office for more information.    · Stop taking iron supplements     5 Days prior to your procedure  · If you are on Coumadin, hold it as instructed at your visit. If any questions call the office for more information.  · Confirm you have a  to take you home following the procedure. You cannot take a taxi cab, bus, or public transport home alone.   · Obtain your Suprep from the pharmacy    2 Days before your procedure  · Avoid high fiber foods such as vegetables, popcorn, nuts etc.    1 Day before your procedure  · No solid food. No alcohol.   · Clear liquids ALL DAY. If you can see through it, you can drink it. Examples are clear broth or consommé, fruit juices without pulp (no orange or tomato), sports drinks (Gatorade, Propel etc.), Jell-o (no fruit added), coffee or tea (sweeteners are ok, no milk or cream), soda or non-alcoholic carbonated drinks, popsicles, water, and clear hard candies. Avoid red or purple colors.   · It is important to try and stay hydrated during the day by drinking fluids. A solution such as Gatorade or a similar sports drink  will help you to stay hydrated.   · At 3:00 pm take 20 mg Dulcolax (over the counter) with 8 oz. of water (either two 10 mg tablets or four 5 mg tablets).   · At 5:00 PM, pour one (1) 6-ounce bottle of Suprep liquid into the mixing container. Add cool drinking water to the 16-ounce line on the container and mix. Drink all the liquid in the container. Then drink two (2) more 16-ounce containers of water over the next 1 hour.      Day of the procedure  · No solid food. No alcohol-water only until 3 hours prior to your procedure.  You should not drink or take anything by mouth 3 hours prior to your procedure.  · At 3:30 AM (5 hrs prior to the procedure), pour one (1) 6-ounce bottle of Suprep liquid into the mixing container. Add cool drinking water to the 16-ounce line on the container and mix. Drink all the liquid in the container. Then drink two (2) more 16-ounce containers of water over the next 1 hour.  · You can take your medications with a sip of water up to 3 hours from your scheduled procedure, except for diabetic medications or blood thinners as previously instructed.      If you have any questions about the process, please call the office.     Special instructions  Diabetics - Contact your physician or my office for instructions on your diabetic medications, including insulin. In general, hold your diabetic medications the day of the procedure. Check your blood sugars frequently the day you are on a clear liquid diet.      Blood Thinners - Including dabigatran (Pradaxa), clopidogril (Plavix), warfarin (Coumadin), dipyridamole ( Persantine), rivaroxaban (Xarelto). Please contact my office if you are on these medications for instructions.     Kidney disease - If you have any problems with kidney disease. You should not take this prep. Please call my office, and we will prescribe a safer alternative    Frequently Asked Questions    What is a colonoscopy?   A colonoscopy is a procedure where we can examine your  large intestine for abnormalities. A flexible tube that has a camera and light at the tip and as thick as your finger is used in the exam.     How long is the colon?   The average length of the colon is 4 to 5 feet.     Why do I need to take the preparation and clear liquid diet?  The most important part in a successful exam is the preparation. It is important to clean your colon completely prior to the exam. If there is still remaining stool in the colon, it can prolong your procedure, and we may not be able to visualize the entire colon and lesions could be missed. If you have a substantial amount of stool remaining, the procedure may be terminated and a repeat or alternative prep may be required.    We have you take the Suprep the evening prior and morning of the procedure since studies have shown that taking the prep in 2 separate doses help improve the cleansing of the colon.     Is there an alternative to the Suprep?  In the past, most preps were performed with Fleets Phospho-Soda. However recent studies have shown the risk of causing permanent kidney damage/failure. Due to the risk of permanent kidney damage with the Fleet Phospho-Soda, most gastroenterologists have stopped using it. There is a prep taking pills; however this has the same medication as the Fleets Phospho-Soda, so the risk of kidney damage is still present.     There are alternatives to Suprep that may cost less, however it is 2 to 4 liters of total volume. If you would like the alternative, then contact my office.      If people have problems taking the prep at home, there is an alternative where you can take the entire preparation in the hospital the day of the procedure.     Can I take all my medications?  Most medications can be continued without problems. However please contact my office if you take diabetic medications, blood thinners such as Plavix, Coumadin, Warfarin, arthritis medications, non-steroidal anti-inflammatory (ibuprofen,  Motrin etc.), or iron supplements    What can I expect the day of the procedure?  You will arrive at the facility where you are scheduled. We have you arrive early since you will need to fill out your information. An IV will be placed so we can give you medications. Once in the procedure room, I will talk to you and answer any questions you may have. The anesthesiologist will talk to you before the procedure.     You will usually lie on your left side. I will then advance the scope to the end of your large intestine. During the procedure it is normal to feel some pressure, bloating or cramping. Careful examination will be performed throughout your colon. Then entire procedure takes approximately 30 minutes, however this can be prolonged in complicated cases.    Once the procedure is complete, you will be brought to the recovery room until you are stable to leave. You should plan on being at the procedure site for 2 to 3 hours.     What if something abnormal is found during the colonoscopy?  Most polyps can be removed at the time of the colonoscopy. Biopsies, tissue samples, can be obtained during the exam.     What are polyps?  Polyps are abnormal growth of colon tissue. A majority are benign or non-cancerous. All polyps that are removed are sent to the lab for analysis. Some polyps have the potential of transforming into cancer. This is why it is important to remove them while the are small and non-cancerous. Polyps can range from a couple of millimeters to a couple of inches.     How are the polyps removed?  The polyps can be removed by biopsy instruments. Some polyps are removed with a wire snare and cautery. Cautery produces an electrical current to help burn and remove the polyps. You should not feel any pain with removal of the polyps. The polyps are then retrieved and sent to a lab to be analyzed.     What will happen after the procedure?  I will discuss the findings with you. If tissue samples/polyps are  removed, I will give you a call in approximately 1 week with the results. If you do not hear from me in 2 weeks, you can call my office for results. Your family history, your personal history, and the number and type of polyps you have will determine when you may need a repeat exam.     Even if you feel alert after the procedure, your judgment and reflexes may be impaired the rest of the day. You should not drive, work machinery or perform any other task that requires your full attention.    You may have bloating and cramping after the exam. This usually is improved with passing of gas.    Normally you may resume a regular diet after the procedure is completed. If you are on a blood thinner, this may be held if a large polyp is removed.    Why do I need some one to accompany me to the exam?  Because you are given a sedative, you need someone you know to drive you home after the exam. If you are taking a bus, taxi or van service a responsible adult you know must accompany you. If this is a problem, the colonoscopy can be attempted without any sedation.     What are the complications of the exam?  Colonoscopies are relatively safe, however complications can occur. Some but not all of the risk are discussed below.  Some patients may have an adverse reaction with the sedation or complications from heart and lung disease. There is also the risk of causing bleeding and perforation (poking a hole in the colon). If these complications do occur, they may need surgical treatment. There are also a lot of large folds in the colon, so all of the walls of the colon cannot be visualized, and lesions can be missed.     After the procedure, if you have any abdominal pain, fever, chills, or rectal bleeding it is important to notify me or seek immediate medical attention.  It is important to know that bleeding can occur even several days after the procedure.     Information above was obtained and revised from the American Society for  Gastrointestinal Endoscopy web site.   Http://www.asge.org/PatientInfoIndex.aspx?nt=736. April 21st 2009.    MAC sedation/Anesthesia assistance    This is additional information about the sedation. You will be undergoing sedation under the assistance of the Anesthesia department. They will decide about what anesthesia would be in your best interest.    Due to the increase in potency of the medication that they use, this can increase the the risk of side effects compared to the regular conscious sedation. Any questions about the sedation should be directed to your anesthesiologist. You will meet them on the day of your procedure.    The Anesthesia Department has requested the following discontinuation of the following medications prior to your procedure:    • Phentermine - hold 10 days prior to your procedure  • Selegiline patches - hold 10 days prior to your procedure  • Sildenafil, Verdenafil, Tadalafil (only if taken for erectile dysfunction)- hold 48 hours prior to procedure  • Short acting insulin - hold the day of the procedure       PROCEDURES:  Open cholecystectomy 30-Nov-2023 17:45:57  Noble Jones

## 2023-11-30 NOTE — PROGRESS NOTE ADULT - PROBLEM SELECTOR PLAN 5
- Now resolved   - K: 3.3> 3.4 >3.7 today   - EKG: normal sinus  - continue to monitor and replete as needed

## 2023-11-30 NOTE — PROGRESS NOTE ADULT - ASSESSMENT
Patient is a 66 y/o Cymraes speaking male with PMH of sinus node disorder s/p PPM, HTN, HLD, cerebral aneurysm s/p coiling, CVA with residual L sided weakness, seizure disorder, and T2DM who presented to [facility] ED on 11/24/23 with complaint of abdominal pain and decreased appetite secondary to the pain he had been experiencing. Currently admitted with acute cholecystitis.

## 2023-11-30 NOTE — CONSULT NOTE ADULT - CRITICAL CARE ATTENDING COMMENT
67 year old s/p cholecystectomy.  patient on pressors. will monitor in ICU. continue to resuscitate. antibiotics. titrate pressors to map 65.

## 2023-11-30 NOTE — CONSULT NOTE ADULT - ASSESSMENT
Assessment:  68 y/o Danish speaking male with PMH of sinus node disorder s/p PPM, NSTEMI, HTN, HLD, cerebral aneurysm s/p coiling, CVA with residual L sided weaknes, seizure disorder, DM2 on Metformin and gastritis who presented to [place] ED with complaint of abdominal pain that started 11/24, admitted for acute cholecystitis. pain is associated with dizziness. pt states on 11/24 he also presented to the ER for chest pain. Workup notable for trop neg x 3 and clear XR. Pt was discharged from ER. Currently denies chest pain or SOB. Cardiology consulted for clearance for lap rajeev    Recommendations:  [ ] EKG nonischemic. pt currently denies any CV symptoms. Trop neg x 1, awaiting trend. Repeat TTE to assess EF and valvular dysfunctions. PPM interrogated with no events, normal device function and 3 yr battery longevity. Score 2 on RCRI. Assessment:  68 y/o Chilean speaking male with PMH of sinus node disorder s/p PPM, ? NSTEMI, HTN, HLD, cerebral aneurysm s/p coiling, CVA with residual L sided weaknes, seizure disorder, DM2 on Metformin and gastritis who presented to [place] ED with complaint of abdominal pain that started 11/24, admitted for acute cholecystitis. pain is associated with dizziness. pt states on 11/24 he also presented to the ER for chest pain. Workup notable for trop neg x 3 and clear XR. Pt was discharged from ER. Currently denies chest pain or SOB. Cardiology consulted for clearance for lap rajeev    Recommendations:  [ ] EKG nonischemic. pt currently denies any CV symptoms. Trop neg x 1, awaiting trend. Repeat TTE to assess EF and valvular dysfunctions. PPM interrogated with no events, normal device function and 3 yr battery longevity. Score 2 on RCRI.    cardio attending  67 yo with abdominal pains.  tn 19.7/23/23/14 blood culture neg, 2/10/23 cardiac cath neg, hb 9.7 hct 29 plt 100 bs 133 got 104 gpt 131 gfr 48. Without evidence or recent infarction overt heart failure or unstable angina and based upon a satisfactory EKG cardiac cath and troponin and clinical exam, Mr Rios  may undergo planned laparoscopic guided cholecystectomy  which constitutes low risk surgery in a patient with intermediate cardiac risk at an ASA class II or III anesthesia risk.         discussed with Annalise harris and alix craig md [facility] Assessment:  68 y/o Syrian speaking male with PMH of sinus node disorder s/p PPM, ? NSTEMI, HTN, HLD, cerebral aneurysm s/p coiling, CVA with residual L sided weaknes, seizure disorder, DM2 on Metformin and gastritis who presented to [place] ED with complaint of abdominal pain that started 11/24, admitted for acute cholecystitis. pain is associated with dizziness. pt states on 11/24 he also presented to the ER for chest pain. Workup notable for trop neg x 3 and clear XR. Pt was discharged from ER. Currently denies chest pain or SOB. Cardiology consulted for clearance for lap rajeev    Recommendations:  [ ] EKG nonischemic. pt currently denies any CV symptoms. Trop neg x 1, awaiting trend. Repeat TTE to assess EF and valvular dysfunctions. PPM interrogated with no events, normal device function and 3 yr battery longevity. Score 2 on RCRI.    cardio attending  67 yo with abdominal pains.  tn 19.7/23/23/14 blood culture neg, 2/10/23 cardiac cath neg, hb 9.7 hct 29 plt 100 bs 133 got 104 gpt 131 gfr 48. Without evidence or recent infarction overt heart failure or unstable angina and based upon a satisfactory EKG cardiac cath and troponin echocardiogram today and clinical exam, Mr Rios  may undergo planned laparoscopic guided cholecystectomy  which constitutes low risk surgery in a patient with intermediate cardiac risk at an ASA class II or III anesthesia risk.         discussed with Annalise harris and alix craig md [facility] Assessment:  66 y/o Solomon Islander speaking male with PMH of sinus node disorder s/p PPM, ? NSTEMI, HTN, HLD, cerebral aneurysm s/p coiling, CVA with residual L sided weaknes, seizure disorder, DM2 on Metformin and gastritis who presented to [place] ED with complaint of abdominal pain that started 11/24, admitted for acute cholecystitis. pain is associated with dizziness. pt states on 11/24 he also presented to the ER for chest pain. Workup notable for trop neg x 3 and clear XR. Pt was discharged from ER. Currently denies chest pain or SOB. Cardiology consulted for clearance for lap rajeev    Recommendations:  [ ] EKG nonischemic. pt currently denies any CV symptoms. Trop neg x 1, awaiting trend. Repeat TTE to assess EF and valvular dysfunctions. PPM interrogated with no events, normal device function and 3 yr battery longevity. Score 2 on RCRI.    cardio attending  67 yo with abdominal pains.  tn 19.7/23/23/14 blood culture neg, 2/10/23 cardiac cath neg, hb 9.7 hct 29 plt 100 bs 133 got 104 gpt 131 gfr 48. Without evidence or recent infarction overt heart failure or unstable angina and based upon a satisfactory EKG cardiac cath and troponin stable echocardiogram today and clinical exam, Mr Rios  may undergo planned laparoscopic guided cholecystectomy  which constitutes low risk surgery in a patient with intermediate cardiac risk at an ASA class II or III anesthesia risk.         discussed with Annalise harris and alix craig md [facility]

## 2023-11-30 NOTE — CONSULT NOTE ADULT - ASSESSMENT
66 y/o Turkish speaking male with PMH of sinus node disorder s/p PPM, HTN, HLD, cerebral aneurysm s/p coiling, CVA with residual L sided weaknes, seizure disorder, DM2 on Metformin and gastritis who presented to [place] ED with complaint of worsening abdominal pain that started 11/24.  Pt states the pain is associated with dizziness and reports one episode of fall from seated position on Friday. Pt denies LOC and hitting his head. Pt also states he has not had an appetite to eat because of the abdominal pain. Noted to have acute cholecystitis and is now s/p Laparoscopic cholecystectomy converted open due to severe inflammatory process occluding safe visualization of the critical structures. Gall bladder disintegrated on retraction, releasing pus from the gall bladder lumen. ICU consulted for post op hypotension requiring pressor support.    PLAN:  [] Acute Cholecystitis s/p lap converted to open cholecystectomy   - NPO  - surgical team following  - Continue Zosyn  - Dilaudid prn for pain  - Zofran for nausea  - CBC, CMP, lactic acid, Mag phos at 2100      [] CHRISTOPHER  - Cr 1.50 with Baseline around 1.13  - c/w IVF at 100cc/hr  - Q1H I&O       [] septic vs hypovolemic shock   - s/p 2L crystalloids in OR  - Echo with EF 65-70%  - Titrate pressors  to maintain MAP > 65-70  - hold  Metoprolol 25mg BID      [] Hx of seizure disorders  - on carbamazepine 300mg BID at home but NPO will convert to keppra until taking PO       [] HX of CVA and HLD  - continue Atorvastatin 80mg QD  - hold AC    [] Hx of DM2  - Hold home metformin 500mg  - continue ISS, blood glucose every 6 hours while NPO    DVT ppx: ICDs     - code status: full code    -d/w Dr. Heard  (E- ICU)

## 2023-11-30 NOTE — PROGRESS NOTE ADULT - PROBLEM SELECTOR PLAN 1
- HIDA scan from 11/29/23: Abnormal morphine-augmented hepatobiliary scan. Findings are compatible with acute cholecystitis.  - Continue IV Zosyn 3.375mg q8 (Day 2)  - Prelim blood cultures from 11/28/23 - NGTD  - surgery recommendations appreciated: cholecystectomy today pending cardio clearance

## 2023-11-30 NOTE — CONSULT NOTE ADULT - SUBJECTIVE AND OBJECTIVE BOX
ICU CONSULT  Location of Patient :  RR 02 ([facility])  Attending requesting Consult: Dr Whitley   Chief Complaint :     Reason For consult : post operative hypotension requiring pressor support     Initial HPI on admission:  HPI:  68 y/o Brazilian speaking male with PMH of sinus node disorder s/p PPM, HTN, HLD, cerebral aneurysm s/p coiling, CVA with residual L sided weaknes, seizure disorder, DM2 on Metformin and gastritis who presented to [place] ED with complaint of abdominal pain that started 11/24. Pt  reports pain has gotten progressively worse since Friday. Pt states the pain is associated with dizziness and reports one episode of fall from seated position on Friday. Pt denies LOC and hitting his head. Pt also states he has not had an appetite to eat because of the abdominal pain. Pt states he also in experiencing constipation with last bowel movement on Saturday 11/25. Pt denies diarrhea, N/V, chest pain, fevers, chills.     In the ED /73, HR 71, Temp 98.2, RR 18 and SpO2 98 on RA, CT chest abd/pelvis showed Distended gallbladder with gallbladder wall thickening and pericholecystic fat stranding and hemangioma in T7 vertebrae. Pt received x1 dose of Zosyn.     (28 Nov 2023 20:10)      BRIEF HOSPITAL COURSE: Now s/p lap converted to open rajeev, hypotensive intra-op now requriing pressors. EBL 100ml however reported purulent "spillage" into abdomen.     PAST MEDICAL & SURGICAL HISTORY:  CVA (cerebral vascular accident)  left arm weakness, no sensation in left side in 1998  Aneurysm of [place]  HLD (hyperlipidemia)  Epilepsy  last seizure 2016  Renal agenesis, unilateral  Depression  Cerebral aneurysm without rupture  diagnosed 2017 had cerebral angio at [facility]  Gastroesophageal reflux disease, esophagitis presence not specified  Glaucoma of both eyes, unspecified glaucoma type  Bradycardia  Language barrier  Brazilian speaking  T2DM (type 2 diabetes mellitus)  S/P craniotomy for RCMA 1998  Pacemaker  Medtronic Serial [id number]/Model [id number]  Implant 6/29/07, new generator 12/23/2016  S/P bunionectomy  S/P cataract surgery with lens implants  Status post coil embolization of cerebral aneurysm 9/2018        Allergies  No Known Allergies      FAMILY HISTORY:  Family history of stomach cancer      Social history: unknown     Review of Systems: pt poor historian, denies any complaints at this time       Medications:  MEDICATIONS:  lactated ringers. 1000 milliLiter(s) (100 mL/Hr) IV Continuous <Continuous>  phenylephrine  Infusion 0.5 MICROgram(s)/kG/Min (14.1 mL/Hr) IV Continuous <Continuous>  HYDROmorphone Injectable 0.5 milliGRAM(s) IV Push every 10 minutes PRN Moderate Pain (4 - 6)  ondansetron Injectable 4 milliGRAM(s) IV Push once PRN Nausea and/or Vomiting      Antibiotics History  piperacillin/tazobactam IVPB.. 3.375 Gram(s) IV Intermittent every 8 hours, 11-28-23 @ 22:00, Stop order after: 7 Days  piperacillin/tazobactam IVPB... 3.375 Gram(s) IV Intermittent once, 11-28-23 @ 18:35, Stop order after: 1 Doses    Home Medications:  Last Order Reconciliation Date: 11-28-23 (Admission Reconciliation)  Aspirin Enteric Coated 81 mg oral delayed release tablet: 1 tab(s) orally once a day  Hospital    (11-28-23)  atorvastatin 80 mg oral tablet: 1 tab(s) orally once a day (at bedtime)  Hospital (11-28-23)  carBAMazepine 300 mg oral capsule, extended release: 1 cap(s) orally 2 times a day  Hospital  (11-28-23)  clopidogrel 75 mg oral tablet: 1 tab(s) orally once a day  Hospital  (11-28-23)  famotidine 20 mg oral tablet: 1 tab(s) orally 2 times a day  HOME HOSPITAL  (11-28-23)  FLUoxetine 20 mg oral capsule: 2 cap(s) orally once a day  Hospital  (11-28-23)  fluticasone 50 mcg/inh nasal spray: 1 spray(s) nasal once a day  HOME (11-28-23)  latanoprost 0.005% ophthalmic solution: 1 drop(s) to each affected eye once a day (at bedtime)  HOME  (11-28-23)  LORazepam 0.5 mg oral tablet: 1 tab(s) orally 3 times a day, As Needed  HOME/Hospital  (11-28-23)  MetFORMIN (Eqv-Fortamet) 500 mg oral tablet, extended release: 1 tab(s) orally once a day STARTING 2/13/23 (11-28-23)  metoprolol tartrate 25 mg oral tablet: 1 tab(s) orally once a day  HOME /HOSPITAL (11-28-23)  zolpidem 10 mg oral tablet: 0.5 tab(s) orally once a day (at bedtime)  [facility] /HOME  (11-28-23)        LABS:                        9.7    7.22  )-----------( 100      ( 30 Nov 2023 07:05 )             29.3     11-30    141  |  105  |  15  ----------------------------<  160<H>  3.7   |  26  |  1.56<H>    Ca    8.3<L>      30 Nov 2023 07:05  Mg     2.1     11-29    TPro  6.4  /  Alb  2.0<L>  /  TBili  0.9  /  DBili  0.5<H>  /  AST  104<H>  /  ALT  131<H>  /  AlkPhos  128<H>  11-30        Urinalysis Basic - ( 30 Nov 2023 07:05 )    Color: x / Appearance: x / SG: x / pH: x  Gluc: 160 mg/dL / Ketone: x  / Bili: x / Urobili: x   Blood: x / Protein: x / Nitrite: x   Leuk Esterase: x / RBC: x / WBC x   Sq Epi: x / Non Sq Epi: x / Bacteria: x        CULTURES: (if applicable)    Culture - Blood (collected 11-28-23 @ 22:39)  Source: .Blood Blood-Peripheral  Preliminary Report (11-30-23 @ 04:02):    No growth at 24 hours    Culture - Blood (collected 11-28-23 @ 22:39)  Source: .Blood Blood-Peripheral  Preliminary Report (11-30-23 @ 04:02):    No growth at 24 hours    CAPILLARY BLOOD GLUCOSE      POCT Blood Glucose.: 185 mg/dL (30 Nov 2023 17:32)      RADIOLOGY  CT abd/ Pel non contrast: 11/28/23   Findings suspicious for acute cholecystitis. Correlate with patient's   symptoms. Consider right upper quadrant ultrasound if clinically   indicated.      HIDA SCAN: 11/29/23  Abnormal morphine-augmented hepatobiliary scan.  Findings are compatible with acute cholecystitis.    ECHO: 11/30/23    1. Left ventricular ejection fraction, by visual estimation, is 65 to  70%.   2. Normal global left ventricular systolic function.   3. Normal left ventricular internal cavity size.   4. Mildly enlarged right ventricle.   5. Mild mitral valve regurgitation.   6. Thickening of the anterior mitral valve leaflet.   7. Mild tricuspid regurgitation.      VITALS:  T(C): 37.1 (11-30-23 @ 17:30), Max: 37.3 (11-29-23 @ 20:50)  T(F): 98.8 (11-30-23 @ 17:30), Max: 99.1 (11-29-23 @ 20:50)  HR: 65 (11-30-23 @ 18:00) (60 - 74)  BP: 128/66 (11-30-23 @ 18:00) (77/49 - 146/71)  RR: 16 (11-30-23 @ 18:00) (16 - 21)  SpO2: 99% (11-30-23 @ 18:00) (94% - 100%)      Height (cm): 154.9 (11-28-23 @ 15:01)  Weight (kg): 75.296 (11-28-23 @ 15:01)  BMI (kg/m2): 31.4 (11-28-23 @ 15:01)      Physical Examination:  GENERAL:               Alert, confused, No acute distress.    HEENT:                    Pupils equal, reactive to light.  Symmetric. No JVD, Moist MM  PULM:                     Bilateral air entry, Clear to auscultation bilaterally, no significant sputum production, No Rales, No Rhonchi, No Wheezing  CVS:                         S1, S2,  faint systolic murmur   ABD:                        Soft, hypoactive BCS, NIKHIL x 2 in RUQ,   EXT:                         No edema, nontender, No Cyanosis or Clubbing   Vascular:                Warm Extremities, Normal Capillary refill, Normal Distal Pulses  SKIN:                       Warm and well perfused, no rashes noted.   NEURO:                  Alert, interactive, L upper arm weakness as from prior stroke  commands

## 2023-11-30 NOTE — CONSULT NOTE ADULT - SUBJECTIVE AND OBJECTIVE BOX
PENELOPE ISLAS  [id number]      HPI:  68 y/o [place] speaking male with PMH of sinus node disorder s/p PPM, HTN, HLD, cerebral aneurysm s/p coiling, CVA with residual L sided weaknes, seizure disorder, DM2 on Metformin and gastritis who presented to [place] ED with complaint of abdominal pain that started . Pt  reports pain has gotten progressively worse since Friday. Pt states the pain is associated with dizziness and reports one episode of fall from seated position on Friday. Pt denies LOC and hitting his head. Pt also states he has not had an appetite to eat because of the abdominal pain. Pt states he also in experiencing constipation with last bowel movement on . Pt denies diarrhea, N/V, chest pain, fevers, chills.     In the ED /73, HR 71, Temp 98.2, RR 18 and SpO2 98 on RA, CT chest abd/pelvis showed Distended gallbladder with gallbladder wall thickening and pericholecystic fat stranding and hemangioma in T7 vertebrae. Pt received x1 dose of Zosyn.     (2023 20:10)        ALLERGIES:  No Known Allergies      PAST MEDICAL & SURGICAL HISTORY:  CVA (cerebral vascular accident)  left arm weakness, no sensation in left side in       Aneurysm of [place]      HLD (hyperlipidemia)      Epilepsy  last seizure 2016      Renal agenesis, unilateral      Depression      Cerebral aneurysm without rupture  diagnosed 2017 had cerebral angio at [facility]      Gastroesophageal reflux disease, esophagitis presence not specified      Glaucoma of both eyes, unspecified glaucoma type      Bradycardia      Language barrier  [place] speaking      T2DM (type 2 diabetes mellitus)      S/P craniotomy  for RCMA       Pacemaker  Medtronic Serial [id number]/Model [id number]  Implant 07, new generator 2016      S/P bunionectomy      S/P cataract surgery  with lens implants      Status post coil embolization of cerebral aneurysm  2018            CURRENT MEDICATIONS:  MEDICATIONS  (STANDING):  aspirin enteric coated 81 milliGRAM(s) Oral daily  atorvastatin 80 milliGRAM(s) Oral at bedtime  carBAMazepine ER Tablet 300 milliGRAM(s) Oral two times a day  dextrose 5%. 1000 milliLiter(s) (50 mL/Hr) IV Continuous <Continuous>  dextrose 5%. 1000 milliLiter(s) (100 mL/Hr) IV Continuous <Continuous>  dextrose 50% Injectable 25 Gram(s) IV Push once  dextrose 50% Injectable 12.5 Gram(s) IV Push once  dextrose 50% Injectable 25 Gram(s) IV Push once  famotidine    Tablet 20 milliGRAM(s) Oral daily  FLUoxetine 20 milliGRAM(s) Oral daily  glucagon  Injectable 1 milliGRAM(s) IntraMuscular once  heparin   Injectable 5000 Unit(s) SubCutaneous every 12 hours  insulin lispro (ADMELOG) corrective regimen sliding scale   SubCutaneous three times a day before meals  insulin lispro (ADMELOG) corrective regimen sliding scale   SubCutaneous at bedtime  metoprolol tartrate 25 milliGRAM(s) Oral two times a day  piperacillin/tazobactam IVPB.. 3.375 Gram(s) IV Intermittent every 8 hours  sodium chloride 0.9%. 1000 milliLiter(s) (70 mL/Hr) IV Continuous <Continuous>    MEDICATIONS  (PRN):  dextrose Oral Gel 15 Gram(s) Oral once PRN Blood Glucose LESS THAN 70 milliGRAM(s)/deciliter  LORazepam     Tablet 0.5 milliGRAM(s) Oral three times a day PRN Anxiety  ondansetron    Tablet 4 milliGRAM(s) Oral every 8 hours PRN Nausea and/or Vomiting      SOCIAL HISTORY:  former smoker for 20 yrs, 2 cigarettes / day    FAMILY HISTORY:  Family history of stomach cancer      ROS:  All 10 systems reviewed and positives noted in HPI    OBJECTIVE:    VITAL SIGNS:  Vital Signs Last 24 Hrs  T(C): 36.8 (2023 04:45), Max: 37.3 (2023 20:50)  T(F): 98.3 (2023 04:45), Max: 99.1 (2023 20:50)  HR: 64 (2023 04:45) (64 - 73)  BP: 91/54 (2023 04:45) (91/54 - 103/61)  BP(mean): --  RR: 17 (2023 04:45) (17 - 17)  SpO2: 94% (2023 04:45) (94% - 95%)    Parameters below as of 2023 04:45  Patient On (Oxygen Delivery Method): room air        PHYSICAL EXAM:  General: well appearing, no distress  HEENT: sclera anicteric  Neck: supple, no carotid bruits b/l  CVS: JVP ~ 7 cm H20, RRR, s1, s2, no murmurs/rubs/gallops  Chest: unlabored respirations, clear to auscultation b/l  Abdomen: non-distended  Extremities: no lower extremity edema b/l  Neuro: awake, alert & oriented x 3  Psych: normal affect      LABS:                        9.7    7.22  )-----------( 100      ( 2023 07:05 )             29.3         141  |  105  |  15  ----------------------------<  160<H>  3.7   |  26  |  1.56<H>    Ca    8.3<L>      2023 07:05  Mg     2.1         TPro  6.4  /  Alb  2.0<L>  /  TBili  0.9  /  DBili  0.5<H>  /  AST  104<H>  /  ALT  131<H>  /  AlkPhos  128<H>          PT/INR - ( 2023 16:00 )   PT: 13.2 sec;   INR: 1.16 ratio         EC2023: NSR  previous EKG with A paced and 1st deg block      TTE: < from: TTE Echo Complete w/o Contrast w/ Doppler (02.10.23 @ 08:48) >   1. Left ventricular ejection fraction, by visual estimation, is 60 to   65%.   2. Normal global left ventricular systolic function.   3. Normal left ventricular internal cavity size.   4. Mildly enlarged right ventricle.   5. Normal left atrial size.   6. Right atrial enlargement.   7. Trace mitral valve regurgitation.   8. Moderate tricuspid regurgitation.   9. Mild aortic regurgitation.    < from: Cardiac Catheterization (02.10.23 @ 18:36) >  Indications:           NSTEMI  Diagnostic Conclusions:   No obstructive CAD.    PENELOPE ISLAS  [id number]      HPI:  68 y/o [place] speaking male with PMH of sinus node disorder s/p PPM, HTN, HLD, cerebral aneurysm s/p coiling, CVA with residual L sided weaknes, seizure disorder, DM2 on Metformin and gastritis who presented to [place] ED with complaint of abdominal pain that started . Pt  reports pain has gotten progressively worse since Friday. Pt states the pain is associated with dizziness and reports one episode of fall from seated position on Friday. Pt denies LOC and hitting his head. Pt also states he has not had an appetite to eat because of the abdominal pain. Pt states he also in experiencing constipation with last bowel movement on . Pt denies diarrhea, N/V, chest pain, fevers, chills.     In the ED /73, HR 71, Temp 98.2, RR 18 and SpO2 98 on RA, CT chest abd/pelvis showed Distended gallbladder with gallbladder wall thickening and pericholecystic fat stranding and hemangioma in T7 vertebrae. Pt received x1 dose of Zosyn.     (2023 20:10)        ALLERGIES:  No Known Allergies      PAST MEDICAL & SURGICAL HISTORY:  CVA (cerebral vascular accident)  left arm weakness, no sensation in left side in       Aneurysm of [place]      HLD (hyperlipidemia)      Epilepsy  last seizure 2016      Renal agenesis, unilateral      Depression      Cerebral aneurysm without rupture  diagnosed 2017 had cerebral angio at [facility]      Gastroesophageal reflux disease, esophagitis presence not specified      Glaucoma of both eyes, unspecified glaucoma type      Bradycardia      Language barrier  [place] speaking      T2DM (type 2 diabetes mellitus)      S/P craniotomy  for RCMA       Pacemaker  Medtronic Serial [id number]/Model A2DR01  Implant 07, new generator 2016      S/P bunionectomy      S/P cataract surgery  with lens implants      Status post coil embolization of cerebral aneurysm  2018            CURRENT MEDICATIONS:  MEDICATIONS  (STANDING):  aspirin enteric coated 81 milliGRAM(s) Oral daily  atorvastatin 80 milliGRAM(s) Oral at bedtime  carBAMazepine ER Tablet 300 milliGRAM(s) Oral two times a day  dextrose 5%. 1000 milliLiter(s) (50 mL/Hr) IV Continuous <Continuous>  dextrose 5%. 1000 milliLiter(s) (100 mL/Hr) IV Continuous <Continuous>  dextrose 50% Injectable 25 Gram(s) IV Push once  dextrose 50% Injectable 12.5 Gram(s) IV Push once  dextrose 50% Injectable 25 Gram(s) IV Push once  famotidine    Tablet 20 milliGRAM(s) Oral daily  FLUoxetine 20 milliGRAM(s) Oral daily  glucagon  Injectable 1 milliGRAM(s) IntraMuscular once  heparin   Injectable 5000 Unit(s) SubCutaneous every 12 hours  insulin lispro (ADMELOG) corrective regimen sliding scale   SubCutaneous three times a day before meals  insulin lispro (ADMELOG) corrective regimen sliding scale   SubCutaneous at bedtime  metoprolol tartrate 25 milliGRAM(s) Oral two times a day  piperacillin/tazobactam IVPB.. 3.375 Gram(s) IV Intermittent every 8 hours  sodium chloride 0.9%. 1000 milliLiter(s) (70 mL/Hr) IV Continuous <Continuous>    MEDICATIONS  (PRN):  dextrose Oral Gel 15 Gram(s) Oral once PRN Blood Glucose LESS THAN 70 milliGRAM(s)/deciliter  LORazepam     Tablet 0.5 milliGRAM(s) Oral three times a day PRN Anxiety  ondansetron    Tablet 4 milliGRAM(s) Oral every 8 hours PRN Nausea and/or Vomiting      SOCIAL HISTORY:  former smoker for 20 yrs, 2 cigarettes / day    FAMILY HISTORY:  Family history of stomach cancer      ROS:  All 10 systems reviewed and positives noted in HPI    OBJECTIVE:    VITAL SIGNS:  Vital Signs Last 24 Hrs  T(C): 36.8 (2023 04:45), Max: 37.3 (2023 20:50)  T(F): 98.3 (2023 04:45), Max: 99.1 (2023 20:50)  HR: 64 (2023 04:45) (64 - 73)  BP: 91/54 (2023 04:45) (91/54 - 103/61)  BP(mean): --  RR: 17 (2023 04:45) (17 - 17)  SpO2: 94% (2023 04:45) (94% - 95%)    Parameters below as of 2023 04:45  Patient On (Oxygen Delivery Method): room air        PHYSICAL EXAM:  General: well appearing, no distress  HEENT: sclera anicteric  Neck: supple, no carotid bruits b/l  CVS: JVP ~ 7 cm H20, RRR, s1, s2, no murmurs/rubs/gallops  Chest: unlabored respirations, clear to auscultation b/l  Abdomen: non-distended  Extremities: no lower extremity edema b/l  Neuro: awake, alert & oriented x 3  Psych: normal affect      LABS:                        9.7    7.22  )-----------( 100      ( 2023 07:05 )             29.3         141  |  105  |  15  ----------------------------<  160<H>  3.7   |  26  |  1.56<H>    Ca    8.3<L>      2023 07:05  Mg     2.1         TPro  6.4  /  Alb  2.0<L>  /  TBili  0.9  /  DBili  0.5<H>  /  AST  104<H>  /  ALT  131<H>  /  AlkPhos  128<H>          PT/INR - ( 2023 16:00 )   PT: 13.2 sec;   INR: 1.16 ratio         EC2023: NSR  previous EKG with A paced and 1st deg block      TTE: < from: TTE Echo Complete w/o Contrast w/ Doppler (02.10.23 @ 08:48) >   1. Left ventricular ejection fraction, by visual estimation, is 60 to   65%.   2. Normal global left ventricular systolic function.   3. Normal left ventricular internal cavity size.   4. Mildly enlarged right ventricle.   5. Normal left atrial size.   6. Right atrial enlargement.   7. Trace mitral valve regurgitation.   8. Moderate tricuspid regurgitation.   9. Mild aortic regurgitation.    < from: TTE Echo Complete w/o Contrast w/ Doppler (23 @ 11:17) >  Summary:   1. Left ventricular ejection fraction, by visual estimation, is 65 to   70%.   2. Normal global left ventricular systolic function.   3. Normal left ventricular internal cavity size.   4. Mildly enlarged right ventricle.   5. Mild mitral valve regurgitation.   6. Thickening of the anterior mitral valve leaflet.   7. Mild tricuspid regurgitation.   8. Mild aortic regurgitation.    [id number] Pete Brewer , Electronically signed on 2023 at   12:07:56 PM            *** Final ***    < end of copied text >      < from: Cardiac Catheterization (02.10.23 @ 18:36) >  Indications:           NSTEMI  Diagnostic Conclusions:   No obstructive CAD.

## 2023-11-30 NOTE — PRE-OP CHECKLIST - SELECT TESTS ORDERED
T & S and Group and RH sent/Type and Screen T & S and Group and RH sent to blood bankl/Type and Screen

## 2023-11-30 NOTE — PROGRESS NOTE ADULT - PROBLEM SELECTOR PLAN 2
- RCRI score 2  - Pacemaker interrogation from 11/30/23: No events. Normal device function  - Cardiology consulted for clearance: repeat troponin was 13 (normal) from 11/30/23; pending ECHO  - Pt is medically optimized for lap cholecystectomy as long as he remains hemodynamically stable. - RCRI score 1  - Pacemaker interrogation from 11/30/23: No events. Normal device function  - Cardiology consulted for clearance: repeat troponin was 13 (normal) from 11/30/23; pending ECHO  - Pt is medically optimized for lap cholecystectomy as long as he remains hemodynamically stable.

## 2023-12-01 LAB
ALBUMIN SERPL ELPH-MCNC: 1.6 G/DL — LOW (ref 3.3–5)
ALBUMIN SERPL ELPH-MCNC: 1.6 G/DL — LOW (ref 3.3–5)
ALP SERPL-CCNC: 98 U/L — SIGNIFICANT CHANGE UP (ref 40–120)
ALP SERPL-CCNC: 98 U/L — SIGNIFICANT CHANGE UP (ref 40–120)
ALT FLD-CCNC: 105 U/L — HIGH (ref 10–45)
ALT FLD-CCNC: 105 U/L — HIGH (ref 10–45)
ANION GAP SERPL CALC-SCNC: 9 MMOL/L — SIGNIFICANT CHANGE UP (ref 5–17)
ANION GAP SERPL CALC-SCNC: 9 MMOL/L — SIGNIFICANT CHANGE UP (ref 5–17)
AST SERPL-CCNC: 109 U/L — HIGH (ref 10–40)
AST SERPL-CCNC: 109 U/L — HIGH (ref 10–40)
BILIRUB SERPL-MCNC: 0.6 MG/DL — SIGNIFICANT CHANGE UP (ref 0.2–1.2)
BILIRUB SERPL-MCNC: 0.6 MG/DL — SIGNIFICANT CHANGE UP (ref 0.2–1.2)
BUN SERPL-MCNC: 12 MG/DL — SIGNIFICANT CHANGE UP (ref 7–23)
BUN SERPL-MCNC: 12 MG/DL — SIGNIFICANT CHANGE UP (ref 7–23)
CALCIUM SERPL-MCNC: 7.8 MG/DL — LOW (ref 8.4–10.5)
CALCIUM SERPL-MCNC: 7.8 MG/DL — LOW (ref 8.4–10.5)
CARBAMAZEPINE SERPL-MCNC: 8 UG/ML — SIGNIFICANT CHANGE UP (ref 4–12)
CARBAMAZEPINE SERPL-MCNC: 8 UG/ML — SIGNIFICANT CHANGE UP (ref 4–12)
CHLORIDE SERPL-SCNC: 103 MMOL/L — SIGNIFICANT CHANGE UP (ref 96–108)
CHLORIDE SERPL-SCNC: 103 MMOL/L — SIGNIFICANT CHANGE UP (ref 96–108)
CO2 SERPL-SCNC: 25 MMOL/L — SIGNIFICANT CHANGE UP (ref 22–31)
CO2 SERPL-SCNC: 25 MMOL/L — SIGNIFICANT CHANGE UP (ref 22–31)
CREAT SERPL-MCNC: 1.57 MG/DL — HIGH (ref 0.5–1.3)
CREAT SERPL-MCNC: 1.57 MG/DL — HIGH (ref 0.5–1.3)
EGFR: 47 ML/MIN/1.73M2 — LOW
EGFR: 47 ML/MIN/1.73M2 — LOW
GLUCOSE BLDC GLUCOMTR-MCNC: 154 MG/DL — HIGH (ref 70–99)
GLUCOSE BLDC GLUCOMTR-MCNC: 154 MG/DL — HIGH (ref 70–99)
GLUCOSE BLDC GLUCOMTR-MCNC: 167 MG/DL — HIGH (ref 70–99)
GLUCOSE BLDC GLUCOMTR-MCNC: 167 MG/DL — HIGH (ref 70–99)
GLUCOSE BLDC GLUCOMTR-MCNC: 172 MG/DL — HIGH (ref 70–99)
GLUCOSE BLDC GLUCOMTR-MCNC: 172 MG/DL — HIGH (ref 70–99)
GLUCOSE BLDC GLUCOMTR-MCNC: 188 MG/DL — HIGH (ref 70–99)
GLUCOSE BLDC GLUCOMTR-MCNC: 188 MG/DL — HIGH (ref 70–99)
GLUCOSE BLDC GLUCOMTR-MCNC: 196 MG/DL — HIGH (ref 70–99)
GLUCOSE BLDC GLUCOMTR-MCNC: 196 MG/DL — HIGH (ref 70–99)
GLUCOSE SERPL-MCNC: 224 MG/DL — HIGH (ref 70–99)
GLUCOSE SERPL-MCNC: 224 MG/DL — HIGH (ref 70–99)
HCT VFR BLD CALC: 27 % — LOW (ref 39–50)
HCT VFR BLD CALC: 27 % — LOW (ref 39–50)
HGB BLD-MCNC: 8.9 G/DL — LOW (ref 13–17)
HGB BLD-MCNC: 8.9 G/DL — LOW (ref 13–17)
LACTATE SERPL-SCNC: 3.4 MMOL/L — HIGH (ref 0.7–2)
LACTATE SERPL-SCNC: 3.4 MMOL/L — HIGH (ref 0.7–2)
MAGNESIUM SERPL-MCNC: 1.9 MG/DL — SIGNIFICANT CHANGE UP (ref 1.6–2.6)
MAGNESIUM SERPL-MCNC: 1.9 MG/DL — SIGNIFICANT CHANGE UP (ref 1.6–2.6)
MCHC RBC-ENTMCNC: 33 GM/DL — SIGNIFICANT CHANGE UP (ref 32–36)
MCHC RBC-ENTMCNC: 33 GM/DL — SIGNIFICANT CHANGE UP (ref 32–36)
MCHC RBC-ENTMCNC: 33.6 PG — SIGNIFICANT CHANGE UP (ref 27–34)
MCHC RBC-ENTMCNC: 33.6 PG — SIGNIFICANT CHANGE UP (ref 27–34)
MCV RBC AUTO: 101.9 FL — HIGH (ref 80–100)
MCV RBC AUTO: 101.9 FL — HIGH (ref 80–100)
NRBC # BLD: 0 /100 WBCS — SIGNIFICANT CHANGE UP (ref 0–0)
NRBC # BLD: 0 /100 WBCS — SIGNIFICANT CHANGE UP (ref 0–0)
PHOSPHATE SERPL-MCNC: 3.7 MG/DL — SIGNIFICANT CHANGE UP (ref 2.5–4.5)
PHOSPHATE SERPL-MCNC: 3.7 MG/DL — SIGNIFICANT CHANGE UP (ref 2.5–4.5)
PLATELET # BLD AUTO: 121 K/UL — LOW (ref 150–400)
PLATELET # BLD AUTO: 121 K/UL — LOW (ref 150–400)
POTASSIUM SERPL-MCNC: 4.3 MMOL/L — SIGNIFICANT CHANGE UP (ref 3.5–5.3)
POTASSIUM SERPL-MCNC: 4.3 MMOL/L — SIGNIFICANT CHANGE UP (ref 3.5–5.3)
POTASSIUM SERPL-SCNC: 4.3 MMOL/L — SIGNIFICANT CHANGE UP (ref 3.5–5.3)
POTASSIUM SERPL-SCNC: 4.3 MMOL/L — SIGNIFICANT CHANGE UP (ref 3.5–5.3)
PROCALCITONIN SERPL-MCNC: 1.18 NG/ML — HIGH
PROCALCITONIN SERPL-MCNC: 1.18 NG/ML — HIGH
PROT SERPL-MCNC: 5.7 G/DL — LOW (ref 6–8.3)
PROT SERPL-MCNC: 5.7 G/DL — LOW (ref 6–8.3)
RBC # BLD: 2.65 M/UL — LOW (ref 4.2–5.8)
RBC # BLD: 2.65 M/UL — LOW (ref 4.2–5.8)
RBC # FLD: 13.9 % — SIGNIFICANT CHANGE UP (ref 10.3–14.5)
RBC # FLD: 13.9 % — SIGNIFICANT CHANGE UP (ref 10.3–14.5)
SODIUM SERPL-SCNC: 137 MMOL/L — SIGNIFICANT CHANGE UP (ref 135–145)
SODIUM SERPL-SCNC: 137 MMOL/L — SIGNIFICANT CHANGE UP (ref 135–145)
WBC # BLD: 6.28 K/UL — SIGNIFICANT CHANGE UP (ref 3.8–10.5)
WBC # BLD: 6.28 K/UL — SIGNIFICANT CHANGE UP (ref 3.8–10.5)
WBC # FLD AUTO: 6.28 K/UL — SIGNIFICANT CHANGE UP (ref 3.8–10.5)
WBC # FLD AUTO: 6.28 K/UL — SIGNIFICANT CHANGE UP (ref 3.8–10.5)

## 2023-12-01 PROCEDURE — 99232 SBSQ HOSP IP/OBS MODERATE 35: CPT

## 2023-12-01 RX ORDER — OXYCODONE HYDROCHLORIDE 5 MG/1
5 TABLET ORAL EVERY 4 HOURS
Refills: 0 | Status: DISCONTINUED | OUTPATIENT
Start: 2023-12-01 | End: 2023-12-04

## 2023-12-01 RX ORDER — FAMOTIDINE 10 MG/ML
20 INJECTION INTRAVENOUS DAILY
Refills: 0 | Status: DISCONTINUED | OUTPATIENT
Start: 2023-12-01 | End: 2023-12-07

## 2023-12-01 RX ORDER — ACETAMINOPHEN 500 MG
1000 TABLET ORAL ONCE
Refills: 0 | Status: COMPLETED | OUTPATIENT
Start: 2023-12-01 | End: 2024-10-29

## 2023-12-01 RX ORDER — SODIUM CHLORIDE 9 MG/ML
1000 INJECTION, SOLUTION INTRAVENOUS ONCE
Refills: 0 | Status: COMPLETED | OUTPATIENT
Start: 2023-12-01 | End: 2023-12-01

## 2023-12-01 RX ORDER — INSULIN LISPRO 100/ML
VIAL (ML) SUBCUTANEOUS
Refills: 0 | Status: DISCONTINUED | OUTPATIENT
Start: 2023-12-01 | End: 2023-12-07

## 2023-12-01 RX ORDER — HEPARIN SODIUM 5000 [USP'U]/ML
5000 INJECTION INTRAVENOUS; SUBCUTANEOUS EVERY 8 HOURS
Refills: 0 | Status: DISCONTINUED | OUTPATIENT
Start: 2023-12-01 | End: 2023-12-03

## 2023-12-01 RX ORDER — SODIUM CHLORIDE 9 MG/ML
1000 INJECTION, SOLUTION INTRAVENOUS
Refills: 0 | Status: DISCONTINUED | OUTPATIENT
Start: 2023-12-01 | End: 2023-12-02

## 2023-12-01 RX ORDER — ALBUMIN HUMAN 25 %
50 VIAL (ML) INTRAVENOUS ONCE
Refills: 0 | Status: COMPLETED | OUTPATIENT
Start: 2023-12-01 | End: 2023-12-01

## 2023-12-01 RX ORDER — CHLORHEXIDINE GLUCONATE 213 G/1000ML
1 SOLUTION TOPICAL
Refills: 0 | Status: DISCONTINUED | OUTPATIENT
Start: 2023-12-01 | End: 2023-12-07

## 2023-12-01 RX ORDER — MORPHINE SULFATE 50 MG/1
1 CAPSULE, EXTENDED RELEASE ORAL EVERY 4 HOURS
Refills: 0 | Status: DISCONTINUED | OUTPATIENT
Start: 2023-12-01 | End: 2023-12-04

## 2023-12-01 RX ORDER — LEVETIRACETAM 250 MG/1
500 TABLET, FILM COATED ORAL EVERY 12 HOURS
Refills: 0 | Status: DISCONTINUED | OUTPATIENT
Start: 2023-12-01 | End: 2023-12-02

## 2023-12-01 RX ORDER — ACETAMINOPHEN 500 MG
1000 TABLET ORAL ONCE
Refills: 0 | Status: COMPLETED | OUTPATIENT
Start: 2023-12-01 | End: 2023-12-01

## 2023-12-01 RX ORDER — POTASSIUM CHLORIDE 20 MEQ
10 PACKET (EA) ORAL
Refills: 0 | Status: DISCONTINUED | OUTPATIENT
Start: 2023-12-01 | End: 2023-12-01

## 2023-12-01 RX ORDER — OXYCODONE HYDROCHLORIDE 5 MG/1
10 TABLET ORAL EVERY 4 HOURS
Refills: 0 | Status: DISCONTINUED | OUTPATIENT
Start: 2023-12-01 | End: 2023-12-04

## 2023-12-01 RX ORDER — LANOLIN ALCOHOL/MO/W.PET/CERES
6 CREAM (GRAM) TOPICAL AT BEDTIME
Refills: 0 | Status: DISCONTINUED | OUTPATIENT
Start: 2023-12-01 | End: 2023-12-07

## 2023-12-01 RX ADMIN — Medication 1: at 21:30

## 2023-12-01 RX ADMIN — OXYCODONE HYDROCHLORIDE 10 MILLIGRAM(S): 5 TABLET ORAL at 09:30

## 2023-12-01 RX ADMIN — OXYCODONE HYDROCHLORIDE 10 MILLIGRAM(S): 5 TABLET ORAL at 08:36

## 2023-12-01 RX ADMIN — Medication 1: at 17:10

## 2023-12-01 RX ADMIN — Medication 1: at 12:08

## 2023-12-01 RX ADMIN — Medication 6 MILLIGRAM(S): at 23:24

## 2023-12-01 RX ADMIN — HEPARIN SODIUM 5000 UNIT(S): 5000 INJECTION INTRAVENOUS; SUBCUTANEOUS at 17:10

## 2023-12-01 RX ADMIN — PIPERACILLIN AND TAZOBACTAM 25 GRAM(S): 4; .5 INJECTION, POWDER, LYOPHILIZED, FOR SOLUTION INTRAVENOUS at 15:00

## 2023-12-01 RX ADMIN — Medication 1000 MILLIGRAM(S): at 17:55

## 2023-12-01 RX ADMIN — PIPERACILLIN AND TAZOBACTAM 25 GRAM(S): 4; .5 INJECTION, POWDER, LYOPHILIZED, FOR SOLUTION INTRAVENOUS at 05:12

## 2023-12-01 RX ADMIN — MORPHINE SULFATE 1 MILLIGRAM(S): 50 CAPSULE, EXTENDED RELEASE ORAL at 21:23

## 2023-12-01 RX ADMIN — LATANOPROST 1 DROP(S): 0.05 SOLUTION/ DROPS OPHTHALMIC; TOPICAL at 21:36

## 2023-12-01 RX ADMIN — Medication 50 MILLILITER(S): at 05:44

## 2023-12-01 RX ADMIN — LEVETIRACETAM 400 MILLIGRAM(S): 250 TABLET, FILM COATED ORAL at 05:12

## 2023-12-01 RX ADMIN — PIPERACILLIN AND TAZOBACTAM 25 GRAM(S): 4; .5 INJECTION, POWDER, LYOPHILIZED, FOR SOLUTION INTRAVENOUS at 21:30

## 2023-12-01 RX ADMIN — LEVETIRACETAM 500 MILLIGRAM(S): 250 TABLET, FILM COATED ORAL at 17:11

## 2023-12-01 RX ADMIN — FAMOTIDINE 20 MILLIGRAM(S): 10 INJECTION INTRAVENOUS at 12:08

## 2023-12-01 RX ADMIN — MORPHINE SULFATE 1 MILLIGRAM(S): 50 CAPSULE, EXTENDED RELEASE ORAL at 21:08

## 2023-12-01 RX ADMIN — SODIUM CHLORIDE 125 MILLILITER(S): 9 INJECTION, SOLUTION INTRAVENOUS at 07:49

## 2023-12-01 RX ADMIN — Medication 1: at 05:13

## 2023-12-01 RX ADMIN — SODIUM CHLORIDE 1000 MILLILITER(S): 9 INJECTION, SOLUTION INTRAVENOUS at 05:26

## 2023-12-01 RX ADMIN — SODIUM CHLORIDE 999 MILLILITER(S): 9 INJECTION, SOLUTION INTRAVENOUS at 11:59

## 2023-12-01 RX ADMIN — HYDROMORPHONE HYDROCHLORIDE 0.5 MILLIGRAM(S): 2 INJECTION INTRAMUSCULAR; INTRAVENOUS; SUBCUTANEOUS at 04:30

## 2023-12-01 RX ADMIN — ATORVASTATIN CALCIUM 80 MILLIGRAM(S): 80 TABLET, FILM COATED ORAL at 21:30

## 2023-12-01 RX ADMIN — Medication 100 MILLIEQUIVALENT(S): at 07:49

## 2023-12-01 RX ADMIN — HEPARIN SODIUM 5000 UNIT(S): 5000 INJECTION INTRAVENOUS; SUBCUTANEOUS at 21:30

## 2023-12-01 RX ADMIN — HYDROMORPHONE HYDROCHLORIDE 0.5 MILLIGRAM(S): 2 INJECTION INTRAMUSCULAR; INTRAVENOUS; SUBCUTANEOUS at 03:53

## 2023-12-01 RX ADMIN — Medication 400 MILLIGRAM(S): at 17:09

## 2023-12-01 NOTE — PROGRESS NOTE ADULT - NUTRITIONAL ASSESSMENT
This patient has been assessed with a concern for Malnutrition and has been determined to have a diagnosis/diagnoses of Moderate protein-calorie malnutrition.    This patient is being managed with:   Diet Clear Liquid-  Supplement Feeding Modality:  Oral  Ensure Clear Cans or Servings Per Day:  1       Frequency:  Two Times a day  Entered: Dec  1 2023  2:27PM    Diet Clear Liquid-  Entered: Dec  1 2023  8:13AM    The following pending diet order is being considered for treatment of Moderate protein-calorie malnutrition:null

## 2023-12-01 NOTE — PROGRESS NOTE ADULT - ASSESSMENT
Assessment:  66 y/o [place] speaking male with PMH of sinus node disorder s/p PPM, ? NSTEMI, HTN, HLD, cerebral aneurysm s/p coiling, CVA with residual L sided weaknes, seizure disorder, DM2 on Metformin and gastritis who presented to [place] ED with complaint of abdominal pain that started 11/24, admitted for acute cholecystitis. pain is associated with dizziness. pt states on 11/24 he also presented to the ER for chest pain. Workup notable for trop neg x 3 and clear XR. Pt was discharged from ER. Currently denies chest pain or SOB. Cardiology consulted for clearance for lap rajeev    Recommendations:  [ ] EKG nonischemic. pt currently denies any CV symptoms. Trop neg x 2. Repeat TTE with normal EF. PPM interrogated with no events, normal device function and 3 yr battery longevity.   Pt s/p laparoscopic rajeev converted to open cholecystectomy, now requiring 2l n/c. TTE with mildly enlarged RV, if sob &/or hemodynamics worsen, consider PE amparo Ocasio AGACNP-BC Assessment:  68 y/o [place] speaking male with PMH of sinus node disorder s/p PPM, ? NSTEMI, HTN, HLD, cerebral aneurysm s/p coiling, CVA with residual L sided weaknes, seizure disorder, DM2 on Metformin and gastritis who presented to [place] ED with complaint of abdominal pain that started 11/24, admitted for acute cholecystitis. pain is associated with dizziness. pt states on 11/24 he also presented to the ER for chest pain. Workup notable for trop neg x 3 and clear XR. Pt was discharged from ER. Currently denies chest pain or SOB. Cardiology consulted for clearance for lap rajeev    Recommendations:  [ ] EKG nonischemic. pt currently denies any CV symptoms. Trop neg x 2. Repeat TTE with normal EF. PPM interrogated with no events, normal device function and 3 yr battery longevity.   Pt s/p laparoscopic rajeev converted to open cholecystectomy, now requiring 2l n/c. TTE with mildly enlarged RV, if sob &/or hemodynamics worsen, consider PE amparo Ocasio AGACNP-BC    12/1/23 cardio attending  hb 8.9 hct 27 no new cardiac recommendations

## 2023-12-01 NOTE — PROGRESS NOTE ADULT - ASSESSMENT
Assessment  Acute cholecystitis s/p Open Cholecystectomy 11/30/2023  HTN (hypertension)  HLD (hyperlipidemia)  Type 2 diabetes mellitus  Seizure disorder  h/o DVT  Preop examination       Plan  IVF   IV antibiotics  monitor off pressors  Continue AED  pain control  out of bed to chiar  incentive spirometry  insulin ss  diet as per surgery         PMD:				                   Notified(Date):  Family Updated: 		                                 Date:       Sedation & Analgesia:	  Diet/Nutrition:		  Diet, Clear Liquid (12-01-23 @ 08:15) [Active]  GI PPx:			famotidine    Tablet 20 milliGRAM(s) Oral daily  DVT Ppx:		hep sq     Activity:		    Head of Bed:               35-45 Deg  Glycemic Control:        Insulin SS     Lines:  CENTRAL LINE: 	[ ] YES [ ] NO	                    LOCATION:   	                       DATE INSERTED:   	                    REMOVE:  [ ] YES [ ] NO    A-LINE:  	                [ ] YES [ ] NO                      LOCATION:   	                       DATE INSERTED: 		            REMOVE:  [ ] YES [ ] NO    QUINTERO: 		        [ ] YES [ ] NO  		                                       DATE INSERTED:		            REMOVE:  [ ] YES [ ] NO      Restraints were deemed necessary to prevent removal of life-sustaining devices [  ] YES   [  s  ]  NO    Disposition: ICU Care  Goals of Care: Full code

## 2023-12-01 NOTE — DIETITIAN INITIAL EVALUATION ADULT - PERTINENT MEDS FT
MEDICATIONS  (STANDING):  atorvastatin 80 milliGRAM(s) Oral at bedtime  chlorhexidine 2% Cloths 1 Application(s) Topical <User Schedule>  dextrose 5%. 1000 milliLiter(s) (50 mL/Hr) IV Continuous <Continuous>  dextrose 5%. 1000 milliLiter(s) (100 mL/Hr) IV Continuous <Continuous>  dextrose 50% Injectable 25 Gram(s) IV Push once  dextrose 50% Injectable 12.5 Gram(s) IV Push once  dextrose 50% Injectable 25 Gram(s) IV Push once  famotidine    Tablet 20 milliGRAM(s) Oral daily  heparin   Injectable 5000 Unit(s) SubCutaneous every 8 hours  insulin lispro (ADMELOG) corrective regimen sliding scale   SubCutaneous Before meals and at bedtime  lactated ringers. 1000 milliLiter(s) (125 mL/Hr) IV Continuous <Continuous>  lactated ringers. 1000 milliLiter(s) (999 mL/Hr) IV Continuous <Continuous>  latanoprost 0.005% Ophthalmic Solution 1 Drop(s) Both EYES at bedtime  levETIRAcetam 500 milliGRAM(s) Oral every 12 hours  piperacillin/tazobactam IVPB.. 3.375 Gram(s) IV Intermittent every 8 hours    MEDICATIONS  (PRN):  morphine  - Injectable 1 milliGRAM(s) IV Push every 4 hours PRN Severe Pain (7 - 10)  ondansetron Injectable 4 milliGRAM(s) IV Push every 6 hours PRN Nausea and/or Vomiting  oxyCODONE    IR 5 milliGRAM(s) Oral every 4 hours PRN Mild Pain (1 - 3)  oxyCODONE    IR 10 milliGRAM(s) Oral every 4 hours PRN Moderate Pain (4 - 6)

## 2023-12-01 NOTE — DIETITIAN INITIAL EVALUATION ADULT - ORAL INTAKE PTA/DIET HISTORY
Pt endorses very poor appetite prior to surgery. States that he would eats toast with cream cheese + a cup of milk at bfast then very small amounts at lunch/dinner- meals prepared by home aide? No chewing/swallowing issues. NKFA. Hx DMII, A1c 6.1%.

## 2023-12-01 NOTE — DIETITIAN INITIAL EVALUATION ADULT - ADD RECOMMEND
1. Add Ensure Clear TID  2. Advance to consistent carbohydrate + glucerna BID as tolerated per GI  3. Diet education as able  1. Add Ensure Clear TID  2. Advance to consistent carbohydrate + glucerna BID as tolerated per surgery   3. Diet education as able

## 2023-12-01 NOTE — PROGRESS NOTE ADULT - SUBJECTIVE AND OBJECTIVE BOX
Patient is a 68y old  Male who presents with a chief complaint of Acute cholecystitis     (01 Dec 2023 14:13)      BRIEF HOSPITAL COURSE: 67 year old male with history of sinus node disorder s/p PPM, HTN, HLD, cerebral aneurysm s/p coiling, CVA with residual L sided weaknes, seizure disorder, DM2 on Metformin and gastritis who presented to [facility] ED with complaint of abdominal pain that started 11/24.   CT chest abd/pelvis showed Distended gallbladder with gallbladder wall thickening and pericholecystic fat stranding and hemangioma in T7 vertebrae.   On 11/30/2023 patient taken to OR for acute cholecystitis and required to go open due ot complexity of inflammatory process.   Perioperatively patient became hypotensive requiring pressors to maintain BP.  Patient brought to ICU post operatively for monitoring.     Events last 24 hours: BPs during the day low and responded to IVF. Patient seen and examined at the bedside. Feels well, no complaints at the time of my exam. Got 1L IVF bolus today with improvement in BP, on IVF at 125ml/hr.     PAST MEDICAL & SURGICAL HISTORY:  CVA (cerebral vascular accident)  left arm weakness, no sensation in left side in 1998      Aneurysm of [place]      HLD (hyperlipidemia)      Epilepsy  last seizure 2016      Renal agenesis, unilateral      Depression      Cerebral aneurysm without rupture  diagnosed 2017 had cerebral angio at [facility]      Gastroesophageal reflux disease, esophagitis presence not specified      Glaucoma of both eyes, unspecified glaucoma type      Bradycardia      Language barrier  U[place] speaking      T2DM (type 2 diabetes mellitus)      S/P craniotomy  for RCMA 1998      Pacemaker  Medtronic Serial [id number]/Model [id number]  Implant 6/29/07, new generator 12/23/2016      S/P bunionectomy      S/P cataract surgery  with lens implants      Status post coil embolization of cerebral aneurysm  9/2018          Review of Systems:  CONSTITUTIONAL: No fever, chills, or fatigue  EYES: No eye pain, visual disturbances, or discharge  ENMT:  No difficulty hearing, tinnitus, vertigo; No sinus or throat pain  NECK: No pain or stiffness  RESPIRATORY: No cough, wheezing, chills or hemoptysis; No shortness of breath  CARDIOVASCULAR: No chest pain, palpitations, dizziness, or leg swelling  GASTROINTESTINAL: No abdominal or epigastric pain. No nausea, vomiting, or hematemesis; No diarrhea or constipation. No melena or hematochezia.  GENITOURINARY: No dysuria, frequency, hematuria, or incontinence  NEUROLOGICAL: No headaches, memory loss, loss of strength, numbness, or tremors  SKIN: No itching, burning, rashes, or lesions   MUSCULOSKELETAL: No joint pain or swelling; No muscle, back, or extremity pain  PSYCHIATRIC: No depression, anxiety, mood swings, or difficulty sleeping      Medications:  piperacillin/tazobactam IVPB.. 3.375 Gram(s) IV Intermittent every 8 hours        acetaminophen   IVPB .. 1000 milliGRAM(s) IV Intermittent once  levETIRAcetam 500 milliGRAM(s) Oral every 12 hours  morphine  - Injectable 1 milliGRAM(s) IV Push every 4 hours PRN  ondansetron Injectable 4 milliGRAM(s) IV Push every 6 hours PRN  oxyCODONE    IR 5 milliGRAM(s) Oral every 4 hours PRN  oxyCODONE    IR 10 milliGRAM(s) Oral every 4 hours PRN      heparin   Injectable 5000 Unit(s) SubCutaneous every 8 hours    famotidine    Tablet 20 milliGRAM(s) Oral daily      atorvastatin 80 milliGRAM(s) Oral at bedtime  dextrose 50% Injectable 25 Gram(s) IV Push once  dextrose 50% Injectable 25 Gram(s) IV Push once  dextrose 50% Injectable 12.5 Gram(s) IV Push once  insulin lispro (ADMELOG) corrective regimen sliding scale   SubCutaneous Before meals and at bedtime    dextrose 5%. 1000 milliLiter(s) IV Continuous <Continuous>  dextrose 5%. 1000 milliLiter(s) IV Continuous <Continuous>  lactated ringers. 1000 milliLiter(s) IV Continuous <Continuous>  lactated ringers. 1000 milliLiter(s) IV Continuous <Continuous>      chlorhexidine 2% Cloths 1 Application(s) Topical <User Schedule>  latanoprost 0.005% Ophthalmic Solution 1 Drop(s) Both EYES at bedtime            ICU Vital Signs Last 24 Hrs  T(C): 36.6 (01 Dec 2023 19:00), Max: 37.1 (01 Dec 2023 04:00)  T(F): 97.8 (01 Dec 2023 19:00), Max: 98.7 (01 Dec 2023 04:00)  HR: 67 (01 Dec 2023 20:00) (67 - 90)  BP: 101/47 (01 Dec 2023 20:00) (68/44 - 140/65)  BP(mean): 63 (01 Dec 2023 20:00) (52 - 86)  ABP: --  ABP(mean): --  RR: 15 (01 Dec 2023 20:00) (15 - 24)  SpO2: 95% (01 Dec 2023 20:00) (94% - 100%)    O2 Parameters below as of 01 Dec 2023 20:00  Patient On (Oxygen Delivery Method): room air                I&O's Detail    30 Nov 2023 07:01  -  01 Dec 2023 07:00  --------------------------------------------------------  IN:    Lactated Ringers: 100 mL    Lactated Ringers: 1050 mL    Lactated Ringers Bolus: 2000 mL    Phenylephrine: 11.2 mL  Total IN: 3161.2 mL    OUT:    Bulb (mL): 45 mL    Bulb (mL): 110 mL    Indwelling Catheter - Urethral (mL): 1125 mL  Total OUT: 1280 mL    Total NET: 1881.2 mL      01 Dec 2023 07:01  -  01 Dec 2023 21:04  --------------------------------------------------------  IN:    IV PiggyBack: 200 mL    Lactated Ringers: 999 mL    Lactated Ringers: 1750 mL    Oral Fluid: 225 mL  Total IN: 3174 mL    OUT:    Bulb (mL): 60 mL    Bulb (mL): 50 mL    Indwelling Catheter - Urethral (mL): 250 mL    Voided (mL): 950 mL  Total OUT: 1310 mL    Total NET: 1864 mL            LABS:                        8.9    6.28  )-----------( 121      ( 01 Dec 2023 04:28 )             27.0     12-01    137  |  103  |  12  ----------------------------<  224<H>  4.3   |  25  |  1.57<H>    Ca    7.8<L>      01 Dec 2023 04:28  Phos  3.7     12-01  Mg     1.9     12-01    TPro  5.7<L>  /  Alb  1.6<L>  /  TBili  0.6  /  DBili  x   /  AST  109<H>  /  ALT  105<H>  /  AlkPhos  98  12-01          CAPILLARY BLOOD GLUCOSE      POCT Blood Glucose.: 154 mg/dL (01 Dec 2023 17:08)      Urinalysis Basic - ( 01 Dec 2023 04:28 )    Color: x / Appearance: x / SG: x / pH: x  Gluc: 224 mg/dL / Ketone: x  / Bili: x / Urobili: x   Blood: x / Protein: x / Nitrite: x   Leuk Esterase: x / RBC: x / WBC x   Sq Epi: x / Non Sq Epi: x / Bacteria: x      CULTURES:  Culture Results:   No growth at 48 Hours (11-28-23 @ 22:39)  Culture Results:   No growth at 48 Hours (11-28-23 @ 22:39)      Physical Examination:    General:  Alert, oriented, interactive, nonfocal    HEENT: Pupils equal, reactive to light.  Symmetric.    PULM: Clear to auscultation bilaterally, no significant sputum production    CVS: Regular rate and rhythm, no murmurs, rubs, or gallops    ABD: Soft, nondistended, mildly tender over RUQ, dressing clean, dry, intact    EXT: No edema, nontender    SKIN: Warm    NEURO: A&Ox3, strength 5/5 all extremities, no focal deficits    CRITICAL CARE TIME SPENT: 56 minutes assessing presenting problems of acute illness, which pose high probability of life threatening deterioration or end organ damage/dysfunction, as well as medical decision making including initiating plan of care, reviewing data, reviewing radiologic exams, discussing with multidisciplinary team,  discussing goals of care with patient/family, and writing this note.  Non-inclusive of procedures performed.    Date of Entry of this note is equal to the date of services rendered     Patient is a 68y old  Male who presents with a chief complaint of Acute cholecystitis     (01 Dec 2023 14:13)      BRIEF HOSPITAL COURSE: 67 year old male with history of sinus node disorder s/p PPM, HTN, HLD, cerebral aneurysm s/p coiling, CVA with residual L sided weaknes, seizure disorder, DM2 on Metformin and gastritis who presented to [facility] ED with complaint of abdominal pain that started 11/24.   CT chest abd/pelvis showed Distended gallbladder with gallbladder wall thickening and pericholecystic fat stranding and hemangioma in T7 vertebrae.   On 11/30/2023 patient taken to OR for acute cholecystitis and required to go open due ot complexity of inflammatory process.   Perioperatively patient became hypotensive requiring pressors to maintain BP.  Patient brought to ICU post operatively for monitoring.     Events last 24 hours: BPs during the day low and responded to IVF. Patient seen and examined at the bedside. Feels well, no complaints at the time of my exam. Got 1L IVF bolus today with improvement in BP, on IVF at 125ml/hr.     PAST MEDICAL & SURGICAL HISTORY:  CVA (cerebral vascular accident)  left arm weakness, no sensation in left side in 1998      Aneurysm of [place]      HLD (hyperlipidemia)      Epilepsy  last seizure 2016      Renal agenesis, unilateral      Depression      Cerebral aneurysm without rupture  diagnosed 2017 had cerebral angio at [facility]      Gastroesophageal reflux disease, esophagitis presence not specified      Glaucoma of both eyes, unspecified glaucoma type      Bradycardia      Language barrier  Upper sorbian speaking      T2DM (type 2 diabetes mellitus)      S/P craniotomy  for RCMA 1998      Pacemaker  Medtronic Serial [id number]/Model [id number]  Implant 6/29/07, new generator 12/23/2016      S/P bunionectomy      S/P cataract surgery  with lens implants      Status post coil embolization of cerebral aneurysm  9/2018          Review of Systems:  CONSTITUTIONAL: No fever, chills, or fatigue  EYES: No eye pain, visual disturbances, or discharge  ENMT:  No difficulty hearing, tinnitus, vertigo; No sinus or throat pain  NECK: No pain or stiffness  RESPIRATORY: No cough, wheezing, chills or hemoptysis; No shortness of breath  CARDIOVASCULAR: No chest pain, palpitations, dizziness, or leg swelling  GASTROINTESTINAL: No abdominal or epigastric pain. No nausea, vomiting, or hematemesis; No diarrhea or constipation. No melena or hematochezia.  GENITOURINARY: No dysuria, frequency, hematuria, or incontinence  NEUROLOGICAL: No headaches, memory loss, loss of strength, numbness, or tremors  SKIN: No itching, burning, rashes, or lesions   MUSCULOSKELETAL: No joint pain or swelling; No muscle, back, or extremity pain  PSYCHIATRIC: No depression, anxiety, mood swings, or difficulty sleeping      Medications:  piperacillin/tazobactam IVPB.. 3.375 Gram(s) IV Intermittent every 8 hours        acetaminophen   IVPB .. 1000 milliGRAM(s) IV Intermittent once  levETIRAcetam 500 milliGRAM(s) Oral every 12 hours  morphine  - Injectable 1 milliGRAM(s) IV Push every 4 hours PRN  ondansetron Injectable 4 milliGRAM(s) IV Push every 6 hours PRN  oxyCODONE    IR 5 milliGRAM(s) Oral every 4 hours PRN  oxyCODONE    IR 10 milliGRAM(s) Oral every 4 hours PRN      heparin   Injectable 5000 Unit(s) SubCutaneous every 8 hours    famotidine    Tablet 20 milliGRAM(s) Oral daily      atorvastatin 80 milliGRAM(s) Oral at bedtime  dextrose 50% Injectable 25 Gram(s) IV Push once  dextrose 50% Injectable 25 Gram(s) IV Push once  dextrose 50% Injectable 12.5 Gram(s) IV Push once  insulin lispro (ADMELOG) corrective regimen sliding scale   SubCutaneous Before meals and at bedtime    dextrose 5%. 1000 milliLiter(s) IV Continuous <Continuous>  dextrose 5%. 1000 milliLiter(s) IV Continuous <Continuous>  lactated ringers. 1000 milliLiter(s) IV Continuous <Continuous>  lactated ringers. 1000 milliLiter(s) IV Continuous <Continuous>      chlorhexidine 2% Cloths 1 Application(s) Topical <User Schedule>  latanoprost 0.005% Ophthalmic Solution 1 Drop(s) Both EYES at bedtime            ICU Vital Signs Last 24 Hrs  T(C): 36.6 (01 Dec 2023 19:00), Max: 37.1 (01 Dec 2023 04:00)  T(F): 97.8 (01 Dec 2023 19:00), Max: 98.7 (01 Dec 2023 04:00)  HR: 67 (01 Dec 2023 20:00) (67 - 90)  BP: 101/47 (01 Dec 2023 20:00) (68/44 - 140/65)  BP(mean): 63 (01 Dec 2023 20:00) (52 - 86)  ABP: --  ABP(mean): --  RR: 15 (01 Dec 2023 20:00) (15 - 24)  SpO2: 95% (01 Dec 2023 20:00) (94% - 100%)    O2 Parameters below as of 01 Dec 2023 20:00  Patient On (Oxygen Delivery Method): room air                I&O's Detail    30 Nov 2023 07:01  -  01 Dec 2023 07:00  --------------------------------------------------------  IN:    Lactated Ringers: 100 mL    Lactated Ringers: 1050 mL    Lactated Ringers Bolus: 2000 mL    Phenylephrine: 11.2 mL  Total IN: 3161.2 mL    OUT:    Bulb (mL): 45 mL    Bulb (mL): 110 mL    Indwelling Catheter - Urethral (mL): 1125 mL  Total OUT: 1280 mL    Total NET: 1881.2 mL      01 Dec 2023 07:01  -  01 Dec 2023 21:04  --------------------------------------------------------  IN:    IV PiggyBack: 200 mL    Lactated Ringers: 999 mL    Lactated Ringers: 1750 mL    Oral Fluid: 225 mL  Total IN: 3174 mL    OUT:    Bulb (mL): 60 mL    Bulb (mL): 50 mL    Indwelling Catheter - Urethral (mL): 250 mL    Voided (mL): 950 mL  Total OUT: 1310 mL    Total NET: 1864 mL            LABS:                        8.9    6.28  )-----------( 121      ( 01 Dec 2023 04:28 )             27.0     12-01    137  |  103  |  12  ----------------------------<  224<H>  4.3   |  25  |  1.57<H>    Ca    7.8<L>      01 Dec 2023 04:28  Phos  3.7     12-01  Mg     1.9     12-01    TPro  5.7<L>  /  Alb  1.6<L>  /  TBili  0.6  /  DBili  x   /  AST  109<H>  /  ALT  105<H>  /  AlkPhos  98  12-01          CAPILLARY BLOOD GLUCOSE      POCT Blood Glucose.: 154 mg/dL (01 Dec 2023 17:08)      Urinalysis Basic - ( 01 Dec 2023 04:28 )    Color: x / Appearance: x / SG: x / pH: x  Gluc: 224 mg/dL / Ketone: x  / Bili: x / Urobili: x   Blood: x / Protein: x / Nitrite: x   Leuk Esterase: x / RBC: x / WBC x   Sq Epi: x / Non Sq Epi: x / Bacteria: x      CULTURES:  Culture Results:   No growth at 48 Hours (11-28-23 @ 22:39)  Culture Results:   No growth at 48 Hours (11-28-23 @ 22:39)      Physical Examination:    General:  Alert, oriented, interactive, nonfocal    HEENT: Pupils equal, reactive to light.  Symmetric.    PULM: Clear to auscultation bilaterally, no significant sputum production    CVS: Regular rate and rhythm, no murmurs, rubs, or gallops    ABD: Soft, nondistended, mildly tender over RUQ, dressing clean, dry, intact    EXT: No edema, nontender    SKIN: Warm    NEURO: A&Ox3, strength 5/5 all extremities, no focal deficits    CRITICAL CARE TIME SPENT: " Time spent on this patient encounter, which includes documenting this note in the electronic medical record, was 42 minutes including assessing the presenting problems with associated risks, reviewing the medical record to prepare for the encounter, and meeting face to face with patient to obtain additional history. I have also performed an appropriate physical exam, made interventions listed and ordered and interpreted appropriate diagnostic studies as documented. To improve communication and patient saftey, I have coordinated care with the multidisciplinary team including the bedside nurse, appropriate attending of record and consultants as needed. "    Date of Entry of this note is equal to the date of services rendered

## 2023-12-01 NOTE — DIETITIAN INITIAL EVALUATION ADULT - NSICDXPASTMEDICALHX_GEN_ALL_CORE_FT
PAST MEDICAL HISTORY:  Aneurysm of [place]     Bradycardia     Cerebral aneurysm without rupture diagnosed 2017 had cerebral angio at [facility]    CVA (cerebral vascular accident) left arm weakness, no sensation in left side in 1998    Depression     Epilepsy last seizure 2016    Gastroesophageal reflux disease, esophagitis presence not specified     Glaucoma of both eyes, unspecified glaucoma type     HLD (hyperlipidemia)     Language barrier [place] speaking    Renal agenesis, unilateral     T2DM (type 2 diabetes mellitus)

## 2023-12-01 NOTE — DIETITIAN INITIAL EVALUATION ADULT - OTHER INFO
67 year old male with history of sinus node disorder s/p PPM, HTN, HLD, cerebral aneurysm s/p coiling, CVA with residual L sided weakness, seizure disorder, DM2 on Metformin and gastritis who presented to [place] ED with complaint of abdominal pain that started 11/24. CT chest abd/pelvis showed Distended gallbladder with gallbladder wall thickening and pericholecystic fat stranding and hemangioma in T7 vertebrae. On 11/30/2023 patient taken to OR for acute cholecystitis and required to go open due ot complexity of inflammatory process. Perioperatively patient became hypotensive requiring pressors to maintain BP.  Patient brought to ICU post operatively for monitoring.     Pt off pressors now. Advanced to clear liquid diet per surgery. Pt states that he didn't take any liquids at lunch due to low appetite. Endorses weight loss, but unsure of quantity or UBW. NFPE with evidence of moderate muscle wasting/fat loss. Receptive to adding oral nutrition supplements to current diet. POCT 133-218mg/dl last 24 hrs. Pt not receptive to diet education at this time due to acuteness of illness, lethargy. Recommend clears + Ensure Clear BID, then advance to consistent carbohydrate + Glucerna BID as tolerated per surgery discretion.

## 2023-12-01 NOTE — PROGRESS NOTE ADULT - SUBJECTIVE AND OBJECTIVE BOX
POD #: 1    24hr events: s.p. open cholecystectomy    Overnight Events:  No acute events overnight    SUBJECTIVE:  Reports well controlled pain  Denies nausea, vomiting  Is not yet passing gas or having bowel movements, denies diarrhea  Remains NPO    OBJECTIVE:  Vital Signs Last 24 Hrs  T(C): 37.1 (01 Dec 2023 04:00), Max: 37.1 (30 Nov 2023 17:30)  T(F): 98.7 (01 Dec 2023 04:00), Max: 98.8 (30 Nov 2023 17:30)  HR: 79 (01 Dec 2023 06:00) (60 - 80)  BP: 102/51 (01 Dec 2023 06:00) (77/49 - 146/71)  BP(mean): 66 (01 Dec 2023 06:00) (56 - 86)  RR: 19 (01 Dec 2023 06:00) (13 - 21)  SpO2: 100% (01 Dec 2023 06:00) (98% - 100%)    Parameters below as of 30 Nov 2023 21:00  Patient On (Oxygen Delivery Method): nasal cannula  O2 Flow (L/min): 2    Physical Examination:  GEN: NAD, resting quietly  NEURO: AAOx3, baseline L sided weakness  PULM: symmetric chest rise bilaterally, no increased WOB, on NC  ABD: softly distended, appropriately tender to palpation, laparoscopic incisions CDI, Subcostal incision CDI  EXTR: no lower extremity edema, moving all extremities    LABS:                        8.9    6.28  )-----------( 121      ( 01 Dec 2023 04:28 )             27.0       12-01    137  |  103  |  12  ----------------------------<  224<H>  4.3   |  25  |  1.57<H>    Ca    7.8<L>      01 Dec 2023 04:28  Phos  3.7     12-01  Mg     1.9     12-01    TPro  5.7<L>  /  Alb  1.6<L>  /  TBili  0.6  /  DBili  x   /  AST  109<H>  /  ALT  105<H>  /  AlkPhos  98  12-01

## 2023-12-01 NOTE — DIETITIAN INITIAL EVALUATION ADULT - PERTINENT LABORATORY DATA
12-01    137  |  103  |  12  ----------------------------<  224<H>  4.3   |  25  |  1.57<H>    Ca    7.8<L>      01 Dec 2023 04:28  Phos  3.7     12-01  Mg     1.9     12-01    TPro  5.7<L>  /  Alb  1.6<L>  /  TBili  0.6  /  DBili  x   /  AST  109<H>  /  ALT  105<H>  /  AlkPhos  98  12-01  POCT Blood Glucose.: 172 mg/dL (12-01-23 @ 12:06)  A1C with Estimated Average Glucose Result: 6.1 % (11-29-23 @ 05:53)  A1C with Estimated Average Glucose Result: 6.2 % (02-12-23 @ 07:06)  
N/A

## 2023-12-01 NOTE — DIETITIAN INITIAL EVALUATION ADULT - NSICDXPASTSURGICALHX_GEN_ALL_CORE_FT
PAST SURGICAL HISTORY:  Pacemaker Medtronic Serial [id number]/Model [id number]  Implant 6/29/07, new generator 12/23/2016    S/P bunionectomy     S/P cataract surgery with lens implants    S/P craniotomy for RCMA 1998    Status post coil embolization of cerebral aneurysm 9/2018

## 2023-12-01 NOTE — PROGRESS NOTE ADULT - ASSESSMENT
Problem List:  1) acute cholecystitis  2) Peritonitis  3) severe sepsis/shock   4) CHRISTOPHER    Pain control with IV tylenol first if able, if not due will need to utilize opiates. This may low his BP, however need to treat pain. Unable to use NSAIDs given CHRISTOPHER  Bp soft, but MAP > 65, on IVF at 125ml/hr based on exam he appears euvolemic, does not have a lot of losses, got IVF today, if BP continues to drop and if it is from medications can add phenylephrine to maintain MAP > 65.   Stable on room air  Clear diet as tolerated  CHRISTOPHER, making some urine, seems to be hovering around 1.5, check labs and lactic in AM   Zosyn to cover for peritonitis, no growth on blood cultures  DVT-P with heparin

## 2023-12-01 NOTE — DIETITIAN INITIAL EVALUATION ADULT - ENTER TO (CAL/KG)
Patient: Clarisse Dubon    Procedure: Procedure(s):  EXCISION, GANGLION CYST, left foot       Anesthesia Type:  General    Note:  Disposition: Outpatient   Postop Pain Control: Uneventful            Sign Out: Well controlled pain   PONV: No   Neuro/Psych: Uneventful            Sign Out: Acceptable/Baseline neuro status   Airway/Respiratory: Uneventful            Sign Out: Acceptable/Baseline resp. status   CV/Hemodynamics: Uneventful            Sign Out: Acceptable CV status; No obvious hypovolemia; No obvious fluid overload   Other NRE: NONE   DID A NON-ROUTINE EVENT OCCUR? No           Last vitals:  Vitals Value Taken Time   /65 03/20/23 1430   Temp 96.8  F (36  C) 03/20/23 1423   Pulse 59 03/20/23 1438   Resp 16 03/20/23 1423   SpO2 100 % 03/20/23 1438   Vitals shown include unvalidated device data.    Electronically Signed By: Eladio Cameron MD  March 20, 2023  2:41 PM   30

## 2023-12-01 NOTE — PROGRESS NOTE ADULT - SUBJECTIVE AND OBJECTIVE BOX
Addendum to H&P/ICU Consult note  - Patient seen and examined today    ICU CONSULT  Location of Patient : [facility]1 [id number] [id number] ( [facility]U1)  Attending requesting Consult:Nathan Mott  Chief Complaint :     Reason For consult :      Initial HPI on admission:  HPI:  66 y/o Georgian speaking male with PMH of sinus node disorder s/p PPM, HTN, HLD, cerebral aneurysm s/p coiling, CVA with residual L sided weaknes, seizure disorder, DM2 on Metformin and gastritis who presented to [place] ED with complaint of abdominal pain that started 11/24. Pt  reports pain has gotten progressively worse since Friday. Pt states the pain is associated with dizziness and reports one episode of fall from seated position on Friday. Pt denies LOC and hitting his head. Pt also states he has not had an appetite to eat because of the abdominal pain. Pt states he also in experiencing constipation with last bowel movement on Saturday 11/25. Pt denies diarrhea, N/V, chest pain, fevers, chills.     In the ED /73, HR 71, Temp 98.2, RR 18 and SpO2 98 on RA, CT chest abd/pelvis showed Distended gallbladder with gallbladder wall thickening and pericholecystic fat stranding and hemangioma in T7 vertebrae. Pt received x1 dose of Zosyn.     (28 Nov 2023 20:10)      BRIEF HOSPITAL COURSE: ***    PAST MEDICAL & SURGICAL HISTORY:  CVA (cerebral vascular accident)  left arm weakness, no sensation in left side in 1998      Aneurysm of [place]      HLD (hyperlipidemia)      Epilepsy  last seizure 2016      Renal agenesis, unilateral      Depression      Cerebral aneurysm without rupture  diagnosed 2017 had cerebral angio at [facility]      Gastroesophageal reflux disease, esophagitis presence not specified      Glaucoma of both eyes, unspecified glaucoma type      Bradycardia      Language barrier  Georgian speaking      T2DM (type 2 diabetes mellitus)      S/P craniotomy  for RCMA 1998      Pacemaker  Medtronic Serial [id number]/Model [id number]  Implant 6/29/07, new generator 12/23/2016      S/P bunionectomy      S/P cataract surgery  with lens implants      Status post coil embolization of cerebral aneurysm  9/2018        Allergies    No Known Allergies    Intolerances      FAMILY HISTORY:  Family history of stomach cancer      Social history: Social History:         Smoking:     Drinking:     Drug use:    Review of Systems: as stated above    CONSTITUTIONAL: No fever, No chills, No fatigue  EYES: No eye pain, No visual disturbances, No discharge  ENMT:  No difficulty hearing, No tinnitus, No vertigo; No sinus or throat pain  NECK: No pain, No stiffness  RESPIRATORY: No Cough, No SOB, No Secretions  CARDIOVASCULAR: No chest pain, No palpitations, No dizziness, or No leg swelling  GASTROINTESTINAL: No abdominal or epigastric pain. No nausea, No vomiting, No hematemesis; No diarrhea, No constipation. No melena, No hematochezia.  GENITOURINARY: No dysuria, No frequency, No hematuria, No incontinence  NEUROLOGICAL: No headaches, No memory loss, No loss of strength, No numbness, No tremors  SKIN: No itching, No burning, No rashes, No lesions   MUSCULOSKELETAL: No joint pain or swelling; No muscle, back, No extremity pain  PSYCHIATRIC: No depression, No anxiety, No mood swings, No difficulty sleeping      Medications:  MEDICATIONS  (STANDING):  atorvastatin 80 milliGRAM(s) Oral at bedtime  chlorhexidine 2% Cloths 1 Application(s) Topical <User Schedule>  dextrose 5%. 1000 milliLiter(s) (50 mL/Hr) IV Continuous <Continuous>  dextrose 5%. 1000 milliLiter(s) (100 mL/Hr) IV Continuous <Continuous>  dextrose 50% Injectable 25 Gram(s) IV Push once  dextrose 50% Injectable 12.5 Gram(s) IV Push once  dextrose 50% Injectable 25 Gram(s) IV Push once  famotidine    Tablet 20 milliGRAM(s) Oral daily  insulin lispro (ADMELOG) corrective regimen sliding scale   SubCutaneous Before meals and at bedtime  lactated ringers. 1000 milliLiter(s) (125 mL/Hr) IV Continuous <Continuous>  latanoprost 0.005% Ophthalmic Solution 1 Drop(s) Both EYES at bedtime  levETIRAcetam 500 milliGRAM(s) Oral every 12 hours  piperacillin/tazobactam IVPB.. 3.375 Gram(s) IV Intermittent every 8 hours    MEDICATIONS  (PRN):  morphine  - Injectable 1 milliGRAM(s) IV Push every 4 hours PRN Severe Pain (7 - 10)  ondansetron Injectable 4 milliGRAM(s) IV Push every 6 hours PRN Nausea and/or Vomiting  oxyCODONE    IR 5 milliGRAM(s) Oral every 4 hours PRN Mild Pain (1 - 3)  oxyCODONE    IR 10 milliGRAM(s) Oral every 4 hours PRN Moderate Pain (4 - 6)      Antibiotics History  piperacillin/tazobactam IVPB. 3.375 Gram(s) IV Intermittent once, 11-30-23 @ 19:35, Stop order after: 1 Doses  piperacillin/tazobactam IVPB.- 3.375 Gram(s) IV Intermittent once, 11-30-23 @ 23:00  piperacillin/tazobactam IVPB.. 3.375 Gram(s) IV Intermittent every 8 hours, 12-01-23 @ 06:00, Stop order after: 7 Days  piperacillin/tazobactam IVPB.. 3.375 Gram(s) IV Intermittent every 8 hours, 11-28-23 @ 22:00, Stop order after: 7 Days  piperacillin/tazobactam IVPB... 3.375 Gram(s) IV Intermittent once, 11-28-23 @ 18:35, Stop order after: 1 Doses      Heme Medications       GI Medications  famotidine    Tablet 20 milliGRAM(s) Oral daily, 12-01-23 @ 08:15, Routine        Home Medications:  Last Order Reconciliation Date: 11-28-23 (Admission Reconciliation)  Aspirin Enteric Coated 81 mg oral delayed release tablet: 1 tab(s) orally once a day  Hospital    (11-28-23)  atorvastatin 80 mg oral tablet: 1 tab(s) orally once a day (at bedtime)  Hospital (11-28-23)  carBAMazepine 300 mg oral capsule, extended release: 1 cap(s) orally 2 times a day  Hospital  (11-28-23)  clopidogrel 75 mg oral tablet: 1 tab(s) orally once a day  Hospital  (11-28-23)  famotidine 20 mg oral tablet: 1 tab(s) orally 2 times a day  HOME HOSPITAL  (11-28-23)  FLUoxetine 20 mg oral capsule: 2 cap(s) orally once a day  Hospital  (11-28-23)  fluticasone 50 mcg/inh nasal spray: 1 spray(s) nasal once a day  HOME (11-28-23)  latanoprost 0.005% ophthalmic solution: 1 drop(s) to each affected eye once a day (at bedtime)  HOME  (11-28-23)  LORazepam 0.5 mg oral tablet: 1 tab(s) orally 3 times a day, As Needed  HOME/Hospital  (11-28-23)  MetFORMIN (Eqv-Fortamet) 500 mg oral tablet, extended release: 1 tab(s) orally once a day STARTING 2/13/23 (11-28-23)  metoprolol tartrate 25 mg oral tablet: 1 tab(s) orally once a day  HOME /HOSPITAL (11-28-23)  zolpidem 10 mg oral tablet: 0.5 tab(s) orally once a day (at bedtime)  [facility]  (11-28-23)        LABS:                        8.9    6.28  )-----------( 121      ( 01 Dec 2023 04:28 )             27.0     12-01    137  |  103  |  12  ----------------------------<  224<H>  4.3   |  25  |  1.57<H>    Ca    7.8<L>      01 Dec 2023 04:28  Phos  3.7     12-01  Mg     1.9     12-01    TPro  5.7<L>  /  Alb  1.6<L>  /  TBili  0.6  /  DBili  x   /  AST  109<H>  /  ALT  105<H>  /  AlkPhos  98  12-01              Urinalysis Basic - ( 01 Dec 2023 04:28 )    Color: x / Appearance: x / SG: x / pH: x  Gluc: 224 mg/dL / Ketone: x  / Bili: x / Urobili: x   Blood: x / Protein: x / Nitrite: x   Leuk Esterase: x / RBC: x / WBC x   Sq Epi: x / Non Sq Epi: x / Bacteria: x      Procalcitonin, Serum: 1.18 ng/mL (12-01-23 @ 04:28)          CULTURES: (if applicable)    Culture - Blood (collected 11-28-23 @ 22:39)  Source: .Blood Blood-Peripheral  Preliminary Report (12-01-23 @ 04:01):    No growth at 48 Hours    Culture - Blood (collected 11-28-23 @ 22:39)  Source: .Blood Blood-Peripheral  Preliminary Report (12-01-23 @ 04:01):    No growth at 48 Hours            CAPILLARY BLOOD GLUCOSE      POCT Blood Glucose.: 196 mg/dL (01 Dec 2023 04:57)      RADIOLOGY  CXR:      CT:    ECHO:      VITALS:  T(C): 37.1 (12-01-23 @ 04:00), Max: 37.1 (11-30-23 @ 17:30)  T(F): 98.7 (12-01-23 @ 04:00), Max: 98.8 (11-30-23 @ 17:30)  HR: 76 (12-01-23 @ 08:00) (60 - 83)  BP: 99/53 (12-01-23 @ 08:00) (77/49 - 146/71)  BP(mean): 68 (12-01-23 @ 08:00) (56 - 86)  ABP: --  ABP(mean): --  RR: 18 (12-01-23 @ 08:00) (13 - 23)  SpO2: 99% (12-01-23 @ 08:00) (98% - 100%)  CVP(mm Hg): --  CVP(cm H2O): --    Ins and Outs     11-30-23 @ 07:01  -  12-01-23 @ 07:00  --------------------------------------------------------  IN: 3161.2 mL / OUT: 1280 mL / NET: 1881.2 mL        Height (cm): 154.9 (11-28-23 @ 15:01)  Weight (kg): 72.4 (11-30-23 @ 19:00)  BMI (kg/m2): 30.2 (11-30-23 @ 19:00)        I&O's Detail    30 Nov 2023 07:01  -  01 Dec 2023 07:00  --------------------------------------------------------  IN:    Lactated Ringers: 100 mL    Lactated Ringers: 1050 mL    Lactated Ringers Bolus: 2000 mL    Phenylephrine: 11.2 mL  Total IN: 3161.2 mL    OUT:    Bulb (mL): 45 mL    Bulb (mL): 110 mL    Indwelling Catheter - Urethral (mL): 1125 mL  Total OUT: 1280 mL    Total NET: 1881.2 mL          Physical Examination:  GENERAL:               Alert, Oriented, No acute distress.    HEENT:                    Pupils equal, reactive to light.  Symmetric. No JVD, Moist MM  PULM:                     Bilateral air entry, Clear to auscultation bilaterally, no significant sputum production, No Rales, No Rhonchi, No Wheezing  CVS:                         S1, S2,  No Murmur  ABD:                        Soft, nondistended, nontender, normoactive bowel sounds,   EXT:                         No edema, nontender, No Cyanosis or Clubbing   Vascular:                Warm Extremities, Normal Capillary refill, Normal Distal Pulses  SKIN:                       Warm and well perfused, no rashes noted.   NEURO:                  Alert, oriented, interactive, nonfocal, follows commands  PSYC:                      Calm, + Insight.       Addendum to H&P/ICU Consult note  - Patient seen and examined today    ICU CONSULT  Location of Patient :  CCU1 [id number] [id number] ( CCU1)  Attending requesting Consult:Nathan Mott  Chief Complaint :  Abd pain  Reason For consult :  Post op monitoring      Initial HPI on admission:  HPI:  67 year old male with history of sinus node disorder s/p PPM, HTN, HLD, cerebral aneurysm s/p coiling, CVA with residual L sided weaknes, seizure disorder, DM2 on Metformin and gastritis who presented to [place] ED with complaint of abdominal pain that started 11/24.   CT chest abd/pelvis showed Distended gallbladder with gallbladder wall thickening and pericholecystic fat stranding and hemangioma in T7 vertebrae.   On 11/30/2023 patient taken to OR for acute cholecystitis and required to go open due ot complexity of inflammatory process.   Perioperatively patient became hypotensive requiring pressors to maintain BP.  Patient brought to ICU post operatively for monitoring.      This am patient off pressors  has incisional site pain,   no fever, chills, n/v,   mild sob on deep inhalation (right sided discomfort       PAST MEDICAL & SURGICAL HISTORY:  CVA (cerebral vascular accident)  left arm weakness, no sensation in left side in 1998  Aneurysm of [place]  HLD (hyperlipidemia)  Epilepsy last seizure 2016  Renal agenesis, unilateral  Depression  Cerebral aneurysm without rupture diagnosed 2017 had cerebral angio at [facility]  Gastroesophageal reflux disease, esophagitis presence not specified  Glaucoma of both eyes, unspecified glaucoma type  Bradycardia  Language barrier Trinidadian speaking  T2DM (type 2 diabetes mellitus)  S/P craniotomy for RCMA 1998  Pacemaker Medtronic Serial [id number]/Model [id number] Implant 6/29/07, new generator 12/23/2016  S/P bunionectomy  S/P cataract surgery with lens implants  Status post coil embolization of cerebral aneurysm 9/2018    Allergies  No Known Allergies      Family history of stomach cancer       Review of Systems: as stated above    CONSTITUTIONAL: No fever, No chills, + fatigue  EYES: No eye pain, No visual disturbances, +discharge  ENMT:  No difficulty hearing, No tinnitus, No vertigo; No sinus or throat pain  NECK: No pain, No stiffness  RESPIRATORY: No Cough, No SOB, No Secretions  CARDIOVASCULAR: No chest pain, No palpitations, No dizziness, or No leg swelling  GASTROINTESTINAL: +abdominal or epigastric pain. No nausea, No vomiting, No hematemesis; No diarrhea, No constipation. No melena, No hematochezia.  GENITOURINARY: No dysuria, No frequency, No hematuria, No incontinence  NEUROLOGICAL: No headaches, No memory loss, No loss of strength, No numbness, No tremors  SKIN: No itching, No burning, No rashes, No lesions   MUSCULOSKELETAL: No joint pain or swelling; No muscle, back, No extremity pain  PSYCHIATRIC: No depression, No anxiety, No mood swings, No difficulty sleeping      Medications:  MEDICATIONS  (STANDING):  atorvastatin 80 milliGRAM(s) Oral at bedtime  chlorhexidine 2% Cloths 1 Application(s) Topical <User Schedule>  dextrose 5%. 1000 milliLiter(s) (50 mL/Hr) IV Continuous <Continuous>  dextrose 5%. 1000 milliLiter(s) (100 mL/Hr) IV Continuous <Continuous>  dextrose 50% Injectable 25 Gram(s) IV Push once  dextrose 50% Injectable 12.5 Gram(s) IV Push once  dextrose 50% Injectable 25 Gram(s) IV Push once  famotidine    Tablet 20 milliGRAM(s) Oral daily  insulin lispro (ADMELOG) corrective regimen sliding scale   SubCutaneous Before meals and at bedtime  lactated ringers. 1000 milliLiter(s) (125 mL/Hr) IV Continuous <Continuous>  latanoprost 0.005% Ophthalmic Solution 1 Drop(s) Both EYES at bedtime  levETIRAcetam 500 milliGRAM(s) Oral every 12 hours  piperacillin/tazobactam IVPB.. 3.375 Gram(s) IV Intermittent every 8 hours    MEDICATIONS  (PRN):  morphine  - Injectable 1 milliGRAM(s) IV Push every 4 hours PRN Severe Pain (7 - 10)  ondansetron Injectable 4 milliGRAM(s) IV Push every 6 hours PRN Nausea and/or Vomiting  oxyCODONE    IR 5 milliGRAM(s) Oral every 4 hours PRN Mild Pain (1 - 3)  oxyCODONE    IR 10 milliGRAM(s) Oral every 4 hours PRN Moderate Pain (4 - 6)      Antibiotics History  piperacillin/tazobactam IVPB. 3.375 Gram(s) IV Intermittent once, 11-30-23 @ 19:35, Stop order after: 1 Doses  piperacillin/tazobactam IVPB.- 3.375 Gram(s) IV Intermittent once, 11-30-23 @ 23:00  piperacillin/tazobactam IVPB.. 3.375 Gram(s) IV Intermittent every 8 hours, 12-01-23 @ 06:00, Stop order after: 7 Days  piperacillin/tazobactam IVPB.. 3.375 Gram(s) IV Intermittent every 8 hours, 11-28-23 @ 22:00, Stop order after: 7 Days  piperacillin/tazobactam IVPB... 3.375 Gram(s) IV Intermittent once, 11-28-23 @ 18:35, Stop order after: 1 Doses      Heme Medications       GI Medications  famotidine    Tablet 20 milliGRAM(s) Oral daily, 12-01-23 @ 08:15, Routine        Home Medications:  Last Order Reconciliation Date: 11-28-23 (Admission Reconciliation)  Aspirin Enteric Coated 81 mg oral delayed release tablet: 1 tab(s) orally once a day  Hospital    (11-28-23)  atorvastatin 80 mg oral tablet: 1 tab(s) orally once a day (at bedtime)  Hospital (11-28-23)  carBAMazepine 300 mg oral capsule, extended release: 1 cap(s) orally 2 times a day  Hospital  (11-28-23)  clopidogrel 75 mg oral tablet: 1 tab(s) orally once a day  Hospital  (11-28-23)  famotidine 20 mg oral tablet: 1 tab(s) orally 2 times a day  HOME HOSPITAL  (11-28-23)  FLUoxetine 20 mg oral capsule: 2 cap(s) orally once a day  Hospital  (11-28-23)  fluticasone 50 mcg/inh nasal spray: 1 spray(s) nasal once a day  HOME (11-28-23)  latanoprost 0.005% ophthalmic solution: 1 drop(s) to each affected eye once a day (at bedtime)  HOME  (11-28-23)  LORazepam 0.5 mg oral tablet: 1 tab(s) orally 3 times a day, As Needed  HOME/Hospital  (11-28-23)  MetFORMIN (Eqv-Fortamet) 500 mg oral tablet, extended release: 1 tab(s) orally once a day STARTING 2/13/23 (11-28-23)  metoprolol tartrate 25 mg oral tablet: 1 tab(s) orally once a day  HOME /HOSPITAL (11-28-23)  zolpidem 10 mg oral tablet: 0.5 tab(s) orally once a day (at bedtime)  [facility] /[facility]  (11-28-23)        LABS:                        8.9    6.28  )-----------( 121      ( 01 Dec 2023 04:28 )             27.0     12-01    137  |  103  |  12  ----------------------------<  224<H>  4.3   |  25  |  1.57<H>    Ca    7.8<L>      01 Dec 2023 04:28  Phos  3.7     12-01  Mg     1.9     12-01    TPro  5.7<L>  /  Alb  1.6<L>  /  TBili  0.6  /  DBili  x   /  AST  109<H>  /  ALT  105<H>  /  AlkPhos  98  12-01      COVID  02-10-23 @ 03:26  COVID -   NotDetec     WBC Trend  12-01-23 @ 04:28   -  6.28  11-30-23 @ 19:42   -  7.84  11-30-23 @ 07:05   -  7.22  11-29-23 @ 05:53   -  8.16  11-28-23 @ 16:00   -  9.16    H/H Trend  12-01-23 @ 04:28   -   8.9<L>/ 27.0<L>  11-30-23 @ 19:42   -   9.2<L>/ 27.5<L>  11-30-23 @ 07:05   -   9.7<L>/ 29.3<L>  11-29-23 @ 05:53   -   10.8<L>/ 32.4<L>  11-28-23 @ 16:00   -   10.3<L>/ 30.4<L>  11-24-23 @ 02:27   -   11.5<L>/ 34.1<L>    Stool Occult Blood    Platelet Trend  12-01-23 @ 04:28   -  121<L>  11-30-23 @ 19:42   -  112<L>  11-30-23 @ 07:05   -  100<L>  11-29-23 @ 05:53   -  102<L>  11-28-23 @ 16:00   -  102<L>    Trend Sodium  12-01-23 @ 04:28   -  137  11-30-23 @ 19:42   -  139  11-30-23 @ 07:05   -  141  11-29-23 @ 05:53   -  137  11-28-23 @ 16:00   -  135    Trend Potassium  12-01-23 @ 04:28   -  4.3  11-30-23 @ 19:42   -  3.8  11-30-23 @ 07:05   -  3.7  11-29-23 @ 05:53   -  3.4<L>  11-28-23 @ 16:00   -  3.3<L>    Trend Bun/Cr  12-01-23 @ 04:28  BUN/CR -  12 / 1.57<H>  11-30-23 @ 19:42  BUN/CR -  14 / 1.45<H>  11-30-23 @ 07:05  BUN/CR -  15 / 1.56<H>  11-29-23 @ 05:53  BUN/CR -  17 / 1.35<H>  11-28-23 @ 16:00  BUN/CR -  23 / 1.50<H>    Lactic Acid Trend  12-01-23 @ 04:28   -   3.4<H>  11-30-23 @ 23:19   -   3.4<H>  11-30-23 @ 19:42   -   2.2<H>    ABG Trend    Trend AST/ALT/ALK Phos/Bili  12-01-23 @ 04:28   109<H>/105<H>/98/0.6  11-30-23 @ 19:42   131<H>/126<H>/117/0.9  11-30-23 @ 07:05   104<H>/131<H>/128<H>/0.9  11-29-23 @ 05:53   149<H>/178<H>/124<H>/1.0  11-28-23 @ 16:00   272<H>/256<H>/131<H>/0.9  11-24-23 @ 02:27   30/33/109/0.2  02-14-23 @ 06:59   57<H>/147<H>/119/0.3  02-13-23 @ 07:18   66<H>/182<H>/119/0.4  02-12-23 @ 07:04   125<H>/250<H>/123<H>/0.5  02-11-23 @ 01:25   289<H>/380<H>/137<H>/1.8<H>       PTT - PT - INR Trend  11-28-23 @ 16:00   -   -- - 13.2<H> - 1.16  02-10-23 @ 16:15   -   176.3<HH> - -- - --  02-10-23 @ 03:40   -   29.1 - 12.1 - 1.04    Glucose Trend  12-01-23 @ 04:57   -  -- -- 196<H>  12-01-23 @ 04:28   -  224<H> -- --  12-01-23 @ 01:08   -  -- -- 188<H>  11-30-23 @ 22:52   -  -- -- 218<H>  11-30-23 @ 19:42   -  211<H> -- --  11-30-23 @ 17:32   -  -- -- 185<H>  11-30-23 @ 12:58   -  -- -- 149<H>  11-30-23 @ 08:37   -  -- -- 133<H>  11-30-23 @ 07:05   -  160<H> -- --  11-29-23 @ 22:24   -  -- -- 216<H>    A1C with Estimated Average Glucose Result: 6.1 % *H* [4.0 - 5.6] (11-29-23 @ 05:53)          VITALS:  T(C): 37.1 (12-01-23 @ 04:00), Max: 37.1 (11-30-23 @ 17:30)  T(F): 98.7 (12-01-23 @ 04:00), Max: 98.8 (11-30-23 @ 17:30)  HR: 76 (12-01-23 @ 08:00) (60 - 83)  BP: 99/53 (12-01-23 @ 08:00) (77/49 - 146/71)  BP(mean): 68 (12-01-23 @ 08:00) (56 - 86)  ABP: --  ABP(mean): --  RR: 18 (12-01-23 @ 08:00) (13 - 23)  SpO2: 99% (12-01-23 @ 08:00) (98% - 100%)  CVP(mm Hg): --  CVP(cm H2O): --    Ins and Outs     11-30-23 @ 07:01  -  12-01-23 @ 07:00  --------------------------------------------------------  IN: 3161.2 mL / OUT: 1280 mL / NET: 1881.2 mL        Height (cm): 154.9 (11-28-23 @ 15:01)  Weight (kg): 72.4 (11-30-23 @ 19:00)  BMI (kg/m2): 30.2 (11-30-23 @ 19:00)        I&O's Detail    30 Nov 2023 07:01  -  01 Dec 2023 07:00  --------------------------------------------------------  IN:    Lactated Ringers: 100 mL    Lactated Ringers: 1050 mL    Lactated Ringers Bolus: 2000 mL    Phenylephrine: 11.2 mL  Total IN: 3161.2 mL    OUT:    Bulb (mL): 45 mL    Bulb (mL): 110 mL    Indwelling Catheter - Urethral (mL): 1125 mL  Total OUT: 1280 mL    Total NET: 1881.2 mL          Physical Examination:  GENERAL:               Alert, Oriented, No acute distress.    HEENT:                    No JVD, Dry MM  PULM:                     Bilateral air entry, Clear to auscultation bilaterally, no significant sputum production, No Rales, No Rhonchi, No Wheezing  CVS:                         S1, S2,  No Murmur  ABD:                        Soft, nondistended, nontender, hypo bowel sounds,  RUQ incision site CDI  EXT:                         No edema, nontender, No Cyanosis or Clubbing   Vascular:                Warm Extremities, Normal Capillary refill,    NEURO:                  Alert, oriented, interactive, nonfocal, follows commands  PSYC:                      Calm, + Insight.

## 2023-12-01 NOTE — PROGRESS NOTE ADULT - SUBJECTIVE AND OBJECTIVE BOX
PENELOPE ISLAS  [id number]    Chief complaint: Ab pain    Interval events: Pt c.o abdominal pain and sob. Sinus 70-80s bpm on tele      ALLERGIES:  No Known Allergies      PAST MEDICAL & SURGICAL HISTORY:  CVA (cerebral vascular accident)  left arm weakness, no sensation in left side in       Aneurysm of [place]      HLD (hyperlipidemia)      Epilepsy  last seizure 2016      Renal agenesis, unilateral      Depression      Cerebral aneurysm without rupture  diagnosed 2017 had cerebral angio at [facility]      Gastroesophageal reflux disease, esophagitis presence not specified      Glaucoma of both eyes, unspecified glaucoma type      Bradycardia      Language barrier  French speaking      T2DM (type 2 diabetes mellitus)      S/P craniotomy  for RCMA       Pacemaker  Medtronic Serial [id number]/Model [id number]  Implant 07, new generator 2016      S/P bunionectomy      S/P cataract surgery  with lens implants      Status post coil embolization of cerebral aneurysm  2018        CURRENT MEDICATIONS:  MEDICATIONS  (STANDING):  aspirin enteric coated 81 milliGRAM(s) Oral daily  atorvastatin 80 milliGRAM(s) Oral at bedtime  carBAMazepine ER Tablet 300 milliGRAM(s) Oral two times a day  dextrose 5%. 1000 milliLiter(s) (50 mL/Hr) IV Continuous <Continuous>  dextrose 5%. 1000 milliLiter(s) (100 mL/Hr) IV Continuous <Continuous>  dextrose 50% Injectable 25 Gram(s) IV Push once  dextrose 50% Injectable 12.5 Gram(s) IV Push once  dextrose 50% Injectable 25 Gram(s) IV Push once  famotidine    Tablet 20 milliGRAM(s) Oral daily  FLUoxetine 20 milliGRAM(s) Oral daily  glucagon  Injectable 1 milliGRAM(s) IntraMuscular once  heparin   Injectable 5000 Unit(s) SubCutaneous every 12 hours  insulin lispro (ADMELOG) corrective regimen sliding scale   SubCutaneous three times a day before meals  insulin lispro (ADMELOG) corrective regimen sliding scale   SubCutaneous at bedtime  metoprolol tartrate 25 milliGRAM(s) Oral two times a day  piperacillin/tazobactam IVPB.. 3.375 Gram(s) IV Intermittent every 8 hours  sodium chloride 0.9%. 1000 milliLiter(s) (70 mL/Hr) IV Continuous <Continuous>    MEDICATIONS  (PRN):  dextrose Oral Gel 15 Gram(s) Oral once PRN Blood Glucose LESS THAN 70 milliGRAM(s)/deciliter  LORazepam     Tablet 0.5 milliGRAM(s) Oral three times a day PRN Anxiety  ondansetron    Tablet 4 milliGRAM(s) Oral every 8 hours PRN Nausea and/or Vomiting      SOCIAL HISTORY:  former smoker for 20 yrs, 2 cigarettes / day    FAMILY HISTORY:  Family history of stomach cancer      ROS:  All 10 systems reviewed and positives noted in HPI    OBJECTIVE:    VITAL SIGNS:  Vital Signs Last 24 Hrs  T(C): 36.8 (2023 04:45), Max: 37.3 (2023 20:50)  T(F): 98.3 (2023 04:45), Max: 99.1 (2023 20:50)  HR: 64 (2023 04:45) (64 - 73)  BP: 91/54 (2023 04:45) (91/54 - 103/61)  BP(mean): --  RR: 17 (2023 04:45) (17 - 17)  SpO2: 94% (2023 04:45) (94% - 95%)    Parameters below as of 2023 04:45  Patient On (Oxygen Delivery Method): room air        PHYSICAL EXAM:  General: well appearing, no distress  HEENT: sclera anicteric  Neck: supple, no carotid bruits b/l  CVS: JVP ~ 7 cm H20, RRR, s1, s2, no murmurs/rubs/gallops  Chest: unlabored respirations, clear to auscultation b/l  Abdomen: bandage clean dry intact, 2 drains in place with serosanguinous drainage  Extremities: no lower extremity edema b/l  Neuro: awake, alert & oriented x 3  Psych: normal affect      LABS:                        9.7    7.22  )-----------( 100      ( 2023 07:05 )             29.3     11-30    141  |  105  |  15  ----------------------------<  160<H>  3.7   |  26  |  1.56<H>    Ca    8.3<L>      2023 07:05  Mg     2.1     11-    TPro  6.4  /  Alb  2.0<L>  /  TBili  0.9  /  DBili  0.5<H>  /  AST  104<H>  /  ALT  131<H>  /  AlkPhos  128<H>  11        PT/INR - ( 2023 16:00 )   PT: 13.2 sec;   INR: 1.16 ratio         EC2023: NSR  previous EKG with A paced and 1st deg block      TTE: < from: TTE Echo Complete w/o Contrast w/ Doppler (02.10.23 @ 08:48) >   1. Left ventricular ejection fraction, by visual estimation, is 60 to   65%.   2. Normal global left ventricular systolic function.   3. Normal left ventricular internal cavity size.   4. Mildly enlarged right ventricle.   5. Normal left atrial size.   6. Right atrial enlargement.   7. Trace mitral valve regurgitation.   8. Moderate tricuspid regurgitation.   9. Mild aortic regurgitation.    < from: TTE Echo Complete w/o Contrast w/ Doppler (23 @ 11:17) >  Summary:   1. Left ventricular ejection fraction, by visual estimation, is 65 to   70%.   2. Normal global left ventricular systolic function.   3. Normal left ventricular internal cavity size.   4. Mildly enlarged right ventricle.   5. Mild mitral valve regurgitation.   6. Thickening of the anterior mitral valve leaflet.   7. Mild tricuspid regurgitation.   8. Mild aortic regurgitation.    [id number] Pete Brewer , Electronically signed on 2023 at   12:07:56 PM      < from: Cardiac Catheterization (02.10.23 @ 18:36) >  Indications:           NSTEMI  Diagnostic Conclusions:   No obstructive CAD.

## 2023-12-01 NOTE — PROGRESS NOTE ADULT - ASSESSMENT
68 y/o English speaking male with PMH of sinus node disorder s/p PPM, HTN, HLD, DM2 Gastritis, cerebral aneurysm s/p coiling, CVA with residual L sided weakness, and seizure disorder who is s/p laparoscopic converted to open cholecystectomy for acute gangrenous cholecystitis.    Plan:  - patient hypotensive requiring derek immediately post-op but off since PACU and doing well  - lactate mildly elevated but likely 2/2 mild ischemic hepatopathy vs liver cauterization - will trend  - PRN pain meds, pain currently well controlled  - NPO - will discuss CLD with Dr. Whitley  - continue home keppra IV, will transition whenon diet  - Can likely d/c south today    Will discuss with Dr. Whitley and update note  Surgery  [phone]

## 2023-12-02 DIAGNOSIS — Z29.9 ENCOUNTER FOR PROPHYLACTIC MEASURES, UNSPECIFIED: ICD-10-CM

## 2023-12-02 LAB
ALBUMIN SERPL ELPH-MCNC: 1.6 G/DL — LOW (ref 3.3–5)
ALBUMIN SERPL ELPH-MCNC: 1.6 G/DL — LOW (ref 3.3–5)
ALP SERPL-CCNC: 75 U/L — SIGNIFICANT CHANGE UP (ref 40–120)
ALP SERPL-CCNC: 75 U/L — SIGNIFICANT CHANGE UP (ref 40–120)
ALT FLD-CCNC: 63 U/L — HIGH (ref 10–45)
ALT FLD-CCNC: 63 U/L — HIGH (ref 10–45)
ANION GAP SERPL CALC-SCNC: 7 MMOL/L — SIGNIFICANT CHANGE UP (ref 5–17)
ANION GAP SERPL CALC-SCNC: 7 MMOL/L — SIGNIFICANT CHANGE UP (ref 5–17)
AST SERPL-CCNC: 56 U/L — HIGH (ref 10–40)
AST SERPL-CCNC: 56 U/L — HIGH (ref 10–40)
BILIRUB SERPL-MCNC: 0.6 MG/DL — SIGNIFICANT CHANGE UP (ref 0.2–1.2)
BILIRUB SERPL-MCNC: 0.6 MG/DL — SIGNIFICANT CHANGE UP (ref 0.2–1.2)
BUN SERPL-MCNC: 11 MG/DL — SIGNIFICANT CHANGE UP (ref 7–23)
BUN SERPL-MCNC: 11 MG/DL — SIGNIFICANT CHANGE UP (ref 7–23)
CALCIUM SERPL-MCNC: 7.4 MG/DL — LOW (ref 8.4–10.5)
CALCIUM SERPL-MCNC: 7.4 MG/DL — LOW (ref 8.4–10.5)
CHLORIDE SERPL-SCNC: 106 MMOL/L — SIGNIFICANT CHANGE UP (ref 96–108)
CHLORIDE SERPL-SCNC: 106 MMOL/L — SIGNIFICANT CHANGE UP (ref 96–108)
CO2 SERPL-SCNC: 26 MMOL/L — SIGNIFICANT CHANGE UP (ref 22–31)
CO2 SERPL-SCNC: 26 MMOL/L — SIGNIFICANT CHANGE UP (ref 22–31)
CREAT SERPL-MCNC: 1.19 MG/DL — SIGNIFICANT CHANGE UP (ref 0.5–1.3)
CREAT SERPL-MCNC: 1.19 MG/DL — SIGNIFICANT CHANGE UP (ref 0.5–1.3)
EGFR: 66 ML/MIN/1.73M2 — SIGNIFICANT CHANGE UP
EGFR: 66 ML/MIN/1.73M2 — SIGNIFICANT CHANGE UP
GLUCOSE BLDC GLUCOMTR-MCNC: 115 MG/DL — HIGH (ref 70–99)
GLUCOSE BLDC GLUCOMTR-MCNC: 115 MG/DL — HIGH (ref 70–99)
GLUCOSE BLDC GLUCOMTR-MCNC: 150 MG/DL — HIGH (ref 70–99)
GLUCOSE BLDC GLUCOMTR-MCNC: 150 MG/DL — HIGH (ref 70–99)
GLUCOSE BLDC GLUCOMTR-MCNC: 170 MG/DL — HIGH (ref 70–99)
GLUCOSE BLDC GLUCOMTR-MCNC: 170 MG/DL — HIGH (ref 70–99)
GLUCOSE BLDC GLUCOMTR-MCNC: 183 MG/DL — HIGH (ref 70–99)
GLUCOSE BLDC GLUCOMTR-MCNC: 183 MG/DL — HIGH (ref 70–99)
GLUCOSE SERPL-MCNC: 138 MG/DL — HIGH (ref 70–99)
GLUCOSE SERPL-MCNC: 138 MG/DL — HIGH (ref 70–99)
HCT VFR BLD CALC: 22 % — LOW (ref 39–50)
HCT VFR BLD CALC: 22 % — LOW (ref 39–50)
HCT VFR BLD CALC: 22.3 % — LOW (ref 39–50)
HCT VFR BLD CALC: 22.3 % — LOW (ref 39–50)
HGB BLD-MCNC: 7.2 G/DL — LOW (ref 13–17)
HGB BLD-MCNC: 7.2 G/DL — LOW (ref 13–17)
HGB BLD-MCNC: 7.3 G/DL — LOW (ref 13–17)
HGB BLD-MCNC: 7.3 G/DL — LOW (ref 13–17)
LACTATE SERPL-SCNC: 1 MMOL/L — SIGNIFICANT CHANGE UP (ref 0.7–2)
LACTATE SERPL-SCNC: 1 MMOL/L — SIGNIFICANT CHANGE UP (ref 0.7–2)
MAGNESIUM SERPL-MCNC: 1.9 MG/DL — SIGNIFICANT CHANGE UP (ref 1.6–2.6)
MAGNESIUM SERPL-MCNC: 1.9 MG/DL — SIGNIFICANT CHANGE UP (ref 1.6–2.6)
MCHC RBC-ENTMCNC: 32.6 PG — SIGNIFICANT CHANGE UP (ref 27–34)
MCHC RBC-ENTMCNC: 32.6 PG — SIGNIFICANT CHANGE UP (ref 27–34)
MCHC RBC-ENTMCNC: 32.7 GM/DL — SIGNIFICANT CHANGE UP (ref 32–36)
MCHC RBC-ENTMCNC: 32.7 GM/DL — SIGNIFICANT CHANGE UP (ref 32–36)
MCV RBC AUTO: 99.6 FL — SIGNIFICANT CHANGE UP (ref 80–100)
MCV RBC AUTO: 99.6 FL — SIGNIFICANT CHANGE UP (ref 80–100)
NRBC # BLD: 0 /100 WBCS — SIGNIFICANT CHANGE UP (ref 0–0)
NRBC # BLD: 0 /100 WBCS — SIGNIFICANT CHANGE UP (ref 0–0)
PHOSPHATE SERPL-MCNC: 2.3 MG/DL — LOW (ref 2.5–4.5)
PHOSPHATE SERPL-MCNC: 2.3 MG/DL — LOW (ref 2.5–4.5)
PLATELET # BLD AUTO: 152 K/UL — SIGNIFICANT CHANGE UP (ref 150–400)
PLATELET # BLD AUTO: 152 K/UL — SIGNIFICANT CHANGE UP (ref 150–400)
POTASSIUM SERPL-MCNC: 3.8 MMOL/L — SIGNIFICANT CHANGE UP (ref 3.5–5.3)
POTASSIUM SERPL-MCNC: 3.8 MMOL/L — SIGNIFICANT CHANGE UP (ref 3.5–5.3)
POTASSIUM SERPL-SCNC: 3.8 MMOL/L — SIGNIFICANT CHANGE UP (ref 3.5–5.3)
POTASSIUM SERPL-SCNC: 3.8 MMOL/L — SIGNIFICANT CHANGE UP (ref 3.5–5.3)
PROT SERPL-MCNC: 5.2 G/DL — LOW (ref 6–8.3)
PROT SERPL-MCNC: 5.2 G/DL — LOW (ref 6–8.3)
RBC # BLD: 2.24 M/UL — LOW (ref 4.2–5.8)
RBC # BLD: 2.24 M/UL — LOW (ref 4.2–5.8)
RBC # FLD: 14 % — SIGNIFICANT CHANGE UP (ref 10.3–14.5)
RBC # FLD: 14 % — SIGNIFICANT CHANGE UP (ref 10.3–14.5)
SODIUM SERPL-SCNC: 139 MMOL/L — SIGNIFICANT CHANGE UP (ref 135–145)
SODIUM SERPL-SCNC: 139 MMOL/L — SIGNIFICANT CHANGE UP (ref 135–145)
WBC # BLD: 6.89 K/UL — SIGNIFICANT CHANGE UP (ref 3.8–10.5)
WBC # BLD: 6.89 K/UL — SIGNIFICANT CHANGE UP (ref 3.8–10.5)
WBC # FLD AUTO: 6.89 K/UL — SIGNIFICANT CHANGE UP (ref 3.8–10.5)
WBC # FLD AUTO: 6.89 K/UL — SIGNIFICANT CHANGE UP (ref 3.8–10.5)

## 2023-12-02 PROCEDURE — 99233 SBSQ HOSP IP/OBS HIGH 50: CPT

## 2023-12-02 RX ORDER — SODIUM,POTASSIUM PHOSPHATES 278-250MG
1 POWDER IN PACKET (EA) ORAL ONCE
Refills: 0 | Status: COMPLETED | OUTPATIENT
Start: 2023-12-02 | End: 2023-12-02

## 2023-12-02 RX ORDER — CARBAMAZEPINE 200 MG
300 TABLET ORAL
Refills: 0 | Status: DISCONTINUED | OUTPATIENT
Start: 2023-12-02 | End: 2023-12-02

## 2023-12-02 RX ORDER — CARBAMAZEPINE 200 MG
300 TABLET ORAL ONCE
Refills: 0 | Status: COMPLETED | OUTPATIENT
Start: 2023-12-02 | End: 2023-12-02

## 2023-12-02 RX ORDER — ASPIRIN/CALCIUM CARB/MAGNESIUM 324 MG
81 TABLET ORAL DAILY
Refills: 0 | Status: DISCONTINUED | OUTPATIENT
Start: 2023-12-02 | End: 2023-12-07

## 2023-12-02 RX ORDER — ACETAMINOPHEN 500 MG
1000 TABLET ORAL ONCE
Refills: 0 | Status: COMPLETED | OUTPATIENT
Start: 2023-12-02 | End: 2023-12-02

## 2023-12-02 RX ORDER — CARBAMAZEPINE 200 MG
300 TABLET ORAL
Refills: 0 | Status: DISCONTINUED | OUTPATIENT
Start: 2023-12-03 | End: 2023-12-07

## 2023-12-02 RX ADMIN — Medication 300 MILLIGRAM(S): at 18:30

## 2023-12-02 RX ADMIN — CHLORHEXIDINE GLUCONATE 1 APPLICATION(S): 213 SOLUTION TOPICAL at 05:56

## 2023-12-02 RX ADMIN — HEPARIN SODIUM 5000 UNIT(S): 5000 INJECTION INTRAVENOUS; SUBCUTANEOUS at 05:56

## 2023-12-02 RX ADMIN — ATORVASTATIN CALCIUM 80 MILLIGRAM(S): 80 TABLET, FILM COATED ORAL at 22:15

## 2023-12-02 RX ADMIN — HEPARIN SODIUM 5000 UNIT(S): 5000 INJECTION INTRAVENOUS; SUBCUTANEOUS at 22:15

## 2023-12-02 RX ADMIN — Medication 6 MILLIGRAM(S): at 22:14

## 2023-12-02 RX ADMIN — FAMOTIDINE 20 MILLIGRAM(S): 10 INJECTION INTRAVENOUS at 11:31

## 2023-12-02 RX ADMIN — Medication 1: at 17:11

## 2023-12-02 RX ADMIN — PIPERACILLIN AND TAZOBACTAM 25 GRAM(S): 4; .5 INJECTION, POWDER, LYOPHILIZED, FOR SOLUTION INTRAVENOUS at 13:48

## 2023-12-02 RX ADMIN — Medication 1: at 11:33

## 2023-12-02 RX ADMIN — LATANOPROST 1 DROP(S): 0.05 SOLUTION/ DROPS OPHTHALMIC; TOPICAL at 22:15

## 2023-12-02 RX ADMIN — Medication 1 PACKET(S): at 11:31

## 2023-12-02 RX ADMIN — PIPERACILLIN AND TAZOBACTAM 25 GRAM(S): 4; .5 INJECTION, POWDER, LYOPHILIZED, FOR SOLUTION INTRAVENOUS at 22:13

## 2023-12-02 RX ADMIN — PIPERACILLIN AND TAZOBACTAM 25 GRAM(S): 4; .5 INJECTION, POWDER, LYOPHILIZED, FOR SOLUTION INTRAVENOUS at 05:56

## 2023-12-02 RX ADMIN — Medication 81 MILLIGRAM(S): at 11:31

## 2023-12-02 RX ADMIN — LEVETIRACETAM 500 MILLIGRAM(S): 250 TABLET, FILM COATED ORAL at 05:56

## 2023-12-02 RX ADMIN — Medication 400 MILLIGRAM(S): at 22:13

## 2023-12-02 RX ADMIN — HEPARIN SODIUM 5000 UNIT(S): 5000 INJECTION INTRAVENOUS; SUBCUTANEOUS at 13:48

## 2023-12-02 RX ADMIN — Medication 1000 MILLIGRAM(S): at 23:28

## 2023-12-02 NOTE — PROGRESS NOTE ADULT - SUBJECTIVE AND OBJECTIVE BOX
Patient is a 68y old  Male who presents with a chief complaint of Acute Cholecystitis (02 Dec 2023 13:23)  S/P Attempted Laparoscopic cholecystectomy converted to open  cholecystectomy with pus POD #2    Patient seen and examined at bedside. Patient lying in bed states that he can not get OOB.  I asked if he wanted to sit in chair and he said no.    He did not feel like trying clear liquds states he will try for supper.    He denies nausea, vomiting or chest pain .       ALLERGIES:  No Known Allergies    MEDICATIONS  (STANDING):  acetaminophen   IVPB .. 1000 milliGRAM(s) IV Intermittent once  aspirin enteric coated 81 milliGRAM(s) Oral daily  atorvastatin 80 milliGRAM(s) Oral at bedtime  carBAMazepine ER Tablet 300 milliGRAM(s) Oral once  chlorhexidine 2% Cloths 1 Application(s) Topical <User Schedule>  dextrose 5%. 1000 milliLiter(s) (50 mL/Hr) IV Continuous <Continuous>  dextrose 5%. 1000 milliLiter(s) (100 mL/Hr) IV Continuous <Continuous>  dextrose 50% Injectable 25 Gram(s) IV Push once  dextrose 50% Injectable 12.5 Gram(s) IV Push once  dextrose 50% Injectable 25 Gram(s) IV Push once  famotidine    Tablet 20 milliGRAM(s) Oral daily  heparin   Injectable 5000 Unit(s) SubCutaneous every 8 hours  insulin lispro (ADMELOG) corrective regimen sliding scale   SubCutaneous Before meals and at bedtime  latanoprost 0.005% Ophthalmic Solution 1 Drop(s) Both EYES at bedtime  melatonin 6 milliGRAM(s) Oral at bedtime  piperacillin/tazobactam IVPB.. 3.375 Gram(s) IV Intermittent every 8 hours    MEDICATIONS  (PRN):  morphine  - Injectable 1 milliGRAM(s) IV Push every 4 hours PRN Severe Pain (7 - 10)  ondansetron Injectable 4 milliGRAM(s) IV Push every 6 hours PRN Nausea and/or Vomiting  oxyCODONE    IR 5 milliGRAM(s) Oral every 4 hours PRN Mild Pain (1 - 3)  oxyCODONE    IR 10 milliGRAM(s) Oral every 4 hours PRN Moderate Pain (4 - 6)    Vital Signs Last 24 Hrs  T(F): 99.4 (02 Dec 2023 13:17), Max: 99.4 (02 Dec 2023 13:17)  HR: 72 (02 Dec 2023 14:00) (67 - 81)  BP: 121/66 (02 Dec 2023 14:00) (93/48 - 137/66)  RR: 16 (02 Dec 2023 13:17) (13 - 18)  SpO2: 98% (02 Dec 2023 14:00) (93% - 98%)  I&O's Summary    01 Dec 2023 07:01  -  02 Dec 2023 07:00  --------------------------------------------------------  IN: 4349 mL / OUT: 2360 mL / NET: 1989 mL    02 Dec 2023 07:01  -  02 Dec 2023 15:49  --------------------------------------------------------  IN: 25 mL / OUT: 0 mL / NET: 25 mL      PHYSICAL EXAM:  General: NAD, A/O x 3 [place]ic speaking   ENT: MMM  Neck: Supple, No JVD  Lungs: Clear to auscultation bilaterally  Cardio: RRR, S1/S2, No murmurs  Abdomen: Soft, tender along incision , Dressing clean and dry,  Bowel sounds present NIKHIL drains with #1 170 ml over last shift of serous fluid   #2 190 ml of dark bilious bloody output.  Extremities: No calf tenderness, No pitting edema    LABS:                        7.2    x     )-----------( x        ( 02 Dec 2023 12:02 )             22.0     12-02    139  |  106  |  11  ----------------------------<  138  3.8   |  26  |  1.19    Ca    7.4      02 Dec 2023 06:00  Phos  2.3     12-02  Mg     1.9     12-02    TPro  5.2  /  Alb  1.6  /  TBili  0.6  /  DBili  x   /  AST  56  /  ALT  63  /  AlkPhos  75  12-02        Lactate, Blood: 1.0 mmol/L (12-02 @ 06:00)  Lactate, Blood: 3.4 mmol/L (12-01 @ 04:28)  Lactate, Blood: 3.4 mmol/L (11-30 @ 23:19)  Lactate, Blood: 2.2 mmol/L (11-30 @ 19:42)    CARDIAC MARKERS ( 30 Nov 2023 07:05 )  x     / 13.0 ng/L / x     / x     / x          POCT Blood Glucose.: 170 mg/dL (02 Dec 2023 11:30)  POCT Blood Glucose.: 150 mg/dL (02 Dec 2023 08:36)  POCT Blood Glucose.: 167 mg/dL (01 Dec 2023 21:28)  POCT Blood Glucose.: 154 mg/dL (01 Dec 2023 17:08)      Urinalysis Basic - ( 02 Dec 2023 06:00 )    Color: x / Appearance: x / SG: x / pH: x  Gluc: 138 mg/dL / Ketone: x  / Bili: x / Urobili: x   Blood: x / Protein: x / Nitrite: x   Leuk Esterase: x / RBC: x / WBC x   Sq Epi: x / Non Sq Epi: x / Bacteria: x        Culture - Blood (collected 28 Nov 2023 22:39)  Source: .Blood Blood-Peripheral  Preliminary Report (02 Dec 2023 04:01):    No growth at 72 Hours    Culture - Blood (collected 28 Nov 2023 22:39)  Source: .Blood Blood-Peripheral  Preliminary Report (02 Dec 2023 04:01):    No growth at 72 Hours          RADIOLOGY & ADDITIONAL TESTS:    Care Discussed with Consultants/Other Providers:    Patient is a 68y old  Male who presents with a chief complaint of Acute Cholecystitis (02 Dec 2023 13:23)  S/P Attempted Laparoscopic cholecystectomy converted to open  cholecystectomy with pus POD #2    Patient seen and examined at bedside. Patient lying in bed states that he can not get OOB.  I asked if he wanted to sit in chair and he said no.    He did not feel like trying clear liquds states he will try for supper.    He denies nausea, vomiting or chest pain .       ALLERGIES:  No Known Allergies    MEDICATIONS  (STANDING):  acetaminophen   IVPB .. 1000 milliGRAM(s) IV Intermittent once  aspirin enteric coated 81 milliGRAM(s) Oral daily  atorvastatin 80 milliGRAM(s) Oral at bedtime  carBAMazepine ER Tablet 300 milliGRAM(s) Oral once  chlorhexidine 2% Cloths 1 Application(s) Topical <User Schedule>  dextrose 5%. 1000 milliLiter(s) (50 mL/Hr) IV Continuous <Continuous>  dextrose 5%. 1000 milliLiter(s) (100 mL/Hr) IV Continuous <Continuous>  dextrose 50% Injectable 25 Gram(s) IV Push once  dextrose 50% Injectable 12.5 Gram(s) IV Push once  dextrose 50% Injectable 25 Gram(s) IV Push once  famotidine    Tablet 20 milliGRAM(s) Oral daily  heparin   Injectable 5000 Unit(s) SubCutaneous every 8 hours  insulin lispro (ADMELOG) corrective regimen sliding scale   SubCutaneous Before meals and at bedtime  latanoprost 0.005% Ophthalmic Solution 1 Drop(s) Both EYES at bedtime  melatonin 6 milliGRAM(s) Oral at bedtime  piperacillin/tazobactam IVPB.. 3.375 Gram(s) IV Intermittent every 8 hours    MEDICATIONS  (PRN):  morphine  - Injectable 1 milliGRAM(s) IV Push every 4 hours PRN Severe Pain (7 - 10)  ondansetron Injectable 4 milliGRAM(s) IV Push every 6 hours PRN Nausea and/or Vomiting  oxyCODONE    IR 5 milliGRAM(s) Oral every 4 hours PRN Mild Pain (1 - 3)  oxyCODONE    IR 10 milliGRAM(s) Oral every 4 hours PRN Moderate Pain (4 - 6)    Vital Signs Last 24 Hrs  T(F): 99.4 (02 Dec 2023 13:17), Max: 99.4 (02 Dec 2023 13:17)  HR: 72 (02 Dec 2023 14:00) (67 - 81)  BP: 121/66 (02 Dec 2023 14:00) (93/48 - 137/66)  RR: 16 (02 Dec 2023 13:17) (13 - 18)  SpO2: 98% (02 Dec 2023 14:00) (93% - 98%)  I&O's Summary    01 Dec 2023 07:01  -  02 Dec 2023 07:00  --------------------------------------------------------  IN: 4349 mL / OUT: 2360 mL / NET: 1989 mL    02 Dec 2023 07:01  -  02 Dec 2023 15:49  --------------------------------------------------------  IN: 25 mL / OUT: 0 mL / NET: 25 mL      PHYSICAL EXAM:  General: NAD, A/O x 3 [place] speaking   ENT: MMM  Neck: Supple, No JVD  Lungs: Clear to auscultation bilaterally  Cardio: RRR, S1/S2, No murmurs  Abdomen: Soft, tender along incision , Dressing clean and dry,  Bowel sounds present NIKHIL drains with #1 170 ml over last shift of serous fluid   #2 190 ml of dark bilious bloody output.  Extremities: No calf tenderness, No pitting edema    LABS:                        7.2    x     )-----------( x        ( 02 Dec 2023 12:02 )             22.0     12-02    139  |  106  |  11  ----------------------------<  138  3.8   |  26  |  1.19    Ca    7.4      02 Dec 2023 06:00  Phos  2.3     12-02  Mg     1.9     12-02    TPro  5.2  /  Alb  1.6  /  TBili  0.6  /  DBili  x   /  AST  56  /  ALT  63  /  AlkPhos  75  12-02        Lactate, Blood: 1.0 mmol/L (12-02 @ 06:00)  Lactate, Blood: 3.4 mmol/L (12-01 @ 04:28)  Lactate, Blood: 3.4 mmol/L (11-30 @ 23:19)  Lactate, Blood: 2.2 mmol/L (11-30 @ 19:42)    CARDIAC MARKERS ( 30 Nov 2023 07:05 )  x     / 13.0 ng/L / x     / x     / x          POCT Blood Glucose.: 170 mg/dL (02 Dec 2023 11:30)  POCT Blood Glucose.: 150 mg/dL (02 Dec 2023 08:36)  POCT Blood Glucose.: 167 mg/dL (01 Dec 2023 21:28)  POCT Blood Glucose.: 154 mg/dL (01 Dec 2023 17:08)      Urinalysis Basic - ( 02 Dec 2023 06:00 )    Color: x / Appearance: x / SG: x / pH: x  Gluc: 138 mg/dL / Ketone: x  / Bili: x / Urobili: x   Blood: x / Protein: x / Nitrite: x   Leuk Esterase: x / RBC: x / WBC x   Sq Epi: x / Non Sq Epi: x / Bacteria: x        Culture - Blood (collected 28 Nov 2023 22:39)  Source: .Blood Blood-Peripheral  Preliminary Report (02 Dec 2023 04:01):    No growth at 72 Hours    Culture - Blood (collected 28 Nov 2023 22:39)  Source: .Blood Blood-Peripheral  Preliminary Report (02 Dec 2023 04:01):    No growth at 72 Hours          RADIOLOGY & ADDITIONAL TESTS:    Care Discussed with Consultants/Other Providers:    Patient is a 68y old  Male who presents with a chief complaint of Acute Cholecystitis (02 Dec 2023 13:23)  S/P Attempted Laparoscopic cholecystectomy converted to open  cholecystectomy with pus POD #2    Patient seen and examined at bedside. Patient lying in bed states that he can not get OOB.  I asked if he wanted to sit in chair and he said no.    He did not feel like trying clear liquds states he will try for supper.    He denies nausea, vomiting or chest pain .       ALLERGIES:  No Known Allergies    MEDICATIONS  (STANDING):  acetaminophen   IVPB .. 1000 milliGRAM(s) IV Intermittent once  aspirin enteric coated 81 milliGRAM(s) Oral daily  atorvastatin 80 milliGRAM(s) Oral at bedtime  carBAMazepine ER Tablet 300 milliGRAM(s) Oral once  chlorhexidine 2% Cloths 1 Application(s) Topical <User Schedule>  dextrose 5%. 1000 milliLiter(s) (50 mL/Hr) IV Continuous <Continuous>  dextrose 5%. 1000 milliLiter(s) (100 mL/Hr) IV Continuous <Continuous>  dextrose 50% Injectable 25 Gram(s) IV Push once  dextrose 50% Injectable 12.5 Gram(s) IV Push once  dextrose 50% Injectable 25 Gram(s) IV Push once  famotidine    Tablet 20 milliGRAM(s) Oral daily  heparin   Injectable 5000 Unit(s) SubCutaneous every 8 hours  insulin lispro (ADMELOG) corrective regimen sliding scale   SubCutaneous Before meals and at bedtime  latanoprost 0.005% Ophthalmic Solution 1 Drop(s) Both EYES at bedtime  melatonin 6 milliGRAM(s) Oral at bedtime  piperacillin/tazobactam IVPB.. 3.375 Gram(s) IV Intermittent every 8 hours    MEDICATIONS  (PRN):  morphine  - Injectable 1 milliGRAM(s) IV Push every 4 hours PRN Severe Pain (7 - 10)  ondansetron Injectable 4 milliGRAM(s) IV Push every 6 hours PRN Nausea and/or Vomiting  oxyCODONE    IR 5 milliGRAM(s) Oral every 4 hours PRN Mild Pain (1 - 3)  oxyCODONE    IR 10 milliGRAM(s) Oral every 4 hours PRN Moderate Pain (4 - 6)    Vital Signs Last 24 Hrs  T(F): 99.4 (02 Dec 2023 13:17), Max: 99.4 (02 Dec 2023 13:17)  HR: 72 (02 Dec 2023 14:00) (67 - 81)  BP: 121/66 (02 Dec 2023 14:00) (93/48 - 137/66)  RR: 16 (02 Dec 2023 13:17) (13 - 18)  SpO2: 98% (02 Dec 2023 14:00) (93% - 98%)  I&O's Summary    01 Dec 2023 07:01  -  02 Dec 2023 07:00  --------------------------------------------------------  IN: 4349 mL / OUT: 2360 mL / NET: 1989 mL    02 Dec 2023 07:01  -  02 Dec 2023 15:49  --------------------------------------------------------  IN: 25 mL / OUT: 0 mL / NET: 25 mL      PHYSICAL EXAM:  General: NAD, A/O x 3 [place]mese speaking   ENT: MMM  Neck: Supple, No JVD  Lungs: Clear to auscultation bilaterally  Cardio: RRR, S1/S2, No murmurs  Abdomen: Soft, tender along incision , Dressing clean and dry,  Bowel sounds present NIKHIL drains with #1 170 ml over last shift of serous fluid   #2 190 ml of dark bilious bloody output.  Extremities: No calf tenderness, No pitting edema    LABS:                        7.2    x     )-----------( x        ( 02 Dec 2023 12:02 )             22.0     12-02    139  |  106  |  11  ----------------------------<  138  3.8   |  26  |  1.19    Ca    7.4      02 Dec 2023 06:00  Phos  2.3     12-02  Mg     1.9     12-02    TPro  5.2  /  Alb  1.6  /  TBili  0.6  /  DBili  x   /  AST  56  /  ALT  63  /  AlkPhos  75  12-02        Lactate, Blood: 1.0 mmol/L (12-02 @ 06:00)  Lactate, Blood: 3.4 mmol/L (12-01 @ 04:28)  Lactate, Blood: 3.4 mmol/L (11-30 @ 23:19)  Lactate, Blood: 2.2 mmol/L (11-30 @ 19:42)    CARDIAC MARKERS ( 30 Nov 2023 07:05 )  x     / 13.0 ng/L / x     / x     / x          POCT Blood Glucose.: 170 mg/dL (02 Dec 2023 11:30)  POCT Blood Glucose.: 150 mg/dL (02 Dec 2023 08:36)  POCT Blood Glucose.: 167 mg/dL (01 Dec 2023 21:28)  POCT Blood Glucose.: 154 mg/dL (01 Dec 2023 17:08)      Urinalysis Basic - ( 02 Dec 2023 06:00 )    Color: x / Appearance: x / SG: x / pH: x  Gluc: 138 mg/dL / Ketone: x  / Bili: x / Urobili: x   Blood: x / Protein: x / Nitrite: x   Leuk Esterase: x / RBC: x / WBC x   Sq Epi: x / Non Sq Epi: x / Bacteria: x        Culture - Blood (collected 28 Nov 2023 22:39)  Source: .Blood Blood-Peripheral  Preliminary Report (02 Dec 2023 04:01):    No growth at 72 Hours    Culture - Blood (collected 28 Nov 2023 22:39)  Source: .Blood Blood-Peripheral  Preliminary Report (02 Dec 2023 04:01):    No growth at 72 Hours          RADIOLOGY & ADDITIONAL TESTS:    Care Discussed with Consultants/Other Providers:

## 2023-12-02 NOTE — PROGRESS NOTE ADULT - SUBJECTIVE AND OBJECTIVE BOX
Patient is a 68-year-old male with PMHx of sinus node disorder s/p PPM, HTN, HLD, cerebral aneurysm s/p coiling, CVA w/ residual L sided weakness, seizure disorder, type 2 diabetes on metformin and gastritis admitted for acute cholecystitis s/p lap to open cholecystectomy 11/30, w/ ICU upgrade due to post-operative hypotension requiring pressors, downgraded this morning to surgical floor.     Overnight Events: None  Interval HPI: Today is hospital day 4. Patient seen and examined at bedside.  Javi # [id number], patient states he is doing well, no gas or BMs, tolerating liquids but endorses discomfort if they are too cold, endorses mild abdominal pain but improved since yesterday. Denies dizziness, headache, SOB, chest pain. Denies any questions or concerns at this time.     REVIEW OF SYSTEMS:  CONSTITUTIONAL: (-) weakness, (-) fevers, (-) chills  EYES/ENT: (-) visual changes,  (-) vertigo,  (-) throat pain   NECK:  (-) pain, (-) stiffness  RESPIRATORY:  (-) shortness of breath, (-) cough,  (-) wheezing,  (-) hemoptysis   CARDIOVASCULAR:  (-) chest pain, (-) palpitations  GASTROINTESTINAL:  (+) abdominal or epigastric pain, (-) nausea, (-) vomiting, (-) diarrhea, (-) constipation, (-) melena,  (-) hematemesis,  (-) hematochezia  GENITOURINARY: (-) dysuria, (-) frequency, (-) hematuria  NEUROLOGICAL: (-) numbness, (-) weakness  SKIN: (-) itching, (-) rashes, (-) lesions    Vital Signs Last 24 Hrs  T(C): 37.1 (02 Dec 2023 07:11), Max: 37.1 (02 Dec 2023 07:11)  T(F): 98.8 (02 Dec 2023 07:11), Max: 98.8 (02 Dec 2023 07:11)  HR: 76 (02 Dec 2023 07:11) (67 - 90)  BP: 120/59 (02 Dec 2023 07:11) (68/44 - 129/59)  BP(mean): 81 (02 Dec 2023 06:00) (52 - 81)  RR: 16 (02 Dec 2023 07:11) (13 - 19)  SpO2: 96% (02 Dec 2023 07:11) (93% - 100%)    Parameters below as of 02 Dec 2023 07:11  Patient On (Oxygen Delivery Method): room air      PHYSICAL EXAM:  GENERAL: NAD, lying in bed comfortably  HEAD:  Atraumatic, Normocephalic  EYES: EOMI, conjunctiva and sclera clear  ENT: Moist mucous membranes  NECK: Supple, No JVD  CHEST/LUNG: Clear to auscultation bilaterally, good air entry bilaterally; No wheezing, rales, or rhonchi. Unlabored respirations  HEART: Regular rate and rhythm. S1 and S2. No murmurs, rubs, or gallops  ABDOMEN: Soft, TTP in RLQ, Nondistended. Bowel sounds present. NIKHIL drain #1 with 25cc serosang output, drain #2 w/ more sanguinous than serous output ~75cc. RUQ dressing clean, dry and intact.   EXTREMITIES:  2+ Peripheral Pulses. No clubbing, cyanosis, or edema  NERVOUS SYSTEM:  Alert & Oriented X3, speech clear. No deficits.  SKIN: No rashes, bruises, or other lesions    LABS:   All Labs Personally Reviewed                         7.3    6.89  )-----------( 152      ( 02 Dec 2023 06:00 )             22.3     12-02    139  |  106  |  11  ----------------------------<  138<H>  3.8   |  26  |  1.19    Ca    7.4<L>      02 Dec 2023 06:00  Phos  2.3     12-02  Mg     1.9     12-02    TPro  5.2<L>  /  Alb  1.6<L>  /  TBili  0.6  /  DBili  x   /  AST  56<H>  /  ALT  63<H>  /  AlkPhos  75  12-02    Blood Culture: 11-28 @ 22:39  Organism --  Gram Stain Blood -- Gram Stain --  Specimen Source .Blood Blood-Peripheral  Culture-Blood --    I&O's Summary    01 Dec 2023 07:01  -  02 Dec 2023 07:00  --------------------------------------------------------  IN: 4349 mL / OUT: 2360 mL / NET: 1989 mL    02 Dec 2023 07:01  -  02 Dec 2023 09:02  --------------------------------------------------------  IN: 25 mL / OUT: 0 mL / NET: 25 mL      CAPILLARY BLOOD GLUCOSE    POCT Blood Glucose.: 150 mg/dL (02 Dec 2023 08:36)  POCT Blood Glucose.: 167 mg/dL (01 Dec 2023 21:28)  POCT Blood Glucose.: 154 mg/dL (01 Dec 2023 17:08)  POCT Blood Glucose.: 172 mg/dL (01 Dec 2023 12:06)      RADIOLOGY/EKG:  All Imaging and EKGs Personally Reviewed     NM Hepatobiliary Imaging w/ RX (11.29.23 @ 14:20)   IMPRESSION: Abnormal morphine-augmented hepatobiliary scan.  Findings are compatible with acute cholecystitis.    US Abdomen Limited (11.28.23 @ 21:21)   IMPRESSION:  Distended thick-walled gallbladder containing sludge.  Borderline CBD   measuring 6 mm.  Findings equivocal for acute cholecystitis. If   clinically indicated, further evaluation with nuclear HIDA scan may be   obtained.    CT Abdomen and Pelvis No Cont (11.28.23 @ 16:52)   IMPRESSION:  Findings suspicious for acute cholecystitis. Correlate with patient's   symptoms. Consider right upper quadrant ultrasound if clinically   indicated.    CT Chest No Cont (11.28.23 @ 16:51)   IMPRESSION:  Findings suspicious for acute cholecystitis. Correlate with patient's   symptoms. Consider right upper quadrant ultrasound if clinically   indicated.    Xray Chest 1 View AP/PA (11.28.23 @ 15:49)   Impression:  No acute pulmonary disease.    Xray Chest 1 View- PORTABLE-Urgent (11.24.23 @ 03:18)   IMPRESSION:  No evidence of acute infiltrate or pleural effusion.    TTE Echo Complete w/o Contrast w/ Doppler (11.30.23 @ 11:17)   Summary:   1. Left ventricular ejection fraction, by visual estimation, is 65 to   70%.   2. Normal global left ventricular systolic function.   3. Normal left ventricular internal cavity size.   4. Mildly enlarged right ventricle.   5. Mild mitral valve regurgitation.   6. Thickening of the anterior mitral valve leaflet.   7. Mild tricuspid regurgitation.   8. Mild aortic regurgitation.    12 Lead ECG (11.28.23 @ 15:11)   Normal sinus rhythm  Voltage criteria for left ventricular hypertrophy  Abnormal ECG  When compared with ECG of 24-NOV-2023 01:26,  Sinus rhythm has replaced Electronic atrial pacemaker      MEDICATIONS:  MEDICATIONS  (STANDING):  acetaminophen   IVPB .. 1000 milliGRAM(s) IV Intermittent once  aspirin enteric coated 81 milliGRAM(s) Oral daily  atorvastatin 80 milliGRAM(s) Oral at bedtime  chlorhexidine 2% Cloths 1 Application(s) Topical <User Schedule>  dextrose 5%. 1000 milliLiter(s) (50 mL/Hr) IV Continuous <Continuous>  dextrose 5%. 1000 milliLiter(s) (100 mL/Hr) IV Continuous <Continuous>  dextrose 50% Injectable 25 Gram(s) IV Push once  dextrose 50% Injectable 12.5 Gram(s) IV Push once  dextrose 50% Injectable 25 Gram(s) IV Push once  famotidine    Tablet 20 milliGRAM(s) Oral daily  heparin   Injectable 5000 Unit(s) SubCutaneous every 8 hours  insulin lispro (ADMELOG) corrective regimen sliding scale   SubCutaneous Before meals and at bedtime  latanoprost 0.005% Ophthalmic Solution 1 Drop(s) Both EYES at bedtime  levETIRAcetam 500 milliGRAM(s) Oral every 12 hours  melatonin 6 milliGRAM(s) Oral at bedtime  piperacillin/tazobactam IVPB.. 3.375 Gram(s) IV Intermittent every 8 hours         Patient is a 68-year-old male with PMHx of sinus node disorder s/p PPM, HTN, HLD, cerebral aneurysm s/p coiling, CVA w/ residual L sided weakness, seizure disorder, type 2 diabetes on metformin and gastritis admitted for acute cholecystitis s/p lap to open cholecystectomy 11/30, w/ ICU upgrade due to post-operative hypotension requiring pressors, downgraded this morning to surgical floor.     Overnight Events: None  Interval HPI: Today is hospital day 4. Patient seen and examined at bedside.  Javi # [id number], patient states he is doing well, no gas or BMs, tolerating liquids but endorses discomfort if they are too cold, endorses mild abdominal pain but improved since yesterday. Denies dizziness, headache, SOB, chest pain. Denies any questions or concerns at this time.     REVIEW OF SYSTEMS:  CONSTITUTIONAL: (-) weakness, (-) fevers, (-) chills  EYES/ENT: (-) visual changes,  (-) vertigo,  (-) throat pain   NECK:  (-) pain, (-) stiffness  RESPIRATORY:  (-) shortness of breath, (-) cough,  (-) wheezing,  (-) hemoptysis   CARDIOVASCULAR:  (-) chest pain, (-) palpitations  GASTROINTESTINAL:  (+) abdominal pain, (-) nausea, (-) vomiting, (-) diarrhea, (-) constipation, (-) melena,  (-) hematemesis,  (-) hematochezia  GENITOURINARY: (-) dysuria, (-) frequency, (-) hematuria  NEUROLOGICAL: (-) numbness, (-) weakness  SKIN: (-) itching, (-) rashes, (-) lesions    Vital Signs Last 24 Hrs  T(C): 37.1 (02 Dec 2023 07:11), Max: 37.1 (02 Dec 2023 07:11)  T(F): 98.8 (02 Dec 2023 07:11), Max: 98.8 (02 Dec 2023 07:11)  HR: 76 (02 Dec 2023 07:11) (67 - 90)  BP: 120/59 (02 Dec 2023 07:11) (68/44 - 129/59)  BP(mean): 81 (02 Dec 2023 06:00) (52 - 81)  RR: 16 (02 Dec 2023 07:11) (13 - 19)  SpO2: 96% (02 Dec 2023 07:11) (93% - 100%)    Parameters below as of 02 Dec 2023 07:11  Patient On (Oxygen Delivery Method): room air    PHYSICAL EXAM:  GENERAL: NAD, lying in bed comfortably  HEAD:  Atraumatic, Normocephalic  EYES: EOMI, conjunctiva and sclera clear  ENT: Moist mucous membranes  NECK: Supple, No JVD  CHEST/LUNG: Clear to auscultation bilaterally, good air entry bilaterally; No wheezing, rales, or rhonchi. Unlabored respirations  HEART: Regular rate and rhythm. S1 and S2. No murmurs, rubs, or gallops  ABDOMEN: Soft, TTP in RLQ, Nondistended. Bowel sounds present.   NIKHIL drain #1 with 25cc serosang output  NIKHIL drain #2 w/ more sanguinous than serous output ~75cc.   RUQ dressing clean, dry and intact.   EXTREMITIES:  2+ Peripheral Pulses. No clubbing, cyanosis, or edema  NERVOUS SYSTEM:  Alert & Oriented X3, speech clear. No deficits.  SKIN: No rashes, bruises, or other lesions    LABS:   All Labs Personally Reviewed                         7.3    6.89  )-----------( 152      ( 02 Dec 2023 06:00 )             22.3     12-02    139  |  106  |  11  ----------------------------<  138<H>  3.8   |  26  |  1.19    Ca    7.4<L>      02 Dec 2023 06:00  Phos  2.3     12-02  Mg     1.9     12-02    TPro  5.2<L>  /  Alb  1.6<L>  /  TBili  0.6  /  DBili  x   /  AST  56<H>  /  ALT  63<H>  /  AlkPhos  75  12-02    Blood Culture: 11-28 @ 22:39  Organism --  Gram Stain Blood -- Gram Stain --  Specimen Source .Blood Blood-Peripheral  Culture-Blood --    I&O's Summary    01 Dec 2023 07:01  -  02 Dec 2023 07:00  --------------------------------------------------------  IN: 4349 mL / OUT: 2360 mL / NET: 1989 mL    02 Dec 2023 07:01  -  02 Dec 2023 09:02  --------------------------------------------------------  IN: 25 mL / OUT: 0 mL / NET: 25 mL      CAPILLARY BLOOD GLUCOSE    POCT Blood Glucose.: 150 mg/dL (02 Dec 2023 08:36)  POCT Blood Glucose.: 167 mg/dL (01 Dec 2023 21:28)  POCT Blood Glucose.: 154 mg/dL (01 Dec 2023 17:08)  POCT Blood Glucose.: 172 mg/dL (01 Dec 2023 12:06)      RADIOLOGY/EKG:  All Imaging and EKGs Personally Reviewed     NM Hepatobiliary Imaging w/ RX (11.29.23 @ 14:20)   IMPRESSION: Abnormal morphine-augmented hepatobiliary scan.  Findings are compatible with acute cholecystitis.    US Abdomen Limited (11.28.23 @ 21:21)   IMPRESSION:  Distended thick-walled gallbladder containing sludge.  Borderline CBD   measuring 6 mm.  Findings equivocal for acute cholecystitis. If   clinically indicated, further evaluation with nuclear HIDA scan may be   obtained.    CT Abdomen and Pelvis No Cont (11.28.23 @ 16:52)   IMPRESSION:  Findings suspicious for acute cholecystitis. Correlate with patient's   symptoms. Consider right upper quadrant ultrasound if clinically   indicated.    CT Chest No Cont (11.28.23 @ 16:51)   IMPRESSION:  Findings suspicious for acute cholecystitis. Correlate with patient's   symptoms. Consider right upper quadrant ultrasound if clinically   indicated.    Xray Chest 1 View AP/PA (11.28.23 @ 15:49)   Impression:  No acute pulmonary disease.    Xray Chest 1 View- PORTABLE-Urgent (11.24.23 @ 03:18)   IMPRESSION:  No evidence of acute infiltrate or pleural effusion.    TTE Echo Complete w/o Contrast w/ Doppler (11.30.23 @ 11:17)   Summary:   1. Left ventricular ejection fraction, by visual estimation, is 65 to   70%.   2. Normal global left ventricular systolic function.   3. Normal left ventricular internal cavity size.   4. Mildly enlarged right ventricle.   5. Mild mitral valve regurgitation.   6. Thickening of the anterior mitral valve leaflet.   7. Mild tricuspid regurgitation.   8. Mild aortic regurgitation.    12 Lead ECG (11.28.23 @ 15:11)   Normal sinus rhythm  Voltage criteria for left ventricular hypertrophy  Abnormal ECG  When compared with ECG of 24-NOV-2023 01:26,  Sinus rhythm has replaced Electronic atrial pacemaker      MEDICATIONS:  MEDICATIONS  (STANDING):  acetaminophen   IVPB .. 1000 milliGRAM(s) IV Intermittent once  aspirin enteric coated 81 milliGRAM(s) Oral daily  atorvastatin 80 milliGRAM(s) Oral at bedtime  chlorhexidine 2% Cloths 1 Application(s) Topical <User Schedule>  dextrose 5%. 1000 milliLiter(s) (50 mL/Hr) IV Continuous <Continuous>  dextrose 5%. 1000 milliLiter(s) (100 mL/Hr) IV Continuous <Continuous>  dextrose 50% Injectable 25 Gram(s) IV Push once  dextrose 50% Injectable 12.5 Gram(s) IV Push once  dextrose 50% Injectable 25 Gram(s) IV Push once  famotidine    Tablet 20 milliGRAM(s) Oral daily  heparin   Injectable 5000 Unit(s) SubCutaneous every 8 hours  insulin lispro (ADMELOG) corrective regimen sliding scale   SubCutaneous Before meals and at bedtime  latanoprost 0.005% Ophthalmic Solution 1 Drop(s) Both EYES at bedtime  levETIRAcetam 500 milliGRAM(s) Oral every 12 hours  melatonin 6 milliGRAM(s) Oral at bedtime  piperacillin/tazobactam IVPB.. 3.375 Gram(s) IV Intermittent every 8 hours

## 2023-12-02 NOTE — PHYSICAL THERAPY INITIAL EVALUATION ADULT - TRANSFER TRAINING, PT EVAL
pt will demonstrate to transfer OOB to chair with min A x SAC within 1-3 visits for safe discharge plan

## 2023-12-02 NOTE — PROGRESS NOTE ADULT - PROBLEM SELECTOR PLAN 1
- S/p lap to open cholecystectomy w/ suppurative GB - POD 2  - Hgb 7.3 this AM, down from 8.9 w/ NIKHIL drain 2 w/ sang > sero output ~75mL  - Zosyn Day 5  - F/U Hgb at noon - S/p lap to open cholecystectomy w/ suppurative GB - POD 2  - Hgb 7.3 this AM, down from 8.9 w/ NIKHIL drain 2 w/ sang > sero output ~75mL  - F/U Hgb at noon  - C/w Zosyn (Day 5)  - C/w incentive spirometry, antiemetics and pain regimen PRN  - Advance diet as tolerated, c/w clear liquid diet w/ ensure   - Appreciate Surg recs - S/p lap to open cholecystectomy w/ suppurative GB - POD 2  - Hgb 7.3 this AM, down from 8.9 w/ NIKHIL drain 2 w/ sang > sero output ~75mL  - Reviewed risks/benefits of transfusion with patient if Hgb falls below 7, consent signed and placed in chart  - F/U Hgb at noon  - C/w Zosyn (Day 5)  - C/w incentive spirometry, antiemetics and pain regimen PRN  - Advance diet as tolerated, c/w clear liquid diet w/ ensure   - Appreciate Surg recs

## 2023-12-02 NOTE — PROGRESS NOTE ADULT - PROBLEM SELECTOR PLAN 5
- Chronic  - At home on atorvastatin 80mg, continue this - Chronic   - At home on metoprolol 25mg BID   - Was in ICU for post-op hypotension, now off pressors  - BP this /59, maintaining MAP > 65  - Continue to monitor vitals

## 2023-12-02 NOTE — PHYSICAL THERAPY INITIAL EVALUATION ADULT - PERTINENT HX OF CURRENT PROBLEM, REHAB EVAL
Patient is a 68-year-old male with PMHx of sinus node disorder s/p PPM, HTN, HLD, cerebral aneurysm s/p coiling, CVA w/ residual L sided weakness, seizure disorder, type 2 diabetes on metformin and gastritis admitted for acute cholecystitis s/p lap to open cholecystectomy 11/30, w/ ICU upgrade due to post-operative hypotension requiring pressors, downgraded this morning to surgical floor.

## 2023-12-02 NOTE — PHYSICAL THERAPY INITIAL EVALUATION ADULT - RANGE OF MOTION EXAMINATION, REHAB EVAL
B UE WFL discomfort with shoulder raises above 90 degrees in abdomen. R LE WFL. L LE hip and knee WFL:, ankle AROM limited by weakness, DF to 0 degrees

## 2023-12-02 NOTE — PROGRESS NOTE ADULT - ATTENDING COMMENTS
Agree with above  s/p open cholecystectomy   downgraded from ICU overnight  treng Hgb on repeat h/h  trx if Hgb drops further  monitor for signs of bleeding  f/u am labs  continue IVFs and IV abx  Tolerating clears, Surgery following - f/u further recs

## 2023-12-02 NOTE — PROGRESS NOTE ADULT - ASSESSMENT
68 year old male is surgically stable s/p open cholecystectomy converted from laparoscopy . POD #2    ----PPM, htnm hld, cerebral aneurysm,, cva with left sided weakness ,   ----seizure disorder , DM2  ---- anemia of acute blood loss       Plan:   Discussed with Dr Ruano             clear liquid diet             check labs in am             1 unit of PRBC s if hgb still 7 68 year old male is surgically stable s/p open cholecystectomy converted from laparoscopy . POD #2    ----PPM, htnm hld, cerebral aneurysm,, cva with left sided weakness ,   ----seizure disorder , DM2  ---- anemia of acute blood loss       Plan:   Discussed with Dr Ruano             clear liquid diet             check labs in am             1 unit of PRBC s if hgb still 7             Continue zosyn

## 2023-12-02 NOTE — PROGRESS NOTE ADULT - ASSESSMENT
Physical Examination:  GENERAL:               Alert, Oriented, No acute distress.    HEENT:                    No JVD, Dry MM  PULM:                     Bilateral air entry, Clear to auscultation bilaterally, no significant sputum production, No Rales, No Rhonchi, No Wheezing  CVS:                         S1, S2,  No Murmur  ABD:                        Soft, distended, nontender, hypo bowel sounds,  RUQ incision site CDI  NEURO:                  Alert, oriented, interactive, nonfocal, follows commands  PSYC:                      Calm, + Insight.    Assessment  Acute cholecystitis s/p Open Cholecystectomy 11/30/2023  HTN (hypertension)  HLD (hyperlipidemia)  Type 2 diabetes mellitus  Seizure disorder  h/o DVT  Preop examination       Plan  IV antibiotics  d/c IVF  Diet as per surgery  monitor off pressors  Continue AED  pain control  out of bed to chair   incentive spirometry  insulin ss  diet as per surgery   noted downtrending h/h, repeat in am - suspect hemodilutional   continue care on medical floor

## 2023-12-02 NOTE — PROGRESS NOTE ADULT - PROBLEM SELECTOR PLAN 4
- Chronic   - At home on metoprolol 25mg BID   - Was in ICU for post-op hypotension, now off pressors  - BP this /59, maintaining MAP > 65  - Continue to monitor vitals - Baseline Cr ~ 1.13  - Cr this AM 1.19  - Resolved

## 2023-12-02 NOTE — PHYSICAL THERAPY INITIAL EVALUATION ADULT - TRANSFER SAFETY CONCERNS NOTED: SIT/STAND, REHAB EVAL
----- Message from Tanesha Ferreira CMA sent at 10/31/2019  7:18 PM CDT -----  Pt has referrals for Ophthalmology, and Dexa. Also has order for mammogram. Please assist.      decreased balance during turns

## 2023-12-02 NOTE — PHYSICAL THERAPY INITIAL EVALUATION ADULT - ADDITIONAL COMMENTS
pt reports living alone in private apartment. used Left ankle brace (AFO?) and SAC for mobility s/p stroke 1998. independent prior with assistive devices.

## 2023-12-02 NOTE — PROGRESS NOTE ADULT - PROBLEM SELECTOR PLAN 3
- Baseline Cr ~ 1.13  - Cr this AM 1.19  - Resolved - Chronic  - At home on carbamazepine 300mg BID and lorazepam 0.5mg TID   - Switched to Keppra in ICU given NPO   - Now on keppra 500mg BID  - Will switch back to home med carbamazepine 300mg BID

## 2023-12-02 NOTE — PROGRESS NOTE ADULT - PROBLEM SELECTOR PLAN 6
- Chronic, last A1c 6.1  - Blood glucose goal 100-180 in hospital setting   - C/w consistent carb diet when can tolerate, c/w clears for now  - C/w hypoglycemia protocols, accuchecks  - C/w low dose ISS

## 2023-12-02 NOTE — PROGRESS NOTE ADULT - PROBLEM SELECTOR PLAN 7
- Chronic  - At home on carbamazepine 300mg BID and lorazepam 0.5mg TID   - Switched to Keppra in ICU given NPO   - Now on keppra 500mg BID - Chronic  - At home on atorvastatin 80mg, continue this

## 2023-12-02 NOTE — PHYSICAL THERAPY INITIAL EVALUATION ADULT - NSPTDISCHREC_GEN_A_CORE
pt would benefit from skilled rehab PT per medical history with acute surgical onset/Acute Inpatient Rehab

## 2023-12-02 NOTE — PROGRESS NOTE ADULT - NUTRITIONAL ASSESSMENT
This patient has been assessed with a concern for Malnutrition and has been determined to have a diagnosis/diagnoses of Moderate protein-calorie malnutrition.    This patient is being managed with:   Diet Clear Liquid-  Supplement Feeding Modality:  Oral  Ensure Clear Cans or Servings Per Day:  1       Frequency:  Two Times a day  Entered: Dec  1 2023  2:27PM

## 2023-12-02 NOTE — PHYSICAL THERAPY INITIAL EVALUATION ADULT - GAIT TRAINING, PT EVAL
pt will ambulate 25 ft with SAC and brace from home with min A within 1-3 visits for safe discharge plan

## 2023-12-02 NOTE — PROGRESS NOTE ADULT - ASSESSMENT
Patient is a 68-year-old male with PMHx of sinus node disorder s/p PPM, HTN, HLD, cerebral aneurysm s/p coiling, CVA w/ residual L sided weakness, seizure disorder, type 2 diabetes on metformin and gastritis admitted for acute cholecystitis s/p lap to open cholecystectomy 11/30, w/ ICU upgrade due to post-operative hypotension requiring pressors, downgraded this morning to surgical floor, on day 5 of Zosyn.     Discussed with attending, Dr. Dorman Patient is a 68-year-old male with PMHx of sinus node disorder s/p PPM, HTN, HLD, cerebral aneurysm s/p coiling, CVA w/ residual L sided weakness, seizure disorder, type 2 diabetes on metformin and gastritis admitted for acute cholecystitis s/p lap to open cholecystectomy 11/30, w/ ICU upgrade due to post-operative hypotension requiring pressors, downgraded this morning to surgical floor, on day 5 of Zosyn, Hgb 7.3 this morning, F/U H&H at noon.     Discussed with attending, Dr. Dorman

## 2023-12-02 NOTE — PHYSICAL THERAPY INITIAL EVALUATION ADULT - MANUAL MUSCLE TESTING RESULTS, REHAB EVAL
B UE grossly 3+/5, R LE grossly 3+/5 MMt , L LE hip and knee 3+/5 MMT , L ankle 3-/5 MMT/grossly assessed due to

## 2023-12-02 NOTE — PHYSICAL THERAPY INITIAL EVALUATION ADULT - BED MOBILITY TRAINING, PT EVAL
pt will demonstrate ability to transfer from supine to sit with min A within 1-3 visits for safe discharge plan

## 2023-12-02 NOTE — PHYSICAL THERAPY INITIAL EVALUATION ADULT - NSPTDMEREC_GEN_A_CORE
owns SAC and " ankle brace" did not specify type of brace. pt reports not having it with him at this time

## 2023-12-02 NOTE — PROGRESS NOTE ADULT - NUTRITIONAL ASSESSMENT
This patient has been assessed with a concern for Malnutrition and has been determined to have a diagnosis/diagnoses of Moderate protein-calorie malnutrition.    This patient is being managed with:   Diet Clear Liquid-  Supplement Feeding Modality:  Oral  Ensure Clear Cans or Servings Per Day:  1       Frequency:  Two Times a day  Entered: Dec  1 2023  2:27PM   detailed exam

## 2023-12-02 NOTE — PROGRESS NOTE ADULT - SUBJECTIVE AND OBJECTIVE BOX
Follow-up Critical Care Progress Note  Chief Complaint : Acute cholecystitis      pt off pressors  comfortable  pain controlled       Allergies :No Known Allergies      PAST MEDICAL & SURGICAL HISTORY:  CVA (cerebral vascular accident)  left arm weakness, no sensation in left side in 1998    Aneurysm of [place]    HLD (hyperlipidemia)    Epilepsy  last seizure 2016    Renal agenesis, unilateral    Depression    Cerebral aneurysm without rupture  diagnosed 2017 had cerebral angio at [facility]    Gastroesophageal reflux disease, esophagitis presence not specified    Glaucoma of both eyes, unspecified glaucoma type    Bradycardia    Language barrier  [place] speaking    T2DM (type 2 diabetes mellitus)    S/P craniotomy  for RCMA 1998    Pacemaker  Medtronic Serial [id number]/Model [id number]  Implant 6/29/07, new generator 12/23/2016    S/P bunionectomy    S/P cataract surgery  with lens implants    Status post coil embolization of cerebral aneurysm  9/2018        Medications:  MEDICATIONS  (STANDING):  acetaminophen   IVPB .. 1000 milliGRAM(s) IV Intermittent once  aspirin enteric coated 81 milliGRAM(s) Oral daily  atorvastatin 80 milliGRAM(s) Oral at bedtime  carBAMazepine ER Tablet 300 milliGRAM(s) Oral once  chlorhexidine 2% Cloths 1 Application(s) Topical <User Schedule>  dextrose 5%. 1000 milliLiter(s) (50 mL/Hr) IV Continuous <Continuous>  dextrose 5%. 1000 milliLiter(s) (100 mL/Hr) IV Continuous <Continuous>  dextrose 50% Injectable 25 Gram(s) IV Push once  dextrose 50% Injectable 12.5 Gram(s) IV Push once  dextrose 50% Injectable 25 Gram(s) IV Push once  famotidine    Tablet 20 milliGRAM(s) Oral daily  heparin   Injectable 5000 Unit(s) SubCutaneous every 8 hours  insulin lispro (ADMELOG) corrective regimen sliding scale   SubCutaneous Before meals and at bedtime  latanoprost 0.005% Ophthalmic Solution 1 Drop(s) Both EYES at bedtime  melatonin 6 milliGRAM(s) Oral at bedtime  piperacillin/tazobactam IVPB.. 3.375 Gram(s) IV Intermittent every 8 hours    MEDICATIONS  (PRN):  morphine  - Injectable 1 milliGRAM(s) IV Push every 4 hours PRN Severe Pain (7 - 10)  ondansetron Injectable 4 milliGRAM(s) IV Push every 6 hours PRN Nausea and/or Vomiting  oxyCODONE    IR 5 milliGRAM(s) Oral every 4 hours PRN Mild Pain (1 - 3)  oxyCODONE    IR 10 milliGRAM(s) Oral every 4 hours PRN Moderate Pain (4 - 6)      Antibiotics History  piperacillin/tazobactam IVPB. 3.375 Gram(s) IV Intermittent once, 11-30-23 @ 19:35, Stop order after: 1 Doses  piperacillin/tazobactam IVPB.- 3.375 Gram(s) IV Intermittent once, 11-30-23 @ 23:00  piperacillin/tazobactam IVPB.. 3.375 Gram(s) IV Intermittent every 8 hours, 12-01-23 @ 06:00, Stop order after: 7 Days  piperacillin/tazobactam IVPB.. 3.375 Gram(s) IV Intermittent every 8 hours, 11-28-23 @ 22:00, Stop order after: 7 Days  piperacillin/tazobactam IVPB... 3.375 Gram(s) IV Intermittent once, 11-28-23 @ 18:35, Stop order after: 1 Doses      Heme Medications   aspirin enteric coated 81 milliGRAM(s) Oral daily, 12-02-23 @ 07:29  heparin   Injectable 5000 Unit(s) SubCutaneous every 8 hours, 12-01-23 @ 10:13      GI Medications  famotidine    Tablet 20 milliGRAM(s) Oral daily, 12-01-23 @ 08:15, Routine      COVID  02-10-23 @ 03:26  COVID -   NotDetec     Trend Cardiac Enzymes  11-30-23 @ 07:05  [id number]-CPKMM/Trop I - -- - --  - --  -  --  /  13.0    Trend BNP    Procalcitonin Trend  12-01-23 @ 04:28   -   1.18<H>  02-10-23 @ 03:40   -   0.10<H>    WBC Trend  12-02-23 @ 06:00   -  6.89  12-01-23 @ 04:28   -  6.28  11-30-23 @ 19:42   -  7.84  11-30-23 @ 07:05   -  7.22    H/H Trend  12-02-23 @ 12:02   -   7.2<L>/ 22.0<L>  12-02-23 @ 06:00   -   7.3<L>/ 22.3<L>  12-01-23 @ 04:28   -   8.9<L>/ 27.0<L>  11-30-23 @ 19:42   -   9.2<L>/ 27.5<L>  11-30-23 @ 07:05   -   9.7<L>/ 29.3<L>  11-29-23 @ 05:53   -   10.8<L>/ 32.4<L>       Platelet Trend  12-02-23 @ 06:00   -  152  12-01-23 @ 04:28   -  121<L>  11-30-23 @ 19:42   -  112<L>  11-30-23 @ 07:05   -  100<L>    Trend Sodium  12-02-23 @ 06:00   -  139  12-01-23 @ 04:28   -  137  11-30-23 @ 19:42   -  139  11-30-23 @ 07:05   -  141    Trend Potassium  12-02-23 @ 06:00   -  3.8  12-01-23 @ 04:28   -  4.3  11-30-23 @ 19:42   -  3.8  11-30-23 @ 07:05   -  3.7    Trend Bun/Cr  12-02-23 @ 06:00  BUN/CR -  11 / 1.19  12-01-23 @ 04:28  BUN/CR -  12 / 1.57<H>  11-30-23 @ 19:42  BUN/CR -  14 / 1.45<H>  11-30-23 @ 07:05  BUN/CR -  15 / 1.56<H>    Lactic Acid Trend  12-02-23 @ 06:00   -   1.0  12-01-23 @ 04:28   -   3.4<H>  11-30-23 @ 23:19   -   3.4<H>  11-30-23 @ 19:42   -   2.2<H>    ABG Trend    Trend AST/ALT/ALK Phos/Bili  12-02-23 @ 06:00   56<H>/63<H>/75/0.6  12-01-23 @ 04:28   109<H>/105<H>/98/0.6  11-30-23 @ 19:42   131<H>/126<H>/117/0.9  11-30-23 @ 07:05   104<H>/131<H>/128<H>/0.9  11-29-23 @ 05:53   149<H>/178<H>/124<H>/1.0  11-28-23 @ 16:00   272<H>/256<H>/131<H>/0.9  11-24-23 @ 02:27   30/33/109/0.2  02-14-23 @ 06:59   57<H>/147<H>/119/0.3  02-13-23 @ 07:18   66<H>/182<H>/119/0.4  02-12-23 @ 07:04   125<H>/250<H>/123<H>/0.5  02-11-23 @ 01:25   289<H>/380<H>/137<H>/1.8<H>  02-10-23 @ 03:40   602<H>/468<H>/177<H>/0.9       Albumin Trend  12-02-23 @ 06:00   -   1.6<L>  12-01-23 @ 04:28   -   1.6<L>  11-30-23 @ 19:42   -   1.8<L>  11-30-23 @ 07:05   -   2.0<L>  11-29-23 @ 05:53   -   2.2<L>  11-28-23 @ 16:00   -   2.4<L>      PTT - PT - INR Trend  11-28-23 @ 16:00   -   -- - 13.2<H> - 1.16  02-10-23 @ 16:15   -   176.3<HH> - -- - --  02-10-23 @ 03:40   -   29.1 - 12.1 - 1.04    Glucose Trend  12-02-23 @ 11:30   -  -- -- 170<H>  12-02-23 @ 08:36   -  -- -- 150<H>  12-02-23 @ 06:00   -  138<H> -- --  12-01-23 @ 21:28   -  -- -- 167<H>  12-01-23 @ 17:08   -  -- -- 154<H>  12-01-23 @ 12:06   -  -- -- 172<H>  12-01-23 @ 04:57   -  -- -- 196<H>  12-01-23 @ 04:28   -  224<H> -- --  12-01-23 @ 01:08   -  -- -- 188<H>  11-30-23 @ 22:52   -  -- -- 218<H>    A1C with Estimated Average Glucose Result: 6.1 % *H* [4.0 - 5.6] (11-29-23 @ 05:53)      LABS:                        7.2    x     )-----------( x        ( 02 Dec 2023 12:02 )             22.0     12-02    139  |  106  |  11  ----------------------------<  138<H>  3.8   |  26  |  1.19    Ca    7.4<L>      02 Dec 2023 06:00  Phos  2.3     12-02  Mg     1.9     12-02    TPro  5.2<L>  /  Alb  1.6<L>  /  TBili  0.6  /  DBili  x   /  AST  56<H>  /  ALT  63<H>  /  AlkPhos  75  12-02       CULTURES: (if applicable)    Culture - Blood (collected 11-28-23 @ 22:39)  Source: .Blood Blood-Peripheral  Preliminary Report (12-02-23 @ 04:01):    No growth at 72 Hours    Culture - Blood (collected 11-28-23 @ 22:39)  Source: .Blood Blood-Peripheral  Preliminary Report (12-02-23 @ 04:01):    No growth at 72 Hours             VITALS:  T(C): 37.4 (12-02-23 @ 13:17), Max: 37.4 (12-02-23 @ 13:17)  T(F): 99.4 (12-02-23 @ 13:17), Max: 99.4 (12-02-23 @ 13:17)  HR: 76 (12-02-23 @ 13:17) (67 - 81)  BP: 137/66 (12-02-23 @ 13:17) (92/51 - 137/66)  BP(mean): 81 (12-02-23 @ 06:00) (59 - 81)  ABP: --  ABP(mean): --  RR: 16 (12-02-23 @ 13:17) (13 - 18)  SpO2: 96% (12-02-23 @ 13:17) (93% - 98%)  CVP(mm Hg): --  CVP(cm H2O): --    Ins and Outs     12-01-23 @ 07:01  -  12-02-23 @ 07:00  --------------------------------------------------------  IN: 4349 mL / OUT: 2360 mL / NET: 1989 mL    12-02-23 @ 07:01  -  12-02-23 @ 13:24  --------------------------------------------------------  IN: 25 mL / OUT: 0 mL / NET: 25 mL          Weight (kg): 72.4 (11-30-23 @ 19:00)        I&O's Detail    01 Dec 2023 07:01  -  02 Dec 2023 07:00  --------------------------------------------------------  IN:    IV PiggyBack: 200 mL    IV PiggyBack: 175 mL    Lactated Ringers: 999 mL    Lactated Ringers: 2750 mL    Oral Fluid: 225 mL  Total IN: 4349 mL    OUT:    Bulb (mL): 190 mL    Bulb (mL): 170 mL    Indwelling Catheter - Urethral (mL): 250 mL    Voided (mL): 1750 mL  Total OUT: 2360 mL    Total NET: 1989 mL      02 Dec 2023 07:01  -  02 Dec 2023 13:24  --------------------------------------------------------  IN:    IV PiggyBack: 25 mL  Total IN: 25 mL    OUT:  Total OUT: 0 mL    Total NET: 25 mL

## 2023-12-03 DIAGNOSIS — D62 ACUTE POSTHEMORRHAGIC ANEMIA: ICD-10-CM

## 2023-12-03 LAB
ALBUMIN SERPL ELPH-MCNC: 1.6 G/DL — LOW (ref 3.3–5)
ALBUMIN SERPL ELPH-MCNC: 1.6 G/DL — LOW (ref 3.3–5)
ALP SERPL-CCNC: 80 U/L — SIGNIFICANT CHANGE UP (ref 40–120)
ALP SERPL-CCNC: 80 U/L — SIGNIFICANT CHANGE UP (ref 40–120)
ALT FLD-CCNC: 56 U/L — HIGH (ref 10–45)
ALT FLD-CCNC: 56 U/L — HIGH (ref 10–45)
ANION GAP SERPL CALC-SCNC: 9 MMOL/L — SIGNIFICANT CHANGE UP (ref 5–17)
ANION GAP SERPL CALC-SCNC: 9 MMOL/L — SIGNIFICANT CHANGE UP (ref 5–17)
AST SERPL-CCNC: 39 U/L — SIGNIFICANT CHANGE UP (ref 10–40)
AST SERPL-CCNC: 39 U/L — SIGNIFICANT CHANGE UP (ref 10–40)
BILIRUB SERPL-MCNC: 0.4 MG/DL — SIGNIFICANT CHANGE UP (ref 0.2–1.2)
BILIRUB SERPL-MCNC: 0.4 MG/DL — SIGNIFICANT CHANGE UP (ref 0.2–1.2)
BLD GP AB SCN SERPL QL: SIGNIFICANT CHANGE UP
BLD GP AB SCN SERPL QL: SIGNIFICANT CHANGE UP
BUN SERPL-MCNC: 11 MG/DL — SIGNIFICANT CHANGE UP (ref 7–23)
BUN SERPL-MCNC: 11 MG/DL — SIGNIFICANT CHANGE UP (ref 7–23)
CALCIUM SERPL-MCNC: 7.8 MG/DL — LOW (ref 8.4–10.5)
CALCIUM SERPL-MCNC: 7.8 MG/DL — LOW (ref 8.4–10.5)
CHLORIDE SERPL-SCNC: 106 MMOL/L — SIGNIFICANT CHANGE UP (ref 96–108)
CHLORIDE SERPL-SCNC: 106 MMOL/L — SIGNIFICANT CHANGE UP (ref 96–108)
CO2 SERPL-SCNC: 26 MMOL/L — SIGNIFICANT CHANGE UP (ref 22–31)
CO2 SERPL-SCNC: 26 MMOL/L — SIGNIFICANT CHANGE UP (ref 22–31)
CREAT SERPL-MCNC: 1.16 MG/DL — SIGNIFICANT CHANGE UP (ref 0.5–1.3)
CREAT SERPL-MCNC: 1.16 MG/DL — SIGNIFICANT CHANGE UP (ref 0.5–1.3)
EGFR: 68 ML/MIN/1.73M2 — SIGNIFICANT CHANGE UP
EGFR: 68 ML/MIN/1.73M2 — SIGNIFICANT CHANGE UP
GLUCOSE BLDC GLUCOMTR-MCNC: 140 MG/DL — HIGH (ref 70–99)
GLUCOSE BLDC GLUCOMTR-MCNC: 140 MG/DL — HIGH (ref 70–99)
GLUCOSE BLDC GLUCOMTR-MCNC: 143 MG/DL — HIGH (ref 70–99)
GLUCOSE BLDC GLUCOMTR-MCNC: 143 MG/DL — HIGH (ref 70–99)
GLUCOSE BLDC GLUCOMTR-MCNC: 175 MG/DL — HIGH (ref 70–99)
GLUCOSE BLDC GLUCOMTR-MCNC: 175 MG/DL — HIGH (ref 70–99)
GLUCOSE BLDC GLUCOMTR-MCNC: 202 MG/DL — HIGH (ref 70–99)
GLUCOSE BLDC GLUCOMTR-MCNC: 202 MG/DL — HIGH (ref 70–99)
GLUCOSE SERPL-MCNC: 146 MG/DL — HIGH (ref 70–99)
GLUCOSE SERPL-MCNC: 146 MG/DL — HIGH (ref 70–99)
HCT VFR BLD CALC: 19.3 % — CRITICAL LOW (ref 39–50)
HCT VFR BLD CALC: 19.3 % — CRITICAL LOW (ref 39–50)
HCT VFR BLD CALC: 27.4 % — LOW (ref 39–50)
HCT VFR BLD CALC: 27.4 % — LOW (ref 39–50)
HCT VFR BLD CALC: 29.3 % — LOW (ref 39–50)
HCT VFR BLD CALC: 29.3 % — LOW (ref 39–50)
HGB BLD-MCNC: 6.4 G/DL — CRITICAL LOW (ref 13–17)
HGB BLD-MCNC: 6.4 G/DL — CRITICAL LOW (ref 13–17)
HGB BLD-MCNC: 9.4 G/DL — LOW (ref 13–17)
HGB BLD-MCNC: 9.4 G/DL — LOW (ref 13–17)
HGB BLD-MCNC: 9.8 G/DL — LOW (ref 13–17)
HGB BLD-MCNC: 9.8 G/DL — LOW (ref 13–17)
MAGNESIUM SERPL-MCNC: 2.2 MG/DL — SIGNIFICANT CHANGE UP (ref 1.6–2.6)
MAGNESIUM SERPL-MCNC: 2.2 MG/DL — SIGNIFICANT CHANGE UP (ref 1.6–2.6)
MCHC RBC-ENTMCNC: 32.4 PG — SIGNIFICANT CHANGE UP (ref 27–34)
MCHC RBC-ENTMCNC: 32.4 PG — SIGNIFICANT CHANGE UP (ref 27–34)
MCHC RBC-ENTMCNC: 32.8 PG — SIGNIFICANT CHANGE UP (ref 27–34)
MCHC RBC-ENTMCNC: 32.8 PG — SIGNIFICANT CHANGE UP (ref 27–34)
MCHC RBC-ENTMCNC: 33.2 GM/DL — SIGNIFICANT CHANGE UP (ref 32–36)
MCHC RBC-ENTMCNC: 33.2 GM/DL — SIGNIFICANT CHANGE UP (ref 32–36)
MCHC RBC-ENTMCNC: 34.3 GM/DL — SIGNIFICANT CHANGE UP (ref 32–36)
MCHC RBC-ENTMCNC: 34.3 GM/DL — SIGNIFICANT CHANGE UP (ref 32–36)
MCV RBC AUTO: 94.5 FL — SIGNIFICANT CHANGE UP (ref 80–100)
MCV RBC AUTO: 94.5 FL — SIGNIFICANT CHANGE UP (ref 80–100)
MCV RBC AUTO: 99 FL — SIGNIFICANT CHANGE UP (ref 80–100)
MCV RBC AUTO: 99 FL — SIGNIFICANT CHANGE UP (ref 80–100)
NRBC # BLD: 0 /100 WBCS — SIGNIFICANT CHANGE UP (ref 0–0)
PHOSPHATE SERPL-MCNC: 2.8 MG/DL — SIGNIFICANT CHANGE UP (ref 2.5–4.5)
PHOSPHATE SERPL-MCNC: 2.8 MG/DL — SIGNIFICANT CHANGE UP (ref 2.5–4.5)
PLATELET # BLD AUTO: 201 K/UL — SIGNIFICANT CHANGE UP (ref 150–400)
PLATELET # BLD AUTO: 201 K/UL — SIGNIFICANT CHANGE UP (ref 150–400)
PLATELET # BLD AUTO: 239 K/UL — SIGNIFICANT CHANGE UP (ref 150–400)
PLATELET # BLD AUTO: 239 K/UL — SIGNIFICANT CHANGE UP (ref 150–400)
POTASSIUM SERPL-MCNC: 3.4 MMOL/L — LOW (ref 3.5–5.3)
POTASSIUM SERPL-MCNC: 3.4 MMOL/L — LOW (ref 3.5–5.3)
POTASSIUM SERPL-SCNC: 3.4 MMOL/L — LOW (ref 3.5–5.3)
POTASSIUM SERPL-SCNC: 3.4 MMOL/L — LOW (ref 3.5–5.3)
PROT SERPL-MCNC: 5.7 G/DL — LOW (ref 6–8.3)
PROT SERPL-MCNC: 5.7 G/DL — LOW (ref 6–8.3)
RBC # BLD: 1.95 M/UL — LOW (ref 4.2–5.8)
RBC # BLD: 1.95 M/UL — LOW (ref 4.2–5.8)
RBC # BLD: 2.9 M/UL — LOW (ref 4.2–5.8)
RBC # BLD: 2.9 M/UL — LOW (ref 4.2–5.8)
RBC # FLD: 13.9 % — SIGNIFICANT CHANGE UP (ref 10.3–14.5)
RBC # FLD: 13.9 % — SIGNIFICANT CHANGE UP (ref 10.3–14.5)
RBC # FLD: 15.1 % — HIGH (ref 10.3–14.5)
RBC # FLD: 15.1 % — HIGH (ref 10.3–14.5)
SODIUM SERPL-SCNC: 141 MMOL/L — SIGNIFICANT CHANGE UP (ref 135–145)
SODIUM SERPL-SCNC: 141 MMOL/L — SIGNIFICANT CHANGE UP (ref 135–145)
WBC # BLD: 5.39 K/UL — SIGNIFICANT CHANGE UP (ref 3.8–10.5)
WBC # BLD: 5.39 K/UL — SIGNIFICANT CHANGE UP (ref 3.8–10.5)
WBC # BLD: 5.9 K/UL — SIGNIFICANT CHANGE UP (ref 3.8–10.5)
WBC # BLD: 5.9 K/UL — SIGNIFICANT CHANGE UP (ref 3.8–10.5)
WBC # FLD AUTO: 5.39 K/UL — SIGNIFICANT CHANGE UP (ref 3.8–10.5)
WBC # FLD AUTO: 5.39 K/UL — SIGNIFICANT CHANGE UP (ref 3.8–10.5)
WBC # FLD AUTO: 5.9 K/UL — SIGNIFICANT CHANGE UP (ref 3.8–10.5)
WBC # FLD AUTO: 5.9 K/UL — SIGNIFICANT CHANGE UP (ref 3.8–10.5)

## 2023-12-03 PROCEDURE — 99233 SBSQ HOSP IP/OBS HIGH 50: CPT

## 2023-12-03 RX ORDER — POTASSIUM CHLORIDE 20 MEQ
20 PACKET (EA) ORAL ONCE
Refills: 0 | Status: COMPLETED | OUTPATIENT
Start: 2023-12-03 | End: 2023-12-03

## 2023-12-03 RX ADMIN — Medication 20 MILLIEQUIVALENT(S): at 12:34

## 2023-12-03 RX ADMIN — Medication 6 MILLIGRAM(S): at 21:31

## 2023-12-03 RX ADMIN — CHLORHEXIDINE GLUCONATE 1 APPLICATION(S): 213 SOLUTION TOPICAL at 05:56

## 2023-12-03 RX ADMIN — HEPARIN SODIUM 5000 UNIT(S): 5000 INJECTION INTRAVENOUS; SUBCUTANEOUS at 14:55

## 2023-12-03 RX ADMIN — PIPERACILLIN AND TAZOBACTAM 25 GRAM(S): 4; .5 INJECTION, POWDER, LYOPHILIZED, FOR SOLUTION INTRAVENOUS at 21:31

## 2023-12-03 RX ADMIN — Medication 300 MILLIGRAM(S): at 05:58

## 2023-12-03 RX ADMIN — Medication 0: at 17:18

## 2023-12-03 RX ADMIN — Medication 1: at 21:31

## 2023-12-03 RX ADMIN — PIPERACILLIN AND TAZOBACTAM 25 GRAM(S): 4; .5 INJECTION, POWDER, LYOPHILIZED, FOR SOLUTION INTRAVENOUS at 14:55

## 2023-12-03 RX ADMIN — HEPARIN SODIUM 5000 UNIT(S): 5000 INJECTION INTRAVENOUS; SUBCUTANEOUS at 05:57

## 2023-12-03 RX ADMIN — ATORVASTATIN CALCIUM 80 MILLIGRAM(S): 80 TABLET, FILM COATED ORAL at 21:31

## 2023-12-03 RX ADMIN — FAMOTIDINE 20 MILLIGRAM(S): 10 INJECTION INTRAVENOUS at 12:33

## 2023-12-03 RX ADMIN — LATANOPROST 1 DROP(S): 0.05 SOLUTION/ DROPS OPHTHALMIC; TOPICAL at 21:31

## 2023-12-03 RX ADMIN — Medication 2: at 12:33

## 2023-12-03 RX ADMIN — PIPERACILLIN AND TAZOBACTAM 25 GRAM(S): 4; .5 INJECTION, POWDER, LYOPHILIZED, FOR SOLUTION INTRAVENOUS at 05:58

## 2023-12-03 RX ADMIN — Medication 300 MILLIGRAM(S): at 17:18

## 2023-12-03 RX ADMIN — Medication 81 MILLIGRAM(S): at 12:33

## 2023-12-03 NOTE — PROGRESS NOTE ADULT - SUBJECTIVE AND OBJECTIVE BOX
Patient is a 68-year-old male with PMHx of sinus node disorder s/p PPM, HTN, HLD, cerebral aneurysm s/p coiling, CVA w/ residual L sided weakness, seizure disorder, type 2 diabetes on metformin and gastritis admitted for acute cholecystitis s/p lap to open cholecystectomy 11/30, w/ ICU upgrade due to post-operative hypotension requiring pressors, downgraded this morning to surgical floor.     Overnight Events: None  Interval HPI: Today is hospital day 5. Patient seen and examined at bedside. Patient states he is doing well, no gas or BMs, tolerating liquids but endorses discomfort if they are too cold, endorses mild abdominal pain but improved since yesterday. Denies dizziness, headache, SOB, chest pain. Denies any questions or concerns at this time.     REVIEW OF SYSTEMS:  CONSTITUTIONAL: (-) weakness, (-) fevers, (-) chills  EYES/ENT: (-) visual changes,  (-) vertigo,  (-) throat pain   NECK:  (-) pain, (-) stiffness  RESPIRATORY:  (-) shortness of breath, (-) cough,  (-) wheezing,  (-) hemoptysis   CARDIOVASCULAR:  (-) chest pain, (-) palpitations  GASTROINTESTINAL:  (+) abdominal pain, (-) nausea, (-) vomiting, (-) diarrhea, (-) constipation, (-) melena,  (-) hematemesis,  (-) hematochezia  GENITOURINARY: (-) dysuria, (-) frequency, (-) hematuria  NEUROLOGICAL: (-) numbness, (-) weakness  SKIN: (-) itching, (-) rashes, (-) lesions    Vital Signs Last 24 Hrs  T(C): 37.1 (02 Dec 2023 07:11), Max: 37.1 (02 Dec 2023 07:11)  T(F): 98.8 (02 Dec 2023 07:11), Max: 98.8 (02 Dec 2023 07:11)  HR: 76 (02 Dec 2023 07:11) (67 - 90)  BP: 120/59 (02 Dec 2023 07:11) (68/44 - 129/59)  BP(mean): 81 (02 Dec 2023 06:00) (52 - 81)  RR: 16 (02 Dec 2023 07:11) (13 - 19)  SpO2: 96% (02 Dec 2023 07:11) (93% - 100%)    Parameters below as of 02 Dec 2023 07:11  Patient On (Oxygen Delivery Method): room air    PHYSICAL EXAM:  GENERAL: NAD, lying in bed comfortably  HEAD:  Atraumatic, Normocephalic  EYES: EOMI, conjunctiva and sclera clear  ENT: Moist mucous membranes  NECK: Supple, No JVD  CHEST/LUNG: Clear to auscultation bilaterally, good air entry bilaterally; No wheezing, rales, or rhonchi. Unlabored respirations  HEART: Regular rate and rhythm. S1 and S2. No murmurs, rubs, or gallops  ABDOMEN: Soft, TTP in RLQ, Nondistended. Bowel sounds present.   NIKHIL drain #1 with 25cc serosang output  NIKHIL drain #2 w/ more sanguinous than serous output ~75cc.   RUQ dressing clean, dry and intact.   EXTREMITIES:  2+ Peripheral Pulses. No clubbing, cyanosis, or edema  NERVOUS SYSTEM:  Alert & Oriented X3, speech clear. No deficits.  SKIN: No rashes, bruises, or other lesions    LABS:   All Labs Personally Reviewed                         7.3    6.89  )-----------( 152      ( 02 Dec 2023 06:00 )             22.3     12-02    139  |  106  |  11  ----------------------------<  138<H>  3.8   |  26  |  1.19    Ca    7.4<L>      02 Dec 2023 06:00  Phos  2.3     12-02  Mg     1.9     12-02    TPro  5.2<L>  /  Alb  1.6<L>  /  TBili  0.6  /  DBili  x   /  AST  56<H>  /  ALT  63<H>  /  AlkPhos  75  12-02    Blood Culture: 11-28 @ 22:39  Organism --  Gram Stain Blood -- Gram Stain --  Specimen Source .Blood Blood-Peripheral  Culture-Blood --    I&O's Summary    01 Dec 2023 07:01  -  02 Dec 2023 07:00  --------------------------------------------------------  IN: 4349 mL / OUT: 2360 mL / NET: 1989 mL    02 Dec 2023 07:01  -  02 Dec 2023 09:02  --------------------------------------------------------  IN: 25 mL / OUT: 0 mL / NET: 25 mL      CAPILLARY BLOOD GLUCOSE    POCT Blood Glucose.: 150 mg/dL (02 Dec 2023 08:36)  POCT Blood Glucose.: 167 mg/dL (01 Dec 2023 21:28)  POCT Blood Glucose.: 154 mg/dL (01 Dec 2023 17:08)  POCT Blood Glucose.: 172 mg/dL (01 Dec 2023 12:06)      RADIOLOGY/EKG:  All Imaging and EKGs Personally Reviewed     NM Hepatobiliary Imaging w/ RX (11.29.23 @ 14:20)   IMPRESSION: Abnormal morphine-augmented hepatobiliary scan.  Findings are compatible with acute cholecystitis.    US Abdomen Limited (11.28.23 @ 21:21)   IMPRESSION:  Distended thick-walled gallbladder containing sludge.  Borderline CBD   measuring 6 mm.  Findings equivocal for acute cholecystitis. If   clinically indicated, further evaluation with nuclear HIDA scan may be   obtained.    CT Abdomen and Pelvis No Cont (11.28.23 @ 16:52)   IMPRESSION:  Findings suspicious for acute cholecystitis. Correlate with patient's   symptoms. Consider right upper quadrant ultrasound if clinically   indicated.    CT Chest No Cont (11.28.23 @ 16:51)   IMPRESSION:  Findings suspicious for acute cholecystitis. Correlate with patient's   symptoms. Consider right upper quadrant ultrasound if clinically   indicated.    Xray Chest 1 View AP/PA (11.28.23 @ 15:49)   Impression:  No acute pulmonary disease.    Xray Chest 1 View- PORTABLE-Urgent (11.24.23 @ 03:18)   IMPRESSION:  No evidence of acute infiltrate or pleural effusion.    TTE Echo Complete w/o Contrast w/ Doppler (11.30.23 @ 11:17)   Summary:   1. Left ventricular ejection fraction, by visual estimation, is 65 to   70%.   2. Normal global left ventricular systolic function.   3. Normal left ventricular internal cavity size.   4. Mildly enlarged right ventricle.   5. Mild mitral valve regurgitation.   6. Thickening of the anterior mitral valve leaflet.   7. Mild tricuspid regurgitation.   8. Mild aortic regurgitation.    12 Lead ECG (11.28.23 @ 15:11)   Normal sinus rhythm  Voltage criteria for left ventricular hypertrophy  Abnormal ECG  When compared with ECG of 24-NOV-2023 01:26,  Sinus rhythm has replaced Electronic atrial pacemaker      MEDICATIONS:  MEDICATIONS  (STANDING):  acetaminophen   IVPB .. 1000 milliGRAM(s) IV Intermittent once  aspirin enteric coated 81 milliGRAM(s) Oral daily  atorvastatin 80 milliGRAM(s) Oral at bedtime  chlorhexidine 2% Cloths 1 Application(s) Topical <User Schedule>  dextrose 5%. 1000 milliLiter(s) (50 mL/Hr) IV Continuous <Continuous>  dextrose 5%. 1000 milliLiter(s) (100 mL/Hr) IV Continuous <Continuous>  dextrose 50% Injectable 25 Gram(s) IV Push once  dextrose 50% Injectable 12.5 Gram(s) IV Push once  dextrose 50% Injectable 25 Gram(s) IV Push once  famotidine    Tablet 20 milliGRAM(s) Oral daily  heparin   Injectable 5000 Unit(s) SubCutaneous every 8 hours  insulin lispro (ADMELOG) corrective regimen sliding scale   SubCutaneous Before meals and at bedtime  latanoprost 0.005% Ophthalmic Solution 1 Drop(s) Both EYES at bedtime  levETIRAcetam 500 milliGRAM(s) Oral every 12 hours  melatonin 6 milliGRAM(s) Oral at bedtime  piperacillin/tazobactam IVPB.. 3.375 Gram(s) IV Intermittent every 8 hours         Patient is a 68-year-old male with PMHx of sinus node disorder s/p PPM, HTN, HLD, cerebral aneurysm s/p coiling, CVA w/ residual L sided weakness, seizure disorder, type 2 diabetes on metformin and gastritis admitted for acute cholecystitis s/p lap to open cholecystectomy 11/30, w/ ICU upgrade due to post-operative hypotension requiring pressors, downgraded this morning to surgical floor.     Overnight Events: None  Interval HPI: Today is hospital day 5. Patient seen and examined at bedside. Patient states he is doing well, no gas or BMs, tolerating liquids but endorses discomfort if they are too cold, endorses mild abdominal pain but improved since yesterday. Denies dizziness, headache, SOB, chest pain. Denies any questions or concerns at this time.     REVIEW OF SYSTEMS:  CONSTITUTIONAL: (-) weakness, (-) fevers, (-) chills  EYES/ENT: (-) visual changes,  (-) vertigo,  (-) throat pain   NECK:  (-) pain, (-) stiffness  RESPIRATORY:  (-) shortness of breath, (-) cough,  (-) wheezing,  (-) hemoptysis   CARDIOVASCULAR:  (-) chest pain, (-) palpitations  GASTROINTESTINAL:  (+) abdominal pain, (-) nausea, (-) vomiting, (-) diarrhea, (-) constipation, (-) melena,  (-) hematemesis,  (-) hematochezia  GENITOURINARY: (-) dysuria, (-) frequency, (-) hematuria  NEUROLOGICAL: (-) numbness, (-) weakness  SKIN: (-) itching, (-) rashes, (-) lesions    Vital Signs Last 24 Hrs  T(C): 37.2 (03 Dec 2023 05:00), Max: 37.4 (02 Dec 2023 13:17)  T(F): 98.9 (03 Dec 2023 05:00), Max: 99.4 (02 Dec 2023 13:17)  HR: 77 (03 Dec 2023 05:00) (62 - 77)  BP: 149/54 (03 Dec 2023 05:00) (121/66 - 149/54)  RR: 18 (03 Dec 2023 05:00) (16 - 18)  SpO2: 98% (03 Dec 2023 05:00) (96% - 98%)    O2 Parameters below as of 03 Dec 2023 05:00  Patient On (Oxygen Delivery Method): room air      PHYSICAL EXAM:  GENERAL: NAD, lying in bed comfortably  HEAD:  Atraumatic, Normocephalic  EYES: EOMI, conjunctiva and sclera clear  ENT: Moist mucous membranes  NECK: Supple, No JVD  CHEST/LUNG: Clear to auscultation bilaterally, good air entry bilaterally; No wheezing, rales, or rhonchi. Unlabored respirations  HEART: Regular rate and rhythm. S1 and S2. No murmurs, rubs, or gallops  ABDOMEN: Soft, TTP in RLQ, Nondistended. Bowel sounds present.   NIKHIL drain #1 with 25cc serosang output  NIKHIL drain #2 w/ more sanguinous than serous output ~75cc.   RUQ dressing clean, dry and intact.   EXTREMITIES:  2+ Peripheral Pulses. No clubbing, cyanosis, or edema  NERVOUS SYSTEM:  Alert & Oriented X3, speech clear. No deficits.  SKIN: No rashes, bruises, or other lesions    LABS:   All Labs Personally Reviewed                  RADIOLOGY/EKG:  All Imaging and EKGs Personally Reviewed     NM Hepatobiliary Imaging w/ RX (11.29.23 @ 14:20)   IMPRESSION: Abnormal morphine-augmented hepatobiliary scan.  Findings are compatible with acute cholecystitis.    US Abdomen Limited (11.28.23 @ 21:21)   IMPRESSION:  Distended thick-walled gallbladder containing sludge.  Borderline CBD   measuring 6 mm.  Findings equivocal for acute cholecystitis. If   clinically indicated, further evaluation with nuclear HIDA scan may be   obtained.    CT Abdomen and Pelvis No Cont (11.28.23 @ 16:52)   IMPRESSION:  Findings suspicious for acute cholecystitis. Correlate with patient's   symptoms. Consider right upper quadrant ultrasound if clinically   indicated.    CT Chest No Cont (11.28.23 @ 16:51)   IMPRESSION:  Findings suspicious for acute cholecystitis. Correlate with patient's   symptoms. Consider right upper quadrant ultrasound if clinically   indicated.    Xray Chest 1 View AP/PA (11.28.23 @ 15:49)   Impression:  No acute pulmonary disease.    Xray Chest 1 View- PORTABLE-Urgent (11.24.23 @ 03:18)   IMPRESSION:  No evidence of acute infiltrate or pleural effusion.    TTE Echo Complete w/o Contrast w/ Doppler (11.30.23 @ 11:17)   Summary:   1. Left ventricular ejection fraction, by visual estimation, is 65 to   70%.   2. Normal global left ventricular systolic function.   3. Normal left ventricular internal cavity size.   4. Mildly enlarged right ventricle.   5. Mild mitral valve regurgitation.   6. Thickening of the anterior mitral valve leaflet.   7. Mild tricuspid regurgitation.   8. Mild aortic regurgitation.    12 Lead ECG (11.28.23 @ 15:11)   Normal sinus rhythm  Voltage criteria for left ventricular hypertrophy  Abnormal ECG  When compared with ECG of 24-NOV-2023 01:26,  Sinus rhythm has replaced Electronic atrial pacemaker      MEDICATIONS:  MEDICATIONS  (STANDING):  acetaminophen   IVPB .. 1000 milliGRAM(s) IV Intermittent once  aspirin enteric coated 81 milliGRAM(s) Oral daily  atorvastatin 80 milliGRAM(s) Oral at bedtime  chlorhexidine 2% Cloths 1 Application(s) Topical <User Schedule>  dextrose 5%. 1000 milliLiter(s) (50 mL/Hr) IV Continuous <Continuous>  dextrose 5%. 1000 milliLiter(s) (100 mL/Hr) IV Continuous <Continuous>  dextrose 50% Injectable 25 Gram(s) IV Push once  dextrose 50% Injectable 12.5 Gram(s) IV Push once  dextrose 50% Injectable 25 Gram(s) IV Push once  famotidine    Tablet 20 milliGRAM(s) Oral daily  heparin   Injectable 5000 Unit(s) SubCutaneous every 8 hours  insulin lispro (ADMELOG) corrective regimen sliding scale   SubCutaneous Before meals and at bedtime  latanoprost 0.005% Ophthalmic Solution 1 Drop(s) Both EYES at bedtime  levETIRAcetam 500 milliGRAM(s) Oral every 12 hours  melatonin 6 milliGRAM(s) Oral at bedtime  piperacillin/tazobactam IVPB.. 3.375 Gram(s) IV Intermittent every 8 hours         Patient is a 68-year-old male with PMHx of sinus node disorder s/p PPM, HTN, HLD, cerebral aneurysm s/p coiling, CVA w/ residual L sided weakness, seizure disorder, type 2 diabetes on metformin and gastritis admitted for acute cholecystitis s/p lap to open cholecystectomy 11/30, w/ ICU upgrade due to post-operative hypotension requiring pressors, downgraded this morning to surgical floor.     Overnight Events: None  Interval HPI: Today is hospital day 5. Patient seen and examined at bedside. Patient states he is doing well, no gas or BMs, tolerating liquids but endorses discomfort if they are too cold, endorses mild abdominal pain but improved since yesterday. Denies dizziness, headache, SOB, chest pain. Denies any questions or concerns at this time.     REVIEW OF SYSTEMS:  CONSTITUTIONAL: (-) weakness, (-) fevers, (-) chills  EYES/ENT: (-) visual changes,  (-) vertigo,  (-) throat pain   NECK:  (-) pain, (-) stiffness  RESPIRATORY:  (-) shortness of breath, (-) cough,  (-) wheezing,  (-) hemoptysis   CARDIOVASCULAR:  (-) chest pain, (-) palpitations  GASTROINTESTINAL:  (+) abdominal pain, (-) nausea, (-) vomiting, (-) diarrhea, (-) constipation, (-) melena,  (-) hematemesis,  (-) hematochezia  GENITOURINARY: (-) dysuria, (-) frequency, (-) hematuria  NEUROLOGICAL: (-) numbness, (-) weakness  SKIN: (-) itching, (-) rashes, (-) lesions      Vital Signs Last 24 Hrs  T(C): 37.2 (03 Dec 2023 05:00), Max: 37.4 (02 Dec 2023 13:17)  T(F): 98.9 (03 Dec 2023 05:00), Max: 99.4 (02 Dec 2023 13:17)  HR: 77 (03 Dec 2023 05:00) (62 - 77)  BP: 149/54 (03 Dec 2023 05:00) (121/66 - 149/54)  RR: 18 (03 Dec 2023 05:00) (16 - 18)  SpO2: 98% (03 Dec 2023 05:00) (96% - 98%)    O2 Parameters below as of 03 Dec 2023 05:00  Patient On (Oxygen Delivery Method): room air    I&O's Summary    02 Dec 2023 07:01  -  03 Dec 2023 07:00  --------------------------------------------------------  IN: 25 mL / OUT: 1060 mL / NET: -1035 mL    03 Dec 2023 07:01  -  03 Dec 2023 10:50  --------------------------------------------------------  IN: 0 mL / OUT: 35 mL / NET: -35 mL    PHYSICAL EXAM:  GENERAL: NAD, lying in bed comfortably. Generally pale appearing  HEAD:  Atraumatic, Normocephalic  EYES: EOMI, conjunctiva and sclera clear  ENT: Moist mucous membranes  NECK: Supple, No JVD  CHEST/LUNG: Clear to auscultation bilaterally, good air entry bilaterally; No wheezing, rales, or rhonchi. Unlabored respirations  HEART: Regular rate and rhythm. S1 and S2. No murmurs, rubs, or gallops  ABDOMEN: Soft, TTP in RLQ, Nondistended. Bowel sounds present.   NIKHIL drain #1 and #2 with serosang output  JRUQ dressing clean, dry and intact.   EXTREMITIES:  2+ Peripheral Pulses. No clubbing, cyanosis, or edema  NERVOUS SYSTEM:  Alert & Oriented X3, speech clear. No deficits.  SKIN: No rashes, bruises, or other lesions    MEDICATIONS:  MEDICATIONS  (STANDING):  aspirin enteric coated 81 milliGRAM(s) Oral daily  atorvastatin 80 milliGRAM(s) Oral at bedtime  carBAMazepine ER Tablet 300 milliGRAM(s) Oral two times a day  chlorhexidine 2% Cloths 1 Application(s) Topical <User Schedule>  dextrose 5%. 1000 milliLiter(s) (50 mL/Hr) IV Continuous <Continuous>  dextrose 5%. 1000 milliLiter(s) (100 mL/Hr) IV Continuous <Continuous>  dextrose 50% Injectable 25 Gram(s) IV Push once  dextrose 50% Injectable 12.5 Gram(s) IV Push once  dextrose 50% Injectable 25 Gram(s) IV Push once  famotidine    Tablet 20 milliGRAM(s) Oral daily  heparin   Injectable 5000 Unit(s) SubCutaneous every 8 hours  insulin lispro (ADMELOG) corrective regimen sliding scale   SubCutaneous Before meals and at bedtime  latanoprost 0.005% Ophthalmic Solution 1 Drop(s) Both EYES at bedtime  melatonin 6 milliGRAM(s) Oral at bedtime  piperacillin/tazobactam IVPB.. 3.375 Gram(s) IV Intermittent every 8 hours  potassium chloride    Tablet ER 20 milliEquivalent(s) Oral once    MEDICATIONS  (PRN):  morphine  - Injectable 1 milliGRAM(s) IV Push every 4 hours PRN Severe Pain (7 - 10)  ondansetron Injectable 4 milliGRAM(s) IV Push every 6 hours PRN Nausea and/or Vomiting  oxyCODONE    IR 5 milliGRAM(s) Oral every 4 hours PRN Mild Pain (1 - 3)  oxyCODONE    IR 10 milliGRAM(s) Oral every 4 hours PRN Moderate Pain (4 - 6)    LABS:   All Labs Personally Reviewed               6.4    5.90  )-----------( 201      ( 03 Dec 2023 07:35 )             19.3     12-03    141  |  106  |  11  ----------------------------<  146<H>  3.4<L>   |  26  |  1.16    Ca    7.8<L>      03 Dec 2023 07:35  Phos  2.8     12-03  Mg     2.2     12-03    TPro  5.7<L>  /  Alb  1.6<L>  /  TBili  0.4  /  DBili  x   /  AST  39  /  ALT  56<H>  /  AlkPhos  80  12-03    Blood Culture: 11-28 @ 22:39  Organism --  Gram Stain Blood -- Gram Stain --  Specimen Source .Blood Blood-Peripheral  Culture-Blood --      CAPILLARY BLOOD GLUCOSE      POCT Blood Glucose.: 143 mg/dL (03 Dec 2023 07:52)  POCT Blood Glucose.: 115 mg/dL (02 Dec 2023 21:57)  POCT Blood Glucose.: 183 mg/dL (02 Dec 2023 17:07)  POCT Blood Glucose.: 170 mg/dL (02 Dec 2023 11:30)                RADIOLOGY/EKG:  All Imaging and EKGs Personally Reviewed     NM Hepatobiliary Imaging w/ RX (11.29.23 @ 14:20)   IMPRESSION: Abnormal morphine-augmented hepatobiliary scan.  Findings are compatible with acute cholecystitis.    US Abdomen Limited (11.28.23 @ 21:21)   IMPRESSION:  Distended thick-walled gallbladder containing sludge.  Borderline CBD   measuring 6 mm.  Findings equivocal for acute cholecystitis. If   clinically indicated, further evaluation with nuclear HIDA scan may be   obtained.    CT Abdomen and Pelvis No Cont (11.28.23 @ 16:52)   IMPRESSION:  Findings suspicious for acute cholecystitis. Correlate with patient's   symptoms. Consider right upper quadrant ultrasound if clinically   indicated.    CT Chest No Cont (11.28.23 @ 16:51)   IMPRESSION:  Findings suspicious for acute cholecystitis. Correlate with patient's   symptoms. Consider right upper quadrant ultrasound if clinically   indicated.    Xray Chest 1 View AP/PA (11.28.23 @ 15:49)   Impression:  No acute pulmonary disease.    Xray Chest 1 View- PORTABLE-Urgent (11.24.23 @ 03:18)   IMPRESSION:  No evidence of acute infiltrate or pleural effusion.    TTE Echo Complete w/o Contrast w/ Doppler (11.30.23 @ 11:17)   Summary:   1. Left ventricular ejection fraction, by visual estimation, is 65 to   70%.   2. Normal global left ventricular systolic function.   3. Normal left ventricular internal cavity size.   4. Mildly enlarged right ventricle.   5. Mild mitral valve regurgitation.   6. Thickening of the anterior mitral valve leaflet.   7. Mild tricuspid regurgitation.   8. Mild aortic regurgitation.    12 Lead ECG (11.28.23 @ 15:11)   Normal sinus rhythm  Voltage criteria for left ventricular hypertrophy  Abnormal ECG  When compared with ECG of 24-NOV-2023 01:26,  Sinus rhythm has replaced Electronic atrial pacemaker             Patient is a 68-year-old male with PMHx of sinus node disorder s/p PPM, HTN, HLD, cerebral aneurysm s/p coiling, CVA w/ residual L sided weakness, seizure disorder, type 2 diabetes on metformin and gastritis admitted for acute cholecystitis s/p lap to open cholecystectomy 11/30, w/ ICU upgrade due to post-operative hypotension requiring pressors, downgradedto surgical floor.     Overnight Events: None  Interval HPI: Today is hospital day 5. Patient seen and examined at bedside. Patient states he is doing well, no gas or BMs, tolerating liquids but endorses discomfort if they are too cold, endorses mild abdominal pain but improved since yesterday. Denies dizziness, headache, SOB, chest pain. Denies any questions or concerns at this time.     REVIEW OF SYSTEMS:  CONSTITUTIONAL: (-) weakness, (-) fevers, (-) chills  EYES/ENT: (-) visual changes,  (-) vertigo,  (-) throat pain   NECK:  (-) pain, (-) stiffness  RESPIRATORY:  (-) shortness of breath, (-) cough,  (-) wheezing,  (-) hemoptysis   CARDIOVASCULAR:  (-) chest pain, (-) palpitations  GASTROINTESTINAL:  (+) abdominal pain, (-) nausea, (-) vomiting, (-) diarrhea, (-) constipation, (-) melena,  (-) hematemesis,  (-) hematochezia  GENITOURINARY: (-) dysuria, (-) frequency, (-) hematuria  NEUROLOGICAL: (-) numbness, (-) weakness  SKIN: (-) itching, (-) rashes, (-) lesions      Vital Signs Last 24 Hrs  T(C): 37.2 (03 Dec 2023 05:00), Max: 37.4 (02 Dec 2023 13:17)  T(F): 98.9 (03 Dec 2023 05:00), Max: 99.4 (02 Dec 2023 13:17)  HR: 77 (03 Dec 2023 05:00) (62 - 77)  BP: 149/54 (03 Dec 2023 05:00) (121/66 - 149/54)  RR: 18 (03 Dec 2023 05:00) (16 - 18)  SpO2: 98% (03 Dec 2023 05:00) (96% - 98%)    O2 Parameters below as of 03 Dec 2023 05:00  Patient On (Oxygen Delivery Method): room air    I&O's Summary    02 Dec 2023 07:01  -  03 Dec 2023 07:00  --------------------------------------------------------  IN: 25 mL / OUT: 1060 mL / NET: -1035 mL    03 Dec 2023 07:01  -  03 Dec 2023 10:50  --------------------------------------------------------  IN: 0 mL / OUT: 35 mL / NET: -35 mL    PHYSICAL EXAM:  GENERAL: NAD, lying in bed comfortably. Generally pale appearing  HEAD:  Atraumatic, Normocephalic  EYES: EOMI, conjunctiva and sclera clear  ENT: Moist mucous membranes  NECK: Supple, No JVD  CHEST/LUNG: Clear to auscultation bilaterally, good air entry bilaterally; No wheezing, rales, or rhonchi. Unlabored respirations  HEART: Regular rate and rhythm. S1 and S2. No murmurs, rubs, or gallops  ABDOMEN: Soft, TTP in RLQ, Nondistended. Bowel sounds present.   NIKHIL drain #1 and #2 with serosang output  JRUQ dressing clean, dry and intact.   EXTREMITIES:  2+ Peripheral Pulses. No clubbing, cyanosis, or edema  NERVOUS SYSTEM:  Alert & Oriented X3, speech clear. No deficits.  SKIN: No rashes, bruises, or other lesions    MEDICATIONS:  MEDICATIONS  (STANDING):  aspirin enteric coated 81 milliGRAM(s) Oral daily  atorvastatin 80 milliGRAM(s) Oral at bedtime  carBAMazepine ER Tablet 300 milliGRAM(s) Oral two times a day  chlorhexidine 2% Cloths 1 Application(s) Topical <User Schedule>  dextrose 5%. 1000 milliLiter(s) (50 mL/Hr) IV Continuous <Continuous>  dextrose 5%. 1000 milliLiter(s) (100 mL/Hr) IV Continuous <Continuous>  dextrose 50% Injectable 25 Gram(s) IV Push once  dextrose 50% Injectable 12.5 Gram(s) IV Push once  dextrose 50% Injectable 25 Gram(s) IV Push once  famotidine    Tablet 20 milliGRAM(s) Oral daily  heparin   Injectable 5000 Unit(s) SubCutaneous every 8 hours  insulin lispro (ADMELOG) corrective regimen sliding scale   SubCutaneous Before meals and at bedtime  latanoprost 0.005% Ophthalmic Solution 1 Drop(s) Both EYES at bedtime  melatonin 6 milliGRAM(s) Oral at bedtime  piperacillin/tazobactam IVPB.. 3.375 Gram(s) IV Intermittent every 8 hours  potassium chloride    Tablet ER 20 milliEquivalent(s) Oral once    MEDICATIONS  (PRN):  morphine  - Injectable 1 milliGRAM(s) IV Push every 4 hours PRN Severe Pain (7 - 10)  ondansetron Injectable 4 milliGRAM(s) IV Push every 6 hours PRN Nausea and/or Vomiting  oxyCODONE    IR 5 milliGRAM(s) Oral every 4 hours PRN Mild Pain (1 - 3)  oxyCODONE    IR 10 milliGRAM(s) Oral every 4 hours PRN Moderate Pain (4 - 6)    LABS:   All Labs Personally Reviewed               6.4    5.90  )-----------( 201      ( 03 Dec 2023 07:35 )             19.3     12-03    141  |  106  |  11  ----------------------------<  146<H>  3.4<L>   |  26  |  1.16    Ca    7.8<L>      03 Dec 2023 07:35  Phos  2.8     12-03  Mg     2.2     12-03    TPro  5.7<L>  /  Alb  1.6<L>  /  TBili  0.4  /  DBili  x   /  AST  39  /  ALT  56<H>  /  AlkPhos  80  12-03    Blood Culture: 11-28 @ 22:39  Organism --  Gram Stain Blood -- Gram Stain --  Specimen Source .Blood Blood-Peripheral  Culture-Blood --      CAPILLARY BLOOD GLUCOSE      POCT Blood Glucose.: 143 mg/dL (03 Dec 2023 07:52)  POCT Blood Glucose.: 115 mg/dL (02 Dec 2023 21:57)  POCT Blood Glucose.: 183 mg/dL (02 Dec 2023 17:07)  POCT Blood Glucose.: 170 mg/dL (02 Dec 2023 11:30)                RADIOLOGY/EKG:  All Imaging and EKGs Personally Reviewed     NM Hepatobiliary Imaging w/ RX (11.29.23 @ 14:20)   IMPRESSION: Abnormal morphine-augmented hepatobiliary scan.  Findings are compatible with acute cholecystitis.    US Abdomen Limited (11.28.23 @ 21:21)   IMPRESSION:  Distended thick-walled gallbladder containing sludge.  Borderline CBD   measuring 6 mm.  Findings equivocal for acute cholecystitis. If   clinically indicated, further evaluation with nuclear HIDA scan may be   obtained.    CT Abdomen and Pelvis No Cont (11.28.23 @ 16:52)   IMPRESSION:  Findings suspicious for acute cholecystitis. Correlate with patient's   symptoms. Consider right upper quadrant ultrasound if clinically   indicated.    CT Chest No Cont (11.28.23 @ 16:51)   IMPRESSION:  Findings suspicious for acute cholecystitis. Correlate with patient's   symptoms. Consider right upper quadrant ultrasound if clinically   indicated.    Xray Chest 1 View AP/PA (11.28.23 @ 15:49)   Impression:  No acute pulmonary disease.    Xray Chest 1 View- PORTABLE-Urgent (11.24.23 @ 03:18)   IMPRESSION:  No evidence of acute infiltrate or pleural effusion.    TTE Echo Complete w/o Contrast w/ Doppler (11.30.23 @ 11:17)   Summary:   1. Left ventricular ejection fraction, by visual estimation, is 65 to   70%.   2. Normal global left ventricular systolic function.   3. Normal left ventricular internal cavity size.   4. Mildly enlarged right ventricle.   5. Mild mitral valve regurgitation.   6. Thickening of the anterior mitral valve leaflet.   7. Mild tricuspid regurgitation.   8. Mild aortic regurgitation.    12 Lead ECG (11.28.23 @ 15:11)   Normal sinus rhythm  Voltage criteria for left ventricular hypertrophy  Abnormal ECG  When compared with ECG of 24-NOV-2023 01:26,  Sinus rhythm has replaced Electronic atrial pacemaker             Patient is a 68-year-old male with PMHx of sinus node disorder s/p PPM, HTN, HLD, cerebral aneurysm s/p coiling, CVA w/ residual L sided weakness, seizure disorder, type 2 diabetes on metformin and gastritis admitted for acute cholecystitis s/p lap to open cholecystectomy 11/30, w/ ICU upgrade due to post-operative hypotension requiring pressors, downgradedto surgical floor.     Overnight Events: None  Interval HPI: Today is hospital day 5. Patient seen and examined at bedside. Patient states he is doing well, no gas or BMs, tolerating liquids but endorses discomfort if they are too cold, endorses mild abdominal pain but improved since yesterday. Denies dizziness, headache, SOB, chest pain. Denies any questions or concerns at this time.     REVIEW OF SYSTEMS:  CONSTITUTIONAL: (-) weakness, (-) fevers, (-) chills  EYES/ENT: (-) visual changes,  (-) vertigo,  (-) throat pain   NECK:  (-) pain, (-) stiffness  RESPIRATORY:  (-) shortness of breath, (-) cough,  (-) wheezing,  (-) hemoptysis   CARDIOVASCULAR:  (-) chest pain, (-) palpitations  GASTROINTESTINAL:  (+) abdominal pain, (-) nausea, (-) vomiting, (-) diarrhea, (-) constipation, (-) melena,  (-) hematemesis,  (-) hematochezia  GENITOURINARY: (-) dysuria, (-) frequency, (-) hematuria  NEUROLOGICAL: (-) numbness, (-) weakness  SKIN: (-) itching, (-) rashes, (-) lesions      Vital Signs Last 24 Hrs  T(C): 37.2 (03 Dec 2023 05:00), Max: 37.4 (02 Dec 2023 13:17)  T(F): 98.9 (03 Dec 2023 05:00), Max: 99.4 (02 Dec 2023 13:17)  HR: 77 (03 Dec 2023 05:00) (62 - 77)  BP: 149/54 (03 Dec 2023 05:00) (121/66 - 149/54)  RR: 18 (03 Dec 2023 05:00) (16 - 18)  SpO2: 98% (03 Dec 2023 05:00) (96% - 98%)    O2 Parameters below as of 03 Dec 2023 05:00  Patient On (Oxygen Delivery Method): room air    I&O's Summary    02 Dec 2023 07:01  -  03 Dec 2023 07:00  --------------------------------------------------------  IN: 25 mL / OUT: 1060 mL / NET: -1035 mL    03 Dec 2023 07:01  -  03 Dec 2023 10:50  --------------------------------------------------------  IN: 0 mL / OUT: 35 mL / NET: -35 mL    PHYSICAL EXAM:  GENERAL: NAD, lying in bed comfortably. Generally pale appearing  HEAD:  Atraumatic, Normocephalic  EYES: EOMI, conjunctiva and sclera clear  ENT: Moist mucous membranes  NECK: Supple, No JVD  CHEST/LUNG: Clear to auscultation bilaterally, good air entry bilaterally; No wheezing, rales, or rhonchi. Unlabored respirations  HEART: Regular rate and rhythm. S1 and S2. No murmurs, rubs, or gallops  ABDOMEN: Soft, TTP in RUG and LLQ, Nondistended. Bowel sounds present.   NIKHIL drain #1 and #2 with serosang output  JRUQ dressing clean, dry and intact.   EXTREMITIES:  2+ Peripheral Pulses. No clubbing, cyanosis, or edema  NERVOUS SYSTEM:  Alert & Oriented X3, speech clear. No deficits.  SKIN: No rashes, bruises, or other lesions    MEDICATIONS:  MEDICATIONS  (STANDING):  aspirin enteric coated 81 milliGRAM(s) Oral daily  atorvastatin 80 milliGRAM(s) Oral at bedtime  carBAMazepine ER Tablet 300 milliGRAM(s) Oral two times a day  chlorhexidine 2% Cloths 1 Application(s) Topical <User Schedule>  dextrose 5%. 1000 milliLiter(s) (50 mL/Hr) IV Continuous <Continuous>  dextrose 5%. 1000 milliLiter(s) (100 mL/Hr) IV Continuous <Continuous>  dextrose 50% Injectable 25 Gram(s) IV Push once  dextrose 50% Injectable 12.5 Gram(s) IV Push once  dextrose 50% Injectable 25 Gram(s) IV Push once  famotidine    Tablet 20 milliGRAM(s) Oral daily  heparin   Injectable 5000 Unit(s) SubCutaneous every 8 hours  insulin lispro (ADMELOG) corrective regimen sliding scale   SubCutaneous Before meals and at bedtime  latanoprost 0.005% Ophthalmic Solution 1 Drop(s) Both EYES at bedtime  melatonin 6 milliGRAM(s) Oral at bedtime  piperacillin/tazobactam IVPB.. 3.375 Gram(s) IV Intermittent every 8 hours  potassium chloride    Tablet ER 20 milliEquivalent(s) Oral once    MEDICATIONS  (PRN):  morphine  - Injectable 1 milliGRAM(s) IV Push every 4 hours PRN Severe Pain (7 - 10)  ondansetron Injectable 4 milliGRAM(s) IV Push every 6 hours PRN Nausea and/or Vomiting  oxyCODONE    IR 5 milliGRAM(s) Oral every 4 hours PRN Mild Pain (1 - 3)  oxyCODONE    IR 10 milliGRAM(s) Oral every 4 hours PRN Moderate Pain (4 - 6)    LABS:   All Labs Personally Reviewed               6.4    5.90  )-----------( 201      ( 03 Dec 2023 07:35 )             19.3     12-03    141  |  106  |  11  ----------------------------<  146<H>  3.4<L>   |  26  |  1.16    Ca    7.8<L>      03 Dec 2023 07:35  Phos  2.8     12-03  Mg     2.2     12-03    TPro  5.7<L>  /  Alb  1.6<L>  /  TBili  0.4  /  DBili  x   /  AST  39  /  ALT  56<H>  /  AlkPhos  80  12-03    Blood Culture: 11-28 @ 22:39  Organism --  Gram Stain Blood -- Gram Stain --  Specimen Source .Blood Blood-Peripheral  Culture-Blood --      CAPILLARY BLOOD GLUCOSE      POCT Blood Glucose.: 143 mg/dL (03 Dec 2023 07:52)  POCT Blood Glucose.: 115 mg/dL (02 Dec 2023 21:57)  POCT Blood Glucose.: 183 mg/dL (02 Dec 2023 17:07)  POCT Blood Glucose.: 170 mg/dL (02 Dec 2023 11:30)                RADIOLOGY/EKG:  All Imaging and EKGs Personally Reviewed     NM Hepatobiliary Imaging w/ RX (11.29.23 @ 14:20)   IMPRESSION: Abnormal morphine-augmented hepatobiliary scan.  Findings are compatible with acute cholecystitis.    US Abdomen Limited (11.28.23 @ 21:21)   IMPRESSION:  Distended thick-walled gallbladder containing sludge.  Borderline CBD   measuring 6 mm.  Findings equivocal for acute cholecystitis. If   clinically indicated, further evaluation with nuclear HIDA scan may be   obtained.    CT Abdomen and Pelvis No Cont (11.28.23 @ 16:52)   IMPRESSION:  Findings suspicious for acute cholecystitis. Correlate with patient's   symptoms. Consider right upper quadrant ultrasound if clinically   indicated.    CT Chest No Cont (11.28.23 @ 16:51)   IMPRESSION:  Findings suspicious for acute cholecystitis. Correlate with patient's   symptoms. Consider right upper quadrant ultrasound if clinically   indicated.    Xray Chest 1 View AP/PA (11.28.23 @ 15:49)   Impression:  No acute pulmonary disease.    Xray Chest 1 View- PORTABLE-Urgent (11.24.23 @ 03:18)   IMPRESSION:  No evidence of acute infiltrate or pleural effusion.    TTE Echo Complete w/o Contrast w/ Doppler (11.30.23 @ 11:17)   Summary:   1. Left ventricular ejection fraction, by visual estimation, is 65 to   70%.   2. Normal global left ventricular systolic function.   3. Normal left ventricular internal cavity size.   4. Mildly enlarged right ventricle.   5. Mild mitral valve regurgitation.   6. Thickening of the anterior mitral valve leaflet.   7. Mild tricuspid regurgitation.   8. Mild aortic regurgitation.    12 Lead ECG (11.28.23 @ 15:11)   Normal sinus rhythm  Voltage criteria for left ventricular hypertrophy  Abnormal ECG  When compared with ECG of 24-NOV-2023 01:26,  Sinus rhythm has replaced Electronic atrial pacemaker

## 2023-12-03 NOTE — PROGRESS NOTE ADULT - PROBLEM SELECTOR PLAN 8
DVT/VTE Prophylaxis: heparin 5000u subcut Q8h - Chronic  - At home on atorvastatin 80mg, continue this

## 2023-12-03 NOTE — PROGRESS NOTE ADULT - PROBLEM SELECTOR PLAN 2
- Acute   - Trending down  - F/U AM CMP - Acute   - Trending down  - continue to monitor CMPs - Hgb 6.4 this AM, down from 7.3 w/ NIKHIL drain x2 w/ serosang output  - Transfusion consent in chart  - 1 unit of blood ordered  - maintain Hb >7  - f/u afternoon H+H - Hgb 6.4 this AM, down from 7.3 w/ NIKHIL drain x2 w/ serosang output  - Transfusion consent in chart  - 1 unit of blood ordered  - continue to monitor and maintain Hb >7  - f/u afternoon H+H

## 2023-12-03 NOTE — PROGRESS NOTE ADULT - NUTRITIONAL ASSESSMENT
This patient has been assessed with a concern for Malnutrition and has been determined to have a diagnosis/diagnoses of Moderate protein-calorie malnutrition.    This patient is being managed with:   Diet Full Liquid-  Entered: Dec  3 2023  6:17AM   This patient has been assessed with a concern for Malnutrition and has been determined to have a diagnosis/diagnoses of Moderate protein-calorie malnutrition.    This patient is being managed with:   Diet DASH  Entered: Dec  3 2023  6:17AM

## 2023-12-03 NOTE — PROGRESS NOTE ADULT - ASSESSMENT
a/p    looks well    although hct dropped, repeat was stable  since hemodynamically stable at this time, transfusion not indicated but would trend cbc.    NIKHIL drains NOT to be removed.    patient needs to ambulate    consider advancing diet      thank you    dr latanya ernst  cell#[phone] a/p    looks well    although hct dropped, repeat was stable  since hemodynamically stable at this time, transfusion not indicated but would trend cbc.    NIKHIL drains NOT to be removed.    patient needs to ambulate    consider advancing diet      thank you    dr latanya ernst  cell#[phone] a/p    looks well    although hct dropped, repeat was stable  since hemodynamically stable at this time, transfusion not indicated but would trend cbc.    NIKHIL drains NOT to be removed.    patient needs to ambulate    consider advancing diet      thank you    dr latanya ernst  cell#[phone]

## 2023-12-03 NOTE — PROGRESS NOTE ADULT - PROBLEM SELECTOR PLAN 6
- Chronic, last A1c 6.1  - Blood glucose goal 100-180 in hospital setting   - C/w consistent carb diet when can tolerate, c/w clears for now  - C/w hypoglycemia protocols, accuchecks  - C/w low dose ISS - Chronic   - At home on metoprolol 25mg BID   - Was in ICU for post-op hypotension, now off pressors  - Maintain MAP > 65  - Continue to monitor vitals

## 2023-12-03 NOTE — PROGRESS NOTE ADULT - PROBLEM SELECTOR PLAN 5
- Chronic   - At home on metoprolol 25mg BID   - Was in ICU for post-op hypotension, now off pressors  - BP this /59, maintaining MAP > 65  - Continue to monitor vitals - Chronic   - At home on metoprolol 25mg BID   - Was in ICU for post-op hypotension, now off pressors  - Maintain MAP > 65  - Continue to monitor vitals Resolved  - Baseline Cr ~ 1.13  - Cr this AM 1.16

## 2023-12-03 NOTE — PROGRESS NOTE ADULT - PROBLEM SELECTOR PLAN 4
Call patient his labs look pretty good all stable except his triglycerides have gone up dramatically want him to be sure to take fish oil trilipix and simvastatin and watch animal fats in diet   Will recheck next visit - Baseline Cr ~ 1.13  - Cr this AM 1.19  - Resolved Resolved  - Baseline Cr ~ 1.13  - Cr this AM 1.16 - Chronic  - At home on carbamazepine 300mg BID and lorazepam 0.5mg TID   - Switched to Keppra in ICU given NPO, discontinued  - Now back to home med carbamazepine 300mg BID

## 2023-12-03 NOTE — PROGRESS NOTE ADULT - ATTENDING COMMENTS
agree with above  transfusing 1 unit PRBC today  trend h/h post transfusion  monitor NIKHIL drain output  surgery following, recs noted

## 2023-12-03 NOTE — PROGRESS NOTE ADULT - PROBLEM SELECTOR PLAN 1
- S/p lap to open cholecystectomy w/ suppurative GB - POD 2  - Hgb 7.3 this AM, down from 8.9 w/ NIKHIL drain 2 w/ sang > sero output ~75mL  - Reviewed risks/benefits of transfusion with patient if Hgb falls below 7, consent signed and placed in chart  - Appreciate Surg recs  - C/w Zosyn (Day 6)  - C/w incentive spirometry, antiemetics and pain regimen PRN  - Advance diet as tolerated, c/w clear liquid diet w/ ensure - S/p lap to open cholecystectomy w/ suppurative GB - POD 3  - Hgb 6.4 this AM, down from 7.3 w/ NIKHIL drain 2 w/ serosang output  - Transfusion consent in chart  - Appreciate Surg recs  - DASH diet  - C/w Zosyn   - C/w incentive spirometry, antiemetics and pain regimen PRN - S/p lap to open cholecystectomy w/ suppurative GB - POD 3  - Appreciate Surg recs  - DASH diet  - C/w Zosyn   - C/w incentive spirometry, antiemetics and pain regimen PRN

## 2023-12-03 NOTE — PROGRESS NOTE ADULT - ASSESSMENT
Physical Examination:  GENERAL:               Alert, Oriented, No acute distress.    HEENT:                    No JVD, Dry MM  PULM:                     Bilateral air entry, Clear to auscultation bilaterally, no significant sputum production, No Rales, No Rhonchi, No Wheezing  CVS:                         S1, S2,  No Murmur  ABD:                        Soft, mild distended, nontender, normo active bowel sounds,  RUQ incision site CDI  NEURO:                  Alert, oriented, interactive, nonfocal, follows commands  PSYC:                      Calm, + Insight.    Assessment  Acute cholecystitis s/p Open Cholecystectomy 11/30/2023  HTN (hypertension)  HLD (hyperlipidemia)  Type 2 diabetes mellitus  Seizure disorder  h/o DVT  Preop examination       Plan  IV antibiotics  anemia f/u repeat cbc post transfusion, hold heparin sq  may need ct imaging if anemia continues  bp stable unsure of active bleed at this time - suspect hemodilutional     Diet as per surgery  monitor off pressors  Continue AED  pain control  out of bed to chair   incentive spirometry  insulin ss  diet as per surgery    continue care on medical floor

## 2023-12-03 NOTE — PROGRESS NOTE ADULT - ASSESSMENT
Patient is a 68-year-old male with PMHx of sinus node disorder s/p PPM, HTN, HLD, cerebral aneurysm s/p coiling, CVA w/ residual L sided weakness, seizure disorder, type 2 diabetes on metformin and gastritis admitted for acute cholecystitis s/p lap to open cholecystectomy 11/30, w/ ICU upgrade due to post-operative hypotension requiring pressors, downgraded this morning to surgical floor.     Discussed with attending, Dr. Dorman

## 2023-12-03 NOTE — PROGRESS NOTE ADULT - PROBLEM SELECTOR PLAN 3
- Chronic  - At home on carbamazepine 300mg BID and lorazepam 0.5mg TID   - Switched to Keppra in ICU given NPO, discontinued  - Now back to home med carbamazepine 300mg BID - Acute   - Trending down  - continue to monitor CMPs

## 2023-12-03 NOTE — PROGRESS NOTE ADULT - SUBJECTIVE AND OBJECTIVE BOX
Follow-up Critical Care Progress Note  Chief Complaint : Acute cholecystitis    pt no longer hypotensive   feels well  mild abd pain  no hunger  + gas  noted drop in h/h this am s/p prbc      Allergies :No Known Allergies      PAST MEDICAL & SURGICAL HISTORY:  CVA (cerebral vascular accident)  left arm weakness, no sensation in left side in 1998    Aneurysm of [place]    HLD (hyperlipidemia)    Epilepsy  last seizure 2016    Renal agenesis, unilateral    Depression    Cerebral aneurysm without rupture  diagnosed 2017 had cerebral angio at [facility]    Gastroesophageal reflux disease, esophagitis presence not specified    Glaucoma of both eyes, unspecified glaucoma type    Bradycardia    Language barrier  [place] speaking    T2DM (type 2 diabetes mellitus)    S/P craniotomy  for RCMA 1998    Pacemaker  Medtronic Serial [id number]/Model [id number]  Implant 6/29/07, new generator 12/23/2016    S/P bunionectomy    S/P cataract surgery  with lens implants    Status post coil embolization of cerebral aneurysm  9/2018        Medications:  MEDICATIONS  (STANDING):  aspirin enteric coated 81 milliGRAM(s) Oral daily  atorvastatin 80 milliGRAM(s) Oral at bedtime  carBAMazepine ER Tablet 300 milliGRAM(s) Oral two times a day  chlorhexidine 2% Cloths 1 Application(s) Topical <User Schedule>  dextrose 5%. 1000 milliLiter(s) (50 mL/Hr) IV Continuous <Continuous>  dextrose 5%. 1000 milliLiter(s) (100 mL/Hr) IV Continuous <Continuous>  dextrose 50% Injectable 25 Gram(s) IV Push once  dextrose 50% Injectable 12.5 Gram(s) IV Push once  dextrose 50% Injectable 25 Gram(s) IV Push once  famotidine    Tablet 20 milliGRAM(s) Oral daily  heparin   Injectable 5000 Unit(s) SubCutaneous every 8 hours  insulin lispro (ADMELOG) corrective regimen sliding scale   SubCutaneous Before meals and at bedtime  latanoprost 0.005% Ophthalmic Solution 1 Drop(s) Both EYES at bedtime  melatonin 6 milliGRAM(s) Oral at bedtime  piperacillin/tazobactam IVPB.. 3.375 Gram(s) IV Intermittent every 8 hours    MEDICATIONS  (PRN):  morphine  - Injectable 1 milliGRAM(s) IV Push every 4 hours PRN Severe Pain (7 - 10)  ondansetron Injectable 4 milliGRAM(s) IV Push every 6 hours PRN Nausea and/or Vomiting  oxyCODONE    IR 5 milliGRAM(s) Oral every 4 hours PRN Mild Pain (1 - 3)  oxyCODONE    IR 10 milliGRAM(s) Oral every 4 hours PRN Moderate Pain (4 - 6)      Antibiotics History  piperacillin/tazobactam IVPB. 3.375 Gram(s) IV Intermittent once, 11-30-23 @ 19:35, Stop order after: 1 Doses  piperacillin/tazobactam IVPB.- 3.375 Gram(s) IV Intermittent once, 11-30-23 @ 23:00  piperacillin/tazobactam IVPB.. 3.375 Gram(s) IV Intermittent every 8 hours, 12-01-23 @ 06:00, Stop order after: 7 Days  piperacillin/tazobactam IVPB.. 3.375 Gram(s) IV Intermittent every 8 hours, 11-28-23 @ 22:00, Stop order after: 7 Days  piperacillin/tazobactam IVPB... 3.375 Gram(s) IV Intermittent once, 11-28-23 @ 18:35, Stop order after: 1 Doses      Heme Medications   aspirin enteric coated 81 milliGRAM(s) Oral daily, 12-02-23 @ 07:29  heparin   Injectable 5000 Unit(s) SubCutaneous every 8 hours, 12-01-23 @ 10:13      GI Medications  famotidine    Tablet 20 milliGRAM(s) Oral daily, 12-01-23 @ 08:15, Routine      COVID  02-10-23 @ 03:26  COVID -   NotDetec       Procalcitonin Trend  12-01-23 @ 04:28   -   1.18<H>  02-10-23 @ 03:40   -   0.10<H>    WBC Trend  12-03-23 @ 07:35   -  5.90  12-02-23 @ 06:00   -  6.89  12-01-23 @ 04:28   -  6.28  11-30-23 @ 19:42   -  7.84    H/H Trend  12-03-23 @ 07:35   -   6.4<LL>/ 19.3<LL>  12-02-23 @ 12:02   -   7.2<L>/ 22.0<L>  12-02-23 @ 06:00   -   7.3<L>/ 22.3<L>  12-01-23 @ 04:28   -   8.9<L>/ 27.0<L>  11-30-23 @ 19:42   -   9.2<L>/ 27.5<L>  11-30-23 @ 07:05   -   9.7<L>/ 29.3<L>    Stool Occult Blood    Platelet Trend  12-03-23 @ 07:35   -  201  12-02-23 @ 06:00   -  152  12-01-23 @ 04:28   -  121<L>  11-30-23 @ 19:42   -  112<L>    Trend Sodium  12-03-23 @ 07:35   -  141  12-02-23 @ 06:00   -  139  12-01-23 @ 04:28   -  137  11-30-23 @ 19:42   -  139    Trend Potassium  12-03-23 @ 07:35   -  3.4<L>  12-02-23 @ 06:00   -  3.8  12-01-23 @ 04:28   -  4.3  11-30-23 @ 19:42   -  3.8    Trend Bun/Cr  12-03-23 @ 07:35  BUN/CR -  11 / 1.16  12-02-23 @ 06:00  BUN/CR -  11 / 1.19  12-01-23 @ 04:28  BUN/CR -  12 / 1.57<H>  11-30-23 @ 19:42  BUN/CR -  14 / 1.45<H>    Lactic Acid Trend  12-02-23 @ 06:00   -   1.0  12-01-23 @ 04:28   -   3.4<H>  11-30-23 @ 23:19   -   3.4<H>  11-30-23 @ 19:42   -   2.2<H>    ABG Trend    Trend AST/ALT/ALK Phos/Bili  12-03-23 @ 07:35   39/56<H>/80/0.4  12-02-23 @ 06:00   56<H>/63<H>/75/0.6  12-01-23 @ 04:28   109<H>/105<H>/98/0.6  11-30-23 @ 19:42   131<H>/126<H>/117/0.9  11-30-23 @ 07:05   104<H>/131<H>/128<H>/0.9  11-29-23 @ 05:53   149<H>/178<H>/124<H>/1.0  11-28-23 @ 16:00   272<H>/256<H>/131<H>/0.9  11-24-23 @ 02:27   30/33/109/0.2  02-14-23 @ 06:59   57<H>/147<H>/119/0.3  02-13-23 @ 07:18   66<H>/182<H>/119/0.4  02-12-23 @ 07:04   125<H>/250<H>/123<H>/0.5  02-11-23 @ 01:25   289<H>/380<H>/137<H>/1.8<H>      Ammonia Trend      Amylase / Lipase Trend  11-28-23 @ 16:00   -   -- / 136<H>      Albumin Trend  12-03-23 @ 07:35   -   1.6<L>  12-02-23 @ 06:00   -   1.6<L>  12-01-23 @ 04:28   -   1.6<L>  11-30-23 @ 19:42   -   1.8<L>  11-30-23 @ 07:05   -   2.0<L>  11-29-23 @ 05:53   -   2.2<L>      PTT - PT - INR Trend  11-28-23 @ 16:00   -   -- - 13.2<H> - 1.16  02-10-23 @ 16:15   -   176.3<HH> - -- - --  02-10-23 @ 03:40   -   29.1 - 12.1 - 1.04    Glucose Trend  12-03-23 @ 12:26   -  -- -- 202<H>  12-03-23 @ 07:52   -  -- -- 143<H>  12-03-23 @ 07:35   -  146<H> -- --  12-02-23 @ 21:57   -  -- -- 115<H>  12-02-23 @ 17:07   -  -- -- 183<H>  12-02-23 @ 11:30   -  -- -- 170<H>  12-02-23 @ 08:36   -  -- -- 150<H>  12-02-23 @ 06:00   -  138<H> -- --  12-01-23 @ 21:28   -  -- -- 167<H>  12-01-23 @ 17:08   -  -- -- 154<H>    A1C with Estimated Average Glucose Result: 6.1 % *H* [4.0 - 5.6] (11-29-23 @ 05:53)      LABS:                        6.4    5.90  )-----------( 201      ( 03 Dec 2023 07:35 )             19.3     12-03    141  |  106  |  11  ----------------------------<  146<H>  3.4<L>   |  26  |  1.16    Ca    7.8<L>      03 Dec 2023 07:35  Phos  2.8     12-03  Mg     2.2     12-03    TPro  5.7<L>  /  Alb  1.6<L>  /  TBili  0.4  /  DBili  x   /  AST  39  /  ALT  56<H>  /  AlkPhos  80  12-03              Urinalysis Basic - ( 03 Dec 2023 07:35 )    Color: x / Appearance: x / SG: x / pH: x  Gluc: 146 mg/dL / Ketone: x  / Bili: x / Urobili: x   Blood: x / Protein: x / Nitrite: x   Leuk Esterase: x / RBC: x / WBC x   Sq Epi: x / Non Sq Epi: x / Bacteria: x      Procalcitonin, Serum: 1.18 ng/mL (12-01-23 @ 04:28)          CULTURES: (if applicable)    Culture - Blood (collected 11-28-23 @ 22:39)  Source: .Blood Blood-Peripheral  Preliminary Report (12-03-23 @ 04:01):    No growth at 4 days    Culture - Blood (collected 11-28-23 @ 22:39)  Source: .Blood Blood-Peripheral  Preliminary Report (12-03-23 @ 04:01):    No growth at 4 days            CAPILLARY BLOOD GLUCOSE      POCT Blood Glucose.: 202 mg/dL (03 Dec 2023 12:26)         VITALS:  T(C): 36.8 (12-03-23 @ 14:28), Max: 37.2 (12-03-23 @ 05:00)  T(F): 98.3 (12-03-23 @ 14:28), Max: 98.9 (12-03-23 @ 05:00)  HR: 73 (12-03-23 @ 14:28) (62 - 77)  BP: 146/72 (12-03-23 @ 14:28) (129/60 - 150/70)  BP(mean): --  ABP: --  ABP(mean): --  RR: 17 (12-03-23 @ 14:28) (16 - 18)  SpO2: 98% (12-03-23 @ 14:28) (97% - 98%)  CVP(mm Hg): --  CVP(cm H2O): --    Ins and Outs     12-02-23 @ 07:01  -  12-03-23 @ 07:00  --------------------------------------------------------  IN: 25 mL / OUT: 1060 mL / NET: -1035 mL    12-03-23 @ 07:01  -  12-03-23 @ 16:49  --------------------------------------------------------  IN: 275 mL / OUT: 195 mL / NET: 80 mL          Weight (kg): 72.4 (11-30-23 @ 19:00)        I&O's Detail    02 Dec 2023 07:01  -  03 Dec 2023 07:00  --------------------------------------------------------  IN:    IV PiggyBack: 25 mL  Total IN: 25 mL    OUT:    Bulb (mL): 295 mL    Bulb (mL): 115 mL    Voided (mL): 650 mL  Total OUT: 1060 mL    Total NET: -1035 mL      03 Dec 2023 07:01  -  03 Dec 2023 16:49  --------------------------------------------------------  IN:    PRBCs (Packed Red Blood Cells): 275 mL  Total IN: 275 mL    OUT:    Bulb (mL): 150 mL    Bulb (mL): 45 mL  Total OUT: 195 mL    Total NET: 80 mL              Follow-up Critical Care Progress Note  Chief Complaint : Acute cholecystitis    pt no longer hypotensive   feels well  mild abd pain  no hunger  + gas  noted drop in h/h this am s/p prbc      Allergies :No Known Allergies      PAST MEDICAL & SURGICAL HISTORY:  CVA (cerebral vascular accident)  left arm weakness, no sensation in left side in 1998    Aneurysm of [place]    HLD (hyperlipidemia)    Epilepsy  last seizure 2016    Renal agenesis, unilateral    Depression    Cerebral aneurysm without rupture  diagnosed 2017 had cerebral angio at [facility]    Gastroesophageal reflux disease, esophagitis presence not specified    Glaucoma of both eyes, unspecified glaucoma type    Bradycardia    Language barrier  Indian speaking    T2DM (type 2 diabetes mellitus)    S/P craniotomy  for RCMA 1998    Pacemaker  Medtronic Serial [id number]/Model [id number]  Implant 6/29/07, new generator 12/23/2016    S/P bunionectomy    S/P cataract surgery  with lens implants    Status post coil embolization of cerebral aneurysm  9/2018        Medications:  MEDICATIONS  (STANDING):  aspirin enteric coated 81 milliGRAM(s) Oral daily  atorvastatin 80 milliGRAM(s) Oral at bedtime  carBAMazepine ER Tablet 300 milliGRAM(s) Oral two times a day  chlorhexidine 2% Cloths 1 Application(s) Topical <User Schedule>  dextrose 5%. 1000 milliLiter(s) (50 mL/Hr) IV Continuous <Continuous>  dextrose 5%. 1000 milliLiter(s) (100 mL/Hr) IV Continuous <Continuous>  dextrose 50% Injectable 25 Gram(s) IV Push once  dextrose 50% Injectable 12.5 Gram(s) IV Push once  dextrose 50% Injectable 25 Gram(s) IV Push once  famotidine    Tablet 20 milliGRAM(s) Oral daily  heparin   Injectable 5000 Unit(s) SubCutaneous every 8 hours  insulin lispro (ADMELOG) corrective regimen sliding scale   SubCutaneous Before meals and at bedtime  latanoprost 0.005% Ophthalmic Solution 1 Drop(s) Both EYES at bedtime  melatonin 6 milliGRAM(s) Oral at bedtime  piperacillin/tazobactam IVPB.. 3.375 Gram(s) IV Intermittent every 8 hours    MEDICATIONS  (PRN):  morphine  - Injectable 1 milliGRAM(s) IV Push every 4 hours PRN Severe Pain (7 - 10)  ondansetron Injectable 4 milliGRAM(s) IV Push every 6 hours PRN Nausea and/or Vomiting  oxyCODONE    IR 5 milliGRAM(s) Oral every 4 hours PRN Mild Pain (1 - 3)  oxyCODONE    IR 10 milliGRAM(s) Oral every 4 hours PRN Moderate Pain (4 - 6)      Antibiotics History  piperacillin/tazobactam IVPB. 3.375 Gram(s) IV Intermittent once, 11-30-23 @ 19:35, Stop order after: 1 Doses  piperacillin/tazobactam IVPB.- 3.375 Gram(s) IV Intermittent once, 11-30-23 @ 23:00  piperacillin/tazobactam IVPB.. 3.375 Gram(s) IV Intermittent every 8 hours, 12-01-23 @ 06:00, Stop order after: 7 Days  piperacillin/tazobactam IVPB.. 3.375 Gram(s) IV Intermittent every 8 hours, 11-28-23 @ 22:00, Stop order after: 7 Days  piperacillin/tazobactam IVPB... 3.375 Gram(s) IV Intermittent once, 11-28-23 @ 18:35, Stop order after: 1 Doses      Heme Medications   aspirin enteric coated 81 milliGRAM(s) Oral daily, 12-02-23 @ 07:29  heparin   Injectable 5000 Unit(s) SubCutaneous every 8 hours, 12-01-23 @ 10:13      GI Medications  famotidine    Tablet 20 milliGRAM(s) Oral daily, 12-01-23 @ 08:15, Routine      COVID  02-10-23 @ 03:26  COVID -   NotDetec       Procalcitonin Trend  12-01-23 @ 04:28   -   1.18<H>  02-10-23 @ 03:40   -   0.10<H>    WBC Trend  12-03-23 @ 07:35   -  5.90  12-02-23 @ 06:00   -  6.89  12-01-23 @ 04:28   -  6.28  11-30-23 @ 19:42   -  7.84    H/H Trend  12-03-23 @ 07:35   -   6.4<LL>/ 19.3<LL>  12-02-23 @ 12:02   -   7.2<L>/ 22.0<L>  12-02-23 @ 06:00   -   7.3<L>/ 22.3<L>  12-01-23 @ 04:28   -   8.9<L>/ 27.0<L>  11-30-23 @ 19:42   -   9.2<L>/ 27.5<L>  11-30-23 @ 07:05   -   9.7<L>/ 29.3<L>    Stool Occult Blood    Platelet Trend  12-03-23 @ 07:35   -  201  12-02-23 @ 06:00   -  152  12-01-23 @ 04:28   -  121<L>  11-30-23 @ 19:42   -  112<L>    Trend Sodium  12-03-23 @ 07:35   -  141  12-02-23 @ 06:00   -  139  12-01-23 @ 04:28   -  137  11-30-23 @ 19:42   -  139    Trend Potassium  12-03-23 @ 07:35   -  3.4<L>  12-02-23 @ 06:00   -  3.8  12-01-23 @ 04:28   -  4.3  11-30-23 @ 19:42   -  3.8    Trend Bun/Cr  12-03-23 @ 07:35  BUN/CR -  11 / 1.16  12-02-23 @ 06:00  BUN/CR -  11 / 1.19  12-01-23 @ 04:28  BUN/CR -  12 / 1.57<H>  11-30-23 @ 19:42  BUN/CR -  14 / 1.45<H>    Lactic Acid Trend  12-02-23 @ 06:00   -   1.0  12-01-23 @ 04:28   -   3.4<H>  11-30-23 @ 23:19   -   3.4<H>  11-30-23 @ 19:42   -   2.2<H>    ABG Trend    Trend AST/ALT/ALK Phos/Bili  12-03-23 @ 07:35   39/56<H>/80/0.4  12-02-23 @ 06:00   56<H>/63<H>/75/0.6  12-01-23 @ 04:28   109<H>/105<H>/98/0.6  11-30-23 @ 19:42   131<H>/126<H>/117/0.9  11-30-23 @ 07:05   104<H>/131<H>/128<H>/0.9  11-29-23 @ 05:53   149<H>/178<H>/124<H>/1.0  11-28-23 @ 16:00   272<H>/256<H>/131<H>/0.9  11-24-23 @ 02:27   30/33/109/0.2  02-14-23 @ 06:59   57<H>/147<H>/119/0.3  02-13-23 @ 07:18   66<H>/182<H>/119/0.4  02-12-23 @ 07:04   125<H>/250<H>/123<H>/0.5  02-11-23 @ 01:25   289<H>/380<H>/137<H>/1.8<H>      Ammonia Trend      Amylase / Lipase Trend  11-28-23 @ 16:00   -   -- / 136<H>      Albumin Trend  12-03-23 @ 07:35   -   1.6<L>  12-02-23 @ 06:00   -   1.6<L>  12-01-23 @ 04:28   -   1.6<L>  11-30-23 @ 19:42   -   1.8<L>  11-30-23 @ 07:05   -   2.0<L>  11-29-23 @ 05:53   -   2.2<L>      PTT - PT - INR Trend  11-28-23 @ 16:00   -   -- - 13.2<H> - 1.16  02-10-23 @ 16:15   -   176.3<HH> - -- - --  02-10-23 @ 03:40   -   29.1 - 12.1 - 1.04    Glucose Trend  12-03-23 @ 12:26   -  -- -- 202<H>  12-03-23 @ 07:52   -  -- -- 143<H>  12-03-23 @ 07:35   -  146<H> -- --  12-02-23 @ 21:57   -  -- -- 115<H>  12-02-23 @ 17:07   -  -- -- 183<H>  12-02-23 @ 11:30   -  -- -- 170<H>  12-02-23 @ 08:36   -  -- -- 150<H>  12-02-23 @ 06:00   -  138<H> -- --  12-01-23 @ 21:28   -  -- -- 167<H>  12-01-23 @ 17:08   -  -- -- 154<H>    A1C with Estimated Average Glucose Result: 6.1 % *H* [4.0 - 5.6] (11-29-23 @ 05:53)      LABS:                        6.4    5.90  )-----------( 201      ( 03 Dec 2023 07:35 )             19.3     12-03    141  |  106  |  11  ----------------------------<  146<H>  3.4<L>   |  26  |  1.16    Ca    7.8<L>      03 Dec 2023 07:35  Phos  2.8     12-03  Mg     2.2     12-03    TPro  5.7<L>  /  Alb  1.6<L>  /  TBili  0.4  /  DBili  x   /  AST  39  /  ALT  56<H>  /  AlkPhos  80  12-03              Urinalysis Basic - ( 03 Dec 2023 07:35 )    Color: x / Appearance: x / SG: x / pH: x  Gluc: 146 mg/dL / Ketone: x  / Bili: x / Urobili: x   Blood: x / Protein: x / Nitrite: x   Leuk Esterase: x / RBC: x / WBC x   Sq Epi: x / Non Sq Epi: x / Bacteria: x      Procalcitonin, Serum: 1.18 ng/mL (12-01-23 @ 04:28)          CULTURES: (if applicable)    Culture - Blood (collected 11-28-23 @ 22:39)  Source: .Blood Blood-Peripheral  Preliminary Report (12-03-23 @ 04:01):    No growth at 4 days    Culture - Blood (collected 11-28-23 @ 22:39)  Source: .Blood Blood-Peripheral  Preliminary Report (12-03-23 @ 04:01):    No growth at 4 days            CAPILLARY BLOOD GLUCOSE      POCT Blood Glucose.: 202 mg/dL (03 Dec 2023 12:26)         VITALS:  T(C): 36.8 (12-03-23 @ 14:28), Max: 37.2 (12-03-23 @ 05:00)  T(F): 98.3 (12-03-23 @ 14:28), Max: 98.9 (12-03-23 @ 05:00)  HR: 73 (12-03-23 @ 14:28) (62 - 77)  BP: 146/72 (12-03-23 @ 14:28) (129/60 - 150/70)  BP(mean): --  ABP: --  ABP(mean): --  RR: 17 (12-03-23 @ 14:28) (16 - 18)  SpO2: 98% (12-03-23 @ 14:28) (97% - 98%)  CVP(mm Hg): --  CVP(cm H2O): --    Ins and Outs     12-02-23 @ 07:01  -  12-03-23 @ 07:00  --------------------------------------------------------  IN: 25 mL / OUT: 1060 mL / NET: -1035 mL    12-03-23 @ 07:01  -  12-03-23 @ 16:49  --------------------------------------------------------  IN: 275 mL / OUT: 195 mL / NET: 80 mL          Weight (kg): 72.4 (11-30-23 @ 19:00)        I&O's Detail    02 Dec 2023 07:01  -  03 Dec 2023 07:00  --------------------------------------------------------  IN:    IV PiggyBack: 25 mL  Total IN: 25 mL    OUT:    Bulb (mL): 295 mL    Bulb (mL): 115 mL    Voided (mL): 650 mL  Total OUT: 1060 mL    Total NET: -1035 mL      03 Dec 2023 07:01  -  03 Dec 2023 16:49  --------------------------------------------------------  IN:    PRBCs (Packed Red Blood Cells): 275 mL  Total IN: 275 mL    OUT:    Bulb (mL): 150 mL    Bulb (mL): 45 mL  Total OUT: 195 mL    Total NET: 80 mL

## 2023-12-03 NOTE — PROGRESS NOTE ADULT - PROBLEM SELECTOR PLAN 7
- Chronic  - At home on atorvastatin 80mg, continue this - Chronic, last A1c 6.1  - Blood glucose goal 100-180 in hospital setting   - C/w consistent carb diet when can tolerate, c/w clears for now  - C/w hypoglycemia protocols, accuchecks  - C/w low dose ISS

## 2023-12-03 NOTE — PROGRESS NOTE ADULT - NUTRITIONAL ASSESSMENT
This patient has been assessed with a concern for Malnutrition and has been determined to have a diagnosis/diagnoses of Moderate protein-calorie malnutrition.    This patient is being managed with:   Diet DASH/TLC-  Sodium & Cholesterol Restricted  Entered: Dec  3 2023  8:57AM

## 2023-12-04 ENCOUNTER — TRANSCRIPTION ENCOUNTER (OUTPATIENT)
Age: 68
End: 2023-12-04

## 2023-12-04 LAB
ALBUMIN SERPL ELPH-MCNC: 2 G/DL — LOW (ref 3.3–5)
ALBUMIN SERPL ELPH-MCNC: 2 G/DL — LOW (ref 3.3–5)
ALP SERPL-CCNC: 98 U/L — SIGNIFICANT CHANGE UP (ref 40–120)
ALP SERPL-CCNC: 98 U/L — SIGNIFICANT CHANGE UP (ref 40–120)
ALT FLD-CCNC: 53 U/L — HIGH (ref 10–45)
ALT FLD-CCNC: 53 U/L — HIGH (ref 10–45)
ANION GAP SERPL CALC-SCNC: 11 MMOL/L — SIGNIFICANT CHANGE UP (ref 5–17)
ANION GAP SERPL CALC-SCNC: 11 MMOL/L — SIGNIFICANT CHANGE UP (ref 5–17)
AST SERPL-CCNC: 35 U/L — SIGNIFICANT CHANGE UP (ref 10–40)
AST SERPL-CCNC: 35 U/L — SIGNIFICANT CHANGE UP (ref 10–40)
BILIRUB SERPL-MCNC: 0.4 MG/DL — SIGNIFICANT CHANGE UP (ref 0.2–1.2)
BILIRUB SERPL-MCNC: 0.4 MG/DL — SIGNIFICANT CHANGE UP (ref 0.2–1.2)
BUN SERPL-MCNC: 15 MG/DL — SIGNIFICANT CHANGE UP (ref 7–23)
BUN SERPL-MCNC: 15 MG/DL — SIGNIFICANT CHANGE UP (ref 7–23)
CALCIUM SERPL-MCNC: 8.3 MG/DL — LOW (ref 8.4–10.5)
CALCIUM SERPL-MCNC: 8.3 MG/DL — LOW (ref 8.4–10.5)
CHLORIDE SERPL-SCNC: 104 MMOL/L — SIGNIFICANT CHANGE UP (ref 96–108)
CHLORIDE SERPL-SCNC: 104 MMOL/L — SIGNIFICANT CHANGE UP (ref 96–108)
CO2 SERPL-SCNC: 28 MMOL/L — SIGNIFICANT CHANGE UP (ref 22–31)
CO2 SERPL-SCNC: 28 MMOL/L — SIGNIFICANT CHANGE UP (ref 22–31)
CREAT SERPL-MCNC: 1.28 MG/DL — SIGNIFICANT CHANGE UP (ref 0.5–1.3)
CREAT SERPL-MCNC: 1.28 MG/DL — SIGNIFICANT CHANGE UP (ref 0.5–1.3)
CULTURE RESULTS: SIGNIFICANT CHANGE UP
EGFR: 61 ML/MIN/1.73M2 — SIGNIFICANT CHANGE UP
EGFR: 61 ML/MIN/1.73M2 — SIGNIFICANT CHANGE UP
GLUCOSE BLDC GLUCOMTR-MCNC: 153 MG/DL — HIGH (ref 70–99)
GLUCOSE BLDC GLUCOMTR-MCNC: 153 MG/DL — HIGH (ref 70–99)
GLUCOSE BLDC GLUCOMTR-MCNC: 157 MG/DL — HIGH (ref 70–99)
GLUCOSE BLDC GLUCOMTR-MCNC: 157 MG/DL — HIGH (ref 70–99)
GLUCOSE BLDC GLUCOMTR-MCNC: 190 MG/DL — HIGH (ref 70–99)
GLUCOSE BLDC GLUCOMTR-MCNC: 190 MG/DL — HIGH (ref 70–99)
GLUCOSE BLDC GLUCOMTR-MCNC: 203 MG/DL — HIGH (ref 70–99)
GLUCOSE BLDC GLUCOMTR-MCNC: 203 MG/DL — HIGH (ref 70–99)
GLUCOSE SERPL-MCNC: 177 MG/DL — HIGH (ref 70–99)
GLUCOSE SERPL-MCNC: 177 MG/DL — HIGH (ref 70–99)
HCT VFR BLD CALC: 30.4 % — LOW (ref 39–50)
HCT VFR BLD CALC: 30.4 % — LOW (ref 39–50)
HGB BLD-MCNC: 10.1 G/DL — LOW (ref 13–17)
HGB BLD-MCNC: 10.1 G/DL — LOW (ref 13–17)
MAGNESIUM SERPL-MCNC: 2.5 MG/DL — SIGNIFICANT CHANGE UP (ref 1.6–2.6)
MAGNESIUM SERPL-MCNC: 2.5 MG/DL — SIGNIFICANT CHANGE UP (ref 1.6–2.6)
MCHC RBC-ENTMCNC: 31.6 PG — SIGNIFICANT CHANGE UP (ref 27–34)
MCHC RBC-ENTMCNC: 31.6 PG — SIGNIFICANT CHANGE UP (ref 27–34)
MCHC RBC-ENTMCNC: 33.2 GM/DL — SIGNIFICANT CHANGE UP (ref 32–36)
MCHC RBC-ENTMCNC: 33.2 GM/DL — SIGNIFICANT CHANGE UP (ref 32–36)
MCV RBC AUTO: 95 FL — SIGNIFICANT CHANGE UP (ref 80–100)
MCV RBC AUTO: 95 FL — SIGNIFICANT CHANGE UP (ref 80–100)
NRBC # BLD: 0 /100 WBCS — SIGNIFICANT CHANGE UP (ref 0–0)
NRBC # BLD: 0 /100 WBCS — SIGNIFICANT CHANGE UP (ref 0–0)
PLATELET # BLD AUTO: 292 K/UL — SIGNIFICANT CHANGE UP (ref 150–400)
PLATELET # BLD AUTO: 292 K/UL — SIGNIFICANT CHANGE UP (ref 150–400)
POTASSIUM SERPL-MCNC: 3.2 MMOL/L — LOW (ref 3.5–5.3)
POTASSIUM SERPL-MCNC: 3.2 MMOL/L — LOW (ref 3.5–5.3)
POTASSIUM SERPL-SCNC: 3.2 MMOL/L — LOW (ref 3.5–5.3)
POTASSIUM SERPL-SCNC: 3.2 MMOL/L — LOW (ref 3.5–5.3)
PROT SERPL-MCNC: 6.8 G/DL — SIGNIFICANT CHANGE UP (ref 6–8.3)
PROT SERPL-MCNC: 6.8 G/DL — SIGNIFICANT CHANGE UP (ref 6–8.3)
RBC # BLD: 3.2 M/UL — LOW (ref 4.2–5.8)
RBC # BLD: 3.2 M/UL — LOW (ref 4.2–5.8)
RBC # FLD: 15.5 % — HIGH (ref 10.3–14.5)
RBC # FLD: 15.5 % — HIGH (ref 10.3–14.5)
SODIUM SERPL-SCNC: 143 MMOL/L — SIGNIFICANT CHANGE UP (ref 135–145)
SODIUM SERPL-SCNC: 143 MMOL/L — SIGNIFICANT CHANGE UP (ref 135–145)
SPECIMEN SOURCE: SIGNIFICANT CHANGE UP
WBC # BLD: 6.72 K/UL — SIGNIFICANT CHANGE UP (ref 3.8–10.5)
WBC # BLD: 6.72 K/UL — SIGNIFICANT CHANGE UP (ref 3.8–10.5)
WBC # FLD AUTO: 6.72 K/UL — SIGNIFICANT CHANGE UP (ref 3.8–10.5)
WBC # FLD AUTO: 6.72 K/UL — SIGNIFICANT CHANGE UP (ref 3.8–10.5)

## 2023-12-04 PROCEDURE — 99233 SBSQ HOSP IP/OBS HIGH 50: CPT

## 2023-12-04 RX ORDER — POLYETHYLENE GLYCOL 3350 17 G/17G
17 POWDER, FOR SOLUTION ORAL DAILY
Refills: 0 | Status: DISCONTINUED | OUTPATIENT
Start: 2023-12-04 | End: 2023-12-07

## 2023-12-04 RX ORDER — HEPARIN SODIUM 5000 [USP'U]/ML
5000 INJECTION INTRAVENOUS; SUBCUTANEOUS EVERY 8 HOURS
Refills: 0 | Status: DISCONTINUED | OUTPATIENT
Start: 2023-12-04 | End: 2023-12-07

## 2023-12-04 RX ORDER — SENNA PLUS 8.6 MG/1
1 TABLET ORAL
Refills: 0 | Status: DISCONTINUED | OUTPATIENT
Start: 2023-12-04 | End: 2023-12-07

## 2023-12-04 RX ORDER — POTASSIUM CHLORIDE 20 MEQ
20 PACKET (EA) ORAL
Refills: 0 | Status: COMPLETED | OUTPATIENT
Start: 2023-12-04 | End: 2023-12-04

## 2023-12-04 RX ADMIN — Medication 300 MILLIGRAM(S): at 17:35

## 2023-12-04 RX ADMIN — ATORVASTATIN CALCIUM 80 MILLIGRAM(S): 80 TABLET, FILM COATED ORAL at 21:44

## 2023-12-04 RX ADMIN — HEPARIN SODIUM 5000 UNIT(S): 5000 INJECTION INTRAVENOUS; SUBCUTANEOUS at 21:53

## 2023-12-04 RX ADMIN — Medication 81 MILLIGRAM(S): at 12:19

## 2023-12-04 RX ADMIN — Medication 1: at 17:21

## 2023-12-04 RX ADMIN — Medication 20 MILLIEQUIVALENT(S): at 09:15

## 2023-12-04 RX ADMIN — CHLORHEXIDINE GLUCONATE 1 APPLICATION(S): 213 SOLUTION TOPICAL at 05:14

## 2023-12-04 RX ADMIN — PIPERACILLIN AND TAZOBACTAM 25 GRAM(S): 4; .5 INJECTION, POWDER, LYOPHILIZED, FOR SOLUTION INTRAVENOUS at 05:14

## 2023-12-04 RX ADMIN — Medication 2: at 12:17

## 2023-12-04 RX ADMIN — Medication 1 DROP(S): at 12:21

## 2023-12-04 RX ADMIN — Medication 1: at 08:14

## 2023-12-04 RX ADMIN — LATANOPROST 1 DROP(S): 0.05 SOLUTION/ DROPS OPHTHALMIC; TOPICAL at 21:48

## 2023-12-04 RX ADMIN — Medication 1: at 21:44

## 2023-12-04 RX ADMIN — PIPERACILLIN AND TAZOBACTAM 25 GRAM(S): 4; .5 INJECTION, POWDER, LYOPHILIZED, FOR SOLUTION INTRAVENOUS at 14:32

## 2023-12-04 RX ADMIN — Medication 300 MILLIGRAM(S): at 05:14

## 2023-12-04 RX ADMIN — FAMOTIDINE 20 MILLIGRAM(S): 10 INJECTION INTRAVENOUS at 14:32

## 2023-12-04 RX ADMIN — Medication 20 MILLIEQUIVALENT(S): at 12:19

## 2023-12-04 RX ADMIN — HEPARIN SODIUM 5000 UNIT(S): 5000 INJECTION INTRAVENOUS; SUBCUTANEOUS at 14:32

## 2023-12-04 RX ADMIN — PIPERACILLIN AND TAZOBACTAM 25 GRAM(S): 4; .5 INJECTION, POWDER, LYOPHILIZED, FOR SOLUTION INTRAVENOUS at 21:47

## 2023-12-04 RX ADMIN — POLYETHYLENE GLYCOL 3350 17 GRAM(S): 17 POWDER, FOR SOLUTION ORAL at 09:14

## 2023-12-04 NOTE — PROGRESS NOTE ADULT - SUBJECTIVE AND OBJECTIVE BOX
Patient is a 68-year-old male with PMHx of sinus node disorder s/p PPM, HTN, HLD, cerebral aneurysm s/p coiling, CVA w/ residual L sided weakness, seizure disorder, type 2 diabetes on metformin and gastritis admitted for acute cholecystitis s/p lap to open cholecystectomy 11/30, w/ ICU upgrade due to post-operative hypotension requiring pressors, downgraded to surgical floor.     Overnight Events: None  Interval HPI: Today is hospital day 5. Patient seen and examined at bedside. Patient states he is doing well, passing gas and tolerating diet.  Denies dizziness, headache, SOB, chest pain. Denies any questions or concerns at this time.     REVIEW OF SYSTEMS:  CONSTITUTIONAL: (-) weakness, (-) fevers, (-) chills  EYES/ENT: (-) visual changes,  (-) vertigo,  (-) throat pain   NECK:  (-) pain, (-) stiffness  RESPIRATORY:  (-) shortness of breath, (-) cough,  (-) wheezing,  (-) hemoptysis   CARDIOVASCULAR:  (-) chest pain, (-) palpitations  GASTROINTESTINAL:  (+) abdominal pain, (-) nausea, (-) vomiting, (-) diarrhea, (-) constipation, (-) melena,  (-) hematemesis,  (-) hematochezia  GENITOURINARY: (-) dysuria, (-) frequency, (-) hematuria  NEUROLOGICAL: (-) numbness, (-) weakness  SKIN: (-) itching, (-) rashes, (-) lesions      Vital Signs Last 24 Hrs    PHYSICAL EXAM:  GENERAL: NAD, lying in bed comfortably. Generally pale appearing  HEAD:  Atraumatic, Normocephalic  EYES: EOMI, conjunctiva and sclera clear  ENT: Moist mucous membranes  NECK: Supple, No JVD  CHEST/LUNG: Clear to auscultation bilaterally, good air entry bilaterally; No wheezing, rales, or rhonchi. Unlabored respirations  HEART: Regular rate and rhythm. S1 and S2. No murmurs, rubs, or gallops  ABDOMEN: Soft, TTP in RUG and LLQ, Nondistended. Bowel sounds present.   NIKHIL drain #1 and #2 with serosang output  JRUQ dressing clean, dry and intact.   EXTREMITIES:  2+ Peripheral Pulses. No clubbing, cyanosis, or edema  NERVOUS SYSTEM:  Alert & Oriented X3, speech clear. No deficits.  SKIN: No rashes, bruises, or other lesions    MEDICATIONS:  MEDICATIONS  (STANDING):  aspirin enteric coated 81 milliGRAM(s) Oral daily  atorvastatin 80 milliGRAM(s) Oral at bedtime  carBAMazepine ER Tablet 300 milliGRAM(s) Oral two times a day  chlorhexidine 2% Cloths 1 Application(s) Topical <User Schedule>  dextrose 5%. 1000 milliLiter(s) (50 mL/Hr) IV Continuous <Continuous>  dextrose 5%. 1000 milliLiter(s) (100 mL/Hr) IV Continuous <Continuous>  dextrose 50% Injectable 25 Gram(s) IV Push once  dextrose 50% Injectable 12.5 Gram(s) IV Push once  dextrose 50% Injectable 25 Gram(s) IV Push once  famotidine    Tablet 20 milliGRAM(s) Oral daily  heparin   Injectable 5000 Unit(s) SubCutaneous every 8 hours  insulin lispro (ADMELOG) corrective regimen sliding scale   SubCutaneous Before meals and at bedtime  latanoprost 0.005% Ophthalmic Solution 1 Drop(s) Both EYES at bedtime  melatonin 6 milliGRAM(s) Oral at bedtime  piperacillin/tazobactam IVPB.. 3.375 Gram(s) IV Intermittent every 8 hours  potassium chloride    Tablet ER 20 milliEquivalent(s) Oral once    MEDICATIONS  (PRN):  morphine  - Injectable 1 milliGRAM(s) IV Push every 4 hours PRN Severe Pain (7 - 10)  ondansetron Injectable 4 milliGRAM(s) IV Push every 6 hours PRN Nausea and/or Vomiting  oxyCODONE    IR 5 milliGRAM(s) Oral every 4 hours PRN Mild Pain (1 - 3)  oxyCODONE    IR 10 milliGRAM(s) Oral every 4 hours PRN Moderate Pain (4 - 6)    LABS:   All Labs Personally Reviewed       RADIOLOGY/EKG:  All Imaging and EKGs Personally Reviewed     NM Hepatobiliary Imaging w/ RX (11.29.23 @ 14:20)   IMPRESSION: Abnormal morphine-augmented hepatobiliary scan.  Findings are compatible with acute cholecystitis.    US Abdomen Limited (11.28.23 @ 21:21)   IMPRESSION:  Distended thick-walled gallbladder containing sludge.  Borderline CBD   measuring 6 mm.  Findings equivocal for acute cholecystitis. If   clinically indicated, further evaluation with nuclear HIDA scan may be   obtained.    CT Abdomen and Pelvis No Cont (11.28.23 @ 16:52)   IMPRESSION:  Findings suspicious for acute cholecystitis. Correlate with patient's   symptoms. Consider right upper quadrant ultrasound if clinically   indicated.    CT Chest No Cont (11.28.23 @ 16:51)   IMPRESSION:  Findings suspicious for acute cholecystitis. Correlate with patient's   symptoms. Consider right upper quadrant ultrasound if clinically   indicated.    Xray Chest 1 View AP/PA (11.28.23 @ 15:49)   Impression:  No acute pulmonary disease.    Xray Chest 1 View- PORTABLE-Urgent (11.24.23 @ 03:18)   IMPRESSION:  No evidence of acute infiltrate or pleural effusion.    TTE Echo Complete w/o Contrast w/ Doppler (11.30.23 @ 11:17)   Summary:   1. Left ventricular ejection fraction, by visual estimation, is 65 to   70%.   2. Normal global left ventricular systolic function.   3. Normal left ventricular internal cavity size.   4. Mildly enlarged right ventricle.   5. Mild mitral valve regurgitation.   6. Thickening of the anterior mitral valve leaflet.   7. Mild tricuspid regurgitation.   8. Mild aortic regurgitation.    12 Lead ECG (11.28.23 @ 15:11)   Normal sinus rhythm  Voltage criteria for left ventricular hypertrophy  Abnormal ECG  When compared with ECG of 24-NOV-2023 01:26,  Sinus rhythm has replaced Electronic atrial pacemaker             Patient is a 68-year-old male with PMHx of sinus node disorder s/p PPM, HTN, HLD, cerebral aneurysm s/p coiling, CVA w/ residual L sided weakness, seizure disorder, type 2 diabetes on metformin and gastritis admitted for acute cholecystitis s/p lap to open cholecystectomy 11/30, w/ ICU upgrade due to post-operative hypotension requiring pressors, downgraded to surgical floor.     Overnight Events: None  Interval HPI: Today is hospital day 6. Patient seen and examined at bedside. Patient states he is doing well, passing gas and tolerating diet.  Denies dizziness, headache, SOB, chest pain. Denies any questions or concerns at this time.     REVIEW OF SYSTEMS:  CONSTITUTIONAL: (-) weakness, (-) fevers, (-) chills  EYES/ENT: (-) visual changes,  (-) vertigo,  (-) throat pain   NECK:  (-) pain, (-) stiffness  RESPIRATORY:  (-) shortness of breath, (-) cough,  (-) wheezing,  (-) hemoptysis   CARDIOVASCULAR:  (-) chest pain, (-) palpitations  GASTROINTESTINAL:  (+) abdominal pain, (-) nausea, (-) vomiting, (-) diarrhea, (-) constipation, (-) melena,  (-) hematemesis,  (-) hematochezia  GENITOURINARY: (-) dysuria, (-) frequency, (-) hematuria  NEUROLOGICAL: (-) numbness, (-) weakness  SKIN: (-) itching, (-) rashes, (-) lesions      Vital Signs Last 24 Hrs    PHYSICAL EXAM:  GENERAL: NAD, lying in bed comfortably. Generally pale appearing  HEAD:  Atraumatic, Normocephalic  EYES: EOMI, conjunctiva and sclera clear  ENT: Moist mucous membranes  NECK: Supple, No JVD  CHEST/LUNG: Clear to auscultation bilaterally, good air entry bilaterally; No wheezing, rales, or rhonchi. Unlabored respirations  HEART: Regular rate and rhythm. S1 and S2. No murmurs, rubs, or gallops  ABDOMEN: Soft, TTP in RUG and LLQ, Nondistended. Bowel sounds present.   NIKHIL drain #1 and #2 with serosang output  JRUQ dressing clean, dry and intact.   EXTREMITIES:  2+ Peripheral Pulses. No clubbing, cyanosis, or edema  NERVOUS SYSTEM:  Alert & Oriented X3, speech clear. No deficits.  SKIN: No rashes, bruises, or other lesions    MEDICATIONS:  MEDICATIONS  (STANDING):  aspirin enteric coated 81 milliGRAM(s) Oral daily  atorvastatin 80 milliGRAM(s) Oral at bedtime  carBAMazepine ER Tablet 300 milliGRAM(s) Oral two times a day  chlorhexidine 2% Cloths 1 Application(s) Topical <User Schedule>  dextrose 5%. 1000 milliLiter(s) (50 mL/Hr) IV Continuous <Continuous>  dextrose 5%. 1000 milliLiter(s) (100 mL/Hr) IV Continuous <Continuous>  dextrose 50% Injectable 25 Gram(s) IV Push once  dextrose 50% Injectable 12.5 Gram(s) IV Push once  dextrose 50% Injectable 25 Gram(s) IV Push once  famotidine    Tablet 20 milliGRAM(s) Oral daily  heparin   Injectable 5000 Unit(s) SubCutaneous every 8 hours  insulin lispro (ADMELOG) corrective regimen sliding scale   SubCutaneous Before meals and at bedtime  latanoprost 0.005% Ophthalmic Solution 1 Drop(s) Both EYES at bedtime  melatonin 6 milliGRAM(s) Oral at bedtime  piperacillin/tazobactam IVPB.. 3.375 Gram(s) IV Intermittent every 8 hours  potassium chloride    Tablet ER 20 milliEquivalent(s) Oral once    MEDICATIONS  (PRN):  morphine  - Injectable 1 milliGRAM(s) IV Push every 4 hours PRN Severe Pain (7 - 10)  ondansetron Injectable 4 milliGRAM(s) IV Push every 6 hours PRN Nausea and/or Vomiting  oxyCODONE    IR 5 milliGRAM(s) Oral every 4 hours PRN Mild Pain (1 - 3)  oxyCODONE    IR 10 milliGRAM(s) Oral every 4 hours PRN Moderate Pain (4 - 6)    LABS:   All Labs Personally Reviewed       RADIOLOGY/EKG:  All Imaging and EKGs Personally Reviewed     NM Hepatobiliary Imaging w/ RX (11.29.23 @ 14:20)   IMPRESSION: Abnormal morphine-augmented hepatobiliary scan.  Findings are compatible with acute cholecystitis.    US Abdomen Limited (11.28.23 @ 21:21)   IMPRESSION:  Distended thick-walled gallbladder containing sludge.  Borderline CBD   measuring 6 mm.  Findings equivocal for acute cholecystitis. If   clinically indicated, further evaluation with nuclear HIDA scan may be   obtained.    CT Abdomen and Pelvis No Cont (11.28.23 @ 16:52)   IMPRESSION:  Findings suspicious for acute cholecystitis. Correlate with patient's   symptoms. Consider right upper quadrant ultrasound if clinically   indicated.    CT Chest No Cont (11.28.23 @ 16:51)   IMPRESSION:  Findings suspicious for acute cholecystitis. Correlate with patient's   symptoms. Consider right upper quadrant ultrasound if clinically   indicated.    Xray Chest 1 View AP/PA (11.28.23 @ 15:49)   Impression:  No acute pulmonary disease.    Xray Chest 1 View- PORTABLE-Urgent (11.24.23 @ 03:18)   IMPRESSION:  No evidence of acute infiltrate or pleural effusion.    TTE Echo Complete w/o Contrast w/ Doppler (11.30.23 @ 11:17)   Summary:   1. Left ventricular ejection fraction, by visual estimation, is 65 to   70%.   2. Normal global left ventricular systolic function.   3. Normal left ventricular internal cavity size.   4. Mildly enlarged right ventricle.   5. Mild mitral valve regurgitation.   6. Thickening of the anterior mitral valve leaflet.   7. Mild tricuspid regurgitation.   8. Mild aortic regurgitation.    12 Lead ECG (11.28.23 @ 15:11)   Normal sinus rhythm  Voltage criteria for left ventricular hypertrophy  Abnormal ECG  When compared with ECG of 24-NOV-2023 01:26,  Sinus rhythm has replaced Electronic atrial pacemaker             Patient is a 68-year-old male with PMHx of sinus node disorder s/p PPM, HTN, HLD, cerebral aneurysm s/p coiling, CVA w/ residual L sided weakness, seizure disorder, type 2 diabetes on metformin and gastritis admitted for acute cholecystitis s/p lap to open cholecystectomy 11/30, w/ ICU upgrade due to post-operative hypotension requiring pressors, downgraded to surgical floor.     Overnight Events: None  Interval HPI: Today is hospital day 6. Patient seen and examined at bedside. Patient states he is doing well, passing gas and tolerating diet.  Denies dizziness, headache, SOB, chest pain. Denies any questions or concerns at this time.     REVIEW OF SYSTEMS:  CONSTITUTIONAL: (-) weakness, (-) fevers, (-) chills  EYES/ENT: (-) visual changes,  (-) vertigo,  (-) throat pain   NECK:  (-) pain, (-) stiffness  RESPIRATORY:  (-) shortness of breath, (-) cough,  (-) wheezing,  (-) hemoptysis   CARDIOVASCULAR:  (-) chest pain, (-) palpitations  GASTROINTESTINAL:  (+) abdominal pain, (-) nausea, (-) vomiting, (-) diarrhea, (-) constipation, (-) melena,  (-) hematemesis,  (-) hematochezia  GENITOURINARY: (-) dysuria, (-) frequency, (-) hematuria  NEUROLOGICAL: (-) numbness, (-) weakness  SKIN: (-) itching, (-) rashes, (-) lesions      Vital Signs Last 24 Hrs  T(C): 36.7 (04 Dec 2023 05:00), Max: 37.2 (03 Dec 2023 09:36)  T(F): 98.1 (04 Dec 2023 05:00), Max: 98.9 (03 Dec 2023 09:36)  HR: 86 (04 Dec 2023 05:00) (71 - 86)  BP: 156/78 (04 Dec 2023 05:00) (146/72 - 156/78)  RR: 18 (04 Dec 2023 05:00) (17 - 18)  SpO2: 98% (04 Dec 2023 05:00) (98% - 98%)    O2 Parameters below as of 04 Dec 2023 05:00  Patient On (Oxygen Delivery Method): room air      PHYSICAL EXAM:  GENERAL: NAD, lying in bed comfortably. Generally pale appearing  HEAD:  Atraumatic, Normocephalic  EYES: EOMI, conjunctiva and sclera clear  ENT: Moist mucous membranes  NECK: Supple, No JVD  CHEST/LUNG: Clear to auscultation bilaterally, good air entry bilaterally; No wheezing, rales, or rhonchi. Unlabored respirations  HEART: Regular rate and rhythm. S1 and S2. No murmurs, rubs, or gallops  ABDOMEN: Soft, TTP in RUG and LLQ, Nondistended. Bowel sounds present.   NIKHIL drain #1 and #2 with serosang output  JRUQ dressing clean, dry and intact.   EXTREMITIES:  2+ Peripheral Pulses. No clubbing, cyanosis, or edema  NERVOUS SYSTEM:  Alert & Oriented X3, speech clear. No deficits.  SKIN: No rashes, bruises, or other lesions    MEDICATIONS:  MEDICATIONS  (STANDING):  aspirin enteric coated 81 milliGRAM(s) Oral daily  atorvastatin 80 milliGRAM(s) Oral at bedtime  carBAMazepine ER Tablet 300 milliGRAM(s) Oral two times a day  chlorhexidine 2% Cloths 1 Application(s) Topical <User Schedule>  dextrose 5%. 1000 milliLiter(s) (50 mL/Hr) IV Continuous <Continuous>  dextrose 5%. 1000 milliLiter(s) (100 mL/Hr) IV Continuous <Continuous>  dextrose 50% Injectable 25 Gram(s) IV Push once  dextrose 50% Injectable 12.5 Gram(s) IV Push once  dextrose 50% Injectable 25 Gram(s) IV Push once  famotidine    Tablet 20 milliGRAM(s) Oral daily  heparin   Injectable 5000 Unit(s) SubCutaneous every 8 hours  insulin lispro (ADMELOG) corrective regimen sliding scale   SubCutaneous Before meals and at bedtime  latanoprost 0.005% Ophthalmic Solution 1 Drop(s) Both EYES at bedtime  melatonin 6 milliGRAM(s) Oral at bedtime  piperacillin/tazobactam IVPB.. 3.375 Gram(s) IV Intermittent every 8 hours  potassium chloride    Tablet ER 20 milliEquivalent(s) Oral once    MEDICATIONS  (PRN):  morphine  - Injectable 1 milliGRAM(s) IV Push every 4 hours PRN Severe Pain (7 - 10)  ondansetron Injectable 4 milliGRAM(s) IV Push every 6 hours PRN Nausea and/or Vomiting  oxyCODONE    IR 5 milliGRAM(s) Oral every 4 hours PRN Mild Pain (1 - 3)  oxyCODONE    IR 10 milliGRAM(s) Oral every 4 hours PRN Moderate Pain (4 - 6)    LABS:   All Labs Personally Reviewed       RADIOLOGY/EKG:  All Imaging and EKGs Personally Reviewed     NM Hepatobiliary Imaging w/ RX (11.29.23 @ 14:20)   IMPRESSION: Abnormal morphine-augmented hepatobiliary scan.  Findings are compatible with acute cholecystitis.    US Abdomen Limited (11.28.23 @ 21:21)   IMPRESSION:  Distended thick-walled gallbladder containing sludge.  Borderline CBD   measuring 6 mm.  Findings equivocal for acute cholecystitis. If   clinically indicated, further evaluation with nuclear HIDA scan may be   obtained.    CT Abdomen and Pelvis No Cont (11.28.23 @ 16:52)   IMPRESSION:  Findings suspicious for acute cholecystitis. Correlate with patient's   symptoms. Consider right upper quadrant ultrasound if clinically   indicated.    CT Chest No Cont (11.28.23 @ 16:51)   IMPRESSION:  Findings suspicious for acute cholecystitis. Correlate with patient's   symptoms. Consider right upper quadrant ultrasound if clinically   indicated.    Xray Chest 1 View AP/PA (11.28.23 @ 15:49)   Impression:  No acute pulmonary disease.    Xray Chest 1 View- PORTABLE-Urgent (11.24.23 @ 03:18)   IMPRESSION:  No evidence of acute infiltrate or pleural effusion.    TTE Echo Complete w/o Contrast w/ Doppler (11.30.23 @ 11:17)   Summary:   1. Left ventricular ejection fraction, by visual estimation, is 65 to   70%.   2. Normal global left ventricular systolic function.   3. Normal left ventricular internal cavity size.   4. Mildly enlarged right ventricle.   5. Mild mitral valve regurgitation.   6. Thickening of the anterior mitral valve leaflet.   7. Mild tricuspid regurgitation.   8. Mild aortic regurgitation.    12 Lead ECG (11.28.23 @ 15:11)   Normal sinus rhythm  Voltage criteria for left ventricular hypertrophy  Abnormal ECG  When compared with ECG of 24-NOV-2023 01:26,  Sinus rhythm has replaced Electronic atrial pacemaker             Patient is a 68-year-old male with PMHx of sinus node disorder s/p PPM, HTN, HLD, cerebral aneurysm s/p coiling, CVA w/ residual L sided weakness, seizure disorder, type 2 diabetes on metformin and gastritis admitted for acute cholecystitis s/p lap to open cholecystectomy 11/30, w/ ICU upgrade due to post-operative hypotension requiring pressors, downgraded to surgical floor.     Overnight Events: None  Interval HPI: Today is hospital day 6. Patient seen and examined at bedside. Patient states he is doing well, passing gas and tolerating diet.  Denies dizziness, headache, SOB, chest pain. Denies any questions or concerns at this time.     REVIEW OF SYSTEMS:  CONSTITUTIONAL: (-) weakness, (-) fevers, (-) chills  EYES/ENT: (-) visual changes,  (-) vertigo,  (-) throat pain   NECK:  (-) pain, (-) stiffness  RESPIRATORY:  (-) shortness of breath, (-) cough,  (-) wheezing,  (-) hemoptysis   CARDIOVASCULAR:  (-) chest pain, (-) palpitations  GASTROINTESTINAL:  (+) abdominal pain, (-) nausea, (-) vomiting, (-) diarrhea, (-) constipation, (-) melena,  (-) hematemesis,  (-) hematochezia  GENITOURINARY: (-) dysuria, (-) frequency, (-) hematuria  NEUROLOGICAL: (-) numbness, (-) weakness  SKIN: (-) itching, (-) rashes, (-) lesions      Vital Signs Last 24 Hrs  T(C): 36.7 (04 Dec 2023 05:00), Max: 37.2 (03 Dec 2023 09:36)  T(F): 98.1 (04 Dec 2023 05:00), Max: 98.9 (03 Dec 2023 09:36)  HR: 86 (04 Dec 2023 05:00) (71 - 86)  BP: 156/78 (04 Dec 2023 05:00) (146/72 - 156/78)  RR: 18 (04 Dec 2023 05:00) (17 - 18)  SpO2: 98% (04 Dec 2023 05:00) (98% - 98%)    O2 Parameters below as of 04 Dec 2023 05:00  Patient On (Oxygen Delivery Method): room air      I&O's Summary    02 Dec 2023 07:01  -  03 Dec 2023 07:00  --------------------------------------------------------  IN: 25 mL / OUT: 1060 mL / NET: -1035 mL    03 Dec 2023 07:01  -  04 Dec 2023 06:29  --------------------------------------------------------  IN: 275 mL / OUT: 1510 mL / NET: -1235 mL    PHYSICAL EXAM:  GENERAL: NAD, lying in bed comfortably. Generally pale appearing  HEAD:  Atraumatic, Normocephalic  EYES: EOMI, conjunctiva and sclera clear  ENT: Moist mucous membranes  NECK: Supple, No JVD  CHEST/LUNG: Clear to auscultation bilaterally, good air entry bilaterally; No wheezing, rales, or rhonchi. Unlabored respirations  HEART: Regular rate and rhythm. S1 and S2. No murmurs, rubs, or gallops  ABDOMEN: Soft, TTP in RUG and LLQ, Nondistended. Bowel sounds present.   NIKHIL drain #1 and #2 with serosang output  JRUQ dressing clean, dry and intact.   EXTREMITIES:  2+ Peripheral Pulses. No clubbing, cyanosis, or edema  NERVOUS SYSTEM:  Alert & Oriented X3, speech clear. Left sided residual stroke weakness  SKIN: No rashes, bruises, or other lesions    MEDICATIONS:  MEDICATIONS  (STANDING):  aspirin enteric coated 81 milliGRAM(s) Oral daily  atorvastatin 80 milliGRAM(s) Oral at bedtime  carBAMazepine ER Tablet 300 milliGRAM(s) Oral two times a day  chlorhexidine 2% Cloths 1 Application(s) Topical <User Schedule>  dextrose 5%. 1000 milliLiter(s) (50 mL/Hr) IV Continuous <Continuous>  dextrose 5%. 1000 milliLiter(s) (100 mL/Hr) IV Continuous <Continuous>  dextrose 50% Injectable 25 Gram(s) IV Push once  dextrose 50% Injectable 12.5 Gram(s) IV Push once  dextrose 50% Injectable 25 Gram(s) IV Push once  famotidine    Tablet 20 milliGRAM(s) Oral daily  heparin   Injectable 5000 Unit(s) SubCutaneous every 8 hours  insulin lispro (ADMELOG) corrective regimen sliding scale   SubCutaneous Before meals and at bedtime  latanoprost 0.005% Ophthalmic Solution 1 Drop(s) Both EYES at bedtime  melatonin 6 milliGRAM(s) Oral at bedtime  piperacillin/tazobactam IVPB.. 3.375 Gram(s) IV Intermittent every 8 hours  potassium chloride    Tablet ER 20 milliEquivalent(s) Oral once    MEDICATIONS  (PRN):  morphine  - Injectable 1 milliGRAM(s) IV Push every 4 hours PRN Severe Pain (7 - 10)  ondansetron Injectable 4 milliGRAM(s) IV Push every 6 hours PRN Nausea and/or Vomiting  oxyCODONE    IR 5 milliGRAM(s) Oral every 4 hours PRN Mild Pain (1 - 3)  oxyCODONE    IR 10 milliGRAM(s) Oral every 4 hours PRN Moderate Pain (4 - 6)    LABS:   All Labs Personally Reviewed       RADIOLOGY/EKG:  All Imaging and EKGs Personally Reviewed     NM Hepatobiliary Imaging w/ RX (11.29.23 @ 14:20)   IMPRESSION: Abnormal morphine-augmented hepatobiliary scan.  Findings are compatible with acute cholecystitis.    US Abdomen Limited (11.28.23 @ 21:21)   IMPRESSION:  Distended thick-walled gallbladder containing sludge.  Borderline CBD   measuring 6 mm.  Findings equivocal for acute cholecystitis. If   clinically indicated, further evaluation with nuclear HIDA scan may be   obtained.    CT Abdomen and Pelvis No Cont (11.28.23 @ 16:52)   IMPRESSION:  Findings suspicious for acute cholecystitis. Correlate with patient's   symptoms. Consider right upper quadrant ultrasound if clinically   indicated.    CT Chest No Cont (11.28.23 @ 16:51)   IMPRESSION:  Findings suspicious for acute cholecystitis. Correlate with patient's   symptoms. Consider right upper quadrant ultrasound if clinically   indicated.    Xray Chest 1 View AP/PA (11.28.23 @ 15:49)   Impression:  No acute pulmonary disease.    Xray Chest 1 View- PORTABLE-Urgent (11.24.23 @ 03:18)   IMPRESSION:  No evidence of acute infiltrate or pleural effusion.    TTE Echo Complete w/o Contrast w/ Doppler (11.30.23 @ 11:17)   Summary:   1. Left ventricular ejection fraction, by visual estimation, is 65 to   70%.   2. Normal global left ventricular systolic function.   3. Normal left ventricular internal cavity size.   4. Mildly enlarged right ventricle.   5. Mild mitral valve regurgitation.   6. Thickening of the anterior mitral valve leaflet.   7. Mild tricuspid regurgitation.   8. Mild aortic regurgitation.    12 Lead ECG (11.28.23 @ 15:11)   Normal sinus rhythm  Voltage criteria for left ventricular hypertrophy  Abnormal ECG  When compared with ECG of 24-NOV-2023 01:26,  Sinus rhythm has replaced Electronic atrial pacemaker             Patient is a 68-year-old male with PMHx of sinus node disorder s/p PPM, HTN, HLD, cerebral aneurysm s/p coiling, CVA w/ residual L sided weakness, seizure disorder, type 2 diabetes on metformin and gastritis admitted for acute cholecystitis s/p lap to open cholecystectomy 11/30, w/ ICU upgrade due to post-operative hypotension requiring pressors, downgraded to surgical floor.     Overnight Events: Nurses concerned about draining from NIKHIL sites on bandaging.  Interval HPI: Today is hospital day 6. Patient seen and examined at bedside. Patient is complaining of constipation and that he is very gassy. He is passing a small amount of gas. Would also like to walk around today and tolerating diet.   Denies dizziness, headache, SOB, chest pain. Denies any questions or concerns at this time.     REVIEW OF SYSTEMS:  CONSTITUTIONAL: (-) weakness, (-) fevers, (-) chills  EYES/ENT: (-) visual changes,  (-) vertigo,  (-) throat pain   NECK:  (-) pain, (-) stiffness  RESPIRATORY:  (-) shortness of breath, (-) cough,  (-) wheezing,  (-) hemoptysis   CARDIOVASCULAR:  (-) chest pain, (-) palpitations  GASTROINTESTINAL:  (+) abdominal pain, (-) nausea, (-) vomiting, (-) diarrhea, (+) constipation, (-) melena,  (-) hematemesis,  (-) hematochezia  GENITOURINARY: (-) dysuria, (-) frequency, (-) hematuria  NEUROLOGICAL: (-) numbness, (-) weakness  SKIN: (-) itching, (-) rashes, (-) lesions      Vital Signs Last 24 Hrs  T(C): 36.7 (04 Dec 2023 05:00), Max: 37.2 (03 Dec 2023 09:36)  T(F): 98.1 (04 Dec 2023 05:00), Max: 98.9 (03 Dec 2023 09:36)  HR: 86 (04 Dec 2023 05:00) (71 - 86)  BP: 156/78 (04 Dec 2023 05:00) (146/72 - 156/78)  RR: 18 (04 Dec 2023 05:00) (17 - 18)  SpO2: 98% (04 Dec 2023 05:00) (98% - 98%)    O2 Parameters below as of 04 Dec 2023 05:00  Patient On (Oxygen Delivery Method): room air      I&O's Summary    02 Dec 2023 07:01  -  03 Dec 2023 07:00  --------------------------------------------------------  IN: 25 mL / OUT: 1060 mL / NET: -1035 mL    03 Dec 2023 07:01  -  04 Dec 2023 06:29  --------------------------------------------------------  IN: 275 mL / OUT: 1510 mL / NET: -1235 mL    PHYSICAL EXAM:  GENERAL: NAD, lying in bed comfortably. Generally pale appearing  HEAD:  Atraumatic, Normocephalic  EYES: EOMI, conjunctiva and sclera clear  ENT: Moist mucous membranes  NECK: Supple, No JVD  CHEST/LUNG: Clear to auscultation bilaterally, good air entry bilaterally; No wheezing, rales, or rhonchi. Unlabored respirations  HEART: Regular rate and rhythm. S1 and S2. No murmurs, rubs, or gallops  ABDOMEN: Soft, TTP in RUG and LLQ, Nondistended. Bowel sounds present.   NIKHIL drain #1 and #2 with serosang output  JRUQ dressing clean, dry and intact.   EXTREMITIES:  2+ Peripheral Pulses. No clubbing, cyanosis, or edema  NERVOUS SYSTEM:  Alert & Oriented X3, speech clear. Left sided residual stroke weakness  SKIN: No rashes, bruises, or other lesions    MEDICATIONS:  MEDICATIONS  (STANDING):  aspirin enteric coated 81 milliGRAM(s) Oral daily  atorvastatin 80 milliGRAM(s) Oral at bedtime  carBAMazepine ER Tablet 300 milliGRAM(s) Oral two times a day  chlorhexidine 2% Cloths 1 Application(s) Topical <User Schedule>  dextrose 5%. 1000 milliLiter(s) (50 mL/Hr) IV Continuous <Continuous>  dextrose 5%. 1000 milliLiter(s) (100 mL/Hr) IV Continuous <Continuous>  dextrose 50% Injectable 25 Gram(s) IV Push once  dextrose 50% Injectable 12.5 Gram(s) IV Push once  dextrose 50% Injectable 25 Gram(s) IV Push once  famotidine    Tablet 20 milliGRAM(s) Oral daily  heparin   Injectable 5000 Unit(s) SubCutaneous every 8 hours  insulin lispro (ADMELOG) corrective regimen sliding scale   SubCutaneous Before meals and at bedtime  latanoprost 0.005% Ophthalmic Solution 1 Drop(s) Both EYES at bedtime  melatonin 6 milliGRAM(s) Oral at bedtime  piperacillin/tazobactam IVPB.. 3.375 Gram(s) IV Intermittent every 8 hours  potassium chloride    Tablet ER 20 milliEquivalent(s) Oral once    MEDICATIONS  (PRN):  morphine  - Injectable 1 milliGRAM(s) IV Push every 4 hours PRN Severe Pain (7 - 10)  ondansetron Injectable 4 milliGRAM(s) IV Push every 6 hours PRN Nausea and/or Vomiting  oxyCODONE    IR 5 milliGRAM(s) Oral every 4 hours PRN Mild Pain (1 - 3)  oxyCODONE    IR 10 milliGRAM(s) Oral every 4 hours PRN Moderate Pain (4 - 6)    LABS:   All Labs Personally Reviewed       RADIOLOGY/EKG:  All Imaging and EKGs Personally Reviewed     NM Hepatobiliary Imaging w/ RX (11.29.23 @ 14:20)   IMPRESSION: Abnormal morphine-augmented hepatobiliary scan.  Findings are compatible with acute cholecystitis.    US Abdomen Limited (11.28.23 @ 21:21)   IMPRESSION:  Distended thick-walled gallbladder containing sludge.  Borderline CBD   measuring 6 mm.  Findings equivocal for acute cholecystitis. If   clinically indicated, further evaluation with nuclear HIDA scan may be   obtained.    CT Abdomen and Pelvis No Cont (11.28.23 @ 16:52)   IMPRESSION:  Findings suspicious for acute cholecystitis. Correlate with patient's   symptoms. Consider right upper quadrant ultrasound if clinically   indicated.    CT Chest No Cont (11.28.23 @ 16:51)   IMPRESSION:  Findings suspicious for acute cholecystitis. Correlate with patient's   symptoms. Consider right upper quadrant ultrasound if clinically   indicated.    Xray Chest 1 View AP/PA (11.28.23 @ 15:49)   Impression:  No acute pulmonary disease.    Xray Chest 1 View- PORTABLE-Urgent (11.24.23 @ 03:18)   IMPRESSION:  No evidence of acute infiltrate or pleural effusion.    TTE Echo Complete w/o Contrast w/ Doppler (11.30.23 @ 11:17)   Summary:   1. Left ventricular ejection fraction, by visual estimation, is 65 to   70%.   2. Normal global left ventricular systolic function.   3. Normal left ventricular internal cavity size.   4. Mildly enlarged right ventricle.   5. Mild mitral valve regurgitation.   6. Thickening of the anterior mitral valve leaflet.   7. Mild tricuspid regurgitation.   8. Mild aortic regurgitation.    12 Lead ECG (11.28.23 @ 15:11)   Normal sinus rhythm  Voltage criteria for left ventricular hypertrophy  Abnormal ECG  When compared with ECG of 24-NOV-2023 01:26,  Sinus rhythm has replaced Electronic atrial pacemaker             Patient is a 68-year-old male with PMHx of sinus node disorder s/p PPM, HTN, HLD, cerebral aneurysm s/p coiling, CVA w/ residual L sided weakness, seizure disorder, type 2 diabetes on metformin and gastritis admitted for acute cholecystitis s/p lap to open cholecystectomy 11/30, w/ ICU upgrade due to post-operative hypotension requiring pressors, downgraded to surgical floor.     Overnight Events: Nurses concerned about draining from NIKHIL sites on bandaging.  Interval HPI: Today is hospital day 6. Patient seen and examined at bedside. Patient is complaining of constipation and that he is very gassy. He is passing a small amount of gas. Would also like to walk around today and tolerating diet.   Denies dizziness, headache, SOB, chest pain. Denies any questions or concerns at this time.     REVIEW OF SYSTEMS:  CONSTITUTIONAL: (-) weakness, (-) fevers, (-) chills  EYES/ENT: (-) visual changes,  (-) vertigo,  (-) throat pain   NECK:  (-) pain, (-) stiffness  RESPIRATORY:  (-) shortness of breath, (-) cough,  (-) wheezing,  (-) hemoptysis   CARDIOVASCULAR:  (-) chest pain, (-) palpitations  GASTROINTESTINAL:  (+) abdominal pain, (-) nausea, (-) vomiting, (-) diarrhea, (+) constipation, (-) melena,  (-) hematemesis,  (-) hematochezia  GENITOURINARY: (-) dysuria, (-) frequency, (-) hematuria  NEUROLOGICAL: (-) numbness, (-) weakness  SKIN: (-) itching, (-) rashes, (-) lesions      Vital Signs Last 24 Hrs  T(C): 36.7 (04 Dec 2023 05:00), Max: 37.2 (03 Dec 2023 09:36)  T(F): 98.1 (04 Dec 2023 05:00), Max: 98.9 (03 Dec 2023 09:36)  HR: 86 (04 Dec 2023 05:00) (71 - 86)  BP: 156/78 (04 Dec 2023 05:00) (146/72 - 156/78)  RR: 18 (04 Dec 2023 05:00) (17 - 18)  SpO2: 98% (04 Dec 2023 05:00) (98% - 98%)    O2 Parameters below as of 04 Dec 2023 05:00  Patient On (Oxygen Delivery Method): room air      I&O's Summary    02 Dec 2023 07:01  -  03 Dec 2023 07:00  --------------------------------------------------------  IN: 25 mL / OUT: 1060 mL / NET: -1035 mL    03 Dec 2023 07:01  -  04 Dec 2023 06:29  --------------------------------------------------------  IN: 275 mL / OUT: 1510 mL / NET: -1235 mL    PHYSICAL EXAM:  GENERAL: NAD, lying in bed comfortably. Generally pale appearing  HEAD:  Atraumatic, Normocephalic  EYES: EOMI, conjunctiva and sclera clear, watery eyes  ENT: Moist mucous membranes  NECK: Supple, No JVD  CHEST/LUNG: Clear to auscultation bilaterally, good air entry bilaterally; No wheezing, rales, or rhonchi. Unlabored respirations  HEART: Regular rate and rhythm. S1 and S2. No murmurs, rubs, or gallops  ABDOMEN: Soft, TTP in RUG and LLQ, Nondistended. Bowel sounds present.   NIKHIL drain #1 and #2 with serosang output, drain 2 mixed with bile.   JRUQ dressing with brown staining, intact.   EXTREMITIES:  2+ Peripheral Pulses. No clubbing, cyanosis, or edema  NERVOUS SYSTEM:  Alert & Oriented X3, speech clear. Left sided residual stroke weakness  SKIN: No rashes, bruises, or other lesions    MEDICATIONS:  MEDICATIONS  (STANDING):  aspirin enteric coated 81 milliGRAM(s) Oral daily  atorvastatin 80 milliGRAM(s) Oral at bedtime  carBAMazepine ER Tablet 300 milliGRAM(s) Oral two times a day  chlorhexidine 2% Cloths 1 Application(s) Topical <User Schedule>  dextrose 5%. 1000 milliLiter(s) (50 mL/Hr) IV Continuous <Continuous>  dextrose 5%. 1000 milliLiter(s) (100 mL/Hr) IV Continuous <Continuous>  dextrose 50% Injectable 25 Gram(s) IV Push once  dextrose 50% Injectable 12.5 Gram(s) IV Push once  dextrose 50% Injectable 25 Gram(s) IV Push once  famotidine    Tablet 20 milliGRAM(s) Oral daily  heparin   Injectable 5000 Unit(s) SubCutaneous every 8 hours  insulin lispro (ADMELOG) corrective regimen sliding scale   SubCutaneous Before meals and at bedtime  latanoprost 0.005% Ophthalmic Solution 1 Drop(s) Both EYES at bedtime  melatonin 6 milliGRAM(s) Oral at bedtime  piperacillin/tazobactam IVPB.. 3.375 Gram(s) IV Intermittent every 8 hours  potassium chloride    Tablet ER 20 milliEquivalent(s) Oral once    MEDICATIONS  (PRN):  morphine  - Injectable 1 milliGRAM(s) IV Push every 4 hours PRN Severe Pain (7 - 10)  ondansetron Injectable 4 milliGRAM(s) IV Push every 6 hours PRN Nausea and/or Vomiting  oxyCODONE    IR 5 milliGRAM(s) Oral every 4 hours PRN Mild Pain (1 - 3)  oxyCODONE    IR 10 milliGRAM(s) Oral every 4 hours PRN Moderate Pain (4 - 6)    LABS:   All Labs Personally Reviewed                         10.1   6.72  )-----------( 292      ( 04 Dec 2023 05:24 )             30.4     12-04    143  |  104  |  15  ----------------------------<  177<H>  3.2<L>   |  28  |  1.28    Ca    8.3<L>      04 Dec 2023 05:24  Phos  2.8     12-03  Mg     2.5     12-04    TPro  6.8  /  Alb  2.0<L>  /  TBili  0.4  /  DBili  x   /  AST  35  /  ALT  53<H>  /  AlkPhos  98  12-04    RADIOLOGY/EKG:  All Imaging and EKGs Personally Reviewed     NM Hepatobiliary Imaging w/ RX (11.29.23 @ 14:20)   IMPRESSION: Abnormal morphine-augmented hepatobiliary scan.  Findings are compatible with acute cholecystitis.    US Abdomen Limited (11.28.23 @ 21:21)   IMPRESSION:  Distended thick-walled gallbladder containing sludge.  Borderline CBD   measuring 6 mm.  Findings equivocal for acute cholecystitis. If   clinically indicated, further evaluation with nuclear HIDA scan may be   obtained.    CT Abdomen and Pelvis No Cont (11.28.23 @ 16:52)   IMPRESSION:  Findings suspicious for acute cholecystitis. Correlate with patient's   symptoms. Consider right upper quadrant ultrasound if clinically   indicated.    CT Chest No Cont (11.28.23 @ 16:51)   IMPRESSION:  Findings suspicious for acute cholecystitis. Correlate with patient's   symptoms. Consider right upper quadrant ultrasound if clinically   indicated.    Xray Chest 1 View AP/PA (11.28.23 @ 15:49)   Impression:  No acute pulmonary disease.    Xray Chest 1 View- PORTABLE-Urgent (11.24.23 @ 03:18)   IMPRESSION:  No evidence of acute infiltrate or pleural effusion.    TTE Echo Complete w/o Contrast w/ Doppler (11.30.23 @ 11:17)   Summary:   1. Left ventricular ejection fraction, by visual estimation, is 65 to   70%.   2. Normal global left ventricular systolic function.   3. Normal left ventricular internal cavity size.   4. Mildly enlarged right ventricle.   5. Mild mitral valve regurgitation.   6. Thickening of the anterior mitral valve leaflet.   7. Mild tricuspid regurgitation.   8. Mild aortic regurgitation.    12 Lead ECG (11.28.23 @ 15:11)   Normal sinus rhythm  Voltage criteria for left ventricular hypertrophy  Abnormal ECG  When compared with ECG of 24-NOV-2023 01:26,  Sinus rhythm has replaced Electronic atrial pacemaker

## 2023-12-04 NOTE — DISCHARGE NOTE PROVIDER - HOSPITAL COURSE
Patient is a 68-year-old male with PMHx of sinus node disorder s/p PPM, HTN, HLD, cerebral aneurysm s/p coiling, CVA w/ residual L sided weakness, seizure disorder, type 2 diabetes on metformin and gastritis admitted for acute cholecystitis s/p lap to open cholecystectomy 11/30, w/ ICU upgrade due to post-operative hypotension requiring pressors, downgraded to surgical floor. In the ED /73, HR 71, Temp 98.2, RR 18 and SpO2 98 on RA, CT chest abd/pelvis showed Distended gallbladder with gallbladder wall thickening and pericholecystic fat stranding and hemangioma in T7 vertebrae. Pt received x1 dose of Zosyn.  Patient was brought to the OR for cholecystectomy. Procedure was complicated by drainage of a small amount of bile and a large amount of pus from gallbladder.  Patient became hypotensive and was moved to the ICU then later downgraded to medical floors. Course was further complicated by acute blood loss anemia, which improved with 1 unit pRBCs.   Patient is now medically optimized for discharge to acute rehab.     Patient was seen and examined by me this morning. No acute complaints. Eager to go to rehab.     consultants: PMR, ICU, Surgery    RADIOLOGY/EKG:  All Imaging and EKGs Personally Reviewed     NM Hepatobiliary Imaging w/ RX (11.29.23 @ 14:20)   IMPRESSION: Abnormal morphine-augmented hepatobiliary scan.  Findings are compatible with acute cholecystitis.    US Abdomen Limited (11.28.23 @ 21:21)   IMPRESSION:  Distended thick-walled gallbladder containing sludge.  Borderline CBD   measuring 6 mm.  Findings equivocal for acute cholecystitis. If   clinically indicated, further evaluation with nuclear HIDA scan may be   obtained.    CT Abdomen and Pelvis No Cont (11.28.23 @ 16:52)   IMPRESSION:  Findings suspicious for acute cholecystitis. Correlate with patient's   symptoms. Consider right upper quadrant ultrasound if clinically   indicated.    CT Chest No Cont (11.28.23 @ 16:51)   IMPRESSION:  Findings suspicious for acute cholecystitis. Correlate with patient's   symptoms. Consider right upper quadrant ultrasound if clinically   indicated.    Xray Chest 1 View AP/PA (11.28.23 @ 15:49)   Impression:  No acute pulmonary disease.    Xray Chest 1 View- PORTABLE-Urgent (11.24.23 @ 03:18)   IMPRESSION:  No evidence of acute infiltrate or pleural effusion.    TTE Echo Complete w/o Contrast w/ Doppler (11.30.23 @ 11:17)   Summary:   1. Left ventricular ejection fraction, by visual estimation, is 65 to   70%.   2. Normal global left ventricular systolic function.   3. Normal left ventricular internal cavity size.   4. Mildly enlarged right ventricle.   5. Mild mitral valve regurgitation.   6. Thickening of the anterior mitral valve leaflet.   7. Mild tricuspid regurgitation.   8. Mild aortic regurgitation.    12 Lead ECG (11.28.23 @ 15:11)   Normal sinus rhythm  Voltage criteria for left ventricular hypertrophy  Abnormal ECG  When compared with ECG of 24-NOV-2023 01:26,  Sinus rhythm has replaced Electronic atrial pacemaker Patient is a 68-year-old male with PMHx of sinus node disorder s/p PPM, HTN, HLD, cerebral aneurysm s/p coiling, CVA w/ residual L sided weakness, seizure disorder, type 2 diabetes on metformin and gastritis admitted for acute cholecystitis s/p lap to open cholecystectomy 11/30, w/ ICU upgrade due to post-operative hypotension requiring pressors, downgraded to surgical floor. In the ED /73, HR 71, Temp 98.2, RR 18 and SpO2 98 on RA, CT chest abd/pelvis showed Distended gallbladder with gallbladder wall thickening and pericholecystic fat stranding and hemangioma in T7 vertebrae. Pt received x1 dose of Zosyn.  Patient was brought to the OR for cholecystectomy. Procedure was complicated by drainage of a small amount of bile and a large amount of pus from gallbladder.  Patient became hypotensive and was moved to the ICU then later downgraded to medical floors. Course was further complicated by acute blood loss anemia, which improved with 1 unit pRBCs. Patient was also found to have bile duct leakage, which required ERCP on 12/5/23  Patient is now medically optimized for discharge to acute rehab.     Patient was seen and examined by me this morning. No acute complaints. Eager to go to rehab.     consultants: PMR, ICU, Surgery    RADIOLOGY/EKG:  All Imaging and EKGs Personally Reviewed     NM Hepatobiliary Imaging w/ RX (11.29.23 @ 14:20)   IMPRESSION: Abnormal morphine-augmented hepatobiliary scan.  Findings are compatible with acute cholecystitis.    US Abdomen Limited (11.28.23 @ 21:21)   IMPRESSION:  Distended thick-walled gallbladder containing sludge.  Borderline CBD   measuring 6 mm.  Findings equivocal for acute cholecystitis. If   clinically indicated, further evaluation with nuclear HIDA scan may be   obtained.    CT Abdomen and Pelvis No Cont (11.28.23 @ 16:52)   IMPRESSION:  Findings suspicious for acute cholecystitis. Correlate with patient's   symptoms. Consider right upper quadrant ultrasound if clinically   indicated.    CT Chest No Cont (11.28.23 @ 16:51)   IMPRESSION:  Findings suspicious for acute cholecystitis. Correlate with patient's   symptoms. Consider right upper quadrant ultrasound if clinically   indicated.    Xray Chest 1 View AP/PA (11.28.23 @ 15:49)   Impression:  No acute pulmonary disease.    Xray Chest 1 View- PORTABLE-Urgent (11.24.23 @ 03:18)   IMPRESSION:  No evidence of acute infiltrate or pleural effusion.    TTE Echo Complete w/o Contrast w/ Doppler (11.30.23 @ 11:17)   Summary:   1. Left ventricular ejection fraction, by visual estimation, is 65 to   70%.   2. Normal global left ventricular systolic function.   3. Normal left ventricular internal cavity size.   4. Mildly enlarged right ventricle.   5. Mild mitral valve regurgitation.   6. Thickening of the anterior mitral valve leaflet.   7. Mild tricuspid regurgitation.   8. Mild aortic regurgitation.    12 Lead ECG (11.28.23 @ 15:11)   Normal sinus rhythm  Voltage criteria for left ventricular hypertrophy  Abnormal ECG  When compared with ECG of 24-NOV-2023 01:26,  Sinus rhythm has replaced Electronic atrial pacemaker Patient is a 68-year-old male with PMHx of sinus node disorder s/p PPM, HTN, HLD, cerebral aneurysm s/p coiling, CVA w/ residual L sided weakness, seizure disorder, type 2 diabetes on metformin and gastritis admitted for acute cholecystitis s/p lap to open cholecystectomy 11/30, w/ ICU upgrade due to post-operative hypotension requiring pressors, downgraded to surgical floor. In the ED /73, HR 71, Temp 98.2, RR 18 and SpO2 98 on RA, CT chest abd/pelvis showed Distended gallbladder with gallbladder wall thickening and pericholecystic fat stranding and hemangioma in T7 vertebrae. Pt received x1 dose of Zosyn.  Patient was brought to the OR for cholecystectomy. Procedure was complicated by drainage of a small amount of bile and a large amount of pus from gallbladder.  Patient became hypotensive and was moved to the ICU then later downgraded to medical floors. Course was further complicated by acute blood loss anemia, which improved with 1 unit pRBCs. Patient was also found to have bile duct leakage, which required ERCP on 12/5/23. Patient improved and surgery and GI cleared him to return to acute rehab with follow up with GI and a follow up ERCP in 4-8 weeks.   Patient is now medically optimized for discharge to acute rehab.     Patient was seen and examined by me this morning. No acute complaints. Eager to go to rehab.         consultants: PMR, ICU, Surgery, GI    RADIOLOGY/EKG:  All Imaging and EKGs Personally Reviewed     NM Hepatobiliary Imaging w/ RX (11.29.23 @ 14:20)   IMPRESSION: Abnormal morphine-augmented hepatobiliary scan.  Findings are compatible with acute cholecystitis.    US Abdomen Limited (11.28.23 @ 21:21)   IMPRESSION:  Distended thick-walled gallbladder containing sludge.  Borderline CBD   measuring 6 mm.  Findings equivocal for acute cholecystitis. If   clinically indicated, further evaluation with nuclear HIDA scan may be   obtained.    CT Abdomen and Pelvis No Cont (11.28.23 @ 16:52)   IMPRESSION:  Findings suspicious for acute cholecystitis. Correlate with patient's   symptoms. Consider right upper quadrant ultrasound if clinically   indicated.    CT Chest No Cont (11.28.23 @ 16:51)   IMPRESSION:  Findings suspicious for acute cholecystitis. Correlate with patient's   symptoms. Consider right upper quadrant ultrasound if clinically   indicated.    Xray Chest 1 View AP/PA (11.28.23 @ 15:49)   Impression:  No acute pulmonary disease.    Xray Chest 1 View- PORTABLE-Urgent (11.24.23 @ 03:18)   IMPRESSION:  No evidence of acute infiltrate or pleural effusion.    TTE Echo Complete w/o Contrast w/ Doppler (11.30.23 @ 11:17)   Summary:   1. Left ventricular ejection fraction, by visual estimation, is 65 to   70%.   2. Normal global left ventricular systolic function.   3. Normal left ventricular internal cavity size.   4. Mildly enlarged right ventricle.   5. Mild mitral valve regurgitation.   6. Thickening of the anterior mitral valve leaflet.   7. Mild tricuspid regurgitation.   8. Mild aortic regurgitation.    12 Lead ECG (11.28.23 @ 15:11)   Normal sinus rhythm  Voltage criteria for left ventricular hypertrophy  Abnormal ECG  When compared with ECG of 24-NOV-2023 01:26,  Sinus rhythm has replaced Electronic atrial pacemaker Patient is a 68-year-old male with PMHx of sinus node disorder s/p PPM, HTN, HLD, cerebral aneurysm s/p coiling, CVA w/ residual L sided weakness, seizure disorder, type 2 diabetes on metformin and gastritis admitted for acute cholecystitis s/p lap to open cholecystectomy 11/30, w/ ICU upgrade due to post-operative hypotension requiring pressors, downgraded to surgical floor. In the ED /73, HR 71, Temp 98.2, RR 18 and SpO2 98 on RA, CT chest abd/pelvis showed Distended gallbladder with gallbladder wall thickening and pericholecystic fat stranding and hemangioma in T7 vertebrae. Pt received x1 dose of Zosyn.  Patient was brought to the OR for cholecystectomy. Procedure was complicated by drainage of a small amount of bile and a large amount of pus from gallbladder.  Patient became hypotensive and was moved to the ICU then later downgraded to medical floors. Course was further complicated by acute blood loss anemia, which improved with 1 unit pRBCs. Patient was also found to have bile duct leakage, which required ERCP on 12/5/23. Patient improved and surgery and GI cleared him to return to acute rehab with follow up with GI and a follow up ERCP in 4-8 weeks.   Patient is now medically optimized for discharge to acute rehab.     Patient was seen and examined by me this morning. No acute complaints. Eager to go to rehab.     REVIEW OF SYSTEMS:  CONSTITUTIONAL: No fevers or chills  EYES/ENT: No visual changes;  No vertigo or throat pain   NECK: No pain or stiffness  RESPIRATORY: No cough, wheezing, hemoptysis; No shortness of breath  CARDIOVASCULAR: No chest pain or palpitations  GASTROINTESTINAL: No abdominal or epigastric pain. No nausea, vomiting; No diarrhea or constipation.   GENITOURINARY: No dysuria, frequency or hematuria  NEUROLOGICAL: No numbness, (+) residual left sided weakness of upper and lower extremity  SKIN: No itching, burning, rashes, or lesions       Vital Signs Last 24 Hrs  T(C): 36.9 (07 Dec 2023 05:00), Max: 36.9 (07 Dec 2023 05:00)  T(F): 98.5 (07 Dec 2023 05:00), Max: 98.5 (07 Dec 2023 05:00)  HR: 64 (07 Dec 2023 05:00) (64 - 64)  BP: 126/59 (07 Dec 2023 05:00) (126/59 - 126/59)  BP(mean): 64 (07 Dec 2023 05:00) (64 - 64)  RR: 16 (07 Dec 2023 05:00) (16 - 16)  SpO2: 97% (07 Dec 2023 05:00) (97% - 97%)  Parameters below as of 07 Dec 2023 05:00  Patient On (Oxygen Delivery Method): room air  PHYSICAL EXAM  Constitutional: Pt lying in bed, awake and alert, NAD  HEENT: EOMI, normocephalic, moist mucous membranes  Neck: Soft and supple, no JVD  Respiratory: CTABL, No wheezing, rales or rhonchi  Cardiovascular: S1S2+, RRR, no M/G/R  Gastrointestinal: BS+, soft, NT/ND, no guarding, no rebound  Extremities: No calf pain  Vascular: Peripheral pulses present  Neurological: AAOx3, no focal deficits  Musculoskeletal: Normal muscle tone, no atrophy, no rigidity, no contractions  Skin: No new lesions    consultants: PMR, ICU, Surgery, GI    RADIOLOGY/EKG:  All Imaging and EKGs Personally Reviewed     NM Hepatobiliary Imaging w/ RX (11.29.23 @ 14:20)   IMPRESSION: Abnormal morphine-augmented hepatobiliary scan.  Findings are compatible with acute cholecystitis.    US Abdomen Limited (11.28.23 @ 21:21)   IMPRESSION:  Distended thick-walled gallbladder containing sludge.  Borderline CBD   measuring 6 mm.  Findings equivocal for acute cholecystitis. If   clinically indicated, further evaluation with nuclear HIDA scan may be   obtained.    CT Abdomen and Pelvis No Cont (11.28.23 @ 16:52)   IMPRESSION:  Findings suspicious for acute cholecystitis. Correlate with patient's   symptoms. Consider right upper quadrant ultrasound if clinically   indicated.    CT Chest No Cont (11.28.23 @ 16:51)   IMPRESSION:  Findings suspicious for acute cholecystitis. Correlate with patient's   symptoms. Consider right upper quadrant ultrasound if clinically   indicated.    Xray Chest 1 View AP/PA (11.28.23 @ 15:49)   Impression:  No acute pulmonary disease.    Xray Chest 1 View- PORTABLE-Urgent (11.24.23 @ 03:18)   IMPRESSION:  No evidence of acute infiltrate or pleural effusion.    TTE Echo Complete w/o Contrast w/ Doppler (11.30.23 @ 11:17)   Summary:   1. Left ventricular ejection fraction, by visual estimation, is 65 to   70%.   2. Normal global left ventricular systolic function.   3. Normal left ventricular internal cavity size.   4. Mildly enlarged right ventricle.   5. Mild mitral valve regurgitation.   6. Thickening of the anterior mitral valve leaflet.   7. Mild tricuspid regurgitation.   8. Mild aortic regurgitation.    12 Lead ECG (11.28.23 @ 15:11)   Normal sinus rhythm  Voltage criteria for left ventricular hypertrophy  Abnormal ECG  When compared with ECG of 24-NOV-2023 01:26,  Sinus rhythm has replaced Electronic atrial pacemaker

## 2023-12-04 NOTE — PROVIDER CONTACT NOTE (OTHER) - SITUATION
Patient found to have saturated NIKHIL dressing over NIKHIL drain #2. Resident contacted and came to see patient.

## 2023-12-04 NOTE — PROGRESS NOTE ADULT - SUBJECTIVE AND OBJECTIVE BOX
POD #4    Remains afebrile  Labs WNL  Tolerating PO  Draining 500-600 cc/24 hrs of probable bile - not unexpected      PLAN: If continues to drain at same rate, will ask GI to perform ERCP with insertion of stent.

## 2023-12-04 NOTE — PROGRESS NOTE ADULT - PROBLEM SELECTOR PLAN 2
- Hgb  this AM,  - Transfusion consent in chart  - continue to monitor and maintain Hb >7 - Hgb  this AM,  - Yesterday, patient had an appropriate response to pRBCs  - Heparin held yesterday  - Transfusion consent in chart  - continue to monitor and maintain Hb >7 - Hgb 10.1 this AM  - Yesterday, patient had an appropriate response to pRBCs  - OK to restart heparin per surgery. Hb now stable  - Transfusion consent in chart  - continue to monitor and maintain Hb >7

## 2023-12-04 NOTE — DISCHARGE NOTE PROVIDER - DETAILS OF MALNUTRITION DIAGNOSIS/DIAGNOSES
This patient has been assessed with a concern for Malnutrition and was treated during this hospitalization for the following Nutrition diagnosis/diagnoses:     -  12/01/2023: Moderate protein-calorie malnutrition

## 2023-12-04 NOTE — DISCHARGE NOTE PROVIDER - NSDCFUSCHEDAPPT_GEN_ALL_CORE_FT
Aster Jones  [facility]  ENDOCRIN [address] [place] [address]  Scheduled Appointment: 12/19/2023     Aster Jones  [facility]  ENDOCRIN [address] [place] [address]  Scheduled Appointment: 12/19/2023

## 2023-12-04 NOTE — DISCHARGE NOTE PROVIDER - NSDCMRMEDTOKEN_GEN_ALL_CORE_FT
Aspirin Enteric Coated 81 mg oral delayed release tablet: 1 tab(s) orally once a day  Hospital     atorvastatin 80 mg oral tablet: 1 tab(s) orally once a day (at bedtime)  Hospital  carBAMazepine 300 mg oral capsule, extended release: 1 cap(s) orally 2 times a day  Hospital   clopidogrel 75 mg oral tablet: 1 tab(s) orally once a day  Hospital   famotidine 20 mg oral tablet: 1 tab(s) orally 2 times a day  HOME HOSPITAL   FLUoxetine 20 mg oral capsule: 2 cap(s) orally once a day  Hospital   fluticasone 50 mcg/inh nasal spray: 1 spray(s) nasal once a day  HOME  latanoprost 0.005% ophthalmic solution: 1 drop(s) to each affected eye once a day (at bedtime)  HOME   LORazepam 0.5 mg oral tablet: 1 tab(s) orally 3 times a day, As Needed  HOME/Hospital   MetFORMIN (Eqv-Fortamet) 500 mg oral tablet, extended release: 1 tab(s) orally once a day STARTING 2/13/23  metoprolol tartrate 25 mg oral tablet: 1 tab(s) orally once a day  HOME /HOSPITAL  zolpidem 10 mg oral tablet: 0.5 tab(s) orally once a day (at bedtime)  [facility] /HOME    Aspirin Enteric Coated 81 mg oral delayed release tablet: 1 tab(s) orally once a day  Hospital     atorvastatin 80 mg oral tablet: 1 tab(s) orally once a day (at bedtime)  Hospital  carBAMazepine 300 mg oral capsule, extended release: 1 cap(s) orally 2 times a day  Hospital   famotidine 20 mg oral tablet: 1 tab(s) orally 2 times a day  HOME HOSPITAL   FLUoxetine 20 mg oral capsule: 2 cap(s) orally once a day  Hospital   fluticasone 50 mcg/inh nasal spray: 1 spray(s) nasal once a day  HOME  latanoprost 0.005% ophthalmic solution: 1 drop(s) to each affected eye once a day (at bedtime)  HOME   LORazepam 0.5 mg oral tablet: 1 tab(s) orally 3 times a day, As Needed  HOME/Hospital   MetFORMIN (Eqv-Fortamet) 500 mg oral tablet, extended release: 1 tab(s) orally once a day STARTING 2/13/23  metoprolol tartrate 25 mg oral tablet: 1 tab(s) orally once a day HOME /HOSPITAL  zolpidem 10 mg oral tablet: 0.5 tab(s) orally once a day (at bedtime)  [facility] /[facility]    Aspirin Enteric Coated 81 mg oral delayed release tablet: 1 tab(s) orally once a day  Hospital     atorvastatin 80 mg oral tablet: 1 tab(s) orally once a day (at bedtime)  Hospital  carBAMazepine 300 mg oral capsule, extended release: 1 cap(s) orally 2 times a day  Hospital   famotidine 20 mg oral tablet: 1 tab(s) orally 2 times a day  HOME HOSPITAL   fluticasone 50 mcg/inh nasal spray: 1 spray(s) nasal once a day  HOME  latanoprost 0.005% ophthalmic solution: 1 drop(s) to each affected eye once a day (at bedtime)  HOME   metoprolol tartrate 25 mg oral tablet: 1 tab(s) orally once a day HOME /HOSPITAL   Aspirin Enteric Coated 81 mg oral delayed release tablet: 1 tab(s) orally once a day  Hospital     atorvastatin 80 mg oral tablet: 1 tab(s) orally once a day (at bedtime)  Hospital  carBAMazepine 300 mg oral capsule, extended release: 1 cap(s) orally 2 times a day  Hospital   famotidine 20 mg oral tablet: 1 tab(s) orally 2 times a day  HOME HOSPITAL   fluticasone 50 mcg/inh nasal spray: 1 spray(s) nasal once a day  HOME  latanoprost 0.005% ophthalmic solution: 1 drop(s) to each affected eye once a day (at bedtime)  HOME   MetFORMIN (Eqv-Fortamet) 500 mg oral tablet, extended release: 1 tab(s) orally once a day  metoprolol tartrate 25 mg oral tablet: 1 tab(s) orally once a day HOME /HOSPITAL

## 2023-12-04 NOTE — PHARMACOTHERAPY INTERVENTION NOTE - NSPHARMCOMMMEDSAFE
Age-related (geriatrics) therapy recommendation

## 2023-12-04 NOTE — CHART NOTE - NSCHARTNOTEFT_GEN_A_CORE
Called to bedside for evaluation of NIKHIL drain site for concern of saturated bandages. 2 bulb drains in place with serosanguinous output. No urgent acute changes. Will defer to surgery team for drain management.
H&H dropped to 6.4/19, patient comfortable, abdomen soft, tolerating diet. Discussed with Dr. Ruano, one unit of PRBC's ordered. Family medicine aware.
Date/Time: 11/30/23 1000  :          [employer] ([employer])         Model:    ADVISA DR MRI [id number]  Mode:   AAIR <=> DDDR                                               Therapy Summary:                          VT/VF        AT/AF  Pace-Terminated Episodes:                   0          0    Pacing                                           % OF TIME SINCE   TOTAL                                       <0.1%  AS-VS                                            29.5%  AS-                                           <0.1%  AP-VS                                            70.5%  AP-                                           <0.1%    IMPEDENCE: Atrial- 361 ohms   RV- 437 ohms  SENSITIVITY: Atrial- 0.3 mV       RV- 0.9 mV  LONGEVITY: 3 YEARS    OBSERVATIONS  No events. Normal device function
Nursing supervision requesting transfer to floors due to staffing  issues in AM  Patient is appropriate for transfer to surgical floors  BP stable throughout the night, patient awake and alert, pain controlled   Would use IV tylenol before opiates if able   Orders modified  LR at 125ml/hr discontinued for now, encourage PO intake  Patient endorsed to maria a Oconnell, and accepted to service  Will go to [facility]
Nutrition Follow Up Note  Hospital Course (Per Electronic Medical Record):   Source: Medical Record [X] Patient [X] Nursing Staff [X]     Diet: DASH/TLC     Patient s/p laparoscopic converted to open cholecystectomy for acute gangrenous cholecystitis. Patient tolerating diet , patient consuming ~ 50% of meal as per nursing , no n/v noted , Hx of DM POCT reviewed , insulin coverage noted , suggest add Consistent Carbohydrate therapeutic restriction to current diet .      Current Weight: no follow up weight     Pertinent Medications: MEDICATIONS  (STANDING):  aspirin enteric coated 81 m illiGRAM(s) Oral daily  atorvastatin 80 milliGRAM(s) Oral at bedtime  carBAMazepine ER Tablet 300 milliGRAM(s) Oral two times a day  chlorhexidine 2% Cloths 1 Application(s) Topical <User Schedule>  dextrose 5%. 1000 milliLiter(s) (50 mL/Hr) IV Continuous <Continuous>  dextrose 5%. 1000 milliLiter(s) (100 mL/Hr) IV Continuous <Continuous>  dextrose 50% Injectable 25 Gram(s) IV Push once  dextrose 50% Injectable 12.5 Gram(s) IV Push once  dextrose 50% Injectable 25 Gram(s) IV Push once  famotidine    Tablet 20 milliGRAM(s) Oral daily  insulin lispro (ADMELOG) corrective regimen sliding scale   SubCutaneous Before meals and at bedtime  latanoprost 0.005% Ophthalmic Solution 1 Drop(s) Both EYES at bedtime  melatonin 6 milliGRAM(s) Oral at bedtime  piperacillin/tazobactam IVPB.. 3.375 Gram(s) IV Intermittent every 8 hours  polyethylene glycol 3350 17 Gram(s) Oral daily  potassium chloride    Tablet ER 20 milliEquivalent(s) Oral every 2 hours    MEDICATIONS  (PRN):  artificial  tears Solution 1 Drop(s) Both EYES two times a day PRN Dry Eyes  ondansetron Injectable 4 milliGRAM(s) IV Push every 6 hours PRN Nausea and/or Vomiting  senna 1 Tablet(s) Oral two times a day PRN Constipation      Pertinent Labs:  12-04 Na143 mmol/L Glu 177 mg/dL<H> K+ 3.2 mmol/L<L> Cr  1.28 mg/dL BUN 15 mg/dL 12-03 Phos 2.8 mg/dL 12-04 Alb 2.0 g/dL<L>  Hgb 10.1g/dl<L> , Hct 30.4%  <L> POCT 190, 175,140,202,143  A1c 6.1% (11/29)     Skin: surgical incision noted (+) NIKHIL drain     Edema: none     Last BM: (12/4)     Estimated Needs:   [X] No Change since Previous Assessment      Previous Nutrition Diagnosis: Moderate Protein Calorie Malnutrition     Nutrition Diagnosis is [X] Ongoing       New Nutrition Diagnosis: [X] Not Applicable    Interventions:   1. suggest change diet to Consistent Carbohydrate , DASH/TLC       Monitoring & Evaluation: will monitor:  [X] Weights   [X] PO Intake   [X] Follow Up (Per Protocol)  [X] Tolerance to Diet Prescription       RD to follow as per Nutrition protocol  Iza Tariq, RDN, CDN

## 2023-12-04 NOTE — PROGRESS NOTE ADULT - PROBLEM SELECTOR PLAN 6
- Chronic   - At home on metoprolol 25mg BID   - Was in ICU for post-op hypotension, now off pressors  - Maintain MAP > 65  - Continue to monitor vitals

## 2023-12-04 NOTE — DISCHARGE NOTE PROVIDER - ATTENDING DISCHARGE PHYSICAL EXAMINATION:
PHYSICAL EXAM:  GENERAL: NAD  HEENT: NCAT  NECK: Supple, No JVD  CHEST/LUNG: Clear to percussion bilaterally; No rales, rhonchi, wheezing  HEART: Regular rate and rhythm; No murmurs  ABDOMEN: Right upper quadrant incision with staples in place, 2 NIKHIL drains with serosanguinous fluids   MUSCULOSKELETAL/EXTREMITIES:  2+ Peripheral Pulses, No LE edema  PSYCH: Appropriate affect  NEURO: AAO x 3, nonfocal

## 2023-12-04 NOTE — PROGRESS NOTE ADULT - ATTENDING COMMENTS
agree with above  no new complaints  tolerating solids  continues on iv zosyn  monitor NIKHIL drain output  surgery following

## 2023-12-04 NOTE — PROGRESS NOTE ADULT - ASSESSMENT
Patient is a 68-year-old male with PMHx of sinus node disorder s/p PPM, HTN, HLD, cerebral aneurysm s/p coiling, CVA w/ residual L sided weakness, seizure disorder, type 2 diabetes on metformin and gastritis admitted for acute cholecystitis s/p lap to open cholecystectomy 11/30, w/ ICU upgrade due to post-operative hypotension requiring pressors, downgraded to surgical floor.     Discussed with attending, Dr. Dorman

## 2023-12-04 NOTE — DISCHARGE NOTE PROVIDER - CARE PROVIDERS DIRECT ADDRESSES
,DirectAddress_Unknown ,DirectAddress_Unknown,[email or web address] ,DirectAddress_Unknown,[email or web address]

## 2023-12-04 NOTE — PROGRESS NOTE ADULT - PROBLEM SELECTOR PLAN 4
- Chronic  - At home on carbamazepine 300mg BID and lorazepam 0.5mg TID   - Switched to Keppra in ICU given NPO, discontinued  - Now back to home med carbamazepine 300mg BID

## 2023-12-04 NOTE — PROGRESS NOTE ADULT - PROBLEM SELECTOR PLAN 9
DVT/VTE Prophylaxis: heparin 5000u subcut Q8h DVT/VTE Prophylaxis: heparin 5000u subcut Q8h held  SCDs DVT/VTE Prophylaxis: heparin 5000u subcut Q8h retarted  SCDs

## 2023-12-04 NOTE — PROGRESS NOTE ADULT - ASSESSMENT
66 y/o Japanese speaking male with PMH of sinus node disorder s/p PPM, HTN, HLD, DM2 Gastritis, cerebral aneurysm s/p coiling, CVA with residual L sided weakness, and seizure disorder who is s/p laparoscopic converted to open cholecystectomy for acute gangrenous cholecystitis.    Plan:  - continue regular diet for now, patient may benefit from ambulating for resolution of ileus  - if neauseated or vomiting, back down to CLD  - NIKHIL#2 increasing output, bile tinged, will continue to monitor for now. If becomes more bilious, may require ERCP and stent of cystic duct  - s/p 1u pRBC with appropriate response-remains stable  - continue ASA81, consider DVT ppx tomorrow if H&H stable  - continue zosyn for total 1w course  -ISS for glucose control  continue home anti-seizure meds    Surgery  [id number]   66 y/o Thai speaking male with PMH of sinus node disorder s/p PPM, HTN, HLD, DM2 Gastritis, cerebral aneurysm s/p coiling, CVA with residual L sided weakness, and seizure disorder who is s/p laparoscopic converted to open cholecystectomy for acute gangrenous cholecystitis.    Plan:  - continue regular diet for now, patient may benefit from ambulating for resolution of ileus  - if neauseated or vomiting, back down to CLD  - NIKHIL#2 increasing output, bile tinged, will continue to monitor for now. If becomes more bilious, may require ERCP and stent of cystic duct  - s/p 1u pRBC with appropriate response-remains stable  - continue ASA81, consider DVT ppx tomorrow if H&H stable  - continue zosyn for total 1w course  -ISS for glucose control  continue home anti-seizure meds    Surgery  [phone]

## 2023-12-04 NOTE — DISCHARGE NOTE PROVIDER - NSDCCPCAREPLAN_GEN_ALL_CORE_FT
PRINCIPAL DISCHARGE DIAGNOSIS  Diagnosis: Acute cholecystitis  Assessment and Plan of Treatment: You were found to have an infection of your gallbladder. You had surgery to remove the gallbladder. After sugery, you had low blood pressure and needed the ICU. You improved and were placed back on the regular medical floors. Your bile duct was found to be leaking and you required an ERCP. You are now medically optimized from medical,surgical and GI standpoint for discharge to acute rehab. Surgery will continue to follow you and manage your drains and remove your staples. Please follow up with your GI doctor. They would like to do another ERCP in 4-8 weeks.      SECONDARY DISCHARGE DIAGNOSES  Diagnosis: Recurrent falls while walking  Assessment and Plan of Treatment:     Diagnosis: Chest wall contusion  Assessment and Plan of Treatment:

## 2023-12-04 NOTE — PROGRESS NOTE ADULT - PROBLEM SELECTOR PLAN 1
- S/p lap to open cholecystectomy w/ suppurative GB - POD 3  - Appreciate Surg recs  - DASH diet  - C/w Zosyn   - C/w incentive spirometry, antiemetics and pain regimen PRN - S/p lap to open cholecystectomy w/ suppurative GB - POD 4  - Appreciate Surg recs  - DASH diet  - C/w Zosyn   - C/w incentive spirometry, antiemetics and pain regimen PRN - S/p lap to open cholecystectomy w/ suppurative GB - POD 4  - Appreciate Surg recs  - surgery to evaluate drainage today  - DASH diet  - C/w Zosyn   - C/w incentive spirometry, antiemetics and pain regimen PRN - S/p lap to open cholecystectomy w/ suppurative GB - POD 5  - Appreciate Surg recs  - c/w DASH diet, unless pt begins vomiting  - C/w Zosyn   - C/w incentive spirometry, antiemetics and pain regimen PRN - S/p lap to open cholecystectomy w/ suppurative GB - POD 5  - Appreciate Surg recs  - continue to monitor NIKHIL drain output  - c/w DASH diet, unless pt begins vomiting  - C/w Zosyn   - C/w incentive spirometry, antiemetics and pain regimen PRN

## 2023-12-04 NOTE — CONSULT NOTE ADULT - ASSESSMENT
------------------------------------------------------------------------------------------------  ASSESSMENT/PLAN  This is a 69 YO with PMHx of sinus node disorder s/p PPM, HTN, HLD, cerebral aneurysm s/p coiling, CVA w/ residual L sided weakness, seizure disorder, type 2 diabetes on metformin and gastritis admitted on 11/25 for acute cholecystitis s/p lap to open cholecystectomy on11/30. Post op with concern for hypotension requiring pressors in ICU found to have bile drainage and a large amount of pus from gallbladder. He also required 1 U PRBC  for ABLA.   Medical management per hospitalist  Pain management -Tylenol  DVT PPX: SCDs, heparin  GI ppx: pepcid  Bowel Regimen: Miralax, senna  continue on  zosyn daily  for total of 7 days  Seizure: Tegretol XL       Rehab Disposition: - Acute Inpatient Rehab     Recommend ACUTE inpatient rehabilitation for the functional deficits consisting of 3 hours of therapy/day & 24 hour RN/daily PMR physician for comorbid medical management. Will continue to follow for ongoing rehab needs and recommendations. Patient will be able to tolerate 3 hours a day.

## 2023-12-04 NOTE — CONSULT NOTE ADULT - SUBJECTIVE AND OBJECTIVE BOX
Patient is a 68y old  Male who presents with a chief complaint of Acute Cholecystitis (04 Dec 2023 21:36)      HPI:  Patient is a 68-year-old male with PMHx of sinus node disorder s/p PPM, HTN, HLD, cerebral aneurysm s/p coiling, CVA w/ residual L sided weakness, seizure disorder, type 2 diabetes on metformin and gastritis admitted for acute cholecystitis s/p lap to open cholecystectomy 11/30, w/ ICU upgrade due to post-operative hypotension requiring pressors, downgraded to surgical floor. In the ED /73, HR 71, Temp 98.2, RR 18 and SpO2 98 on RA, CT chest abd/pelvis showed Distended gallbladder with gallbladder wall thickening and pericholecystic fat stranding and hemangioma in T7 vertebrae. Pt received x1 dose of Zosyn. Patient was brought to the OR for cholecystectomy. Procedure was complicated by drainage of a small amount of bile and a large amount of pus from gallbladder. Patient became hypotensive and was moved to the ICU then later downgraded to medical floors. Course was further complicated by acute blood loss anemia, which improved with 1 unit pRBCs.     Patient was seen in room.  used for encounter. He denies pain, cough, fever, or chills.      # [id number]      REVIEW OF SYSTEMS  Constitutional: No fever, No Chills, No fatigue  HEENT: No eye pain, No visual disturbances, No difficulty hearing  Pulm: No cough, No shortness of breath  Cardio: No chest pain, No palpitations  GI:  + abdominal pain, No nausea, No vomiting, No diarrhea, No constipation  : No dysuria, No frequency, No hematuria  Neuro: No headaches, No memory loss, + loss of strength, no numbness, No tremors  Skin: No itching, No rashes, No lesions   Endo: No temperature intolerance  MSK: No joint pain, No joint swelling, No muscle pain, No Neck or back pain, left foot drop  Psych:  No depression, + anxiety      PAST MEDICAL & SURGICAL HISTORY  CVA (cerebral vascular accident)    Aneurysm of [place] of [place]    Dysrhythmia, cardiac    HLD (hyperlipidemia)    Epilepsy    Renal agenesis, unilateral    Depression    Cerebral aneurysm without rupture    Gastroesophageal reflux disease, esophagitis presence not specified    Glaucoma of both eyes, unspecified glaucoma type    Bradycardia    Language barrier    T2DM (type 2 diabetes mellitus)    S/P craniotomy    Pacemaker    S/P bunionectomy    S/P cataract surgery    Status post coil embolization of cerebral aneurysm      FAMILY HISTORY   Family history of stomach cancer    SOCIAL HISTORY  Smoking - Denies  EtOH -  Denies  Drugs - Denies    Marital status: single    FUNCTIONAL HISTORY:   Patient lives in apartment. he has HHA 36/ week. he ambulates with SAC, He has a AFO  PTA: Independent in ADLs and ambulation     CURRENT FUNCTIONAL STATUS  Date: 12/2  Bed Mobility:  Max a, 1 person  Transfers: mod a, 1 person  Gait: min a, 1 person 5ft, forward and backwards at bedside x 2 trials     RECENT LABS/IMAGING  CBC Full  -  ( 04 Dec 2023 05:24 )  WBC Count : 6.72 K/uL  RBC Count : 3.20 M/uL  Hemoglobin : 10.1 g/dL  Hematocrit : 30.4 %  Platelet Count - Automated : 292 K/uL  Mean Cell Volume : 95.0 fl  Mean Cell Hemoglobin : 31.6 pg  Mean Cell Hemoglobin Concentration : 33.2 gm/dL  Auto Neutrophil # : x  Auto Lymphocyte # : x  Auto Monocyte # : x  Auto Eosinophil # : x  Auto Basophil # : x  Auto Neutrophil % : x  Auto Lymphocyte % : x  Auto Monocyte % : x  Auto Eosinophil % : x  Auto Basophil % : x    12-04    143  |  104  |  15  ----------------------------<  177<H>  3.2<L>   |  28  |  1.28    Ca    8.3<L>      04 Dec 2023 05:24  Phos  2.8     12-03  Mg     2.5     12-04    TPro  6.8  /  Alb  2.0<L>  /  TBili  0.4  /  DBili  x   /  AST  35  /  ALT  53<H>  /  AlkPhos  98  12-04    Urinalysis Basic - ( 04 Dec 2023 05:24 )    Color: x / Appearance: x / SG: x / pH: x  Gluc: 177 mg/dL / Ketone: x  / Bili: x / Urobili: x   Blood: x / Protein: x / Nitrite: x   Leuk Esterase: x / RBC: x / WBC x   Sq Epi: x / Non Sq Epi: x / Bacteria: x    NM Hepatobiliary Imaging w/ RX (11.29.23 @ 14:20)   IMPRESSION: Abnormal morphine-augmented hepatobiliary scan.    US Abdomen Limited (11.28.23 @ 21:21)   IMPRESSION:    Distended thick-walled gallbladder containing sludge.  Borderline CBD   measuring 6 mm.  Findings equivocal for acute cholecystitis. If   clinically indicated, further evaluation with nuclear HIDA scan may be   obtained.      CT Abdomen and Pelvis No Cont (11.28.23 @ 16:52)   IMPRESSION:  Findings suspicious for acute cholecystitis. Correlate with patient's   symptoms. Consider right upper quadrant ultrasound if clinically   indicated.      VITALS  T(C): 37 (12-04-23 @ 12:32), Max: 37 (12-04-23 @ 12:32)  HR: 95 (12-04-23 @ 12:32) (86 - 95)  BP: 145/75 (12-04-23 @ 12:32) (145/75 - 156/78)  RR: 18 (12-04-23 @ 12:32) (18 - 18)  SpO2: 98% (12-04-23 @ 12:32) (98% - 98%)  Wt(kg): --    ALLERGIES  No Known Allergies      MEDICATIONS   artificial  tears Solution 1 Drop(s) Both EYES two times a day PRN  aspirin enteric coated 81 milliGRAM(s) Oral daily  atorvastatin 80 milliGRAM(s) Oral at bedtime  carBAMazepine ER Tablet 300 milliGRAM(s) Oral two times a day  chlorhexidine 2% Cloths 1 Application(s) Topical <User Schedule>  dextrose 5%. 1000 milliLiter(s) IV Continuous <Continuous>  dextrose 5%. 1000 milliLiter(s) IV Continuous <Continuous>  dextrose 50% Injectable 25 Gram(s) IV Push once  dextrose 50% Injectable 12.5 Gram(s) IV Push once  dextrose 50% Injectable 25 Gram(s) IV Push once  famotidine    Tablet 20 milliGRAM(s) Oral daily  heparin   Injectable 5000 Unit(s) SubCutaneous every 8 hours  insulin lispro (ADMELOG) corrective regimen sliding scale   SubCutaneous Before meals and at bedtime  latanoprost 0.005% Ophthalmic Solution 1 Drop(s) Both EYES at bedtime  melatonin 6 milliGRAM(s) Oral at bedtime  ondansetron Injectable 4 milliGRAM(s) IV Push every 6 hours PRN  piperacillin/tazobactam IVPB.. 3.375 Gram(s) IV Intermittent every 8 hours  polyethylene glycol 3350 17 Gram(s) Oral daily  senna 1 Tablet(s) Oral two times a day PRN      ----------------------------------------------------------------------------------------  PHYSICAL EXAM  Gen - NAD, Comfortable  HEENT - NCAT, EOMI, MMM  Neck - Supple, No limited ROM  Pulm - CTAB, No wheeze, No rhonchi, No crackles  Cardiovascular - RRR, S1S2  Abdomen - + tender, +BS, NIKHIL drain x 2   Extremities - No clubbing, no cyanosis, no peripheral edema, no calf tenderness  Neuro-     Cognitive - Awake, Alert, Oriented  to self, place, date - with options. Able to follow command     Communication - Fluent, Comprehensible     Attention: Intact      Memory: recalled 2/3 items     Cranial Nerves - CN 2-12 intact.     Motor -                     LEFT    UE - 4/5                    RIGHT UE - 5/5                    LEFT    LE - HF 5/5, KE 5/5, DF/PF 2/5- left foot drop                    RIGHT LE -  5/5        Sensory - Intact to LT     Reflexes - DTR Intact, No primitive reflexive     Tone - normal  Psychiatric - Mood stable, Affect WNL  Skin: abdominal incision with staple,                 Patient is a 68y old  Male who presents with a chief complaint of Acute Cholecystitis (04 Dec 2023 21:36)      HPI:  Patient is a 68-year-old male with PMHx of sinus node disorder s/p PPM, HTN, HLD, cerebral aneurysm s/p coiling, CVA w/ residual L sided weakness, seizure disorder, type 2 diabetes on metformin and gastritis admitted for acute cholecystitis s/p lap to open cholecystectomy 11/30, w/ ICU upgrade due to post-operative hypotension requiring pressors, downgraded to surgical floor. In the ED /73, HR 71, Temp 98.2, RR 18 and SpO2 98 on RA, CT chest abd/pelvis showed Distended gallbladder with gallbladder wall thickening and pericholecystic fat stranding and hemangioma in T7 vertebrae. Pt received x1 dose of Zosyn. Patient was brought to the OR for cholecystectomy. Procedure was complicated by drainage of a small amount of bile and a large amount of pus from gallbladder. Patient became hypotensive and was moved to the ICU then later downgraded to medical floors. Course was further complicated by acute blood loss anemia, which improved with 1 unit pRBCs.     Patient was seen in room.  used for encounter. He denies pain, cough, fever, or chills.      # [id number]      REVIEW OF SYSTEMS  Constitutional: No fever, No Chills, No fatigue  HEENT: No eye pain, No visual disturbances, No difficulty hearing  Pulm: No cough, No shortness of breath  Cardio: No chest pain, No palpitations  GI:  + abdominal pain, No nausea, No vomiting, No diarrhea, No constipation  : No dysuria, No frequency, No hematuria  Neuro: No headaches, No memory loss, + loss of strength, no numbness, No tremors  Skin: No itching, No rashes, No lesions   Endo: No temperature intolerance  MSK: No joint pain, No joint swelling, No muscle pain, No Neck or back pain, left foot drop  Psych:  No depression, + anxiety      PAST MEDICAL & SURGICAL HISTORY  CVA (cerebral vascular accident)    Aneurysm of [place]    Dysrhythmia, cardiac    HLD (hyperlipidemia)    Epilepsy    Renal agenesis, unilateral    Depression    Cerebral aneurysm without rupture    Gastroesophageal reflux disease, esophagitis presence not specified    Glaucoma of both eyes, unspecified glaucoma type    Bradycardia    Language barrier    T2DM (type 2 diabetes mellitus)    S/P craniotomy    Pacemaker    S/P bunionectomy    S/P cataract surgery    Status post coil embolization of cerebral aneurysm      FAMILY HISTORY   Family history of stomach cancer    SOCIAL HISTORY  Smoking - Denies  EtOH -  Denies  Drugs - Denies    Marital status: single    FUNCTIONAL HISTORY:   Patient lives in apartment. he has HHA 36/ week. he ambulates with SAC, He has a AFO  PTA: Independent in ADLs and ambulation     CURRENT FUNCTIONAL STATUS  Date: 12/2  Bed Mobility:  Max a, 1 person  Transfers: mod a, 1 person  Gait: min a, 1 person 5ft, forward and backwards at bedside x 2 trials     RECENT LABS/IMAGING  CBC Full  -  ( 04 Dec 2023 05:24 )  WBC Count : 6.72 K/uL  RBC Count : 3.20 M/uL  Hemoglobin : 10.1 g/dL  Hematocrit : 30.4 %  Platelet Count - Automated : 292 K/uL  Mean Cell Volume : 95.0 fl  Mean Cell Hemoglobin : 31.6 pg  Mean Cell Hemoglobin Concentration : 33.2 gm/dL  Auto Neutrophil # : x  Auto Lymphocyte # : x  Auto Monocyte # : x  Auto Eosinophil # : x  Auto Basophil # : x  Auto Neutrophil % : x  Auto Lymphocyte % : x  Auto Monocyte % : x  Auto Eosinophil % : x  Auto Basophil % : x    12-04    143  |  104  |  15  ----------------------------<  177<H>  3.2<L>   |  28  |  1.28    Ca    8.3<L>      04 Dec 2023 05:24  Phos  2.8     12-03  Mg     2.5     12-04    TPro  6.8  /  Alb  2.0<L>  /  TBili  0.4  /  DBili  x   /  AST  35  /  ALT  53<H>  /  AlkPhos  98  12-04    Urinalysis Basic - ( 04 Dec 2023 05:24 )    Color: x / Appearance: x / SG: x / pH: x  Gluc: 177 mg/dL / Ketone: x  / Bili: x / Urobili: x   Blood: x / Protein: x / Nitrite: x   Leuk Esterase: x / RBC: x / WBC x   Sq Epi: x / Non Sq Epi: x / Bacteria: x    NM Hepatobiliary Imaging w/ RX (11.29.23 @ 14:20)   IMPRESSION: Abnormal morphine-augmented hepatobiliary scan.    US Abdomen Limited (11.28.23 @ 21:21)   IMPRESSION:    Distended thick-walled gallbladder containing sludge.  Borderline CBD   measuring 6 mm.  Findings equivocal for acute cholecystitis. If   clinically indicated, further evaluation with nuclear HIDA scan may be   obtained.      CT Abdomen and Pelvis No Cont (11.28.23 @ 16:52)   IMPRESSION:  Findings suspicious for acute cholecystitis. Correlate with patient's   symptoms. Consider right upper quadrant ultrasound if clinically   indicated.      VITALS  T(C): 37 (12-04-23 @ 12:32), Max: 37 (12-04-23 @ 12:32)  HR: 95 (12-04-23 @ 12:32) (86 - 95)  BP: 145/75 (12-04-23 @ 12:32) (145/75 - 156/78)  RR: 18 (12-04-23 @ 12:32) (18 - 18)  SpO2: 98% (12-04-23 @ 12:32) (98% - 98%)  Wt(kg): --    ALLERGIES  No Known Allergies      MEDICATIONS   artificial  tears Solution 1 Drop(s) Both EYES two times a day PRN  aspirin enteric coated 81 milliGRAM(s) Oral daily  atorvastatin 80 milliGRAM(s) Oral at bedtime  carBAMazepine ER Tablet 300 milliGRAM(s) Oral two times a day  chlorhexidine 2% Cloths 1 Application(s) Topical <User Schedule>  dextrose 5%. 1000 milliLiter(s) IV Continuous <Continuous>  dextrose 5%. 1000 milliLiter(s) IV Continuous <Continuous>  dextrose 50% Injectable 25 Gram(s) IV Push once  dextrose 50% Injectable 12.5 Gram(s) IV Push once  dextrose 50% Injectable 25 Gram(s) IV Push once  famotidine    Tablet 20 milliGRAM(s) Oral daily  heparin   Injectable 5000 Unit(s) SubCutaneous every 8 hours  insulin lispro (ADMELOG) corrective regimen sliding scale   SubCutaneous Before meals and at bedtime  latanoprost 0.005% Ophthalmic Solution 1 Drop(s) Both EYES at bedtime  melatonin 6 milliGRAM(s) Oral at bedtime  ondansetron Injectable 4 milliGRAM(s) IV Push every 6 hours PRN  piperacillin/tazobactam IVPB.. 3.375 Gram(s) IV Intermittent every 8 hours  polyethylene glycol 3350 17 Gram(s) Oral daily  senna 1 Tablet(s) Oral two times a day PRN      ----------------------------------------------------------------------------------------  PHYSICAL EXAM  Gen - NAD, Comfortable  HEENT - NCAT, EOMI, MMM  Neck - Supple, No limited ROM  Pulm - CTAB, No wheeze, No rhonchi, No crackles  Cardiovascular - RRR, S1S2  Abdomen - + tender, +BS, NIKHIL drain x 2   Extremities - No clubbing, no cyanosis, no peripheral edema, no calf tenderness  Neuro-     Cognitive - Awake, Alert, Oriented  to self, place, date - with options. Able to follow command     Communication - Fluent, Comprehensible     Attention: Intact      Memory: recalled 2/3 items     Cranial Nerves - CN 2-12 intact.     Motor -                     LEFT    UE - 4/5                    RIGHT UE - 5/5                    LEFT    LE - HF 5/5, KE 5/5, DF/PF 2/5- left foot drop                    RIGHT LE -  5/5        Sensory - Intact to LT     Reflexes - DTR Intact, No primitive reflexive     Tone - normal  Psychiatric - Mood stable, Affect WNL  Skin: abdominal incision with staple,

## 2023-12-04 NOTE — PROGRESS NOTE ADULT - SUBJECTIVE AND OBJECTIVE BOX
Overnight Events:  No acute events overnight    SUBJECTIVE:  Reports pain related to abdominal distention  Denies nausea, vomiting  Is passing not yet gas and having bowel movements, denies diarrhea  Tolerating diet  Will ambulate today    OBJECTIVE:  Vital Signs Last 24 Hrs  T(C): 36.7 (04 Dec 2023 05:00), Max: 37.2 (03 Dec 2023 09:36)  T(F): 98.1 (04 Dec 2023 05:00), Max: 98.9 (03 Dec 2023 09:36)  HR: 86 (04 Dec 2023 05:00) (71 - 86)  BP: 156/78 (04 Dec 2023 05:00) (146/72 - 156/78)  BP(mean): --  RR: 18 (04 Dec 2023 05:00) (17 - 18)  SpO2: 98% (04 Dec 2023 05:00) (98% - 98%)    Parameters below as of 04 Dec 2023 05:00  Patient On (Oxygen Delivery Method): room air      Physical Examination:  GEN: NAD, resting quietly  NEURO: AAOx3, baseline L sided weakness  PULM: symmetric chest rise bilaterally, no increased WOB, on NC  ABD: softly distended, minimally tender to palpation, laparoscopic incisions CDI, Subcostal incision CDI, NIKHIL #1 serosanguinous output, NIKHIL #2 slightly darker bile tinged serosanguinous output -higher volume  EXTR: no lower extremity edema, moving all extremities    LABS:                        10.1   6.72  )-----------( 292      ( 04 Dec 2023 05:24 )             30.4       12-04    143  |  104  |  15  ----------------------------<  177<H>  3.2<L>   |  28  |  1.28    Ca    8.3<L>      04 Dec 2023 05:24  Phos  2.8     12-03  Mg     2.5     12-04    TPro  6.8  /  Alb  2.0<L>  /  TBili  0.4  /  DBili  x   /  AST  35  /  ALT  53<H>  /  AlkPhos  98  12-04

## 2023-12-04 NOTE — DISCHARGE NOTE PROVIDER - CARE PROVIDER_API CALL
Easton Carroll.  Internal Medicine  [address] [place] [address]  Phone: [phone]  Fax: [phone]  Follow Up Time:    Easton Carroll.  Internal Medicine  [address] [place] [address]  Phone: [phone]  Fax: [phone]  Follow Up Time:    Easton Carroll  Internal Medicine  [address] [place] [address]  Phone: [phone]  Fax: [phone]  Follow Up Time:     Francisco Dorsey  Gastroenterology  [address] [place] [address], [address]  [place] [address]  Phone: [phone]  Fax: [phone]  Follow Up Time:    Easton Carroll  Internal Medicine  [address] [place] [address]  Phone: [phone]  Fax: [phone]  Follow Up Time:     Francisco Dorsey  Gastroenterology  [address] [place] [address], [address]  [place] [address]  Phone: [phone]  Fax: [phone]  Follow Up Time:

## 2023-12-05 ENCOUNTER — TRANSCRIPTION ENCOUNTER (OUTPATIENT)
Age: 68
End: 2023-12-05

## 2023-12-05 DIAGNOSIS — K83.8 OTHER SPECIFIED DISEASES OF BILIARY TRACT: ICD-10-CM

## 2023-12-05 DIAGNOSIS — Z01.89 ENCOUNTER FOR OTHER SPECIFIED SPECIAL EXAMINATIONS: ICD-10-CM

## 2023-12-05 LAB
ALBUMIN SERPL ELPH-MCNC: 2 G/DL — LOW (ref 3.3–5)
ALBUMIN SERPL ELPH-MCNC: 2 G/DL — LOW (ref 3.3–5)
ALP SERPL-CCNC: 101 U/L — SIGNIFICANT CHANGE UP (ref 40–120)
ALP SERPL-CCNC: 101 U/L — SIGNIFICANT CHANGE UP (ref 40–120)
ALT FLD-CCNC: 48 U/L — HIGH (ref 10–45)
ALT FLD-CCNC: 48 U/L — HIGH (ref 10–45)
ANION GAP SERPL CALC-SCNC: 10 MMOL/L — SIGNIFICANT CHANGE UP (ref 5–17)
ANION GAP SERPL CALC-SCNC: 10 MMOL/L — SIGNIFICANT CHANGE UP (ref 5–17)
AST SERPL-CCNC: 37 U/L — SIGNIFICANT CHANGE UP (ref 10–40)
AST SERPL-CCNC: 37 U/L — SIGNIFICANT CHANGE UP (ref 10–40)
BILIRUB SERPL-MCNC: 0.3 MG/DL — SIGNIFICANT CHANGE UP (ref 0.2–1.2)
BILIRUB SERPL-MCNC: 0.3 MG/DL — SIGNIFICANT CHANGE UP (ref 0.2–1.2)
BUN SERPL-MCNC: 17 MG/DL — SIGNIFICANT CHANGE UP (ref 7–23)
BUN SERPL-MCNC: 17 MG/DL — SIGNIFICANT CHANGE UP (ref 7–23)
CALCIUM SERPL-MCNC: 8.6 MG/DL — SIGNIFICANT CHANGE UP (ref 8.4–10.5)
CALCIUM SERPL-MCNC: 8.6 MG/DL — SIGNIFICANT CHANGE UP (ref 8.4–10.5)
CHLORIDE SERPL-SCNC: 106 MMOL/L — SIGNIFICANT CHANGE UP (ref 96–108)
CHLORIDE SERPL-SCNC: 106 MMOL/L — SIGNIFICANT CHANGE UP (ref 96–108)
CO2 SERPL-SCNC: 23 MMOL/L — SIGNIFICANT CHANGE UP (ref 22–31)
CO2 SERPL-SCNC: 23 MMOL/L — SIGNIFICANT CHANGE UP (ref 22–31)
CREAT SERPL-MCNC: 1.22 MG/DL — SIGNIFICANT CHANGE UP (ref 0.5–1.3)
CREAT SERPL-MCNC: 1.22 MG/DL — SIGNIFICANT CHANGE UP (ref 0.5–1.3)
EGFR: 64 ML/MIN/1.73M2 — SIGNIFICANT CHANGE UP
EGFR: 64 ML/MIN/1.73M2 — SIGNIFICANT CHANGE UP
GLUCOSE BLDC GLUCOMTR-MCNC: 124 MG/DL — HIGH (ref 70–99)
GLUCOSE BLDC GLUCOMTR-MCNC: 124 MG/DL — HIGH (ref 70–99)
GLUCOSE BLDC GLUCOMTR-MCNC: 138 MG/DL — HIGH (ref 70–99)
GLUCOSE BLDC GLUCOMTR-MCNC: 138 MG/DL — HIGH (ref 70–99)
GLUCOSE BLDC GLUCOMTR-MCNC: 151 MG/DL — HIGH (ref 70–99)
GLUCOSE BLDC GLUCOMTR-MCNC: 151 MG/DL — HIGH (ref 70–99)
GLUCOSE BLDC GLUCOMTR-MCNC: 154 MG/DL — HIGH (ref 70–99)
GLUCOSE BLDC GLUCOMTR-MCNC: 154 MG/DL — HIGH (ref 70–99)
GLUCOSE BLDC GLUCOMTR-MCNC: 162 MG/DL — HIGH (ref 70–99)
GLUCOSE BLDC GLUCOMTR-MCNC: 162 MG/DL — HIGH (ref 70–99)
GLUCOSE SERPL-MCNC: 168 MG/DL — HIGH (ref 70–99)
GLUCOSE SERPL-MCNC: 168 MG/DL — HIGH (ref 70–99)
HCT VFR BLD CALC: 29.6 % — LOW (ref 39–50)
HCT VFR BLD CALC: 29.6 % — LOW (ref 39–50)
HGB BLD-MCNC: 9.6 G/DL — LOW (ref 13–17)
HGB BLD-MCNC: 9.6 G/DL — LOW (ref 13–17)
MAGNESIUM SERPL-MCNC: 2.6 MG/DL — SIGNIFICANT CHANGE UP (ref 1.6–2.6)
MAGNESIUM SERPL-MCNC: 2.6 MG/DL — SIGNIFICANT CHANGE UP (ref 1.6–2.6)
MCHC RBC-ENTMCNC: 32.2 PG — SIGNIFICANT CHANGE UP (ref 27–34)
MCHC RBC-ENTMCNC: 32.2 PG — SIGNIFICANT CHANGE UP (ref 27–34)
MCHC RBC-ENTMCNC: 32.4 GM/DL — SIGNIFICANT CHANGE UP (ref 32–36)
MCHC RBC-ENTMCNC: 32.4 GM/DL — SIGNIFICANT CHANGE UP (ref 32–36)
MCV RBC AUTO: 99.3 FL — SIGNIFICANT CHANGE UP (ref 80–100)
MCV RBC AUTO: 99.3 FL — SIGNIFICANT CHANGE UP (ref 80–100)
NRBC # BLD: 0 /100 WBCS — SIGNIFICANT CHANGE UP (ref 0–0)
NRBC # BLD: 0 /100 WBCS — SIGNIFICANT CHANGE UP (ref 0–0)
PLATELET # BLD AUTO: 304 K/UL — SIGNIFICANT CHANGE UP (ref 150–400)
PLATELET # BLD AUTO: 304 K/UL — SIGNIFICANT CHANGE UP (ref 150–400)
POTASSIUM SERPL-MCNC: 4.1 MMOL/L — SIGNIFICANT CHANGE UP (ref 3.5–5.3)
POTASSIUM SERPL-MCNC: 4.1 MMOL/L — SIGNIFICANT CHANGE UP (ref 3.5–5.3)
POTASSIUM SERPL-SCNC: 4.1 MMOL/L — SIGNIFICANT CHANGE UP (ref 3.5–5.3)
POTASSIUM SERPL-SCNC: 4.1 MMOL/L — SIGNIFICANT CHANGE UP (ref 3.5–5.3)
PROT SERPL-MCNC: 6.7 G/DL — SIGNIFICANT CHANGE UP (ref 6–8.3)
PROT SERPL-MCNC: 6.7 G/DL — SIGNIFICANT CHANGE UP (ref 6–8.3)
RBC # BLD: 2.98 M/UL — LOW (ref 4.2–5.8)
RBC # BLD: 2.98 M/UL — LOW (ref 4.2–5.8)
RBC # FLD: 15.6 % — HIGH (ref 10.3–14.5)
RBC # FLD: 15.6 % — HIGH (ref 10.3–14.5)
SODIUM SERPL-SCNC: 139 MMOL/L — SIGNIFICANT CHANGE UP (ref 135–145)
SODIUM SERPL-SCNC: 139 MMOL/L — SIGNIFICANT CHANGE UP (ref 135–145)
WBC # BLD: 12.01 K/UL — HIGH (ref 3.8–10.5)
WBC # BLD: 12.01 K/UL — HIGH (ref 3.8–10.5)
WBC # FLD AUTO: 12.01 K/UL — HIGH (ref 3.8–10.5)
WBC # FLD AUTO: 12.01 K/UL — HIGH (ref 3.8–10.5)

## 2023-12-05 PROCEDURE — 43262 ENDO CHOLANGIOPANCREATOGRAPH: CPT | Mod: 59

## 2023-12-05 PROCEDURE — 43264 ERCP REMOVE DUCT CALCULI: CPT | Mod: 59

## 2023-12-05 PROCEDURE — 99223 1ST HOSP IP/OBS HIGH 75: CPT | Mod: 25

## 2023-12-05 PROCEDURE — 43274 ERCP DUCT STENT PLACEMENT: CPT | Mod: 59

## 2023-12-05 PROCEDURE — 99233 SBSQ HOSP IP/OBS HIGH 50: CPT

## 2023-12-05 DEVICE — STENT BIL ADVANIX 10FRX5CM PRELOADED
Type: IMPLANTABLE DEVICE | Status: NON-FUNCTIONAL
Removed: 2023-12-05

## 2023-12-05 DEVICE — HYDRATOME 44
Type: IMPLANTABLE DEVICE | Status: NON-FUNCTIONAL
Removed: 2023-12-05

## 2023-12-05 DEVICE — CATH BLLN BIL RX 9-12CM
Type: IMPLANTABLE DEVICE | Status: NON-FUNCTIONAL
Removed: 2023-12-05

## 2023-12-05 RX ORDER — INDOMETHACIN 50 MG
100 CAPSULE ORAL ONCE
Refills: 0 | Status: DISCONTINUED | OUTPATIENT
Start: 2023-12-05 | End: 2023-12-07

## 2023-12-05 RX ADMIN — FAMOTIDINE 20 MILLIGRAM(S): 10 INJECTION INTRAVENOUS at 12:19

## 2023-12-05 RX ADMIN — Medication 1: at 18:17

## 2023-12-05 RX ADMIN — PIPERACILLIN AND TAZOBACTAM 25 GRAM(S): 4; .5 INJECTION, POWDER, LYOPHILIZED, FOR SOLUTION INTRAVENOUS at 13:26

## 2023-12-05 RX ADMIN — CHLORHEXIDINE GLUCONATE 1 APPLICATION(S): 213 SOLUTION TOPICAL at 06:03

## 2023-12-05 RX ADMIN — HEPARIN SODIUM 5000 UNIT(S): 5000 INJECTION INTRAVENOUS; SUBCUTANEOUS at 22:09

## 2023-12-05 RX ADMIN — LATANOPROST 1 DROP(S): 0.05 SOLUTION/ DROPS OPHTHALMIC; TOPICAL at 22:10

## 2023-12-05 RX ADMIN — PIPERACILLIN AND TAZOBACTAM 25 GRAM(S): 4; .5 INJECTION, POWDER, LYOPHILIZED, FOR SOLUTION INTRAVENOUS at 22:10

## 2023-12-05 RX ADMIN — HEPARIN SODIUM 5000 UNIT(S): 5000 INJECTION INTRAVENOUS; SUBCUTANEOUS at 06:01

## 2023-12-05 RX ADMIN — ATORVASTATIN CALCIUM 80 MILLIGRAM(S): 80 TABLET, FILM COATED ORAL at 22:10

## 2023-12-05 RX ADMIN — Medication 1: at 12:17

## 2023-12-05 RX ADMIN — PIPERACILLIN AND TAZOBACTAM 25 GRAM(S): 4; .5 INJECTION, POWDER, LYOPHILIZED, FOR SOLUTION INTRAVENOUS at 06:01

## 2023-12-05 RX ADMIN — Medication 1: at 08:18

## 2023-12-05 RX ADMIN — Medication 300 MILLIGRAM(S): at 06:00

## 2023-12-05 RX ADMIN — Medication 300 MILLIGRAM(S): at 18:18

## 2023-12-05 NOTE — CONSULT NOTE ADULT - NS ATTEND AMEND GEN_ALL_CORE FT
i have made amendments to document as necessary.
Agree with above.  See ERCP report.  Observe post procedure.

## 2023-12-05 NOTE — CONSULT NOTE ADULT - SUBJECTIVE AND OBJECTIVE BOX
INTERVAL HPI/OVERNIGHT EVENTS:  HPI:    GI consult  68 y/o Swedish speaking male with PMH of sinus node disorder s/p PPM, HTN, HLD, cerebral aneurysm s/p coiling, CVA with residual L sided weakness seizure disorder, DM2 on Metformin and gastritis who presented to [place] ED with complaint of abdominal pain that started 11/24. Pt  reports pain has gotten progressively worse since Friday. Pt states the pain is associated with dizziness and reports one episode of fall from seated position on Friday. Pt denies LOC and hitting his head. Pt also states he has not had an appetite to eat because of the abdominal pain. Pt states he also in experiencing constipation with last bowel movement on Saturday 11/25. Pt denies diarrhea, N/V, chest pain, fevers, chills.     Patient seen and examined at bedside. POD 5 sp   MEDICATIONS  (STANDING):  aspirin enteric coated 81 milliGRAM(s) Oral daily  atorvastatin 80 milliGRAM(s) Oral at bedtime  carBAMazepine ER Tablet 300 milliGRAM(s) Oral two times a day  chlorhexidine 2% Cloths 1 Application(s) Topical <User Schedule>  dextrose 5%. 1000 milliLiter(s) (50 mL/Hr) IV Continuous <Continuous>  dextrose 5%. 1000 milliLiter(s) (100 mL/Hr) IV Continuous <Continuous>  dextrose 50% Injectable 25 Gram(s) IV Push once  dextrose 50% Injectable 12.5 Gram(s) IV Push once  dextrose 50% Injectable 25 Gram(s) IV Push once  famotidine    Tablet 20 milliGRAM(s) Oral daily  heparin   Injectable 5000 Unit(s) SubCutaneous every 8 hours  indomethacin Suppository 100 milliGRAM(s) Rectal once  insulin lispro (ADMELOG) corrective regimen sliding scale   SubCutaneous Before meals and at bedtime  latanoprost 0.005% Ophthalmic Solution 1 Drop(s) Both EYES at bedtime  melatonin 6 milliGRAM(s) Oral at bedtime  piperacillin/tazobactam IVPB.. 3.375 Gram(s) IV Intermittent every 8 hours  polyethylene glycol 3350 17 Gram(s) Oral daily    MEDICATIONS  (PRN):  aluminum hydroxide/magnesium hydroxide/simethicone Suspension 30 milliLiter(s) Oral every 6 hours PRN Dyspepsia  artificial  tears Solution 1 Drop(s) Both EYES two times a day PRN Dry Eyes  ondansetron Injectable 4 milliGRAM(s) IV Push every 6 hours PRN Nausea and/or Vomiting  senna 1 Tablet(s) Oral two times a day PRN Constipation      Allergies    No Known Allergies    Intolerances        PAST MEDICAL & SURGICAL HISTORY:  CVA (cerebral vascular accident)  left arm weakness, no sensation in left side in 1998      Aneurysm of [place]      HLD (hyperlipidemia)      Epilepsy  last seizure 2016      Renal agenesis, unilateral      Depression      Cerebral aneurysm without rupture  diagnosed 2017 had cerebral angio at [facility]      Gastroesophageal reflux disease, esophagitis presence not specified      Glaucoma of both eyes, unspecified glaucoma type      Bradycardia      Language barrier  Swedish speaking      T2DM (type 2 diabetes mellitus)      S/P craniotomy  for RCMA 1998      Pacemaker  Medtronic Serial [id number]/Model [id number]  Implant 6/29/07, new generator 12/23/2016      S/P bunionectomy      S/P cataract surgery  with lens implants      Status post coil embolization of cerebral aneurysm  9/2018      REVIEW OF SYSTEMS- See HPI    PHYSICAL EXAM:   Vital Signs:  Vital Signs Last 24 Hrs  T(C): 36.7 (05 Dec 2023 14:09), Max: 36.9 (05 Dec 2023 04:00)  T(F): 98.1 (05 Dec 2023 14:09), Max: 98.4 (05 Dec 2023 04:00)  HR: 89 (05 Dec 2023 14:09) (85 - 89)  BP: 136/84 (05 Dec 2023 14:09) (133/67 - 156/81)  BP(mean): --  RR: 16 (05 Dec 2023 14:09) (16 - 20)  SpO2: 100% (05 Dec 2023 14:09) (97% - 100%)    Parameters below as of 05 Dec 2023 13:30  Patient On (Oxygen Delivery Method): room air      Daily Height in cm: 154.9 (05 Dec 2023 14:09)    Daily I&O's Summary    04 Dec 2023 07:01  -  05 Dec 2023 07:00  --------------------------------------------------------  IN: 0 mL / OUT: 665 mL / NET: -665 mL    05 Dec 2023 07:01  -  05 Dec 2023 15:26  --------------------------------------------------------  IN: 0 mL / OUT: 82 mL / NET: -82 mL        GENERAL:  Appears stated age, well-groomed, well-nourished, no distress  HEENT:  Moist and clear sclera and conjunctivae  CHEST:  Full & symmetric excursion, no increased effort, breath sounds clear  HEART:  Regular rhythm, S1, S2  ABDOMEN:  Soft, non-tender, non-distended, normoactive bowel sounds,  no masses, koffi tucker drains in place   EXTREMITIES:  no edema  SKIN:  No rash  NEURO:  Alert, oriented, no tremor, no encephalopathy      LABS:                        9.6    12.01 )-----------( 304      ( 05 Dec 2023 07:15 )             29.6     12-05    139  |  106  |  17  ----------------------------<  168<H>  4.1   |  23  |  1.22    Ca    8.6      05 Dec 2023 07:15  Mg     2.6     12-05    TPro  6.7  /  Alb  2.0<L>  /  TBili  0.3  /  DBili  x   /  AST  37  /  ALT  48<H>  /  AlkPhos  101  12-05      Urinalysis Basic - ( 05 Dec 2023 07:15 )    Color: x / Appearance: x / SG: x / pH: x  Gluc: 168 mg/dL / Ketone: x  / Bili: x / Urobili: x   Blood: x / Protein: x / Nitrite: x   Leuk Esterase: x / RBC: x / WBC x   Sq Epi: x / Non Sq Epi: x / Bacteria: x      RADIOLOGY & ADDITIONAL TESTS:   INTERVAL HPI/OVERNIGHT EVENTS:  HPI:    GI consult  66 y/o Nepali speaking male with PMH of sinus node disorder s/p PPM, HTN, HLD, cerebral aneurysm s/p coiling, CVA with residual L sided weakness seizure disorder, DM2 on Metformin and gastritis who presented to [facility] ED with complaint of abdominal pain that started 11/24. Pt  reports pain has gotten progressively worse since Friday. Pt states the pain is associated with dizziness and reports one episode of fall from seated position on Friday. Pt denies LOC and hitting his head. Pt also states he has not had an appetite to eat because of the abdominal pain. Pt states he also in experiencing constipation with last bowel movement on Saturday 11/25. Pt denies diarrhea, N/V, chest pain, fevers, chills.     Patient seen and examined at bedside. POD 5 sp   MEDICATIONS  (STANDING):  aspirin enteric coated 81 milliGRAM(s) Oral daily  atorvastatin 80 milliGRAM(s) Oral at bedtime  carBAMazepine ER Tablet 300 milliGRAM(s) Oral two times a day  chlorhexidine 2% Cloths 1 Application(s) Topical <User Schedule>  dextrose 5%. 1000 milliLiter(s) (50 mL/Hr) IV Continuous <Continuous>  dextrose 5%. 1000 milliLiter(s) (100 mL/Hr) IV Continuous <Continuous>  dextrose 50% Injectable 25 Gram(s) IV Push once  dextrose 50% Injectable 12.5 Gram(s) IV Push once  dextrose 50% Injectable 25 Gram(s) IV Push once  famotidine    Tablet 20 milliGRAM(s) Oral daily  heparin   Injectable 5000 Unit(s) SubCutaneous every 8 hours  indomethacin Suppository 100 milliGRAM(s) Rectal once  insulin lispro (ADMELOG) corrective regimen sliding scale   SubCutaneous Before meals and at bedtime  latanoprost 0.005% Ophthalmic Solution 1 Drop(s) Both EYES at bedtime  melatonin 6 milliGRAM(s) Oral at bedtime  piperacillin/tazobactam IVPB.. 3.375 Gram(s) IV Intermittent every 8 hours  polyethylene glycol 3350 17 Gram(s) Oral daily    MEDICATIONS  (PRN):  aluminum hydroxide/magnesium hydroxide/simethicone Suspension 30 milliLiter(s) Oral every 6 hours PRN Dyspepsia  artificial  tears Solution 1 Drop(s) Both EYES two times a day PRN Dry Eyes  ondansetron Injectable 4 milliGRAM(s) IV Push every 6 hours PRN Nausea and/or Vomiting  senna 1 Tablet(s) Oral two times a day PRN Constipation      Allergies    No Known Allergies    Intolerances        PAST MEDICAL & SURGICAL HISTORY:  CVA (cerebral vascular accident)  left arm weakness, no sensation in left side in 1998      Aneurysm of [place]      HLD (hyperlipidemia)      Epilepsy  last seizure 2016      Renal agenesis, unilateral      Depression      Cerebral aneurysm without rupture  diagnosed 2017 had cerebral angio at [facility]      Gastroesophageal reflux disease, esophagitis presence not specified      Glaucoma of both eyes, unspecified glaucoma type      Bradycardia      Language barrier  Nepali speaking      T2DM (type 2 diabetes mellitus)      S/P craniotomy  for RCMA 1998      Pacemaker  Medtronic Serial [id number]/Model [id number]  Implant 6/29/07, new generator 12/23/2016      S/P bunionectomy      S/P cataract surgery  with lens implants      Status post coil embolization of cerebral aneurysm  9/2018      REVIEW OF SYSTEMS- See HPI    PHYSICAL EXAM:   Vital Signs:  Vital Signs Last 24 Hrs  T(C): 36.7 (05 Dec 2023 14:09), Max: 36.9 (05 Dec 2023 04:00)  T(F): 98.1 (05 Dec 2023 14:09), Max: 98.4 (05 Dec 2023 04:00)  HR: 89 (05 Dec 2023 14:09) (85 - 89)  BP: 136/84 (05 Dec 2023 14:09) (133/67 - 156/81)  BP(mean): --  RR: 16 (05 Dec 2023 14:09) (16 - 20)  SpO2: 100% (05 Dec 2023 14:09) (97% - 100%)    Parameters below as of 05 Dec 2023 13:30  Patient On (Oxygen Delivery Method): room air      Daily Height in cm: 154.9 (05 Dec 2023 14:09)    Daily I&O's Summary    04 Dec 2023 07:01  -  05 Dec 2023 07:00  --------------------------------------------------------  IN: 0 mL / OUT: 665 mL / NET: -665 mL    05 Dec 2023 07:01  -  05 Dec 2023 15:26  --------------------------------------------------------  IN: 0 mL / OUT: 82 mL / NET: -82 mL        GENERAL:  Appears stated age, well-groomed, well-nourished, no distress  HEENT:  Moist and clear sclera and conjunctivae  CHEST:  Full & symmetric excursion, no increased effort, breath sounds clear  HEART:  Regular rhythm, S1, S2  ABDOMEN:  Soft, non-tender, non-distended, normoactive bowel sounds,  no masses, koffi tucker drains in place   EXTREMITIES:  no edema  SKIN:  No rash  NEURO:  Alert, oriented, no tremor, no encephalopathy      LABS:                        9.6    12.01 )-----------( 304      ( 05 Dec 2023 07:15 )             29.6     12-05    139  |  106  |  17  ----------------------------<  168<H>  4.1   |  23  |  1.22    Ca    8.6      05 Dec 2023 07:15  Mg     2.6     12-05    TPro  6.7  /  Alb  2.0<L>  /  TBili  0.3  /  DBili  x   /  AST  37  /  ALT  48<H>  /  AlkPhos  101  12-05      Urinalysis Basic - ( 05 Dec 2023 07:15 )    Color: x / Appearance: x / SG: x / pH: x  Gluc: 168 mg/dL / Ketone: x  / Bili: x / Urobili: x   Blood: x / Protein: x / Nitrite: x   Leuk Esterase: x / RBC: x / WBC x   Sq Epi: x / Non Sq Epi: x / Bacteria: x      RADIOLOGY & ADDITIONAL TESTS:

## 2023-12-05 NOTE — CONSULT NOTE ADULT - PROBLEM SELECTOR RECOMMENDATION 9
Post cholecystectomy syndrome   - NPO   - ERCP with stent placement Post cholecystectomy syndrome   - NPO   - ERCP, r/b/a

## 2023-12-05 NOTE — OCCUPATIONAL THERAPY INITIAL EVALUATION ADULT - ADDITIONAL COMMENTS
Able to communicate with patient in native language Icelandic. Patient resides alone in 2nd floor apartment, has 5 steps to enter building with elevator access to patient's apartment. Patient has a shower stall at home with grab bars and a comfort height toilet. Patient had CVA prior and has residual left side weakness. Patient has home health aide 6hours/7days that assisted with IADL's and ADL's occasionally Able to communicate with patient in native language Upper sorbian. Patient resides alone in 2nd floor apartment, has 5 steps to enter building with elevator access to patient's apartment. Patient has a shower stall at home with grab bars and a comfort height toilet. Patient had CVA prior and has residual left side weakness. Patient has home health aide 6hours/7days that assisted with IADL's and ADL's occasionally

## 2023-12-05 NOTE — PROGRESS NOTE ADULT - SUBJECTIVE AND OBJECTIVE BOX
Patient is a 68-year-old male with PMHx of sinus node disorder s/p PPM, HTN, HLD, cerebral aneurysm s/p coiling, CVA w/ residual L sided weakness, seizure disorder, type 2 diabetes on metformin and gastritis admitted for acute cholecystitis s/p lap to open cholecystectomy 11/30, w/ ICU upgrade due to post-operative hypotension requiring pressors, downgraded to surgical floor.     Overnight Events: None  Interval HPI: Today is hospital day 7. Patient seen and examined at bedside. Denies dizziness, headache, SOB, chest pain. Denies any questions at this time.     REVIEW OF SYSTEMS:  CONSTITUTIONAL: (-) weakness, (-) fevers, (-) chills  EYES/ENT: (-) visual changes,  (-) vertigo,  (-) throat pain   NECK:  (-) pain, (-) stiffness  RESPIRATORY:  (-) shortness of breath, (-) cough,  (-) wheezing,  (-) hemoptysis   CARDIOVASCULAR:  (-) chest pain, (-) palpitations  GASTROINTESTINAL:  (-) abdominal pain, (-) nausea, (-) vomiting, (-) diarrhea, (+) constipation, (-) melena,  (-) hematemesis,  (-) hematochezia  GENITOURINARY: (-) dysuria, (-) frequency, (-) hematuria  NEUROLOGICAL: (-) numbness, (-) weakness  SKIN: (-) itching, (-) rashes, (-) lesions      Vital Signs Last 24 Hrs    T(C): 36.9 (05 Dec 2023 04:00), Max: 37 (04 Dec 2023 12:32)  T(F): 98.4 (05 Dec 2023 04:00), Max: 98.6 (04 Dec 2023 12:32)  HR: 85 (05 Dec 2023 04:00) (85 - 95)  BP: 156/81 (05 Dec 2023 04:00) (145/75 - 156/81)  BP(mean): --  RR: 20 (05 Dec 2023 04:00) (18 - 20)  SpO2: 97% (05 Dec 2023 04:00) (97% - 98%)    Parameters below as of 05 Dec 2023 04:00  Patient On (Oxygen Delivery Method): room air  I&O's Summary  03 Dec 2023 07:01  -  04 Dec 2023 07:00  --------------------------------------------------------  IN: 275 mL / OUT: 1650 mL / NET: -1375 mL    04 Dec 2023 07:01  -  05 Dec 2023 06:01  --------------------------------------------------------  IN: 0 mL / OUT: 330 mL / NET: -330 mL    PHYSICAL EXAM:  GENERAL: NAD, lying in bed comfortably. Generally pale appearing  HEAD:  Atraumatic, Normocephalic  EYES: EOMI, conjunctiva and sclera clear, watery eyes  ENT: Moist mucous membranes  NECK: Supple, No JVD  CHEST/LUNG: Clear to auscultation bilaterally, good air entry bilaterally; No wheezing, rales, or rhonchi. Unlabored respirations  HEART: Regular rate and rhythm. S1 and S2. No murmurs, rubs, or gallops  ABDOMEN: Soft, TTP in RUG and LLQ, Nondistended. Bowel sounds present.   NIKHIL drain #1 and #2 with serosang output, drain 2 mixed with bile.   JRUQ dressing with brown staining, intact.   EXTREMITIES:  2+ Peripheral Pulses. No clubbing, cyanosis, or edema  NERVOUS SYSTEM:  Alert & Oriented X3, speech clear. Left sided residual stroke weakness  SKIN: No rashes, bruises, or other lesions    MEDICATIONS:  MEDICATIONS  (STANDING):  aspirin enteric coated 81 milliGRAM(s) Oral daily  atorvastatin 80 milliGRAM(s) Oral at bedtime  carBAMazepine ER Tablet 300 milliGRAM(s) Oral two times a day  chlorhexidine 2% Cloths 1 Application(s) Topical <User Schedule>  dextrose 5%. 1000 milliLiter(s) (50 mL/Hr) IV Continuous <Continuous>  dextrose 5%. 1000 milliLiter(s) (100 mL/Hr) IV Continuous <Continuous>  dextrose 50% Injectable 25 Gram(s) IV Push once  dextrose 50% Injectable 12.5 Gram(s) IV Push once  dextrose 50% Injectable 25 Gram(s) IV Push once  famotidine    Tablet 20 milliGRAM(s) Oral daily  heparin   Injectable 5000 Unit(s) SubCutaneous every 8 hours  insulin lispro (ADMELOG) corrective regimen sliding scale   SubCutaneous Before meals and at bedtime  latanoprost 0.005% Ophthalmic Solution 1 Drop(s) Both EYES at bedtime  melatonin 6 milliGRAM(s) Oral at bedtime  piperacillin/tazobactam IVPB.. 3.375 Gram(s) IV Intermittent every 8 hours  polyethylene glycol 3350 17 Gram(s) Oral daily    MEDICATIONS  (PRN):  artificial  tears Solution 1 Drop(s) Both EYES two times a day PRN Dry Eyes  ondansetron Injectable 4 milliGRAM(s) IV Push every 6 hours PRN Nausea and/or Vomiting  senna 1 Tablet(s) Oral two times a day PRN Constipation    LABS:   All Labs Personally Reviewed                         10.1   6.72  )-----------( 292      ( 04 Dec 2023 05:24 )             30.4     12-04    143  |  104  |  15  ----------------------------<  177<H>  3.2<L>   |  28  |  1.28    Ca    8.3<L>      04 Dec 2023 05:24  Phos  2.8     12-03  Mg     2.5     12-04    TPro  6.8  /  Alb  2.0<L>  /  TBili  0.4  /  DBili  x   /  AST  35  /  ALT  53<H>  /  AlkPhos  98  12-04    RADIOLOGY/EKG:  All Imaging and EKGs Personally Reviewed     NM Hepatobiliary Imaging w/ RX (11.29.23 @ 14:20)   IMPRESSION: Abnormal morphine-augmented hepatobiliary scan.  Findings are compatible with acute cholecystitis.    US Abdomen Limited (11.28.23 @ 21:21)   IMPRESSION:  Distended thick-walled gallbladder containing sludge.  Borderline CBD   measuring 6 mm.  Findings equivocal for acute cholecystitis. If   clinically indicated, further evaluation with nuclear HIDA scan may be   obtained.    CT Abdomen and Pelvis No Cont (11.28.23 @ 16:52)   IMPRESSION:  Findings suspicious for acute cholecystitis. Correlate with patient's   symptoms. Consider right upper quadrant ultrasound if clinically   indicated.    CT Chest No Cont (11.28.23 @ 16:51)   IMPRESSION:  Findings suspicious for acute cholecystitis. Correlate with patient's   symptoms. Consider right upper quadrant ultrasound if clinically   indicated.    Xray Chest 1 View AP/PA (11.28.23 @ 15:49)   Impression:  No acute pulmonary disease.    Xray Chest 1 View- PORTABLE-Urgent (11.24.23 @ 03:18)   IMPRESSION:  No evidence of acute infiltrate or pleural effusion.    TTE Echo Complete w/o Contrast w/ Doppler (11.30.23 @ 11:17)   Summary:   1. Left ventricular ejection fraction, by visual estimation, is 65 to   70%.   2. Normal global left ventricular systolic function.   3. Normal left ventricular internal cavity size.   4. Mildly enlarged right ventricle.   5. Mild mitral valve regurgitation.   6. Thickening of the anterior mitral valve leaflet.   7. Mild tricuspid regurgitation.   8. Mild aortic regurgitation.    12 Lead ECG (11.28.23 @ 15:11)   Normal sinus rhythm  Voltage criteria for left ventricular hypertrophy  Abnormal ECG  When compared with ECG of 24-NOV-2023 01:26,  Sinus rhythm has replaced Electronic atrial pacemaker

## 2023-12-05 NOTE — PROGRESS NOTE ADULT - NSPROGADDITIONALINFOA_GEN_ALL_CORE
PT rec: acute inpatient rehab, apprec PMR recs  Goal to get patient OOB today to aid with gas passing and overall weakness  Patient says he will update his family on his own

## 2023-12-05 NOTE — PROGRESS NOTE ADULT - PROBLEM SELECTOR PLAN 1
- S/p lap to open cholecystectomy w/ suppurative GB - POD 6  - Appreciate Surg recs  - continue to monitor NIKHIL drain output  - c/w DASH diet, unless pt begins vomiting  - C/w Zosyn   - C/w incentive spirometry, antiemetics and pain regimen PRN - S/p lap to open cholecystectomy w/ suppurative GB - POD 6  - Appreciate Surg recs  - GI consulted by surgery for with bile leak and for possible ERCP with stent placement  -made NPO  - continue to monitor NIKHIL drain output  - c/w DASH diet, unless pt begins vomiting  - C/w Zosyn   - C/w incentive spirometry, antiemetics and pain regimen PRN - S/p lap to open cholecystectomy w/ suppurative GB - POD 6  - Appreciate Surg recs  - GI consulted by surgery for with bile leak and for possible ERCP with stent placement  -made NPO  - continue to monitor NIKHIL drain output  - c/w DASH diet, unless pt begins vomiting  - C/w Zosyn,   - C/w incentive spirometry, antiemetics and pain regimen PRN  - New leukocytosis

## 2023-12-05 NOTE — CONSULT NOTE ADULT - CONSULT REASON
Abdominal pain
pod # 5 s/p lap converted to open cholecystectomy, pt with bile leak, GI c/s requested by Dr. Bro for possible ERCP with stent placement.
clearance for lap rajeev
hypotension
acute rehab placement

## 2023-12-05 NOTE — PROGRESS NOTE ADULT - PROBLEM SELECTOR PLAN 2
IMPROVED  - restarted heparin per surgery. Hb now stable  - Transfusion consent in chart  - continue to monitor and maintain Hb >7

## 2023-12-05 NOTE — PROGRESS NOTE ADULT - ATTENDING COMMENTS
agree with above  GI consulted for possible ERCP with stent placement  continue iv abx  trend WBC, monitor h/h on daily labs    medically optimized for GI procedure

## 2023-12-05 NOTE — PROGRESS NOTE ADULT - SUBJECTIVE AND OBJECTIVE BOX
Patient is a 68y old  Male who presents with a chief complaint of Acute Cholecystitis (05 Dec 2023 05:59)  pt POD # 5 s/p lap converted open cholecystectomy  pt with persistent bile leak in one of the surgical drains  pt feels well, denies n/v/f/c  tolerating diet    Patient seen and examined at bedside    ALLERGIES:  No Known Allergies    MEDICATIONS  (STANDING):  aspirin enteric coated 81 milliGRAM(s) Oral daily  atorvastatin 80 milliGRAM(s) Oral at bedtime  carBAMazepine ER Tablet 300 milliGRAM(s) Oral two times a day  chlorhexidine 2% Cloths 1 Application(s) Topical <User Schedule>  dextrose 5%. 1000 milliLiter(s) (50 mL/Hr) IV Continuous <Continuous>  famotidine    Tablet 20 milliGRAM(s) Oral daily  heparin   Injectable 5000 Unit(s) SubCutaneous every 8 hours  insulin lispro (ADMELOG) corrective regimen sliding scale   SubCutaneous Before meals and at bedtime  latanoprost 0.005% Ophthalmic Solution 1 Drop(s) Both EYES at bedtime  melatonin 6 milliGRAM(s) Oral at bedtime  piperacillin/tazobactam IVPB.. 3.375 Gram(s) IV Intermittent every 8 hours  polyethylene glycol 3350 17 Gram(s) Oral daily    MEDICATIONS  (PRN):  aluminum hydroxide/magnesium hydroxide/simethicone Suspension 30 milliLiter(s) Oral every 6 hours PRN Dyspepsia  artificial  tears Solution 1 Drop(s) Both EYES two times a day PRN Dry Eyes  ondansetron Injectable 4 milliGRAM(s) IV Push every 6 hours PRN Nausea and/or Vomiting  senna 1 Tablet(s) Oral two times a day PRN Constipation    Vital Signs Last 24 Hrs  T(F): 98.4 (05 Dec 2023 04:00), Max: 98.6 (04 Dec 2023 12:32)  HR: 85 (05 Dec 2023 04:00) (85 - 95)  BP: 156/81 (05 Dec 2023 04:00) (145/75 - 156/81)  RR: 20 (05 Dec 2023 04:00) (18 - 20)  SpO2: 97% (05 Dec 2023 04:00) (97% - 98%)    I&O's Summary    04 Dec 2023 07:01  -  05 Dec 2023 07:00  --------------------------------------------------------  IN: 0 mL / OUT: 665 mL / NET: -665 mL    PHYSICAL EXAM:  General: NAD, A/O x 3, non toxic appearance  ENT: MMM  Neck: Supple, No JVD  Abdomen: soft, nd, nttp, no guarding, incisions c/d/i, dressing c/d/i, one rogelio drain with serosanguinous fluid, adjacent rogelio drain with bilious fluid (310cc/355cc last 24 hours)  Extremities: No calf tenderness, No pitting edema    LABS:                        9.6    12.01 )-----------( 304      ( 05 Dec 2023 07:15 )             29.6     12-05    139  |  106  |  17  ----------------------------<  168  4.1   |  23  |  1.22    Ca    8.6      05 Dec 2023 07:15  Phos  2.8     12-03  Mg     2.6     12-05    TPro  6.7  /  Alb  2.0  /  TBili  0.3  /  DBili  x   /  AST  37  /  ALT  48  /  AlkPhos  101  12-05    POCT Blood Glucose.: 154 mg/dL (05 Dec 2023 08:15)  POCT Blood Glucose.: 157 mg/dL (04 Dec 2023 21:42)  POCT Blood Glucose.: 153 mg/dL (04 Dec 2023 16:58)  POCT Blood Glucose.: 203 mg/dL (04 Dec 2023 12:07)    Urinalysis Basic - ( 05 Dec 2023 07:15 )    Color: x / Appearance: x / SG: x / pH: x  Gluc: 168 mg/dL / Ketone: x  / Bili: x / Urobili: x   Blood: x / Protein: x / Nitrite: x   Leuk Esterase: x / RBC: x / WBC x   Sq Epi: x / Non Sq Epi: x / Bacteria: x    Culture - Blood (collected 28 Nov 2023 22:39)  Source: .Blood Blood-Peripheral  Final Report (04 Dec 2023 04:01):    No growth at 5 days    Culture - Blood (collected 28 Nov 2023 22:39)  Source: .Blood Blood-Peripheral  Final Report (04 Dec 2023 04:01):    No growth at 5 days

## 2023-12-05 NOTE — OCCUPATIONAL THERAPY INITIAL EVALUATION ADULT - LEVEL OF INDEPENDENCE: DRESS LOWER BODY, OT EVAL
Patient apprehensive to lean forward. Decrease mobility in left LE/dependent (less than 25% patients effort)

## 2023-12-05 NOTE — OCCUPATIONAL THERAPY INITIAL EVALUATION ADULT - PERTINENT HX OF CURRENT PROBLEM, REHAB EVAL
Patient presents to [place] [facility] ED with complaint of abdominal pain that started 11/24 and got progressively worse. Pain associated with dizziness and reports an episode of fall from seated position Patient presents to [place] ED with complaint of abdominal pain that started 11/24 and got progressively worse. Pain associated with dizziness and reports an episode of fall from seated position

## 2023-12-05 NOTE — CONSULT NOTE ADULT - ASSESSMENT
68 y/o [place] speaking male with PMH of sinus node disorder s/p PPM, HTN, HLD, cerebral aneurysm s/p coiling, CVA with residual L sided weaknes,    Currently POD # 5 s/p lap converted to open cholecystectomy.   Post cholecystectomy syndrome  GI consult: ERCP with stent placement. 68 y/o [place] speaking male with PMH of sinus node disorder s/p PPM, HTN, HLD, cerebral aneurysm s/p coiling, CVA with residual L sided weaknes,    Currently POD # 5 s/p lap converted to open cholecystectomy.   Post cholecystectomy syndrome  GI consult: ERCP with stent placement.

## 2023-12-05 NOTE — PROGRESS NOTE ADULT - NUTRITIONAL ASSESSMENT
This patient has been assessed with a concern for Malnutrition and has been determined to have a diagnosis/diagnoses of Moderate protein-calorie malnutrition.    This patient is being managed with:   Diet NPO-  Except Medications  Entered: Dec  5 2023 10:13AM

## 2023-12-05 NOTE — PROGRESS NOTE ADULT - ASSESSMENT
Patient is a 68y old  Male who presents with a chief complaint of Acute Cholecystitis (05 Dec 2023 05:59)  pt POD # 5 s/p lap converted open cholecystectomy  pt with persistent bile leak in one of the surgical drains  pt feels well, denies n/v/f/c  tolerating diet    plan  - GI c/s for ERCP with stent  - NPO, hold Heparin for procedure    case d/w Dandy Bacon and NP [place]

## 2023-12-06 LAB
ALBUMIN SERPL ELPH-MCNC: 1.7 G/DL — LOW (ref 3.3–5)
ALBUMIN SERPL ELPH-MCNC: 1.7 G/DL — LOW (ref 3.3–5)
ALP SERPL-CCNC: 99 U/L — SIGNIFICANT CHANGE UP (ref 40–120)
ALP SERPL-CCNC: 99 U/L — SIGNIFICANT CHANGE UP (ref 40–120)
ALT FLD-CCNC: 35 U/L — SIGNIFICANT CHANGE UP (ref 10–45)
ALT FLD-CCNC: 35 U/L — SIGNIFICANT CHANGE UP (ref 10–45)
ANION GAP SERPL CALC-SCNC: 12 MMOL/L — SIGNIFICANT CHANGE UP (ref 5–17)
ANION GAP SERPL CALC-SCNC: 12 MMOL/L — SIGNIFICANT CHANGE UP (ref 5–17)
AST SERPL-CCNC: 41 U/L — HIGH (ref 10–40)
AST SERPL-CCNC: 41 U/L — HIGH (ref 10–40)
BILIRUB SERPL-MCNC: 0.3 MG/DL — SIGNIFICANT CHANGE UP (ref 0.2–1.2)
BILIRUB SERPL-MCNC: 0.3 MG/DL — SIGNIFICANT CHANGE UP (ref 0.2–1.2)
BUN SERPL-MCNC: 16 MG/DL — SIGNIFICANT CHANGE UP (ref 7–23)
BUN SERPL-MCNC: 16 MG/DL — SIGNIFICANT CHANGE UP (ref 7–23)
CALCIUM SERPL-MCNC: 7.6 MG/DL — LOW (ref 8.4–10.5)
CALCIUM SERPL-MCNC: 7.6 MG/DL — LOW (ref 8.4–10.5)
CHLORIDE SERPL-SCNC: 108 MMOL/L — SIGNIFICANT CHANGE UP (ref 96–108)
CHLORIDE SERPL-SCNC: 108 MMOL/L — SIGNIFICANT CHANGE UP (ref 96–108)
CO2 SERPL-SCNC: 22 MMOL/L — SIGNIFICANT CHANGE UP (ref 22–31)
CO2 SERPL-SCNC: 22 MMOL/L — SIGNIFICANT CHANGE UP (ref 22–31)
CREAT SERPL-MCNC: 1.26 MG/DL — SIGNIFICANT CHANGE UP (ref 0.5–1.3)
CREAT SERPL-MCNC: 1.26 MG/DL — SIGNIFICANT CHANGE UP (ref 0.5–1.3)
EGFR: 61 ML/MIN/1.73M2 — SIGNIFICANT CHANGE UP
EGFR: 61 ML/MIN/1.73M2 — SIGNIFICANT CHANGE UP
GLUCOSE BLDC GLUCOMTR-MCNC: 132 MG/DL — HIGH (ref 70–99)
GLUCOSE BLDC GLUCOMTR-MCNC: 132 MG/DL — HIGH (ref 70–99)
GLUCOSE BLDC GLUCOMTR-MCNC: 137 MG/DL — HIGH (ref 70–99)
GLUCOSE BLDC GLUCOMTR-MCNC: 137 MG/DL — HIGH (ref 70–99)
GLUCOSE BLDC GLUCOMTR-MCNC: 151 MG/DL — HIGH (ref 70–99)
GLUCOSE BLDC GLUCOMTR-MCNC: 151 MG/DL — HIGH (ref 70–99)
GLUCOSE BLDC GLUCOMTR-MCNC: 181 MG/DL — HIGH (ref 70–99)
GLUCOSE BLDC GLUCOMTR-MCNC: 181 MG/DL — HIGH (ref 70–99)
GLUCOSE BLDC GLUCOMTR-MCNC: 194 MG/DL — HIGH (ref 70–99)
GLUCOSE BLDC GLUCOMTR-MCNC: 194 MG/DL — HIGH (ref 70–99)
GLUCOSE SERPL-MCNC: 126 MG/DL — HIGH (ref 70–99)
GLUCOSE SERPL-MCNC: 126 MG/DL — HIGH (ref 70–99)
HCT VFR BLD CALC: 26.5 % — LOW (ref 39–50)
HCT VFR BLD CALC: 26.5 % — LOW (ref 39–50)
HGB BLD-MCNC: 8.6 G/DL — LOW (ref 13–17)
HGB BLD-MCNC: 8.6 G/DL — LOW (ref 13–17)
MAGNESIUM SERPL-MCNC: 2.5 MG/DL — SIGNIFICANT CHANGE UP (ref 1.6–2.6)
MAGNESIUM SERPL-MCNC: 2.5 MG/DL — SIGNIFICANT CHANGE UP (ref 1.6–2.6)
MCHC RBC-ENTMCNC: 32.5 GM/DL — SIGNIFICANT CHANGE UP (ref 32–36)
MCHC RBC-ENTMCNC: 32.5 GM/DL — SIGNIFICANT CHANGE UP (ref 32–36)
MCHC RBC-ENTMCNC: 32.5 PG — SIGNIFICANT CHANGE UP (ref 27–34)
MCHC RBC-ENTMCNC: 32.5 PG — SIGNIFICANT CHANGE UP (ref 27–34)
MCV RBC AUTO: 100 FL — SIGNIFICANT CHANGE UP (ref 80–100)
MCV RBC AUTO: 100 FL — SIGNIFICANT CHANGE UP (ref 80–100)
NRBC # BLD: 0 /100 WBCS — SIGNIFICANT CHANGE UP (ref 0–0)
NRBC # BLD: 0 /100 WBCS — SIGNIFICANT CHANGE UP (ref 0–0)
PHOSPHATE SERPL-MCNC: 2.6 MG/DL — SIGNIFICANT CHANGE UP (ref 2.5–4.5)
PHOSPHATE SERPL-MCNC: 2.6 MG/DL — SIGNIFICANT CHANGE UP (ref 2.5–4.5)
PLATELET # BLD AUTO: 262 K/UL — SIGNIFICANT CHANGE UP (ref 150–400)
PLATELET # BLD AUTO: 262 K/UL — SIGNIFICANT CHANGE UP (ref 150–400)
POTASSIUM SERPL-MCNC: 3.9 MMOL/L — SIGNIFICANT CHANGE UP (ref 3.5–5.3)
POTASSIUM SERPL-MCNC: 3.9 MMOL/L — SIGNIFICANT CHANGE UP (ref 3.5–5.3)
POTASSIUM SERPL-SCNC: 3.9 MMOL/L — SIGNIFICANT CHANGE UP (ref 3.5–5.3)
POTASSIUM SERPL-SCNC: 3.9 MMOL/L — SIGNIFICANT CHANGE UP (ref 3.5–5.3)
PROT SERPL-MCNC: 5.9 G/DL — LOW (ref 6–8.3)
PROT SERPL-MCNC: 5.9 G/DL — LOW (ref 6–8.3)
RBC # BLD: 2.65 M/UL — LOW (ref 4.2–5.8)
RBC # BLD: 2.65 M/UL — LOW (ref 4.2–5.8)
RBC # FLD: 15.8 % — HIGH (ref 10.3–14.5)
RBC # FLD: 15.8 % — HIGH (ref 10.3–14.5)
SODIUM SERPL-SCNC: 142 MMOL/L — SIGNIFICANT CHANGE UP (ref 135–145)
SODIUM SERPL-SCNC: 142 MMOL/L — SIGNIFICANT CHANGE UP (ref 135–145)
WBC # BLD: 10.56 K/UL — HIGH (ref 3.8–10.5)
WBC # BLD: 10.56 K/UL — HIGH (ref 3.8–10.5)
WBC # FLD AUTO: 10.56 K/UL — HIGH (ref 3.8–10.5)
WBC # FLD AUTO: 10.56 K/UL — HIGH (ref 3.8–10.5)

## 2023-12-06 PROCEDURE — 99232 SBSQ HOSP IP/OBS MODERATE 35: CPT

## 2023-12-06 PROCEDURE — 99233 SBSQ HOSP IP/OBS HIGH 50: CPT

## 2023-12-06 RX ADMIN — Medication 30 MILLILITER(S): at 08:34

## 2023-12-06 RX ADMIN — PIPERACILLIN AND TAZOBACTAM 25 GRAM(S): 4; .5 INJECTION, POWDER, LYOPHILIZED, FOR SOLUTION INTRAVENOUS at 05:07

## 2023-12-06 RX ADMIN — Medication 1: at 21:08

## 2023-12-06 RX ADMIN — HEPARIN SODIUM 5000 UNIT(S): 5000 INJECTION INTRAVENOUS; SUBCUTANEOUS at 05:08

## 2023-12-06 RX ADMIN — ATORVASTATIN CALCIUM 80 MILLIGRAM(S): 80 TABLET, FILM COATED ORAL at 21:02

## 2023-12-06 RX ADMIN — Medication 300 MILLIGRAM(S): at 18:09

## 2023-12-06 RX ADMIN — Medication 81 MILLIGRAM(S): at 14:59

## 2023-12-06 RX ADMIN — HEPARIN SODIUM 5000 UNIT(S): 5000 INJECTION INTRAVENOUS; SUBCUTANEOUS at 21:02

## 2023-12-06 RX ADMIN — Medication 300 MILLIGRAM(S): at 05:07

## 2023-12-06 RX ADMIN — POLYETHYLENE GLYCOL 3350 17 GRAM(S): 17 POWDER, FOR SOLUTION ORAL at 15:01

## 2023-12-06 RX ADMIN — LATANOPROST 1 DROP(S): 0.05 SOLUTION/ DROPS OPHTHALMIC; TOPICAL at 21:02

## 2023-12-06 RX ADMIN — CHLORHEXIDINE GLUCONATE 1 APPLICATION(S): 213 SOLUTION TOPICAL at 05:14

## 2023-12-06 RX ADMIN — HEPARIN SODIUM 5000 UNIT(S): 5000 INJECTION INTRAVENOUS; SUBCUTANEOUS at 14:59

## 2023-12-06 RX ADMIN — Medication 1: at 15:04

## 2023-12-06 RX ADMIN — FAMOTIDINE 20 MILLIGRAM(S): 10 INJECTION INTRAVENOUS at 15:02

## 2023-12-06 RX ADMIN — Medication 6 MILLIGRAM(S): at 21:02

## 2023-12-06 NOTE — PROGRESS NOTE ADULT - SUBJECTIVE AND OBJECTIVE BOX
INTERVAL HPI/OVERNIGHT EVENTS:  Patient seen and examined at bed side. No event over night. Denies nausea, vomiting.     MEDICATIONS  (STANDING):  aspirin enteric coated 81 milliGRAM(s) Oral daily  atorvastatin 80 milliGRAM(s) Oral at bedtime  carBAMazepine ER Tablet 300 milliGRAM(s) Oral two times a day  chlorhexidine 2% Cloths 1 Application(s) Topical <User Schedule>  dextrose 5%. 1000 milliLiter(s) (50 mL/Hr) IV Continuous <Continuous>  dextrose 5%. 1000 milliLiter(s) (100 mL/Hr) IV Continuous <Continuous>  dextrose 50% Injectable 25 Gram(s) IV Push once  dextrose 50% Injectable 12.5 Gram(s) IV Push once  dextrose 50% Injectable 25 Gram(s) IV Push once  famotidine    Tablet 20 milliGRAM(s) Oral daily  heparin   Injectable 5000 Unit(s) SubCutaneous every 8 hours  indomethacin Suppository 100 milliGRAM(s) Rectal once  insulin lispro (ADMELOG) corrective regimen sliding scale   SubCutaneous Before meals and at bedtime  latanoprost 0.005% Ophthalmic Solution 1 Drop(s) Both EYES at bedtime  melatonin 6 milliGRAM(s) Oral at bedtime  piperacillin/tazobactam IVPB.. 3.375 Gram(s) IV Intermittent every 8 hours  polyethylene glycol 3350 17 Gram(s) Oral daily    MEDICATIONS  (PRN):  aluminum hydroxide/magnesium hydroxide/simethicone Suspension 30 milliLiter(s) Oral every 6 hours PRN Dyspepsia  artificial  tears Solution 1 Drop(s) Both EYES two times a day PRN Dry Eyes  ondansetron Injectable 4 milliGRAM(s) IV Push every 6 hours PRN Nausea and/or Vomiting  senna 1 Tablet(s) Oral two times a day PRN Constipation      Allergies    No Known Allergies    Intolerances              Vital Signs Last 24 Hrs  T(C): 37 (06 Dec 2023 05:00), Max: 37.1 (05 Dec 2023 18:13)  T(F): 98.6 (06 Dec 2023 05:00), Max: 98.7 (05 Dec 2023 18:13)  HR: 72 (06 Dec 2023 05:00) (65 - 89)  BP: 119/61 (06 Dec 2023 05:00) (119/61 - 146/70)  BP(mean): 80 (06 Dec 2023 05:00) (80 - 80)  RR: 19 (06 Dec 2023 05:00) (16 - 20)  SpO2: 98% (06 Dec 2023 05:00) (97% - 100%)    Parameters below as of 06 Dec 2023 05:00  Patient On (Oxygen Delivery Method): room air        PHYSICAL EXAM:    Constitutional: NAD, well-developed  Neck: No LAD, supple  Respiratory: No SOB No distress   Cardiovascular: S1 and S2  Gastrointestinal: Soft, non distended ,tender appropriate post op,+ BS Abdominal staples and dressing intact & NIKHIL to SS.   Extremities: No peripheral edema, neg clubbing, cyanosis  Neurological: A/O x 3   Skin: No rashes      LABS:                        8.6    10.56 )-----------( 262      ( 06 Dec 2023 06:04 )             26.5     12-06    142  |  108  |  16  ----------------------------<  126<H>  3.9   |  22  |  1.26    Ca    7.6<L>      06 Dec 2023 06:04  Phos  2.6     12-06  Mg     2.5     12-06    TPro  5.9<L>  /  Alb  1.7<L>  /  TBili  0.3  /  DBili  x   /  AST  41<H>  /  ALT  35  /  AlkPhos  99  12-06      Urinalysis Basic - ( 06 Dec 2023 06:04 )    Color: x / Appearance: x / SG: x / pH: x  Gluc: 126 mg/dL / Ketone: x  / Bili: x / Urobili: x   Blood: x / Protein: x / Nitrite: x   Leuk Esterase: x / RBC: x / WBC x   Sq Epi: x / Non Sq Epi: x / Bacteria: x      LIVER FUNCTIONS - ( 06 Dec 2023 06:04 )  Alb: 1.7 g/dL / Pro: 5.9 g/dL / ALK PHOS: 99 U/L / ALT: 35 U/L / AST: 41 U/L / GGT: x             RADIOLOGY & ADDITIONAL TESTS:    ERCP Report        Date: 2023 3:00 PM        Patient Name: PENELOPE ISLAS        MRN: [id number]        Account Number:        [id number]        Gender: Male         (age): 1955 (68)        Instrument(s):        OLYMPUS([id number])        Attending/Fellow:        Francisco Dorsey MD        Technician:        Sondra Cheung RN        Procedure Room #:        Procedure Room 2                Anesthesia Provider:        Yloi Jesus DO        Nurse(s):        Melony Perez RN        ASA Class:        P3 - 2023 3:59 PM Yoli Jesus DO        Indications:        Bile duct leak - K83.8        Procedure:        The procedure, indications, preparation and potential complications were    explained to the patient, who indicated understanding and signed the    corresponding consent forms. IV anesthesia was administered by anesthesiologist.    Continuous pulse oximetry and blood pressure monitoring were used throughout the    procedure. Supplemental oxygen was used. Patient was placed in prone position.    The duodenoscope was introduced through mouth and advanced under direct    visualization until ampulla was reached. Patient tolerance to the procedure was    excellent. The procedure was not difficult. Blood loss was none.        Limitations/Complications:        There were no apparent limitations or complications        Endoscopy Findings:        Normal esophagus, food in stomach, normal duodenum except for periampullary    diverticulum        ERCP Findings:        The duodenoscope was passed into the second portion of the duodenum. The major    papilla was identified and appeared normal except a periampullary    diverticulum.Using a short tipped traction sphincterotome, the bile duct was    readily cannulated. A wire was passed into the bile duct, and a cholangiogram    was produced by injecting contrast demonstrating filling defects and a leak from    the cystic duct.Using a pulsed cut setting, a 9 mm sphincterotomy was performed    without complications.A balloon was used to perform a sweep of the bile duct.    Two boxcar stones and debris were removed. Using the previously placed biliary    access wire, a straight 10Fr 5 cm stent was placed into the bile duct. A    clearing cholangiogram was performed.        Fluoroscopic Interpretation:        I supervised the acquisition and interpretation of the fluoroscopic images at    the biliary tree. The quality of the images was good. The total fluoro time was    recorded.        Impressions:  Choledocholithiasis.  Bile leak.        Plan:    Return to floor for further management    ERCP in 4-8 weeks  Francisco Dorsey MD        Version 1, Electronically signed on 2023 6:55:29 PM by Francisco Dorsey MD   INTERVAL HPI/OVERNIGHT EVENTS:  Patient seen and examined at bed side. No event over night. Denies nausea, vomiting.     MEDICATIONS  (STANDING):  aspirin enteric coated 81 milliGRAM(s) Oral daily  atorvastatin 80 milliGRAM(s) Oral at bedtime  carBAMazepine ER Tablet 300 milliGRAM(s) Oral two times a day  chlorhexidine 2% Cloths 1 Application(s) Topical <User Schedule>  dextrose 5%. 1000 milliLiter(s) (50 mL/Hr) IV Continuous <Continuous>  dextrose 5%. 1000 milliLiter(s) (100 mL/Hr) IV Continuous <Continuous>  dextrose 50% Injectable 25 Gram(s) IV Push once  dextrose 50% Injectable 12.5 Gram(s) IV Push once  dextrose 50% Injectable 25 Gram(s) IV Push once  famotidine    Tablet 20 milliGRAM(s) Oral daily  heparin   Injectable 5000 Unit(s) SubCutaneous every 8 hours  indomethacin Suppository 100 milliGRAM(s) Rectal once  insulin lispro (ADMELOG) corrective regimen sliding scale   SubCutaneous Before meals and at bedtime  latanoprost 0.005% Ophthalmic Solution 1 Drop(s) Both EYES at bedtime  melatonin 6 milliGRAM(s) Oral at bedtime  piperacillin/tazobactam IVPB.. 3.375 Gram(s) IV Intermittent every 8 hours  polyethylene glycol 3350 17 Gram(s) Oral daily    MEDICATIONS  (PRN):  aluminum hydroxide/magnesium hydroxide/simethicone Suspension 30 milliLiter(s) Oral every 6 hours PRN Dyspepsia  artificial  tears Solution 1 Drop(s) Both EYES two times a day PRN Dry Eyes  ondansetron Injectable 4 milliGRAM(s) IV Push every 6 hours PRN Nausea and/or Vomiting  senna 1 Tablet(s) Oral two times a day PRN Constipation      Allergies    No Known Allergies    Intolerances              Vital Signs Last 24 Hrs  T(C): 37 (06 Dec 2023 05:00), Max: 37.1 (05 Dec 2023 18:13)  T(F): 98.6 (06 Dec 2023 05:00), Max: 98.7 (05 Dec 2023 18:13)  HR: 72 (06 Dec 2023 05:00) (65 - 89)  BP: 119/61 (06 Dec 2023 05:00) (119/61 - 146/70)  BP(mean): 80 (06 Dec 2023 05:00) (80 - 80)  RR: 19 (06 Dec 2023 05:00) (16 - 20)  SpO2: 98% (06 Dec 2023 05:00) (97% - 100%)    Parameters below as of 06 Dec 2023 05:00  Patient On (Oxygen Delivery Method): room air        PHYSICAL EXAM:    Constitutional: NAD, well-developed  Neck: No LAD, supple  Respiratory: No SOB No distress   Cardiovascular: S1 and S2  Gastrointestinal: Soft, non distended ,tender appropriate post op,+ BS Abdominal staples and dressing intact & NIKHIL to SS.   Extremities: No peripheral edema, neg clubbing, cyanosis  Neurological: A/O x 3   Skin: No rashes      LABS:                        8.6    10.56 )-----------( 262      ( 06 Dec 2023 06:04 )             26.5     12-06    142  |  108  |  16  ----------------------------<  126<H>  3.9   |  22  |  1.26    Ca    7.6<L>      06 Dec 2023 06:04  Phos  2.6     12-06  Mg     2.5     12-06    TPro  5.9<L>  /  Alb  1.7<L>  /  TBili  0.3  /  DBili  x   /  AST  41<H>  /  ALT  35  /  AlkPhos  99  12-06      Urinalysis Basic - ( 06 Dec 2023 06:04 )    Color: x / Appearance: x / SG: x / pH: x  Gluc: 126 mg/dL / Ketone: x  / Bili: x / Urobili: x   Blood: x / Protein: x / Nitrite: x   Leuk Esterase: x / RBC: x / WBC x   Sq Epi: x / Non Sq Epi: x / Bacteria: x      LIVER FUNCTIONS - ( 06 Dec 2023 06:04 )  Alb: 1.7 g/dL / Pro: 5.9 g/dL / ALK PHOS: 99 U/L / ALT: 35 U/L / AST: 41 U/L / GGT: x             RADIOLOGY & ADDITIONAL TESTS:    ERCP Report        Date: 2023 3:00 PM        Patient Name: PENELOPE ISLAS        MRN: [id number]        Account Number:        [id number]        Gender: Male         (age): 1955 (68)        Instrument(s):        OLYMPUS([id number])        Attending/Fellow:        Francisco Dorsey MD        Technician:        Sondra Cheung RN        Procedure Room #:        Procedure Room 2                Anesthesia Provider:        Yoli Jesus DO        Nurse(s):        Melony Perez RN        ASA Class:        P3 - 2023 3:59 PM Yoli Jesus DO        Indications:        Bile duct leak - K83.8        Procedure:        The procedure, indications, preparation and potential complications were    explained to the patient, who indicated understanding and signed the    corresponding consent forms. IV anesthesia was administered by anesthesiologist.    Continuous pulse oximetry and blood pressure monitoring were used throughout the    procedure. Supplemental oxygen was used. Patient was placed in prone position.    The duodenoscope was introduced through mouth and advanced under direct    visualization until ampulla was reached. Patient tolerance to the procedure was    excellent. The procedure was not difficult. Blood loss was none.        Limitations/Complications:        There were no apparent limitations or complications        Endoscopy Findings:        Normal esophagus, food in stomach, normal duodenum except for periampullary    diverticulum        ERCP Findings:        The duodenoscope was passed into the second portion of the duodenum. The major    papilla was identified and appeared normal except a periampullary    diverticulum.Using a short tipped traction sphincterotome, the bile duct was    readily cannulated. A wire was passed into the bile duct, and a cholangiogram    was produced by injecting contrast demonstrating filling defects and a leak from    the cystic duct.Using a pulsed cut setting, a 9 mm sphincterotomy was performed    without complications.A balloon was used to perform a sweep of the bile duct.    Two boxcar stones and debris were removed. Using the previously placed biliary    access wire, a straight 10Fr 5 cm stent was placed into the bile duct. A    clearing cholangiogram was performed.        Fluoroscopic Interpretation:        I supervised the acquisition and interpretation of the fluoroscopic images at    the biliary tree. The quality of the images was good. The total fluoro time was    recorded.        Impressions:  Choledocholithiasis.  Bile leak.        Plan:    Return to floor for further management    ERCP in 4-8 weeks  Francisco Dorsey MD        Version 1, Electronically signed on 2023 6:55:29 PM by Francisco Dorsey MD   GI follow up    Patient seen and examined at bed side. No event over night. Denies nausea, vomiting.     MEDICATIONS  (STANDING):  aspirin enteric coated 81 milliGRAM(s) Oral daily  atorvastatin 80 milliGRAM(s) Oral at bedtime  carBAMazepine ER Tablet 300 milliGRAM(s) Oral two times a day  chlorhexidine 2% Cloths 1 Application(s) Topical <User Schedule>  dextrose 5%. 1000 milliLiter(s) (50 mL/Hr) IV Continuous <Continuous>  dextrose 5%. 1000 milliLiter(s) (100 mL/Hr) IV Continuous <Continuous>  dextrose 50% Injectable 25 Gram(s) IV Push once  dextrose 50% Injectable 12.5 Gram(s) IV Push once  dextrose 50% Injectable 25 Gram(s) IV Push once  famotidine    Tablet 20 milliGRAM(s) Oral daily  heparin   Injectable 5000 Unit(s) SubCutaneous every 8 hours  indomethacin Suppository 100 milliGRAM(s) Rectal once  insulin lispro (ADMELOG) corrective regimen sliding scale   SubCutaneous Before meals and at bedtime  latanoprost 0.005% Ophthalmic Solution 1 Drop(s) Both EYES at bedtime  melatonin 6 milliGRAM(s) Oral at bedtime  piperacillin/tazobactam IVPB.. 3.375 Gram(s) IV Intermittent every 8 hours  polyethylene glycol 3350 17 Gram(s) Oral daily    MEDICATIONS  (PRN):  aluminum hydroxide/magnesium hydroxide/simethicone Suspension 30 milliLiter(s) Oral every 6 hours PRN Dyspepsia  artificial  tears Solution 1 Drop(s) Both EYES two times a day PRN Dry Eyes  ondansetron Injectable 4 milliGRAM(s) IV Push every 6 hours PRN Nausea and/or Vomiting  senna 1 Tablet(s) Oral two times a day PRN Constipation      Allergies    No Known Allergies    Intolerances        Vital Signs Last 24 Hrs  T(C): 37 (06 Dec 2023 05:00), Max: 37.1 (05 Dec 2023 18:13)  T(F): 98.6 (06 Dec 2023 05:00), Max: 98.7 (05 Dec 2023 18:13)  HR: 72 (06 Dec 2023 05:00) (65 - 89)  BP: 119/61 (06 Dec 2023 05:00) (119/61 - 146/70)  BP(mean): 80 (06 Dec 2023 05:00) (80 - 80)  RR: 19 (06 Dec 2023 05:00) (16 - 20)  SpO2: 98% (06 Dec 2023 05:00) (97% - 100%)    Parameters below as of 06 Dec 2023 05:00  Patient On (Oxygen Delivery Method): room air        PHYSICAL EXAM:    Constitutional: NAD, well-developed  Neck: No LAD, supple  Respiratory: No SOB No distress   Cardiovascular: S1 and S2  Gastrointestinal: Soft, non distended ,tender appropriate post op,+ BS Abdominal staples and dressing intact & NIKHIL to SS.   Extremities: No peripheral edema, neg clubbing, cyanosis  Neurological: A/O x 3   Skin: No rashes      LABS:                        8.6    10.56 )-----------( 262      ( 06 Dec 2023 06:04 )             26.5     12-06    142  |  108  |  16  ----------------------------<  126<H>  3.9   |  22  |  1.26    Ca    7.6<L>      06 Dec 2023 06:04  Phos  2.6     12-  Mg     2.5     12-06    TPro  5.9<L>  /  Alb  1.7<L>  /  TBili  0.3  /  DBili  x   /  AST  41<H>  /  ALT  35  /  AlkPhos  99  12-06      Urinalysis Basic - ( 06 Dec 2023 06:04 )    Color: x / Appearance: x / SG: x / pH: x  Gluc: 126 mg/dL / Ketone: x  / Bili: x / Urobili: x   Blood: x / Protein: x / Nitrite: x   Leuk Esterase: x / RBC: x / WBC x   Sq Epi: x / Non Sq Epi: x / Bacteria: x      LIVER FUNCTIONS - ( 06 Dec 2023 06:04 )  Alb: 1.7 g/dL / Pro: 5.9 g/dL / ALK PHOS: 99 U/L / ALT: 35 U/L / AST: 41 U/L / GGT: x                 RADIOLOGY & ADDITIONAL TESTS:    ERCP Report        Date: 2023 3:00 PM        Patient Name: PENELOPE ISLAS        MRN: [id number]        Account Number:        [id number]        Gender: Male         (age): 1955 (68)        Instrument(s):        OLYMPUS([id number])        Attending/Fellow:        Francisco Dorsey MD        Technician:        Sondra Cheung RN        Procedure Room #:        Procedure Room 2                Anesthesia Provider:        Yoli Jesus DO        Nurse(s):        Melony Perez RN        ASA Class:        P3 - 2023 3:59 PM Yoli Jesus DO        Indications:        Bile duct leak - K83.8        Procedure:        The procedure, indications, preparation and potential complications were    explained to the patient, who indicated understanding and signed the    corresponding consent forms. IV anesthesia was administered by anesthesiologist.    Continuous pulse oximetry and blood pressure monitoring were used throughout the    procedure. Supplemental oxygen was used. Patient was placed in prone position.    The duodenoscope was introduced through mouth and advanced under direct    visualization until ampulla was reached. Patient tolerance to the procedure was    excellent. The procedure was not difficult. Blood loss was none.        Limitations/Complications:        There were no apparent limitations or complications        Endoscopy Findings:        Normal esophagus, food in stomach, normal duodenum except for periampullary    diverticulum        ERCP Findings:        The duodenoscope was passed into the second portion of the duodenum. The major    papilla was identified and appeared normal except a periampullary    diverticulum.Using a short tipped traction sphincterotome, the bile duct was    readily cannulated. A wire was passed into the bile duct, and a cholangiogram    was produced by injecting contrast demonstrating filling defects and a leak from    the cystic duct.Using a pulsed cut setting, a 9 mm sphincterotomy was performed    without complications.A balloon was used to perform a sweep of the bile duct.    Two boxcar stones and debris were removed. Using the previously placed biliary    access wire, a straight 10Fr 5 cm stent was placed into the bile duct. A    clearing cholangiogram was performed.        Fluoroscopic Interpretation:        I supervised the acquisition and interpretation of the fluoroscopic images at    the biliary tree. The quality of the images was good. The total fluoro time was    recorded.        Impressions:  Choledocholithiasis.  Bile leak.        Plan:    Return to floor for further management    ERCP in 4-8 weeks  Francisco Dorsey MD        Version 1, Electronically signed on 2023 6:55:29 PM by Francisco Dorsey MD   GI follow up    Patient seen and examined at bed side. No event over night. Denies nausea, vomiting.     MEDICATIONS  (STANDING):  aspirin enteric coated 81 milliGRAM(s) Oral daily  atorvastatin 80 milliGRAM(s) Oral at bedtime  carBAMazepine ER Tablet 300 milliGRAM(s) Oral two times a day  chlorhexidine 2% Cloths 1 Application(s) Topical <User Schedule>  dextrose 5%. 1000 milliLiter(s) (50 mL/Hr) IV Continuous <Continuous>  dextrose 5%. 1000 milliLiter(s) (100 mL/Hr) IV Continuous <Continuous>  dextrose 50% Injectable 25 Gram(s) IV Push once  dextrose 50% Injectable 12.5 Gram(s) IV Push once  dextrose 50% Injectable 25 Gram(s) IV Push once  famotidine    Tablet 20 milliGRAM(s) Oral daily  heparin   Injectable 5000 Unit(s) SubCutaneous every 8 hours  indomethacin Suppository 100 milliGRAM(s) Rectal once  insulin lispro (ADMELOG) corrective regimen sliding scale   SubCutaneous Before meals and at bedtime  latanoprost 0.005% Ophthalmic Solution 1 Drop(s) Both EYES at bedtime  melatonin 6 milliGRAM(s) Oral at bedtime  piperacillin/tazobactam IVPB.. 3.375 Gram(s) IV Intermittent every 8 hours  polyethylene glycol 3350 17 Gram(s) Oral daily    MEDICATIONS  (PRN):  aluminum hydroxide/magnesium hydroxide/simethicone Suspension 30 milliLiter(s) Oral every 6 hours PRN Dyspepsia  artificial  tears Solution 1 Drop(s) Both EYES two times a day PRN Dry Eyes  ondansetron Injectable 4 milliGRAM(s) IV Push every 6 hours PRN Nausea and/or Vomiting  senna 1 Tablet(s) Oral two times a day PRN Constipation      Allergies    No Known Allergies    Intolerances        Vital Signs Last 24 Hrs  T(C): 37 (06 Dec 2023 05:00), Max: 37.1 (05 Dec 2023 18:13)  T(F): 98.6 (06 Dec 2023 05:00), Max: 98.7 (05 Dec 2023 18:13)  HR: 72 (06 Dec 2023 05:00) (65 - 89)  BP: 119/61 (06 Dec 2023 05:00) (119/61 - 146/70)  BP(mean): 80 (06 Dec 2023 05:00) (80 - 80)  RR: 19 (06 Dec 2023 05:00) (16 - 20)  SpO2: 98% (06 Dec 2023 05:00) (97% - 100%)    Parameters below as of 06 Dec 2023 05:00  Patient On (Oxygen Delivery Method): room air        PHYSICAL EXAM:    Constitutional: NAD, well-developed  Neck: No LAD, supple  Respiratory: No SOB No distress   Cardiovascular: S1 and S2  Gastrointestinal: Soft, non distended ,tender appropriate post op,+ BS Abdominal staples and dressing intact & NIKHIL to SS.   Extremities: No peripheral edema, neg clubbing, cyanosis  Neurological: A/O x 3   Skin: No rashes      LABS:                        8.6    10.56 )-----------( 262      ( 06 Dec 2023 06:04 )             26.5     12-06    142  |  108  |  16  ----------------------------<  126<H>  3.9   |  22  |  1.26    Ca    7.6<L>      06 Dec 2023 06:04  Phos  2.6     12-  Mg     2.5     12-06    TPro  5.9<L>  /  Alb  1.7<L>  /  TBili  0.3  /  DBili  x   /  AST  41<H>  /  ALT  35  /  AlkPhos  99  12-06      Urinalysis Basic - ( 06 Dec 2023 06:04 )    Color: x / Appearance: x / SG: x / pH: x  Gluc: 126 mg/dL / Ketone: x  / Bili: x / Urobili: x   Blood: x / Protein: x / Nitrite: x   Leuk Esterase: x / RBC: x / WBC x   Sq Epi: x / Non Sq Epi: x / Bacteria: x      LIVER FUNCTIONS - ( 06 Dec 2023 06:04 )  Alb: 1.7 g/dL / Pro: 5.9 g/dL / ALK PHOS: 99 U/L / ALT: 35 U/L / AST: 41 U/L / GGT: x                 RADIOLOGY & ADDITIONAL TESTS:    ERCP Report        Date: 2023 3:00 PM        Patient Name: PENELOPE ISLAS        MRN: [id number]        Account Number:        [id number]        Gender: Male         (age): 1955 (68)        Instrument(s):        OLYMPUS([id number])        Attending/Fellow:        Francisco Dorsey MD        Technician:        Sondra Cheung RN        Procedure Room #:        Procedure Room 2                Anesthesia Provider:        Yoli Jesus DO        Nurse(s):        Melony Perez RN        ASA Class:        P3 - 2023 3:59 PM Yoli Jesus DO        Indications:        Bile duct leak - K83.8        Procedure:        The procedure, indications, preparation and potential complications were    explained to the patient, who indicated understanding and signed the    corresponding consent forms. IV anesthesia was administered by anesthesiologist.    Continuous pulse oximetry and blood pressure monitoring were used throughout the    procedure. Supplemental oxygen was used. Patient was placed in prone position.    The duodenoscope was introduced through mouth and advanced under direct    visualization until ampulla was reached. Patient tolerance to the procedure was    excellent. The procedure was not difficult. Blood loss was none.        Limitations/Complications:        There were no apparent limitations or complications        Endoscopy Findings:        Normal esophagus, food in stomach, normal duodenum except for periampullary    diverticulum        ERCP Findings:        The duodenoscope was passed into the second portion of the duodenum. The major    papilla was identified and appeared normal except a periampullary    diverticulum.Using a short tipped traction sphincterotome, the bile duct was    readily cannulated. A wire was passed into the bile duct, and a cholangiogram    was produced by injecting contrast demonstrating filling defects and a leak from    the cystic duct.Using a pulsed cut setting, a 9 mm sphincterotomy was performed    without complications.A balloon was used to perform a sweep of the bile duct.    Two boxcar stones and debris were removed. Using the previously placed biliary    access wire, a straight 10Fr 5 cm stent was placed into the bile duct. A    clearing cholangiogram was performed.        Fluoroscopic Interpretation:        I supervised the acquisition and interpretation of the fluoroscopic images at    the biliary tree. The quality of the images was good. The total fluoro time was    recorded.        Impressions:  Choledocholithiasis.  Bile leak.        Plan:    Return to floor for further management    ERCP in 4-8 weeks  Francisco Dorsey MD        Version 1, Electronically signed on 2023 6:55:29 PM by Francisco Dorsey MD

## 2023-12-06 NOTE — PROGRESS NOTE ADULT - ASSESSMENT
66 y/o [place] speaking male with PMH of sinus node disorder s/p PPM, HTN, HLD, cerebral aneurysm s/p coiling, CVA with residual L sided weaknes,    Currently POD # 5 s/p lap converted to open cholecystectomy.   Post cholecystectomy syndrome  GI consult: Bile leak , S/P ERCP with stent placement 12/5/23  66 y/o [place]ian speaking male with PMH of sinus node disorder s/p PPM, HTN, HLD, cerebral aneurysm s/p coiling, CVA with residual L sided weaknes,    Currently POD # 5 s/p lap converted to open cholecystectomy.   Post cholecystectomy syndrome  GI consult: Bile leak , S/P ERCP with stent placement 12/5/23

## 2023-12-06 NOTE — PROGRESS NOTE ADULT - NUTRITIONAL ASSESSMENT
This patient has been assessed with a concern for Malnutrition and has been determined to have a diagnosis/diagnoses of Moderate protein-calorie malnutrition.    This patient is being managed with:   Diet Clear Liquid-  Entered: Dec  6 2023  5:05AM

## 2023-12-06 NOTE — PROGRESS NOTE ADULT - SUBJECTIVE AND OBJECTIVE BOX
Patient is a 68-year-old male with PMHx of sinus node disorder s/p PPM, HTN, HLD, cerebral aneurysm s/p coiling, CVA w/ residual L sided weakness, seizure disorder, type 2 diabetes on metformin and gastritis admitted for acute cholecystitis s/p lap to open cholecystectomy 11/30, w/ ICU upgrade due to post-operative hypotension requiring pressors, downgraded to surgical floor.     Overnight Events: None  Interval HPI: Today is hospital day 7. Patient seen and examined at bedside. Denies dizziness, headache, SOB, chest pain. Denies any questions at this time.     REVIEW OF SYSTEMS:  CONSTITUTIONAL: (-) weakness, (-) fevers, (-) chills  EYES/ENT: (-) visual changes,  (-) vertigo,  (-) throat pain   NECK:  (-) pain, (-) stiffness  RESPIRATORY:  (-) shortness of breath, (-) cough,  (-) wheezing,  (-) hemoptysis   CARDIOVASCULAR:  (-) chest pain, (-) palpitations  GASTROINTESTINAL:  (-) abdominal pain, (-) nausea, (-) vomiting, (-) diarrhea, (+) constipation, (-) melena,  (-) hematemesis,  (-) hematochezia  GENITOURINARY: (-) dysuria, (-) frequency, (-) hematuria  NEUROLOGICAL: (-) numbness, (-) weakness  SKIN: (-) itching, (-) rashes, (-) lesions      Vital Signs Last 24 Hrs      PHYSICAL EXAM:  GENERAL: NAD, lying in bed comfortably. Generally pale appearing  HEAD:  Atraumatic, Normocephalic  EYES: EOMI, conjunctiva and sclera clear, watery eyes  ENT: Moist mucous membranes  NECK: Supple, No JVD  CHEST/LUNG: Clear to auscultation bilaterally, good air entry bilaterally; No wheezing, rales, or rhonchi. Unlabored respirations  HEART: Regular rate and rhythm. S1 and S2. No murmurs, rubs, or gallops  ABDOMEN: Soft, TTP in RUG and LLQ, Nondistended. Bowel sounds present.   NIKHIL drain #1 and #2 with serosang output, drain 2 mixed with bile.   JRUQ dressing with brown staining, intact.   EXTREMITIES:  2+ Peripheral Pulses. No clubbing, cyanosis, or edema  NERVOUS SYSTEM:  Alert & Oriented X3, speech clear. Left sided residual stroke weakness  SKIN: No rashes, bruises, or other lesions    MEDICATIONS:        RADIOLOGY/EKG:  All Imaging and EKGs Personally Reviewed     NM Hepatobiliary Imaging w/ RX (11.29.23 @ 14:20)   IMPRESSION: Abnormal morphine-augmented hepatobiliary scan.  Findings are compatible with acute cholecystitis.    US Abdomen Limited (11.28.23 @ 21:21)   IMPRESSION:  Distended thick-walled gallbladder containing sludge.  Borderline CBD   measuring 6 mm.  Findings equivocal for acute cholecystitis. If   clinically indicated, further evaluation with nuclear HIDA scan may be   obtained.    CT Abdomen and Pelvis No Cont (11.28.23 @ 16:52)   IMPRESSION:  Findings suspicious for acute cholecystitis. Correlate with patient's   symptoms. Consider right upper quadrant ultrasound if clinically   indicated.    CT Chest No Cont (11.28.23 @ 16:51)   IMPRESSION:  Findings suspicious for acute cholecystitis. Correlate with patient's   symptoms. Consider right upper quadrant ultrasound if clinically   indicated.    Xray Chest 1 View AP/PA (11.28.23 @ 15:49)   Impression:  No acute pulmonary disease.    Xray Chest 1 View- PORTABLE-Urgent (11.24.23 @ 03:18)   IMPRESSION:  No evidence of acute infiltrate or pleural effusion.    TTE Echo Complete w/o Contrast w/ Doppler (11.30.23 @ 11:17)   Summary:   1. Left ventricular ejection fraction, by visual estimation, is 65 to   70%.   2. Normal global left ventricular systolic function.   3. Normal left ventricular internal cavity size.   4. Mildly enlarged right ventricle.   5. Mild mitral valve regurgitation.   6. Thickening of the anterior mitral valve leaflet.   7. Mild tricuspid regurgitation.   8. Mild aortic regurgitation.    12 Lead ECG (11.28.23 @ 15:11)   Normal sinus rhythm  Voltage criteria for left ventricular hypertrophy  Abnormal ECG  When compared with ECG of 24-NOV-2023 01:26,  Sinus rhythm has replaced Electronic atrial pacemaker             Patient is a 68-year-old male with PMHx of sinus node disorder s/p PPM, HTN, HLD, cerebral aneurysm s/p coiling, CVA w/ residual L sided weakness, seizure disorder, type 2 diabetes on metformin and gastritis admitted for acute cholecystitis s/p lap to open cholecystectomy 11/30, w/ ICU upgrade due to post-operative hypotension requiring pressors, downgraded to surgical floor.     Overnight Events: None  Interval HPI: Patient seen and examined at bedside. Denies dizziness, headache, SOB, chest pain. Denies any questions at this time. Having BMs and passing gas. Tolerate clear liquids.     REVIEW OF SYSTEMS:  CONSTITUTIONAL: (-) weakness, (-) fevers, (-) chills  EYES/ENT: (-) visual changes,  (-) vertigo,  (-) throat pain   NECK:  (-) pain, (-) stiffness  RESPIRATORY:  (-) shortness of breath, (-) cough,  (-) wheezing,  (-) hemoptysis   CARDIOVASCULAR:  (-) chest pain, (-) palpitations  GASTROINTESTINAL:  (-) abdominal pain, (-) nausea, (-) vomiting, (-) diarrhea, (+) constipation, (-) melena,  (-) hematemesis,  (-) hematochezia  GENITOURINARY: (-) dysuria, (-) frequency, (-) hematuria  NEUROLOGICAL: (-) numbness, (-) weakness  SKIN: (-) itching, (-) rashes, (-) lesions      Vital Signs Last 24 Hrs  T(C): 37 (06 Dec 2023 05:00), Max: 37.1 (05 Dec 2023 18:13)  T(F): 98.6 (06 Dec 2023 05:00), Max: 98.7 (05 Dec 2023 18:13)  HR: 72 (06 Dec 2023 05:00) (65 - 89)  BP: 119/61 (06 Dec 2023 05:00) (119/61 - 146/70)  BP(mean): 80 (06 Dec 2023 05:00) (80 - 80)  RR: 19 (06 Dec 2023 05:00) (16 - 20)  SpO2: 98% (06 Dec 2023 05:00) (97% - 100%)    Parameters below as of 06 Dec 2023 05:00  Patient On (Oxygen Delivery Method): room air    I&O's Summary    05 Dec 2023 07:01  -  06 Dec 2023 07:00  --------------------------------------------------------  IN: 175 mL / OUT: 1962 mL / NET: -1787 mL    06 Dec 2023 07:01  -  06 Dec 2023 09:40  --------------------------------------------------------  IN: 0 mL / OUT: 20 mL / NET: -20 mL      PHYSICAL EXAM:  GENERAL: NAD, lying in bed comfortably.   HEAD:  Atraumatic, Normocephalic  EYES: EOMI, conjunctiva and sclera clear, watery eyes  ENT: Moist mucous membranes  NECK: Supple, No JVD  CHEST/LUNG: Clear to auscultation bilaterally, good air entry bilaterally; No wheezing, rales, or rhonchi. Unlabored respirations  HEART: Regular rate and rhythm. S1 and S2. No murmurs, rubs, or gallops  ABDOMEN: Soft, nontender, Nondistended. Bowel sounds present.   NIKHIL drain #1 and #2 with serosang output, drain 2 mixed slightly with bile. Dressing clean and intact  EXTREMITIES:  2+ Peripheral Pulses. No clubbing, cyanosis, or edema  NERVOUS SYSTEM:  Alert & Oriented X3, speech clear. Left sided residual stroke weakness  SKIN: No rashes, bruises, or other lesions    MEDICATIONS:  MEDICATIONS  (STANDING):  aspirin enteric coated 81 milliGRAM(s) Oral daily  atorvastatin 80 milliGRAM(s) Oral at bedtime  carBAMazepine ER Tablet 300 milliGRAM(s) Oral two times a day  chlorhexidine 2% Cloths 1 Application(s) Topical <User Schedule>  dextrose 5%. 1000 milliLiter(s) (50 mL/Hr) IV Continuous <Continuous>  dextrose 5%. 1000 milliLiter(s) (100 mL/Hr) IV Continuous <Continuous>  dextrose 50% Injectable 25 Gram(s) IV Push once  dextrose 50% Injectable 12.5 Gram(s) IV Push once  dextrose 50% Injectable 25 Gram(s) IV Push once  famotidine    Tablet 20 milliGRAM(s) Oral daily  heparin   Injectable 5000 Unit(s) SubCutaneous every 8 hours  indomethacin Suppository 100 milliGRAM(s) Rectal once  insulin lispro (ADMELOG) corrective regimen sliding scale   SubCutaneous Before meals and at bedtime  latanoprost 0.005% Ophthalmic Solution 1 Drop(s) Both EYES at bedtime  melatonin 6 milliGRAM(s) Oral at bedtime  piperacillin/tazobactam IVPB.. 3.375 Gram(s) IV Intermittent every 8 hours  polyethylene glycol 3350 17 Gram(s) Oral daily    MEDICATIONS  (PRN):  aluminum hydroxide/magnesium hydroxide/simethicone Suspension 30 milliLiter(s) Oral every 6 hours PRN Dyspepsia  artificial  tears Solution 1 Drop(s) Both EYES two times a day PRN Dry Eyes  ondansetron Injectable 4 milliGRAM(s) IV Push every 6 hours PRN Nausea and/or Vomiting  senna 1 Tablet(s) Oral two times a day PRN Constipation      LABS: All Labs Reviewed:                        8.6    10.56 )-----------( 262      ( 06 Dec 2023 06:04 )             26.5     12-06    142  |  108  |  16  ----------------------------<  126<H>  3.9   |  22  |  1.26    Ca    7.6<L>      06 Dec 2023 06:04  Phos  2.6     12-06  Mg     2.5     12-06    TPro  5.9<L>  /  Alb  1.7<L>  /  TBili  0.3  /  DBili  x   /  AST  41<H>  /  ALT  35  /  AlkPhos  99  12-06      Blood Culture:   CAPILLARY BLOOD GLUCOSE      POCT Blood Glucose.: 132 mg/dL (06 Dec 2023 08:34)  POCT Blood Glucose.: 124 mg/dL (05 Dec 2023 22:15)  POCT Blood Glucose.: 151 mg/dL (05 Dec 2023 18:15)  POCT Blood Glucose.: 138 mg/dL (05 Dec 2023 14:37)  POCT Blood Glucose.: 162 mg/dL (05 Dec 2023 12:15)    RADIOLOGY/EKG:  All Imaging and EKGs Personally Reviewed     NM Hepatobiliary Imaging w/ RX (11.29.23 @ 14:20)   IMPRESSION: Abnormal morphine-augmented hepatobiliary scan.  Findings are compatible with acute cholecystitis.    US Abdomen Limited (11.28.23 @ 21:21)   IMPRESSION:  Distended thick-walled gallbladder containing sludge.  Borderline CBD   measuring 6 mm.  Findings equivocal for acute cholecystitis. If   clinically indicated, further evaluation with nuclear HIDA scan may be   obtained.    CT Abdomen and Pelvis No Cont (11.28.23 @ 16:52)   IMPRESSION:  Findings suspicious for acute cholecystitis. Correlate with patient's   symptoms. Consider right upper quadrant ultrasound if clinically   indicated.    CT Chest No Cont (11.28.23 @ 16:51)   IMPRESSION:  Findings suspicious for acute cholecystitis. Correlate with patient's   symptoms. Consider right upper quadrant ultrasound if clinically   indicated.    Xray Chest 1 View AP/PA (11.28.23 @ 15:49)   Impression:  No acute pulmonary disease.    Xray Chest 1 View- PORTABLE-Urgent (11.24.23 @ 03:18)   IMPRESSION:  No evidence of acute infiltrate or pleural effusion.    TTE Echo Complete w/o Contrast w/ Doppler (11.30.23 @ 11:17)   Summary:   1. Left ventricular ejection fraction, by visual estimation, is 65 to   70%.   2. Normal global left ventricular systolic function.   3. Normal left ventricular internal cavity size.   4. Mildly enlarged right ventricle.   5. Mild mitral valve regurgitation.   6. Thickening of the anterior mitral valve leaflet.   7. Mild tricuspid regurgitation.   8. Mild aortic regurgitation.    12 Lead ECG (11.28.23 @ 15:11)   Normal sinus rhythm  Voltage criteria for left ventricular hypertrophy  Abnormal ECG  When compared with ECG of 24-NOV-2023 01:26,  Sinus rhythm has replaced Electronic atrial pacemaker             Patient is a 68-year-old male with PMHx of sinus node disorder s/p PPM, HTN, HLD, cerebral aneurysm s/p coiling, CVA w/ residual L sided weakness, seizure disorder, type 2 diabetes on metformin and gastritis admitted for acute cholecystitis s/p lap to open cholecystectomy 11/30, w/ ICU upgrade due to post-operative hypotension requiring pressors, downgraded to surgical floor and s/p ERCP on 12/6/23.     Overnight Events: None  Interval HPI: Patient seen and examined at bedside. Denies dizziness, headache, SOB, chest pain. Denies any questions at this time. Having BMs and passing gas. Tolerate clear liquids.     REVIEW OF SYSTEMS:  CONSTITUTIONAL: (-) weakness, (-) fevers, (-) chills  EYES/ENT: (-) visual changes,  (-) vertigo,  (-) throat pain   NECK:  (-) pain, (-) stiffness  RESPIRATORY:  (-) shortness of breath, (-) cough,  (-) wheezing,  (-) hemoptysis   CARDIOVASCULAR:  (-) chest pain, (-) palpitations  GASTROINTESTINAL:  (-) abdominal pain, (-) nausea, (-) vomiting, (-) diarrhea, (+) constipation, (-) melena,  (-) hematemesis,  (-) hematochezia  GENITOURINARY: (-) dysuria, (-) frequency, (-) hematuria  NEUROLOGICAL: (-) numbness, (-) weakness  SKIN: (-) itching, (-) rashes, (-) lesions      Vital Signs Last 24 Hrs  T(C): 37 (06 Dec 2023 05:00), Max: 37.1 (05 Dec 2023 18:13)  T(F): 98.6 (06 Dec 2023 05:00), Max: 98.7 (05 Dec 2023 18:13)  HR: 72 (06 Dec 2023 05:00) (65 - 89)  BP: 119/61 (06 Dec 2023 05:00) (119/61 - 146/70)  BP(mean): 80 (06 Dec 2023 05:00) (80 - 80)  RR: 19 (06 Dec 2023 05:00) (16 - 20)  SpO2: 98% (06 Dec 2023 05:00) (97% - 100%)    Parameters below as of 06 Dec 2023 05:00  Patient On (Oxygen Delivery Method): room air    I&O's Summary    05 Dec 2023 07:01  -  06 Dec 2023 07:00  --------------------------------------------------------  IN: 175 mL / OUT: 1962 mL / NET: -1787 mL    06 Dec 2023 07:01  -  06 Dec 2023 09:40  --------------------------------------------------------  IN: 0 mL / OUT: 20 mL / NET: -20 mL      PHYSICAL EXAM:  GENERAL: NAD, lying in bed comfortably.   HEAD:  Atraumatic, Normocephalic  EYES: EOMI, conjunctiva and sclera clear, watery eyes  ENT: Moist mucous membranes  NECK: Supple, No JVD  CHEST/LUNG: Clear to auscultation bilaterally, good air entry bilaterally; No wheezing, rales, or rhonchi. Unlabored respirations  HEART: Regular rate and rhythm. S1 and S2. No murmurs, rubs, or gallops  ABDOMEN: Soft, nontender, Nondistended. Bowel sounds present.   NIKHIL drain #1 and #2 with serosang output, drain 2 mixed slightly with bile. Dressing clean and intact  EXTREMITIES:  2+ Peripheral Pulses. No clubbing, cyanosis, or edema  NERVOUS SYSTEM:  Alert & Oriented X3, speech clear. Left sided residual stroke weakness  SKIN: No rashes, bruises, or other lesions    MEDICATIONS:  MEDICATIONS  (STANDING):  aspirin enteric coated 81 milliGRAM(s) Oral daily  atorvastatin 80 milliGRAM(s) Oral at bedtime  carBAMazepine ER Tablet 300 milliGRAM(s) Oral two times a day  chlorhexidine 2% Cloths 1 Application(s) Topical <User Schedule>  dextrose 5%. 1000 milliLiter(s) (50 mL/Hr) IV Continuous <Continuous>  dextrose 5%. 1000 milliLiter(s) (100 mL/Hr) IV Continuous <Continuous>  dextrose 50% Injectable 25 Gram(s) IV Push once  dextrose 50% Injectable 12.5 Gram(s) IV Push once  dextrose 50% Injectable 25 Gram(s) IV Push once  famotidine    Tablet 20 milliGRAM(s) Oral daily  heparin   Injectable 5000 Unit(s) SubCutaneous every 8 hours  indomethacin Suppository 100 milliGRAM(s) Rectal once  insulin lispro (ADMELOG) corrective regimen sliding scale   SubCutaneous Before meals and at bedtime  latanoprost 0.005% Ophthalmic Solution 1 Drop(s) Both EYES at bedtime  melatonin 6 milliGRAM(s) Oral at bedtime  piperacillin/tazobactam IVPB.. 3.375 Gram(s) IV Intermittent every 8 hours  polyethylene glycol 3350 17 Gram(s) Oral daily    MEDICATIONS  (PRN):  aluminum hydroxide/magnesium hydroxide/simethicone Suspension 30 milliLiter(s) Oral every 6 hours PRN Dyspepsia  artificial  tears Solution 1 Drop(s) Both EYES two times a day PRN Dry Eyes  ondansetron Injectable 4 milliGRAM(s) IV Push every 6 hours PRN Nausea and/or Vomiting  senna 1 Tablet(s) Oral two times a day PRN Constipation      LABS: All Labs Reviewed:                        8.6    10.56 )-----------( 262      ( 06 Dec 2023 06:04 )             26.5     12-06    142  |  108  |  16  ----------------------------<  126<H>  3.9   |  22  |  1.26    Ca    7.6<L>      06 Dec 2023 06:04  Phos  2.6     12-06  Mg     2.5     12-06    TPro  5.9<L>  /  Alb  1.7<L>  /  TBili  0.3  /  DBili  x   /  AST  41<H>  /  ALT  35  /  AlkPhos  99  12-06      Blood Culture:   CAPILLARY BLOOD GLUCOSE      POCT Blood Glucose.: 132 mg/dL (06 Dec 2023 08:34)  POCT Blood Glucose.: 124 mg/dL (05 Dec 2023 22:15)  POCT Blood Glucose.: 151 mg/dL (05 Dec 2023 18:15)  POCT Blood Glucose.: 138 mg/dL (05 Dec 2023 14:37)  POCT Blood Glucose.: 162 mg/dL (05 Dec 2023 12:15)    RADIOLOGY/EKG:  All Imaging and EKGs Personally Reviewed     NM Hepatobiliary Imaging w/ RX (11.29.23 @ 14:20)   IMPRESSION: Abnormal morphine-augmented hepatobiliary scan.  Findings are compatible with acute cholecystitis.    US Abdomen Limited (11.28.23 @ 21:21)   IMPRESSION:  Distended thick-walled gallbladder containing sludge.  Borderline CBD   measuring 6 mm.  Findings equivocal for acute cholecystitis. If   clinically indicated, further evaluation with nuclear HIDA scan may be   obtained.    CT Abdomen and Pelvis No Cont (11.28.23 @ 16:52)   IMPRESSION:  Findings suspicious for acute cholecystitis. Correlate with patient's   symptoms. Consider right upper quadrant ultrasound if clinically   indicated.    CT Chest No Cont (11.28.23 @ 16:51)   IMPRESSION:  Findings suspicious for acute cholecystitis. Correlate with patient's   symptoms. Consider right upper quadrant ultrasound if clinically   indicated.    Xray Chest 1 View AP/PA (11.28.23 @ 15:49)   Impression:  No acute pulmonary disease.    Xray Chest 1 View- PORTABLE-Urgent (11.24.23 @ 03:18)   IMPRESSION:  No evidence of acute infiltrate or pleural effusion.    TTE Echo Complete w/o Contrast w/ Doppler (11.30.23 @ 11:17)   Summary:   1. Left ventricular ejection fraction, by visual estimation, is 65 to   70%.   2. Normal global left ventricular systolic function.   3. Normal left ventricular internal cavity size.   4. Mildly enlarged right ventricle.   5. Mild mitral valve regurgitation.   6. Thickening of the anterior mitral valve leaflet.   7. Mild tricuspid regurgitation.   8. Mild aortic regurgitation.    12 Lead ECG (11.28.23 @ 15:11)   Normal sinus rhythm  Voltage criteria for left ventricular hypertrophy  Abnormal ECG  When compared with ECG of 24-NOV-2023 01:26,  Sinus rhythm has replaced Electronic atrial pacemaker             Patient is a 68-year-old male with PMHx of sinus node disorder s/p PPM, HTN, HLD, cerebral aneurysm s/p coiling, CVA w/ residual L sided weakness, seizure disorder, type 2 diabetes on metformin and gastritis admitted for acute cholecystitis s/p lap to open cholecystectomy 11/30, w/ ICU upgrade due to post-operative hypotension requiring pressors, downgraded to surgical floor and s/p ERCP on 12/5/23.     Overnight Events: None  Interval HPI: Patient seen and examined at bedside. Denies dizziness, headache, SOB, chest pain. Denies any questions at this time. Having BMs and passing gas. Tolerate clear liquids.     REVIEW OF SYSTEMS:  CONSTITUTIONAL: (-) weakness, (-) fevers, (-) chills  EYES/ENT: (-) visual changes,  (-) vertigo,  (-) throat pain   NECK:  (-) pain, (-) stiffness  RESPIRATORY:  (-) shortness of breath, (-) cough,  (-) wheezing,  (-) hemoptysis   CARDIOVASCULAR:  (-) chest pain, (-) palpitations  GASTROINTESTINAL:  (-) abdominal pain, (-) nausea, (-) vomiting, (-) diarrhea, (+) constipation, (-) melena,  (-) hematemesis,  (-) hematochezia  GENITOURINARY: (-) dysuria, (-) frequency, (-) hematuria  NEUROLOGICAL: (-) numbness, (-) weakness  SKIN: (-) itching, (-) rashes, (-) lesions      Vital Signs Last 24 Hrs  T(C): 37 (06 Dec 2023 05:00), Max: 37.1 (05 Dec 2023 18:13)  T(F): 98.6 (06 Dec 2023 05:00), Max: 98.7 (05 Dec 2023 18:13)  HR: 72 (06 Dec 2023 05:00) (65 - 89)  BP: 119/61 (06 Dec 2023 05:00) (119/61 - 146/70)  BP(mean): 80 (06 Dec 2023 05:00) (80 - 80)  RR: 19 (06 Dec 2023 05:00) (16 - 20)  SpO2: 98% (06 Dec 2023 05:00) (97% - 100%)    Parameters below as of 06 Dec 2023 05:00  Patient On (Oxygen Delivery Method): room air    I&O's Summary    05 Dec 2023 07:01  -  06 Dec 2023 07:00  --------------------------------------------------------  IN: 175 mL / OUT: 1962 mL / NET: -1787 mL    06 Dec 2023 07:01  -  06 Dec 2023 09:40  --------------------------------------------------------  IN: 0 mL / OUT: 20 mL / NET: -20 mL      PHYSICAL EXAM:  GENERAL: NAD, lying in bed comfortably.   HEAD:  Atraumatic, Normocephalic  EYES: EOMI, conjunctiva and sclera clear, watery eyes  ENT: Moist mucous membranes  NECK: Supple, No JVD  CHEST/LUNG: Clear to auscultation bilaterally, good air entry bilaterally; No wheezing, rales, or rhonchi. Unlabored respirations  HEART: Regular rate and rhythm. S1 and S2. No murmurs, rubs, or gallops  ABDOMEN: Soft, nontender, Nondistended. Bowel sounds present.   NIKHIL drain #1 and #2 with serosang output, drain 2 mixed slightly with bile. Dressing clean and intact  EXTREMITIES:  2+ Peripheral Pulses. No clubbing, cyanosis, or edema  NERVOUS SYSTEM:  Alert & Oriented X3, speech clear. Left sided residual stroke weakness  SKIN: No rashes, bruises, or other lesions    MEDICATIONS:  MEDICATIONS  (STANDING):  aspirin enteric coated 81 milliGRAM(s) Oral daily  atorvastatin 80 milliGRAM(s) Oral at bedtime  carBAMazepine ER Tablet 300 milliGRAM(s) Oral two times a day  chlorhexidine 2% Cloths 1 Application(s) Topical <User Schedule>  dextrose 5%. 1000 milliLiter(s) (50 mL/Hr) IV Continuous <Continuous>  dextrose 5%. 1000 milliLiter(s) (100 mL/Hr) IV Continuous <Continuous>  dextrose 50% Injectable 25 Gram(s) IV Push once  dextrose 50% Injectable 12.5 Gram(s) IV Push once  dextrose 50% Injectable 25 Gram(s) IV Push once  famotidine    Tablet 20 milliGRAM(s) Oral daily  heparin   Injectable 5000 Unit(s) SubCutaneous every 8 hours  indomethacin Suppository 100 milliGRAM(s) Rectal once  insulin lispro (ADMELOG) corrective regimen sliding scale   SubCutaneous Before meals and at bedtime  latanoprost 0.005% Ophthalmic Solution 1 Drop(s) Both EYES at bedtime  melatonin 6 milliGRAM(s) Oral at bedtime  piperacillin/tazobactam IVPB.. 3.375 Gram(s) IV Intermittent every 8 hours  polyethylene glycol 3350 17 Gram(s) Oral daily    MEDICATIONS  (PRN):  aluminum hydroxide/magnesium hydroxide/simethicone Suspension 30 milliLiter(s) Oral every 6 hours PRN Dyspepsia  artificial  tears Solution 1 Drop(s) Both EYES two times a day PRN Dry Eyes  ondansetron Injectable 4 milliGRAM(s) IV Push every 6 hours PRN Nausea and/or Vomiting  senna 1 Tablet(s) Oral two times a day PRN Constipation      LABS: All Labs Reviewed:                        8.6    10.56 )-----------( 262      ( 06 Dec 2023 06:04 )             26.5     12-06    142  |  108  |  16  ----------------------------<  126<H>  3.9   |  22  |  1.26    Ca    7.6<L>      06 Dec 2023 06:04  Phos  2.6     12-06  Mg     2.5     12-06    TPro  5.9<L>  /  Alb  1.7<L>  /  TBili  0.3  /  DBili  x   /  AST  41<H>  /  ALT  35  /  AlkPhos  99  12-06      Blood Culture:   CAPILLARY BLOOD GLUCOSE      POCT Blood Glucose.: 132 mg/dL (06 Dec 2023 08:34)  POCT Blood Glucose.: 124 mg/dL (05 Dec 2023 22:15)  POCT Blood Glucose.: 151 mg/dL (05 Dec 2023 18:15)  POCT Blood Glucose.: 138 mg/dL (05 Dec 2023 14:37)  POCT Blood Glucose.: 162 mg/dL (05 Dec 2023 12:15)    RADIOLOGY/EKG:  All Imaging and EKGs Personally Reviewed     NM Hepatobiliary Imaging w/ RX (11.29.23 @ 14:20)   IMPRESSION: Abnormal morphine-augmented hepatobiliary scan.  Findings are compatible with acute cholecystitis.    US Abdomen Limited (11.28.23 @ 21:21)   IMPRESSION:  Distended thick-walled gallbladder containing sludge.  Borderline CBD   measuring 6 mm.  Findings equivocal for acute cholecystitis. If   clinically indicated, further evaluation with nuclear HIDA scan may be   obtained.    CT Abdomen and Pelvis No Cont (11.28.23 @ 16:52)   IMPRESSION:  Findings suspicious for acute cholecystitis. Correlate with patient's   symptoms. Consider right upper quadrant ultrasound if clinically   indicated.    CT Chest No Cont (11.28.23 @ 16:51)   IMPRESSION:  Findings suspicious for acute cholecystitis. Correlate with patient's   symptoms. Consider right upper quadrant ultrasound if clinically   indicated.    Xray Chest 1 View AP/PA (11.28.23 @ 15:49)   Impression:  No acute pulmonary disease.    Xray Chest 1 View- PORTABLE-Urgent (11.24.23 @ 03:18)   IMPRESSION:  No evidence of acute infiltrate or pleural effusion.    TTE Echo Complete w/o Contrast w/ Doppler (11.30.23 @ 11:17)   Summary:   1. Left ventricular ejection fraction, by visual estimation, is 65 to   70%.   2. Normal global left ventricular systolic function.   3. Normal left ventricular internal cavity size.   4. Mildly enlarged right ventricle.   5. Mild mitral valve regurgitation.   6. Thickening of the anterior mitral valve leaflet.   7. Mild tricuspid regurgitation.   8. Mild aortic regurgitation.    12 Lead ECG (11.28.23 @ 15:11)   Normal sinus rhythm  Voltage criteria for left ventricular hypertrophy  Abnormal ECG  When compared with ECG of 24-NOV-2023 01:26,  Sinus rhythm has replaced Electronic atrial pacemaker

## 2023-12-06 NOTE — PROGRESS NOTE ADULT - NS ATTEND AMEND GEN_ALL_CORE FT
Agree with the assessment and plan of KASEY Carrillo. S/P ERCP for bile leak. Currently stable. Tolerating diet.  Monitor liver tests while inpatient.  Outpt follow up with Dr. Dorsye after DC home. Patient will need eventual ERCP for stent removal. Agree with the assessment and plan of KASEY Carrillo. S/P ERCP for bile leak. Currently stable. Tolerating diet.  Monitor liver tests while inpatient.  Outpt follow up with Dr. Dorsey after DC home. Patient will need eventual ERCP for stent removal.

## 2023-12-06 NOTE — PROGRESS NOTE ADULT - NSPROGADDITIONALINFOA_GEN_ALL_CORE
PT rec: acute inpatient rehab, apprec PMR recs  Goal to get patient OOB today to aid with gas passing and overall weakness  Patient says he will update his family on his own PT rec: acute inpatient rehab, apprec PMR recs    Patient says he will update his family on his own

## 2023-12-06 NOTE — PROGRESS NOTE ADULT - PROBLEM SELECTOR PLAN 1
Post cholecystectomy syndrome   S/P ERCP with stent 12/5/23  Monitor LFTs   Advance diet as tolerated   NIKHIL to SS  Monitor & record NIKHIL out put    Out patient GI F/U with Dr. Dorsey  Repeat ERCP in 4-8 week

## 2023-12-06 NOTE — PROGRESS NOTE ADULT - ASSESSMENT
Patient is a 68-year-old male with PMHx of sinus node disorder s/p PPM, HTN, HLD, cerebral aneurysm s/p coiling, CVA w/ residual L sided weakness, seizure disorder, type 2 diabetes on metformin and gastritis admitted for acute cholecystitis s/p lap to open cholecystectomy 11/30, w/ ICU upgrade due to post-operative hypotension requiring pressors, downgraded to surgical floor.     Discussed with attending, Dr. Dorman Patient is a 68-year-old male with PMHx of sinus node disorder s/p PPM, HTN, HLD, cerebral aneurysm s/p coiling, CVA w/ residual L sided weakness, seizure disorder, type 2 diabetes on metformin and gastritis admitted for acute cholecystitis s/p lap to open cholecystectomy 11/30, w/ ICU upgrade due to post-operative hypotension requiring pressors, downgraded to surgical floor, and ERCP on 12/6/23.     Discussed with attending, Dr. Dorman Patient is a 68-year-old male with PMHx of sinus node disorder s/p PPM, HTN, HLD, cerebral aneurysm s/p coiling, CVA w/ residual L sided weakness, seizure disorder, type 2 diabetes on metformin and gastritis admitted for acute cholecystitis s/p lap to open cholecystectomy 11/30, w/ ICU upgrade due to post-operative hypotension requiring pressors, downgraded to surgical floor, and ERCP on 12/5/23.     Discussed with attending, Dr. Dorman

## 2023-12-06 NOTE — PROGRESS NOTE ADULT - PROBLEM SELECTOR PLAN 1
- S/p lap to open cholecystectomy w/ suppurative GB - POD 6  -found to have bile duct leakage s/p ERCP  - Appreciate Surg recs  - GI consulted by surgery for with bile leak and for possible ERCP with stent placement  -made NPO  - continue to monitor NIKHIL drain output  - c/w DASH diet, unless pt begins vomiting  - C/w Zosyn,   - C/w incentive spirometry, antiemetics and pain regimen PRN  - New leukocytosis - S/p lap to open cholecystectomy w/ suppurative GB - POD 6  - S/p ERCP with  stent placed with GI 12/5/23  - leukocytosis improved  - Appreciate Surg recs - clear for dc to [facility]  - Appreciate GI recs  - upgrade to DASH diet  - continue to monitor NIKHIL drain output  - C/w Zosyn,   - C/w incentive spirometry, antiemetics and pain regimen PRN - S/p lap to open cholecystectomy w/ suppurative GB   - S/p ERCP with  stent placed with GI 12/5/23  - leukocytosis improved  - Appreciate Surg recs - clear for dc to [facility]  - Appreciate GI recs  - upgrade to DASH diet  - continue to monitor NIKHIL drain output  - C/w Zosyn, until tomorrow  - C/w incentive spirometry, antiemetics and pain regimen PRN - S/p lap to open cholecystectomy w/ suppurative GB   - S/p ERCP with  stent placed with GI 12/5/23  - leukocytosis improved  - Appreciate Surg recs - clear for dc to [facility]  - Appreciate GI recs  - upgrade to DASH diet  - continue to monitor NIKHIL drain output  - C/w Zosyn, until tomorrow  - C/w incentive spirometry, antiemetics and pain regimen PRN  - Out patient GI F/U with Dr. Dorsey  - Repeat ERCP in 4-8 week. - S/p lap to open cholecystectomy w/ suppurative GB   - S/p ERCP with  stent placed with GI 12/5/23  - leukocytosis improved  - Appreciate Surg recs - clear for dc to [facility]  - Appreciate GI recs  - upgrade to DASH diet today, monitor tolerance overnight  - continue to monitor NIKHIL drain output  - completing 7 days of zosyn, then observe off abx  - C/w incentive spirometry, antiemetics and pain regimen PRN  - Out patient GI F/U with Dr. Dorsey  - Repeat ERCP in 4-8 week.  - Acute rehab planning in AM if medically stable - S/p lap to open cholecystectomy w/ suppurative GB   - S/p ERCP with  stent placed with GI 12/5/23  - leukocytosis improved  - Appreciate Surg recs - clear for dc to [facility]  - Appreciate GI recs  - upgrade to DASH diet today, monitor tolerance overnight  - continue to monitor NIKHIL drain output  - completed 7 days of zosyn,d/c'd. observe off abx  - C/w incentive spirometry, antiemetics and pain regimen PRN  - Out patient GI F/U with Dr. Dorsey  - Repeat ERCP in 4-8 week.  - Acute rehab planning in AM if medically stable

## 2023-12-06 NOTE — PROGRESS NOTE ADULT - SUBJECTIVE AND OBJECTIVE BOX
Patient is a 68y old  Male who presents with a chief complaint of Acute Cholecystitis   11/30/23 5 s/p lap converted open cholecystectomy.    INTERVAL HPI/OVERNIGHT EVENTS:  Pt. seen and examined at bedside resting comfortably.  Denies fever/chills, chest pain, dyspnea, cough, dizziness.     Vital Signs Last 24 Hrs  T(C): 37 (06 Dec 2023 05:00), Max: 37.1 (05 Dec 2023 18:13)  T(F): 98.6 (06 Dec 2023 05:00), Max: 98.7 (05 Dec 2023 18:13)  HR: 72 (06 Dec 2023 05:00) (65 - 89)  BP: 119/61 (06 Dec 2023 05:00) (119/61 - 146/70)  BP(mean): 80 (06 Dec 2023 05:00) (80 - 80)  RR: 19 (06 Dec 2023 05:00) (16 - 20)  SpO2: 98% (06 Dec 2023 05:00) (97% - 100%)    Parameters below as of 06 Dec 2023 05:00  Patient On (Oxygen Delivery Method): room air        PHYSICAL EXAM:    General: NAD, A/O x 3, non toxic appearance  Neck: Supple, No JVD  Abdomen: soft, nd, nttp, no guarding, incisions c/d/i, dressing c/d/i, both NIKHIL drains with serosang fluid, nurse emptied prior to seeing patient  Extremities: No calf tenderness, No pitting edema    I&O's Detail    05 Dec 2023 07:01  -  06 Dec 2023 07:00  --------------------------------------------------------  IN:    IV PiggyBack: 175 mL  Total IN: 175 mL    OUT:    Bulb (mL): 175 mL    Bulb (mL): 37 mL    Intermittent Catheterization - Urethral (mL): 1600 mL    Voided (mL): 150 mL  Total OUT: 1962 mL    Total NET: -1787 mL      06 Dec 2023 07:01  -  06 Dec 2023 09:25  --------------------------------------------------------  IN:  Total IN: 0 mL    OUT:    Bulb (mL): 7.5 mL    Bulb (mL): 12.5 mL  Total OUT: 20 mL    Total NET: -20 mL          LABS:                        8.6    10.56 )-----------( 262      ( 06 Dec 2023 06:04 )             26.5     12-06    142  |  108  |  16  ----------------------------<  126<H>  3.9   |  22  |  1.26    Ca    7.6<L>      06 Dec 2023 06:04  Phos  2.6     12-06  Mg     2.5     12-06    TPro  5.9<L>  /  Alb  1.7<L>  /  TBili  0.3  /  DBili  x   /  AST  41<H>  /  ALT  35  /  AlkPhos  99  12-06      Urinalysis Basic - ( 06 Dec 2023 06:04 )    Color: x / Appearance: x / SG: x / pH: x  Gluc: 126 mg/dL / Ketone: x  / Bili: x / Urobili: x   Blood: x / Protein: x / Nitrite: x   Leuk Esterase: x / RBC: x / WBC x   Sq Epi: x / Non Sq Epi: x / Bacteria: x        RADIOLOGY & ADDITIONAL STUDIES:    Impression: Patient is a 68y old  Male who presents with a chief complaint of Acute Cholecystitis  11/30 s/p lap converted open cholecystectomy  Patient had ERCP with  stent placed with GI 12/5/23  pt feels well, denies n/v/f/c  tolerating diet    plan  Dash diet  continue to monitor NIKHIL drain output  will need staples to be removed  Patient is cleared from surgical perspective to be discharged to acute rehab, will continue to follow patient in rehab    case d/w Dr. Bro,      Plan:

## 2023-12-06 NOTE — PROGRESS NOTE ADULT - ATTENDING COMMENTS
agree with above  s/p ERCP with stent placement and tolerated well  NIKHIL drains in place, per surgery can keep in place on d/c to acute rehab and they will continue to monitor output  diet advanced today, monitor   d/c planning to acute rehab tomorrow if does well with solids   f/u am labs agree with above  s/p ERCP with stent placement and tolerated well  NIKHIL drains in place, per surgery can keep in place on d/c to acute rehab and they will continue to monitor output  diet advanced today, monitor   completed 7 day course of zosyn. monitor off abx  d/c planning to acute rehab tomorrow if does well with solids   f/u am labs

## 2023-12-07 ENCOUNTER — TRANSCRIPTION ENCOUNTER (OUTPATIENT)
Age: 68
End: 2023-12-07

## 2023-12-07 ENCOUNTER — INPATIENT (INPATIENT)
Facility: HOSPITAL | Age: 68
LOS: 11 days | Discharge: HOME HEALTH | DRG: 949 | End: 2023-12-19
Attending: PHYSICAL MEDICINE & REHABILITATION | Admitting: PHYSICAL MEDICINE & REHABILITATION
Payer: MEDICARE

## 2023-12-07 VITALS
OXYGEN SATURATION: 97 % | HEART RATE: 69 BPM | SYSTOLIC BLOOD PRESSURE: 128 MMHG | HEIGHT: 65 IN | RESPIRATION RATE: 15 BRPM | TEMPERATURE: 99 F | WEIGHT: 157.85 LBS | DIASTOLIC BLOOD PRESSURE: 70 MMHG

## 2023-12-07 VITALS
HEART RATE: 89 BPM | SYSTOLIC BLOOD PRESSURE: 138 MMHG | OXYGEN SATURATION: 98 % | RESPIRATION RATE: 16 BRPM | DIASTOLIC BLOOD PRESSURE: 88 MMHG | TEMPERATURE: 99 F

## 2023-12-07 DIAGNOSIS — R53.81 OTHER MALAISE: ICD-10-CM

## 2023-12-07 DIAGNOSIS — Z95.0 PRESENCE OF CARDIAC PACEMAKER: Chronic | ICD-10-CM

## 2023-12-07 DIAGNOSIS — Z98.89 OTHER SPECIFIED POSTPROCEDURAL STATES: Chronic | ICD-10-CM

## 2023-12-07 DIAGNOSIS — E87.8 OTHER DISORDERS OF ELECTROLYTE AND FLUID BALANCE, NOT ELSEWHERE CLASSIFIED: ICD-10-CM

## 2023-12-07 DIAGNOSIS — Z98.890 OTHER SPECIFIED POSTPROCEDURAL STATES: Chronic | ICD-10-CM

## 2023-12-07 DIAGNOSIS — Z98.49 CATARACT EXTRACTION STATUS, UNSPECIFIED EYE: Chronic | ICD-10-CM

## 2023-12-07 LAB
ALBUMIN SERPL ELPH-MCNC: 1.7 G/DL — LOW (ref 3.3–5)
ALBUMIN SERPL ELPH-MCNC: 1.7 G/DL — LOW (ref 3.3–5)
ALP SERPL-CCNC: 110 U/L — SIGNIFICANT CHANGE UP (ref 40–120)
ALP SERPL-CCNC: 110 U/L — SIGNIFICANT CHANGE UP (ref 40–120)
ALT FLD-CCNC: 42 U/L — SIGNIFICANT CHANGE UP (ref 10–45)
ALT FLD-CCNC: 42 U/L — SIGNIFICANT CHANGE UP (ref 10–45)
ANION GAP SERPL CALC-SCNC: 12 MMOL/L — SIGNIFICANT CHANGE UP (ref 5–17)
ANION GAP SERPL CALC-SCNC: 12 MMOL/L — SIGNIFICANT CHANGE UP (ref 5–17)
AST SERPL-CCNC: 44 U/L — HIGH (ref 10–40)
AST SERPL-CCNC: 44 U/L — HIGH (ref 10–40)
BILIRUB SERPL-MCNC: 0.3 MG/DL — SIGNIFICANT CHANGE UP (ref 0.2–1.2)
BILIRUB SERPL-MCNC: 0.3 MG/DL — SIGNIFICANT CHANGE UP (ref 0.2–1.2)
BUN SERPL-MCNC: 16 MG/DL — SIGNIFICANT CHANGE UP (ref 7–23)
BUN SERPL-MCNC: 16 MG/DL — SIGNIFICANT CHANGE UP (ref 7–23)
CALCIUM SERPL-MCNC: 7.4 MG/DL — LOW (ref 8.4–10.5)
CALCIUM SERPL-MCNC: 7.4 MG/DL — LOW (ref 8.4–10.5)
CHLORIDE SERPL-SCNC: 106 MMOL/L — SIGNIFICANT CHANGE UP (ref 96–108)
CHLORIDE SERPL-SCNC: 106 MMOL/L — SIGNIFICANT CHANGE UP (ref 96–108)
CO2 SERPL-SCNC: 22 MMOL/L — SIGNIFICANT CHANGE UP (ref 22–31)
CO2 SERPL-SCNC: 22 MMOL/L — SIGNIFICANT CHANGE UP (ref 22–31)
CREAT SERPL-MCNC: 1.09 MG/DL — SIGNIFICANT CHANGE UP (ref 0.5–1.3)
CREAT SERPL-MCNC: 1.09 MG/DL — SIGNIFICANT CHANGE UP (ref 0.5–1.3)
EGFR: 74 ML/MIN/1.73M2 — SIGNIFICANT CHANGE UP
EGFR: 74 ML/MIN/1.73M2 — SIGNIFICANT CHANGE UP
GLUCOSE BLDC GLUCOMTR-MCNC: 114 MG/DL — HIGH (ref 70–99)
GLUCOSE BLDC GLUCOMTR-MCNC: 114 MG/DL — HIGH (ref 70–99)
GLUCOSE BLDC GLUCOMTR-MCNC: 119 MG/DL — HIGH (ref 70–99)
GLUCOSE BLDC GLUCOMTR-MCNC: 119 MG/DL — HIGH (ref 70–99)
GLUCOSE BLDC GLUCOMTR-MCNC: 125 MG/DL — HIGH (ref 70–99)
GLUCOSE BLDC GLUCOMTR-MCNC: 125 MG/DL — HIGH (ref 70–99)
GLUCOSE BLDC GLUCOMTR-MCNC: 170 MG/DL — HIGH (ref 70–99)
GLUCOSE BLDC GLUCOMTR-MCNC: 170 MG/DL — HIGH (ref 70–99)
GLUCOSE SERPL-MCNC: 134 MG/DL — HIGH (ref 70–99)
GLUCOSE SERPL-MCNC: 134 MG/DL — HIGH (ref 70–99)
HCT VFR BLD CALC: 26.5 % — LOW (ref 39–50)
HCT VFR BLD CALC: 26.5 % — LOW (ref 39–50)
HGB BLD-MCNC: 8.8 G/DL — LOW (ref 13–17)
HGB BLD-MCNC: 8.8 G/DL — LOW (ref 13–17)
MAGNESIUM SERPL-MCNC: 2.6 MG/DL — SIGNIFICANT CHANGE UP (ref 1.6–2.6)
MAGNESIUM SERPL-MCNC: 2.6 MG/DL — SIGNIFICANT CHANGE UP (ref 1.6–2.6)
MCHC RBC-ENTMCNC: 32.2 PG — SIGNIFICANT CHANGE UP (ref 27–34)
MCHC RBC-ENTMCNC: 32.2 PG — SIGNIFICANT CHANGE UP (ref 27–34)
MCHC RBC-ENTMCNC: 33.2 GM/DL — SIGNIFICANT CHANGE UP (ref 32–36)
MCHC RBC-ENTMCNC: 33.2 GM/DL — SIGNIFICANT CHANGE UP (ref 32–36)
MCV RBC AUTO: 97.1 FL — SIGNIFICANT CHANGE UP (ref 80–100)
MCV RBC AUTO: 97.1 FL — SIGNIFICANT CHANGE UP (ref 80–100)
NRBC # BLD: 0 /100 WBCS — SIGNIFICANT CHANGE UP (ref 0–0)
NRBC # BLD: 0 /100 WBCS — SIGNIFICANT CHANGE UP (ref 0–0)
PHOSPHATE SERPL-MCNC: 2.2 MG/DL — LOW (ref 2.5–4.5)
PHOSPHATE SERPL-MCNC: 2.2 MG/DL — LOW (ref 2.5–4.5)
PLATELET # BLD AUTO: 308 K/UL — SIGNIFICANT CHANGE UP (ref 150–400)
PLATELET # BLD AUTO: 308 K/UL — SIGNIFICANT CHANGE UP (ref 150–400)
POTASSIUM SERPL-MCNC: 3.4 MMOL/L — LOW (ref 3.5–5.3)
POTASSIUM SERPL-MCNC: 3.4 MMOL/L — LOW (ref 3.5–5.3)
POTASSIUM SERPL-SCNC: 3.4 MMOL/L — LOW (ref 3.5–5.3)
POTASSIUM SERPL-SCNC: 3.4 MMOL/L — LOW (ref 3.5–5.3)
PROT SERPL-MCNC: 5.9 G/DL — LOW (ref 6–8.3)
PROT SERPL-MCNC: 5.9 G/DL — LOW (ref 6–8.3)
RBC # BLD: 2.73 M/UL — LOW (ref 4.2–5.8)
RBC # BLD: 2.73 M/UL — LOW (ref 4.2–5.8)
RBC # FLD: 15.3 % — HIGH (ref 10.3–14.5)
RBC # FLD: 15.3 % — HIGH (ref 10.3–14.5)
SARS-COV-2 RNA SPEC QL NAA+PROBE: DETECTED
SARS-COV-2 RNA SPEC QL NAA+PROBE: DETECTED
SODIUM SERPL-SCNC: 140 MMOL/L — SIGNIFICANT CHANGE UP (ref 135–145)
SODIUM SERPL-SCNC: 140 MMOL/L — SIGNIFICANT CHANGE UP (ref 135–145)
WBC # BLD: 8.92 K/UL — SIGNIFICANT CHANGE UP (ref 3.8–10.5)
WBC # BLD: 8.92 K/UL — SIGNIFICANT CHANGE UP (ref 3.8–10.5)
WBC # FLD AUTO: 8.92 K/UL — SIGNIFICANT CHANGE UP (ref 3.8–10.5)
WBC # FLD AUTO: 8.92 K/UL — SIGNIFICANT CHANGE UP (ref 3.8–10.5)

## 2023-12-07 PROCEDURE — 74329 X-RAY FOR PANCREAS ENDOSCOPY: CPT

## 2023-12-07 PROCEDURE — 74176 CT ABD & PELVIS W/O CONTRAST: CPT | Mod: MA

## 2023-12-07 PROCEDURE — C9399: CPT

## 2023-12-07 PROCEDURE — 97110 THERAPEUTIC EXERCISES: CPT

## 2023-12-07 PROCEDURE — 82248 BILIRUBIN DIRECT: CPT

## 2023-12-07 PROCEDURE — 84484 ASSAY OF TROPONIN QUANT: CPT

## 2023-12-07 PROCEDURE — 83605 ASSAY OF LACTIC ACID: CPT

## 2023-12-07 PROCEDURE — 76705 ECHO EXAM OF ABDOMEN: CPT

## 2023-12-07 PROCEDURE — 97162 PT EVAL MOD COMPLEX 30 MIN: CPT

## 2023-12-07 PROCEDURE — 84145 PROCALCITONIN (PCT): CPT

## 2023-12-07 PROCEDURE — 99239 HOSP IP/OBS DSCHRG MGMT >30: CPT

## 2023-12-07 PROCEDURE — 71250 CT THORAX DX C-: CPT | Mod: MA

## 2023-12-07 PROCEDURE — 85025 COMPLETE CBC W/AUTO DIFF WBC: CPT

## 2023-12-07 PROCEDURE — 36415 COLL VENOUS BLD VENIPUNCTURE: CPT

## 2023-12-07 PROCEDURE — 93306 TTE W/DOPPLER COMPLETE: CPT

## 2023-12-07 PROCEDURE — 81001 URINALYSIS AUTO W/SCOPE: CPT

## 2023-12-07 PROCEDURE — 71045 X-RAY EXAM CHEST 1 VIEW: CPT

## 2023-12-07 PROCEDURE — 80156 ASSAY CARBAMAZEPINE TOTAL: CPT

## 2023-12-07 PROCEDURE — P9016: CPT

## 2023-12-07 PROCEDURE — 87040 BLOOD CULTURE FOR BACTERIA: CPT

## 2023-12-07 PROCEDURE — 78227 HEPATOBIL SYST IMAGE W/DRUG: CPT

## 2023-12-07 PROCEDURE — 85014 HEMATOCRIT: CPT

## 2023-12-07 PROCEDURE — 88304 TISSUE EXAM BY PATHOLOGIST: CPT

## 2023-12-07 PROCEDURE — 86900 BLOOD TYPING SEROLOGIC ABO: CPT

## 2023-12-07 PROCEDURE — 83036 HEMOGLOBIN GLYCOSYLATED A1C: CPT

## 2023-12-07 PROCEDURE — 82962 GLUCOSE BLOOD TEST: CPT

## 2023-12-07 PROCEDURE — 84100 ASSAY OF PHOSPHORUS: CPT

## 2023-12-07 PROCEDURE — C1773: CPT

## 2023-12-07 PROCEDURE — A9537: CPT

## 2023-12-07 PROCEDURE — 85018 HEMOGLOBIN: CPT

## 2023-12-07 PROCEDURE — 36430 TRANSFUSION BLD/BLD COMPNT: CPT

## 2023-12-07 PROCEDURE — 93005 ELECTROCARDIOGRAM TRACING: CPT

## 2023-12-07 PROCEDURE — 83735 ASSAY OF MAGNESIUM: CPT

## 2023-12-07 PROCEDURE — 80074 ACUTE HEPATITIS PANEL: CPT

## 2023-12-07 PROCEDURE — 86923 COMPATIBILITY TEST ELECTRIC: CPT

## 2023-12-07 PROCEDURE — 85610 PROTHROMBIN TIME: CPT

## 2023-12-07 PROCEDURE — 97116 GAIT TRAINING THERAPY: CPT

## 2023-12-07 PROCEDURE — 96368 THER/DIAG CONCURRENT INF: CPT

## 2023-12-07 PROCEDURE — 85027 COMPLETE CBC AUTOMATED: CPT

## 2023-12-07 PROCEDURE — 97166 OT EVAL MOD COMPLEX 45 MIN: CPT

## 2023-12-07 PROCEDURE — 86850 RBC ANTIBODY SCREEN: CPT

## 2023-12-07 PROCEDURE — 83690 ASSAY OF LIPASE: CPT

## 2023-12-07 PROCEDURE — 96365 THER/PROPH/DIAG IV INF INIT: CPT

## 2023-12-07 PROCEDURE — 86901 BLOOD TYPING SEROLOGIC RH(D): CPT

## 2023-12-07 PROCEDURE — P9047: CPT

## 2023-12-07 PROCEDURE — 99233 SBSQ HOSP IP/OBS HIGH 50: CPT

## 2023-12-07 PROCEDURE — C2625: CPT

## 2023-12-07 PROCEDURE — 99285 EMERGENCY DEPT VISIT HI MDM: CPT

## 2023-12-07 PROCEDURE — 80053 COMPREHEN METABOLIC PANEL: CPT

## 2023-12-07 RX ORDER — SENNA PLUS 8.6 MG/1
1 TABLET ORAL
Refills: 0 | Status: DISCONTINUED | OUTPATIENT
Start: 2023-12-07 | End: 2023-12-12

## 2023-12-07 RX ORDER — HEPARIN SODIUM 5000 [USP'U]/ML
5000 INJECTION INTRAVENOUS; SUBCUTANEOUS EVERY 8 HOURS
Refills: 0 | Status: DISCONTINUED | OUTPATIENT
Start: 2023-12-07 | End: 2023-12-19

## 2023-12-07 RX ORDER — SODIUM,POTASSIUM PHOSPHATES 278-250MG
1 POWDER IN PACKET (EA) ORAL ONCE
Refills: 0 | Status: DISCONTINUED | OUTPATIENT
Start: 2023-12-07 | End: 2023-12-07

## 2023-12-07 RX ORDER — INSULIN LISPRO 100/ML
VIAL (ML) SUBCUTANEOUS AT BEDTIME
Refills: 0 | Status: DISCONTINUED | OUTPATIENT
Start: 2023-12-07 | End: 2023-12-10

## 2023-12-07 RX ORDER — ASPIRIN/CALCIUM CARB/MAGNESIUM 324 MG
81 TABLET ORAL DAILY
Refills: 0 | Status: DISCONTINUED | OUTPATIENT
Start: 2023-12-08 | End: 2023-12-19

## 2023-12-07 RX ORDER — SODIUM CHLORIDE 9 MG/ML
1000 INJECTION, SOLUTION INTRAVENOUS
Refills: 0 | Status: DISCONTINUED | OUTPATIENT
Start: 2023-12-07 | End: 2023-12-19

## 2023-12-07 RX ORDER — METFORMIN HYDROCHLORIDE 850 MG/1
1 TABLET ORAL
Qty: 0 | Refills: 0 | DISCHARGE

## 2023-12-07 RX ORDER — DEXTROSE 50 % IN WATER 50 %
25 SYRINGE (ML) INTRAVENOUS ONCE
Refills: 0 | Status: DISCONTINUED | OUTPATIENT
Start: 2023-12-07 | End: 2023-12-19

## 2023-12-07 RX ORDER — ATORVASTATIN CALCIUM 80 MG/1
80 TABLET, FILM COATED ORAL AT BEDTIME
Refills: 0 | Status: DISCONTINUED | OUTPATIENT
Start: 2023-12-07 | End: 2023-12-19

## 2023-12-07 RX ORDER — CARBAMAZEPINE 200 MG
300 TABLET ORAL
Refills: 0 | Status: DISCONTINUED | OUTPATIENT
Start: 2023-12-08 | End: 2023-12-19

## 2023-12-07 RX ORDER — GLUCAGON INJECTION, SOLUTION 0.5 MG/.1ML
1 INJECTION, SOLUTION SUBCUTANEOUS ONCE
Refills: 0 | Status: DISCONTINUED | OUTPATIENT
Start: 2023-12-07 | End: 2023-12-19

## 2023-12-07 RX ORDER — LATANOPROST 0.05 MG/ML
1 SOLUTION/ DROPS OPHTHALMIC; TOPICAL AT BEDTIME
Refills: 0 | Status: DISCONTINUED | OUTPATIENT
Start: 2023-12-07 | End: 2023-12-19

## 2023-12-07 RX ORDER — ZOLPIDEM TARTRATE 10 MG/1
0.5 TABLET ORAL
Qty: 0 | Refills: 0 | DISCHARGE

## 2023-12-07 RX ORDER — POTASSIUM CHLORIDE 20 MEQ
20 PACKET (EA) ORAL ONCE
Refills: 0 | Status: COMPLETED | OUTPATIENT
Start: 2023-12-07 | End: 2023-12-07

## 2023-12-07 RX ORDER — POLYETHYLENE GLYCOL 3350 17 G/17G
17 POWDER, FOR SOLUTION ORAL DAILY
Refills: 0 | Status: DISCONTINUED | OUTPATIENT
Start: 2023-12-08 | End: 2023-12-19

## 2023-12-07 RX ORDER — LANOLIN ALCOHOL/MO/W.PET/CERES
6 CREAM (GRAM) TOPICAL AT BEDTIME
Refills: 0 | Status: DISCONTINUED | OUTPATIENT
Start: 2023-12-07 | End: 2023-12-19

## 2023-12-07 RX ORDER — FAMOTIDINE 10 MG/ML
20 INJECTION INTRAVENOUS DAILY
Refills: 0 | Status: DISCONTINUED | OUTPATIENT
Start: 2023-12-07 | End: 2023-12-19

## 2023-12-07 RX ORDER — DEXTROSE 50 % IN WATER 50 %
15 SYRINGE (ML) INTRAVENOUS ONCE
Refills: 0 | Status: DISCONTINUED | OUTPATIENT
Start: 2023-12-07 | End: 2023-12-19

## 2023-12-07 RX ORDER — INSULIN LISPRO 100/ML
VIAL (ML) SUBCUTANEOUS
Refills: 0 | Status: DISCONTINUED | OUTPATIENT
Start: 2023-12-07 | End: 2023-12-10

## 2023-12-07 RX ORDER — SODIUM,POTASSIUM PHOSPHATES 278-250MG
1 POWDER IN PACKET (EA) ORAL ONCE
Refills: 0 | Status: COMPLETED | OUTPATIENT
Start: 2023-12-07 | End: 2023-12-07

## 2023-12-07 RX ORDER — DEXTROSE 50 % IN WATER 50 %
12.5 SYRINGE (ML) INTRAVENOUS ONCE
Refills: 0 | Status: DISCONTINUED | OUTPATIENT
Start: 2023-12-07 | End: 2023-12-19

## 2023-12-07 RX ADMIN — Medication 6 MILLIGRAM(S): at 23:13

## 2023-12-07 RX ADMIN — HEPARIN SODIUM 5000 UNIT(S): 5000 INJECTION INTRAVENOUS; SUBCUTANEOUS at 18:19

## 2023-12-07 RX ADMIN — Medication 20 MILLIEQUIVALENT(S): at 13:34

## 2023-12-07 RX ADMIN — Medication 300 MILLIGRAM(S): at 05:18

## 2023-12-07 RX ADMIN — ATORVASTATIN CALCIUM 80 MILLIGRAM(S): 80 TABLET, FILM COATED ORAL at 22:21

## 2023-12-07 RX ADMIN — FAMOTIDINE 20 MILLIGRAM(S): 10 INJECTION INTRAVENOUS at 13:28

## 2023-12-07 RX ADMIN — CHLORHEXIDINE GLUCONATE 1 APPLICATION(S): 213 SOLUTION TOPICAL at 05:21

## 2023-12-07 RX ADMIN — Medication 1: at 13:29

## 2023-12-07 RX ADMIN — POLYETHYLENE GLYCOL 3350 17 GRAM(S): 17 POWDER, FOR SOLUTION ORAL at 13:29

## 2023-12-07 RX ADMIN — HEPARIN SODIUM 5000 UNIT(S): 5000 INJECTION INTRAVENOUS; SUBCUTANEOUS at 22:20

## 2023-12-07 RX ADMIN — Medication 30 MILLILITER(S): at 06:53

## 2023-12-07 RX ADMIN — HEPARIN SODIUM 5000 UNIT(S): 5000 INJECTION INTRAVENOUS; SUBCUTANEOUS at 05:18

## 2023-12-07 RX ADMIN — Medication 300 MILLIGRAM(S): at 18:19

## 2023-12-07 RX ADMIN — Medication 1 PACKET(S): at 18:20

## 2023-12-07 RX ADMIN — Medication 81 MILLIGRAM(S): at 13:28

## 2023-12-07 RX ADMIN — LATANOPROST 1 DROP(S): 0.05 SOLUTION/ DROPS OPHTHALMIC; TOPICAL at 22:21

## 2023-12-07 NOTE — H&P ADULT - ASSESSMENT
Assessment/Plan:  67 y/o M with PMH of sinus node disorder s/p PPM, HTN, HLD, cerebral aneurysm s/p coiling, CVA w/ residual L sided weakness, seizure disorder, type 2 diabetes on metformin and gastritis admitted to [facility] on 11/28 with acute cholecystitis s/p lap to open cholecystectomy on 11/30.  Hospital Course complicated by hypotension (s/p pressors), acute blood loss anemia (s/p 1 unit PRBC), and bile duct leakage (s/p ERCP with stent on 12/5/23). Patient now admitted for a multidisciplinary rehab program.     Debility 2/2 acute cholecystitis   - complicated by bile duct leak (s/p ERCP with stent on 12/5)  - s/p 7 days of zosyn  - Impaired ADLs and mobility  - Need for assistance with personal care   - Start comprehensive rehab program of PT/OT - 3 hours a day, 5 days a week. P&O as needed   - Fall precautions  - monitor NIKHIL drain output  - outpatient GI f/u with Dr. Dorsey  - Repeat ERCP & stent removal in 4-8 weeks    Acute Blood Loss Anemia (improved)  - s/p 1 unit PRBC transfusion  - monitor H/H    Seizure Disorder  - continue home carbamazepine 300 mg BID    h/o CVA/ HLD  - residual L sided weakness  - continue atorvastatin 80 mg QHS  - continue ASA 81 mg QD    HTN  - Home med: metoprolol 25 mg BID  - required pressors in ICU post-op  - stable off anti-hypertensives  - Monitor BP    T2DM  - last HbA1c 6.1  - low dose SSI  - Monitor fingersticks    Mood / Cognition  - Neuropsychology consult PRN    Sleep  - Maintain quiet hours and a low stim environment.   - Melatonin 6 mg QHS    GI / Bowel  - Senna qHS PRN  - Miralax Daily  - GI ppx: famotidine 20 mg QD     / Bladder  - Currently patient voids independently     Skin / Pressure injury  - Skin assessment on admission performed [  ] :   - Monitor Incisions:    - Pressure Injury/Skin: OOB to chair, PT/OT  - nursing to monitor skin qShift    Diet/Dysphagia:  - Diet Consistency: DASH, regular  - Nutrition consult    DVT prophylaxis:   - Heparin 5000u Q8H      Outpatient Follow-up:  Easton Carroll  Internal Medicine  [address] [place] [address]  Phone: [phone]  Fax: [phone]  Follow Up Time:     Francisco Dorsey  Gastroenterology  [address] [facility], [address]  [place] [address]  Phone: [phone]  Fax: [phone]  Follow Up Time    ---------------    Goals: Safe discharge to home  Estimated Length of Stay: 10-14 days  Rehab Potential: Good  Medical Prognosis: Good  Estimated Disposition: Home with home care      PRESCREEN COMPARISON:  I have reviewed the prescreen information and I have found no relevant changes between the preadmission screening and my post admission evaluation.    RATIONALE FOR INPATIENT ADMISSION: Patient demonstrates the following:  [X] Medically appropriate for rehabilitation admission  [X] Has attainable rehab goals with an appropriate initial discharge plan  [X]Has rehabilitation potential (expected to make a significant improvement within a reasonable period of time)  [X] Requires close medical management by a rehab physician, rehab nursing care, Hospitalist and comprehensive interdisciplinary team (including PT, OT and/or SLP, Prosthetics and Orthotics)     Assessment/Plan:  67 y/o M with PMH of sinus node disorder s/p PPM, HTN, HLD, cerebral aneurysm s/p coiling, CVA w/ residual L sided weakness, seizure disorder, type 2 diabetes on metformin and gastritis admitted to [facility] on 11/28 with acute cholecystitis s/p lap to open cholecystectomy on 11/30.  Hospital Course complicated by hypotension (s/p pressors), acute blood loss anemia (s/p 1 unit PRBC), and bile duct leakage (s/p ERCP with stent on 12/5/23). Patient now admitted for a multidisciplinary rehab program.     Debility 2/2 acute cholecystitis   - complicated by bile duct leak (s/p ERCP with stent on 12/5)  - s/p 7 days of zosyn  - Impaired ADLs and mobility  - Need for assistance with personal care   - Start comprehensive rehab program of PT/OT - 3 hours a day, 5 days a week. P&O as needed   - Fall precautions  - monitor NIKHIL drain output  - outpatient GI f/u with Dr. Dorsey  - Repeat ERCP & stent removal in 4-8 weeks    Acute Blood Loss Anemia (improved)  - s/p 1 unit PRBC transfusion  - monitor H/H    Seizure Disorder  - continue home carbamazepine 300 mg BID    h/o CVA/ HLD  - residual L sided weakness  - continue atorvastatin 80 mg QHS  - continue ASA 81 mg QD    HTN  - Home med: metoprolol 25 mg BID  - required pressors in ICU post-op  - stable off anti-hypertensives  - Monitor BP    T2DM  - last HbA1c 6.1  - low dose SSI  - Monitor fingersticks    Mood / Cognition  - Neuropsychology consult PRN    Sleep  - Maintain quiet hours and a low stim environment.   - Melatonin 6 mg QHS    GI / Bowel  - Senna qHS PRN  - Miralax Daily  - GI ppx: famotidine 20 mg QD     / Bladder  - Currently patient voids independently     Skin / Pressure injury  - Skin assessment on admission performed [  ] :   - Monitor Incisions:    - Pressure Injury/Skin: OOB to chair, PT/OT  - nursing to monitor skin qShift    Diet/Dysphagia:  - Diet Consistency: DASH, regular  - Nutrition consult    DVT prophylaxis:   - Heparin 5000u Q8H      Outpatient Follow-up:  Easton Carroll  Internal Medicine  [address] [place] [address]  Phone: [phone]  Fax: [phone]  Follow Up Time:     Francisco Dorsey  Gastroenterology  [address] [facility], [address]  [place] [address]  Phone: [phone]  Fax: [phone]  Follow Up Time    ---------------    Goals: Safe discharge to home  Estimated Length of Stay: 10-14 days  Rehab Potential: Good  Medical Prognosis: Good  Estimated Disposition: Home with home care      PRESCREEN COMPARISON:  I have reviewed the prescreen information and I have found no relevant changes between the preadmission screening and my post admission evaluation.    RATIONALE FOR INPATIENT ADMISSION: Patient demonstrates the following:  [X] Medically appropriate for rehabilitation admission  [X] Has attainable rehab goals with an appropriate initial discharge plan  [X]Has rehabilitation potential (expected to make a significant improvement within a reasonable period of time)  [X] Requires close medical management by a rehab physician, rehab nursing care, Hospitalist and comprehensive interdisciplinary team (including PT, OT and/or SLP, Prosthetics and Orthotics)     Assessment/Plan:  67 y/o M with PMH of sinus node disorder s/p PPM, HTN, HLD, cerebral aneurysm s/p coiling, CVA w/ residual L sided weakness, seizure disorder, type 2 diabetes on metformin and gastritis admitted to [facility] on 11/28 with acute cholecystitis s/p lap to open cholecystectomy on 11/30. Hospital Course complicated by hypotension (s/p pressors), acute blood loss anemia (s/p 1 unit PRBC), and bile duct leakage (s/p ERCP with stent on 12/5/23). Patient now admitted for a multidisciplinary rehab program.     Debility 2/2 acute cholecystitis   - complicated by bile duct leak (s/p ERCP with stent on 12/5)  - s/p 7 days of zosyn  - Impaired ADLs and mobility  - Need for assistance with personal care   - Start comprehensive rehab program of PT/OT - 3 hours a day, 5 days a week. P&O as needed   - Fall precautions  - monitor NIKHIL drain output  - outpatient GI f/u with Dr. Dorsey  - Repeat ERCP & stent removal in 4-8 weeks    Acute Blood Loss Anemia (improved)  - s/p 1 unit PRBC transfusion  - monitor H/H    Seizure Disorder  - continue home carbamazepine 300 mg BID    h/o CVA/ HLD  - residual L sided weakness  - continue atorvastatin 80 mg QHS  - continue ASA 81 mg QD    HTN  - Home med: metoprolol 25 mg BID  - required pressors in ICU post-op  - stable off anti-hypertensives  - Monitor BP    T2DM  - last HbA1c 6.1  - low dose SSI  - Monitor fingersticks  - on metformin at home    Mood / Cognition  - Neuropsychology consult PRN    Sleep  - Maintain quiet hours and a low stim environment.   - Melatonin 6 mg QHS    GI / Bowel  - Senna qHS PRN  - Miralax Daily  - GI ppx: famotidine 20 mg QD     / Bladder  - Currently patient voids independently     Skin / Pressure injury  - Skin assessment on admission performed: abdominal incision with staple,   - Pressure Injury/Skin: OOB to chair, PT/OT  - nursing to monitor skin q Shift    Diet/Dysphagia:  - Diet Consistency: DASH, regular  - Nutrition consult    DVT prophylaxis:   - Heparin 5000u Q8H    Outpatient Follow-up:    Easton Carroll  Internal Medicine  [address] [place] [address]  Phone: [phone]  Fax: [phone]  Follow Up Time:     Francisco Dorsey  Gastroenterology  [address] [facility], [address]  [place] [address]  Phone: [phone]  Fax: [phone]  Follow Up Time    ---------------    Goals: Safe discharge to home  Estimated Length of Stay: 10-14 days  Rehab Potential: Good  Medical Prognosis: Good  Estimated Disposition: Home with home care      PRESCREEN COMPARISON:  I have reviewed the prescreen information and I have found no relevant changes between the preadmission screening and my post admission evaluation.    RATIONALE FOR INPATIENT ADMISSION: Patient demonstrates the following:  [X] Medically appropriate for rehabilitation admission  [X] Has attainable rehab goals with an appropriate initial discharge plan  [X]Has rehabilitation potential (expected to make a significant improvement within a reasonable period of time)  [X] Requires close medical management by a rehab physician, rehab nursing care, Hospitalist and comprehensive interdisciplinary team (including PT, OT and/or SLP, Prosthetics and Orthotics)     Assessment/Plan:  67 y/o M with PMH of sinus node disorder s/p PPM, HTN, HLD, cerebral aneurysm s/p coiling, CVA w/ residual L sided weakness, seizure disorder, type 2 diabetes on metformin and gastritis admitted [facility] with acute cholecystitis s/p lap to open cholecystectomy on 11/30. Hospital Course complicated by hypotension (s/p pressors), acute blood loss anemia (s/p 1 unit PRBC), and bile duct leakage (s/p ERCP with stent on 12/5/23). Patient now admitted for a multidisciplinary rehab program.     Debility 2/2 acute cholecystitis   - complicated by bile duct leak (s/p ERCP with stent on 12/5)  - s/p 7 days of zosyn  - Impaired ADLs and mobility  - Need for assistance with personal care   - Start comprehensive rehab program of PT/OT - 3 hours a day, 5 days a week. P&O as needed   - Fall precautions  - monitor NIKHIL drain output  - outpatient GI f/u with Dr. Dorsey  - Repeat ERCP & stent removal in 4-8 weeks    Acute Blood Loss Anemia (improved)  - s/p 1 unit PRBC transfusion  - monitor H/H    Seizure Disorder  - continue home carbamazepine 300 mg BID    h/o CVA/ HLD  - residual L sided weakness  - continue atorvastatin 80 mg QHS  - continue ASA 81 mg QD    HTN  - Home med: metoprolol 25 mg BID  - required pressors in ICU post-op  - stable off anti-hypertensives  - Monitor BP    T2DM  - last HbA1c 6.1  - low dose SSI  - Monitor fingersticks  - on metformin at home    Mood / Cognition  - Neuropsychology consult PRN    Sleep  - Maintain quiet hours and a low stim environment.   - Melatonin 6 mg QHS    GI / Bowel  - Senna qHS PRN  - Miralax Daily  - GI ppx: famotidine 20 mg QD     / Bladder  - Currently patient voids independently     Skin / Pressure injury  - Skin assessment on admission performed: abdominal incision with staple,   - Pressure Injury/Skin: OOB to chair, PT/OT  - nursing to monitor skin q Shift    Diet/Dysphagia:  - Diet Consistency: DASH, regular  - Nutrition consult    DVT prophylaxis:   - Heparin 5000u Q8H    Outpatient Follow-up:    Easton Carroll  Internal Medicine  [address] [place] [address]  Phone: [phone]  Fax: [phone]  Follow Up Time:     Francisco Dorsey  Gastroenterology  [address] [facility], [address]  [place] [address]  Phone: [phone]  Fax: [phone]  Follow Up Time    ---------------    Goals: Safe discharge to home  Estimated Length of Stay: 10-14 days  Rehab Potential: Good  Medical Prognosis: Good  Estimated Disposition: Home with home care      PRESCREEN COMPARISON:  I have reviewed the prescreen information and I have found no relevant changes between the preadmission screening and my post admission evaluation.    RATIONALE FOR INPATIENT ADMISSION: Patient demonstrates the following:  [X] Medically appropriate for rehabilitation admission  [X] Has attainable rehab goals with an appropriate initial discharge plan  [X]Has rehabilitation potential (expected to make a significant improvement within a reasonable period of time)  [X] Requires close medical management by a rehab physician, rehab nursing care, Hospitalist and comprehensive interdisciplinary team (including PT, OT and/or SLP, Prosthetics and Orthotics)     Assessment/Plan:  69 y/o M with PMH of sinus node disorder s/p PPM, HTN, HLD, cerebral aneurysm s/p coiling, CVA w/ residual L sided weakness, seizure disorder, type 2 diabetes on metformin and gastritis admitted to [facility] on 11/28 with acute cholecystitis s/p lap to open cholecystectomy on 11/30. Hospital Course complicated by hypotension (s/p pressors), acute blood loss anemia (s/p 1 unit PRBC), and bile duct leakage (s/p ERCP with stent on 12/5/23). Patient now admitted for a multidisciplinary rehab program.     Debility 2/2 acute cholecystitis   - complicated by bile duct leak (s/p ERCP with stent on 12/5)  - s/p 7 days of zosyn  - Impaired ADLs and mobility  - Need for assistance with personal care   - Start comprehensive rehab program of PT/OT - 3 hours a day, 5 days a week. P&O as needed   - Fall precautions  - monitor NIKHIL drain output  - outpatient GI f/u with Dr. Dorsey  - Repeat ERCP & stent removal in 4-8 weeks    Acute Blood Loss Anemia (improved)  - s/p 1 unit PRBC transfusion  - monitor H/H    Seizure Disorder  - continue home carbamazepine 300 mg BID    h/o CVA/ HLD  - residual L sided weakness  - continue atorvastatin 80 mg QHS  - continue ASA 81 mg QD    HTN  - Home med: metoprolol 25 mg BID  - required pressors in ICU post-op  - stable off anti-hypertensives  - Monitor BP    T2DM  - last HbA1c 6.1  - low dose SSI  - Monitor fingersticks  - on metformin at home    Mood / Cognition  - Neuropsychology consult PRN    Sleep  - Maintain quiet hours and a low stim environment.   - Melatonin 6 mg QHS    GI / Bowel  - Senna qHS PRN  - Miralax Daily  - GI ppx: famotidine 20 mg QD     / Bladder  - BS Q8H and SC >400cc     Skin / Pressure injury  - Skin assessment on admission performed: abdominal incision with staple,   - Pressure Injury/Skin: OOB to chair, PT/OT  - nursing to monitor skin q Shift    Diet/Dysphagia:  - Diet Consistency: DASH, regular  - Nutrition consult    DVT prophylaxis:   - Heparin 5000u Q8H    Outpatient Follow-up:    Easton Carroll  Internal Medicine  [address] [place] [address]  Phone: [phone]  Fax: [phone]  Follow Up Time:     Francisco Dorsey  Gastroenterology  [address] [facility], [address]  [place] [address]  Phone: [phone]  Fax: [phone]  Follow Up Time    ---------------    Goals: Safe discharge to home  Estimated Length of Stay: 10-14 days  Rehab Potential: Good  Medical Prognosis: Good  Estimated Disposition: Home with home care      PRESCREEN COMPARISON:  I have reviewed the prescreen information and I have found no relevant changes between the preadmission screening and my post admission evaluation.    RATIONALE FOR INPATIENT ADMISSION: Patient demonstrates the following:  [X] Medically appropriate for rehabilitation admission  [X] Has attainable rehab goals with an appropriate initial discharge plan  [X]Has rehabilitation potential (expected to make a significant improvement within a reasonable period of time)  [X] Requires close medical management by a rehab physician, rehab nursing care, Hospitalist and comprehensive interdisciplinary team (including PT, OT and/or SLP, Prosthetics and Orthotics)     Assessment/Plan:  69 y/o M with PMH of sinus node disorder s/p PPM, HTN, HLD, cerebral aneurysm s/p coiling, CVA w/ residual L sided weakness, seizure disorder, type 2 diabetes on metformin and gastritis admitted to [facility] on 11/28 with acute cholecystitis s/p lap to open cholecystectomy on 11/30. Hospital Course complicated by hypotension (s/p pressors), acute blood loss anemia (s/p 1 unit PRBC), and bile duct leakage (s/p ERCP with stent on 12/5/23). Patient now admitted for a multidisciplinary rehab program.     Debility 2/2 acute cholecystitis   - complicated by bile duct leak (s/p ERCP with stent on 12/5)  - s/p 7 days of zosyn  - Impaired ADLs and mobility  - Need for assistance with personal care   - Start comprehensive rehab program of PT/OT - 3 hours a day, 5 days a week. P&O as needed   - Fall precautions  - monitor NIKHIL drain output  - outpatient GI f/u with Dr. Dorsey  - Repeat ERCP & stent removal in 4-8 weeks    Acute Blood Loss Anemia (improved)  - s/p 1 unit PRBC transfusion  - monitor H/H    Seizure Disorder  - continue home carbamazepine 300 mg BID    h/o CVA/ HLD  - residual L sided weakness  - continue atorvastatin 80 mg QHS  - continue ASA 81 mg QD    HTN  - Home med: metoprolol 25 mg BID  - required pressors in ICU post-op  - stable off anti-hypertensives  - Monitor BP    T2DM  - last HbA1c 6.1  - low dose SSI  - Monitor fingersticks  - on metformin at home    Mood / Cognition  - Neuropsychology consult PRN    Sleep  - Maintain quiet hours and a low stim environment.   - Melatonin 6 mg QHS    GI / Bowel  - Senna qHS PRN  - Miralax Daily  - GI ppx: famotidine 20 mg QD     / Bladder  - BS Q8H and SC >400cc     Skin / Pressure injury  - Skin assessment on admission performed: abdominal incision with staple,   - Pressure Injury/Skin: OOB to chair, PT/OT  - nursing to monitor skin q Shift    Diet/Dysphagia:  - Diet Consistency: DASH, regular  - Nutrition consult    DVT prophylaxis:   - Heparin 5000u Q8H    Outpatient Follow-up:    Easton Carroll  Internal Medicine  [address] [place] [address]  Phone: [phone]  Fax: [phone]  Follow Up Time:     Francisco Dorsey  Gastroenterology  [address] [facility], [address]  [place] [address]  Phone: [phone]  Fax: [phone]  Follow Up Time    ---------------    Goals: Safe discharge to home  Estimated Length of Stay: 10-14 days  Rehab Potential: Good  Medical Prognosis: Good  Estimated Disposition: Home with home care      PRESCREEN COMPARISON:  I have reviewed the prescreen information and I have found no relevant changes between the preadmission screening and my post admission evaluation.    RATIONALE FOR INPATIENT ADMISSION: Patient demonstrates the following:  [X] Medically appropriate for rehabilitation admission  [X] Has attainable rehab goals with an appropriate initial discharge plan  [X]Has rehabilitation potential (expected to make a significant improvement within a reasonable period of time)  [X] Requires close medical management by a rehab physician, rehab nursing care, Hospitalist and comprehensive interdisciplinary team (including PT, OT and/or SLP, Prosthetics and Orthotics)     Assessment/Plan:  67 y/o M with PMH of sinus node disorder s/p PPM, HTN, HLD, cerebral aneurysm s/p coiling, CVA w/ residual L sided weakness, seizure disorder, type 2 diabetes on metformin and gastritis admitted to [facility] on 11/28 with acute cholecystitis s/p lap to open cholecystectomy on 11/30. Hospital Course complicated by hypotension (s/p pressors), acute blood loss anemia (s/p 1 unit PRBC), and bile duct leakage (s/p ERCP with stent on 12/5/23). Patient now admitted for a multidisciplinary rehab program.     Debility 2/2 acute cholecystitis   - complicated by bile duct leak (s/p ERCP with stent on 12/5)  - s/p 7 days of zosyn  - Impaired ADLs and mobility  - Need for assistance with personal care   - Start comprehensive rehab program of PT/OT - 3 hours a day, 5 days a week. P&O as needed   - Fall precautions  - monitor NIKHIL drain output  - outpatient GI f/u with Dr. Dorsey  - Repeat ERCP & stent removal in 4-8 weeks    COVID+  - patient tested positive for COVID on admission to rehab (12/8)  - isolation precautions  - monitor for symptoms    Acute Blood Loss Anemia (improved)  - s/p 1 unit PRBC transfusion  - monitor H/H    Seizure Disorder  - continue home carbamazepine 300 mg BID    h/o CVA/ HLD  - residual L sided weakness  - continue atorvastatin 80 mg QHS  - continue ASA 81 mg QD    HTN  - Home med: metoprolol 25 mg BID  - required pressors in ICU post-op  - stable off anti-hypertensives  - Monitor BP    T2DM  - last HbA1c 6.1  - low dose SSI  - Monitor fingersticks  - on metformin at home    Mood / Cognition  - Neuropsychology consult PRN    Sleep  - Maintain quiet hours and a low stim environment.   - Melatonin 6 mg QHS    GI / Bowel  - Senna qHS PRN  - Miralax Daily  - GI ppx: famotidine 20 mg QD     / Bladder  - BS Q8H and SC >400cc     Skin / Pressure injury  - Skin assessment on admission performed: abdominal incision with staple,   - Pressure Injury/Skin: OOB to chair, PT/OT  - nursing to monitor skin q Shift    Diet/Dysphagia:  - Diet Consistency: DASH, regular  - Nutrition consult    DVT prophylaxis:   - Heparin 5000u Q8H    Outpatient Follow-up:    Easton Carroll  Internal Medicine  [address] [place] [address]  Phone: [phone]  Fax: [phone]  Follow Up Time:     Francisco Dorsey  Gastroenterology  [address] [facility], [address]  [place] [address]  Phone: [phone]  Fax: [phone]  Follow Up Time    ---------------    Goals: Safe discharge to home  Estimated Length of Stay: 10-14 days  Rehab Potential: Good  Medical Prognosis: Good  Estimated Disposition: Home with home care      PRESCREEN COMPARISON:  I have reviewed the prescreen information and I have found no relevant changes between the preadmission screening and my post admission evaluation.    RATIONALE FOR INPATIENT ADMISSION: Patient demonstrates the following:  [X] Medically appropriate for rehabilitation admission  [X] Has attainable rehab goals with an appropriate initial discharge plan  [X]Has rehabilitation potential (expected to make a significant improvement within a reasonable period of time)  [X] Requires close medical management by a rehab physician, rehab nursing care, Hospitalist and comprehensive interdisciplinary team (including PT, OT and/or SLP, Prosthetics and Orthotics)     Assessment/Plan:  69 y/o M with PMH of sinus node disorder s/p PPM, HTN, HLD, cerebral aneurysm s/p coiling, CVA w/ residual L sided weakness, seizure disorder, type 2 diabetes on metformin and gastritis admitted to [facility] on 11/28 with acute cholecystitis s/p lap to open cholecystectomy on 11/30. Hospital Course complicated by hypotension (s/p pressors), acute blood loss anemia (s/p 1 unit PRBC), and bile duct leakage (s/p ERCP with stent on 12/5/23). Patient now admitted for a multidisciplinary rehab program.     Debility 2/2 acute cholecystitis   - complicated by bile duct leak (s/p ERCP with stent on 12/5)  - s/p 7 days of zosyn  - Impaired ADLs and mobility  - Need for assistance with personal care   - Start comprehensive rehab program of PT/OT - 3 hours a day, 5 days a week. P&O as needed   - Fall precautions  - monitor NIKHIL drain output  - outpatient GI f/u with Dr. Dorsey  - Repeat ERCP & stent removal in 4-8 weeks    COVID+  - patient tested positive for COVID on admission to rehab (12/8)  - isolation precautions  - monitor for symptoms    Acute Blood Loss Anemia (improved)  - s/p 1 unit PRBC transfusion  - monitor H/H    Seizure Disorder  - continue home carbamazepine 300 mg BID    h/o CVA/ HLD  - residual L sided weakness  - continue atorvastatin 80 mg QHS  - continue ASA 81 mg QD    HTN  - Home med: metoprolol 25 mg BID  - required pressors in ICU post-op  - stable off anti-hypertensives  - Monitor BP    T2DM  - last HbA1c 6.1  - low dose SSI  - Monitor fingersticks  - on metformin at home    Mood / Cognition  - Neuropsychology consult PRN    Sleep  - Maintain quiet hours and a low stim environment.   - Melatonin 6 mg QHS    GI / Bowel  - Senna qHS PRN  - Miralax Daily  - GI ppx: famotidine 20 mg QD     / Bladder  - BS Q8H and SC >400cc     Skin / Pressure injury  - Skin assessment on admission performed: abdominal incision with staple,   - Pressure Injury/Skin: OOB to chair, PT/OT  - nursing to monitor skin q Shift    Diet/Dysphagia:  - Diet Consistency: DASH, regular  - Nutrition consult    DVT prophylaxis:   - Heparin 5000u Q8H    Outpatient Follow-up:    Easton Carroll  Internal Medicine  [address] [place] [address]  Phone: [phone]  Fax: [phone]  Follow Up Time:     Francisco Dorsey  Gastroenterology  [address] [facility], [address]  [place] [address]  Phone: [phone]  Fax: [phone]  Follow Up Time    ---------------    Goals: Safe discharge to home  Estimated Length of Stay: 10-14 days  Rehab Potential: Good  Medical Prognosis: Good  Estimated Disposition: Home with home care      PRESCREEN COMPARISON:  I have reviewed the prescreen information and I have found no relevant changes between the preadmission screening and my post admission evaluation.    RATIONALE FOR INPATIENT ADMISSION: Patient demonstrates the following:  [X] Medically appropriate for rehabilitation admission  [X] Has attainable rehab goals with an appropriate initial discharge plan  [X]Has rehabilitation potential (expected to make a significant improvement within a reasonable period of time)  [X] Requires close medical management by a rehab physician, rehab nursing care, Hospitalist and comprehensive interdisciplinary team (including PT, OT and/or SLP, Prosthetics and Orthotics)     Assessment/Plan:  69 y/o M with PMH of sinus node disorder s/p PPM, HTN, HLD, cerebral aneurysm s/p coiling, CVA w/ residual L sided weakness, seizure disorder, type 2 diabetes on metformin and gastritis admitted to [facility] on 11/28 with acute cholecystitis s/p lap to open cholecystectomy on 11/30. Hospital Course complicated by hypotension (s/p pressors), acute blood loss anemia (s/p 1 unit PRBC), and bile duct leakage (s/p ERCP with stent on 12/5/23). Patient now admitted for a multidisciplinary rehab program.     Debility 2/2 acute cholecystitis   - complicated by bile duct leak (s/p ERCP with stent on 12/5)  - s/p 7 days of zosyn  - Impaired ADLs and mobility  - Need for assistance with personal care   - Start comprehensive rehab program of PT/OT - 3 hours a day, 5 days a week. P&O as needed   - Fall precautions  - monitor NIKHIL drain output  - outpatient GI f/u with Dr. Dorsey  - Repeat ERCP & stent removal in 4-8 weeks    COVID+  - patient tested positive for COVID on admission to rehab (12/7)  - isolation precautions  - monitor for symptoms    Acute Blood Loss Anemia (improved)  - s/p 1 unit PRBC transfusion  - monitor H/H    Seizure Disorder  - continue home carbamazepine 300 mg BID    h/o CVA/ HLD  - residual L sided weakness  - continue atorvastatin 80 mg QHS  - continue ASA 81 mg QD    HTN  - Home med: metoprolol 25 mg BID  - required pressors in ICU post-op  - stable off anti-hypertensives  - Monitor BP    T2DM  - last HbA1c 6.1  - low dose SSI  - Monitor fingersticks  - on metformin at home    Mood / Cognition  - Neuropsychology consult PRN    Sleep  - Maintain quiet hours and a low stim environment.   - Melatonin 6 mg QHS    GI / Bowel  - Senna qHS PRN  - Miralax Daily  - GI ppx: famotidine 20 mg QD     / Bladder  - BS Q8H and SC >400cc     Skin / Pressure injury  - Skin assessment on admission performed: abdominal incision with staple,   - Pressure Injury/Skin: OOB to chair, PT/OT  - nursing to monitor skin q Shift    Diet/Dysphagia:  - Diet Consistency: DASH, regular  - Nutrition consult    DVT prophylaxis:   - Heparin 5000u Q8H    Outpatient Follow-up:    Easton Carroll  Internal Medicine  [address] [place] [address]  Phone: [phone]  Fax: [phone]  Follow Up Time:     Francisco Dorsey  Gastroenterology  [address] [facility], [address]  [place] [address]  Phone: [phone]  Fax: [phone]  Follow Up Time    ---------------    Goals: Safe discharge to home  Estimated Length of Stay: 10-14 days  Rehab Potential: Good  Medical Prognosis: Good  Estimated Disposition: Home with home care      PRESCREEN COMPARISON:  I have reviewed the prescreen information and I have found no relevant changes between the preadmission screening and my post admission evaluation.    RATIONALE FOR INPATIENT ADMISSION: Patient demonstrates the following:  [X] Medically appropriate for rehabilitation admission  [X] Has attainable rehab goals with an appropriate initial discharge plan  [X]Has rehabilitation potential (expected to make a significant improvement within a reasonable period of time)  [X] Requires close medical management by a rehab physician, rehab nursing care, Hospitalist and comprehensive interdisciplinary team (including PT, OT and/or SLP, Prosthetics and Orthotics)     Assessment/Plan:  69 y/o M with PMH of sinus node disorder s/p PPM, HTN, HLD, cerebral aneurysm s/p coiling, CVA w/ residual L sided weakness, seizure disorder, type 2 diabetes on metformin and gastritis admitted to [facility] on 11/28 with acute cholecystitis s/p lap to open cholecystectomy on 11/30. Hospital Course complicated by hypotension (s/p pressors), acute blood loss anemia (s/p 1 unit PRBC), and bile duct leakage (s/p ERCP with stent on 12/5/23). Patient now admitted for a multidisciplinary rehab program.     Debility 2/2 acute cholecystitis   - complicated by bile duct leak (s/p ERCP with stent on 12/5)  - s/p 7 days of zosyn  - Impaired ADLs and mobility  - Need for assistance with personal care   - Start comprehensive rehab program of PT/OT - 3 hours a day, 5 days a week. P&O as needed   - Fall precautions  - monitor NIKHIL drain output  - outpatient GI f/u with Dr. Dorsey  - Repeat ERCP & stent removal in 4-8 weeks    COVID+  - patient tested positive for COVID on admission to rehab (12/7)  - isolation precautions  - monitor for symptoms    Acute Blood Loss Anemia (improved)  - s/p 1 unit PRBC transfusion  - monitor H/H    Seizure Disorder  - continue home carbamazepine 300 mg BID    h/o CVA/ HLD  - residual L sided weakness  - continue atorvastatin 80 mg QHS  - continue ASA 81 mg QD    HTN  - Home med: metoprolol 25 mg BID  - required pressors in ICU post-op  - stable off anti-hypertensives  - Monitor BP    T2DM  - last HbA1c 6.1  - low dose SSI  - Monitor fingersticks  - on metformin at home    Mood / Cognition  - Neuropsychology consult PRN    Sleep  - Maintain quiet hours and a low stim environment.   - Melatonin 6 mg QHS    GI / Bowel  - Senna qHS PRN  - Miralax Daily  - GI ppx: famotidine 20 mg QD     / Bladder  - BS Q8H and SC >400cc     Skin / Pressure injury  - Skin assessment on admission performed: abdominal incision with staple,   - Pressure Injury/Skin: OOB to chair, PT/OT  - nursing to monitor skin q Shift    Diet/Dysphagia:  - Diet Consistency: DASH, regular  - Nutrition consult    DVT prophylaxis:   - Heparin 5000u Q8H    Outpatient Follow-up:    Easton Carroll  Internal Medicine  [address] [place] [address]  Phone: [phone]  Fax: [phone]  Follow Up Time:     Francisco Dorsey  Gastroenterology  [address] [facility], [address]  [place] [address]  Phone: [phone]  Fax: [phone]  Follow Up Time    ---------------    Goals: Safe discharge to home  Estimated Length of Stay: 10-14 days  Rehab Potential: Good  Medical Prognosis: Good  Estimated Disposition: Home with home care      PRESCREEN COMPARISON:  I have reviewed the prescreen information and I have found no relevant changes between the preadmission screening and my post admission evaluation.    RATIONALE FOR INPATIENT ADMISSION: Patient demonstrates the following:  [X] Medically appropriate for rehabilitation admission  [X] Has attainable rehab goals with an appropriate initial discharge plan  [X]Has rehabilitation potential (expected to make a significant improvement within a reasonable period of time)  [X] Requires close medical management by a rehab physician, rehab nursing care, Hospitalist and comprehensive interdisciplinary team (including PT, OT and/or SLP, Prosthetics and Orthotics)     Assessment/Plan:  Mr. Benjamin Rios is a 68 year old right handed male patient with past medical history of sinus node disorder s/p PPM, HTN, HLD, cerebral aneurysm s/p coiling, CVA w/ residual L sided weakness, seizure disorder, type 2 diabetes on metformin and gastritis who is admitted for Acute Inpatient Rehabilitation with a multidisciplinary rehab program at [facility] with functional impairments in ADLs and mobility secondary to a complaint of worsening abdominal pain with imaging remarkable for distended gallbladder with wall thickening and pericholecystic fat stranding and hemangioma treated surgically with a lap to open cholecystectomy for acute cholecystitis on 11/30 complicated by drainage of a small amount of bile and a large amount of pus from gallbladder and subsequent requirement of ICU admission due to post-operative hypotension requiring pressors and acute blood loss anemia improved with transfusion of one unit of PRBCs. Additionally, patient was found to have bile duct leakage, requiring ERCP with stent on 12/5/23.      Debility 2/2 acute cholecystitis   - complicated by bile duct leak (s/p ERCP with stent on 12/5)  - s/p 7 days of zosyn  - S/P ICU for post-operative hypotension  - Impaired ADLs and mobility  - Need for assistance with personal care   - Start comprehensive rehab program of PT/OT - 3 hours a day, 5 days a week. P&O as needed   - Fall precautions  - monitor NIKHIL drain output  - outpatient GI f/u with Dr. Dorsey  - Repeat ERCP & stent removal in 4-8 weeks    COVID+  - patient tested positive for COVID on admission to rehab (12/7)  - isolation precautions  - monitor for symptoms    Acute Blood Loss Anemia (improved)  - s/p 1 unit PRBC transfusion  - monitor H/H    Seizure Disorder  - continue home carbamazepine 300 mg BID    h/o CVA/ HLD  - residual L sided weakness  - continue atorvastatin 80 mg QHS  - continue ASA 81 mg QD    HTN  - Home med: metoprolol 25 mg BID  - required pressors in ICU post-op  - stable off anti-hypertensives  - Monitor BP    T2DM  - last HbA1c 6.1  - low dose SSI  - Monitor fingersticks  - on metformin at home    Mood / Cognition  - Neuropsychology consult PRN    Sleep  - Maintain quiet hours and a low stim environment.   - Melatonin 6 mg QHS    GI / Bowel  - Senna qHS PRN  - Miralax Daily  - GI ppx: famotidine 20 mg QD     / Bladder  - BS Q8H and SC >400cc     Skin / Pressure injury  - Skin assessment on admission performed: abdominal incision with staple,   - Pressure Injury/Skin: OOB to chair, PT/OT  - nursing to monitor skin q Shift    Diet/Dysphagia:  - Diet Consistency: DASH, regular  - Nutrition consult    DVT prophylaxis:   - Heparin 5000u Q8H    Outpatient Follow-up:    Easton Carroll  Internal Medicine  [address] [place] [address]  Phone: [phone]  Fax: [phone]  Follow Up Time:     Francisco Dorsey  Gastroenterology  [address] [facility], [address]  [place] [address]  Phone: [phone]  Fax: [phone]  Follow Up Time    ---------------    Goals: Safe discharge to home  Estimated Length of Stay: 10-14 days  Rehab Potential: Good  Medical Prognosis: Good  Estimated Disposition: Home with home care      PRESCREEN COMPARISON:  I have reviewed the prescreen information and I have found no relevant changes between the preadmission screening and my post admission evaluation.    RATIONALE FOR INPATIENT ADMISSION: Patient demonstrates the following:  [X] Medically appropriate for rehabilitation admission  [X] Has attainable rehab goals with an appropriate initial discharge plan  [X]Has rehabilitation potential (expected to make a significant improvement within a reasonable period of time)  [X] Requires close medical management by a rehab physician, rehab nursing care, Hospitalist and comprehensive interdisciplinary team (including PT, OT and/or SLP, Prosthetics and Orthotics)     Assessment/Plan:  Mr. Benjamin Rios is a 68 year old right handed male patient with past medical history of sinus node disorder s/p PPM, HTN, HLD, cerebral aneurysm s/p coiling, CVA w/ residual L sided weakness, seizure disorder, type 2 diabetes on metformin and gastritis who is admitted for Acute Inpatient Rehabilitation with a multidisciplinary rehab program at [facility] with functional impairments in ADLs and mobility secondary to a complaint of worsening abdominal pain with imaging remarkable for distended gallbladder with wall thickening and pericholecystic fat stranding and hemangioma treated surgically with a lap to open cholecystectomy for acute cholecystitis on 11/30 complicated by drainage of a small amount of bile and a large amount of pus from gallbladder and subsequent requirement of ICU admission due to post-operative hypotension requiring pressors and acute blood loss anemia improved with transfusion of one unit of PRBCs. Additionally, patient was found to have bile duct leakage, requiring ERCP with stent on 12/5/23.      Debility 2/2 acute cholecystitis   - complicated by bile duct leak (s/p ERCP with stent on 12/5)  - s/p 7 days of zosyn  - S/P ICU for post-operative hypotension  - Impaired ADLs and mobility  - Need for assistance with personal care   - Start comprehensive rehab program of PT/OT - 3 hours a day, 5 days a week. P&O as needed   - Fall precautions  - monitor NIKHIL drain output  - outpatient GI f/u with Dr. Dorsey  - Repeat ERCP & stent removal in 4-8 weeks    COVID+  - patient tested positive for COVID on admission to rehab (12/7)  - isolation precautions  - monitor for symptoms    Acute Blood Loss Anemia (improved)  - s/p 1 unit PRBC transfusion  - monitor H/H    Seizure Disorder  - continue home carbamazepine 300 mg BID    h/o CVA/ HLD  - residual L sided weakness  - continue atorvastatin 80 mg QHS  - continue ASA 81 mg QD    HTN  - Home med: metoprolol 25 mg BID  - required pressors in ICU post-op  - stable off anti-hypertensives  - Monitor BP    T2DM  - last HbA1c 6.1  - low dose SSI  - Monitor fingersticks  - on metformin at home    Mood / Cognition  - Neuropsychology consult PRN    Sleep  - Maintain quiet hours and a low stim environment.   - Melatonin 6 mg QHS    GI / Bowel  - Senna qHS PRN  - Miralax Daily  - GI ppx: famotidine 20 mg QD     / Bladder  - BS Q8H and SC >400cc     Skin / Pressure injury  - Skin assessment on admission performed: abdominal incision with staple,   - Pressure Injury/Skin: OOB to chair, PT/OT  - nursing to monitor skin q Shift    Diet/Dysphagia:  - Diet Consistency: DASH, regular  - Nutrition consult    DVT prophylaxis:   - Heparin 5000u Q8H    Outpatient Follow-up:    Easton Carroll  Internal Medicine  [address] [place] [address]  Phone: [phone]  Fax: [phone]  Follow Up Time:     Francisco Dorsey  Gastroenterology  [address] [facility], [address]  [place] [address]  Phone: [phone]  Fax: [phone]  Follow Up Time    ---------------    Goals: Safe discharge to home  Estimated Length of Stay: 10-14 days  Rehab Potential: Good  Medical Prognosis: Good  Estimated Disposition: Home with home care      PRESCREEN COMPARISON:  I have reviewed the prescreen information and I have found no relevant changes between the preadmission screening and my post admission evaluation.    RATIONALE FOR INPATIENT ADMISSION: Patient demonstrates the following:  [X] Medically appropriate for rehabilitation admission  [X] Has attainable rehab goals with an appropriate initial discharge plan  [X]Has rehabilitation potential (expected to make a significant improvement within a reasonable period of time)  [X] Requires close medical management by a rehab physician, rehab nursing care, Hospitalist and comprehensive interdisciplinary team (including PT, OT and/or SLP, Prosthetics and Orthotics)

## 2023-12-07 NOTE — H&P ADULT - NSICDXPASTSURGICALHX_GEN_ALL_CORE_FT
PAST SURGICAL HISTORY:  Pacemaker Medtronic Serial [id number]/Model [id number]  Implant 6/29/07, new generator 12/23/2016    S/P bunionectomy     S/P cataract surgery with lens implants    S/P craniotomy for RCMA 1998    Status post coil embolization of cerebral aneurysm 9/2018     PAST SURGICAL HISTORY:  Pacemaker Med[employer]onic Serial [id number]/Model [id number]  Implant 6/29/07, new generator 12/23/2016    S/P bunionectomy     S/P cataract surgery with lens implants    S/P craniotomy for RCMA 1998    Status post coil embolization of cerebral aneurysm 9/2018

## 2023-12-07 NOTE — PATIENT PROFILE ADULT - NSPRESCRALCFREQ_GEN_A_NUR
prjuancarlos Junior MD Monthly or less

## 2023-12-07 NOTE — PROGRESS NOTE ADULT - SUBJECTIVE AND OBJECTIVE BOX
Patient is a 68-year-old male with PMHx of sinus node disorder s/p PPM, HTN, HLD, cerebral aneurysm s/p coiling, CVA w/ residual L sided weakness, seizure disorder, type 2 diabetes on metformin and gastritis admitted for acute cholecystitis s/p lap to open cholecystectomy 11/30, w/ ICU upgrade due to post-operative hypotension requiring pressors, downgraded to surgical floor and s/p ERCP on 12/5/23.     Overnight Events: None  Interval HPI: Patient seen and examined at bedside. Denies dizziness, headache, SOB, chest pain. Denies any questions at this time. Having BMs and passing gas. Tolerate clear liquids.     REVIEW OF SYSTEMS:  CONSTITUTIONAL: (-) weakness, (-) fevers, (-) chills  EYES/ENT: (-) visual changes,  (-) vertigo,  (-) throat pain   NECK:  (-) pain, (-) stiffness  RESPIRATORY:  (-) shortness of breath, (-) cough,  (-) wheezing,  (-) hemoptysis   CARDIOVASCULAR:  (-) chest pain, (-) palpitations  GASTROINTESTINAL:  (-) abdominal pain, (-) nausea, (-) vomiting, (-) diarrhea, (+) constipation, (-) melena,  (-) hematemesis,  (-) hematochezia  GENITOURINARY: (-) dysuria, (-) frequency, (-) hematuria  NEUROLOGICAL: (-) numbness, (-) weakness  SKIN: (-) itching, (-) rashes, (-) lesions      Vital Signs Last 24 Hrs      I&O's Summary          PHYSICAL EXAM:  GENERAL: NAD, lying in bed comfortably.   HEAD:  Atraumatic, Normocephalic  EYES: EOMI, conjunctiva and sclera clear, watery eyes  ENT: Moist mucous membranes  NECK: Supple, No JVD  CHEST/LUNG: Clear to auscultation bilaterally, good air entry bilaterally; No wheezing, rales, or rhonchi. Unlabored respirations  HEART: Regular rate and rhythm. S1 and S2. No murmurs, rubs, or gallops  ABDOMEN: Soft, nontender, Nondistended. Bowel sounds present.   NIKHIL drain #1 and #2 with serosang output, drain 2 mixed slightly with bile. Dressing clean and intact  EXTREMITIES:  2+ Peripheral Pulses. No clubbing, cyanosis, or edema  NERVOUS SYSTEM:  Alert & Oriented X3, speech clear. Left sided residual stroke weakness  SKIN: No rashes, bruises, or other lesions    MEDICATIONS:  MEDICATIONS  (STANDING):  aspirin enteric coated 81 milliGRAM(s) Oral daily  atorvastatin 80 milliGRAM(s) Oral at bedtime  carBAMazepine ER Tablet 300 milliGRAM(s) Oral two times a day  chlorhexidine 2% Cloths 1 Application(s) Topical <User Schedule>  dextrose 5%. 1000 milliLiter(s) (50 mL/Hr) IV Continuous <Continuous>  dextrose 5%. 1000 milliLiter(s) (100 mL/Hr) IV Continuous <Continuous>  dextrose 50% Injectable 25 Gram(s) IV Push once  dextrose 50% Injectable 12.5 Gram(s) IV Push once  dextrose 50% Injectable 25 Gram(s) IV Push once  famotidine    Tablet 20 milliGRAM(s) Oral daily  heparin   Injectable 5000 Unit(s) SubCutaneous every 8 hours  indomethacin Suppository 100 milliGRAM(s) Rectal once  insulin lispro (ADMELOG) corrective regimen sliding scale   SubCutaneous Before meals and at bedtime  latanoprost 0.005% Ophthalmic Solution 1 Drop(s) Both EYES at bedtime  melatonin 6 milliGRAM(s) Oral at bedtime  piperacillin/tazobactam IVPB.. 3.375 Gram(s) IV Intermittent every 8 hours  polyethylene glycol 3350 17 Gram(s) Oral daily    MEDICATIONS  (PRN):  aluminum hydroxide/magnesium hydroxide/simethicone Suspension 30 milliLiter(s) Oral every 6 hours PRN Dyspepsia  artificial  tears Solution 1 Drop(s) Both EYES two times a day PRN Dry Eyes  ondansetron Injectable 4 milliGRAM(s) IV Push every 6 hours PRN Nausea and/or Vomiting  senna 1 Tablet(s) Oral two times a day PRN Constipation      LABS: All Labs Reviewed:         RADIOLOGY/EKG:  All Imaging and EKGs Personally Reviewed     NM Hepatobiliary Imaging w/ RX (11.29.23 @ 14:20)   IMPRESSION: Abnormal morphine-augmented hepatobiliary scan.  Findings are compatible with acute cholecystitis.    US Abdomen Limited (11.28.23 @ 21:21)   IMPRESSION:  Distended thick-walled gallbladder containing sludge.  Borderline CBD   measuring 6 mm.  Findings equivocal for acute cholecystitis. If   clinically indicated, further evaluation with nuclear HIDA scan may be   obtained.    CT Abdomen and Pelvis No Cont (11.28.23 @ 16:52)   IMPRESSION:  Findings suspicious for acute cholecystitis. Correlate with patient's   symptoms. Consider right upper quadrant ultrasound if clinically   indicated.    CT Chest No Cont (11.28.23 @ 16:51)   IMPRESSION:  Findings suspicious for acute cholecystitis. Correlate with patient's   symptoms. Consider right upper quadrant ultrasound if clinically   indicated.    Xray Chest 1 View AP/PA (11.28.23 @ 15:49)   Impression:  No acute pulmonary disease.    Xray Chest 1 View- PORTABLE-Urgent (11.24.23 @ 03:18)   IMPRESSION:  No evidence of acute infiltrate or pleural effusion.    TTE Echo Complete w/o Contrast w/ Doppler (11.30.23 @ 11:17)   Summary:   1. Left ventricular ejection fraction, by visual estimation, is 65 to   70%.   2. Normal global left ventricular systolic function.   3. Normal left ventricular internal cavity size.   4. Mildly enlarged right ventricle.   5. Mild mitral valve regurgitation.   6. Thickening of the anterior mitral valve leaflet.   7. Mild tricuspid regurgitation.   8. Mild aortic regurgitation.    12 Lead ECG (11.28.23 @ 15:11)   Normal sinus rhythm  Voltage criteria for left ventricular hypertrophy  Abnormal ECG  When compared with ECG of 24-NOV-2023 01:26,  Sinus rhythm has replaced Electronic atrial pacemaker             Patient is a 68-year-old male with PMHx of sinus node disorder s/p PPM, HTN, HLD, cerebral aneurysm s/p coiling, CVA w/ residual L sided weakness, seizure disorder, type 2 diabetes on metformin and gastritis admitted for acute cholecystitis s/p lap to open cholecystectomy 11/30, w/ ICU upgrade due to post-operative hypotension requiring pressors, downgraded to surgical floor and s/p ERCP on 12/5/23.     Overnight Events: None  Interval HPI: Patient seen and examined at bedside. Denies dizziness, headache, SOB, chest pain. Denies any questions at this time. Having BMs and passing gas. Tolerate clear liquids.     REVIEW OF SYSTEMS:  CONSTITUTIONAL: (-) weakness, (-) fevers, (-) chills  EYES/ENT: (-) visual changes,  (-) vertigo,  (-) throat pain   NECK:  (-) pain, (-) stiffness  RESPIRATORY:  (-) shortness of breath, (-) cough,  (-) wheezing,  (-) hemoptysis   CARDIOVASCULAR:  (-) chest pain, (-) palpitations  GASTROINTESTINAL:  (-) abdominal pain, (-) nausea, (-) vomiting, (-) diarrhea, (+) constipation, (-) melena,  (-) hematemesis,  (-) hematochezia  GENITOURINARY: (-) dysuria, (-) frequency, (-) hematuria  NEUROLOGICAL: (-) numbness, (-) weakness  SKIN: (-) itching, (-) rashes, (-) lesions      Vital Signs Last 24 Hrs  T(C): 36.9 (07 Dec 2023 05:00), Max: 36.9 (07 Dec 2023 05:00)  T(F): 98.5 (07 Dec 2023 05:00), Max: 98.5 (07 Dec 2023 05:00)  HR: 64 (07 Dec 2023 05:00) (64 - 64)  BP: 126/59 (07 Dec 2023 05:00) (126/59 - 126/59)  BP(mean): 64 (07 Dec 2023 05:00) (64 - 64)  RR: 16 (07 Dec 2023 05:00) (16 - 16)  SpO2: 97% (07 Dec 2023 05:00) (97% - 97%)    Parameters below as of 07 Dec 2023 05:00  Patient On (Oxygen Delivery Method): room air      I&O's Summary    05 Dec 2023 07:01  -  06 Dec 2023 07:00  --------------------------------------------------------  IN: 175 mL / OUT: 1962 mL / NET: -1787 mL    06 Dec 2023 07:01  -  07 Dec 2023 06:55  --------------------------------------------------------  IN: 0 mL / OUT: 1735 mL / NET: -1735 mL      PHYSICAL EXAM:  GENERAL: NAD, lying in bed comfortably.   HEAD:  Atraumatic, Normocephalic  EYES: EOMI, conjunctiva and sclera clear, watery eyes  ENT: Moist mucous membranes  NECK: Supple, No JVD  CHEST/LUNG: Clear to auscultation bilaterally, good air entry bilaterally; No wheezing, rales, or rhonchi. Unlabored respirations  HEART: Regular rate and rhythm. S1 and S2. No murmurs, rubs, or gallops  ABDOMEN: Soft, nontender, Nondistended. Bowel sounds present.   NIKHIL drain #1 serous output and #2 with serosang output. Dressing clean and intact  EXTREMITIES:  2+ Peripheral Pulses. No clubbing, cyanosis, or edema  NERVOUS SYSTEM:  Alert & Oriented X3, speech clear. Left sided residual stroke weakness  SKIN: No rashes, bruises, or other lesions    MEDICATIONS:  MEDICATIONS  (STANDING):  aspirin enteric coated 81 milliGRAM(s) Oral daily  atorvastatin 80 milliGRAM(s) Oral at bedtime  carBAMazepine ER Tablet 300 milliGRAM(s) Oral two times a day  chlorhexidine 2% Cloths 1 Application(s) Topical <User Schedule>  dextrose 5%. 1000 milliLiter(s) (50 mL/Hr) IV Continuous <Continuous>  dextrose 5%. 1000 milliLiter(s) (100 mL/Hr) IV Continuous <Continuous>  dextrose 50% Injectable 25 Gram(s) IV Push once  dextrose 50% Injectable 12.5 Gram(s) IV Push once  dextrose 50% Injectable 25 Gram(s) IV Push once  famotidine    Tablet 20 milliGRAM(s) Oral daily  heparin   Injectable 5000 Unit(s) SubCutaneous every 8 hours  indomethacin Suppository 100 milliGRAM(s) Rectal once  insulin lispro (ADMELOG) corrective regimen sliding scale   SubCutaneous Before meals and at bedtime  latanoprost 0.005% Ophthalmic Solution 1 Drop(s) Both EYES at bedtime  melatonin 6 milliGRAM(s) Oral at bedtime  piperacillin/tazobactam IVPB.. 3.375 Gram(s) IV Intermittent every 8 hours  polyethylene glycol 3350 17 Gram(s) Oral daily    MEDICATIONS  (PRN):  aluminum hydroxide/magnesium hydroxide/simethicone Suspension 30 milliLiter(s) Oral every 6 hours PRN Dyspepsia  artificial  tears Solution 1 Drop(s) Both EYES two times a day PRN Dry Eyes  ondansetron Injectable 4 milliGRAM(s) IV Push every 6 hours PRN Nausea and/or Vomiting  senna 1 Tablet(s) Oral two times a day PRN Constipation      LABS: All Labs Reviewed:         RADIOLOGY/EKG:  All Imaging and EKGs Personally Reviewed     NM Hepatobiliary Imaging w/ RX (11.29.23 @ 14:20)   IMPRESSION: Abnormal morphine-augmented hepatobiliary scan.  Findings are compatible with acute cholecystitis.    US Abdomen Limited (11.28.23 @ 21:21)   IMPRESSION:  Distended thick-walled gallbladder containing sludge.  Borderline CBD   measuring 6 mm.  Findings equivocal for acute cholecystitis. If   clinically indicated, further evaluation with nuclear HIDA scan may be   obtained.    CT Abdomen and Pelvis No Cont (11.28.23 @ 16:52)   IMPRESSION:  Findings suspicious for acute cholecystitis. Correlate with patient's   symptoms. Consider right upper quadrant ultrasound if clinically   indicated.    CT Chest No Cont (11.28.23 @ 16:51)   IMPRESSION:  Findings suspicious for acute cholecystitis. Correlate with patient's   symptoms. Consider right upper quadrant ultrasound if clinically   indicated.    Xray Chest 1 View AP/PA (11.28.23 @ 15:49)   Impression:  No acute pulmonary disease.    Xray Chest 1 View- PORTABLE-Urgent (11.24.23 @ 03:18)   IMPRESSION:  No evidence of acute infiltrate or pleural effusion.    TTE Echo Complete w/o Contrast w/ Doppler (11.30.23 @ 11:17)   Summary:   1. Left ventricular ejection fraction, by visual estimation, is 65 to   70%.   2. Normal global left ventricular systolic function.   3. Normal left ventricular internal cavity size.   4. Mildly enlarged right ventricle.   5. Mild mitral valve regurgitation.   6. Thickening of the anterior mitral valve leaflet.   7. Mild tricuspid regurgitation.   8. Mild aortic regurgitation.    12 Lead ECG (11.28.23 @ 15:11)   Normal sinus rhythm  Voltage criteria for left ventricular hypertrophy  Abnormal ECG  When compared with ECG of 24-NOV-2023 01:26,  Sinus rhythm has replaced Electronic atrial pacemaker             Patient is a 68-year-old male with PMHx of sinus node disorder s/p PPM, HTN, HLD, cerebral aneurysm s/p coiling, CVA w/ residual L sided weakness, seizure disorder, type 2 diabetes on metformin and gastritis admitted for acute cholecystitis s/p lap to open cholecystectomy 11/30, w/ ICU upgrade due to post-operative hypotension requiring pressors, downgraded to surgical floor and s/p ERCP on 12/5/23.     Overnight Events: None  Interval HPI: Patient seen and examined at bedside. Denies dizziness, headache, SOB, chest pain. Denies any questions at this time. Having BMs and passing gas. Tolerate clear liquids.     REVIEW OF SYSTEMS:  CONSTITUTIONAL: (-) weakness, (-) fevers, (-) chills  EYES/ENT: (-) visual changes,  (-) vertigo,  (-) throat pain   NECK:  (-) pain, (-) stiffness  RESPIRATORY:  (-) shortness of breath, (-) cough,  (-) wheezing,  (-) hemoptysis   CARDIOVASCULAR:  (-) chest pain, (-) palpitations  GASTROINTESTINAL:  (-) abdominal pain, (-) nausea, (-) vomiting, (-) diarrhea, (+) constipation, (-) melena,  (-) hematemesis,  (-) hematochezia  GENITOURINARY: (-) dysuria, (-) frequency, (-) hematuria  NEUROLOGICAL: (-) numbness, (-) weakness  SKIN: (-) itching, (-) rashes, (-) lesions      Vital Signs Last 24 Hrs  T(C): 36.9 (07 Dec 2023 05:00), Max: 36.9 (07 Dec 2023 05:00)  T(F): 98.5 (07 Dec 2023 05:00), Max: 98.5 (07 Dec 2023 05:00)  HR: 64 (07 Dec 2023 05:00) (64 - 64)  BP: 126/59 (07 Dec 2023 05:00) (126/59 - 126/59)  BP(mean): 64 (07 Dec 2023 05:00) (64 - 64)  RR: 16 (07 Dec 2023 05:00) (16 - 16)  SpO2: 97% (07 Dec 2023 05:00) (97% - 97%)    Parameters below as of 07 Dec 2023 05:00  Patient On (Oxygen Delivery Method): room air      I&O's Summary  06 Dec 2023 07:01  -  07 Dec 2023 07:00  --------------------------------------------------------  IN: 0 mL / OUT: 1735 mL / NET: -1735 mL    PHYSICAL EXAM:  GENERAL: NAD, lying in bed comfortably.   HEAD:  Atraumatic, Normocephalic  EYES: EOMI, conjunctiva and sclera clear, watery eyes  ENT: Moist mucous membranes  NECK: Supple, No JVD  CHEST/LUNG: Clear to auscultation bilaterally, good air entry bilaterally; No wheezing, rales, or rhonchi. Unlabored respirations  HEART: Regular rate and rhythm. S1 and S2. No murmurs, rubs, or gallops  ABDOMEN: Soft, nontender, Nondistended. Bowel sounds present.   NIKHIL drain #1 serous output and #2 with serosang output. Dressing clean and intact  EXTREMITIES:  2+ Peripheral Pulses. No clubbing, cyanosis, or edema  NERVOUS SYSTEM:  Alert & Oriented X3, speech clear. Left sided residual stroke weakness  SKIN: No rashes, bruises, or other lesions    MEDICATIONS:  MEDICATIONS  (STANDING):  aspirin enteric coated 81 milliGRAM(s) Oral daily  atorvastatin 80 milliGRAM(s) Oral at bedtime  carBAMazepine ER Tablet 300 milliGRAM(s) Oral two times a day  chlorhexidine 2% Cloths 1 Application(s) Topical <User Schedule>  dextrose 5%. 1000 milliLiter(s) (50 mL/Hr) IV Continuous <Continuous>  dextrose 5%. 1000 milliLiter(s) (100 mL/Hr) IV Continuous <Continuous>  dextrose 50% Injectable 25 Gram(s) IV Push once  dextrose 50% Injectable 12.5 Gram(s) IV Push once  dextrose 50% Injectable 25 Gram(s) IV Push once  famotidine    Tablet 20 milliGRAM(s) Oral daily  heparin   Injectable 5000 Unit(s) SubCutaneous every 8 hours  indomethacin Suppository 100 milliGRAM(s) Rectal once  insulin lispro (ADMELOG) corrective regimen sliding scale   SubCutaneous Before meals and at bedtime  latanoprost 0.005% Ophthalmic Solution 1 Drop(s) Both EYES at bedtime  melatonin 6 milliGRAM(s) Oral at bedtime  piperacillin/tazobactam IVPB.. 3.375 Gram(s) IV Intermittent every 8 hours  polyethylene glycol 3350 17 Gram(s) Oral daily    MEDICATIONS  (PRN):  aluminum hydroxide/magnesium hydroxide/simethicone Suspension 30 milliLiter(s) Oral every 6 hours PRN Dyspepsia  artificial  tears Solution 1 Drop(s) Both EYES two times a day PRN Dry Eyes  ondansetron Injectable 4 milliGRAM(s) IV Push every 6 hours PRN Nausea and/or Vomiting  senna 1 Tablet(s) Oral two times a day PRN Constipation      LABS: All Labs Reviewed:         RADIOLOGY/EKG:  All Imaging and EKGs Personally Reviewed     NM Hepatobiliary Imaging w/ RX (11.29.23 @ 14:20)   IMPRESSION: Abnormal morphine-augmented hepatobiliary scan.  Findings are compatible with acute cholecystitis.    US Abdomen Limited (11.28.23 @ 21:21)   IMPRESSION:  Distended thick-walled gallbladder containing sludge.  Borderline CBD   measuring 6 mm.  Findings equivocal for acute cholecystitis. If   clinically indicated, further evaluation with nuclear HIDA scan may be   obtained.    CT Abdomen and Pelvis No Cont (11.28.23 @ 16:52)   IMPRESSION:  Findings suspicious for acute cholecystitis. Correlate with patient's   symptoms. Consider right upper quadrant ultrasound if clinically   indicated.    CT Chest No Cont (11.28.23 @ 16:51)   IMPRESSION:  Findings suspicious for acute cholecystitis. Correlate with patient's   symptoms. Consider right upper quadrant ultrasound if clinically   indicated.    Xray Chest 1 View AP/PA (11.28.23 @ 15:49)   Impression:  No acute pulmonary disease.    Xray Chest 1 View- PORTABLE-Urgent (11.24.23 @ 03:18)   IMPRESSION:  No evidence of acute infiltrate or pleural effusion.    TTE Echo Complete w/o Contrast w/ Doppler (11.30.23 @ 11:17)   Summary:   1. Left ventricular ejection fraction, by visual estimation, is 65 to   70%.   2. Normal global left ventricular systolic function.   3. Normal left ventricular internal cavity size.   4. Mildly enlarged right ventricle.   5. Mild mitral valve regurgitation.   6. Thickening of the anterior mitral valve leaflet.   7. Mild tricuspid regurgitation.   8. Mild aortic regurgitation.    12 Lead ECG (11.28.23 @ 15:11)   Normal sinus rhythm  Voltage criteria for left ventricular hypertrophy  Abnormal ECG  When compared with ECG of 24-NOV-2023 01:26,  Sinus rhythm has replaced Electronic atrial pacemaker             Patient is a 68-year-old male with PMHx of sinus node disorder s/p PPM, HTN, HLD, cerebral aneurysm s/p coiling, CVA w/ residual L sided weakness, seizure disorder, type 2 diabetes on metformin and gastritis admitted for acute cholecystitis s/p lap to open cholecystectomy 11/30, w/ ICU upgrade due to post-operative hypotension requiring pressors, downgraded to surgical floor and s/p ERCP on 12/5/23 for bile leak.     Overnight Events: None  Interval HPI: Patient seen and examined at bedside. Denies dizziness, headache, SOB, chest pain. Denies any questions at this time. Having BMs and passing gas. Tolerate clear liquids.     REVIEW OF SYSTEMS:  CONSTITUTIONAL: (-) weakness, (-) fevers, (-) chills  EYES/ENT: (-) visual changes,  (-) vertigo,  (-) throat pain   NECK:  (-) pain, (-) stiffness  RESPIRATORY:  (-) shortness of breath, (-) cough,  (-) wheezing,  (-) hemoptysis   CARDIOVASCULAR:  (-) chest pain, (-) palpitations  GASTROINTESTINAL:  (-) abdominal pain, (-) nausea, (-) vomiting, (-) diarrhea, (+) constipation, (-) melena,  (-) hematemesis,  (-) hematochezia  GENITOURINARY: (-) dysuria, (-) frequency, (-) hematuria  NEUROLOGICAL: (-) numbness, (-) weakness  SKIN: (-) itching, (-) rashes, (-) lesions    Vital Signs Last 24 Hrs  T(C): 36.9 (07 Dec 2023 05:00), Max: 36.9 (07 Dec 2023 05:00)  T(F): 98.5 (07 Dec 2023 05:00), Max: 98.5 (07 Dec 2023 05:00)  HR: 64 (07 Dec 2023 05:00) (64 - 64)  BP: 126/59 (07 Dec 2023 05:00) (126/59 - 126/59)  BP(mean): 64 (07 Dec 2023 05:00) (64 - 64)  RR: 16 (07 Dec 2023 05:00) (16 - 16)  SpO2: 97% (07 Dec 2023 05:00) (97% - 97%)    Parameters below as of 07 Dec 2023 05:00  Patient On (Oxygen Delivery Method): room air    I&O's Summary  06 Dec 2023 07:01  -  07 Dec 2023 07:00  --------------------------------------------------------  IN: 0 mL / OUT: 1735 mL / NET: -1735 mL    PHYSICAL EXAM:  GENERAL: NAD, lying in bed comfortably.   HEAD:  Atraumatic, Normocephalic  EYES: EOMI, conjunctiva and sclera clear, watery eyes  ENT: Moist mucous membranes  NECK: Supple, No JVD  CHEST/LUNG: Clear to auscultation bilaterally, good air entry bilaterally; No wheezing, rales, or rhonchi. Unlabored respirations  HEART: Regular rate and rhythm. S1 and S2. No murmurs, rubs, or gallops  ABDOMEN: Soft, nontender, Nondistended. Bowel sounds present.   NIKHIL drain #1 serous output and #2 with serosang output. Dressing clean and intact  EXTREMITIES:  2+ Peripheral Pulses. No clubbing, cyanosis, or edema  NERVOUS SYSTEM:  Alert & Oriented X3, speech clear. Left sided residual stroke weakness  SKIN: No rashes, bruises, or other lesions    MEDICATIONS:  MEDICATIONS  (STANDING):  aspirin enteric coated 81 milliGRAM(s) Oral daily  atorvastatin 80 milliGRAM(s) Oral at bedtime  carBAMazepine ER Tablet 300 milliGRAM(s) Oral two times a day  chlorhexidine 2% Cloths 1 Application(s) Topical <User Schedule>  dextrose 5%. 1000 milliLiter(s) (50 mL/Hr) IV Continuous <Continuous>  dextrose 5%. 1000 milliLiter(s) (100 mL/Hr) IV Continuous <Continuous>  dextrose 50% Injectable 25 Gram(s) IV Push once  dextrose 50% Injectable 12.5 Gram(s) IV Push once  dextrose 50% Injectable 25 Gram(s) IV Push once  famotidine    Tablet 20 milliGRAM(s) Oral daily  heparin   Injectable 5000 Unit(s) SubCutaneous every 8 hours  indomethacin Suppository 100 milliGRAM(s) Rectal once  insulin lispro (ADMELOG) corrective regimen sliding scale   SubCutaneous Before meals and at bedtime  latanoprost 0.005% Ophthalmic Solution 1 Drop(s) Both EYES at bedtime  melatonin 6 milliGRAM(s) Oral at bedtime  piperacillin/tazobactam IVPB.. 3.375 Gram(s) IV Intermittent every 8 hours  polyethylene glycol 3350 17 Gram(s) Oral daily    MEDICATIONS  (PRN):  aluminum hydroxide/magnesium hydroxide/simethicone Suspension 30 milliLiter(s) Oral every 6 hours PRN Dyspepsia  artificial  tears Solution 1 Drop(s) Both EYES two times a day PRN Dry Eyes  ondansetron Injectable 4 milliGRAM(s) IV Push every 6 hours PRN Nausea and/or Vomiting  senna 1 Tablet(s) Oral two times a day PRN Constipation    LABS: All Labs Reviewed:         RADIOLOGY/EKG:  All Imaging and EKGs Personally Reviewed     NM Hepatobiliary Imaging w/ RX (11.29.23 @ 14:20)   IMPRESSION: Abnormal morphine-augmented hepatobiliary scan.  Findings are compatible with acute cholecystitis.    US Abdomen Limited (11.28.23 @ 21:21)   IMPRESSION:  Distended thick-walled gallbladder containing sludge.  Borderline CBD   measuring 6 mm.  Findings equivocal for acute cholecystitis. If   clinically indicated, further evaluation with nuclear HIDA scan may be   obtained.    CT Abdomen and Pelvis No Cont (11.28.23 @ 16:52)   IMPRESSION:  Findings suspicious for acute cholecystitis. Correlate with patient's   symptoms. Consider right upper quadrant ultrasound if clinically   indicated.    CT Chest No Cont (11.28.23 @ 16:51)   IMPRESSION:  Findings suspicious for acute cholecystitis. Correlate with patient's   symptoms. Consider right upper quadrant ultrasound if clinically   indicated.    Xray Chest 1 View AP/PA (11.28.23 @ 15:49)   Impression:  No acute pulmonary disease.    Xray Chest 1 View- PORTABLE-Urgent (11.24.23 @ 03:18)   IMPRESSION:  No evidence of acute infiltrate or pleural effusion.    TTE Echo Complete w/o Contrast w/ Doppler (11.30.23 @ 11:17)   Summary:   1. Left ventricular ejection fraction, by visual estimation, is 65 to   70%.   2. Normal global left ventricular systolic function.   3. Normal left ventricular internal cavity size.   4. Mildly enlarged right ventricle.   5. Mild mitral valve regurgitation.   6. Thickening of the anterior mitral valve leaflet.   7. Mild tricuspid regurgitation.   8. Mild aortic regurgitation.    12 Lead ECG (11.28.23 @ 15:11)   Normal sinus rhythm  Voltage criteria for left ventricular hypertrophy  Abnormal ECG  When compared with ECG of 24-NOV-2023 01:26,  Sinus rhythm has replaced Electronic atrial pacemaker

## 2023-12-07 NOTE — PROGRESS NOTE ADULT - REASON FOR ADMISSION
Acute Cholecystitis
normal gait and station , no tenderness or deformities present
Acute Cholecystitis

## 2023-12-07 NOTE — H&P ADULT - NSHPSOCIALHISTORY_GEN_ALL_CORE
Smoking - denies  EtOH - denies  Drugs - denies    Patient resides alone in 2nd floor apartment, has 5 steps to enter building with elevator access to patient's apartment. Patient has a shower stall at home with grab bars and a comfort height toilet. Patient had CVA prior and has residual left side weakness. Patient has home health aide 6hours/7days that assisted with IADL's and ADL's occasionally    CURRENT FUNCTIONAL STATUS  Bed Mobility: maxA   Transfers: modA  Gait: Michelle Smoking - denies  EtOH - denies  Drugs - denies    Patient resides alone in 2nd floor apartment, has 5 steps to enter building with elevator access to patient's apartment. Patient has a shower stall at home with grab bars and a comfort height toilet. Patient had CVA prior and has residual left side weakness. Patient has home health aide 6hours/7days that assisted with IADL's and ADL's occasionally    CURRENT FUNCTIONAL STATUS  Bed Mobility: max A   Transfers: mod A  Gait: min A

## 2023-12-07 NOTE — H&P ADULT - HISTORY OF PRESENT ILLNESS
69 y/o M with PMH of sinus node disorder s/p PPM, HTN, HLD, cerebral aneurysm s/p coiling, CVA w/ residual L sided weakness, seizure disorder, type 2 diabetes on metformin and gastritis admitted to [facility] on 11/28 for worsening abdominal pain since 11/24. CT chest abd/pelvis showed distended gallbladder with gallbladder wall thickening and pericholecystic fat stranding and hemangioma in T7 vertebrae. Patient underwent lap to open cholecystectomy for acute cholecystitis on 11/30. Procedure was complicated by drainage of a small amount of bile and a large amount of pus from gallbladder. Patient was upgraded to ICU due to post-operative hypotension requiring pressors then downgraded to surgical floor. Hospital course further complicated by acute blood loss anemia, improved s/p 1 unit PRBCs. Patient also found to have bile duct leakage, requiring ERCP on 12/5/23. Patient requires follow up with GI and a follow up ERCP in 4-8 weeks. Patient evaluated by PT/OT and was recommended for acute inpatient rehab. Patient is medically stable for discharge to [facility] on 12/7.   69 y/o M with PMH of sinus node disorder s/p PPM, HTN, HLD, cerebral aneurysm s/p coiling, CVA w/ residual L sided weakness, seizure disorder, type 2 diabetes on metformin and gastritis admitted to [facility] on 11/28 for worsening abdominal pain since 11/24. CT chest abd/pelvis showed distended gallbladder with gallbladder wall thickening and pericholecystic fat stranding and hemangioma in T7 vertebrae. Patient underwent lap to open cholecystectomy for acute cholecystitis on 11/30. Procedure was complicated by drainage of a small amount of bile and a large amount of pus from gallbladder. Patient was upgraded to ICU due to post-operative hypotension requiring pressors then downgraded to surgical floor. Hospital course further complicated by acute blood loss anemia, improved s/p 1 unit PRBCs. Patient also found to have bile duct leakage, requiring ERCP on 12/5/23. Patient requires follow up with GI and a follow up ERCP in 4-8 weeks. Patient evaluated by PT/OT and was recommended for acute inpatient rehab. Patient is medically stable for discharge to [facility] on 12/7.   69 y/o M with PMH of sinus node disorder s/p PPM, HTN, HLD, cerebral aneurysm s/p coiling, CVA w/ residual L sided weakness, seizure disorder, type 2 diabetes on metformin and gastritis admitted to [facility] on 11/28 for worsening abdominal pain since 11/24. CT chest abd/pelvis showed distended gallbladder with gallbladder wall thickening and pericholecystic fat stranding and hemangioma in T7 vertebrae. Patient underwent lap to open cholecystectomy for acute cholecystitis on 11/30. Procedure was complicated by drainage of a small amount of bile and a large amount of pus from gallbladder. Patient was upgraded to ICU due to post-operative hypotension requiring pressors then downgraded to surgical floor. Hospital course further complicated by acute blood loss anemia, improved s/p 1 unit PRBCs. Patient also found to have bile duct leakage, requiring ERCP on 12/5/23. Patient requires follow up with GI and a follow up ERCP in 4-8 weeks. Patient evaluated by PT/OT and was recommended for acute inpatient rehab. Patient is medically stable for discharge to acute rehab on 12/7.   69 y/o M with PMH of sinus node disorder s/p PPM, HTN, HLD, cerebral aneurysm s/p coiling, CVA w/ residual L sided weakness, seizure disorder, type 2 diabetes on metformin and gastritis admitted to [facility] on 11/28 for worsening abdominal pain since 11/24. CT chest abd/pelvis showed distended gallbladder with gallbladder wall thickening and pericholecystic fat stranding and hemangioma in T7 vertebrae. Patient underwent lap to open cholecystectomy for acute cholecystitis on 11/30. Procedure was complicated by drainage of a small amount of bile and a large amount of pus from gallbladder. Patient was upgraded to ICU due to post-operative hypotension requiring pressors then downgraded to surgical floor. Hospital course further complicated by acute blood loss anemia, improved s/p 1 unit PRBCs. Patient also found to have bile duct leakage, requiring ERCP on 12/5/23. Patient requires follow up with GI and a follow up ERCP in 4-8 weeks. Patient evaluated by PT/OT and was recommended for acute inpatient rehab. Patient is medically stable for discharge to acute rehab on 12/7.   67 y/o M with PMH of sinus node disorder s/p PPM, HTN, HLD, cerebral aneurysm s/p coiling, CVA w/ residual L sided weakness, seizure disorder, type 2 diabetes on metformin and gastritis admitted to [facility] on 11/28 for worsening abdominal pain since 11/24. CT chest abd/pelvis showed distended gallbladder with gallbladder wall thickening and pericholecystic fat stranding and hemangioma in T7 vertebrae. Patient underwent lap to open cholecystectomy for acute cholecystitis on 11/30. Procedure was complicated by drainage of a small amount of bile and a large amount of pus from gallbladder. Patient was upgraded to ICU due to post-operative hypotension requiring pressors then downgraded to surgical floor. Hospital course further complicated by acute blood loss anemia, improved s/p 1 unit PRBCs. Patient also found to have bile duct leakage, requiring ERCP with stent on 12/5/23. Patient requires follow up with GI and a follow up ERCP in 4-8 weeks. Patient evaluated by PT/OT and was recommended for acute inpatient rehab. Patient is medically stable for discharge to acute rehab on 12/7.   69 y/o M with PMH of sinus node disorder s/p PPM, HTN, HLD, cerebral aneurysm s/p coiling, CVA w/ residual L sided weakness, seizure disorder, type 2 diabetes on metformin and gastritis admitted to [facility] on 11/28 for worsening abdominal pain since 11/24. CT chest abd/pelvis showed distended gallbladder with gallbladder wall thickening and pericholecystic fat stranding and hemangioma in T7 vertebrae. Patient underwent lap to open cholecystectomy for acute cholecystitis on 11/30. Procedure was complicated by drainage of a small amount of bile and a large amount of pus from gallbladder. Patient was upgraded to ICU due to post-operative hypotension requiring pressors then downgraded to surgical floor. Hospital course further complicated by acute blood loss anemia, improved s/p 1 unit PRBCs. Patient also found to have bile duct leakage, requiring ERCP with stent on 12/5/23. Patient requires follow up with GI and a follow up ERCP in 4-8 weeks. Patient evaluated by PT/OT and was recommended for acute inpatient rehab. Patient is medically stable for discharge to acute rehab on 12/7.   Mr. Benjamin Rios is a 68 year old right handed male patient with past medical history of sinus node disorder s/p PPM, HTN, HLD, cerebral aneurysm s/p coiling, CVA w/ residual L sided weakness, seizure disorder, type 2 diabetes on metformin and gastritis admitted to [facility] on 11/28 for worsening abdominal pain since 11/24. CT chest abd/pelvis showed distended gallbladder with gallbladder wall thickening and pericholecystic fat stranding and hemangioma in T7 vertebrae. Patient underwent lap to open cholecystectomy for acute cholecystitis on 11/30. Procedure was complicated by drainage of a small amount of bile and a large amount of pus from gallbladder. Patient was upgraded to ICU due to post-operative hypotension requiring pressors then downgraded to surgical floor. Hospital course further complicated by acute blood loss anemia, improved s/p 1 unit PRBCs. Patient also found to have bile duct leakage, requiring ERCP with stent on 12/5/23. Patient requires follow up with GI and a follow up ERCP in 4-8 weeks. Patient evaluated by PT/OT and was recommended for acute inpatient rehab. Patient is medically stable for discharge to acute rehab on 12/7.   Mr. Benjamin Rios is a 68 year old right handed male patient with past medical history of sinus node disorder s/p PPM, HTN, HLD, cerebral aneurysm s/p coiling, CVA w/ residual L sided weakness, seizure disorder, type 2 diabetes on metformin and gastritis admitted to [facility] on 11/28 for worsening abdominal pain since 11/24. CT chest abd/pelvis showed distended gallbladder with gallbladder wall thickening and pericholecystic fat stranding and hemangioma in T7 vertebrae. Patient underwent lap to open cholecystectomy for acute cholecystitis on 11/30. Procedure was complicated by drainage of a small amount of bile and a large amount of pus from gallbladder. Patient was upgraded to ICU due to post-operative hypotension requiring pressors then downgraded to surgical floor. Hospital course further complicated by acute blood loss anemia, improved s/p 1 unit PRBCs. Patient also found to have bile duct leakage, requiring ERCP with stent on 12/5/23. Patient requires follow up with GI and a follow up ERCP in 4-8 weeks. Patient evaluated by PT/OT and was recommended for acute inpatient rehab. Patient is medically stable for discharge to acute rehab on 12/7.

## 2023-12-07 NOTE — PROGRESS NOTE ADULT - ASSESSMENT
68 y/o Zimbabwean speaking male with PMH of sinus node disorder s/p PPM, HTN, HLD, cerebral aneurysm s/p coiling, CVA with residual L sided weaknes,   S/p lap converted to open cholecystectomy.   Post cholecystectomy syndrome   GI consult: Bile leak post cholecystectomy, had S/P ERCP with stent placement 12/5/23  68 y/o Kenyan speaking male with PMH of sinus node disorder s/p PPM, HTN, HLD, cerebral aneurysm s/p coiling, CVA with residual L sided weaknes,   S/p lap converted to open cholecystectomy.   Post cholecystectomy syndrome   GI consult: Bile leak post cholecystectomy, had S/P ERCP with stent placement 12/5/23  68 y/o Luxembourger speaking male with PMH of sinus node disorder s/p PPM, HTN, HLD, cerebral aneurysm s/p coiling, CVA with residual L sided weaknes,   S/p lap converted to open cholecystectomy.     GI consult: Bile leak post cholecystectomy, had S/P ERCP with stent placement 12/5/23  68 y/o Lebanese speaking male with PMH of sinus node disorder s/p PPM, HTN, HLD, cerebral aneurysm s/p coiling, CVA with residual L sided weaknes,   S/p lap converted to open cholecystectomy.     GI consult: Bile leak post cholecystectomy, had S/P ERCP with stent placement 12/5/23

## 2023-12-07 NOTE — PROGRESS NOTE ADULT - PROBLEM SELECTOR PLAN 5
Resolved  - Baseline Cr ~ 1.13 - Chronic  - At home on carbamazepine 300mg BID and lorazepam 0.5mg TID   - Switched to Keppra in ICU given NPO, discontinued  - Now back to home med carbamazepine 300mg BID - Chronic  - At home on carbamazepine 300mg BID and lorazepam 0.5mg TID PRN  - Switched to Keppra in ICU given NPO, discontinued  - Now back to home med carbamazepine 300mg BID

## 2023-12-07 NOTE — DISCHARGE NOTE NURSING/CASE MANAGEMENT/SOCIAL WORK - NSDCPEFALRISK_GEN_ALL_CORE
For information on Fall & Injury Prevention, visit: [email or web address] OR  [email or web address] OR  https://www.cdc.gov/steadi/patient.html For information on Fall & Injury Prevention, visit: [email or web address] OR  [email or web address] OR  https://www.cdc.gov/steadi/patient.html

## 2023-12-07 NOTE — DISCHARGE NOTE NURSING/CASE MANAGEMENT/SOCIAL WORK - PATIENT PORTAL LINK FT
You can access the FollowMyHealth Patient Portal offered by [facility] by registering at the following website: [email or web address]. By joining [employer]’s FollowMyHealth portal, you will also be able to view your health information using other applications (apps) compatible with our system. You can access the FollowMyHealth Patient Portal offered by [facility] by registering at the following website: [email or web address]. By joining [employer]’s FollowMyHealth portal, you will also be able to view your health information using other applications (apps) compatible with our system.

## 2023-12-07 NOTE — PROGRESS NOTE ADULT - PROBLEM SELECTOR PROBLEM 1
Bile duct leak
Bile duct leak
Acute cholecystitis

## 2023-12-07 NOTE — PROGRESS NOTE ADULT - PROVIDER SPECIALTY LIST ADULT
Critical Care
Critical Care
Gastroenterology
Surgery
Surgery
Critical Care
Critical Care
Family Medicine
Surgery
Surgery
Cardiology
Family Medicine
Gastroenterology
Critical Care
Critical Care
Family Medicine
Surgery
Family Medicine

## 2023-12-07 NOTE — H&P ADULT - NSICDXPASTMEDICALHX_GEN_ALL_CORE_FT
PAST MEDICAL HISTORY:  Aneurysm of [place]     Bradycardia     Cerebral aneurysm without rupture diagnosed 2017 had cerebral angio at [facility]    CVA (cerebral vascular accident) left arm weakness, no sensation in left side in 1998    Depression     Epilepsy last seizure 2016    Gastroesophageal reflux disease, esophagitis presence not specified     Glaucoma of both eyes, unspecified glaucoma type     HLD (hyperlipidemia)     Language barrier Kazakh speaking    Renal agenesis, unilateral     T2DM (type 2 diabetes mellitus)      PAST MEDICAL HISTORY:  Aneurysm of [place]     Bradycardia     Cerebral aneurysm without rupture diagnosed 2017 had cerebral angio at [facility]    CVA (cerebral vascular accident) left arm weakness, no sensation in left side in 1998    Depression     Epilepsy last seizure 2016    Gastroesophageal reflux disease, esophagitis presence not specified     Glaucoma of both eyes, unspecified glaucoma type     HLD (hyperlipidemia)     Language barrier Kazakh speaking    Renal agenesis, unilateral     T2DM (type 2 diabetes mellitus)

## 2023-12-07 NOTE — H&P ADULT - NS_MD_PANP_GEN_ALL_CORE
Attending and PA/NP shared services statement (NON-critical care): Same Histology In Subsequent Stages Text: The pattern and morphology of the tumor is as described in the first stage.

## 2023-12-07 NOTE — PROGRESS NOTE ADULT - ASSESSMENT
Patient is a 68-year-old male with PMHx of sinus node disorder s/p PPM, HTN, HLD, cerebral aneurysm s/p coiling, CVA w/ residual L sided weakness, seizure disorder, type 2 diabetes on metformin and gastritis admitted for acute cholecystitis s/p lap to open cholecystectomy 11/30, w/ ICU upgrade due to post-operative hypotension requiring pressors, downgraded to surgical floor, and ERCP on 12/5/23.     Discussed with attending, Dr. Dorman Patient is a 68-year-old male with PMHx of sinus node disorder s/p PPM, HTN, HLD, cerebral aneurysm s/p coiling, CVA w/ residual L sided weakness, seizure disorder, type 2 diabetes on metformin and gastritis admitted for acute cholecystitis s/p lap to open cholecystectomy 11/30, w/ ICU upgrade due to post-operative hypotension requiring pressors, downgraded to surgical floor, and ERCP on 12/5/23.     Discussed with attending, Dr. Berg Patient is a 68-year-old male with PMHx of sinus node disorder s/p PPM, HTN, HLD, cerebral aneurysm s/p coiling, CVA w/ residual L sided weakness, seizure disorder, type 2 diabetes on metformin and gastritis admitted for acute cholecystitis s/p lap to open cholecystectomy 11/30, w/ ICU upgrade due to post-operative hypotension requiring pressors, downgraded to surgical floor, and ERCP on 12/5/23 for bile leak.     Discussed with attending, Dr. Berg

## 2023-12-07 NOTE — PROGRESS NOTE ADULT - PROBLEM SELECTOR PLAN 10
- hepatin 5000 subq q12
- hepatin 5000 subq q12
DVT/VTE Prophylaxis: heparin 5000u subcut Q8h retarted  SCDs

## 2023-12-07 NOTE — H&P ADULT - NSHPREVIEWOFSYSTEMS_GEN_ALL_CORE
Constitutional: No fever, No Chills, No fatigue  HEENT: No eye pain, No visual disturbances, No difficulty hearing  Pulm: No cough, No shortness of breath  Cardio: No chest pain, No palpitations  GI:  + abdominal pain, No nausea, No vomiting, No diarrhea, No constipation  : No dysuria, No frequency, No hematuria  Neuro: No headaches, No memory loss, + loss of strength, no numbness, No tremors  Skin: No itching, No rashes, No lesions   Endo: No temperature intolerance  MSK: No joint pain, No joint swelling, No muscle pain, No Neck or back pain, left foot drop  Psych:  No depression, + anxiety

## 2023-12-07 NOTE — H&P ADULT - NSHPPHYSICALEXAM_GEN_ALL_CORE
Gen - NAD, Comfortable  HEENT - NCAT, EOMI, MMM  Neck - Supple, No limited ROM  Pulm - CTAB, No wheeze, No rhonchi, No crackles  Cardiovascular - RRR, S1S2  Abdomen - + tender, +BS, NIKHIL drain x 2   Extremities - No clubbing, no cyanosis, no peripheral edema, no calf tenderness  Neuro-     Cognitive - Awake, Alert, Oriented  to self, place, date - with options. Able to follow command     Communication - Fluent, Comprehensible     Attention: Intact      Memory: recalled 2/3 items     Cranial Nerves - CN 2-12 intact.     Motor -                     LEFT    UE - 4/5                    RIGHT UE - 5/5                    LEFT    LE - HF 5/5, KE 5/5, DF/PF 2/5- left foot drop                    RIGHT LE -  5/5        Sensory - Intact to LT     Reflexes - DTR Intact, No primitive reflexive     Tone - normal  Psychiatric - Mood stable, Affect WNL  Skin: abdominal incision with staple

## 2023-12-07 NOTE — PROGRESS NOTE ADULT - ASSESSMENT
68 y/o Macedonian speaking male with PMH of sinus node disorder s/p PPM, HTN, HLD, DM2 Gastritis, cerebral aneurysm s/p coiling, CVA with residual L sided weakness, and seizure disorder who is s/p laparoscopic converted to open cholecystectomy for acute gangrenous cholecystitis. Also s/p CBD stent 2/2 cystic duct leak.    Plan:  - continue regular diet  - NIKHIL output decreasing apprpriately  - continue DVT ppx  - no need for further abx  -ISS for glucose control  -continue home anti-seizure meds  - patient recommended for acute inpatient rehab    Surgery  [phone] 66 y/o Romansh speaking male with PMH of sinus node disorder s/p PPM, HTN, HLD, DM2 Gastritis, cerebral aneurysm s/p coiling, CVA with residual L sided weakness, and seizure disorder who is s/p laparoscopic converted to open cholecystectomy for acute gangrenous cholecystitis. Also s/p CBD stent 2/2 cystic duct leak.    Plan:  - continue regular diet  - NIKHIL output decreasing apprpriately  - continue DVT ppx  - no need for further abx  -ISS for glucose control  -continue home anti-seizure meds  - patient recommended for acute inpatient rehab    Surgery  [id number]

## 2023-12-07 NOTE — PROGRESS NOTE ADULT - ATTENDING COMMENTS
Briefly this is a 68 year old male with past medical history of sinus node dysfunction s/p PPM, HTN, HLD, cerebral aneurysm s/p coiling, CVA w/ left sided weakness, seizure disorder, type 2 diabetes, gastritis, who presented on 11/28 with abd pain and was admitted for acute cholecystitis. He underwent lap converted to open cholecystectomy on 11/30 with post-op complicated by hypotension requiring pressors. He was downgraded to [facility] with course further complicated by bile leak s/p ERCP and stent placement on 12/5.     [id number]  Patient reports feeling well, no N/V/abdominal pain. Had BM this morning. He is tolerating his diet.   On exam he is well appearing. RUQ incision with staples in place healing well, no evidence of infection, 2JP drain with serosanguinous fluids.     Labs and imaging personally reviewed. H/H stable. Replete potassium and phos.   Agree with plan as above.  Patient is doing well post-op/ERCP. Discussed with surgery team, cleared for discharge to acute inpatient rehab today. Briefly this is a 68 year old male with past medical history of sinus node dysfunction s/p PPM, HTN, HLD, cerebral aneurysm s/p coiling, CVA w/ left sided weakness, seizure disorder, type 2 diabetes, gastritis, who presented on 11/28 with abd pain and was admitted for acute cholecystitis. He underwent lap converted to open cholecystectomy on 11/30 with post-op complicated by hypotension requiring pressors. He was downgraded to [facility] with course further complicated by bile leak s/p ERCP and stent placement on 12/5.     [id number]  Patient reports feeling well, no N/V/abdominal pain. Had BM this morning. He is tolerating his diet.   On exam he is well appearing. RUQ incision with staples in place healing well, no evidence of infection, 2JP drain with serosanguinous fluids.     Labs and imaging personally reviewed. H/H stable. Replete potassium and phos.   Agree with plan as above.  Patient is doing well post-op/ERCP. Discussed with surgery team, cleared for discharge to acute inpatient rehab today.

## 2023-12-07 NOTE — PROGRESS NOTE ADULT - PROBLEM SELECTOR PLAN 6
- Chronic   - At home on metoprolol 25mg BID   - Was in ICU for post-op hypotension, now off pressors  - Maintain MAP > 65  - Continue to monitor vitals Resolved  - Baseline Cr ~ 1.13

## 2023-12-07 NOTE — PROGRESS NOTE ADULT - SUBJECTIVE AND OBJECTIVE BOX
Overnight Events:  No acute events overnight    SUBJECTIVE:  Reports no pain  Denies nausea, vomiting  Denies passing gas or having bowel movements yesterday but was doing well previously  Tolerating diet      OBJECTIVE:  Vital Signs Last 24 Hrs  T(C): 36.9 (07 Dec 2023 05:00), Max: 36.9 (07 Dec 2023 05:00)  T(F): 98.5 (07 Dec 2023 05:00), Max: 98.5 (07 Dec 2023 05:00)  HR: 64 (07 Dec 2023 05:00) (64 - 64)  BP: 126/59 (07 Dec 2023 05:00) (126/59 - 126/59)  BP(mean): 64 (07 Dec 2023 05:00) (64 - 64)  RR: 16 (07 Dec 2023 05:00) (16 - 16)  SpO2: 97% (07 Dec 2023 05:00) (97% - 97%)    Parameters below as of 07 Dec 2023 05:00  Patient On (Oxygen Delivery Method): room air          Physical Examination:  GEN: NAD, resting quietly  NEURO: AAOx3, CN II-XII grossly intact, no focal deficits  PULM: symmetric chest rise bilaterally, no increased WOB  ABD: soft, nd, nttp, no guarding, incisions c/d/i, dressing c/d/i, both NIKHIL drains with serosang  EXTR: no lower extremity edema, moving all extremities    LABS:                        8.8    8.92  )-----------( 308      ( 07 Dec 2023 07:44 )             26.5       12-06    142  |  108  |  16  ----------------------------<  126<H>  3.9   |  22  |  1.26    Ca    7.6<L>      06 Dec 2023 06:04  Phos  2.6     12-06  Mg     2.5     12-06    TPro  5.9<L>  /  Alb  1.7<L>  /  TBili  0.3  /  DBili  x   /  AST  41<H>  /  ALT  35  /  AlkPhos  99  12-06

## 2023-12-07 NOTE — PROGRESS NOTE ADULT - SUBJECTIVE AND OBJECTIVE BOX
POD # 6    Remains afebrile   WBC and LFT's normal  Wounds clean  Minimal drainage since yesterdays ERCP/stent  Tolerating PO    PLAN: Remove drains in am

## 2023-12-07 NOTE — PROGRESS NOTE ADULT - SUBJECTIVE AND OBJECTIVE BOX
INTERVAL HPI/OVERNIGHT EVENTS:  Patient seen and examined at bed side. No event over night. He is tolerating diet had 1 BM this morning.     MEDICATIONS  (STANDING):  aspirin enteric coated 81 milliGRAM(s) Oral daily  atorvastatin 80 milliGRAM(s) Oral at bedtime  carBAMazepine ER Tablet 300 milliGRAM(s) Oral two times a day  chlorhexidine 2% Cloths 1 Application(s) Topical <User Schedule>  dextrose 5%. 1000 milliLiter(s) (50 mL/Hr) IV Continuous <Continuous>  dextrose 5%. 1000 milliLiter(s) (100 mL/Hr) IV Continuous <Continuous>  dextrose 50% Injectable 12.5 Gram(s) IV Push once  dextrose 50% Injectable 25 Gram(s) IV Push once  dextrose 50% Injectable 25 Gram(s) IV Push once  famotidine    Tablet 20 milliGRAM(s) Oral daily  heparin   Injectable 5000 Unit(s) SubCutaneous every 8 hours  indomethacin Suppository 100 milliGRAM(s) Rectal once  insulin lispro (ADMELOG) corrective regimen sliding scale   SubCutaneous Before meals and at bedtime  latanoprost 0.005% Ophthalmic Solution 1 Drop(s) Both EYES at bedtime  melatonin 6 milliGRAM(s) Oral at bedtime  polyethylene glycol 3350 17 Gram(s) Oral daily  potassium chloride    Tablet ER 20 milliEquivalent(s) Oral once  potassium phosphate / sodium phosphate Powder (PHOS-NaK) 1 Packet(s) Oral once    MEDICATIONS  (PRN):  aluminum hydroxide/magnesium hydroxide/simethicone Suspension 30 milliLiter(s) Oral every 6 hours PRN Dyspepsia  artificial  tears Solution 1 Drop(s) Both EYES two times a day PRN Dry Eyes  ondansetron Injectable 4 milliGRAM(s) IV Push every 6 hours PRN Nausea and/or Vomiting  senna 1 Tablet(s) Oral two times a day PRN Constipation      Allergies    No Known Allergies    Intolerances              Vital Signs Last 24 Hrs  T(C): 36.9 (07 Dec 2023 05:00), Max: 36.9 (07 Dec 2023 05:00)  T(F): 98.5 (07 Dec 2023 05:00), Max: 98.5 (07 Dec 2023 05:00)  HR: 64 (07 Dec 2023 05:00) (64 - 64)  BP: 126/59 (07 Dec 2023 05:00) (126/59 - 126/59)  BP(mean): 64 (07 Dec 2023 05:00) (64 - 64)  RR: 16 (07 Dec 2023 05:00) (16 - 16)  SpO2: 97% (07 Dec 2023 05:00) (97% - 97%)    Parameters below as of 07 Dec 2023 05:00  Patient On (Oxygen Delivery Method): room air        PHYSICAL EXAM:    Constitutional: NAD, well-developed  Neck: No LAD, supple  Respiratory: No SOB No distress   Cardiovascular: S1 and S2  Gastrointestinal: Soft, appropriate tender, non distended + BS Staples intact, 2 NIKHIL to SS with SS drainage    Extremities: No peripheral edema, neg clubbing, cyanosis  Neurological: A/O   Skin: No rashes      LABS:                        8.8    8.92  )-----------( 308      ( 07 Dec 2023 07:44 )             26.5     12-07    140  |  106  |  16  ----------------------------<  134<H>  3.4<L>   |  22  |  1.09    Ca    7.4<L>      07 Dec 2023 07:44  Phos  2.2     12-07  Mg     2.6     12-07    TPro  5.9<L>  /  Alb  1.7<L>  /  TBili  0.3  /  DBili  x   /  AST  44<H>  /  ALT  42  /  AlkPhos  110  12-07      Urinalysis Basic - ( 07 Dec 2023 07:44 )    Color: x / Appearance: x / SG: x / pH: x  Gluc: 134 mg/dL / Ketone: x  / Bili: x / Urobili: x   Blood: x / Protein: x / Nitrite: x   Leuk Esterase: x / RBC: x / WBC x   Sq Epi: x / Non Sq Epi: x / Bacteria: x      LIVER FUNCTIONS - ( 07 Dec 2023 07:44 )  Alb: 1.7 g/dL / Pro: 5.9 g/dL / ALK PHOS: 110 U/L / ALT: 42 U/L / AST: 44 U/L / GGT: x             RADIOLOGY & ADDITIONAL TESTS:   GI Follow up    Patient seen and examined at bedside. No event over night. He is tolerating diet and had 1 BM this morning.    Terence #[id number]    MEDICATIONS  (STANDING):  aspirin enteric coated 81 milliGRAM(s) Oral daily  atorvastatin 80 milliGRAM(s) Oral at bedtime  carBAMazepine ER Tablet 300 milliGRAM(s) Oral two times a day  chlorhexidine 2% Cloths 1 Application(s) Topical <User Schedule>  dextrose 5%. 1000 milliLiter(s) (50 mL/Hr) IV Continuous <Continuous>  dextrose 5%. 1000 milliLiter(s) (100 mL/Hr) IV Continuous <Continuous>  dextrose 50% Injectable 12.5 Gram(s) IV Push once  dextrose 50% Injectable 25 Gram(s) IV Push once  dextrose 50% Injectable 25 Gram(s) IV Push once  famotidine    Tablet 20 milliGRAM(s) Oral daily  heparin   Injectable 5000 Unit(s) SubCutaneous every 8 hours  indomethacin Suppository 100 milliGRAM(s) Rectal once  insulin lispro (ADMELOG) corrective regimen sliding scale   SubCutaneous Before meals and at bedtime  latanoprost 0.005% Ophthalmic Solution 1 Drop(s) Both EYES at bedtime  melatonin 6 milliGRAM(s) Oral at bedtime  polyethylene glycol 3350 17 Gram(s) Oral daily  potassium chloride    Tablet ER 20 milliEquivalent(s) Oral once  potassium phosphate / sodium phosphate Powder (PHOS-NaK) 1 Packet(s) Oral once    MEDICATIONS  (PRN):  aluminum hydroxide/magnesium hydroxide/simethicone Suspension 30 milliLiter(s) Oral every 6 hours PRN Dyspepsia  artificial  tears Solution 1 Drop(s) Both EYES two times a day PRN Dry Eyes  ondansetron Injectable 4 milliGRAM(s) IV Push every 6 hours PRN Nausea and/or Vomiting  senna 1 Tablet(s) Oral two times a day PRN Constipation      Allergies    No Known Allergies    Intolerances              Vital Signs Last 24 Hrs  T(C): 36.9 (07 Dec 2023 05:00), Max: 36.9 (07 Dec 2023 05:00)  T(F): 98.5 (07 Dec 2023 05:00), Max: 98.5 (07 Dec 2023 05:00)  HR: 64 (07 Dec 2023 05:00) (64 - 64)  BP: 126/59 (07 Dec 2023 05:00) (126/59 - 126/59)  BP(mean): 64 (07 Dec 2023 05:00) (64 - 64)  RR: 16 (07 Dec 2023 05:00) (16 - 16)  SpO2: 97% (07 Dec 2023 05:00) (97% - 97%)    Parameters below as of 07 Dec 2023 05:00  Patient On (Oxygen Delivery Method): room air        PHYSICAL EXAM:    Constitutional: NAD, well-developed  HEENT: EOMI. Anicteric sclera.  Respiratory: clear  Cardiovascular: S1 and S2  Gastrointestinal: Soft, appropriate tender, non distended + BS Staples intact, 2 NIKHIL to SS with SS drainage    Extremities: No peripheral edema, neg clubbing, cyanosis  Neurological: A/O   Skin: No rashes      LABS:                        8.8    8.92  )-----------( 308      ( 07 Dec 2023 07:44 )             26.5     12-07    140  |  106  |  16  ----------------------------<  134<H>  3.4<L>   |  22  |  1.09    Ca    7.4<L>      07 Dec 2023 07:44  Phos  2.2     12-07  Mg     2.6     12-07    TPro  5.9<L>  /  Alb  1.7<L>  /  TBili  0.3  /  DBili  x   /  AST  44<H>  /  ALT  42  /  AlkPhos  110  12-07      Urinalysis Basic - ( 07 Dec 2023 07:44 )    Color: x / Appearance: x / SG: x / pH: x  Gluc: 134 mg/dL / Ketone: x  / Bili: x / Urobili: x   Blood: x / Protein: x / Nitrite: x   Leuk Esterase: x / RBC: x / WBC x   Sq Epi: x / Non Sq Epi: x / Bacteria: x      LIVER FUNCTIONS - ( 07 Dec 2023 07:44 )  Alb: 1.7 g/dL / Pro: 5.9 g/dL / ALK PHOS: 110 U/L / ALT: 42 U/L / AST: 44 U/L / GGT: x          GI Follow up    Patient seen and examined at bedside. No event over night. He is tolerating diet and had 1 BM this morning.    Terence #[id number]    MEDICATIONS  (STANDING):  aspirin enteric coated 81 milliGRAM(s) Oral daily  atorvastatin 80 milliGRAM(s) Oral at bedtime  carBAMazepine ER Tablet 300 milliGRAM(s) Oral two times a day  chlorhexidine 2% Cloths 1 Application(s) Topical <User Schedule>  dextrose 5%. 1000 milliLiter(s) (50 mL/Hr) IV Continuous <Continuous>  dextrose 5%. 1000 milliLiter(s) (100 mL/Hr) IV Continuous <Continuous>  dextrose 50% Injectable 12.5 Gram(s) IV Push once  dextrose 50% Injectable 25 Gram(s) IV Push once  dextrose 50% Injectable 25 Gram(s) IV Push once  famotidine    Tablet 20 milliGRAM(s) Oral daily  heparin   Injectable 5000 Unit(s) SubCutaneous every 8 hours  indomethacin Suppository 100 milliGRAM(s) Rectal once  insulin lispro (ADMELOG) corrective regimen sliding scale   SubCutaneous Before meals and at bedtime  latanoprost 0.005% Ophthalmic Solution 1 Drop(s) Both EYES at bedtime  melatonin 6 milliGRAM(s) Oral at bedtime  polyethylene glycol 3350 17 Gram(s) Oral daily  potassium chloride    Tablet ER 20 milliEquivalent(s) Oral once  potassium phosphate / sodium phosphate Powder (PHOS-NaK) 1 Packet(s) Oral once    MEDICATIONS  (PRN):  aluminum hydroxide/magnesium hydroxide/simethicone Suspension 30 milliLiter(s) Oral every 6 hours PRN Dyspepsia  artificial  tears Solution 1 Drop(s) Both EYES two times a day PRN Dry Eyes  ondansetron Injectable 4 milliGRAM(s) IV Push every 6 hours PRN Nausea and/or Vomiting  senna 1 Tablet(s) Oral two times a day PRN Constipation      Allergies    No Known Allergies    Intolerances              Vital Signs Last 24 Hrs  T(C): 36.9 (07 Dec 2023 05:00), Max: 36.9 (07 Dec 2023 05:00)  T(F): 98.5 (07 Dec 2023 05:00), Max: 98.5 (07 Dec 2023 05:00)  HR: 64 (07 Dec 2023 05:00) (64 - 64)  BP: 126/59 (07 Dec 2023 05:00) (126/59 - 126/59)  BP(mean): 64 (07 Dec 2023 05:00) (64 - 64)  RR: 16 (07 Dec 2023 05:00) (16 - 16)  SpO2: 97% (07 Dec 2023 05:00) (97% - 97%)    Parameters below as of 07 Dec 2023 05:00  Patient On (Oxygen Delivery Method): room air        PHYSICAL EXAM:    Constitutional: NAD, well-developed  HEENT: EOMI. Anicteric sclera.  Respiratory: clear  Cardiovascular: S1 and S2  Gastrointestinal: Soft, appropriate tender, non distended + BS Staples intact, 2 NIKHIL to SS with SS drainage    Extremities: No peripheral edema, neg clubbing, cyanosis  Neurological: A/O   Skin: No rashes      LABS:                        8.8    8.92  )-----------( 308      ( 07 Dec 2023 07:44 )             26.5     12-07    140  |  106  |  16  ----------------------------<  134<H>  3.4<L>   |  22  |  1.09    Ca    7.4<L>      07 Dec 2023 07:44  Phos  2.2     12-07  Mg     2.6     12-07    TPro  5.9<L>  /  Alb  1.7<L>  /  TBili  0.3  /  DBili  x   /  AST  44<H>  /  ALT  42  /  AlkPhos  110  12-07      Urinalysis Basic - ( 07 Dec 2023 07:44 )    Color: x / Appearance: x / SG: x / pH: x  Gluc: 134 mg/dL / Ketone: x  / Bili: x / Urobili: x   Blood: x / Protein: x / Nitrite: x   Leuk Esterase: x / RBC: x / WBC x   Sq Epi: x / Non Sq Epi: x / Bacteria: x      LIVER FUNCTIONS - ( 07 Dec 2023 07:44 )  Alb: 1.7 g/dL / Pro: 5.9 g/dL / ALK PHOS: 110 U/L / ALT: 42 U/L / AST: 44 U/L / GGT: x

## 2023-12-07 NOTE — PROGRESS NOTE ADULT - PROBLEM SELECTOR PLAN 1
- S/p lap to open cholecystectomy w/ suppurative GB   - S/p ERCP with  stent placed with GI 12/5/23  - leukocytosis improved  - Appreciate Surg recs - clear for dc to [facility]  - Appreciate GI recs  - upgrade to DASH diet today, monitor tolerance overnight  - continue to monitor NIKHIL drain output  - completed 7 days of zosyn,d/c'd. observe off abx  - C/w incentive spirometry, antiemetics and pain regimen PRN  - Out patient GI F/U with Dr. Dorsey  - Repeat ERCP in 4-8 week.  - Acute rehab planning today possible - S/p lap to open cholecystectomy w/ suppurative GB   - S/p ERCP with  stent placed with GI 12/5/23  - leukocytosis improved  - Appreciate Surg recs - clear for dc to [facility]  - Appreciate GI recs  - continue to monitor NIKHIL drain output  - completed 7 days of zosyn,d/c'd. observe off abx  - C/w incentive spirometry, antiemetics and pain regimen PRN  - Out patient GI F/U with Dr. Dorsey  - Repeat ERCP in 4-8 week.  - Acute rehab planning today possible - S/p lap to open cholecystectomy w/ suppurative GB   - S/p ERCP with  stent placed with GI 12/5/23 for bile lead   - leukocytosis resolved  - Appreciate Surg recs - clear for dc to acute inpatient rehab  - Appreciate GI recs  - continue to monitor NIKHIL drain output  - completed 7 days of zosyn, observe off abx  - C/w incentive spirometry, antiemetics and pain regimen PRN  - Out patient GI F/U with Dr. Dorsey  - Repeat ERCP in 4-8 week.

## 2023-12-07 NOTE — PATIENT PROFILE ADULT - FUNCTIONAL ASSESSMENT - BASIC MOBILITY 6.
3-calculated by average/Not able to assess (calculate score using [facility] averaging method) 3-calculated by average/Not able to assess (calculate score using [facility] averaging method)

## 2023-12-07 NOTE — PROGRESS NOTE ADULT - NUTRITIONAL ASSESSMENT
This patient has been assessed with a concern for Malnutrition and has been determined to have a diagnosis/diagnoses of Moderate protein-calorie malnutrition.    This patient is being managed with:   Diet Regular-  DASH/TLC {Sodium & Cholesterol Restricted}  Entered: Dec  6 2023  9:39AM

## 2023-12-07 NOTE — PROGRESS NOTE ADULT - PROBLEM SELECTOR PLAN 7
- Chronic, last A1c 6.1  - Blood glucose goal 100-180 in hospital setting   - C/w consistent carb diet when can tolerate, c/w clears for now  - C/w hypoglycemia protocols, accuchecks  - C/w low dose ISS - Chronic   - At home on metoprolol 25mg BID   - Was in ICU for post-op hypotension, now off pressors  - Maintain MAP > 65  - Continue to monitor vitals - Chronic   - At home on metoprolol 25mg BID   - Was in ICU for post-op hypotension, now off pressors  - Maintain MAP > 65  - Continue to monitor vitals, BP stable off antihypertensives

## 2023-12-07 NOTE — PROGRESS NOTE ADULT - PROBLEM SELECTOR PLAN 9
DVT/VTE Prophylaxis: heparin 5000u subcut Q8h retarted  SCDs - Chronic  - At home on atorvastatin 80mg, continue this - Chronic  - At home on atorvastatin 80mg QHS

## 2023-12-07 NOTE — H&P ADULT - NSHPLABSRESULTS_GEN_ALL_CORE
LABS:                          8.8    8.92  )-----------( 308      ( 07 Dec 2023 07:44 )             26.5     12-07    140  |  106  |  16  ----------------------------<  134<H>  3.4<L>   |  22  |  1.09    Ca    7.4<L>      07 Dec 2023 07:44  Phos  2.2     12-07  Mg     2.6     12-07    TPro  5.9<L>  /  Alb  1.7<L>  /  TBili  0.3  /  DBili  x   /  AST  44<H>  /  ALT  42  /  AlkPhos  110  12-07    US Abdomen Limited (11.28.23 @ 21:21)   Distended thick-walled gallbladder containing sludge.  Borderline CBD measuring 6 mm.  Findings equivocal for acute cholecystitis. If clinically indicated, further evaluation with nuclear HIDA scan may be obtained.    CT Abdomen and Pelvis No Cont (11.28.23 @ 16:52)   Findings suspicious for acute cholecystitis. Correlate with patient's symptoms. Consider right upper quadrant ultrasound if clinically indicated.    CT Chest No Cont (11.28.23 @ 16:51)   Findings suspicious for acute cholecystitis. Correlate with patient's symptoms. Consider right upper quadrant ultrasound if clinically indicated.    Xray Chest 1 View AP/PA (11.28.23 @ 15:49)   No acute pulmonary disease.    NM Hepatobiliary Imaging w/ RX (11.29.23 @ 14:20)   Abnormal morphine-augmented hepatobiliary scan.  Findings are compatible with acute cholecystitis.    TTE Echo Complete w/o Contrast w/ Doppler (11.30.23 @ 11:17)   Summary:   1. Left ventricular ejection fraction, by visual estimation, is 65 to   70%.   2. Normal global left ventricular systolic function.   3. Normal left ventricular internal cavity size.   4. Mildly enlarged right ventricle.   5. Mild mitral valve regurgitation.   6. Thickening of the anterior mitral valve leaflet.   7. Mild tricuspid regurgitation.   8. Mild aortic regurgitation.    12 Lead ECG (11.28.23 @ 15:11)   Normal sinus rhythm  Voltage criteria for left ventricular hypertrophy  Abnormal ECG  When compared with ECG of 24-NOV-2023 01:26,  Sinus rhythm has replaced Electronic atrial pacemaker LABS:                    8.8    8.92  )-----------( 308      ( 07 Dec 2023 07:44 )             26.5     12-07    140  |  106  |  16  ----------------------------<  134<H>  3.4<L>   |  22  |  1.09    Ca    7.4<L>      07 Dec 2023 07:44  Phos  2.2     12-07  Mg     2.6     12-07    TPro  5.9<L>  /  Alb  1.7<L>  /  TBili  0.3  /  DBili  x   /  AST  44<H>  /  ALT  42  /  AlkPhos  110  12-07    US Abdomen Limited (11.28.23 @ 21:21)   Distended thick-walled gallbladder containing sludge.  Borderline CBD measuring 6 mm.  Findings equivocal for acute cholecystitis. If clinically indicated, further evaluation with nuclear HIDA scan may be obtained.    CT Abdomen and Pelvis No Cont (11.28.23 @ 16:52)   Findings suspicious for acute cholecystitis. Correlate with patient's symptoms. Consider right upper quadrant ultrasound if clinically indicated.    CT Chest No Cont (11.28.23 @ 16:51)   Findings suspicious for acute cholecystitis. Correlate with patient's symptoms. Consider right upper quadrant ultrasound if clinically indicated.    Xray Chest 1 View AP/PA (11.28.23 @ 15:49)   No acute pulmonary disease.    NM Hepatobiliary Imaging w/ RX (11.29.23 @ 14:20)   Abnormal morphine-augmented hepatobiliary scan.  Findings are compatible with acute cholecystitis.    TTE Echo Complete w/o Contrast w/ Doppler (11.30.23 @ 11:17)   Summary:   1. Left ventricular ejection fraction, by visual estimation, is 65 to   70%.   2. Normal global left ventricular systolic function.   3. Normal left ventricular internal cavity size.   4. Mildly enlarged right ventricle.   5. Mild mitral valve regurgitation.   6. Thickening of the anterior mitral valve leaflet.   7. Mild tricuspid regurgitation.   8. Mild aortic regurgitation.    12 Lead ECG (11.28.23 @ 15:11)   Normal sinus rhythm  Voltage criteria for left ventricular hypertrophy  Abnormal ECG  When compared with ECG of 24-NOV-2023 01:26,  Sinus rhythm has replaced Electronic atrial pacemaker

## 2023-12-07 NOTE — H&P ADULT - NS ATTEND AMEND GEN_ALL_CORE FT
I independently performed the documented the history, exam, and medical decision making. I have made amendments to the documentation where necessary. I have personally seen and examined the patient. Medical records were reviewed and I have made amendments to the documentation where necessary and adjusted the history, physical examination, and plan as documented by the Nurse Practitioner and Resident Physician. Patient was seen and evaluated at bedside today. Reported no overnight events and is in no acute distress. Eager to participate on the recommended rehabilitation program. Denies any CP, SOB, AUGUSTINE, palpitations, fever, chills, body aches, cough, congestion, or any other symptoms at this time. Admission vitals, labs, and physical exam are outlined below.    LAB                        8.8    8.92  )-----------( 308      ( 07 Dec 2023 07:44 )             26.5     12-07    140  |  106  |  16  ----------------------------<  134<H>  3.4<L>   |  22  |  1.09    Ca    7.4<L>      07 Dec 2023 07:44  Phos  2.2     12-07  Mg     2.6     12-07    TPro  5.9<L>  /  Alb  1.7<L>  /  TBili  0.3  /  DBili  x   /  AST  44<H>  /  ALT  42  /  AlkPhos  110  12-07    LIVER FUNCTIONS - ( 07 Dec 2023 07:44 )  Alb: 1.7 g/dL / Pro: 5.9 g/dL / ALK PHOS: 110 U/L / ALT: 42 U/L / AST: 44 U/L / GGT: x             PHYSICAL EXAM  Vital Signs Last 24 Hrs  T(C): 37.3 (07 Dec 2023 20:32), Max: 37.3 (07 Dec 2023 20:32)  T(F): 99.1 (07 Dec 2023 20:32), Max: 99.1 (07 Dec 2023 20:32)  HR: 69 (07 Dec 2023 20:32) (69 - 89)  BP: 128/70 (07 Dec 2023 20:32) (128/70 - 138/88)  RR: 15 (07 Dec 2023 20:32) (15 - 16)  SpO2: 97% (07 Dec 2023 20:32) (97% - 98%)    Gen - NAD, Comfortable  HEENT - NCAT, EOMI, MMM  Pulm - CTAB, No wheeze, No rhonchi, No crackles  Cardiovascular - RRR, S1S2  Abdomen - + tender, +BS, NIKHIL drain x 2   Extremities - No clubbing, no cyanosis, no peripheral edema, no calf tenderness  Neuro-     Cognitive - Awake, Alert, and Oriented      Motor -                     LEFT    UE - 4/5                    RIGHT UE - 5/5                    LEFT    LE - HF 5/5, KE 5/5, DF/PF 2/5- left foot drop                    RIGHT LE -  5/5        Sensory - Intact to LT     Reflexes - DTR Intact, No primitive reflexive  Psychiatric - Mood stable, Affect WNL  Skin: abdominal incision with staple

## 2023-12-07 NOTE — PROGRESS NOTE ADULT - PROBLEM SELECTOR PROBLEM 10
Encounter for deep vein thrombosis (DVT) prophylaxis
Encounter for deep vein thrombosis (DVT) prophylaxis
Prophylactic measure

## 2023-12-07 NOTE — PROGRESS NOTE ADULT - PROBLEM SELECTOR PLAN 3
- Acute   - Trending down  - continue to monitor CMPs -Hypokalemia, Hypophosphatemia  -replete PRN  -monitor BMPs

## 2023-12-07 NOTE — PATIENT PROFILE ADULT - FALL HARM RISK - HARM RISK INTERVENTIONS
Assistance with ambulation/Assistance OOB with selected safe patient handling equipment/Communicate Risk of Fall with Harm to all staff/Discuss with provider need for PT consult/Monitor gait and stability/Provide patient with walking aids - walker, cane, crutches/Reinforce activity limits and safety measures with patient and family/Tailored Fall Risk Interventions/Visual Cue: Yellow wristband and red socks/Bed in lowest position, wheels locked, appropriate side rails in place/Call bell, personal items and telephone in reach/Instruct patient to call for assistance before getting out of bed or chair/Non-slip footwear when patient is out of bed/[place] to call system/Physically safe environment - no spills, clutter or unnecessary equipment/Purposeful Proactive Rounding/Room/bathroom lighting operational, light cord in reach Assistance with ambulation/Assistance OOB with selected safe patient handling equipment/Communicate Risk of Fall with Harm to all staff/Discuss with provider need for PT consult/Monitor gait and stability/Provide patient with walking aids - walker, cane, crutches/Reinforce activity limits and safety measures with patient and family/Tailored Fall Risk Interventions/Visual Cue: Yellow wristband and red socks/Bed in lowest position, wheels locked, appropriate side rails in place/Call bell, personal items and telephone in reach/Instruct patient to call for assistance before getting out of bed or chair/Non-slip footwear when patient is out of bed/[place] to call system/Physically safe environment - no spills, clutter or unnecessary equipment/Purposeful Proactive Rounding/Room/bathroom lighting operational, light cord in reach

## 2023-12-08 LAB
ALBUMIN SERPL ELPH-MCNC: 1.8 G/DL — LOW (ref 3.3–5)
ALBUMIN SERPL ELPH-MCNC: 1.8 G/DL — LOW (ref 3.3–5)
ALP SERPL-CCNC: 104 U/L — SIGNIFICANT CHANGE UP (ref 40–120)
ALP SERPL-CCNC: 104 U/L — SIGNIFICANT CHANGE UP (ref 40–120)
ALT FLD-CCNC: 43 U/L — SIGNIFICANT CHANGE UP (ref 10–45)
ALT FLD-CCNC: 43 U/L — SIGNIFICANT CHANGE UP (ref 10–45)
ANION GAP SERPL CALC-SCNC: 8 MMOL/L — SIGNIFICANT CHANGE UP (ref 5–17)
ANION GAP SERPL CALC-SCNC: 8 MMOL/L — SIGNIFICANT CHANGE UP (ref 5–17)
AST SERPL-CCNC: 44 U/L — HIGH (ref 10–40)
AST SERPL-CCNC: 44 U/L — HIGH (ref 10–40)
BASOPHILS # BLD AUTO: 0.03 K/UL — SIGNIFICANT CHANGE UP (ref 0–0.2)
BASOPHILS # BLD AUTO: 0.03 K/UL — SIGNIFICANT CHANGE UP (ref 0–0.2)
BASOPHILS NFR BLD AUTO: 0.6 % — SIGNIFICANT CHANGE UP (ref 0–2)
BASOPHILS NFR BLD AUTO: 0.6 % — SIGNIFICANT CHANGE UP (ref 0–2)
BILIRUB SERPL-MCNC: 0.3 MG/DL — SIGNIFICANT CHANGE UP (ref 0.2–1.2)
BILIRUB SERPL-MCNC: 0.3 MG/DL — SIGNIFICANT CHANGE UP (ref 0.2–1.2)
BUN SERPL-MCNC: 14 MG/DL — SIGNIFICANT CHANGE UP (ref 7–23)
BUN SERPL-MCNC: 14 MG/DL — SIGNIFICANT CHANGE UP (ref 7–23)
CALCIUM SERPL-MCNC: 7.5 MG/DL — LOW (ref 8.4–10.5)
CALCIUM SERPL-MCNC: 7.5 MG/DL — LOW (ref 8.4–10.5)
CHLORIDE SERPL-SCNC: 104 MMOL/L — SIGNIFICANT CHANGE UP (ref 96–108)
CHLORIDE SERPL-SCNC: 104 MMOL/L — SIGNIFICANT CHANGE UP (ref 96–108)
CO2 SERPL-SCNC: 25 MMOL/L — SIGNIFICANT CHANGE UP (ref 22–31)
CO2 SERPL-SCNC: 25 MMOL/L — SIGNIFICANT CHANGE UP (ref 22–31)
CREAT SERPL-MCNC: 1.01 MG/DL — SIGNIFICANT CHANGE UP (ref 0.5–1.3)
CREAT SERPL-MCNC: 1.01 MG/DL — SIGNIFICANT CHANGE UP (ref 0.5–1.3)
EGFR: 80 ML/MIN/1.73M2 — SIGNIFICANT CHANGE UP
EGFR: 80 ML/MIN/1.73M2 — SIGNIFICANT CHANGE UP
EOSINOPHIL # BLD AUTO: 0.1 K/UL — SIGNIFICANT CHANGE UP (ref 0–0.5)
EOSINOPHIL # BLD AUTO: 0.1 K/UL — SIGNIFICANT CHANGE UP (ref 0–0.5)
EOSINOPHIL NFR BLD AUTO: 1.9 % — SIGNIFICANT CHANGE UP (ref 0–6)
EOSINOPHIL NFR BLD AUTO: 1.9 % — SIGNIFICANT CHANGE UP (ref 0–6)
GLUCOSE BLDC GLUCOMTR-MCNC: 115 MG/DL — HIGH (ref 70–99)
GLUCOSE BLDC GLUCOMTR-MCNC: 115 MG/DL — HIGH (ref 70–99)
GLUCOSE BLDC GLUCOMTR-MCNC: 141 MG/DL — HIGH (ref 70–99)
GLUCOSE BLDC GLUCOMTR-MCNC: 141 MG/DL — HIGH (ref 70–99)
GLUCOSE BLDC GLUCOMTR-MCNC: 142 MG/DL — HIGH (ref 70–99)
GLUCOSE BLDC GLUCOMTR-MCNC: 142 MG/DL — HIGH (ref 70–99)
GLUCOSE BLDC GLUCOMTR-MCNC: 166 MG/DL — HIGH (ref 70–99)
GLUCOSE BLDC GLUCOMTR-MCNC: 166 MG/DL — HIGH (ref 70–99)
GLUCOSE SERPL-MCNC: 128 MG/DL — HIGH (ref 70–99)
GLUCOSE SERPL-MCNC: 128 MG/DL — HIGH (ref 70–99)
HCT VFR BLD CALC: 26.4 % — LOW (ref 39–50)
HCT VFR BLD CALC: 26.4 % — LOW (ref 39–50)
HGB BLD-MCNC: 8.8 G/DL — LOW (ref 13–17)
HGB BLD-MCNC: 8.8 G/DL — LOW (ref 13–17)
IMM GRANULOCYTES NFR BLD AUTO: 0.6 % — SIGNIFICANT CHANGE UP (ref 0–0.9)
IMM GRANULOCYTES NFR BLD AUTO: 0.6 % — SIGNIFICANT CHANGE UP (ref 0–0.9)
LYMPHOCYTES # BLD AUTO: 0.87 K/UL — LOW (ref 1–3.3)
LYMPHOCYTES # BLD AUTO: 0.87 K/UL — LOW (ref 1–3.3)
LYMPHOCYTES # BLD AUTO: 16.4 % — SIGNIFICANT CHANGE UP (ref 13–44)
LYMPHOCYTES # BLD AUTO: 16.4 % — SIGNIFICANT CHANGE UP (ref 13–44)
MCHC RBC-ENTMCNC: 32.7 PG — SIGNIFICANT CHANGE UP (ref 27–34)
MCHC RBC-ENTMCNC: 32.7 PG — SIGNIFICANT CHANGE UP (ref 27–34)
MCHC RBC-ENTMCNC: 33.3 GM/DL — SIGNIFICANT CHANGE UP (ref 32–36)
MCHC RBC-ENTMCNC: 33.3 GM/DL — SIGNIFICANT CHANGE UP (ref 32–36)
MCV RBC AUTO: 98.1 FL — SIGNIFICANT CHANGE UP (ref 80–100)
MCV RBC AUTO: 98.1 FL — SIGNIFICANT CHANGE UP (ref 80–100)
MONOCYTES # BLD AUTO: 0.44 K/UL — SIGNIFICANT CHANGE UP (ref 0–0.9)
MONOCYTES # BLD AUTO: 0.44 K/UL — SIGNIFICANT CHANGE UP (ref 0–0.9)
MONOCYTES NFR BLD AUTO: 8.3 % — SIGNIFICANT CHANGE UP (ref 2–14)
MONOCYTES NFR BLD AUTO: 8.3 % — SIGNIFICANT CHANGE UP (ref 2–14)
NEUTROPHILS # BLD AUTO: 3.85 K/UL — SIGNIFICANT CHANGE UP (ref 1.8–7.4)
NEUTROPHILS # BLD AUTO: 3.85 K/UL — SIGNIFICANT CHANGE UP (ref 1.8–7.4)
NEUTROPHILS NFR BLD AUTO: 72.2 % — SIGNIFICANT CHANGE UP (ref 43–77)
NEUTROPHILS NFR BLD AUTO: 72.2 % — SIGNIFICANT CHANGE UP (ref 43–77)
NRBC # BLD: 0 /100 WBCS — SIGNIFICANT CHANGE UP (ref 0–0)
NRBC # BLD: 0 /100 WBCS — SIGNIFICANT CHANGE UP (ref 0–0)
PLATELET # BLD AUTO: 334 K/UL — SIGNIFICANT CHANGE UP (ref 150–400)
PLATELET # BLD AUTO: 334 K/UL — SIGNIFICANT CHANGE UP (ref 150–400)
POTASSIUM SERPL-MCNC: 3.8 MMOL/L — SIGNIFICANT CHANGE UP (ref 3.5–5.3)
POTASSIUM SERPL-MCNC: 3.8 MMOL/L — SIGNIFICANT CHANGE UP (ref 3.5–5.3)
POTASSIUM SERPL-SCNC: 3.8 MMOL/L — SIGNIFICANT CHANGE UP (ref 3.5–5.3)
POTASSIUM SERPL-SCNC: 3.8 MMOL/L — SIGNIFICANT CHANGE UP (ref 3.5–5.3)
PROT SERPL-MCNC: 5.9 G/DL — LOW (ref 6–8.3)
PROT SERPL-MCNC: 5.9 G/DL — LOW (ref 6–8.3)
RBC # BLD: 2.69 M/UL — LOW (ref 4.2–5.8)
RBC # BLD: 2.69 M/UL — LOW (ref 4.2–5.8)
RBC # FLD: 15.1 % — HIGH (ref 10.3–14.5)
RBC # FLD: 15.1 % — HIGH (ref 10.3–14.5)
SARS-COV-2 RNA SPEC QL NAA+PROBE: DETECTED
SARS-COV-2 RNA SPEC QL NAA+PROBE: DETECTED
SARS-COV-2 RNA SPEC QL NAA+PROBE: SIGNIFICANT CHANGE UP
SARS-COV-2 RNA SPEC QL NAA+PROBE: SIGNIFICANT CHANGE UP
SODIUM SERPL-SCNC: 137 MMOL/L — SIGNIFICANT CHANGE UP (ref 135–145)
SODIUM SERPL-SCNC: 137 MMOL/L — SIGNIFICANT CHANGE UP (ref 135–145)
WBC # BLD: 5.32 K/UL — SIGNIFICANT CHANGE UP (ref 3.8–10.5)
WBC # BLD: 5.32 K/UL — SIGNIFICANT CHANGE UP (ref 3.8–10.5)
WBC # FLD AUTO: 5.32 K/UL — SIGNIFICANT CHANGE UP (ref 3.8–10.5)
WBC # FLD AUTO: 5.32 K/UL — SIGNIFICANT CHANGE UP (ref 3.8–10.5)

## 2023-12-08 PROCEDURE — 99223 1ST HOSP IP/OBS HIGH 75: CPT

## 2023-12-08 RX ORDER — ALBUTEROL 90 UG/1
1 AEROSOL, METERED ORAL EVERY 4 HOURS
Refills: 0 | Status: DISCONTINUED | OUTPATIENT
Start: 2023-12-08 | End: 2023-12-19

## 2023-12-08 RX ORDER — ALBUTEROL 90 UG/1
1 AEROSOL, METERED ORAL EVERY 4 HOURS
Refills: 0 | Status: COMPLETED | OUTPATIENT
Start: 2023-12-08 | End: 2024-11-05

## 2023-12-08 RX ADMIN — HEPARIN SODIUM 5000 UNIT(S): 5000 INJECTION INTRAVENOUS; SUBCUTANEOUS at 05:01

## 2023-12-08 RX ADMIN — HEPARIN SODIUM 5000 UNIT(S): 5000 INJECTION INTRAVENOUS; SUBCUTANEOUS at 14:36

## 2023-12-08 RX ADMIN — Medication 81 MILLIGRAM(S): at 12:11

## 2023-12-08 RX ADMIN — POLYETHYLENE GLYCOL 3350 17 GRAM(S): 17 POWDER, FOR SOLUTION ORAL at 12:11

## 2023-12-08 RX ADMIN — ATORVASTATIN CALCIUM 80 MILLIGRAM(S): 80 TABLET, FILM COATED ORAL at 22:16

## 2023-12-08 RX ADMIN — LATANOPROST 1 DROP(S): 0.05 SOLUTION/ DROPS OPHTHALMIC; TOPICAL at 22:17

## 2023-12-08 RX ADMIN — FAMOTIDINE 20 MILLIGRAM(S): 10 INJECTION INTRAVENOUS at 12:11

## 2023-12-08 RX ADMIN — Medication 1: at 12:11

## 2023-12-08 RX ADMIN — Medication 6 MILLIGRAM(S): at 22:15

## 2023-12-08 RX ADMIN — Medication 300 MILLIGRAM(S): at 05:01

## 2023-12-08 RX ADMIN — Medication 1 DROP(S): at 22:16

## 2023-12-08 RX ADMIN — Medication 300 MILLIGRAM(S): at 17:34

## 2023-12-08 RX ADMIN — HEPARIN SODIUM 5000 UNIT(S): 5000 INJECTION INTRAVENOUS; SUBCUTANEOUS at 22:16

## 2023-12-08 NOTE — DIETITIAN INITIAL EVALUATION ADULT - OTHER INFO
Mr. Benjamin Rios is a 68 year old right handed male patient with past medical history of sinus node disorder s/p PPM, HTN, HLD, cerebral aneurysm s/p coiling, CVA w/ residual L sided weakness, seizure disorder, type 2 diabetes on metformin and gastritis admitted to [facility] on 11/28 for worsening abdominal pain since 11/24. CT chest abd/pelvis showed distended gallbladder with gallbladder wall thickening and pericholecystic fat stranding and hemangioma in T7 vertebrae. Patient underwent lap to open cholecystectomy for acute cholecystitis on 11/30. Procedure was complicated by drainage of a small amount of bile and a large amount of pus from gallbladder. Patient was upgraded to ICU due to post-operative hypotension requiring pressors then downgraded to surgical floor. Hospital course further complicated by acute blood loss anemia, improved s/p 1 unit PRBCs. Patient also found to have bile duct leakage, requiring ERCP with stent on 12/5/23. Patient requires follow up with GI and a follow up ERCP in 4-8 weeks. Patient evaluated by PT/OT and was recommended for acute inpatient rehab. Patient is medically stable for discharge to acute rehab on 12/7.    Used [employer] ID#[id number]. Met with pt this AM at bedside. Pt was seated upright in chair and able to articulate his nutrition hx well. NKFA nor food intolerances reported. Pt reported decreased appetite PTA since 4-5 months ago. Pt denied N/V/D, however reported constipation - stated last BM 12/7. Encouraged pt to consume adequate fluids and fiber, and offered prunes/prune juice at breakfast to assist in BMs. Pt was accepting to suggestion - RD will note in Kardex. Pt denied hx of T2DM, however mentioned being on metformin to reduce BG levels.     *Note: A1C 6.1% (11/29) - RD will continue to monitor and assess need for consistent CHO diet.  Mr. Benjamin Rios is a 68 year old right handed male patient with past medical history of sinus node disorder s/p PPM, HTN, HLD, cerebral aneurysm s/p coiling, CVA w/ residual L sided weakness, seizure disorder, type 2 diabetes on metformin and gastritis admitted to [facility] on 11/28 for worsening abdominal pain since 11/24. CT chest abd/pelvis showed distended gallbladder with gallbladder wall thickening and pericholecystic fat stranding and hemangioma in T7 vertebrae. Patient underwent lap to open cholecystectomy for acute cholecystitis on 11/30. Procedure was complicated by drainage of a small amount of bile and a large amount of pus from gallbladder. Patient was upgraded to ICU due to post-operative hypotension requiring pressors then downgraded to surgical floor. Hospital course further complicated by acute blood loss anemia, improved s/p 1 unit PRBCs. Patient also found to have bile duct leakage, requiring ERCP with stent on 12/5/23. Patient requires follow up with GI and a follow up ERCP in 4-8 weeks. Patient evaluated by PT/OT and was recommended for acute inpatient rehab. Patient is medically stable for discharge to acute rehab on 12/7.    Used [employer] ID#[id number]. Met with pt this AM at bedside. Pt was seated upright in chair and able to articulate his nutrition hx well. NKFA nor food intolerances reported. Pt reported decreased appetite PTA since 4-5 months ago. Pt denied N/V/D, however reported constipation - stated last BM 12/7. Encouraged pt to consume adequate fluids and fiber, and offered prunes/prune juice at breakfast to assist in BMs. Pt was accepting to suggestion - RD will note in Kardex. Pt denied hx of T2DM, however mentioned being on metformin to reduce BG levels.     *Note: A1C 6.1% (11/29) - RD will continue to monitor and assess need for consistent CHO diet.  Mr. Benjamin Rios is a 68 year old right handed male patient with past medical history of sinus node disorder s/p PPM, HTN, HLD, cerebral aneurysm s/p coiling, CVA w/ residual L sided weakness, seizure disorder, type 2 diabetes on metformin and gastritis admitted to [facility] on 11/28 for worsening abdominal pain since 11/24. CT chest abd/pelvis showed distended gallbladder with gallbladder wall thickening and pericholecystic fat stranding and hemangioma in T7 vertebrae. Patient underwent lap to open cholecystectomy for acute cholecystitis on 11/30. Procedure was complicated by drainage of a small amount of bile and a large amount of pus from gallbladder. Patient was upgraded to ICU due to post-operative hypotension requiring pressors then downgraded to surgical floor. Hospital course further complicated by acute blood loss anemia, improved s/p 1 unit PRBCs. Patient also found to have bile duct leakage, requiring ERCP with stent on 12/5/23. Patient requires follow up with GI and a follow up ERCP in 4-8 weeks. Patient evaluated by PT/OT and was recommended for acute inpatient rehab. Patient is medically stable for discharge to acute rehab on 12/7.    Used [employer] ID#[id number]. Met with pt this AM at bedside. Pt was seated upright in chair and able to articulate his nutrition hx well. NKFA nor food intolerances reported. Pt reported decreased appetite PTA since 4-5 months ago. Pt denied N/V/D, however reported constipation - stated last BM 12/7. Encouraged pt to consume adequate fluids and fiber, and offered prunes/prune juice at breakfast to assist in BMs. Pt was accepting to suggestion - RD will note in Kardex. Pt denied hx of T2DM, however mentioned being on metformin to reduce BG levels. Pt reported UBW ~164lbs about 4-5 months ago. RD offered pt Glucerna qd - pt was accepting to suggestion (flavor preference vanilla).    *Note:   - A1C 6.1% (11/29) - RD will continue to monitor and assess need for consistent CHO diet.   - Corrected Ca: ([ 4 - 1.8] x 0.8) + 7.5 = 9.26 (WNL) Mr. Benjamin Rios is a 68 year old right handed male patient with past medical history of sinus node disorder s/p PPM, HTN, HLD, cerebral aneurysm s/p coiling, CVA w/ residual L sided weakness, seizure disorder, type 2 diabetes on metformin and gastritis admitted to [facility] on 11/28 for worsening abdominal pain since 11/24. CT chest abd/pelvis showed distended gallbladder with gallbladder wall thickening and pericholecystic fat stranding and hemangioma in T7 vertebrae. Patient underwent lap to open cholecystectomy for acute cholecystitis on 11/30. Procedure was complicated by drainage of a small amount of bile and a large amount of pus from gallbladder. Patient was upgraded to ICU due to post-operative hypotension requiring pressors then downgraded to surgical floor. Hospital course further complicated by acute blood loss anemia, improved s/p 1 unit PRBCs. Patient also found to have bile duct leakage, requiring ERCP with stent on 12/5/23. Patient requires follow up with GI and a follow up ERCP in 4-8 weeks. Patient evaluated by PT/OT and was recommended for acute inpatient rehab. Patient is medically stable for discharge to acute rehab on 12/7.    Used [employer] ID#[id number]. Met with pt this AM at bedside. Pt was seated upright in chair and able to articulate his nutrition hx well. NKFA nor food intolerances reported. Pt reported decreased appetite PTA since 4-5 months ago. Pt denied N/V/D, however reported constipation - stated last BM 12/7. Encouraged pt to consume adequate fluids and fiber, and offered prunes/prune juice at breakfast to assist in BMs. Pt was accepting to suggestion - RD will note in Kardex. Pt denied hx of T2DM, however mentioned being on metformin to reduce BG levels. Pt reported UBW ~164lbs about 4-5 months ago. RD offered pt Glucerna qd - pt was accepting to suggestion (flavor preference vanilla).    *Note:   - A1C 6.1% (11/29) - RD will continue to monitor and assess need for consistent CHO diet.   - Corrected Ca: ([ 4 - 1.8] x 0.8) + 7.5 = 9.26 (WNL)

## 2023-12-08 NOTE — DIETITIAN INITIAL EVALUATION ADULT - PERTINENT LABORATORY DATA
12-08    137  |  104  |  14  ----------------------------<  128<H>  3.8   |  25  |  1.01    Ca    7.5<L>      08 Dec 2023 06:16  Phos  2.2     12-07  Mg     2.6     12-07    TPro  5.9<L>  /  Alb  1.8<L>  /  TBili  0.3  /  DBili  x   /  AST  44<H>  /  ALT  43  /  AlkPhos  104  12-08  POCT Blood Glucose.: 166 mg/dL (12-08-23 @ 12:09)  A1C with Estimated Average Glucose Result: 6.1 % (11-29-23 @ 05:53)  A1C with Estimated Average Glucose Result: 6.2 % (02-12-23 @ 07:06)

## 2023-12-08 NOTE — DIETITIAN INITIAL EVALUATION ADULT - NSICDXPASTSURGICALHX_GEN_ALL_CORE_FT
PAST SURGICAL HISTORY:  Pacemaker [employer] Serial [id number]/Model [id number]  Implant 6/29/07, new generator 12/23/2016    S/P bunionectomy     S/P cataract surgery with lens implants    S/P craniotomy for RCMA 1998    Status post coil embolization of cerebral aneurysm 9/2018     PAST SURGICAL HISTORY:  Pacemaker [employer] Serial [id number]/Model [id number]  Implant 6/29/07, new generator 12/23/2016    S/P bunionectomy     S/P cataract surgery with lens implants    S/P craniotomy for RCMA 1998    Status post coil embolization of cerebral aneurysm 9/2018

## 2023-12-08 NOTE — DIETITIAN INITIAL EVALUATION ADULT - CALCULATED FROM (CAL/KG)
You can access the FollowMyHealth Patient Portal offered by [facility] by registering at the following website: [email or web address]. By joining [employer]’s FollowMyHealth portal, you will also be able to view your health information using other applications (apps) compatible with our system. [id number]

## 2023-12-08 NOTE — CHART NOTE - NSCHARTNOTEFT_GEN_A_CORE
Notified by RN that patient's admission surveillance COVID PCR came back positive. Patient placed on isolation precautions and moved to separate room. Prior roommates notified by maria a. Patient seen at bedside and notified of results. Patient endorses a slight dry cough but denies any other symptoms. No headache, nasal congestion, chest pain, SOB, body aches. Will continue to monitor for symptoms. Vitals: T 99.1 F, HR 68, /70, RR 15, SpO2 97% on RA.    Repeat COVID PCR this AM negative. Spoke with nursing supervisor who recommended re-swab in 6 hours.     Asked patient if he would like me to notify his family member. Patient states we can call his nephew Rancho Rios [phone], but to wait until later in the morning. Will contact family member before sign out. Notified by RN that patient's admission surveillance COVID PCR came back positive. Patient placed on isolation precautions and moved to separate room. Prior roommates notified by maria a. Patient seen at bedside and notified of results. Patient endorses a slight dry cough but denies any other symptoms. No headache, nasal congestion, chest pain, SOB, body aches. Will continue to monitor for symptoms. Vitals: T 99.1 F, HR 68, /70, RR 15, SpO2 97% on RA.    Repeat COVID PCR this AM negative. Spoke with nursing supervisor who recommended re-swab in 6 hours.     Asked patient if he would like me to notify his family member. Patient states we can call his nephew Rancho Rios [phone], but to wait until later in the morning. Will contact family member before sign out. Notified by RN that patient's admission surveillance COVID PCR came back positive. Patient placed on isolation precautions and moved to separate room. Prior roommates notified by maria a. Patient seen at bedside and notified of results. Patient endorses a slight dry cough but denies any other symptoms. No headache, nasal congestion, chest pain, SOB, body aches. Will continue to monitor for symptoms. Vitals: T 99.1 F, HR 68, /70, RR 15, SpO2 97% on RA.    Repeat COVID PCR this AM negative. Spoke with nursing supervisor who recommended re-swab in 6 hours. Order placed.    Asked patient if he would like me to notify his family member. Patient states we can call his nephew Rancho Rios [phone], but to wait until later in the morning. Will call family member before sign out. Notified by RN that patient's admission surveillance COVID PCR came back positive. Patient placed on isolation precautions and moved to separate room. Prior roommates notified by maria a. Patient seen at bedside and notified of results. Patient endorses a slight dry cough but denies any other symptoms. No headache, nasal congestion, chest pain, SOB, body aches. Will continue to monitor for symptoms. Vitals: T 99.1 F, HR 68, /70, RR 15, SpO2 97% on RA.    Repeat COVID PCR this AM negative. Spoke with nursing supervisor who recommended re-swab in 6 hours. Order placed.    Asked patient if he would like me to notify his family member. Patient states we can call his nephew Rancho Rios [phone], but to wait until later in the morning. Will call family member before sign out. Notified by RN that patient's admission surveillance COVID PCR came back positive. Patient placed on isolation precautions and moved to separate room. Prior roommates notified by nocturnist. Patient seen at bedside and notified of results. Patient endorses a slight dry cough but denies any other symptoms. No headache, nasal congestion, chest pain, SOB, body aches. Will continue to monitor for symptoms. Vitals: T 99.1 F, HR 68, /70, RR 15, SpO2 97% on RA.    Repeat COVID PCR this AM negative. Spoke with nursing supervisor who recommended re-swab in 6 hours. Order placed.    Asked patient if he would like me to notify his family member. Patient states we can call his nephew Rancho Rios [phone], but to wait until later in the morning. Signed out to Dr. Fuentes. He will call patient's family after 11 AM test result. Patient in agreement. Notified by RN that patient's admission surveillance COVID PCR came back positive. Patient placed on isolation precautions and moved to separate room. Prior roommates notified by nocturnist. Patient seen at bedside and notified of results. Patient endorses a slight dry cough but denies any other symptoms. No headache, nasal congestion, chest pain, SOB, body aches. Will continue to monitor for symptoms. Vitals: T 99.1 F, HR 68, /70, RR 15, SpO2 97% on RA.    Repeat COVID PCR this AM negative. Spoke with nursing supervisor who recommended re-swab in 6 hours. Order placed.    Asked patient if he would like me to notify his family member. Patient states we can call his nephew Rancho Rios [phone], but to wait until later in the morning. Signed out to Dr. Fuentes. He will call patient's family after 11 AM test result. Patient in agreement.

## 2023-12-08 NOTE — CONSULT NOTE ADULT - ASSESSMENT
Assessment/Plan:  Mr. Benjamin Rios is a 68 year old right handed male patient with past medical history of sinus node disorder s/p PPM, HTN, HLD, cerebral aneurysm s/p coiling, CVA w/ residual L sided weakness, seizure disorder, type 2 diabetes on metformin and gastritis who is admitted for Acute Inpatient Rehabilitation with a multidisciplinary rehab program at [facility] with functional impairments in ADLs and mobility secondary to a complaint of worsening abdominal pain with imaging remarkable for distended gallbladder with wall thickening and pericholecystic fat stranding and hemangioma treated surgically with a lap to open cholecystectomy for acute cholecystitis on 11/30 complicated by drainage of a small amount of bile and a large amount of pus from gallbladder and subsequent requirement of ICU admission due to post-operative hypotension requiring pressors and acute blood loss anemia improved with transfusion of one unit of PRBCs. Additionally, patient was found to have bile duct leakage, requiring ERCP with stent on 12/5/23.      Debility 2/2 acute cholecystitis complicated by bile duct leak (s/p ERCP with stent on 12/5)  - monitor NIKHIL drain output  - outpatient GI f/u with Dr. Dorsey  - Repeat ERCP & stent removal in 4-8 weeks    COVID+  - patient tested positive for COVID on admission to rehab (12/7)  - isolation precautions  - pt with mild cough, no other respiratory symptoms. sating well on RA  - supportive care for now.  - Guifenesin prn     Acute Blood Loss Anemia (improved)  - s/p 1 unit PRBC transfusion  - monitor H/H    Seizure Disorder  - continue home carbamazepine 300 mg BID    CVA/ HLD  - residual L sided weakness  - continue atorvastatin 80 mg QHS  - continue ASA 81 mg QD    HTN  - Home med: metoprolol 25 mg BID  - stable off anti-hypertensives  - Monitor BP    T2DM  - last HbA1c 6.1  - low dose SSI  - Monitor fingersticks  - on metformin at home    DVT prophylaxis:   - Heparin subq Assessment/Plan:  Mr. Benjamin Rios is a 68 year old right handed male patient with past medical history of sinus node disorder s/p PPM, HTN, HLD, cerebral aneurysm s/p coiling, CVA w/ residual L sided weakness, seizure disorder, type 2 diabetes on metformin and gastritis who is admitted for Acute Inpatient Rehabilitation with a multidisciplinary rehab program at [facility] with functional impairments in ADLs and mobility secondary to a complaint of worsening abdominal pain with imaging remarkable for distended gallbladder with wall thickening and pericholecystic fat stranding and hemangioma treated surgically with a lap to open cholecystectomy for acute cholecystitis on 11/30 complicated by drainage of a small amount of bile and a large amount of pus from gallbladder and subsequent requirement of ICU admission due to post-operative hypotension requiring pressors and acute blood loss anemia improved with transfusion of one unit of PRBCs. Additionally, patient was found to have bile duct leakage, requiring ERCP with stent on 12/5/23.      Debility 2/2 acute cholecystitis complicated by bile duct leak (s/p ERCP with stent on 12/5)  - monitor NIKHIL drain output  - outpatient GI f/u with Dr. Dorsey  - Repeat ERCP & stent removal in 4-8 weeks    COVID+  - patient tested positive for COVID on admission to rehab (12/7)  - isolation precautions  - pt with mild cough, no other respiratory symptoms. sating well on RA  - supportive care for now.  - Guifenesin prn     Acute Blood Loss Anemia (improved)  - s/p 1 unit PRBC transfusion  - monitor H/H    Seizure Disorder  - continue home carbamazepine 300 mg BID    CVA/ HLD  - residual L sided weakness  - continue atorvastatin 80 mg QHS  - continue ASA 81 mg QD    HTN  - Home med: metoprolol 25 mg BID  - stable off anti-hypertensives  - Monitor BP    T2DM  - last HbA1c 6.1  - low dose SSI  - Monitor fingersticks  - on metformin at home    DVT prophylaxis:   - Heparin subq

## 2023-12-08 NOTE — DIETITIAN INITIAL EVALUATION ADULT - ADD RECOMMEND
1. Continue with DASH/TLC diet  - RD will continue to monitor BG levels and assess need for C.CHO diet  - Encourage high-kcal/high-protein foods  2. Glucerna qd   3. Recommend MVI for general nutrient coverage  4. Appreciate weekly weight trends  5. Continue with bowel regimen, prn

## 2023-12-08 NOTE — PROGRESS NOTE ADULT - SUBJECTIVE AND OBJECTIVE BOX
Patient is a 68y old  Male who presents with a chief complaint of Other malaise  pt s/p lap to open rajeev 11/30 and ERCP with CBD stent 12/5  no events noted overnight  pt transferred to acute rehab due to debility  pt feels well, tolerating reg diet, denies n/v + flatus, + BM      Patient seen and examined at bedside    ALLERGIES:  No Known Allergies    MEDICATIONS  (STANDING):  aspirin enteric coated 81 milliGRAM(s) Oral daily  atorvastatin 80 milliGRAM(s) Oral at bedtime  carBAMazepine ER Tablet 300 milliGRAM(s) Oral two times a day  dextrose 5%. 1000 milliLiter(s) (100 mL/Hr) IV Continuous <Continuous>  dextrose 5%. 1000 milliLiter(s) (50 mL/Hr) IV Continuous <Continuous>  dextrose 50% Injectable 25 Gram(s) IV Push once  dextrose 50% Injectable 12.5 Gram(s) IV Push once  dextrose 50% Injectable 25 Gram(s) IV Push once  famotidine    Tablet 20 milliGRAM(s) Oral daily  glucagon  Injectable 1 milliGRAM(s) IntraMuscular once  heparin   Injectable 5000 Unit(s) SubCutaneous every 8 hours  insulin lispro (ADMELOG) corrective regimen sliding scale   SubCutaneous three times a day before meals  insulin lispro (ADMELOG) corrective regimen sliding scale   SubCutaneous at bedtime  latanoprost 0.005% Ophthalmic Solution 1 Drop(s) Both EYES at bedtime  melatonin 6 milliGRAM(s) Oral at bedtime  polyethylene glycol 3350 17 Gram(s) Oral daily    MEDICATIONS  (PRN):  albuterol    90 MICROgram(s) HFA Inhaler 1 Puff(s) Inhalation every 4 hours PRN Shortness of Breath and/or Wheezing  artificial  tears Solution 1 Drop(s) Both EYES two times a day PRN Dry Eyes  dextrose Oral Gel 15 Gram(s) Oral once PRN Blood Glucose LESS THAN 70 milliGRAM(s)/deciliter  guaiFENesin Oral Liquid (Sugar-Free) 200 milliGRAM(s) Oral every 6 hours PRN Cough  senna 1 Tablet(s) Oral two times a day PRN Constipation    Vital Signs Last 24 Hrs  T(F): 98.2 (08 Dec 2023 07:55), Max: 99.1 (07 Dec 2023 20:32)  HR: 69 (08 Dec 2023 07:55) (69 - 69)  BP: 117/74 (08 Dec 2023 07:55) (117/74 - 128/70)  RR: 15 (08 Dec 2023 07:55) (15 - 15)  SpO2: 98% (08 Dec 2023 07:55) (97% - 98%)    I&O's Summary    07 Dec 2023 07:01  -  08 Dec 2023 07:00  --------------------------------------------------------  IN: 0 mL / OUT: 1155 mL / NET: -1155 mL    PHYSICAL EXAM:  General: NAD, A/O x 3  ENT: MMM  Neck: Supple, No JVD  Abdomen: soft, nd, nttp, incision c/d/i, staples in place, 2 rogelio drains removed w/o issue, pt tolerated procedure well  Extremities: No calf tenderness, No pitting edema    LABS:                        8.8    5.32  )-----------( 334      ( 08 Dec 2023 06:16 )             26.4     12-08    137  |  104  |  14  ----------------------------<  128  3.8   |  25  |  1.01    Ca    7.5      08 Dec 2023 06:16  Phos  2.2     12-07  Mg     2.6     12-07    TPro  5.9  /  Alb  1.8  /  TBili  0.3  /  DBili  x   /  AST  44  /  ALT  43  /  AlkPhos  104  12-08      POCT Blood Glucose.: 166 mg/dL (08 Dec 2023 12:09)  POCT Blood Glucose.: 115 mg/dL (08 Dec 2023 08:04)  POCT Blood Glucose.: 114 mg/dL (07 Dec 2023 22:17)  POCT Blood Glucose.: 119 mg/dL (07 Dec 2023 17:29)      Urinalysis Basic - ( 08 Dec 2023 06:16 )    Color: x / Appearance: x / SG: x / pH: x  Gluc: 128 mg/dL / Ketone: x  / Bili: x / Urobili: x   Blood: x / Protein: x / Nitrite: x   Leuk Esterase: x / RBC: x / WBC x   Sq Epi: x / Non Sq Epi: x / Bacteria: x      COVID-19 PCR: Detected (12-08-23 @ 11:25)  COVID-19 PCR: NotDetec (12-08-23 @ 05:00)  COVID-19 PCR: Detected (12-07-23 @ 22:00)

## 2023-12-08 NOTE — DIETITIAN INITIAL EVALUATION ADULT - PERTINENT MEDS FT
MEDICATIONS  (STANDING):  aspirin enteric coated 81 milliGRAM(s) Oral daily  atorvastatin 80 milliGRAM(s) Oral at bedtime  carBAMazepine ER Tablet 300 milliGRAM(s) Oral two times a day  dextrose 5%. 1000 milliLiter(s) (100 mL/Hr) IV Continuous <Continuous>  dextrose 5%. 1000 milliLiter(s) (50 mL/Hr) IV Continuous <Continuous>  dextrose 50% Injectable 25 Gram(s) IV Push once  dextrose 50% Injectable 12.5 Gram(s) IV Push once  dextrose 50% Injectable 25 Gram(s) IV Push once  famotidine    Tablet 20 milliGRAM(s) Oral daily  glucagon  Injectable 1 milliGRAM(s) IntraMuscular once  heparin   Injectable 5000 Unit(s) SubCutaneous every 8 hours  insulin lispro (ADMELOG) corrective regimen sliding scale   SubCutaneous three times a day before meals  insulin lispro (ADMELOG) corrective regimen sliding scale   SubCutaneous at bedtime  latanoprost 0.005% Ophthalmic Solution 1 Drop(s) Both EYES at bedtime  melatonin 6 milliGRAM(s) Oral at bedtime  polyethylene glycol 3350 17 Gram(s) Oral daily    MEDICATIONS  (PRN):  albuterol    90 MICROgram(s) HFA Inhaler 1 Puff(s) Inhalation every 4 hours PRN Shortness of Breath and/or Wheezing  artificial  tears Solution 1 Drop(s) Both EYES two times a day PRN Dry Eyes  dextrose Oral Gel 15 Gram(s) Oral once PRN Blood Glucose LESS THAN 70 milliGRAM(s)/deciliter  guaiFENesin Oral Liquid (Sugar-Free) 200 milliGRAM(s) Oral every 6 hours PRN Cough  senna 1 Tablet(s) Oral two times a day PRN Constipation

## 2023-12-08 NOTE — PROGRESS NOTE ADULT - ASSESSMENT
Patient is a 68y old  Male who presents with a chief complaint of Other malaise  pt s/p lap to open rajeev 11/30 and ERCP with CBD stent 12/5  no events noted overnight  pt transferred to acute rehab due to debility  pt feels well, tolerating reg diet, denies n/v + flatus, + BM      pt doing well from surgical standpoint    2 rogelio drains removed    will remove staples on POD # 14     case d/w Dr. Bro

## 2023-12-08 NOTE — DIETITIAN INITIAL EVALUATION ADULT - NSICDXPASTMEDICALHX_GEN_ALL_CORE_FT
PAST MEDICAL HISTORY:  Aneurysm of [place] of [place]     Bradycardia     Cerebral aneurysm without rupture diagnosed 2017 had cerebral angio at [facility]    CVA (cerebral vascular accident) left arm weakness, no sensation in left side in 1998    Depression     Epilepsy last seizure 2016    Gastroesophageal reflux disease, esophagitis presence not specified     Glaucoma of both eyes, unspecified glaucoma type     HLD (hyperlipidemia)     Language barrier Nepali speaking    Renal agenesis, unilateral     T2DM (type 2 diabetes mellitus)      PAST MEDICAL HISTORY:  Aneurysm of [place] of [place]     Bradycardia     Cerebral aneurysm without rupture diagnosed 2017 had cerebral angio at [facility]    CVA (cerebral vascular accident) left arm weakness, no sensation in left side in 1998    Depression     Epilepsy last seizure 2016    Gastroesophageal reflux disease, esophagitis presence not specified     Glaucoma of both eyes, unspecified glaucoma type     HLD (hyperlipidemia)     Language barrier Yi speaking    Renal agenesis, unilateral     T2DM (type 2 diabetes mellitus)

## 2023-12-08 NOTE — CONSULT NOTE ADULT - SUBJECTIVE AND OBJECTIVE BOX
HPI:  Mr. Benjamin Rios is a 68 year old right handed male patient with past medical history of sinus node disorder s/p PPM, HTN, HLD, cerebral aneurysm s/p coiling, CVA w/ residual L sided weakness, seizure disorder, type 2 diabetes on metformin and gastritis admitted to [facility] on 11/28 for worsening abdominal pain since 11/24. CT chest abd/pelvis showed distended gallbladder with gallbladder wall thickening and pericholecystic fat stranding and hemangioma in T7 vertebrae. Patient underwent lap to open cholecystectomy for acute cholecystitis on 11/30. Procedure was complicated by drainage of a small amount of bile and a large amount of pus from gallbladder. Patient was upgraded to ICU due to post-operative hypotension requiring pressors then downgraded to surgical floor. Hospital course further complicated by acute blood loss anemia, improved s/p 1 unit PRBCs. Patient also found to have bile duct leakage, requiring ERCP with stent on 12/5/23. Patient requires follow up with GI and a follow up ERCP in 4-8 weeks. Patient evaluated by PT/OT and was recommended for acute inpatient rehab. Patient is medically stable for discharge to acute rehab on 12/7.   (07 Dec 2023 13:33)    Patient is a 68y old  Male who presents with a chief complaint of Debility 2/2 acute cholecystitis (07 Dec 2023 13:33)      Subjective and overnight events:  Patient seen and examined at bedside. pt found with COVID positive on arrival. reports mild cough, no fever, chills, sob, cp, abd pain. no nausea/vomiting, diarrhea. no headache, dizziness.    PAST MEDICAL & SURGICAL HISTORY:  CVA (cerebral vascular accident)  left arm weakness, no sensation in left side in 1998      Aneurysm of [place] of [place]      HLD (hyperlipidemia)      Epilepsy  last seizure 2016      Renal agenesis, unilateral      Depression      Cerebral aneurysm without rupture  diagnosed 2017 had cerebral angio at [facility]      Gastroesophageal reflux disease, esophagitis presence not specified      Glaucoma of both eyes, unspecified glaucoma type      Bradycardia      Language barrier  Bulgarian speaking      T2DM (type 2 diabetes mellitus)      S/P craniotomy  for RCMA 1998      Pacemaker  Medtronic Serial [id number]/Model [id number]  Implant 6/29/07, new generator 12/23/2016      S/P bunionectomy      S/P cataract surgery  with lens implants      Status post coil embolization of cerebral aneurysm  9/2018      Family history: no CAD, HTN in first degree family    Social history: no smoking, ETOH or illicit drug    ALLERGIES:  No Known Allergies    MEDICATIONS  (STANDING):  aspirin enteric coated 81 milliGRAM(s) Oral daily  atorvastatin 80 milliGRAM(s) Oral at bedtime  carBAMazepine ER Tablet 300 milliGRAM(s) Oral two times a day  dextrose 5%. 1000 milliLiter(s) (50 mL/Hr) IV Continuous <Continuous>  dextrose 5%. 1000 milliLiter(s) (100 mL/Hr) IV Continuous <Continuous>  dextrose 50% Injectable 25 Gram(s) IV Push once  dextrose 50% Injectable 12.5 Gram(s) IV Push once  dextrose 50% Injectable 25 Gram(s) IV Push once  famotidine    Tablet 20 milliGRAM(s) Oral daily  glucagon  Injectable 1 milliGRAM(s) IntraMuscular once  heparin   Injectable 5000 Unit(s) SubCutaneous every 8 hours  insulin lispro (ADMELOG) corrective regimen sliding scale   SubCutaneous three times a day before meals  insulin lispro (ADMELOG) corrective regimen sliding scale   SubCutaneous at bedtime  latanoprost 0.005% Ophthalmic Solution 1 Drop(s) Both EYES at bedtime  melatonin 6 milliGRAM(s) Oral at bedtime  polyethylene glycol 3350 17 Gram(s) Oral daily    MEDICATIONS  (PRN):  artificial  tears Solution 1 Drop(s) Both EYES two times a day PRN Dry Eyes  dextrose Oral Gel 15 Gram(s) Oral once PRN Blood Glucose LESS THAN 70 milliGRAM(s)/deciliter  senna 1 Tablet(s) Oral two times a day PRN Constipation    Vital Signs Last 24 Hrs  T(F): 98.2 (08 Dec 2023 07:55), Max: 99.1 (07 Dec 2023 20:32)  HR: 69 (08 Dec 2023 07:55) (69 - 69)  BP: 117/74 (08 Dec 2023 07:55) (117/74 - 128/70)  RR: 15 (08 Dec 2023 07:55) (15 - 15)  SpO2: 98% (08 Dec 2023 07:55) (97% - 98%)  I&O's Summary    07 Dec 2023 07:01  -  08 Dec 2023 07:00  --------------------------------------------------------  IN: 0 mL / OUT: 1155 mL / NET: -1155 mL      PHYSICAL EXAM:  General: NAD, A/O x 3  ENT: MMM  Neck: Supple, No JVD  Lungs: Clear to auscultation bilaterally  Cardio: RRR, S1/S2, No murmurs  Abdomen: Soft, Nontender, Nondistended; Bowel sounds present  Extremities: No calf tenderness, No pitting edema    LABS:                        8.8    5.32  )-----------( 334      ( 08 Dec 2023 06:16 )             26.4     12-08    137  |  104  |  14  ----------------------------<  128  3.8   |  25  |  1.01    Ca    7.5      08 Dec 2023 06:16  Phos  2.2     12-07  Mg     2.6     12-07    TPro  5.9  /  Alb  1.8  /  TBili  0.3  /  DBili  x   /  AST  44  /  ALT  43  /  AlkPhos  104  12-08        POCT Blood Glucose.: 166 mg/dL (08 Dec 2023 12:09)  POCT Blood Glucose.: 115 mg/dL (08 Dec 2023 08:04)  POCT Blood Glucose.: 114 mg/dL (07 Dec 2023 22:17)  POCT Blood Glucose.: 119 mg/dL (07 Dec 2023 17:29)      Urinalysis Basic - ( 08 Dec 2023 06:16 )    Color: x / Appearance: x / SG: x / pH: x  Gluc: 128 mg/dL / Ketone: x  / Bili: x / Urobili: x   Blood: x / Protein: x / Nitrite: x   Leuk Esterase: x / RBC: x / WBC x   Sq Epi: x / Non Sq Epi: x / Bacteria: x        COVID-19 PCR: Detected (12-08-23 @ 11:25)  COVID-19 PCR: NotDetec (12-08-23 @ 05:00)  COVID-19 PCR: Detected (12-07-23 @ 22:00)      RADIOLOGY & ADDITIONAL TESTS:    < from: [facility] Hepatobiliary Imaging w/ RX (11.29.23 @ 14:20) >  IMPRESSION: Abnormal morphine-augmented hepatobiliary scan.    Findings are compatible with acute cholecystitis.    Findings were discussed via Teams message with Dr. ELIGIO BROWN   [phone] 11/29/2023 1:46 PM by Dr. Digna Bob with read back   confirmation.    --- End of Report ---    < end of copied text >      Care Discussed with Consultants/Other Providers: d/w rehab team during IDR round   HPI:  Mr. Benjamin Rios is a 68 year old right handed male patient with past medical history of sinus node disorder s/p PPM, HTN, HLD, cerebral aneurysm s/p coiling, CVA w/ residual L sided weakness, seizure disorder, type 2 diabetes on metformin and gastritis admitted to [facility] on 11/28 for worsening abdominal pain since 11/24. CT chest abd/pelvis showed distended gallbladder with gallbladder wall thickening and pericholecystic fat stranding and hemangioma in T7 vertebrae. Patient underwent lap to open cholecystectomy for acute cholecystitis on 11/30. Procedure was complicated by drainage of a small amount of bile and a large amount of pus from gallbladder. Patient was upgraded to ICU due to post-operative hypotension requiring pressors then downgraded to surgical floor. Hospital course further complicated by acute blood loss anemia, improved s/p 1 unit PRBCs. Patient also found to have bile duct leakage, requiring ERCP with stent on 12/5/23. Patient requires follow up with GI and a follow up ERCP in 4-8 weeks. Patient evaluated by PT/OT and was recommended for acute inpatient rehab. Patient is medically stable for discharge to acute rehab on 12/7.   (07 Dec 2023 13:33)    Patient is a 68y old  Male who presents with a chief complaint of Debility 2/2 acute cholecystitis (07 Dec 2023 13:33)      Subjective and overnight events:  Patient seen and examined at bedside. pt found with COVID positive on arrival. reports mild cough, no fever, chills, sob, cp, abd pain. no nausea/vomiting, diarrhea. no headache, dizziness.    PAST MEDICAL & SURGICAL HISTORY:  CVA (cerebral vascular accident)  left arm weakness, no sensation in left side in 1998      Aneurysm of [place] of [place]      HLD (hyperlipidemia)      Epilepsy  last seizure 2016      Renal agenesis, unilateral      Depression      Cerebral aneurysm without rupture  diagnosed 2017 had cerebral angio at [facility]      Gastroesophageal reflux disease, esophagitis presence not specified      Glaucoma of both eyes, unspecified glaucoma type      Bradycardia      Language barrier  Urdu speaking      T2DM (type 2 diabetes mellitus)      S/P craniotomy  for RCMA 1998      Pacemaker  Medtronic Serial [id number]/Model [id number]  Implant 6/29/07, new generator 12/23/2016      S/P bunionectomy      S/P cataract surgery  with lens implants      Status post coil embolization of cerebral aneurysm  9/2018      Family history: no CAD, HTN in first degree family    Social history: no smoking, ETOH or illicit drug    ALLERGIES:  No Known Allergies    MEDICATIONS  (STANDING):  aspirin enteric coated 81 milliGRAM(s) Oral daily  atorvastatin 80 milliGRAM(s) Oral at bedtime  carBAMazepine ER Tablet 300 milliGRAM(s) Oral two times a day  dextrose 5%. 1000 milliLiter(s) (50 mL/Hr) IV Continuous <Continuous>  dextrose 5%. 1000 milliLiter(s) (100 mL/Hr) IV Continuous <Continuous>  dextrose 50% Injectable 25 Gram(s) IV Push once  dextrose 50% Injectable 12.5 Gram(s) IV Push once  dextrose 50% Injectable 25 Gram(s) IV Push once  famotidine    Tablet 20 milliGRAM(s) Oral daily  glucagon  Injectable 1 milliGRAM(s) IntraMuscular once  heparin   Injectable 5000 Unit(s) SubCutaneous every 8 hours  insulin lispro (ADMELOG) corrective regimen sliding scale   SubCutaneous three times a day before meals  insulin lispro (ADMELOG) corrective regimen sliding scale   SubCutaneous at bedtime  latanoprost 0.005% Ophthalmic Solution 1 Drop(s) Both EYES at bedtime  melatonin 6 milliGRAM(s) Oral at bedtime  polyethylene glycol 3350 17 Gram(s) Oral daily    MEDICATIONS  (PRN):  artificial  tears Solution 1 Drop(s) Both EYES two times a day PRN Dry Eyes  dextrose Oral Gel 15 Gram(s) Oral once PRN Blood Glucose LESS THAN 70 milliGRAM(s)/deciliter  senna 1 Tablet(s) Oral two times a day PRN Constipation    Vital Signs Last 24 Hrs  T(F): 98.2 (08 Dec 2023 07:55), Max: 99.1 (07 Dec 2023 20:32)  HR: 69 (08 Dec 2023 07:55) (69 - 69)  BP: 117/74 (08 Dec 2023 07:55) (117/74 - 128/70)  RR: 15 (08 Dec 2023 07:55) (15 - 15)  SpO2: 98% (08 Dec 2023 07:55) (97% - 98%)  I&O's Summary    07 Dec 2023 07:01  -  08 Dec 2023 07:00  --------------------------------------------------------  IN: 0 mL / OUT: 1155 mL / NET: -1155 mL      PHYSICAL EXAM:  General: NAD, A/O x 3  ENT: MMM  Neck: Supple, No JVD  Lungs: Clear to auscultation bilaterally  Cardio: RRR, S1/S2, No murmurs  Abdomen: Soft, Nontender, Nondistended; Bowel sounds present  Extremities: No calf tenderness, No pitting edema    LABS:                        8.8    5.32  )-----------( 334      ( 08 Dec 2023 06:16 )             26.4     12-08    137  |  104  |  14  ----------------------------<  128  3.8   |  25  |  1.01    Ca    7.5      08 Dec 2023 06:16  Phos  2.2     12-07  Mg     2.6     12-07    TPro  5.9  /  Alb  1.8  /  TBili  0.3  /  DBili  x   /  AST  44  /  ALT  43  /  AlkPhos  104  12-08        POCT Blood Glucose.: 166 mg/dL (08 Dec 2023 12:09)  POCT Blood Glucose.: 115 mg/dL (08 Dec 2023 08:04)  POCT Blood Glucose.: 114 mg/dL (07 Dec 2023 22:17)  POCT Blood Glucose.: 119 mg/dL (07 Dec 2023 17:29)      Urinalysis Basic - ( 08 Dec 2023 06:16 )    Color: x / Appearance: x / SG: x / pH: x  Gluc: 128 mg/dL / Ketone: x  / Bili: x / Urobili: x   Blood: x / Protein: x / Nitrite: x   Leuk Esterase: x / RBC: x / WBC x   Sq Epi: x / Non Sq Epi: x / Bacteria: x        COVID-19 PCR: Detected (12-08-23 @ 11:25)  COVID-19 PCR: NotDetec (12-08-23 @ 05:00)  COVID-19 PCR: Detected (12-07-23 @ 22:00)      RADIOLOGY & ADDITIONAL TESTS:    < from: [facility] Hepatobiliary Imaging w/ RX (11.29.23 @ 14:20) >  IMPRESSION: Abnormal morphine-augmented hepatobiliary scan.    Findings are compatible with acute cholecystitis.    Findings were discussed via Teams message with Dr. ELIGIO BROWN   [phone] 11/29/2023 1:46 PM by Dr. Digna Bob with read back   confirmation.    --- End of Report ---    < end of copied text >      Care Discussed with Consultants/Other Providers: d/w rehab team during IDR round

## 2023-12-08 NOTE — CHART NOTE - NSCHARTNOTEFT_GEN_A_CORE
REHABILITATION DIAGNOSIS/IMPAIRMENT GROUP CODE:  Debility 2/2 acute cholecystitis         COMORBIDITIES/COMPLICATING CONDITIONS IMPACTING REHABILITATION:  HEALTH ISSUES - PROBLEM Dx:  - complicated by bile duct leak (s/p ERCP with stent on 12/5)  - s/p 7 days of zosyn  - S/P ICU for post-operative hypotension  - Impaired ADLs and mobility  - Need for assistance with personal care         PAST MEDICAL & SURGICAL HISTORY:  CVA (cerebral vascular accident)  left arm weakness, no sensation in left side in 1998      Aneurysm of [place] of [place]      HLD (hyperlipidemia)      Epilepsy  last seizure 2016      Renal agenesis, unilateral      Depression      Cerebral aneurysm without rupture  diagnosed 2017 had cerebral angio at [facility]      Gastroesophageal reflux disease, esophagitis presence not specified      Glaucoma of both eyes, unspecified glaucoma type      Bradycardia      Language barrier  Citizen of [place] speaking      T2DM (type 2 diabetes mellitus)      S/P craniotomy  for RCMA 1998      Pacemaker  Medtronic Serial [id number]/Model [id number]  Implant 6/29/07, new generator 12/23/2016      S/P bunionectomy      S/P cataract surgery  with lens implants      Status post coil embolization of cerebral aneurysm  9/2018          Based upon consideration of the patient's impairments, functional status, complicating conditions and any other contributing factors and after information garnered from the assessments of all therapy disciplines involved in treating the patient and other pertinent clinicians:    INTERDISCIPLINARY REHABILITATION INTERVENTIONS:    [ X  ] Transfer Training  [X   ] Bed Mobility  [ X  ] Therapeutic Exercise  [ X  ] Balance/Coordination Exercises  [ X  ] Locomotion retraining  [ X  ] Stairs  [ X  ] Functional Transfer Training  [ X  ] Bowel/Bladder program  [  X ] Pain Management  [ X  ] Skin/Wound Care  [X   ] Visual/Perceptual Training  [X   ] Therapeutic Recreation Activities  [  X ] Neuromuscular Re-education  [  X ] Activities of Daily Living  [   ] Speech Exercise  [   ] Swallowing Exercises  [   ] Vital Stim  [   ] Dietary Supplements  [   ] Calorie Count  [   ] Cognitive Exercises  [   ] Congnitive/Linguistic Treatment  [   ] Behavior Program  [  X ] Neuropsych Therapy  [ X  ] Patient/Family Counseling  [ X  ] Family Training  [ X  ] Community Re-entry  [ X  ] Orthotic Evaluation  [   ] Prosthetic Eval/Training    MEDICAL PROGNOSIS:  good     REHAB POTENTIAL:  good     EXPECTED DAILY THERAPY:         PT: 2hr/day       OT: 1hr/day       P&O: 0    EXPECTED INTENSITY OF PROGRAM:  3 hrs/day    EXPECTED FREQUENCY OF PROGRAM:  5 days/week    ESTIMATED LOS:  10-14 days    ESTIMATED DISPOSITION:  home    INTERDISCIPLINARY FUNCTIONAL OUTCOMES?GOALS:         Gait/Mobility: mod-independent       Transfers: mod-independent       ADLs: mod-independent       Functional Transfers: mod-independent       Medication Management: mod-independent       Communication: independent       Cognitive:               Bladder: continent       Bowel: continent REHABILITATION DIAGNOSIS/IMPAIRMENT GROUP CODE:  Debility 2/2 acute cholecystitis         COMORBIDITIES/COMPLICATING CONDITIONS IMPACTING REHABILITATION:  HEALTH ISSUES - PROBLEM Dx:  - complicated by bile duct leak (s/p ERCP with stent on 12/5)  - s/p 7 days of zosyn  - S/P ICU for post-operative hypotension  - Impaired ADLs and mobility  - Need for assistance with personal care         PAST MEDICAL & SURGICAL HISTORY:  CVA (cerebral vascular accident)  left arm weakness, no sensation in left side in 1998      Aneurysm of [place] of [place]      HLD (hyperlipidemia)      Epilepsy  last seizure 2016      Renal agenesis, unilateral      Depression      Cerebral aneurysm without rupture  diagnosed 2017 had cerebral angio at [facility]      Gastroesophageal reflux disease, esophagitis presence not specified      Glaucoma of both eyes, unspecified glaucoma type      Bradycardia      Language barrier  Vincentian speaking      T2DM (type 2 diabetes mellitus)      S/P craniotomy  for RCMA 1998      Pacemaker  Medtronic Serial [id number]/Model [id number]  Implant 6/29/07, new generator 12/23/2016      S/P bunionectomy      S/P cataract surgery  with lens implants      Status post coil embolization of cerebral aneurysm  9/2018          Based upon consideration of the patient's impairments, functional status, complicating conditions and any other contributing factors and after information garnered from the assessments of all therapy disciplines involved in treating the patient and other pertinent clinicians:    INTERDISCIPLINARY REHABILITATION INTERVENTIONS:    [ X  ] Transfer Training  [X   ] Bed Mobility  [ X  ] Therapeutic Exercise  [ X  ] Balance/Coordination Exercises  [ X  ] Locomotion retraining  [ X  ] Stairs  [ X  ] Functional Transfer Training  [ X  ] Bowel/Bladder program  [  X ] Pain Management  [ X  ] Skin/Wound Care  [X   ] Visual/Perceptual Training  [X   ] Therapeutic Recreation Activities  [  X ] Neuromuscular Re-education  [  X ] Activities of Daily Living  [   ] Speech Exercise  [   ] Swallowing Exercises  [   ] Vital Stim  [   ] Dietary Supplements  [   ] Calorie Count  [   ] Cognitive Exercises  [   ] Congnitive/Linguistic Treatment  [   ] Behavior Program  [  X ] Neuropsych Therapy  [ X  ] Patient/Family Counseling  [ X  ] Family Training  [ X  ] Community Re-entry  [ X  ] Orthotic Evaluation  [   ] Prosthetic Eval/Training    MEDICAL PROGNOSIS:  good     REHAB POTENTIAL:  good     EXPECTED DAILY THERAPY:         PT: 2hr/day       OT: 1hr/day       P&O: 0    EXPECTED INTENSITY OF PROGRAM:  3 hrs/day    EXPECTED FREQUENCY OF PROGRAM:  5 days/week    ESTIMATED LOS:  10-14 days    ESTIMATED DISPOSITION:  home    INTERDISCIPLINARY FUNCTIONAL OUTCOMES?GOALS:         Gait/Mobility: mod-independent       Transfers: mod-independent       ADLs: mod-independent       Functional Transfers: mod-independent       Medication Management: mod-independent       Communication: independent       Cognitive:               Bladder: continent       Bowel: continent

## 2023-12-09 LAB
GLUCOSE BLDC GLUCOMTR-MCNC: 140 MG/DL — HIGH (ref 70–99)
GLUCOSE BLDC GLUCOMTR-MCNC: 140 MG/DL — HIGH (ref 70–99)
GLUCOSE BLDC GLUCOMTR-MCNC: 151 MG/DL — HIGH (ref 70–99)
GLUCOSE BLDC GLUCOMTR-MCNC: 151 MG/DL — HIGH (ref 70–99)
GLUCOSE BLDC GLUCOMTR-MCNC: 171 MG/DL — HIGH (ref 70–99)
GLUCOSE BLDC GLUCOMTR-MCNC: 171 MG/DL — HIGH (ref 70–99)
GLUCOSE BLDC GLUCOMTR-MCNC: 175 MG/DL — HIGH (ref 70–99)
GLUCOSE BLDC GLUCOMTR-MCNC: 175 MG/DL — HIGH (ref 70–99)

## 2023-12-09 PROCEDURE — 99232 SBSQ HOSP IP/OBS MODERATE 35: CPT | Mod: GC

## 2023-12-09 PROCEDURE — 99232 SBSQ HOSP IP/OBS MODERATE 35: CPT

## 2023-12-09 RX ORDER — REMDESIVIR 5 MG/ML
INJECTION INTRAVENOUS
Refills: 0 | Status: COMPLETED | OUTPATIENT
Start: 2023-12-09 | End: 2023-12-13

## 2023-12-09 RX ORDER — MIDODRINE HYDROCHLORIDE 2.5 MG/1
2.5 TABLET ORAL
Refills: 0 | Status: DISCONTINUED | OUTPATIENT
Start: 2023-12-09 | End: 2023-12-10

## 2023-12-09 RX ORDER — SODIUM CHLORIDE 9 MG/ML
500 INJECTION INTRAMUSCULAR; INTRAVENOUS; SUBCUTANEOUS ONCE
Refills: 0 | Status: COMPLETED | OUTPATIENT
Start: 2023-12-09 | End: 2023-12-09

## 2023-12-09 RX ORDER — PANTOPRAZOLE SODIUM 20 MG/1
40 TABLET, DELAYED RELEASE ORAL DAILY
Refills: 0 | Status: DISCONTINUED | OUTPATIENT
Start: 2023-12-09 | End: 2023-12-19

## 2023-12-09 RX ORDER — REMDESIVIR 5 MG/ML
100 INJECTION INTRAVENOUS EVERY 24 HOURS
Refills: 0 | Status: COMPLETED | OUTPATIENT
Start: 2023-12-10 | End: 2023-12-13

## 2023-12-09 RX ORDER — SIMETHICONE 80 MG/1
80 TABLET, CHEWABLE ORAL THREE TIMES A DAY
Refills: 0 | Status: DISCONTINUED | OUTPATIENT
Start: 2023-12-09 | End: 2023-12-19

## 2023-12-09 RX ORDER — REMDESIVIR 5 MG/ML
200 INJECTION INTRAVENOUS EVERY 24 HOURS
Refills: 0 | Status: COMPLETED | OUTPATIENT
Start: 2023-12-09 | End: 2023-12-09

## 2023-12-09 RX ADMIN — HEPARIN SODIUM 5000 UNIT(S): 5000 INJECTION INTRAVENOUS; SUBCUTANEOUS at 15:50

## 2023-12-09 RX ADMIN — HEPARIN SODIUM 5000 UNIT(S): 5000 INJECTION INTRAVENOUS; SUBCUTANEOUS at 21:38

## 2023-12-09 RX ADMIN — FAMOTIDINE 20 MILLIGRAM(S): 10 INJECTION INTRAVENOUS at 12:54

## 2023-12-09 RX ADMIN — REMDESIVIR 200 MILLIGRAM(S): 5 INJECTION INTRAVENOUS at 15:00

## 2023-12-09 RX ADMIN — Medication 300 MILLIGRAM(S): at 05:12

## 2023-12-09 RX ADMIN — Medication 6 MILLIGRAM(S): at 21:38

## 2023-12-09 RX ADMIN — LATANOPROST 1 DROP(S): 0.05 SOLUTION/ DROPS OPHTHALMIC; TOPICAL at 21:38

## 2023-12-09 RX ADMIN — Medication 1: at 17:54

## 2023-12-09 RX ADMIN — Medication 300 MILLIGRAM(S): at 17:54

## 2023-12-09 RX ADMIN — ATORVASTATIN CALCIUM 80 MILLIGRAM(S): 80 TABLET, FILM COATED ORAL at 21:38

## 2023-12-09 RX ADMIN — POLYETHYLENE GLYCOL 3350 17 GRAM(S): 17 POWDER, FOR SOLUTION ORAL at 15:11

## 2023-12-09 RX ADMIN — PANTOPRAZOLE SODIUM 40 MILLIGRAM(S): 20 TABLET, DELAYED RELEASE ORAL at 13:58

## 2023-12-09 RX ADMIN — SODIUM CHLORIDE 500 MILLILITER(S): 9 INJECTION INTRAMUSCULAR; INTRAVENOUS; SUBCUTANEOUS at 14:03

## 2023-12-09 RX ADMIN — MIDODRINE HYDROCHLORIDE 2.5 MILLIGRAM(S): 2.5 TABLET ORAL at 13:59

## 2023-12-09 RX ADMIN — HEPARIN SODIUM 5000 UNIT(S): 5000 INJECTION INTRAVENOUS; SUBCUTANEOUS at 05:12

## 2023-12-09 RX ADMIN — Medication 81 MILLIGRAM(S): at 12:54

## 2023-12-09 RX ADMIN — Medication 1: at 08:44

## 2023-12-09 NOTE — PROGRESS NOTE ADULT - ASSESSMENT
68 year old right handed male patient with past medical history of sinus node disorder s/p PPM, HTN, HLD, cerebral aneurysm s/p coiling, CVA w/ residual L sided weakness, seizure disorder, type 2 diabetes on metformin and gastritis who is admitted for Acute Inpatient Rehabilitation with a multidisciplinary rehab program at [facility] with functional impairments in ADLs and mobility secondary to a complaint of worsening abdominal pain with imaging remarkable for distended gallbladder with wall thickening and pericholecystic fat stranding and hemangioma treated surgically with a lap to open cholecystectomy for acute cholecystitis on 11/30 complicated by drainage of a small amount of bile and a large amount of pus from gallbladder and subsequent requirement of ICU admission due to post-operative hypotension requiring pressors and acute blood loss anemia improved with transfusion of one unit of PRBCs. Additionally, patient was found to have bile duct leakage, requiring ERCP with stent on 12/5/23.    Debility 2/2 acute cholecystitis complicated by bile duct leak (s/p ERCP with stent on 12/5)  - monitor NIKHIL drain output  - outpatient GI f/u with Dr. Dorsey  - Repeat ERCP & stent removal in 4-8 weeks  - pt reports acid reflux symptoms. add protonix, continue with pepcid. Dec 9    COVID+  - patient tested positive for COVID on admission to rehab (12/7)  - low grade fever 100.1 this morning, BP 90/46, pt with mild cough. denies SOB  - start Remdesivi, no need for decadron at this time  - Guifenesin prn, albuterol prn    Hypotension  - pt reports orthostatic dizziness  - BP 90/46   - NS 500cc bolus x1 today   - start low dose midodrine 2.5mg BID. check orthostatic VS    Acute Blood Loss Anemia (improved)  - s/p 1 unit PRBC transfusion  - monitor H/H    Seizure Disorder  - continue home carbamazepine 300 mg BID    CVA/ HLD  - residual L sided weakness  - continue atorvastatin 80 mg QHS  - continue ASA 81 mg QD    HTN  - Home med: metoprolol 25 mg BID  - off anti-hypertensives   - Monitor BP    T2DM  - last HbA1c 6.1  - low dose SSI  - Monitor fingersticks  - on metformin at home    DVT prophylaxis:   - Heparin subq    d/w rehab team.  68 year old right handed male patient with past medical history of sinus node disorder s/p PPM, HTN, HLD, cerebral aneurysm s/p coiling, CVA w/ residual L sided weakness, seizure disorder, type 2 diabetes on metformin and gastritis who is admitted for Acute Inpatient Rehabilitation with a multidisciplinary rehab program at [facility] with functional impairments in ADLs and mobility secondary to a complaint of worsening abdominal pain with imaging remarkable for distended gallbladder with wall thickening and pericholecystic fat stranding and hemangioma treated surgically with a lap to open cholecystectomy for acute cholecystitis on 11/30 complicated by drainage of a small amount of bile and a large amount of pus from gallbladder and subsequent requirement of ICU admission due to post-operative hypotension requiring pressors and acute blood loss anemia improved with transfusion of one unit of PRBCs. Additionally, patient was found to have bile duct leakage, requiring ERCP with stent on 12/5/23.    Debility 2/2 acute cholecystitis complicated by bile duct leak (s/p ERCP with stent on 12/5)  - monitor NIKHIL drain output  - outpatient GI f/u with Dr. Dosrey  - Repeat ERCP & stent removal in 4-8 weeks  - pt reports acid reflux symptoms. add protonix, continue with pepcid. Dec 9    COVID+  - patient tested positive for COVID on admission to rehab (12/7)  - low grade fever 100.1 this morning, BP 90/46, pt with mild cough. denies SOB  - start Remdesivi, no need for decadron at this time  - Guifenesin prn, albuterol prn    Hypotension  - pt reports orthostatic dizziness  - BP 90/46   - NS 500cc bolus x1 today   - start low dose midodrine 2.5mg BID. check orthostatic VS    Acute Blood Loss Anemia (improved)  - s/p 1 unit PRBC transfusion  - monitor H/H    Seizure Disorder  - continue home carbamazepine 300 mg BID    CVA/ HLD  - residual L sided weakness  - continue atorvastatin 80 mg QHS  - continue ASA 81 mg QD    HTN  - Home med: metoprolol 25 mg BID  - off anti-hypertensives   - Monitor BP    T2DM  - last HbA1c 6.1  - low dose SSI  - Monitor fingersticks  - on metformin at home    DVT prophylaxis:   - Heparin subq    d/w rehab team.

## 2023-12-09 NOTE — PROGRESS NOTE ADULT - ASSESSMENT
· Assessment	  Assessment/Plan:  Mr. Benjamin Rios is a 68 year old right handed male patient with past medical history of sinus node disorder s/p PPM, HTN, HLD, cerebral aneurysm s/p coiling, CVA w/ residual L sided weakness, seizure disorder, type 2 diabetes on metformin and gastritis who is admitted for Acute Inpatient Rehabilitation with a multidisciplinary rehab program at [facility] with functional impairments in ADLs and mobility secondary to a complaint of worsening abdominal pain with imaging remarkable for distended gallbladder with wall thickening and pericholecystic fat stranding and hemangioma treated surgically with a lap to open cholecystectomy for acute cholecystitis on 11/30 complicated by drainage of a small amount of bile and a large amount of pus from gallbladder and subsequent requirement of ICU admission due to post-operative hypotension requiring pressors and acute blood loss anemia improved with transfusion of one unit of PRBCs. Additionally, patient was found to have bile duct leakage, requiring ERCP with stent on 12/5/23.      Debility 2/2 acute cholecystitis   - complicated by bile duct leak (s/p ERCP with stent on 12/5)  - s/p 7 days of zosyn  - S/P ICU for post-operative hypotension  - Impaired ADLs and mobility  - Need for assistance with personal care   - comprehensive rehab program of PT/OT - 3 hours a day, 5 days a week. P&O as needed   - Fall precautions  - monitor NIKHIL drain output  - outpatient GI f/u with Dr. Dorsey  - Repeat ERCP & stent removal in 4-8 weeks    COVID+  - patient tested positive for COVID on admission to rehab (12/7)  - isolation precautions  - monitor for symptoms  started on Remdesivir by hospitalist secondary to low grade temps    Acute Blood Loss Anemia (improved)  - s/p 1 unit PRBC transfusion  - monitor H/H    Seizure Disorder  - continue home carbamazepine 300 mg BID    h/o CVA/ HLD  - residual L sided weakness  - continue atorvastatin 80 mg QHS  - continue ASA 81 mg QD    HTN  - Home med: metoprolol 25 mg BID  - required pressors in ICU post-op  Hypotensive today- low urine output- IV NS fluid challenge given  started on low dose midodrine   would encourage PO Fluids    T2DM  - last HbA1c 6.1  - low dose SSI  - Monitor fingersticks  - on metformin at home  glucoses OK    Mood / Cognition  - Neuropsychology consult PRN    Sleep  - Maintain quiet hours and a low stim environment.   - Melatonin 6 mg QHS    GI / Bowel  - Senna qHS PRN  - Miralax Daily  - GI ppx: famotidine 20 mg QD     / Bladder  - BS Q8H and SC >400cc   St caths X 2 yesterday 1000cc- No st caths recorded to date - ? spont Void  will check labs in AM    Skin / Pressure injury  - Skin assessment on admission performed: abdominal incision with staple,   - Pressure Injury/Skin: OOB to chair, PT/OT  - nursing to monitor skin q Shift    Diet/Dysphagia:  - Diet Consistency: DASH, regular  - Nutrition consult    DVT prophylaxis:   - Heparin 5000u Q8H   · Assessment	  Assessment/Plan:  Mr. Benjamin Rios is a 68 year old right handed male patient with past medical history of sinus node disorder s/p PPM, HTN, HLD, cerebral aneurysm s/p coiling, CVA w/ residual L sided weakness, seizure disorder, type 2 diabetes on metformin and gastritis who is admitted for Acute Inpatient Rehabilitation with a multidisciplinary rehab program at [facility] with functional impairments in ADLs and mobility secondary to a complaint of worsening abdominal pain with imaging remarkable for distended gallbladder with wall thickening and pericholecystic fat stranding and hemangioma treated surgically with a lap to open cholecystectomy for acute cholecystitis on 11/30 complicated by drainage of a small amount of bile and a large amount of pus from gallbladder and subsequent requirement of ICU admission due to post-operative hypotension requiring pressors and acute blood loss anemia improved with transfusion of one unit of PRBCs. Additionally, patient was found to have bile duct leakage, requiring ERCP with stent on 12/5/23.      Debility 2/2 acute cholecystitis   - complicated by bile duct leak (s/p ERCP with stent on 12/5)  - s/p 7 days of zosyn  - S/P ICU for post-operative hypotension  - Impaired ADLs and mobility  - Need for assistance with personal care   - comprehensive rehab program of PT/OT - 3 hours a day, 5 days a week. P&O as needed   - Fall precautions  - monitor NIKHIL drain output  - outpatient GI f/u with Dr. Dorsey  - Repeat ERCP & stent removal in 4-8 weeks    COVID+  - patient tested positive for COVID on admission to rehab (12/7)  - isolation precautions  - monitor for symptoms  started on Remdesivir by hospitalist secondary to low grade temps    Acute Blood Loss Anemia (improved)  - s/p 1 unit PRBC transfusion  - monitor H/H    Seizure Disorder  - continue home carbamazepine 300 mg BID    h/o CVA/ HLD  - residual L sided weakness  - continue atorvastatin 80 mg QHS  - continue ASA 81 mg QD    HTN  - Home med: metoprolol 25 mg BID  - required pressors in ICU post-op  Hypotensive today- low urine output- IV NS fluid challenge given  started on low dose midodrine   would encourage PO Fluids    T2DM  - last HbA1c 6.1  - low dose SSI  - Monitor fingersticks  - on metformin at home  glucoses OK    Mood / Cognition  - Neuropsychology consult PRN    Sleep  - Maintain quiet hours and a low stim environment.   - Melatonin 6 mg QHS    GI / Bowel  - Senna qHS PRN  - Miralax Daily  - GI ppx: famotidine 20 mg QD     / Bladder  - BS Q8H and SC >400cc   St caths X 2 yesterday 1000cc- No st caths recorded to date - ? spont Void  will check labs in AM    Skin / Pressure injury  - Skin assessment on admission performed: abdominal incision with staple,   - Pressure Injury/Skin: OOB to chair, PT/OT  - nursing to monitor skin q Shift    Diet/Dysphagia:  - Diet Consistency: DASH, regular  - Nutrition consult    DVT prophylaxis:   - Heparin 5000u Q8H

## 2023-12-09 NOTE — PROGRESS NOTE ADULT - SUBJECTIVE AND OBJECTIVE BOX
Patient is a 68y old  Male who presents with a chief complaint of Other malaise     (08 Dec 2023 14:33)      Subjective and overnight events:  Patient seen and examined at bedside. low grade fever 100.1, pt admits to chills. + mild cough. no SOB, CP. + acid reflux and gas . no nausea/vomiting     ALLERGIES:  No Known Allergies    MEDICATIONS  (STANDING):  aspirin enteric coated 81 milliGRAM(s) Oral daily  atorvastatin 80 milliGRAM(s) Oral at bedtime  carBAMazepine ER Tablet 300 milliGRAM(s) Oral two times a day  dextrose 5%. 1000 milliLiter(s) (50 mL/Hr) IV Continuous <Continuous>  dextrose 5%. 1000 milliLiter(s) (100 mL/Hr) IV Continuous <Continuous>  dextrose 50% Injectable 25 Gram(s) IV Push once  dextrose 50% Injectable 12.5 Gram(s) IV Push once  dextrose 50% Injectable 25 Gram(s) IV Push once  famotidine    Tablet 20 milliGRAM(s) Oral daily  glucagon  Injectable 1 milliGRAM(s) IntraMuscular once  heparin   Injectable 5000 Unit(s) SubCutaneous every 8 hours  insulin lispro (ADMELOG) corrective regimen sliding scale   SubCutaneous three times a day before meals  insulin lispro (ADMELOG) corrective regimen sliding scale   SubCutaneous at bedtime  latanoprost 0.005% Ophthalmic Solution 1 Drop(s) Both EYES at bedtime  melatonin 6 milliGRAM(s) Oral at bedtime  midodrine. 2.5 milliGRAM(s) Oral <User Schedule>  polyethylene glycol 3350 17 Gram(s) Oral daily  remdesivir  IVPB 200 milliGRAM(s) IV Intermittent every 24 hours  remdesivir  IVPB   IV Intermittent   sodium chloride 0.9% Bolus 500 milliLiter(s) IV Bolus once    MEDICATIONS  (PRN):  albuterol    90 MICROgram(s) HFA Inhaler 1 Puff(s) Inhalation every 4 hours PRN Shortness of Breath and/or Wheezing  artificial  tears Solution 1 Drop(s) Both EYES two times a day PRN Dry Eyes  dextrose Oral Gel 15 Gram(s) Oral once PRN Blood Glucose LESS THAN 70 milliGRAM(s)/deciliter  guaiFENesin Oral Liquid (Sugar-Free) 200 milliGRAM(s) Oral every 6 hours PRN Cough  senna 1 Tablet(s) Oral two times a day PRN Constipation  simethicone 80 milliGRAM(s) Chew three times a day PRN Gas    Vital Signs Last 24 Hrs  T(F): 100.1 (09 Dec 2023 08:51), Max: 100.1 (09 Dec 2023 08:51)  HR: 87 (09 Dec 2023 08:51) (86 - 87)  BP: 90/46 (09 Dec 2023 08:51) (90/46 - 120/56)  RR: 16 (09 Dec 2023 08:51) (16 - 16)  SpO2: 96% (09 Dec 2023 08:51) (96% - 97%)  I&O's Summary    08 Dec 2023 07:01  -  09 Dec 2023 07:00  --------------------------------------------------------  IN: 0 mL / OUT: 1000 mL / NET: -1000 mL      PHYSICAL EXAM:  General: NAD, A/O x 3  ENT: MMM  Neck: Supple, No JVD  Lungs: Clear to auscultation bilaterally  Cardio: RRR, S1/S2, No murmurs  Abdomen: Soft, Nontender, Nondistended; Bowel sounds present  Extremities: No calf tenderness, No pitting edema    LABS:                        8.8    5.32  )-----------( 334      ( 08 Dec 2023 06:16 )             26.4     12-08    137  |  104  |  14  ----------------------------<  128  3.8   |  25  |  1.01    Ca    7.5      08 Dec 2023 06:16  Phos  2.2     12-07  Mg     2.6     12-07    TPro  5.9  /  Alb  1.8  /  TBili  0.3  /  DBili  x   /  AST  44  /  ALT  43  /  AlkPhos  104  12-08      POCT Blood Glucose.: 171 mg/dL (09 Dec 2023 08:41)  POCT Blood Glucose.: 142 mg/dL (08 Dec 2023 22:14)  POCT Blood Glucose.: 141 mg/dL (08 Dec 2023 17:33)      Urinalysis Basic - ( 08 Dec 2023 06:16 )    Color: x / Appearance: x / SG: x / pH: x  Gluc: 128 mg/dL / Ketone: x  / Bili: x / Urobili: x   Blood: x / Protein: x / Nitrite: x   Leuk Esterase: x / RBC: x / WBC x   Sq Epi: x / Non Sq Epi: x / Bacteria: x        COVID-19 PCR: Detected (12-08-23 @ 11:25)  COVID-19 PCR: NotDetec (12-08-23 @ 05:00)  COVID-19 PCR: Detected (12-07-23 @ 22:00)      RADIOLOGY & ADDITIONAL TESTS:    Care Discussed with Consultants/Other Providers:

## 2023-12-09 NOTE — PROGRESS NOTE ADULT - SUBJECTIVE AND OBJECTIVE BOX
Chief complaint- weakness and difficulty ambulating    Mr. Benjamin Rios is a 68 year old right handed male patient with past medical history of sinus node disorder s/p PPM, HTN, HLD, cerebral aneurysm s/p coiling, CVA w/ residual L sided weakness, seizure disorder, type 2 diabetes on metformin and gastritis admitted to [facility] on 11/28 for worsening abdominal pain since 11/24. CT chest abd/pelvis showed distended gallbladder with gallbladder wall thickening and pericholecystic fat stranding and hemangioma in T7 vertebrae. Patient underwent lap to open cholecystectomy for acute cholecystitis on 11/30. Procedure was complicated by drainage of a small amount of bile and a large amount of pus from gallbladder. Patient was upgraded to ICU due to post-operative hypotension requiring pressors then downgraded to surgical floor. Hospital course further complicated by acute blood loss anemia, improved s/p 1 unit PRBCs. Patient also found to have bile duct leakage, requiring ERCP with stent on 12/5/23. Patient requires follow up with GI and a follow up ERCP in 4-8 weeks. Patient evaluated by PT/OT and was recommended for acute inpatient rehab. Patient is medically stable for discharge to acute rehab on 12/7.    ROS/Subjective:  patient seen and evaluated bedside  reports no significant pain  reports gassy feeling  some loose stool  minimal if any urine output- needed St cath yesterday AM  BPs Low- given fluid Challenge by hospitalist  started on remdesivir  no reported Resp complaints, no sore throat- T max 100.2 however  no nausea, no vomiting, no SOB, no chest pain , no dizziness, no headaches      MEDICATIONS  (STANDING):  aspirin enteric coated 81 milliGRAM(s) Oral daily  atorvastatin 80 milliGRAM(s) Oral at bedtime  carBAMazepine ER Tablet 300 milliGRAM(s) Oral two times a day  dextrose 5%. 1000 milliLiter(s) (50 mL/Hr) IV Continuous <Continuous>  dextrose 5%. 1000 milliLiter(s) (100 mL/Hr) IV Continuous <Continuous>  dextrose 50% Injectable 25 Gram(s) IV Push once  dextrose 50% Injectable 12.5 Gram(s) IV Push once  dextrose 50% Injectable 25 Gram(s) IV Push once  famotidine    Tablet 20 milliGRAM(s) Oral daily  glucagon  Injectable 1 milliGRAM(s) IntraMuscular once  heparin   Injectable 5000 Unit(s) SubCutaneous every 8 hours  insulin lispro (ADMELOG) corrective regimen sliding scale   SubCutaneous three times a day before meals  insulin lispro (ADMELOG) corrective regimen sliding scale   SubCutaneous at bedtime  latanoprost 0.005% Ophthalmic Solution 1 Drop(s) Both EYES at bedtime  melatonin 6 milliGRAM(s) Oral at bedtime  midodrine. 2.5 milliGRAM(s) Oral <User Schedule>  pantoprazole    Tablet 40 milliGRAM(s) Oral daily  polyethylene glycol 3350 17 Gram(s) Oral daily  remdesivir  IVPB   IV Intermittent     MEDICATIONS  (PRN):  albuterol    90 MICROgram(s) HFA Inhaler 1 Puff(s) Inhalation every 4 hours PRN Shortness of Breath and/or Wheezing  artificial  tears Solution 1 Drop(s) Both EYES two times a day PRN Dry Eyes  dextrose Oral Gel 15 Gram(s) Oral once PRN Blood Glucose LESS THAN 70 milliGRAM(s)/deciliter  guaiFENesin Oral Liquid (Sugar-Free) 200 milliGRAM(s) Oral every 6 hours PRN Cough  senna 1 Tablet(s) Oral two times a day PRN Constipation  simethicone 80 milliGRAM(s) Chew three times a day PRN Gas                            8.8    5.32  )-----------( 334      ( 08 Dec 2023 06:16 )             26.4     12-08    137  |  104  |  14  ----------------------------<  128<H>  3.8   |  25  |  1.01    Ca    7.5<L>      08 Dec 2023 06:16    TPro  5.9<L>  /  Alb  1.8<L>  /  TBili  0.3  /  DBili  x   /  AST  44<H>  /  ALT  43  /  AlkPhos  104  12-08    Urinalysis Basic - ( 08 Dec 2023 06:16 )    Color: x / Appearance: x / SG: x / pH: x  Gluc: 128 mg/dL / Ketone: x  / Bili: x / Urobili: x   Blood: x / Protein: x / Nitrite: x   Leuk Esterase: x / RBC: x / WBC x   Sq Epi: x / Non Sq Epi: x / Bacteria: x        CAPILLARY BLOOD GLUCOSE      POCT Blood Glucose.: 140 mg/dL (09 Dec 2023 12:53)  POCT Blood Glucose.: 171 mg/dL (09 Dec 2023 08:41)  POCT Blood Glucose.: 142 mg/dL (08 Dec 2023 22:14)  POCT Blood Glucose.: 141 mg/dL (08 Dec 2023 17:33)      Vital Signs Last 24 Hrs  T(C): 37.8 (09 Dec 2023 08:51), Max: 37.8 (09 Dec 2023 08:51)  T(F): 100.1 (09 Dec 2023 08:51), Max: 100.1 (09 Dec 2023 08:51)  HR: 79 (09 Dec 2023 13:57) (79 - 87)  BP: 106/64 (09 Dec 2023 13:57) (90/46 - 120/56)  BP(mean): --  RR: 16 (09 Dec 2023 08:51) (16 - 16)  SpO2: 96% (09 Dec 2023 08:51) (96% - 97%)    Parameters below as of 09 Dec 2023 08:51  Patient On (Oxygen Delivery Method): room air        Gen - NAD, Comfortable  HEENT - NCAT, EOMI, MMM  Neck - Supple, No limited ROM  Pulm - CTAB, No wheeze, No rhonchi, No crackles  Cardiovascular - RRR, S1S2  Abdomen - non tender , +BS, NIKHIL drain x 2   Extremities - No clubbing, no cyanosis, no peripheral edema, no calf tenderness  Neuro-     Cognitive - Awake, Alert, Oriented  to self, place, date - with options. Able to follow command     Communication - Fluent, Comprehensible     Attention: Intact      Memory: recalled 2/3 items     Cranial Nerves - CN 2-12 intact.     Motor -                     LEFT    UE - 4/5                    RIGHT UE - 5/5                    LEFT    LE - HF 5/5, KE 5/5, DF/PF 2/5- left foot drop                    RIGHT LE -  5/5        Sensory - Intact to LT     Reflexes - DTR Intact, No primitive reflexive     Tone - normal  Psychiatric - Mood stable, Affect WNL  Skin: abdominal incision with staple Chief complaint- weakness and difficulty ambulating    Mr. Benjamin Rios is a 68 year old right handed male patient with past medical history of sinus node disorder s/p PPM, HTN, HLD, cerebral aneurysm s/p coiling, CVA w/ residual L sided weakness, seizure disorder, type 2 diabetes on metformin and gastritis admitted to [facility] on 11/28 for worsening abdominal pain since 11/24. CT chest abd/pelvis showed distended gallbladder with gallbladder wall thickening and pericholecystic fat stranding and hemangioma in T7 vertebrae. Patient underwent lap to open cholecystectomy for acute cholecystitis on 11/30. Procedure was complicated by drainage of a small amount of bile and a large amount of pus from gallbladder. Patient was upgraded to ICU due to post-operative hypotension requiring pressors then downgraded to surgical floor. Hospital course further complicated by acute blood loss anemia, improved s/p 1 unit PRBCs. Patient also found to have bile duct leakage, requiring ERCP with stent on 12/5/23. Patient requires follow up with GI and a follow up ERCP in 4-8 weeks. Patient evaluated by PT/OT and was recommended for acute inpatient rehab. Patient is medically stable for discharge to acute rehab on 12/7.    ROS/Subjective:  patient seen and evaluated bedside  reports no significant pain  reports gassy feeling  some loose stool  minimal if any urine output- needed St cath yesterday AM  BPs Low- given fluid Challenge by hospitalist  started on remdesivir  no reported Resp complaints, no sore throat- T max 100.2 however  no nausea, no vomiting, no SOB, no chest pain , no dizziness, no headaches      MEDICATIONS  (STANDING):  aspirin enteric coated 81 milliGRAM(s) Oral daily  atorvastatin 80 milliGRAM(s) Oral at bedtime  carBAMazepine ER Tablet 300 milliGRAM(s) Oral two times a day  dextrose 5%. 1000 milliLiter(s) (50 mL/Hr) IV Continuous <Continuous>  dextrose 5%. 1000 milliLiter(s) (100 mL/Hr) IV Continuous <Continuous>  dextrose 50% Injectable 25 Gram(s) IV Push once  dextrose 50% Injectable 12.5 Gram(s) IV Push once  dextrose 50% Injectable 25 Gram(s) IV Push once  famotidine    Tablet 20 milliGRAM(s) Oral daily  glucagon  Injectable 1 milliGRAM(s) IntraMuscular once  heparin   Injectable 5000 Unit(s) SubCutaneous every 8 hours  insulin lispro (ADMELOG) corrective regimen sliding scale   SubCutaneous three times a day before meals  insulin lispro (ADMELOG) corrective regimen sliding scale   SubCutaneous at bedtime  latanoprost 0.005% Ophthalmic Solution 1 Drop(s) Both EYES at bedtime  melatonin 6 milliGRAM(s) Oral at bedtime  midodrine. 2.5 milliGRAM(s) Oral <User Schedule>  pantoprazole    Tablet 40 milliGRAM(s) Oral daily  polyethylene glycol 3350 17 Gram(s) Oral daily  remdesivir  IVPB   IV Intermittent     MEDICATIONS  (PRN):  albuterol    90 MICROgram(s) HFA Inhaler 1 Puff(s) Inhalation every 4 hours PRN Shortness of Breath and/or Wheezing  artificial  tears Solution 1 Drop(s) Both EYES two times a day PRN Dry Eyes  dextrose Oral Gel 15 Gram(s) Oral once PRN Blood Glucose LESS THAN 70 milliGRAM(s)/deciliter  guaiFENesin Oral Liquid (Sugar-Free) 200 milliGRAM(s) Oral every 6 hours PRN Cough  senna 1 Tablet(s) Oral two times a day PRN Constipation  simethicone 80 milliGRAM(s) Chew three times a day PRN Gas                            8.8    5.32  )-----------( 334      ( 08 Dec 2023 06:16 )             26.4     12-08    137  |  104  |  14  ----------------------------<  128<H>  3.8   |  25  |  1.01    Ca    7.5<L>      08 Dec 2023 06:16    TPro  5.9<L>  /  Alb  1.8<L>  /  TBili  0.3  /  DBili  x   /  AST  44<H>  /  ALT  43  /  AlkPhos  104  12-08    Urinalysis Basic - ( 08 Dec 2023 06:16 )    Color: x / Appearance: x / SG: x / pH: x  Gluc: 128 mg/dL / Ketone: x  / Bili: x / Urobili: x   Blood: x / Protein: x / Nitrite: x   Leuk Esterase: x / RBC: x / WBC x   Sq Epi: x / Non Sq Epi: x / Bacteria: x        CAPILLARY BLOOD GLUCOSE      POCT Blood Glucose.: 140 mg/dL (09 Dec 2023 12:53)  POCT Blood Glucose.: 171 mg/dL (09 Dec 2023 08:41)  POCT Blood Glucose.: 142 mg/dL (08 Dec 2023 22:14)  POCT Blood Glucose.: 141 mg/dL (08 Dec 2023 17:33)      Vital Signs Last 24 Hrs  T(C): 37.8 (09 Dec 2023 08:51), Max: 37.8 (09 Dec 2023 08:51)  T(F): 100.1 (09 Dec 2023 08:51), Max: 100.1 (09 Dec 2023 08:51)  HR: 79 (09 Dec 2023 13:57) (79 - 87)  BP: 106/64 (09 Dec 2023 13:57) (90/46 - 120/56)  BP(mean): --  RR: 16 (09 Dec 2023 08:51) (16 - 16)  SpO2: 96% (09 Dec 2023 08:51) (96% - 97%)    Parameters below as of 09 Dec 2023 08:51  Patient On (Oxygen Delivery Method): room air        Gen - NAD, Comfortable  HEENT - NCAT, EOMI, MMM  Neck - Supple, No limited ROM  Pulm - CTAB, No wheeze, No rhonchi, No crackles  Cardiovascular - RRR, S1S2  Abdomen - non tender , +BS, NIKHIL drain x 2   Extremities - No clubbing, no cyanosis, no peripheral edema, no calf tenderness  Neuro-     Cognitive - Awake, Alert, Oriented  to self, place, date - with options. Able to follow command     Communication - Fluent, Comprehensible     Attention: Intact      Memory: recalled 2/3 items     Cranial Nerves - CN 2-12 intact.     Motor -                     LEFT    UE - 4/5                    RIGHT UE - 5/5                    LEFT    LE - HF 5/5, KE 5/5, DF/PF 2/5- left foot drop                    RIGHT LE -  5/5        Sensory - Intact to LT     Reflexes - DTR Intact, No primitive reflexive     Tone - normal  Psychiatric - Mood stable, Affect WNL  Skin: abdominal incision with staple

## 2023-12-10 LAB
ALBUMIN SERPL ELPH-MCNC: 1.8 G/DL — LOW (ref 3.3–5)
ALBUMIN SERPL ELPH-MCNC: 1.8 G/DL — LOW (ref 3.3–5)
ALP SERPL-CCNC: 110 U/L — SIGNIFICANT CHANGE UP (ref 40–120)
ALP SERPL-CCNC: 110 U/L — SIGNIFICANT CHANGE UP (ref 40–120)
ALT FLD-CCNC: 39 U/L — SIGNIFICANT CHANGE UP (ref 10–45)
ALT FLD-CCNC: 39 U/L — SIGNIFICANT CHANGE UP (ref 10–45)
ANION GAP SERPL CALC-SCNC: 9 MMOL/L — SIGNIFICANT CHANGE UP (ref 5–17)
ANION GAP SERPL CALC-SCNC: 9 MMOL/L — SIGNIFICANT CHANGE UP (ref 5–17)
AST SERPL-CCNC: 46 U/L — HIGH (ref 10–40)
AST SERPL-CCNC: 46 U/L — HIGH (ref 10–40)
BILIRUB SERPL-MCNC: 0.3 MG/DL — SIGNIFICANT CHANGE UP (ref 0.2–1.2)
BILIRUB SERPL-MCNC: 0.3 MG/DL — SIGNIFICANT CHANGE UP (ref 0.2–1.2)
BUN SERPL-MCNC: 12 MG/DL — SIGNIFICANT CHANGE UP (ref 7–23)
BUN SERPL-MCNC: 12 MG/DL — SIGNIFICANT CHANGE UP (ref 7–23)
CALCIUM SERPL-MCNC: 6.7 MG/DL — LOW (ref 8.4–10.5)
CALCIUM SERPL-MCNC: 6.7 MG/DL — LOW (ref 8.4–10.5)
CHLORIDE SERPL-SCNC: 103 MMOL/L — SIGNIFICANT CHANGE UP (ref 96–108)
CHLORIDE SERPL-SCNC: 103 MMOL/L — SIGNIFICANT CHANGE UP (ref 96–108)
CO2 SERPL-SCNC: 23 MMOL/L — SIGNIFICANT CHANGE UP (ref 22–31)
CO2 SERPL-SCNC: 23 MMOL/L — SIGNIFICANT CHANGE UP (ref 22–31)
CREAT SERPL-MCNC: 1.01 MG/DL — SIGNIFICANT CHANGE UP (ref 0.5–1.3)
CREAT SERPL-MCNC: 1.01 MG/DL — SIGNIFICANT CHANGE UP (ref 0.5–1.3)
EGFR: 80 ML/MIN/1.73M2 — SIGNIFICANT CHANGE UP
EGFR: 80 ML/MIN/1.73M2 — SIGNIFICANT CHANGE UP
GLUCOSE SERPL-MCNC: 163 MG/DL — HIGH (ref 70–99)
GLUCOSE SERPL-MCNC: 163 MG/DL — HIGH (ref 70–99)
HCT VFR BLD CALC: 26.8 % — LOW (ref 39–50)
HCT VFR BLD CALC: 26.8 % — LOW (ref 39–50)
HGB BLD-MCNC: 8.8 G/DL — LOW (ref 13–17)
HGB BLD-MCNC: 8.8 G/DL — LOW (ref 13–17)
MCHC RBC-ENTMCNC: 31.9 PG — SIGNIFICANT CHANGE UP (ref 27–34)
MCHC RBC-ENTMCNC: 31.9 PG — SIGNIFICANT CHANGE UP (ref 27–34)
MCHC RBC-ENTMCNC: 32.8 GM/DL — SIGNIFICANT CHANGE UP (ref 32–36)
MCHC RBC-ENTMCNC: 32.8 GM/DL — SIGNIFICANT CHANGE UP (ref 32–36)
MCV RBC AUTO: 97.1 FL — SIGNIFICANT CHANGE UP (ref 80–100)
MCV RBC AUTO: 97.1 FL — SIGNIFICANT CHANGE UP (ref 80–100)
NRBC # BLD: 0 /100 WBCS — SIGNIFICANT CHANGE UP (ref 0–0)
NRBC # BLD: 0 /100 WBCS — SIGNIFICANT CHANGE UP (ref 0–0)
PLATELET # BLD AUTO: 384 K/UL — SIGNIFICANT CHANGE UP (ref 150–400)
PLATELET # BLD AUTO: 384 K/UL — SIGNIFICANT CHANGE UP (ref 150–400)
POTASSIUM SERPL-MCNC: 4 MMOL/L — SIGNIFICANT CHANGE UP (ref 3.5–5.3)
POTASSIUM SERPL-MCNC: 4 MMOL/L — SIGNIFICANT CHANGE UP (ref 3.5–5.3)
POTASSIUM SERPL-SCNC: 4 MMOL/L — SIGNIFICANT CHANGE UP (ref 3.5–5.3)
POTASSIUM SERPL-SCNC: 4 MMOL/L — SIGNIFICANT CHANGE UP (ref 3.5–5.3)
PROT SERPL-MCNC: 6.1 G/DL — SIGNIFICANT CHANGE UP (ref 6–8.3)
PROT SERPL-MCNC: 6.1 G/DL — SIGNIFICANT CHANGE UP (ref 6–8.3)
RBC # BLD: 2.76 M/UL — LOW (ref 4.2–5.8)
RBC # BLD: 2.76 M/UL — LOW (ref 4.2–5.8)
RBC # FLD: 15.5 % — HIGH (ref 10.3–14.5)
RBC # FLD: 15.5 % — HIGH (ref 10.3–14.5)
SODIUM SERPL-SCNC: 135 MMOL/L — SIGNIFICANT CHANGE UP (ref 135–145)
SODIUM SERPL-SCNC: 135 MMOL/L — SIGNIFICANT CHANGE UP (ref 135–145)
WBC # BLD: 10.21 K/UL — SIGNIFICANT CHANGE UP (ref 3.8–10.5)
WBC # BLD: 10.21 K/UL — SIGNIFICANT CHANGE UP (ref 3.8–10.5)
WBC # FLD AUTO: 10.21 K/UL — SIGNIFICANT CHANGE UP (ref 3.8–10.5)
WBC # FLD AUTO: 10.21 K/UL — SIGNIFICANT CHANGE UP (ref 3.8–10.5)

## 2023-12-10 PROCEDURE — 99232 SBSQ HOSP IP/OBS MODERATE 35: CPT

## 2023-12-10 PROCEDURE — 99232 SBSQ HOSP IP/OBS MODERATE 35: CPT | Mod: GC

## 2023-12-10 RX ORDER — MIDODRINE HYDROCHLORIDE 2.5 MG/1
5 TABLET ORAL
Refills: 0 | Status: DISCONTINUED | OUTPATIENT
Start: 2023-12-10 | End: 2023-12-19

## 2023-12-10 RX ADMIN — Medication 6 MILLIGRAM(S): at 21:05

## 2023-12-10 RX ADMIN — HEPARIN SODIUM 5000 UNIT(S): 5000 INJECTION INTRAVENOUS; SUBCUTANEOUS at 21:04

## 2023-12-10 RX ADMIN — Medication 300 MILLIGRAM(S): at 17:56

## 2023-12-10 RX ADMIN — POLYETHYLENE GLYCOL 3350 17 GRAM(S): 17 POWDER, FOR SOLUTION ORAL at 13:00

## 2023-12-10 RX ADMIN — FAMOTIDINE 20 MILLIGRAM(S): 10 INJECTION INTRAVENOUS at 12:57

## 2023-12-10 RX ADMIN — Medication 300 MILLIGRAM(S): at 05:09

## 2023-12-10 RX ADMIN — MIDODRINE HYDROCHLORIDE 2.5 MILLIGRAM(S): 2.5 TABLET ORAL at 06:52

## 2023-12-10 RX ADMIN — PANTOPRAZOLE SODIUM 40 MILLIGRAM(S): 20 TABLET, DELAYED RELEASE ORAL at 12:57

## 2023-12-10 RX ADMIN — LATANOPROST 1 DROP(S): 0.05 SOLUTION/ DROPS OPHTHALMIC; TOPICAL at 21:04

## 2023-12-10 RX ADMIN — HEPARIN SODIUM 5000 UNIT(S): 5000 INJECTION INTRAVENOUS; SUBCUTANEOUS at 05:09

## 2023-12-10 RX ADMIN — MIDODRINE HYDROCHLORIDE 5 MILLIGRAM(S): 2.5 TABLET ORAL at 12:58

## 2023-12-10 RX ADMIN — REMDESIVIR 200 MILLIGRAM(S): 5 INJECTION INTRAVENOUS at 12:51

## 2023-12-10 RX ADMIN — ATORVASTATIN CALCIUM 80 MILLIGRAM(S): 80 TABLET, FILM COATED ORAL at 21:05

## 2023-12-10 RX ADMIN — HEPARIN SODIUM 5000 UNIT(S): 5000 INJECTION INTRAVENOUS; SUBCUTANEOUS at 13:05

## 2023-12-10 RX ADMIN — Medication 81 MILLIGRAM(S): at 12:56

## 2023-12-10 NOTE — PROGRESS NOTE ADULT - SUBJECTIVE AND OBJECTIVE BOX
Patient is a 68y old  Male who presents with a chief complaint of Debility 2/2 acute cholecystitis (09 Dec 2023 16:58)      Subjective and overnight events:  Patient seen and examined at bedside. no complaints. no fever, chills, sob, cp, abd pain.     ALLERGIES:  No Known Allergies    MEDICATIONS  (STANDING):  aspirin enteric coated 81 milliGRAM(s) Oral daily  atorvastatin 80 milliGRAM(s) Oral at bedtime  carBAMazepine ER Tablet 300 milliGRAM(s) Oral two times a day  dextrose 5%. 1000 milliLiter(s) (100 mL/Hr) IV Continuous <Continuous>  dextrose 5%. 1000 milliLiter(s) (50 mL/Hr) IV Continuous <Continuous>  dextrose 50% Injectable 25 Gram(s) IV Push once  dextrose 50% Injectable 12.5 Gram(s) IV Push once  dextrose 50% Injectable 25 Gram(s) IV Push once  famotidine    Tablet 20 milliGRAM(s) Oral daily  glucagon  Injectable 1 milliGRAM(s) IntraMuscular once  heparin   Injectable 5000 Unit(s) SubCutaneous every 8 hours  latanoprost 0.005% Ophthalmic Solution 1 Drop(s) Both EYES at bedtime  melatonin 6 milliGRAM(s) Oral at bedtime  midodrine. 5 milliGRAM(s) Oral <User Schedule>  pantoprazole    Tablet 40 milliGRAM(s) Oral daily  polyethylene glycol 3350 17 Gram(s) Oral daily  remdesivir  IVPB 100 milliGRAM(s) IV Intermittent every 24 hours  remdesivir  IVPB   IV Intermittent     MEDICATIONS  (PRN):  albuterol    90 MICROgram(s) HFA Inhaler 1 Puff(s) Inhalation every 4 hours PRN Shortness of Breath and/or Wheezing  artificial  tears Solution 1 Drop(s) Both EYES two times a day PRN Dry Eyes  dextrose Oral Gel 15 Gram(s) Oral once PRN Blood Glucose LESS THAN 70 milliGRAM(s)/deciliter  guaiFENesin Oral Liquid (Sugar-Free) 200 milliGRAM(s) Oral every 6 hours PRN Cough  senna 1 Tablet(s) Oral two times a day PRN Constipation  simethicone 80 milliGRAM(s) Chew three times a day PRN Gas    Vital Signs Last 24 Hrs  T(F): 99.2 (09 Dec 2023 20:10), Max: 99.2 (09 Dec 2023 20:10)  HR: 80 (10 Dec 2023 06:50) (79 - 82)  BP: 99/61 (10 Dec 2023 06:50) (99/61 - 106/64)  RR: 16 (09 Dec 2023 20:10) (16 - 16)  SpO2: 96% (09 Dec 2023 20:10) (96% - 96%)  I&O's Summary    PHYSICAL EXAM:  General: NAD, A/O x 3  ENT: MMM  Neck: Supple, No JVD  Lungs: Clear to auscultation bilaterally  Cardio: RRR, S1/S2, No murmurs  Abdomen: Soft, Nontender, Nondistended; Bowel sounds present  Extremities: No calf tenderness, No pitting edema    LABS:                        8.8    10.21 )-----------( 384      ( 10 Dec 2023 06:35 )             26.8     12-10    135  |  103  |  12  ----------------------------<  163  4.0   |  23  |  1.01    Ca    6.7      10 Dec 2023 06:35    TPro  6.1  /  Alb  1.8  /  TBili  0.3  /  DBili  x   /  AST  46  /  ALT  39  /  AlkPhos  110  12-10          POCT Blood Glucose.: 175 mg/dL (09 Dec 2023 21:35)  POCT Blood Glucose.: 151 mg/dL (09 Dec 2023 17:52)  POCT Blood Glucose.: 140 mg/dL (09 Dec 2023 12:53)      Urinalysis Basic - ( 10 Dec 2023 06:35 )    Color: x / Appearance: x / SG: x / pH: x  Gluc: 163 mg/dL / Ketone: x  / Bili: x / Urobili: x   Blood: x / Protein: x / Nitrite: x   Leuk Esterase: x / RBC: x / WBC x   Sq Epi: x / Non Sq Epi: x / Bacteria: x        COVID-19 PCR: Detected (12-08-23 @ 11:25)  COVID-19 PCR: NotDetec (12-08-23 @ 05:00)  COVID-19 PCR: Detected (12-07-23 @ 22:00)      RADIOLOGY & ADDITIONAL TESTS:    Care Discussed with Consultants/Other Providers:

## 2023-12-10 NOTE — PROGRESS NOTE ADULT - SUBJECTIVE AND OBJECTIVE BOX
Chief complaint- weakness and difficulty ambulating    Mr. Benjamin Rios is a 68 year old right handed male patient with past medical history of sinus node disorder s/p PPM, HTN, HLD, cerebral aneurysm s/p coiling, CVA w/ residual L sided weakness, seizure disorder, type 2 diabetes on metformin and gastritis admitted to [facility] on 11/28 for worsening abdominal pain since 11/24. CT chest abd/pelvis showed distended gallbladder with gallbladder wall thickening and pericholecystic fat stranding and hemangioma in T7 vertebrae. Patient underwent lap to open cholecystectomy for acute cholecystitis on 11/30. Procedure was complicated by drainage of a small amount of bile and a large amount of pus from gallbladder. Patient was upgraded to ICU due to post-operative hypotension requiring pressors then downgraded to surgical floor. Hospital course further complicated by acute blood loss anemia, improved s/p 1 unit PRBCs. Patient also found to have bile duct leakage, requiring ERCP with stent on 12/5/23. Patient requires follow up with GI and a follow up ERCP in 4-8 weeks. Patient evaluated by PT/OT and was recommended for acute inpatient rehab. Patient is medically stable for discharge to acute rehab on 12/7.    ROS/Subjective:  patient seen and evaluated bedside  feels well - no issues overnight  still with some loose bowels - voiding well in primafit and in diaper  no st caths today  BPs still Low- given fluid Challenge by hospitalist  started on remdesivir  no reported Resp complaints, no sore throat- no cough  no nausea, no vomiting, no SOB, no chest pain , no dizziness, no headaches, no abd pain    MEDICATIONS  (STANDING):  aspirin enteric coated 81 milliGRAM(s) Oral daily  atorvastatin 80 milliGRAM(s) Oral at bedtime  carBAMazepine ER Tablet 300 milliGRAM(s) Oral two times a day  dextrose 5%. 1000 milliLiter(s) (100 mL/Hr) IV Continuous <Continuous>  dextrose 5%. 1000 milliLiter(s) (50 mL/Hr) IV Continuous <Continuous>  dextrose 50% Injectable 25 Gram(s) IV Push once  dextrose 50% Injectable 12.5 Gram(s) IV Push once  dextrose 50% Injectable 25 Gram(s) IV Push once  famotidine    Tablet 20 milliGRAM(s) Oral daily  glucagon  Injectable 1 milliGRAM(s) IntraMuscular once  heparin   Injectable 5000 Unit(s) SubCutaneous every 8 hours  latanoprost 0.005% Ophthalmic Solution 1 Drop(s) Both EYES at bedtime  melatonin 6 milliGRAM(s) Oral at bedtime  midodrine. 5 milliGRAM(s) Oral <User Schedule>  pantoprazole    Tablet 40 milliGRAM(s) Oral daily  polyethylene glycol 3350 17 Gram(s) Oral daily  remdesivir  IVPB 100 milliGRAM(s) IV Intermittent every 24 hours  remdesivir  IVPB   IV Intermittent     MEDICATIONS  (PRN):  albuterol    90 MICROgram(s) HFA Inhaler 1 Puff(s) Inhalation every 4 hours PRN Shortness of Breath and/or Wheezing  artificial  tears Solution 1 Drop(s) Both EYES two times a day PRN Dry Eyes  dextrose Oral Gel 15 Gram(s) Oral once PRN Blood Glucose LESS THAN 70 milliGRAM(s)/deciliter  guaiFENesin Oral Liquid (Sugar-Free) 200 milliGRAM(s) Oral every 6 hours PRN Cough  senna 1 Tablet(s) Oral two times a day PRN Constipation  simethicone 80 milliGRAM(s) Chew three times a day PRN Gas                            8.8    10.21 )-----------( 384      ( 10 Dec 2023 06:35 )             26.8     12-10    135  |  103  |  12  ----------------------------<  163<H>  4.0   |  23  |  1.01    Ca    6.7<L>      10 Dec 2023 06:35    TPro  6.1  /  Alb  1.8<L>  /  TBili  0.3  /  DBili  x   /  AST  46<H>  /  ALT  39  /  AlkPhos  110  12-10    Urinalysis Basic - ( 10 Dec 2023 06:35 )    Color: x / Appearance: x / SG: x / pH: x  Gluc: 163 mg/dL / Ketone: x  / Bili: x / Urobili: x   Blood: x / Protein: x / Nitrite: x   Leuk Esterase: x / RBC: x / WBC x   Sq Epi: x / Non Sq Epi: x / Bacteria: x        CAPILLARY BLOOD GLUCOSE      POCT Blood Glucose.: 175 mg/dL (09 Dec 2023 21:35)  POCT Blood Glucose.: 151 mg/dL (09 Dec 2023 17:52)      Vital Signs Last 24 Hrs  T(C): 36.9 (10 Dec 2023 13:02), Max: 37.3 (09 Dec 2023 20:10)  T(F): 98.5 (10 Dec 2023 13:02), Max: 99.2 (09 Dec 2023 20:10)  HR: 75 (10 Dec 2023 13:02) (75 - 82)  BP: 95/58 (10 Dec 2023 13:02) (95/58 - 102/56)  BP(mean): --  RR: 16 (10 Dec 2023 13:02) (16 - 16)  SpO2: 96% (10 Dec 2023 13:02) (96% - 96%)    Parameters below as of 09 Dec 2023 20:10  Patient On (Oxygen Delivery Method): room air      Gen - NAD, Comfortable  HEENT - NCAT, EOMI, MMM  Neck - Supple, No limited ROM  Pulm - CTAB, No wheeze, No rhonchi, No crackles  Cardiovascular - RRR, S1S2  Abdomen - non tender , +BS, NIKHIL drain x 2   Extremities - No clubbing, no cyanosis, no peripheral edema, no calf tenderness  Neuro-     Cognitive - Awake, Alert, Oriented  to self, place, date - with options. Able to follow command     Communication - Fluent, Comprehensible     Attention: Intact      Memory: recalled 2/3 items     Cranial Nerves - CN 2-12 intact.     Motor -                     LEFT    UE - 4/5                    RIGHT UE - 5/5                    LEFT    LE - HF 5/5, KE 5/5, DF/PF 2/5- left foot drop                    RIGHT LE -  5/5        Sensory - Intact to LT     Reflexes - DTR Intact, No primitive reflexive     Tone - normal  Psychiatric - Mood stable, Affect WNL  Skin: abdominal incision with staple Chief complaint- weakness and difficulty ambulating    Mr. Benjamin Rios is a 68 year old right handed male patient with past medical history of sinus node disorder s/p PPM, HTN, HLD, cerebral aneurysm s/p coiling, CVA w/ residual L sided weakness, seizure disorder, type 2 diabetes on metformin and gastritis admitted to [facility] on 11/28 for worsening abdominal pain since 11/24. CT chest abd/pelvis showed distended gallbladder with gallbladder wall thickening and pericholecystic fat stranding and hemangioma in T7 vertebrae. Patient underwent lap to open cholecystectomy for acute cholecystitis on 11/30. Procedure was complicated by drainage of a small amount of bile and a large amount of pus from gallbladder. Patient was upgraded to ICU due to post-operative hypotension requiring pressors then downgraded to surgical floor. Hospital course further complicated by acute blood loss anemia, improved s/p 1 unit PRBCs. Patient also found to have bile duct leakage, requiring ERCP with stent on 12/5/23. Patient requires follow up with GI and a follow up ERCP in 4-8 weeks. Patient evaluated by PT/OT and was recommended for acute inpatient rehab. Patient is medically stable for discharge to acute rehab on 12/7.    ROS/Subjective:  patient seen and evaluated bedside  feels well - no issues overnight  still with some loose bowels - voiding well in primafit and in diaper  no st caths today  BPs still Low- given fluid Challenge by hospitalist  started on remdesivir  no reported Resp complaints, no sore throat- no cough  no nausea, no vomiting, no SOB, no chest pain , no dizziness, no headaches, no abd pain    MEDICATIONS  (STANDING):  aspirin enteric coated 81 milliGRAM(s) Oral daily  atorvastatin 80 milliGRAM(s) Oral at bedtime  carBAMazepine ER Tablet 300 milliGRAM(s) Oral two times a day  dextrose 5%. 1000 milliLiter(s) (100 mL/Hr) IV Continuous <Continuous>  dextrose 5%. 1000 milliLiter(s) (50 mL/Hr) IV Continuous <Continuous>  dextrose 50% Injectable 25 Gram(s) IV Push once  dextrose 50% Injectable 12.5 Gram(s) IV Push once  dextrose 50% Injectable 25 Gram(s) IV Push once  famotidine    Tablet 20 milliGRAM(s) Oral daily  glucagon  Injectable 1 milliGRAM(s) IntraMuscular once  heparin   Injectable 5000 Unit(s) SubCutaneous every 8 hours  latanoprost 0.005% Ophthalmic Solution 1 Drop(s) Both EYES at bedtime  melatonin 6 milliGRAM(s) Oral at bedtime  midodrine. 5 milliGRAM(s) Oral <User Schedule>  pantoprazole    Tablet 40 milliGRAM(s) Oral daily  polyethylene glycol 3350 17 Gram(s) Oral daily  remdesivir  IVPB 100 milliGRAM(s) IV Intermittent every 24 hours  remdesivir  IVPB   IV Intermittent     MEDICATIONS  (PRN):  albuterol    90 MICROgram(s) HFA Inhaler 1 Puff(s) Inhalation every 4 hours PRN Shortness of Breath and/or Wheezing  artificial  tears Solution 1 Drop(s) Both EYES two times a day PRN Dry Eyes  dextrose Oral Gel 15 Gram(s) Oral once PRN Blood Glucose LESS THAN 70 milliGRAM(s)/deciliter  guaiFENesin Oral Liquid (Sugar-Free) 200 milliGRAM(s) Oral every 6 hours PRN Cough  senna 1 Tablet(s) Oral two times a day PRN Constipation  simethicone 80 milliGRAM(s) Chew three times a day PRN Gas                            8.8    10.21 )-----------( 384      ( 10 Dec 2023 06:35 )             26.8     12-10    135  |  103  |  12  ----------------------------<  163<H>  4.0   |  23  |  1.01    Ca    6.7<L>      10 Dec 2023 06:35    TPro  6.1  /  Alb  1.8<L>  /  TBili  0.3  /  DBili  x   /  AST  46<H>  /  ALT  39  /  AlkPhos  110  12-10    Urinalysis Basic - ( 10 Dec 2023 06:35 )    Color: x / Appearance: x / SG: x / pH: x  Gluc: 163 mg/dL / Ketone: x  / Bili: x / Urobili: x   Blood: x / Protein: x / Nitrite: x   Leuk Esterase: x / RBC: x / WBC x   Sq Epi: x / Non Sq Epi: x / Bacteria: x        CAPILLARY BLOOD GLUCOSE      POCT Blood Glucose.: 175 mg/dL (09 Dec 2023 21:35)  POCT Blood Glucose.: 151 mg/dL (09 Dec 2023 17:52)      Vital Signs Last 24 Hrs  T(C): 36.9 (10 Dec 2023 13:02), Max: 37.3 (09 Dec 2023 20:10)  T(F): 98.5 (10 Dec 2023 13:02), Max: 99.2 (09 Dec 2023 20:10)  HR: 75 (10 Dec 2023 13:02) (75 - 82)  BP: 95/58 (10 Dec 2023 13:02) (95/58 - 102/56)  BP(mean): --  RR: 16 (10 Dec 2023 13:02) (16 - 16)  SpO2: 96% (10 Dec 2023 13:02) (96% - 96%)    Parameters below as of 09 Dec 2023 20:10  Patient On (Oxygen Delivery Method): room air      Gen - NAD, Comfortable  HEENT - NCAT, EOMI, MMM  Neck - Supple, No limited ROM  Pulm - CTAB, No wheeze, No rhonchi, No crackles  Cardiovascular - RRR, S1S2  Abdomen - non tender , +BS, NIKHIL drain x 2   Extremities - No clubbing, no cyanosis, no peripheral edema, no calf tenderness  Neuro-     Cognitive - Awake, Alert, Oriented  to self, place, date - with options. Able to follow command     Communication - Fluent, Comprehensible     Attention: Intact      Memory: recalled 2/3 items     Cranial Nerves - CN 2-12 intact.     Motor -                     LEFT    UE - 4/5                    RIGHT UE - 5/5                    LEFT    LE - HF 5/5, KE 5/5, DF/PF 2/5- left foot drop                    RIGHT LE -  5/5        Sensory - Intact to LT     Reflexes - DTR Intact, No primitive reflexive     Tone - normal  Psychiatric - Mood stable, Affect WNL  Skin: abdominal incision with staple

## 2023-12-10 NOTE — CHART NOTE - NSCHARTNOTEFT_GEN_A_CORE
[facility] Plan of Care    REHABILITATION DIAGNOSIS:  Other malaise          COMORBIDITIES/COMPLICATING CONDITIONS IMPACTING REHABILITATION:  HEALTH ISSUES - PROBLEM Dx:  CVA  COVID  SP Cholecystectomy      PAST MEDICAL & SURGICAL HISTORY:  CVA (cerebral vascular accident)  left arm weakness, no sensation in left side in 1998      Aneurysm of [place]      HLD (hyperlipidemia)      Epilepsy  last seizure 2016      Renal agenesis, unilateral      Depression      Cerebral aneurysm without rupture  diagnosed 2017 had cerebral angio at [facility]      Gastroesophageal reflux disease, esophagitis presence not specified      Glaucoma of both eyes, unspecified glaucoma type      Bradycardia      Language barrier  [place]ian speaking      T2DM (type 2 diabetes mellitus)      S/P craniotomy  for RCMA 1998      Pacemaker  Medtronic Serial [id number]/Model [id number]  Implant 6/29/07, new generator 12/23/2016      S/P bunionectomy      S/P cataract surgery  with lens implants      Status post coil embolization of cerebral aneurysm  9/2018          Based upon consideration of the patient's impairments, functional status, complicating conditions and any other contributing factors and after information garnered from the assessments of all therapy disciplines involved in treating the patient and other pertinent clinicians:    INTERDISCIPLINARY REHABILITATION INTERVENTIONS:    [ X  ] Transfer Training  [ X  ] Bed Mobility  [ X  ] Therapeutic Exercise  [ X ] Balance/Coordination Exercises  [ X ] Locomotion retraining  [ X  ] Stairs  [  X ] Functional Transfer Training  [   ] Bowel/Bladder program  [   ] Pain Management  [   ] Skin/Wound Care  [   ] Visual/Perceptual Training  [   ] Therapeutic Recreation Activities  [   ] Neuromuscular Re-education  [ X  ] Activities of Daily Living  [   ] Speech Exercise  [   ] Swallowing Exercises  [   ] Vital Stim  [   ] Dietary Supplements  [   ] Calorie Count  [   ] Cognitive Exercises  [   ] Congnitive/Linguistic Treatment  [   ] Behavior Program  [   ] Neuropsych Therapy  [ X  ] Patient/Family Counseling  [ X ] Family Training  [ X  ] Community Re-entry  [   ] Orthotic Evaluation  [   ] Prosthetic Eval/Training    MEDICAL PROGNOSIS:  good    REHAB POTENTIAL:  good  EXPECTED DAILY THERAPY:         PT: 2hr       OT: 1hr       ST:       P&O:    EXPECTED INTENSITY OF PROGRAM:  3 hrs / Day    EXPECTED FREQUENCY OF PROGRAM: 5 Days/ Week    ESTIMATED LOS:  [  ] 5-7 Days  [  ] 7-10 Days  [  ] 10- 14 Days  [ x ] 14- 18 Days  [  ] 18- 21 Days    ESTIMATED DISPOSITION:  [  ] Home   [  ] Home with Outpatient Therapies  [ x ] Home with Home Therapies  [  ] Assisted Living  [  ] Nursing Home  [  ] Long Term Acute Care    INTERDISCIPLINARY FUNCTIONAL OUTCOMES/GOALS:         Gait/Mobility:CG       Transfers: CG       ADLs: CG       Functional Transfers: CG       Medication Management:independent       Communication: NA       Cognitive: NA       Dysphagia: NA       Bladder independent       Bowel: independent     Functional Independent Measures:   7 = Independent  6 = Modified Independent  5 = Supervision  4 = Minimal Assist/ Contact Guard  3 = Moderate Assistance  2 = Maximum Assistance  1 = Total Assistance  0 = Unable to assess [facility] Plan of Care    REHABILITATION DIAGNOSIS:  Other malaise          COMORBIDITIES/COMPLICATING CONDITIONS IMPACTING REHABILITATION:  HEALTH ISSUES - PROBLEM Dx:  CVA  COVID  SP Cholecystectomy      PAST MEDICAL & SURGICAL HISTORY:  CVA (cerebral vascular accident)  left arm weakness, no sensation in left side in 1998      Aneurysm of [place] of [place]      HLD (hyperlipidemia)      Epilepsy  last seizure 2016      Renal agenesis, unilateral      Depression      Cerebral aneurysm without rupture  diagnosed 2017 had cerebral angio at [facility]      Gastroesophageal reflux disease, esophagitis presence not specified      Glaucoma of both eyes, unspecified glaucoma type      Bradycardia      Language barrier  [place]inamese speaking      T2DM (type 2 diabetes mellitus)      S/P craniotomy  for RCMA 1998      Pacemaker  Medtronic Serial [id number]/Model [id number]  Implant 6/29/07, new generator 12/23/2016      S/P bunionectomy      S/P cataract surgery  with lens implants      Status post coil embolization of cerebral aneurysm  9/2018          Based upon consideration of the patient's impairments, functional status, complicating conditions and any other contributing factors and after information garnered from the assessments of all therapy disciplines involved in treating the patient and other pertinent clinicians:    INTERDISCIPLINARY REHABILITATION INTERVENTIONS:    [ X  ] Transfer Training  [ X  ] Bed Mobility  [ X  ] Therapeutic Exercise  [ X ] Balance/Coordination Exercises  [ X ] Locomotion retraining  [ X  ] Stairs  [  X ] Functional Transfer Training  [   ] Bowel/Bladder program  [   ] Pain Management  [   ] Skin/Wound Care  [   ] Visual/Perceptual Training  [   ] Therapeutic Recreation Activities  [   ] Neuromuscular Re-education  [ X  ] Activities of Daily Living  [   ] Speech Exercise  [   ] Swallowing Exercises  [   ] Vital Stim  [   ] Dietary Supplements  [   ] Calorie Count  [   ] Cognitive Exercises  [   ] Congnitive/Linguistic Treatment  [   ] Behavior Program  [   ] Neuropsych Therapy  [ X  ] Patient/Family Counseling  [ X ] Family Training  [ X  ] Community Re-entry  [   ] Orthotic Evaluation  [   ] Prosthetic Eval/Training    MEDICAL PROGNOSIS:  good    REHAB POTENTIAL:  good  EXPECTED DAILY THERAPY:         PT: 2hr       OT: 1hr       ST:       P&O:    EXPECTED INTENSITY OF PROGRAM:  3 hrs / Day    EXPECTED FREQUENCY OF PROGRAM: 5 Days/ Week    ESTIMATED LOS:  [  ] 5-7 Days  [  ] 7-10 Days  [  ] 10- 14 Days  [ x ] 14- 18 Days  [  ] 18- 21 Days    ESTIMATED DISPOSITION:  [  ] Home   [  ] Home with Outpatient Therapies  [ x ] Home with Home Therapies  [  ] Assisted Living  [  ] Nursing Home  [  ] Long Term Acute Care    INTERDISCIPLINARY FUNCTIONAL OUTCOMES/GOALS:         Gait/Mobility:CG       Transfers: CG       ADLs: CG       Functional Transfers: CG       Medication Management:independent       Communication: NA       Cognitive: NA       Dysphagia: NA       Bladder independent       Bowel: independent     Functional Independent Measures:   7 = Independent  6 = Modified Independent  5 = Supervision  4 = Minimal Assist/ Contact Guard  3 = Moderate Assistance  2 = Maximum Assistance  1 = Total Assistance  0 = Unable to assess

## 2023-12-10 NOTE — PROGRESS NOTE ADULT - ASSESSMENT
68 year old right handed male patient with past medical history of sinus node disorder s/p PPM, HTN, HLD, cerebral aneurysm s/p coiling, CVA w/ residual L sided weakness, seizure disorder, type 2 diabetes on metformin and gastritis who is admitted for Acute Inpatient Rehabilitation with a multidisciplinary rehab program at [facility] with functional impairments in ADLs and mobility secondary to a complaint of worsening abdominal pain with imaging remarkable for distended gallbladder with wall thickening and pericholecystic fat stranding and hemangioma treated surgically with a lap to open cholecystectomy for acute cholecystitis on 11/30 complicated by drainage of a small amount of bile and a large amount of pus from gallbladder and subsequent requirement of ICU admission due to post-operative hypotension requiring pressors and acute blood loss anemia improved with transfusion of one unit of PRBCs. Additionally, patient was found to have bile duct leakage, requiring ERCP with stent on 12/5/23.    Debility 2/2 acute cholecystitis complicated by bile duct leak (s/p ERCP with stent on 12/5)  - monitor NIKHIL drain output  - outpatient GI f/u with Dr. Dorsey  - Repeat ERCP & stent removal in 4-8 weeks  - pt reports acid reflux symptoms. add protonix, continue with pepcid. Dec 9    COVID+  - patient tested positive for COVID on admission to rehab (12/7)  - Dec 9 low grade fever 100.1, BP 90/46, pt with mild cough. denies SOB  - start Remdesivi for 5 days, no need for decadron at this time  - Guifenesin prn, albuterol prn    Hypotension  - pt reports orthostatic dizziness  - BP 90/46. s/p IVF   - started on low dose midodrine 5mg BID.   - check orthostatic VS    Acute Blood Loss Anemia (improved)  - s/p 1 unit PRBC transfusion  - monitor H/H    Seizure Disorder  - continue home carbamazepine 300 mg BID    CVA/ HLD  - residual L sided weakness  - continue atorvastatin 80 mg QHS  - continue ASA 81 mg QD    HTN  - Home med: metoprolol 25 mg BID  - off anti-hypertensives   - Monitor BP    T2DM  - last HbA1c 6.1  - low dose SSI  - Monitor fingersticks  - on metformin at home    DVT prophylaxis:   - Heparin subq    d/w rehab team.  68 year old right handed male patient with past medical history of sinus node disorder s/p PPM, HTN, HLD, cerebral aneurysm s/p coiling, CVA w/ residual L sided weakness, seizure disorder, type 2 diabetes on metformin and gastritis who is admitted for Acute Inpatient Rehabilitation with a multidisciplinary rehab program at [facility] with functional impairments in ADLs and mobility secondary to a complaint of worsening abdominal pain with imaging remarkable for distended gallbladder with wall thickening and pericholecystic fat stranding and hemangioma treated surgically with a lap to open cholecystectomy for acute cholecystitis on 11/30 complicated by drainage of a small amount of bile and a large amount of pus from gallbladder and subsequent requirement of ICU admission due to post-operative hypotension requiring pressors and acute blood loss anemia improved with transfusion of one unit of PRBCs. Additionally, patient was found to have bile duct leakage, requiring ERCP with stent on 12/5/23.    Debility 2/2 acute cholecystitis complicated by bile duct leak (s/p ERCP with stent on 12/5)  - monitor NIKHIL drain output  - outpatient GI f/u with Dr. Dorsey  - Repeat ERCP & stent removal in 4-8 weeks  - pt reports acid reflux symptoms. add protonix, continue with pepcid. Dec 9    COVID+  - patient tested positive for COVID on admission to rehab (12/7)  - Dec 9 low grade fever 100.1, BP 90/46, pt with mild cough. denies SOB  - start Remdesivi for 5 days, no need for decadron at this time  - Guifenesin prn, albuterol prn    Hypotension  - pt reports orthostatic dizziness  - BP 90/46. s/p IVF   - started on low dose midodrine 5mg BID.   - check orthostatic VS    Acute Blood Loss Anemia (improved)  - s/p 1 unit PRBC transfusion  - monitor H/H    Seizure Disorder  - continue home carbamazepine 300 mg BID    CVA/ HLD  - residual L sided weakness  - continue atorvastatin 80 mg QHS  - continue ASA 81 mg QD    HTN  - Home med: metoprolol 25 mg BID  - off anti-hypertensives   - Monitor BP    T2DM  - last HbA1c 6.1  - low dose SSI  - Monitor fingersticks  - on metformin at home    DVT prophylaxis:   - Heparin subq    d/w rehab team.

## 2023-12-10 NOTE — PROGRESS NOTE ADULT - ASSESSMENT
· Assessment	  Assessment/Plan:  Mr. Benjamin Rios is a 68 year old right handed male patient with past medical history of sinus node disorder s/p PPM, HTN, HLD, cerebral aneurysm s/p coiling, CVA w/ residual L sided weakness, seizure disorder, type 2 diabetes on metformin and gastritis who is admitted for Acute Inpatient Rehabilitation with a multidisciplinary rehab program at [facility] with functional impairments in ADLs and mobility secondary to a complaint of worsening abdominal pain with imaging remarkable for distended gallbladder with wall thickening and pericholecystic fat stranding and hemangioma treated surgically with a lap to open cholecystectomy for acute cholecystitis on 11/30 complicated by drainage of a small amount of bile and a large amount of pus from gallbladder and subsequent requirement of ICU admission due to post-operative hypotension requiring pressors and acute blood loss anemia improved with transfusion of one unit of PRBCs. Additionally, patient was found to have bile duct leakage, requiring ERCP with stent on 12/5/23.      Debility 2/2 acute cholecystitis   - complicated by bile duct leak (s/p ERCP with stent on 12/5)  - s/p 7 days of zosyn  - S/P ICU for post-operative hypotension  - Impaired ADLs and mobility  - Need for assistance with personal care   - comprehensive rehab program of PT/OT - 3 hours a day, 5 days a week. P&O as needed   - Fall precautions  - monitor NIKHIL drain output  - outpatient GI f/u with Dr. Dorsey  - Repeat ERCP & stent removal in 4-8 weeks    COVID+  - patient tested positive for COVID on admission to rehab (12/7)  - isolation precautions  - monitor for symptoms  started on Remdesivir by hospitalist secondary to low grade temps    Acute Blood Loss Anemia (improved)  - s/p 1 unit PRBC transfusion  - monitor H/H    Seizure Disorder  - continue home carbamazepine 300 mg BID    h/o CVA/ HLD  -sl residual L sided weakness  - continue atorvastatin 80 mg QHS  - continue ASA 81 mg QD    HTN  - Home med: metoprolol 25 mg BID  - required pressors in ICU post-op  BPs still low - patient however asymptomatic  started on low dose midodrine   would encourage PO Fluids    T2DM  - last HbA1c 6.1  - low dose SSI  - Monitor fingersticks  - on metformin at home  glucoses OK    Mood / Cognition  - Neuropsychology consult PRN    Sleep  - Maintain quiet hours and a low stim environment.   - Melatonin 6 mg QHS    GI / Bowel  - Senna qHS PRN  - Miralax Daily  - GI ppx: famotidine 20 mg QD     / Bladder  - BS Q8H and SC >400cc   spontaneously voiding into primafit and diaper- pvrs low    Skin / Pressure injury  - Skin assessment on admission performed: abdominal incision with staple,   - Pressure Injury/Skin: OOB to chair, PT/OT  - nursing to monitor skin q Shift    Diet/Dysphagia:  - Diet Consistency: DASH, regular  - Nutrition consult    DVT prophylaxis:   - Heparin 5000u Q8H   · Assessment	  Assessment/Plan:  Mr. Benjamin Rios is a 68 year old right handed male patient with past medical history of sinus node disorder s/p PPM, HTN, HLD, cerebral aneurysm s/p coiling, CVA w/ residual L sided weakness, seizure disorder, type 2 diabetes on metformin and gastritis who is admitted for Acute Inpatient Rehabilitation with a multidisciplinary rehab program at [facility] with functional impairments in ADLs and mobility secondary to a complaint of worsening abdominal pain with imaging remarkable for distended gallbladder with wall thickening and pericholecystic fat stranding and hemangioma treated surgically with a lap to open cholecystectomy for acute cholecystitis on 11/30 complicated by drainage of a small amount of bile and a large amount of pus from gallbladder and subsequent requirement of ICU admission due to post-operative hypotension requiring pressors and acute blood loss anemia improved with transfusion of one unit of PRBCs. Additionally, patient was found to have bile duct leakage, requiring ERCP with stent on 12/5/23.      Debility 2/2 acute cholecystitis   - complicated by bile duct leak (s/p ERCP with stent on 12/5)  - s/p 7 days of zosyn  - S/P ICU for post-operative hypotension  - Impaired ADLs and mobility  - Need for assistance with personal care   - comprehensive rehab program of PT/OT - 3 hours a day, 5 days a week. P&O as needed   - Fall precautions  - monitor NIKHIL drain output  - outpatient GI f/u with Dr. Dorsey  - Repeat ERCP & stent removal in 4-8 weeks    COVID+  - patient tested positive for COVID on admission to rehab (12/7)  - isolation precautions  - monitor for symptoms  started on Remdesivir by hospitalist secondary to low grade temps    Acute Blood Loss Anemia (improved)  - s/p 1 unit PRBC transfusion  - monitor H/H    Seizure Disorder  - continue home carbamazepine 300 mg BID    h/o CVA/ HLD  -sl residual L sided weakness  - continue atorvastatin 80 mg QHS  - continue ASA 81 mg QD    HTN  - Home med: metoprolol 25 mg BID  - required pressors in ICU post-op  BPs still low - patient however asymptomatic  started on low dose midodrine   would encourage PO Fluids    T2DM  - last HbA1c 6.1  - low dose SSI  - Monitor fingersticks  - on metformin at home  glucoses OK    Mood / Cognition  - Neuropsychology consult PRN    Sleep  - Maintain quiet hours and a low stim environment.   - Melatonin 6 mg QHS    GI / Bowel  - Senna qHS PRN  - Miralax Daily  - GI ppx: famotidine 20 mg QD     / Bladder  - BS Q8H and SC >400cc   spontaneously voiding into primafit and diaper- pvrs low    Skin / Pressure injury  - Skin assessment on admission performed: abdominal incision with staple,   - Pressure Injury/Skin: OOB to chair, PT/OT  - nursing to monitor skin q Shift    Diet/Dysphagia:  - Diet Consistency: DASH, regular  - Nutrition consult    DVT prophylaxis:   - Heparin 5000u Q8H

## 2023-12-11 LAB
ALBUMIN SERPL ELPH-MCNC: 1.6 G/DL — LOW (ref 3.3–5)
ALBUMIN SERPL ELPH-MCNC: 1.6 G/DL — LOW (ref 3.3–5)
ALP SERPL-CCNC: 95 U/L — SIGNIFICANT CHANGE UP (ref 40–120)
ALP SERPL-CCNC: 95 U/L — SIGNIFICANT CHANGE UP (ref 40–120)
ALT FLD-CCNC: 38 U/L — SIGNIFICANT CHANGE UP (ref 10–45)
ALT FLD-CCNC: 38 U/L — SIGNIFICANT CHANGE UP (ref 10–45)
ANION GAP SERPL CALC-SCNC: 9 MMOL/L — SIGNIFICANT CHANGE UP (ref 5–17)
ANION GAP SERPL CALC-SCNC: 9 MMOL/L — SIGNIFICANT CHANGE UP (ref 5–17)
AST SERPL-CCNC: 50 U/L — HIGH (ref 10–40)
AST SERPL-CCNC: 50 U/L — HIGH (ref 10–40)
BASOPHILS # BLD AUTO: 0.02 K/UL — SIGNIFICANT CHANGE UP (ref 0–0.2)
BASOPHILS # BLD AUTO: 0.02 K/UL — SIGNIFICANT CHANGE UP (ref 0–0.2)
BASOPHILS NFR BLD AUTO: 0.3 % — SIGNIFICANT CHANGE UP (ref 0–2)
BASOPHILS NFR BLD AUTO: 0.3 % — SIGNIFICANT CHANGE UP (ref 0–2)
BILIRUB SERPL-MCNC: 0.3 MG/DL — SIGNIFICANT CHANGE UP (ref 0.2–1.2)
BILIRUB SERPL-MCNC: 0.3 MG/DL — SIGNIFICANT CHANGE UP (ref 0.2–1.2)
BUN SERPL-MCNC: 12 MG/DL — SIGNIFICANT CHANGE UP (ref 7–23)
BUN SERPL-MCNC: 12 MG/DL — SIGNIFICANT CHANGE UP (ref 7–23)
CALCIUM SERPL-MCNC: 6.7 MG/DL — LOW (ref 8.4–10.5)
CALCIUM SERPL-MCNC: 6.7 MG/DL — LOW (ref 8.4–10.5)
CHLORIDE SERPL-SCNC: 105 MMOL/L — SIGNIFICANT CHANGE UP (ref 96–108)
CHLORIDE SERPL-SCNC: 105 MMOL/L — SIGNIFICANT CHANGE UP (ref 96–108)
CO2 SERPL-SCNC: 24 MMOL/L — SIGNIFICANT CHANGE UP (ref 22–31)
CO2 SERPL-SCNC: 24 MMOL/L — SIGNIFICANT CHANGE UP (ref 22–31)
CREAT SERPL-MCNC: 1 MG/DL — SIGNIFICANT CHANGE UP (ref 0.5–1.3)
CREAT SERPL-MCNC: 1 MG/DL — SIGNIFICANT CHANGE UP (ref 0.5–1.3)
EGFR: 81 ML/MIN/1.73M2 — SIGNIFICANT CHANGE UP
EGFR: 81 ML/MIN/1.73M2 — SIGNIFICANT CHANGE UP
EOSINOPHIL # BLD AUTO: 0.11 K/UL — SIGNIFICANT CHANGE UP (ref 0–0.5)
EOSINOPHIL # BLD AUTO: 0.11 K/UL — SIGNIFICANT CHANGE UP (ref 0–0.5)
EOSINOPHIL NFR BLD AUTO: 1.8 % — SIGNIFICANT CHANGE UP (ref 0–6)
EOSINOPHIL NFR BLD AUTO: 1.8 % — SIGNIFICANT CHANGE UP (ref 0–6)
GLUCOSE SERPL-MCNC: 151 MG/DL — HIGH (ref 70–99)
GLUCOSE SERPL-MCNC: 151 MG/DL — HIGH (ref 70–99)
HCT VFR BLD CALC: 24.3 % — LOW (ref 39–50)
HCT VFR BLD CALC: 24.3 % — LOW (ref 39–50)
HGB BLD-MCNC: 7.8 G/DL — LOW (ref 13–17)
HGB BLD-MCNC: 7.8 G/DL — LOW (ref 13–17)
IMM GRANULOCYTES NFR BLD AUTO: 0.3 % — SIGNIFICANT CHANGE UP (ref 0–0.9)
IMM GRANULOCYTES NFR BLD AUTO: 0.3 % — SIGNIFICANT CHANGE UP (ref 0–0.9)
LYMPHOCYTES # BLD AUTO: 1.13 K/UL — SIGNIFICANT CHANGE UP (ref 1–3.3)
LYMPHOCYTES # BLD AUTO: 1.13 K/UL — SIGNIFICANT CHANGE UP (ref 1–3.3)
LYMPHOCYTES # BLD AUTO: 18.6 % — SIGNIFICANT CHANGE UP (ref 13–44)
LYMPHOCYTES # BLD AUTO: 18.6 % — SIGNIFICANT CHANGE UP (ref 13–44)
MCHC RBC-ENTMCNC: 31.7 PG — SIGNIFICANT CHANGE UP (ref 27–34)
MCHC RBC-ENTMCNC: 31.7 PG — SIGNIFICANT CHANGE UP (ref 27–34)
MCHC RBC-ENTMCNC: 32.1 GM/DL — SIGNIFICANT CHANGE UP (ref 32–36)
MCHC RBC-ENTMCNC: 32.1 GM/DL — SIGNIFICANT CHANGE UP (ref 32–36)
MCV RBC AUTO: 98.8 FL — SIGNIFICANT CHANGE UP (ref 80–100)
MCV RBC AUTO: 98.8 FL — SIGNIFICANT CHANGE UP (ref 80–100)
MONOCYTES # BLD AUTO: 0.65 K/UL — SIGNIFICANT CHANGE UP (ref 0–0.9)
MONOCYTES # BLD AUTO: 0.65 K/UL — SIGNIFICANT CHANGE UP (ref 0–0.9)
MONOCYTES NFR BLD AUTO: 10.7 % — SIGNIFICANT CHANGE UP (ref 2–14)
MONOCYTES NFR BLD AUTO: 10.7 % — SIGNIFICANT CHANGE UP (ref 2–14)
NEUTROPHILS # BLD AUTO: 4.14 K/UL — SIGNIFICANT CHANGE UP (ref 1.8–7.4)
NEUTROPHILS # BLD AUTO: 4.14 K/UL — SIGNIFICANT CHANGE UP (ref 1.8–7.4)
NEUTROPHILS NFR BLD AUTO: 68.3 % — SIGNIFICANT CHANGE UP (ref 43–77)
NEUTROPHILS NFR BLD AUTO: 68.3 % — SIGNIFICANT CHANGE UP (ref 43–77)
NRBC # BLD: 0 /100 WBCS — SIGNIFICANT CHANGE UP (ref 0–0)
NRBC # BLD: 0 /100 WBCS — SIGNIFICANT CHANGE UP (ref 0–0)
PLATELET # BLD AUTO: 369 K/UL — SIGNIFICANT CHANGE UP (ref 150–400)
PLATELET # BLD AUTO: 369 K/UL — SIGNIFICANT CHANGE UP (ref 150–400)
POTASSIUM SERPL-MCNC: 3.7 MMOL/L — SIGNIFICANT CHANGE UP (ref 3.5–5.3)
POTASSIUM SERPL-MCNC: 3.7 MMOL/L — SIGNIFICANT CHANGE UP (ref 3.5–5.3)
POTASSIUM SERPL-SCNC: 3.7 MMOL/L — SIGNIFICANT CHANGE UP (ref 3.5–5.3)
POTASSIUM SERPL-SCNC: 3.7 MMOL/L — SIGNIFICANT CHANGE UP (ref 3.5–5.3)
PROT SERPL-MCNC: 5.8 G/DL — LOW (ref 6–8.3)
PROT SERPL-MCNC: 5.8 G/DL — LOW (ref 6–8.3)
RBC # BLD: 2.46 M/UL — LOW (ref 4.2–5.8)
RBC # BLD: 2.46 M/UL — LOW (ref 4.2–5.8)
RBC # FLD: 15.1 % — HIGH (ref 10.3–14.5)
RBC # FLD: 15.1 % — HIGH (ref 10.3–14.5)
SODIUM SERPL-SCNC: 138 MMOL/L — SIGNIFICANT CHANGE UP (ref 135–145)
SODIUM SERPL-SCNC: 138 MMOL/L — SIGNIFICANT CHANGE UP (ref 135–145)
SURGICAL PATHOLOGY STUDY: SIGNIFICANT CHANGE UP
SURGICAL PATHOLOGY STUDY: SIGNIFICANT CHANGE UP
WBC # BLD: 6.07 K/UL — SIGNIFICANT CHANGE UP (ref 3.8–10.5)
WBC # BLD: 6.07 K/UL — SIGNIFICANT CHANGE UP (ref 3.8–10.5)
WBC # FLD AUTO: 6.07 K/UL — SIGNIFICANT CHANGE UP (ref 3.8–10.5)
WBC # FLD AUTO: 6.07 K/UL — SIGNIFICANT CHANGE UP (ref 3.8–10.5)

## 2023-12-11 PROCEDURE — 99232 SBSQ HOSP IP/OBS MODERATE 35: CPT

## 2023-12-11 RX ADMIN — MIDODRINE HYDROCHLORIDE 5 MILLIGRAM(S): 2.5 TABLET ORAL at 06:51

## 2023-12-11 RX ADMIN — MIDODRINE HYDROCHLORIDE 5 MILLIGRAM(S): 2.5 TABLET ORAL at 12:55

## 2023-12-11 RX ADMIN — Medication 300 MILLIGRAM(S): at 05:24

## 2023-12-11 RX ADMIN — PANTOPRAZOLE SODIUM 40 MILLIGRAM(S): 20 TABLET, DELAYED RELEASE ORAL at 11:34

## 2023-12-11 RX ADMIN — Medication 300 MILLIGRAM(S): at 18:17

## 2023-12-11 RX ADMIN — FAMOTIDINE 20 MILLIGRAM(S): 10 INJECTION INTRAVENOUS at 11:34

## 2023-12-11 RX ADMIN — ATORVASTATIN CALCIUM 80 MILLIGRAM(S): 80 TABLET, FILM COATED ORAL at 21:10

## 2023-12-11 RX ADMIN — REMDESIVIR 200 MILLIGRAM(S): 5 INJECTION INTRAVENOUS at 11:26

## 2023-12-11 RX ADMIN — HEPARIN SODIUM 5000 UNIT(S): 5000 INJECTION INTRAVENOUS; SUBCUTANEOUS at 15:16

## 2023-12-11 RX ADMIN — Medication 81 MILLIGRAM(S): at 11:34

## 2023-12-11 RX ADMIN — POLYETHYLENE GLYCOL 3350 17 GRAM(S): 17 POWDER, FOR SOLUTION ORAL at 11:35

## 2023-12-11 RX ADMIN — HEPARIN SODIUM 5000 UNIT(S): 5000 INJECTION INTRAVENOUS; SUBCUTANEOUS at 05:25

## 2023-12-11 RX ADMIN — HEPARIN SODIUM 5000 UNIT(S): 5000 INJECTION INTRAVENOUS; SUBCUTANEOUS at 21:10

## 2023-12-11 RX ADMIN — LATANOPROST 1 DROP(S): 0.05 SOLUTION/ DROPS OPHTHALMIC; TOPICAL at 21:10

## 2023-12-11 RX ADMIN — Medication 6 MILLIGRAM(S): at 21:13

## 2023-12-11 NOTE — PROGRESS NOTE ADULT - ASSESSMENT
Upon Nutritional Assessment by the Registered Dietitian your patient was determined to meet criteria / has evidence of the following diagnosis/diagnoses:          [ ]  Mild Protein Calorie Malnutrition        [x ]  Moderate Protein Calorie Malnutrition        [ ] Severe Protein Calorie Malnutrition        [ ] Unspecified Protein Calorie Malnutrition        [ ] Underweight / BMI <19        [ ] Morbid Obesity / BMI > 40      Findings as based on:  •  Comprehensive nutrition assessment and consultation  •  Calorie counts (nutrient intake analysis)  •  Food acceptance and intake status from observations by staff  •  Follow up  •  Patient education  •  Intervention secondary to interdisciplinary rounds  •   concerns      Treatment:    The following diet has been recommended:    Increase enteral feeds of Pivot 60ml/hr     PROVIDER Section:     By signing this assessment you are acknowledging and agree with the diagnosis/diagnoses assigned by the Registered Dietitian    Comments: Mr. Benjamin Rios is a 68 year old right handed male patient with past medical history of sinus node disorder s/p PPM, HTN, HLD, cerebral aneurysm s/p coiling, CVA w/ residual L sided weakness, seizure disorder, type 2 diabetes on metformin and gastritis who is admitted for Acute Inpatient Rehabilitation with a multidisciplinary rehab program at [facility] with functional impairments in ADLs and mobility secondary to a complaint of worsening abdominal pain with imaging remarkable for distended gallbladder with wall thickening and pericholecystic fat stranding and hemangioma treated surgically with a lap to open cholecystectomy for acute cholecystitis on 11/30 complicated by drainage of a small amount of bile and a large amount of pus from gallbladder and subsequent requirement of ICU admission due to post-operative hypotension requiring pressors and acute blood loss anemia improved with transfusion of one unit of PRBCs. Additionally, patient was found to have bile duct leakage, requiring ERCP with stent on 12/5/23.      Debility 2/2 acute cholecystitis   - complicated by bile duct leak (s/p ERCP with stent on 12/5)  - s/p 7 days of zosyn  - S/P ICU for post-operative hypotension  - Impaired ADLs and mobility  - Need for assistance with personal care   - Continue comprehensive rehab program of PT/OT - 3 hours a day, 5 days a week. P&O as needed   - Fall precautions  - monitor NIKHIL drain output  - outpatient GI f/u with Dr. Dorsey  - Repeat ERCP & stent removal in 4-8 weeks    COVID+  - patient tested positive for COVID on admission to rehab (12/7)  - isolation precautions  - monitor for symptoms    Acute Blood Loss Anemia (improved)  - s/p 1 unit PRBC transfusion  - monitor H/H    Seizure Disorder  - continue home carbamazepine 300 mg BID    h/o CVA/ HLD  - residual L sided weakness  - continue atorvastatin 80 mg QHS  - continue ASA 81 mg QD    HTN  - Home med: metoprolol 25 mg BID  - required pressors in ICU post-op  - stable off anti-hypertensives  - Monitor BP    T2DM  - last HbA1c 6.1  - low dose SSI  - Monitor fingersticks  - on metformin at home    Mood / Cognition  - Neuropsychology consult PRN    Sleep  - Maintain quiet hours and a low stim environment.   - Melatonin 6 mg QHS    GI / Bowel  - Senna qHS PRN  - Miralax Daily  - GI ppx: famotidine 20 mg QD     / Bladder  - BS Q8H and SC >400cc     Skin / Pressure injury  - Skin assessment on admission performed: abdominal incision with staple,   - Pressure Injury/Skin: OOB to chair, PT/OT  - nursing to monitor skin q Shift    Diet/Dysphagia:  - Diet Consistency: DASH, regular  - Nutrition consult    DVT prophylaxis:   - Heparin 5000u Q8H    Outpatient Follow-up:    Easton Carroll  Internal Medicine  [address] [place] [address]  Phone: [phone]  Fax: [phone]  Follow Up Time:     Francisco Dorsey  Gastroenterology  [address] [place] [address], [address]  [place] [address]  Phone: [phone]  Fax: [phone]  Follow Up Time    --------------- Mr. Benjamin Rios is a 68 year old right handed male patient with past medical history of sinus node disorder s/p PPM, HTN, HLD, cerebral aneurysm s/p coiling, CVA w/ residual L sided weakness, seizure disorder, type 2 diabetes on metformin and gastritis who is admitted for Acute Inpatient Rehabilitation with a multidisciplinary rehab program at [facility] with functional impairments in ADLs and mobility secondary to a complaint of worsening abdominal pain with imaging remarkable for distended gallbladder with wall thickening and pericholecystic fat stranding and hemangioma treated surgically with a lap to open cholecystectomy for acute cholecystitis on 11/30 complicated by drainage of a small amount of bile and a large amount of pus from gallbladder and subsequent requirement of ICU admission due to post-operative hypotension requiring pressors and acute blood loss anemia improved with transfusion of one unit of PRBCs. Additionally, patient was found to have bile duct leakage, requiring ERCP with stent on 12/5/23.      Debility 2/2 acute cholecystitis   - complicated by bile duct leak (s/p ERCP with stent on 12/5)  - s/p 7 days of zosyn  - S/P ICU for post-operative hypotension  - Impaired ADLs and mobility  - Need for assistance with personal care   - Continue comprehensive rehab program of PT/OT - 3 hours a day, 5 days a week. P&O as needed   - Fall precautions  - monitor NIKHIL drain output  - outpatient GI f/u with Dr. Dorsey  - Repeat ERCP & stent removal in 4-8 weeks    COVID+  - patient tested positive for COVID on admission to rehab (12/7)  - isolation precautions  - monitor for symptoms    Acute Blood Loss Anemia (improved)  - s/p 1 unit PRBC transfusion  - monitor H/H    Seizure Disorder  - continue home carbamazepine 300 mg BID    h/o CVA/ HLD  - residual L sided weakness  - continue atorvastatin 80 mg QHS  - continue ASA 81 mg QD    HTN  - Home med: metoprolol 25 mg BID  - required pressors in ICU post-op  - stable off anti-hypertensives  - Monitor BP    T2DM  - last HbA1c 6.1  - low dose SSI  - Monitor fingersticks  - on metformin at home    Mood / Cognition  - Neuropsychology consult PRN    Sleep  - Maintain quiet hours and a low stim environment.   - Melatonin 6 mg QHS    GI / Bowel  - Senna qHS PRN  - Miralax Daily  - GI ppx: famotidine 20 mg QD     / Bladder  - BS Q8H and SC >400cc     Skin / Pressure injury  - Skin assessment on admission performed: abdominal incision with staple,   - Pressure Injury/Skin: OOB to chair, PT/OT  - nursing to monitor skin q Shift    Diet/Dysphagia:  - Diet Consistency: DASH, regular  - Nutrition consult    DVT prophylaxis:   - Heparin 5000u Q8H    Outpatient Follow-up:    Easton Carroll  Internal Medicine  [address] [place] [address]  Phone: [phone]  Fax: [phone]  Follow Up Time:     Francisco Dorsey  Gastroenterology  [address] [place] [address], [address]  [place] [address]  Phone: [phone]  Fax: [phone]  Follow Up Time    ---------------

## 2023-12-11 NOTE — PROGRESS NOTE ADULT - SUBJECTIVE AND OBJECTIVE BOX
HPI:  Mr. Benjamin Rios is a 68 year old right handed male patient with past medical history of sinus node disorder s/p PPM, HTN, HLD, cerebral aneurysm s/p coiling, CVA w/ residual L sided weakness, seizure disorder, type 2 diabetes on metformin and gastritis admitted to [facility] on 11/28 for worsening abdominal pain since 11/24. CT chest abd/pelvis showed distended gallbladder with gallbladder wall thickening and pericholecystic fat stranding and hemangioma in T7 vertebrae. Patient underwent lap to open cholecystectomy for acute cholecystitis on 11/30. Procedure was complicated by drainage of a small amount of bile and a large amount of pus from gallbladder. Patient was upgraded to ICU due to post-operative hypotension requiring pressors then downgraded to surgical floor. Hospital course further complicated by acute blood loss anemia, improved s/p 1 unit PRBCs. Patient also found to have bile duct leakage, requiring ERCP with stent on 12/5/23. Patient requires follow up with GI and a follow up ERCP in 4-8 weeks. Patient evaluated by PT/OT and was recommended for acute inpatient rehab. Patient is medically stable for discharge to acute rehab on 12/7.  (07 Dec 2023 13:33)    ___________________________________________________________________________    SUBJECTIVE/ROS  Patient was seen and evaluated at bedside today.  Reported no overnight events and is in no acute distress.  Continues on isolation for positive COVID-19 status.  Case to be evaluated at Interdisciplinary Team meeting tomorrow.  Eager to participate on the recommended rehabilitation program.  Denies any CP, SOB, AUGUSTINE, palpitations, fever, chills, body aches, cough, congestion, or any other symptoms at this time.   ___________________________________________________________________________    VITALS  T(C): 37.1 (12-11-23 @ 08:50), Max: 37.7 (12-10-23 @ 21:08)  HR: 72 (12-11-23 @ 08:50) (72 - 80)  BP: 110/61 (12-11-23 @ 08:50) (95/58 - 110/61)  RR: 16 (12-11-23 @ 08:50) (15 - 16)  SpO2: 95% (12-11-23 @ 08:50) (95% - 97%)  ___________________________________________________________________________    LABS                          7.8    6.07  )-----------( 369      ( 11 Dec 2023 05:30 )             24.3       12-11    138  |  105  |  12  ----------------------------<  151<H>  3.7   |  24  |  1.00    Ca    6.7<L>      11 Dec 2023 05:30    TPro  5.8<L>  /  Alb  1.6<L>  /  TBili  0.3  /  DBili  x   /  AST  50<H>  /  ALT  38  /  AlkPhos  95  12-11    ___________________________________________________________________________    MEDICATIONS  (STANDING):  aspirin enteric coated 81 milliGRAM(s) Oral daily  atorvastatin 80 milliGRAM(s) Oral at bedtime  carBAMazepine ER Tablet 300 milliGRAM(s) Oral two times a day  dextrose 5%. 1000 milliLiter(s) (100 mL/Hr) IV Continuous <Continuous>  dextrose 5%. 1000 milliLiter(s) (50 mL/Hr) IV Continuous <Continuous>  dextrose 50% Injectable 25 Gram(s) IV Push once  dextrose 50% Injectable 12.5 Gram(s) IV Push once  dextrose 50% Injectable 25 Gram(s) IV Push once  famotidine    Tablet 20 milliGRAM(s) Oral daily  glucagon  Injectable 1 milliGRAM(s) IntraMuscular once  heparin   Injectable 5000 Unit(s) SubCutaneous every 8 hours  latanoprost 0.005% Ophthalmic Solution 1 Drop(s) Both EYES at bedtime  melatonin 6 milliGRAM(s) Oral at bedtime  midodrine. 5 milliGRAM(s) Oral <User Schedule>  pantoprazole    Tablet 40 milliGRAM(s) Oral daily  polyethylene glycol 3350 17 Gram(s) Oral daily  remdesivir  IVPB 100 milliGRAM(s) IV Intermittent every 24 hours  remdesivir  IVPB   IV Intermittent     MEDICATIONS  (PRN):  albuterol    90 MICROgram(s) HFA Inhaler 1 Puff(s) Inhalation every 4 hours PRN Shortness of Breath and/or Wheezing  artificial  tears Solution 1 Drop(s) Both EYES two times a day PRN Dry Eyes  dextrose Oral Gel 15 Gram(s) Oral once PRN Blood Glucose LESS THAN 70 milliGRAM(s)/deciliter  guaiFENesin Oral Liquid (Sugar-Free) 200 milliGRAM(s) Oral every 6 hours PRN Cough  senna 1 Tablet(s) Oral two times a day PRN Constipation  simethicone 80 milliGRAM(s) Chew three times a day PRN Gas    ___________________________________________________________________________    PHYSICAL EXAM:    Gen - NAD, Comfortable  HEENT - NCAT, EOMI, MMM  Pulm - CTAB, No wheeze, No rhonchi, No crackles  Cardiovascular - RRR, S1S2  Abdomen - + tender, +BS, NIKHIL drain x 2   Extremities - No clubbing, no cyanosis, no peripheral edema, no calf tenderness  Neuro-     Cognitive - Awake, Alert, and Oriented      Motor -                     LEFT    UE - 4/5                    RIGHT UE - 5/5                    LEFT    LE - HF 5/5, KE 5/5, DF/PF 2/5- left foot drop                    RIGHT LE -  5/5        Sensory - Intact to LT     Reflexes - DTR Intact, No primitive reflexive  Psychiatric - Mood stable, Affect WNL  Skin: abdominal incision with staple.    ___________________________________________________________________________ HPI:  Mr. Benjamin Rios is a 68 year old right handed male patient with past medical history of sinus node disorder s/p PPM, HTN, HLD, cerebral aneurysm s/p coiling, CVA w/ residual L sided weakness, seizure disorder, type 2 diabetes on metformin and gastritis admitted to [facility] on 11/28 for worsening abdominal pain since 11/24. CT chest abd/pelvis showed distended gallbladder with gallbladder wall thickening and pericholecystic fat stranding and hemangioma in T7 vertebrae. Patient underwent lap to open cholecystectomy for acute cholecystitis on 11/30. Procedure was complicated by drainage of a small amount of bile and a large amount of pus from gallbladder. Patient was upgraded to ICU due to post-operative hypotension requiring pressors then downgraded to surgical floor. Hospital course further complicated by acute blood loss anemia, improved s/p 1 unit PRBCs. Patient also found to have bile duct leakage, requiring ERCP with stent on 12/5/23. Patient requires follow up with GI and a follow up ERCP in 4-8 weeks. Patient evaluated by PT/OT and was recommended for acute inpatient rehab. Patient is medically stable for discharge to acute rehab on 12/7.  (07 Dec 2023 13:33)    ___________________________________________________________________________    SUBJECTIVE/ROS  Patient was seen and evaluated at bedside today.  Reported no overnight events and is in no acute distress.  Continues on isolation for positive COVID-19 status.  Case to be evaluated at Interdisciplinary Team meeting tomorrow.  Eager to participate on the recommended rehabilitation program.  Denies any CP, SOB, AUGUSTINE, palpitations, fever, chills, body aches, cough, congestion, or any other symptoms at this time.   ___________________________________________________________________________    VITALS  T(C): 37.1 (12-11-23 @ 08:50), Max: 37.7 (12-10-23 @ 21:08)  HR: 72 (12-11-23 @ 08:50) (72 - 80)  BP: 110/61 (12-11-23 @ 08:50) (95/58 - 110/61)  RR: 16 (12-11-23 @ 08:50) (15 - 16)  SpO2: 95% (12-11-23 @ 08:50) (95% - 97%)  ___________________________________________________________________________    LABS                          7.8    6.07  )-----------( 369      ( 11 Dec 2023 05:30 )             24.3       12-11    138  |  105  |  12  ----------------------------<  151<H>  3.7   |  24  |  1.00    Ca    6.7<L>      11 Dec 2023 05:30    TPro  5.8<L>  /  Alb  1.6<L>  /  TBili  0.3  /  DBili  x   /  AST  50<H>  /  ALT  38  /  AlkPhos  95  12-11    ___________________________________________________________________________    MEDICATIONS  (STANDING):  aspirin enteric coated 81 milliGRAM(s) Oral daily  atorvastatin 80 milliGRAM(s) Oral at bedtime  carBAMazepine ER Tablet 300 milliGRAM(s) Oral two times a day  dextrose 5%. 1000 milliLiter(s) (100 mL/Hr) IV Continuous <Continuous>  dextrose 5%. 1000 milliLiter(s) (50 mL/Hr) IV Continuous <Continuous>  dextrose 50% Injectable 25 Gram(s) IV Push once  dextrose 50% Injectable 12.5 Gram(s) IV Push once  dextrose 50% Injectable 25 Gram(s) IV Push once  famotidine    Tablet 20 milliGRAM(s) Oral daily  glucagon  Injectable 1 milliGRAM(s) IntraMuscular once  heparin   Injectable 5000 Unit(s) SubCutaneous every 8 hours  latanoprost 0.005% Ophthalmic Solution 1 Drop(s) Both EYES at bedtime  melatonin 6 milliGRAM(s) Oral at bedtime  midodrine. 5 milliGRAM(s) Oral <User Schedule>  pantoprazole    Tablet 40 milliGRAM(s) Oral daily  polyethylene glycol 3350 17 Gram(s) Oral daily  remdesivir  IVPB 100 milliGRAM(s) IV Intermittent every 24 hours  remdesivir  IVPB   IV Intermittent     MEDICATIONS  (PRN):  albuterol    90 MICROgram(s) HFA Inhaler 1 Puff(s) Inhalation every 4 hours PRN Shortness of Breath and/or Wheezing  artificial  tears Solution 1 Drop(s) Both EYES two times a day PRN Dry Eyes  dextrose Oral Gel 15 Gram(s) Oral once PRN Blood Glucose LESS THAN 70 milliGRAM(s)/deciliter  guaiFENesin Oral Liquid (Sugar-Free) 200 milliGRAM(s) Oral every 6 hours PRN Cough  senna 1 Tablet(s) Oral two times a day PRN Constipation  simethicone 80 milliGRAM(s) Chew three times a day PRN Gas    ___________________________________________________________________________    PHYSICAL EXAM:    Gen - NAD, Comfortable  HEENT - NCAT, EOMI, MMM  Pulm - CTAB, No wheeze, No rhonchi, No crackles  Cardiovascular - RRR, S1S2  Abdomen - + tender, +BS, NIKHIL drain x 2   Extremities - No clubbing, no cyanosis, no peripheral edema, no calf tenderness  Neuro-     Cognitive - Awake, Alert, and Oriented      Motor -                     LEFT    UE - 4/5                    RIGHT UE - 5/5                    LEFT    LE - HF 5/5, KE 5/5, DF/PF 2/5- left foot drop                    RIGHT LE -  5/5        Sensory - Intact to LT     Reflexes - DTR Intact, No primitive reflexive  Psychiatric - Mood stable, Affect WNL  Skin: abdominal incision with staple.    ___________________________________________________________________________

## 2023-12-12 PROCEDURE — 99232 SBSQ HOSP IP/OBS MODERATE 35: CPT

## 2023-12-12 RX ORDER — SENNA PLUS 8.6 MG/1
1 TABLET ORAL
Refills: 0 | Status: DISCONTINUED | OUTPATIENT
Start: 2023-12-12 | End: 2023-12-19

## 2023-12-12 RX ORDER — LACTULOSE 10 G/15ML
10 SOLUTION ORAL ONCE
Refills: 0 | Status: COMPLETED | OUTPATIENT
Start: 2023-12-12 | End: 2023-12-16

## 2023-12-12 RX ORDER — LACTULOSE 10 G/15ML
200 SOLUTION ORAL ONCE
Refills: 0 | Status: DISCONTINUED | OUTPATIENT
Start: 2023-12-12 | End: 2023-12-19

## 2023-12-12 RX ADMIN — POLYETHYLENE GLYCOL 3350 17 GRAM(S): 17 POWDER, FOR SOLUTION ORAL at 12:28

## 2023-12-12 RX ADMIN — REMDESIVIR 200 MILLIGRAM(S): 5 INJECTION INTRAVENOUS at 12:28

## 2023-12-12 RX ADMIN — SIMETHICONE 80 MILLIGRAM(S): 80 TABLET, CHEWABLE ORAL at 05:27

## 2023-12-12 RX ADMIN — ATORVASTATIN CALCIUM 80 MILLIGRAM(S): 80 TABLET, FILM COATED ORAL at 22:35

## 2023-12-12 RX ADMIN — MIDODRINE HYDROCHLORIDE 5 MILLIGRAM(S): 2.5 TABLET ORAL at 12:27

## 2023-12-12 RX ADMIN — PANTOPRAZOLE SODIUM 40 MILLIGRAM(S): 20 TABLET, DELAYED RELEASE ORAL at 12:27

## 2023-12-12 RX ADMIN — HEPARIN SODIUM 5000 UNIT(S): 5000 INJECTION INTRAVENOUS; SUBCUTANEOUS at 05:27

## 2023-12-12 RX ADMIN — Medication 300 MILLIGRAM(S): at 17:47

## 2023-12-12 RX ADMIN — Medication 6 MILLIGRAM(S): at 22:35

## 2023-12-12 RX ADMIN — FAMOTIDINE 20 MILLIGRAM(S): 10 INJECTION INTRAVENOUS at 12:27

## 2023-12-12 RX ADMIN — Medication 300 MILLIGRAM(S): at 05:27

## 2023-12-12 RX ADMIN — SENNA PLUS 1 TABLET(S): 8.6 TABLET ORAL at 17:48

## 2023-12-12 RX ADMIN — MIDODRINE HYDROCHLORIDE 5 MILLIGRAM(S): 2.5 TABLET ORAL at 05:27

## 2023-12-12 RX ADMIN — HEPARIN SODIUM 5000 UNIT(S): 5000 INJECTION INTRAVENOUS; SUBCUTANEOUS at 13:25

## 2023-12-12 RX ADMIN — HEPARIN SODIUM 5000 UNIT(S): 5000 INJECTION INTRAVENOUS; SUBCUTANEOUS at 22:36

## 2023-12-12 RX ADMIN — Medication 81 MILLIGRAM(S): at 12:27

## 2023-12-12 RX ADMIN — LATANOPROST 1 DROP(S): 0.05 SOLUTION/ DROPS OPHTHALMIC; TOPICAL at 22:36

## 2023-12-12 NOTE — PROGRESS NOTE ADULT - SUBJECTIVE AND OBJECTIVE BOX
HPI:  Mr. Benjamin Rios is a 68 year old right handed male patient with past medical history of sinus node disorder s/p PPM, HTN, HLD, cerebral aneurysm s/p coiling, CVA w/ residual L sided weakness, seizure disorder, type 2 diabetes on metformin and gastritis admitted to [facility] on 11/28 for worsening abdominal pain since 11/24. CT chest abd/pelvis showed distended gallbladder with gallbladder wall thickening and pericholecystic fat stranding and hemangioma in T7 vertebrae. Patient underwent lap to open cholecystectomy for acute cholecystitis on 11/30. Procedure was complicated by drainage of a small amount of bile and a large amount of pus from gallbladder. Patient was upgraded to ICU due to post-operative hypotension requiring pressors then downgraded to surgical floor. Hospital course further complicated by acute blood loss anemia, improved s/p 1 unit PRBCs. Patient also found to have bile duct leakage, requiring ERCP with stent on 12/5/23. Patient requires follow up with GI and a follow up ERCP in 4-8 weeks. Patient evaluated by PT/OT and was recommended for acute inpatient rehab. Patient is medically stable for discharge to acute rehab on 12/7.  (07 Dec 2023 13:33)    ___________________________________________________________________________    SUBJECTIVE/ROS  Patient was seen and evaluated at bedside today.  Reported no overnight events and is in no acute distress.  Continues on isolation for positive COVID-19 status.  Case to be evaluated at Interdisciplinary Team meeting later today.  Eager to participate on the recommended rehabilitation program.  Denies any CP, SOB, AUGUSTINE, palpitations, fever, chills, body aches, cough, congestion, or any other symptoms at this time.   ___________________________________________________________________________    Vital Signs Last 24 Hrs  T(C): 36.7 (12 Dec 2023 08:43), Max: 36.9 (11 Dec 2023 21:14)  T(F): 98.1 (12 Dec 2023 08:43), Max: 98.4 (11 Dec 2023 21:14)  HR: 76 (12 Dec 2023 08:43) (76 - 80)  BP: 103/62 (12 Dec 2023 08:43) (103/62 - 121/74)  RR: 16 (12 Dec 2023 08:43) (15 - 16)  SpO2: 97% (12 Dec 2023 08:43) (96% - 97%)    ___________________________________________________________________________    LABS                          7.8    6.07  )-----------( 369      ( 11 Dec 2023 05:30 )             24.3       12-11    138  |  105  |  12  ----------------------------<  151<H>  3.7   |  24  |  1.00    Ca    6.7<L>      11 Dec 2023 05:30    TPro  5.8<L>  /  Alb  1.6<L>  /  TBili  0.3  /  DBili  x   /  AST  50<H>  /  ALT  38  /  AlkPhos  95  12-11    ___________________________________________________________________________    MEDICATIONS  (STANDING):  aspirin enteric coated 81 milliGRAM(s) Oral daily  atorvastatin 80 milliGRAM(s) Oral at bedtime  carBAMazepine ER Tablet 300 milliGRAM(s) Oral two times a day  dextrose 5%. 1000 milliLiter(s) (100 mL/Hr) IV Continuous <Continuous>  dextrose 5%. 1000 milliLiter(s) (50 mL/Hr) IV Continuous <Continuous>  dextrose 50% Injectable 25 Gram(s) IV Push once  dextrose 50% Injectable 12.5 Gram(s) IV Push once  dextrose 50% Injectable 25 Gram(s) IV Push once  famotidine    Tablet 20 milliGRAM(s) Oral daily  glucagon  Injectable 1 milliGRAM(s) IntraMuscular once  heparin   Injectable 5000 Unit(s) SubCutaneous every 8 hours  latanoprost 0.005% Ophthalmic Solution 1 Drop(s) Both EYES at bedtime  melatonin 6 milliGRAM(s) Oral at bedtime  midodrine. 5 milliGRAM(s) Oral <User Schedule>  pantoprazole    Tablet 40 milliGRAM(s) Oral daily  polyethylene glycol 3350 17 Gram(s) Oral daily  remdesivir  IVPB 100 milliGRAM(s) IV Intermittent every 24 hours  remdesivir  IVPB   IV Intermittent     MEDICATIONS  (PRN):  albuterol    90 MICROgram(s) HFA Inhaler 1 Puff(s) Inhalation every 4 hours PRN Shortness of Breath and/or Wheezing  artificial  tears Solution 1 Drop(s) Both EYES two times a day PRN Dry Eyes  dextrose Oral Gel 15 Gram(s) Oral once PRN Blood Glucose LESS THAN 70 milliGRAM(s)/deciliter  guaiFENesin Oral Liquid (Sugar-Free) 200 milliGRAM(s) Oral every 6 hours PRN Cough  senna 1 Tablet(s) Oral two times a day PRN Constipation  simethicone 80 milliGRAM(s) Chew three times a day PRN Gas    ___________________________________________________________________________    PHYSICAL EXAM:    Gen - NAD, Comfortable  HEENT - NCAT, EOMI, MMM  Pulm - CTAB, No wheeze, No rhonchi, No crackles  Cardiovascular - RRR, S1S2  Abdomen - + tender, +BS, NIKHIL drain x 2   Extremities - No clubbing, no cyanosis, no peripheral edema, no calf tenderness  Neuro-     Cognitive - Awake, Alert, and Oriented      Motor -                     LEFT    UE - 4/5                    RIGHT UE - 5/5                    LEFT    LE - HF 5/5, KE 5/5, DF/PF 2/5- left foot drop                    RIGHT LE -  5/5        Sensory - Intact to LT     Reflexes - DTR Intact, No primitive reflexive  Psychiatric - Mood stable, Affect WNL  Skin: abdominal incision with staple.    ___________________________________________________________________________ HPI:  Mr. Benjamin Rios is a 68 year old right handed male patient with past medical history of sinus node disorder s/p PPM, HTN, HLD, cerebral aneurysm s/p coiling, CVA w/ residual L sided weakness, seizure disorder, type 2 diabetes on metformin and gastritis admitted to [facility] on 11/28 for worsening abdominal pain since 11/24. CT chest abd/pelvis showed distended gallbladder with gallbladder wall thickening and pericholecystic fat stranding and hemangioma in T7 vertebrae. Patient underwent lap to open cholecystectomy for acute cholecystitis on 11/30. Procedure was complicated by drainage of a small amount of bile and a large amount of pus from gallbladder. Patient was upgraded to ICU due to post-operative hypotension requiring pressors then downgraded to surgical floor. Hospital course further complicated by acute blood loss anemia, improved s/p 1 unit PRBCs. Patient also found to have bile duct leakage, requiring ERCP with stent on 12/5/23. Patient requires follow up with GI and a follow up ERCP in 4-8 weeks. Patient evaluated by PT/OT and was recommended for acute inpatient rehab. Patient is medically stable for discharge to acute rehab on 12/7.  (07 Dec 2023 13:33)    ___________________________________________________________________________    SUBJECTIVE/ROS  Patient was seen and evaluated at bedside today.  Reported no overnight events and is in no acute distress.  Continues on isolation for positive COVID-19 status.  Case to be evaluated at Interdisciplinary Team meeting later today.  Eager to participate on the recommended rehabilitation program.  Denies any CP, SOB, AUGUSTINE, palpitations, fever, chills, body aches, cough, congestion, or any other symptoms at this time.   ___________________________________________________________________________    Vital Signs Last 24 Hrs  T(C): 36.7 (12 Dec 2023 08:43), Max: 36.9 (11 Dec 2023 21:14)  T(F): 98.1 (12 Dec 2023 08:43), Max: 98.4 (11 Dec 2023 21:14)  HR: 76 (12 Dec 2023 08:43) (76 - 80)  BP: 103/62 (12 Dec 2023 08:43) (103/62 - 121/74)  RR: 16 (12 Dec 2023 08:43) (15 - 16)  SpO2: 97% (12 Dec 2023 08:43) (96% - 97%)    ___________________________________________________________________________    LABS                          7.8    6.07  )-----------( 369      ( 11 Dec 2023 05:30 )             24.3       12-11    138  |  105  |  12  ----------------------------<  151<H>  3.7   |  24  |  1.00    Ca    6.7<L>      11 Dec 2023 05:30    TPro  5.8<L>  /  Alb  1.6<L>  /  TBili  0.3  /  DBili  x   /  AST  50<H>  /  ALT  38  /  AlkPhos  95  12-11    ___________________________________________________________________________    MEDICATIONS  (STANDING):  aspirin enteric coated 81 milliGRAM(s) Oral daily  atorvastatin 80 milliGRAM(s) Oral at bedtime  carBAMazepine ER Tablet 300 milliGRAM(s) Oral two times a day  dextrose 5%. 1000 milliLiter(s) (100 mL/Hr) IV Continuous <Continuous>  dextrose 5%. 1000 milliLiter(s) (50 mL/Hr) IV Continuous <Continuous>  dextrose 50% Injectable 25 Gram(s) IV Push once  dextrose 50% Injectable 12.5 Gram(s) IV Push once  dextrose 50% Injectable 25 Gram(s) IV Push once  famotidine    Tablet 20 milliGRAM(s) Oral daily  glucagon  Injectable 1 milliGRAM(s) IntraMuscular once  heparin   Injectable 5000 Unit(s) SubCutaneous every 8 hours  latanoprost 0.005% Ophthalmic Solution 1 Drop(s) Both EYES at bedtime  melatonin 6 milliGRAM(s) Oral at bedtime  midodrine. 5 milliGRAM(s) Oral <User Schedule>  pantoprazole    Tablet 40 milliGRAM(s) Oral daily  polyethylene glycol 3350 17 Gram(s) Oral daily  remdesivir  IVPB 100 milliGRAM(s) IV Intermittent every 24 hours  remdesivir  IVPB   IV Intermittent     MEDICATIONS  (PRN):  albuterol    90 MICROgram(s) HFA Inhaler 1 Puff(s) Inhalation every 4 hours PRN Shortness of Breath and/or Wheezing  artificial  tears Solution 1 Drop(s) Both EYES two times a day PRN Dry Eyes  dextrose Oral Gel 15 Gram(s) Oral once PRN Blood Glucose LESS THAN 70 milliGRAM(s)/deciliter  guaiFENesin Oral Liquid (Sugar-Free) 200 milliGRAM(s) Oral every 6 hours PRN Cough  senna 1 Tablet(s) Oral two times a day PRN Constipation  simethicone 80 milliGRAM(s) Chew three times a day PRN Gas    ___________________________________________________________________________    PHYSICAL EXAM:    Gen - NAD, Comfortable  HEENT - NCAT, EOMI, MMM  Pulm - CTAB, No wheeze, No rhonchi, No crackles  Cardiovascular - RRR, S1S2  Abdomen - + tender, +BS, NIKHIL drain x 2   Extremities - No clubbing, no cyanosis, no peripheral edema, no calf tenderness  Neuro-     Cognitive - Awake, Alert, and Oriented      Motor -                     LEFT    UE - 4/5                    RIGHT UE - 5/5                    LEFT    LE - HF 5/5, KE 5/5, DF/PF 2/5- left foot drop                    RIGHT LE -  5/5        Sensory - Intact to LT     Reflexes - DTR Intact, No primitive reflexive  Psychiatric - Mood stable, Affect WNL  Skin: abdominal incision with staple.    ___________________________________________________________________________

## 2023-12-12 NOTE — PROGRESS NOTE ADULT - ASSESSMENT
Mr. Benjamin Rios is a 68 year old right handed male patient with past medical history of sinus node disorder s/p PPM, HTN, HLD, cerebral aneurysm s/p coiling, CVA w/ residual L sided weakness, seizure disorder, type 2 diabetes on metformin and gastritis who is admitted for Acute Inpatient Rehabilitation with a multidisciplinary rehab program at [facility] with functional impairments in ADLs and mobility secondary to a complaint of worsening abdominal pain with imaging remarkable for distended gallbladder with wall thickening and pericholecystic fat stranding and hemangioma treated surgically with a lap to open cholecystectomy for acute cholecystitis on 11/30 complicated by drainage of a small amount of bile and a large amount of pus from gallbladder and subsequent requirement of ICU admission due to post-operative hypotension requiring pressors and acute blood loss anemia improved with transfusion of one unit of PRBCs. Additionally, patient was found to have bile duct leakage, requiring ERCP with stent on 12/5/23.      Debility 2/2 acute cholecystitis   - complicated by bile duct leak (s/p ERCP with stent on 12/5)  - s/p 7 days of zosyn  - S/P ICU for post-operative hypotension  - Impaired ADLs and mobility  - Need for assistance with personal care   - Continue comprehensive rehab program of PT/OT - 3 hours a day, 5 days a week. P&O as needed   - Fall precautions  - monitor NIKHIL drain output  - outpatient GI f/u with Dr. Dorsey  - Repeat ERCP & stent removal in 4-8 weeks    COVID+  - patient tested positive for COVID on admission to rehab (12/7)  - isolation precautions  - monitor for symptoms    Acute Blood Loss Anemia (improved)  - s/p 1 unit PRBC transfusion  - monitor H/H    Seizure Disorder  - continue home carbamazepine 300 mg BID    h/o CVA/ HLD  - residual L sided weakness  - continue atorvastatin 80 mg QHS  - continue ASA 81 mg QD    HTN  - Home med: metoprolol 25 mg BID  - required pressors in ICU post-op  - stable off anti-hypertensives  - Monitor BP    T2DM  - last HbA1c 6.1  - low dose SSI  - Monitor fingersticks  - on metformin at home    Mood / Cognition  - Neuropsychology consult PRN    Sleep  - Maintain quiet hours and a low stim environment.   - Melatonin 6 mg QHS    GI / Bowel  - Senna qHS PRN  - Miralax Daily  - GI ppx: famotidine 20 mg QD     / Bladder  - BS Q8H and SC >400cc     Skin / Pressure injury  - Skin assessment on admission performed: abdominal incision with staple,   - Pressure Injury/Skin: OOB to chair, PT/OT  - nursing to monitor skin q Shift    Diet/Dysphagia:  - Diet Consistency: DASH, regular  - Nutrition consult    DVT prophylaxis:   - Heparin 5000u Q8H    Outpatient Follow-up:    Easton Carroll  Internal Medicine  [address] [place] [address]  Phone: [phone]  Fax: [phone]  Follow Up Time:     Francisco Dorsey  Gastroenterology  [address] [place] [address], [address]  [place] [address]  Phone: [phone]  Fax: [phone]  Follow Up Time    --------------- Mr. Benjamin Rios is a 68 year old right handed male patient with past medical history of sinus node disorder s/p PPM, HTN, HLD, cerebral aneurysm s/p coiling, CVA w/ residual L sided weakness, seizure disorder, type 2 diabetes on metformin and gastritis who is admitted for Acute Inpatient Rehabilitation with a multidisciplinary rehab program at [facility] with functional impairments in ADLs and mobility secondary to a complaint of worsening abdominal pain with imaging remarkable for distended gallbladder with wall thickening and pericholecystic fat stranding and hemangioma treated surgically with a lap to open cholecystectomy for acute cholecystitis on 11/30 complicated by drainage of a small amount of bile and a large amount of pus from gallbladder and subsequent requirement of ICU admission due to post-operative hypotension requiring pressors and acute blood loss anemia improved with transfusion of one unit of PRBCs. Additionally, patient was found to have bile duct leakage, requiring ERCP with stent on 12/5/23.      Debility 2/2 acute cholecystitis   - complicated by bile duct leak (s/p ERCP with stent on 12/5)  - s/p 7 days of zosyn  - S/P ICU for post-operative hypotension  - Impaired ADLs and mobility  - Need for assistance with personal care   - Continue comprehensive rehab program of PT/OT - 3 hours a day, 5 days a week. P&O as needed   - Fall precautions  - monitor NIKHIL drain output  - outpatient GI f/u with Dr. Dorsey  - Repeat ERCP & stent removal in 4-8 weeks    COVID+  - patient tested positive for COVID on admission to rehab (12/7)  - isolation precautions  - monitor for symptoms    Acute Blood Loss Anemia (improved)  - s/p 1 unit PRBC transfusion  - monitor H/H    Seizure Disorder  - continue home carbamazepine 300 mg BID    h/o CVA/ HLD  - residual L sided weakness  - continue atorvastatin 80 mg QHS  - continue ASA 81 mg QD    HTN  - Home med: metoprolol 25 mg BID  - required pressors in ICU post-op  - stable off anti-hypertensives  - Monitor BP    T2DM  - last HbA1c 6.1  - low dose SSI  - Monitor fingersticks  - on metformin at home    Mood / Cognition  - Neuropsychology consult PRN    Sleep  - Maintain quiet hours and a low stim environment.   - Melatonin 6 mg QHS    GI / Bowel  - Senna qHS PRN  - Miralax Daily  - GI ppx: famotidine 20 mg QD     / Bladder  - BS Q8H and SC >400cc     Skin / Pressure injury  - Skin assessment on admission performed: abdominal incision with staple,   - Pressure Injury/Skin: OOB to chair, PT/OT  - nursing to monitor skin q Shift    Diet/Dysphagia:  - Diet Consistency: DASH, regular  - Nutrition consult    DVT prophylaxis:   - Heparin 5000u Q8H    Outpatient Follow-up:    Easton Carroll  Internal Medicine  [address] [place] [address]  Phone: [phone]  Fax: [phone]  Follow Up Time:     Francisco Dorsey  Gastroenterology  [address] [place] [address], [address]  [place] [address]  Phone: [phone]  Fax: [phone]  Follow Up Time    ---------------

## 2023-12-12 NOTE — PROGRESS NOTE ADULT - SUBJECTIVE AND OBJECTIVE BOX
Patient is a 68y old  Male who presents with a chief complaint of Debility 2/2 acute cholecystitis (11 Dec 2023 10:27)      Patient seen and examined at bedside.    ALLERGIES:  No Known Allergies    MEDICATIONS  (STANDING):  aspirin enteric coated 81 milliGRAM(s) Oral daily  atorvastatin 80 milliGRAM(s) Oral at bedtime  carBAMazepine ER Tablet 300 milliGRAM(s) Oral two times a day  dextrose 5%. 1000 milliLiter(s) (100 mL/Hr) IV Continuous <Continuous>  dextrose 5%. 1000 milliLiter(s) (50 mL/Hr) IV Continuous <Continuous>  dextrose 50% Injectable 25 Gram(s) IV Push once  dextrose 50% Injectable 12.5 Gram(s) IV Push once  dextrose 50% Injectable 25 Gram(s) IV Push once  famotidine    Tablet 20 milliGRAM(s) Oral daily  glucagon  Injectable 1 milliGRAM(s) IntraMuscular once  heparin   Injectable 5000 Unit(s) SubCutaneous every 8 hours  latanoprost 0.005% Ophthalmic Solution 1 Drop(s) Both EYES at bedtime  melatonin 6 milliGRAM(s) Oral at bedtime  midodrine. 5 milliGRAM(s) Oral <User Schedule>  pantoprazole    Tablet 40 milliGRAM(s) Oral daily  polyethylene glycol 3350 17 Gram(s) Oral daily  remdesivir  IVPB 100 milliGRAM(s) IV Intermittent every 24 hours  remdesivir  IVPB   IV Intermittent     MEDICATIONS  (PRN):  albuterol    90 MICROgram(s) HFA Inhaler 1 Puff(s) Inhalation every 4 hours PRN Shortness of Breath and/or Wheezing  artificial  tears Solution 1 Drop(s) Both EYES two times a day PRN Dry Eyes  dextrose Oral Gel 15 Gram(s) Oral once PRN Blood Glucose LESS THAN 70 milliGRAM(s)/deciliter  guaiFENesin Oral Liquid (Sugar-Free) 200 milliGRAM(s) Oral every 6 hours PRN Cough  senna 1 Tablet(s) Oral two times a day PRN Constipation  simethicone 80 milliGRAM(s) Chew three times a day PRN Gas    Vital Signs Last 24 Hrs  T(F): 98.1 (12 Dec 2023 08:43), Max: 98.4 (11 Dec 2023 21:14)  HR: 76 (12 Dec 2023 08:43) (76 - 80)  BP: 103/62 (12 Dec 2023 08:43) (103/62 - 121/74)  RR: 16 (12 Dec 2023 08:43) (15 - 16)  SpO2: 97% (12 Dec 2023 08:43) (96% - 97%)  I&O's Summary    11 Dec 2023 07:01  -  12 Dec 2023 07:00  --------------------------------------------------------  IN: 0 mL / OUT: 200 mL / NET: -200 mL      BMI (kg/m2): 26.3 (12-07-23 @ 20:32)  PHYSICAL EXAM:  General: NAD, A/O x 3  ENT: MMM, no scleral icterus  Neck: Supple, No JVD, no thyroidomegaly  Lungs: Clear to auscultation bilaterally, no wheezes, no rales, no rhonchi, good inspiratory effort  Cardio: RRR, S1/S2, No murmurs  Abdomen: Soft, Nontender, Nondistended; Bowel sounds present  Extremities: No calf tenderness, No pitting edema, no skin changes    LABS:                        7.8    6.07  )-----------( 369      ( 11 Dec 2023 05:30 )             24.3       12-11    138  |  105  |  12  ----------------------------<  151  3.7   |  24  |  1.00    Ca    6.7      11 Dec 2023 05:30    TPro  5.8  /  Alb  1.6  /  TBili  0.3  /  DBili  x   /  AST  50  /  ALT  38  /  AlkPhos  95  12-11     Urinalysis Basic - ( 11 Dec 2023 05:30 )    Color: x / Appearance: x / SG: x / pH: x  Gluc: 151 mg/dL / Ketone: x  / Bili: x / Urobili: x   Blood: x / Protein: x / Nitrite: x   Leuk Esterase: x / RBC: x / WBC x   Sq Epi: x / Non Sq Epi: x / Bacteria: x    COVID-19 PCR: Detected (12-08-23 @ 11:25)  COVID-19 PCR: NotDetec (12-08-23 @ 05:00)  COVID-19 PCR: Detected (12-07-23 @ 22:00)  COVID-19 PCR: Detected (12-08-23 @ 11:25)  COVID-19 PCR: NotDetec (12-08-23 @ 05:00)  COVID-19 PCR: Detected (12-07-23 @ 22:00)

## 2023-12-12 NOTE — PROGRESS NOTE ADULT - ASSESSMENT
68 year old right handed male patient with past medical history of sinus node disorder s/p PPM, HTN, HLD, cerebral aneurysm s/p coiling, CVA w/ residual L sided weakness, seizure disorder, type 2 diabetes on metformin and gastritis who is admitted for Acute Inpatient Rehabilitation with a multidisciplinary rehab program at [facility] with functional impairments in ADLs and mobility secondary to a complaint of worsening abdominal pain with imaging remarkable for distended gallbladder with wall thickening and pericholecystic fat stranding and hemangioma treated surgically with a lap to open cholecystectomy for acute cholecystitis on 11/30 complicated by drainage of a small amount of bile and a large amount of pus from gallbladder and subsequent requirement of ICU admission due to post-operative hypotension requiring pressors and acute blood loss anemia improved with transfusion of one unit of PRBCs. Additionally, patient was found to have bile duct leakage, requiring ERCP with stent on 12/5/23.    Debility 2/2 acute cholecystitis complicated by bile duct leak (s/p ERCP with stent on 12/5)  - monitor NIKHIL drain output  - outpatient GI f/u with Dr. Dorsey  - Repeat ERCP & stent removal in 4-8 weeks    COVID+  - patient tested positive for COVID on admission to rehab (12/7)  - c/w Remdesivi for 5 days, no need for decadron at this time  - Guifenesin prn, albuterol prn    Hypotension  - pt reports orthostatic dizziness  - c/w midodrine 5mg BID    Acute Blood Loss Anemia (improved)  - s/p 1 unit PRBC transfusion  - monitor H/H    Seizure Disorder  - continue home carbamazepine 300 mg BID    CVA/ HLD  - residual L sided weakness  - continue atorvastatin 80 mg QHS  - continue ASA 81 mg QD    HTN  - Home med: metoprolol 25 mg BID  - off anti-hypertensives   - Monitor BP    T2DM  - last HbA1c 6.1  - low dose SSI  - Monitor fingersticks  - on metformin at home    DVT prophylaxis: Heparin subq       68 year old right handed male patient with past medical history of sinus node disorder s/p PPM, HTN, HLD, cerebral aneurysm s/p coiling, CVA w/ residual L sided weakness, seizure disorder, type 2 diabetes on metformin and gastritis who is admitted for Acute Inpatient Rehabilitation with a multidisciplinary rehab program at [facility] with functional impairments in ADLs and mobility secondary to a complaint of worsening abdominal pain with imaging remarkable for distended gallbladder with wall thickening and pericholecystic fat stranding and hemangioma treated surgically with a lap to open cholecystectomy for acute cholecystitis on 11/30 complicated by drainage of a small amount of bile and a large amount of pus from gallbladder and subsequent requirement of ICU admission due to post-operative hypotension requiring pressors and acute blood loss anemia improved with transfusion of one unit of PRBCs. Additionally, patient was found to have bile duct leakage, requiring ERCP with stent on 12/5/23.    Debility 2/2 acute cholecystitis complicated by bile duct leak (s/p ERCP with stent on 12/5)  - monitor NIKHIL drain output  - outpatient GI f/u with Dr. Dorsye  - Repeat ERCP & stent removal in 4-8 weeks    COVID+  - patient tested positive for COVID on admission to rehab (12/7)  - c/w Remdesivi for 5 days, no need for decadron at this time  - Guifenesin prn, albuterol prn    Hypotension  - pt reports orthostatic dizziness  - c/w midodrine 5mg BID    Acute Blood Loss Anemia (improved)  - s/p 1 unit PRBC transfusion  - monitor H/H    Seizure Disorder  - continue home carbamazepine 300 mg BID    CVA/ HLD  - residual L sided weakness  - continue atorvastatin 80 mg QHS  - continue ASA 81 mg QD    HTN  - Home med: metoprolol 25 mg BID  - off anti-hypertensives   - Monitor BP    T2DM  - last HbA1c 6.1  - low dose SSI  - Monitor fingersticks  - on metformin at home    DVT prophylaxis: Heparin subq

## 2023-12-12 NOTE — CHART NOTE - NSCHARTNOTEFT_GEN_A_CORE
NUTRITION FOLLOW UP    SOURCE: Patient [X]   Family [ ]    Medical Record [X]    DIET: DASH/TLC, glucerna qd  ALLERGIES: NKFA    IMPRESSION:  Using [employer] ([place]) ID# [id number]. Met with pt this AM at bedside. Pt was seated upright and able to articulate his nutrition hx well. Pt reported adequate appetite and POs, with good acceptance of Glucerna qd - pt requested Glucerna BID, RD will update diet order. Encouraged pt to continue taking adequate POs and protein-rich foods to regain strength. Pt denied N/V/D, however endorsed constipation, stated last BM 12/11. Encouraged adequate consumption of fluids + fiber and prunes @ bfast daily to assist BMs. Pt was accepting to suggestion and asking appropriate questions.     *Notes: Corrected Ca: ([4 - 1.6] x 0.8) + 6.7 = 8.62 WNL     PATIENT REPORT: [X] constipation    PO INTAKE: 51-75% per RN flowsheets    CURRENT WEIGHT: 157.8 lbs (12/7)    WEIGHT HX: N/A    PERTINENT MEDS:   Pertinent Medications: MEDICATIONS  (STANDING):  aspirin enteric coated 81 milliGRAM(s) Oral daily  atorvastatin 80 milliGRAM(s) Oral at bedtime  carBAMazepine ER Tablet 300 milliGRAM(s) Oral two times a day  dextrose 5%. 1000 milliLiter(s) (100 mL/Hr) IV Continuous <Continuous>  dextrose 5%. 1000 milliLiter(s) (50 mL/Hr) IV Continuous <Continuous>  dextrose 50% Injectable 25 Gram(s) IV Push once  dextrose 50% Injectable 12.5 Gram(s) IV Push once  dextrose 50% Injectable 25 Gram(s) IV Push once  famotidine    Tablet 20 milliGRAM(s) Oral daily  glucagon  Injectable 1 milliGRAM(s) IntraMuscular once  heparin   Injectable 5000 Unit(s) SubCutaneous every 8 hours  latanoprost 0.005% Ophthalmic Solution 1 Drop(s) Both EYES at bedtime  melatonin 6 milliGRAM(s) Oral at bedtime  midodrine. 5 milliGRAM(s) Oral <User Schedule>  pantoprazole    Tablet 40 milliGRAM(s) Oral daily  polyethylene glycol 3350 17 Gram(s) Oral daily  remdesivir  IVPB 100 milliGRAM(s) IV Intermittent every 24 hours  remdesivir  IVPB   IV Intermittent     MEDICATIONS  (PRN):  albuterol    90 MICROgram(s) HFA Inhaler 1 Puff(s) Inhalation every 4 hours PRN Shortness of Breath and/or Wheezing  artificial  tears Solution 1 Drop(s) Both EYES two times a day PRN Dry Eyes  dextrose Oral Gel 15 Gram(s) Oral once PRN Blood Glucose LESS THAN 70 milliGRAM(s)/deciliter  guaiFENesin Oral Liquid (Sugar-Free) 200 milliGRAM(s) Oral every 6 hours PRN Cough  senna 1 Tablet(s) Oral two times a day PRN Constipation  simethicone 80 milliGRAM(s) Chew three times a day PRN Gas    PERTINENT LABS:  12-11 Na138 mmol/L Glu 151 mg/dL<H> K+ 3.7 mmol/L Cr  1.00 mg/dL BUN 12 mg/dL 12-07 Phos 2.2 mg/dL<L> 12-11 Alb 1.6 g/dL<L>    SKIN: no pressure injury per RN flowsheets  EDEMA: N/A    ESTIMATED NEEDS:   [X] no change since previous assessment     PREVIOUS NUTRITION DIAGNOSIS: Acute moderate protein-kcal malnutrition    NUTRITION DIAGNOSIS is: [X] Ongoing - addressed with glucerna qd     NEW NUTRITION DIAGNOSIS: N/A    MONITORING AND EVALUATION:   Current diet order is appropriate and is well tolerated, will continue to monitor:  - Food and nutrient intake/POs  - Nutrition related lab value, specifically POCT BG & Ca  - Weight/weight trends  - GI functions  - Supplement acceptance    NUTRITION RECOMMENDATIONS:   1. Continue with current nutrition regimen, as tolerated  - Glucerna BID per pt's request  - Encourage high kcal/high protein foods  - Honor food preferences  2. Appreciate weekly weight trends  3. Continue with bowel regimen, PRN    Sarah Rayo RDN also available via TEAMS NUTRITION FOLLOW UP    SOURCE: Patient [X]   Family [ ]    Medical Record [X]    DIET: DASH/TLC, glucerna qd  ALLERGIES: NKFA    IMPRESSION:  Using [employer] ([place]) ID# [id number]. Met with pt this AM at bedside. Pt was seated upright and able to articulate his nutrition hx well. Pt reported adequate appetite and POs, with good acceptance of Glucerna qd - pt requested Glucerna BID, RD will update diet order. Encouraged pt to continue taking adequate POs and protein-rich foods to regain strength. Pt denied N/V/D, however endorsed constipation, stated last BM 12/11. Encouraged adequate consumption of fluids + fiber and prunes @ bfast daily to assist BMs. Pt was accepting to suggestion and asking appropriate questions.     *Notes: Corrected Ca: ([4 - 1.6] x 0.8) + 6.7 = 8.62 WNL     PATIENT REPORT: [X] constipation    PO INTAKE: 51-75% per RN flowsheets    CURRENT WEIGHT: 157.8 lbs (12/7)    WEIGHT HX: N/A    PERTINENT MEDS:   Pertinent Medications: MEDICATIONS  (STANDING):  aspirin enteric coated 81 milliGRAM(s) Oral daily  atorvastatin 80 milliGRAM(s) Oral at bedtime  carBAMazepine ER Tablet 300 milliGRAM(s) Oral two times a day  dextrose 5%. 1000 milliLiter(s) (100 mL/Hr) IV Continuous <Continuous>  dextrose 5%. 1000 milliLiter(s) (50 mL/Hr) IV Continuous <Continuous>  dextrose 50% Injectable 25 Gram(s) IV Push once  dextrose 50% Injectable 12.5 Gram(s) IV Push once  dextrose 50% Injectable 25 Gram(s) IV Push once  famotidine    Tablet 20 milliGRAM(s) Oral daily  glucagon  Injectable 1 milliGRAM(s) IntraMuscular once  heparin   Injectable 5000 Unit(s) SubCutaneous every 8 hours  latanoprost 0.005% Ophthalmic Solution 1 Drop(s) Both EYES at bedtime  melatonin 6 milliGRAM(s) Oral at bedtime  midodrine. 5 milliGRAM(s) Oral <User Schedule>  pantoprazole    Tablet 40 milliGRAM(s) Oral daily  polyethylene glycol 3350 17 Gram(s) Oral daily  remdesivir  IVPB 100 milliGRAM(s) IV Intermittent every 24 hours  remdesivir  IVPB   IV Intermittent     MEDICATIONS  (PRN):  albuterol    90 MICROgram(s) HFA Inhaler 1 Puff(s) Inhalation every 4 hours PRN Shortness of Breath and/or Wheezing  artificial  tears Solution 1 Drop(s) Both EYES two times a day PRN Dry Eyes  dextrose Oral Gel 15 Gram(s) Oral once PRN Blood Glucose LESS THAN 70 milliGRAM(s)/deciliter  guaiFENesin Oral Liquid (Sugar-Free) 200 milliGRAM(s) Oral every 6 hours PRN Cough  senna 1 Tablet(s) Oral two times a day PRN Constipation  simethicone 80 milliGRAM(s) Chew three times a day PRN Gas    PERTINENT LABS:  12-11 Na138 mmol/L Glu 151 mg/dL<H> K+ 3.7 mmol/L Cr  1.00 mg/dL BUN 12 mg/dL 12-07 Phos 2.2 mg/dL<L> 12-11 Alb 1.6 g/dL<L>    SKIN: no pressure injury per RN flowsheets  EDEMA: N/A    ESTIMATED NEEDS:   [X] no change since previous assessment     PREVIOUS NUTRITION DIAGNOSIS: Acute moderate protein-kcal malnutrition    NUTRITION DIAGNOSIS is: [X] Ongoing - addressed with glucerna qd     NEW NUTRITION DIAGNOSIS: N/A    MONITORING AND EVALUATION:   Current diet order is appropriate and is well tolerated, will continue to monitor:  - Food and nutrient intake/POs  - Nutrition related lab value, specifically POCT BG & Ca  - Weight/weight trends  - GI functions  - Supplement acceptance    NUTRITION RECOMMENDATIONS:   1. Continue with current nutrition regimen, as tolerated  - Glucerna BID per pt's request  - Encourage high kcal/high protein foods  - Honor food preferences  2. Appreciate weekly weight trends  3. Continue with bowel regimen, PRN    Sarah Rayo RDN also available via TEAMS

## 2023-12-13 PROCEDURE — 99232 SBSQ HOSP IP/OBS MODERATE 35: CPT

## 2023-12-13 RX ADMIN — FAMOTIDINE 20 MILLIGRAM(S): 10 INJECTION INTRAVENOUS at 13:16

## 2023-12-13 RX ADMIN — POLYETHYLENE GLYCOL 3350 17 GRAM(S): 17 POWDER, FOR SOLUTION ORAL at 13:17

## 2023-12-13 RX ADMIN — SENNA PLUS 1 TABLET(S): 8.6 TABLET ORAL at 06:30

## 2023-12-13 RX ADMIN — ATORVASTATIN CALCIUM 80 MILLIGRAM(S): 80 TABLET, FILM COATED ORAL at 21:17

## 2023-12-13 RX ADMIN — Medication 81 MILLIGRAM(S): at 13:17

## 2023-12-13 RX ADMIN — Medication 300 MILLIGRAM(S): at 06:30

## 2023-12-13 RX ADMIN — SENNA PLUS 1 TABLET(S): 8.6 TABLET ORAL at 17:53

## 2023-12-13 RX ADMIN — LATANOPROST 1 DROP(S): 0.05 SOLUTION/ DROPS OPHTHALMIC; TOPICAL at 21:17

## 2023-12-13 RX ADMIN — HEPARIN SODIUM 5000 UNIT(S): 5000 INJECTION INTRAVENOUS; SUBCUTANEOUS at 06:30

## 2023-12-13 RX ADMIN — Medication 300 MILLIGRAM(S): at 17:53

## 2023-12-13 RX ADMIN — MIDODRINE HYDROCHLORIDE 5 MILLIGRAM(S): 2.5 TABLET ORAL at 06:30

## 2023-12-13 RX ADMIN — REMDESIVIR 200 MILLIGRAM(S): 5 INJECTION INTRAVENOUS at 13:18

## 2023-12-13 RX ADMIN — Medication 6 MILLIGRAM(S): at 21:16

## 2023-12-13 RX ADMIN — PANTOPRAZOLE SODIUM 40 MILLIGRAM(S): 20 TABLET, DELAYED RELEASE ORAL at 13:17

## 2023-12-13 RX ADMIN — MIDODRINE HYDROCHLORIDE 5 MILLIGRAM(S): 2.5 TABLET ORAL at 13:25

## 2023-12-13 RX ADMIN — HEPARIN SODIUM 5000 UNIT(S): 5000 INJECTION INTRAVENOUS; SUBCUTANEOUS at 13:17

## 2023-12-13 RX ADMIN — HEPARIN SODIUM 5000 UNIT(S): 5000 INJECTION INTRAVENOUS; SUBCUTANEOUS at 21:17

## 2023-12-13 NOTE — PROGRESS NOTE ADULT - SUBJECTIVE AND OBJECTIVE BOX
Patient is a 68y old  Male who presents with a chief complaint of Debility 2/2 acute cholecystitis (13 Dec 2023 08:02)      Patient seen and examined at bedside. No overnight events reported.  reports mild cough. denies sob, cp, abd pain, n/v. no fever, chills.     ALLERGIES:  No Known Allergies    MEDICATIONS  (STANDING):  aspirin enteric coated 81 milliGRAM(s) Oral daily  atorvastatin 80 milliGRAM(s) Oral at bedtime  carBAMazepine ER Tablet 300 milliGRAM(s) Oral two times a day  dextrose 5%. 1000 milliLiter(s) (100 mL/Hr) IV Continuous <Continuous>  dextrose 5%. 1000 milliLiter(s) (50 mL/Hr) IV Continuous <Continuous>  dextrose 50% Injectable 25 Gram(s) IV Push once  dextrose 50% Injectable 12.5 Gram(s) IV Push once  dextrose 50% Injectable 25 Gram(s) IV Push once  famotidine    Tablet 20 milliGRAM(s) Oral daily  glucagon  Injectable 1 milliGRAM(s) IntraMuscular once  heparin   Injectable 5000 Unit(s) SubCutaneous every 8 hours  lactulose Retention Enema 200 Gram(s) Rectal once  lactulose Syrup 10 Gram(s) Oral once  latanoprost 0.005% Ophthalmic Solution 1 Drop(s) Both EYES at bedtime  melatonin 6 milliGRAM(s) Oral at bedtime  midodrine. 5 milliGRAM(s) Oral <User Schedule>  pantoprazole    Tablet 40 milliGRAM(s) Oral daily  polyethylene glycol 3350 17 Gram(s) Oral daily  senna 1 Tablet(s) Oral two times a day    MEDICATIONS  (PRN):  albuterol    90 MICROgram(s) HFA Inhaler 1 Puff(s) Inhalation every 4 hours PRN Shortness of Breath and/or Wheezing  artificial  tears Solution 1 Drop(s) Both EYES two times a day PRN Dry Eyes  dextrose Oral Gel 15 Gram(s) Oral once PRN Blood Glucose LESS THAN 70 milliGRAM(s)/deciliter  guaiFENesin Oral Liquid (Sugar-Free) 200 milliGRAM(s) Oral every 6 hours PRN Cough  simethicone 80 milliGRAM(s) Chew three times a day PRN Gas    Vital Signs Last 24 Hrs  T(F): 98 (13 Dec 2023 08:22), Max: 98.5 (12 Dec 2023 20:06)  HR: 75 (13 Dec 2023 08:22) (72 - 76)  BP: 123/77 (13 Dec 2023 08:22) (101/59 - 123/77)  RR: 16 (13 Dec 2023 08:22) (16 - 16)  SpO2: 98% (13 Dec 2023 08:22) (97% - 98%)  I&O's Summary    12 Dec 2023 07:01  -  13 Dec 2023 07:00  --------------------------------------------------------  IN: 0 mL / OUT: 700 mL / NET: -700 mL          GENERAL: NAD  HEAD:  Atraumatic, Normocephalic   EYES: EOMI, PERRLA, sclera clear  NECK: Supple  CHEST/LUNG: Clear to auscultation bilaterally; No wheeze  HEART: Regular rate and rhythm; No murmurs, rubs, or gallops  ABDOMEN: Soft, Nontender, Nondistended; Bowel sounds present  EXTREMITIES:  2+ Peripheral Pulses, No clubbing, cyanosis, or edema  PSYCH: AAOx3  NEUROLOGY: non-focal    LABS:                        7.8    6.07  )-----------( 369      ( 11 Dec 2023 05:30 )             24.3     12-11    138  |  105  |  12  ----------------------------<  151  3.7   |  24  |  1.00    Ca    6.7      11 Dec 2023 05:30    TPro  5.8  /  Alb  1.6  /  TBili  0.3  /  DBili  x   /  AST  50  /  ALT  38  /  AlkPhos  95  12-11            Urinalysis Basic - ( 11 Dec 2023 05:30 )    Color: x / Appearance: x / SG: x / pH: x  Gluc: 151 mg/dL / Ketone: x  / Bili: x / Urobili: x   Blood: x / Protein: x / Nitrite: x   Leuk Esterase: x / RBC: x / WBC x   Sq Epi: x / Non Sq Epi: x / Bacteria: x        COVID-19 PCR: Detected (12-08-23 @ 11:25)  COVID-19 PCR: NotDetec (12-08-23 @ 05:00)  COVID-19 PCR: Detected (12-07-23 @ 22:00)    RADIOLOGY & ADDITIONAL TESTS:    Care Discussed with Consultants/Other Providers:

## 2023-12-13 NOTE — PROGRESS NOTE ADULT - ASSESSMENT
Mr. Benjamin Rios is a 68 year old right handed male patient with past medical history of sinus node disorder s/p PPM, HTN, HLD, cerebral aneurysm s/p coiling, CVA w/ residual L sided weakness, seizure disorder, type 2 diabetes on metformin and gastritis who is admitted for Acute Inpatient Rehabilitation with a multidisciplinary rehab program at [facility] with functional impairments in ADLs and mobility secondary to a complaint of worsening abdominal pain with imaging remarkable for distended gallbladder with wall thickening and pericholecystic fat stranding and hemangioma treated surgically with a lap to open cholecystectomy for acute cholecystitis on 11/30 complicated by drainage of a small amount of bile and a large amount of pus from gallbladder and subsequent requirement of ICU admission due to post-operative hypotension requiring pressors and acute blood loss anemia improved with transfusion of one unit of PRBCs. Additionally, patient was found to have bile duct leakage, requiring ERCP with stent on 12/5/23.      Debility 2/2 acute cholecystitis   - complicated by bile duct leak (s/p ERCP with stent on 12/5)  - s/p 7 days of zosyn  - S/P ICU for post-operative hypotension  - Impaired ADLs and mobility  - Need for assistance with personal care   - Continue comprehensive rehab program of PT/OT - 3 hours a day, 5 days a week. P&O as needed   - Fall precautions  - monitor NIKHIL drain output  - outpatient GI f/u with Dr. Dorsey  - Repeat ERCP & stent removal in 4-8 weeks    COVID+  - patient tested positive for COVID on admission to rehab (12/7)  - isolation precautions  - monitor for symptoms    Acute Blood Loss Anemia (improved)  - s/p 1 unit PRBC transfusion  - monitor H/H    Seizure Disorder  - continue home carbamazepine 300 mg BID    h/o CVA/ HLD  - residual L sided weakness  - continue atorvastatin 80 mg QHS  - continue ASA 81 mg QD    HTN  - Home med: metoprolol 25 mg BID  - required pressors in ICU post-op  - stable off anti-hypertensives  - Monitor BP    T2DM  - last HbA1c 6.1  - low dose SSI  - Monitor fingersticks  - on metformin at home    Mood / Cognition  - Neuropsychology consult PRN    Sleep  - Maintain quiet hours and a low stim environment.   - Melatonin 6 mg QHS    GI / Bowel  - Senna qHS PRN  - Miralax Daily  - GI ppx: famotidine 20 mg QD     / Bladder  - BS Q8H and SC >400cc     Skin / Pressure injury  - Skin assessment on admission performed: abdominal incision with staple,   - Pressure Injury/Skin: OOB to chair, PT/OT  - nursing to monitor skin q Shift    Diet/Dysphagia:  - Diet Consistency: DASH, regular  - Nutrition consult    DVT prophylaxis:   - Heparin 5000u Q8H    Outpatient Follow-up:    Easton Carroll  Internal Medicine  [address] [place] [address]  Phone: [phone]  Fax: [phone]  Follow Up Time:     Francisco Dorsey  Gastroenterology  [address] [place] [address], [address]  [place] [address]  Phone: [phone]  Fax: [phone]  Follow Up Time    --------------- Mr. Benjamin Rios is a 68 year old right handed male patient with past medical history of sinus node disorder s/p PPM, HTN, HLD, cerebral aneurysm s/p coiling, CVA w/ residual L sided weakness, seizure disorder, type 2 diabetes on metformin and gastritis who is admitted for Acute Inpatient Rehabilitation with a multidisciplinary rehab program at [facility] with functional impairments in ADLs and mobility secondary to a complaint of worsening abdominal pain with imaging remarkable for distended gallbladder with wall thickening and pericholecystic fat stranding and hemangioma treated surgically with a lap to open cholecystectomy for acute cholecystitis on 11/30 complicated by drainage of a small amount of bile and a large amount of pus from gallbladder and subsequent requirement of ICU admission due to post-operative hypotension requiring pressors and acute blood loss anemia improved with transfusion of one unit of PRBCs. Additionally, patient was found to have bile duct leakage, requiring ERCP with stent on 12/5/23.      Debility 2/2 acute cholecystitis   - complicated by bile duct leak (s/p ERCP with stent on 12/5)  - s/p 7 days of zosyn  - S/P ICU for post-operative hypotension  - Impaired ADLs and mobility  - Need for assistance with personal care   - Continue comprehensive rehab program of PT/OT - 3 hours a day, 5 days a week. P&O as needed   - Fall precautions  - monitor NIKHIL drain output  - outpatient GI f/u with Dr. Dorsey  - Repeat ERCP & stent removal in 4-8 weeks    COVID+  - patient tested positive for COVID on admission to rehab (12/7)  - isolation precautions  - monitor for symptoms    Acute Blood Loss Anemia (improved)  - s/p 1 unit PRBC transfusion  - monitor H/H    Seizure Disorder  - continue home carbamazepine 300 mg BID    h/o CVA/ HLD  - residual L sided weakness  - continue atorvastatin 80 mg QHS  - continue ASA 81 mg QD    HTN  - Home med: metoprolol 25 mg BID  - required pressors in ICU post-op  - stable off anti-hypertensives  - Monitor BP    T2DM  - last HbA1c 6.1  - low dose SSI  - Monitor fingersticks  - on metformin at home    Mood / Cognition  - Neuropsychology consult PRN    Sleep  - Maintain quiet hours and a low stim environment.   - Melatonin 6 mg QHS    GI / Bowel  - Senna qHS PRN  - Miralax Daily  - GI ppx: famotidine 20 mg QD     / Bladder  - BS Q8H and SC >400cc     Skin / Pressure injury  - Skin assessment on admission performed: abdominal incision with staple,   - Pressure Injury/Skin: OOB to chair, PT/OT  - nursing to monitor skin q Shift    Diet/Dysphagia:  - Diet Consistency: DASH, regular  - Nutrition consult    DVT prophylaxis:   - Heparin 5000u Q8H    Outpatient Follow-up:    Easton Carroll  Internal Medicine  [address] [place] [address]  Phone: [phone]  Fax: [phone]  Follow Up Time:     Francisco Dorsey  Gastroenterology  [address] [place] [address], [address]  [place] [address]  Phone: [phone]  Fax: [phone]  Follow Up Time    ---------------

## 2023-12-13 NOTE — PROGRESS NOTE ADULT - ASSESSMENT
68 year old right handed male patient with past medical history of sinus node disorder s/p PPM, HTN, HLD, cerebral aneurysm s/p coiling, CVA w/ residual L sided weakness, seizure disorder, type 2 diabetes on metformin and gastritis who is admitted for Acute Inpatient Rehabilitation with a multidisciplinary rehab program at [facility] with functional impairments in ADLs and mobility secondary to a complaint of worsening abdominal pain with imaging remarkable for distended gallbladder with wall thickening and pericholecystic fat stranding and hemangioma treated surgically with a lap to open cholecystectomy for acute cholecystitis on 11/30 complicated by drainage of a small amount of bile and a large amount of pus from gallbladder and subsequent requirement of ICU admission due to post-operative hypotension requiring pressors and acute blood loss anemia improved with transfusion of one unit of PRBCs. Additionally, patient was found to have bile duct leakage, requiring ERCP with stent on 12/5/23.    Debility 2/2 acute cholecystitis complicated by bile duct leak (s/p ERCP with stent on 12/5)  - monitor NIKHIL drain output  - outpatient GI f/u with Dr. Dorsey  - Repeat ERCP & stent removal in 4-8 weeks    COVID+  - patient tested positive for COVID on admission to rehab (12/7)  - completed Remdesivi 5 day course   - no need for decadron   - Guifenesin prn, albuterol prn    Hypotension - resolved   - pt reports orthostatic dizziness  - check orthostatic VS   - c/w midodrine 5mg BID  - BP improved currently    Acute Blood Loss Anemia (improved)  - s/p 1 unit PRBC transfusion  - monitor H/H    Seizure Disorder  - continue home carbamazepine 300 mg BID    CVA/ HLD  - residual L sided weakness  - continue atorvastatin 80 mg QHS  - continue ASA 81 mg QD    HTN  - Home med: metoprolol 25 mg BID  - off anti-hypertensives   - Monitor BP    T2DM  - last HbA1c 6.1  - low dose SSI  - Monitor fingersticks  - on metformin at home    DVT prophylaxis: Heparin subq       68 year old right handed male patient with past medical history of sinus node disorder s/p PPM, HTN, HLD, cerebral aneurysm s/p coiling, CVA w/ residual L sided weakness, seizure disorder, type 2 diabetes on metformin and gastritis who is admitted for Acute Inpatient Rehabilitation with a multidisciplinary rehab program at [facility] with functional impairments in ADLs and mobility secondary to a complaint of worsening abdominal pain with imaging remarkable for distended gallbladder with wall thickening and pericholecystic fat stranding and hemangioma treated surgically with a lap to open cholecystectomy for acute cholecystitis on 11/30 complicated by drainage of a small amount of bile and a large amount of pus from gallbladder and subsequent requirement of ICU admission due to post-operative hypotension requiring pressors and acute blood loss anemia improved with transfusion of one unit of PRBCs. Additionally, patient was found to have bile duct leakage, requiring ERCP with stent on 12/5/23.    Debility 2/2 acute cholecystitis complicated by bile duct leak (s/p ERCP with stent on 12/5)  - monitor NIKHIL drain output  - outpatient GI f/u with Dr. Dorsey  - Repeat ERCP & stent removal in 4-8 weeks    COVID+  - patient tested positive for COVID on admission to rehab (12/7)  - completed Remdesivi 5 day course   - no need for decadron   - Guifenesin prn, albuterol prn    Hypotension - resolved   - pt reports orthostatic dizziness  - check orthostatic VS   - c/w midodrine 5mg BID  - BP improved currently    Acute Blood Loss Anemia (improved)  - s/p 1 unit PRBC transfusion  - monitor H/H    Seizure Disorder  - continue home carbamazepine 300 mg BID    CVA/ HLD  - residual L sided weakness  - continue atorvastatin 80 mg QHS  - continue ASA 81 mg QD    HTN  - Home med: metoprolol 25 mg BID  - off anti-hypertensives   - Monitor BP    T2DM  - last HbA1c 6.1  - low dose SSI  - Monitor fingersticks  - on metformin at home    DVT prophylaxis: Heparin subq

## 2023-12-13 NOTE — PROGRESS NOTE ADULT - SUBJECTIVE AND OBJECTIVE BOX
HPI:  Mr. Benjamin Rios is a 68 year old right handed male patient with past medical history of sinus node disorder s/p PPM, HTN, HLD, cerebral aneurysm s/p coiling, CVA w/ residual L sided weakness, seizure disorder, type 2 diabetes on metformin and gastritis admitted to [facility] on 11/28 for worsening abdominal pain since 11/24. CT chest abd/pelvis showed distended gallbladder with gallbladder wall thickening and pericholecystic fat stranding and hemangioma in T7 vertebrae. Patient underwent lap to open cholecystectomy for acute cholecystitis on 11/30. Procedure was complicated by drainage of a small amount of bile and a large amount of pus from gallbladder. Patient was upgraded to ICU due to post-operative hypotension requiring pressors then downgraded to surgical floor. Hospital course further complicated by acute blood loss anemia, improved s/p 1 unit PRBCs. Patient also found to have bile duct leakage, requiring ERCP with stent on 12/5/23. Patient requires follow up with GI and a follow up ERCP in 4-8 weeks. Patient evaluated by PT/OT and was recommended for acute inpatient rehab. Patient is medically stable for discharge to acute rehab on 12/7.  (07 Dec 2023 13:33)    TDD: 12/19 Home  ___________________________________________________________________________    SUBJECTIVE/ROS  Patient was seen and evaluated at bedside today.  Reported no overnight events and is in no acute distress.  Continues on isolation for positive COVID-19 status.  LBM yesterday.  Case was discussed at Interdisciplinary Team meeting yesterday.  A tentative discharge date is outlined above.  Patient is eager to continue participation on the recommended rehabilitation program.  Denies any CP, SOB, AUGUSTINE, palpitations, fever, chills, body aches, cough, congestion, or any other symptoms at this time.   ___________________________________________________________________________    Vital Signs Last 24 Hrs  T(C): 36.9 (12 Dec 2023 20:06), Max: 36.9 (12 Dec 2023 20:06)  T(F): 98.5 (12 Dec 2023 20:06), Max: 98.5 (12 Dec 2023 20:06)  HR: 72 (13 Dec 2023 06:25) (72 - 76)  BP: 101/59 (13 Dec 2023 06:25) (101/59 - 107/62)  RR: 16 (13 Dec 2023 06:25) (16 - 16)  SpO2: 97% (13 Dec 2023 06:25) (97% - 97%)    ___________________________________________________________________________    LABS                          7.8    6.07  )-----------( 369      ( 11 Dec 2023 05:30 )             24.3       12-11    138  |  105  |  12  ----------------------------<  151<H>  3.7   |  24  |  1.00    Ca    6.7<L>      11 Dec 2023 05:30    TPro  5.8<L>  /  Alb  1.6<L>  /  TBili  0.3  /  DBili  x   /  AST  50<H>  /  ALT  38  /  AlkPhos  95  12-11    ___________________________________________________________________________    MEDICATIONS  (STANDING):  aspirin enteric coated 81 milliGRAM(s) Oral daily  atorvastatin 80 milliGRAM(s) Oral at bedtime  carBAMazepine ER Tablet 300 milliGRAM(s) Oral two times a day  dextrose 5%. 1000 milliLiter(s) (50 mL/Hr) IV Continuous <Continuous>  dextrose 5%. 1000 milliLiter(s) (100 mL/Hr) IV Continuous <Continuous>  dextrose 50% Injectable 25 Gram(s) IV Push once  dextrose 50% Injectable 12.5 Gram(s) IV Push once  dextrose 50% Injectable 25 Gram(s) IV Push once  famotidine    Tablet 20 milliGRAM(s) Oral daily  glucagon  Injectable 1 milliGRAM(s) IntraMuscular once  heparin   Injectable 5000 Unit(s) SubCutaneous every 8 hours  lactulose Retention Enema 200 Gram(s) Rectal once  lactulose Syrup 10 Gram(s) Oral once  latanoprost 0.005% Ophthalmic Solution 1 Drop(s) Both EYES at bedtime  melatonin 6 milliGRAM(s) Oral at bedtime  midodrine. 5 milliGRAM(s) Oral <User Schedule>  pantoprazole    Tablet 40 milliGRAM(s) Oral daily  polyethylene glycol 3350 17 Gram(s) Oral daily  remdesivir  IVPB   IV Intermittent   remdesivir  IVPB 100 milliGRAM(s) IV Intermittent every 24 hours  senna 1 Tablet(s) Oral two times a day    MEDICATIONS  (PRN):  albuterol    90 MICROgram(s) HFA Inhaler 1 Puff(s) Inhalation every 4 hours PRN Shortness of Breath and/or Wheezing  artificial  tears Solution 1 Drop(s) Both EYES two times a day PRN Dry Eyes  dextrose Oral Gel 15 Gram(s) Oral once PRN Blood Glucose LESS THAN 70 milliGRAM(s)/deciliter  guaiFENesin Oral Liquid (Sugar-Free) 200 milliGRAM(s) Oral every 6 hours PRN Cough  simethicone 80 milliGRAM(s) Chew three times a day PRN Gas    ___________________________________________________________________________    PHYSICAL EXAM:    Gen - NAD, Comfortable  HEENT - NCAT, EOMI, MMM  Pulm - CTAB, No wheeze, No rhonchi, No crackles  Cardiovascular - RRR, S1S2  Abdomen - + tender, +BS, NIKHIL drain x 2   Extremities - No clubbing, no cyanosis, no peripheral edema, no calf tenderness  Neuro-     Cognitive - Awake, Alert, and Oriented      Motor -                     LEFT    UE - 4/5                    RIGHT UE - 5/5                    LEFT    LE - HF 5/5, KE 5/5, DF/PF 2/5- left foot drop                    RIGHT LE -  5/5        Sensory - Intact to LT     Reflexes - DTR Intact, No primitive reflexive  Psychiatric - Mood stable, Affect WNL  Skin: abdominal incisions in RUQ and umbilicus clean and dry.    ___________________________________________________________________________ HPI:  Mr. Benjamin Rios is a 68 year old right handed male patient with past medical history of sinus node disorder s/p PPM, HTN, HLD, cerebral aneurysm s/p coiling, CVA w/ residual L sided weakness, seizure disorder, type 2 diabetes on metformin and gastritis admitted to [facility] on 11/28 for worsening abdominal pain since 11/24. CT chest abd/pelvis showed distended gallbladder with gallbladder wall thickening and pericholecystic fat stranding and hemangioma in T7 vertebrae. Patient underwent lap to open cholecystectomy for acute cholecystitis on 11/30. Procedure was complicated by drainage of a small amount of bile and a large amount of pus from gallbladder. Patient was upgraded to ICU due to post-operative hypotension requiring pressors then downgraded to surgical floor. Hospital course further complicated by acute blood loss anemia, improved s/p 1 unit PRBCs. Patient also found to have bile duct leakage, requiring ERCP with stent on 12/5/23. Patient requires follow up with GI and a follow up ERCP in 4-8 weeks. Patient evaluated by PT/OT and was recommended for acute inpatient rehab. Patient is medically stable for discharge to acute rehab on 12/7.  (07 Dec 2023 13:33)    TDD: 12/19 Home  ___________________________________________________________________________    SUBJECTIVE/ROS  Patient was seen and evaluated at bedside today.  Reported no overnight events and is in no acute distress.  Continues on isolation for positive COVID-19 status.  LBM yesterday.  Case was discussed at Interdisciplinary Team meeting yesterday.  A tentative discharge date is outlined above.  Patient is eager to continue participation on the recommended rehabilitation program.  Denies any CP, SOB, AUGUSTINE, palpitations, fever, chills, body aches, cough, congestion, or any other symptoms at this time.   ___________________________________________________________________________    Vital Signs Last 24 Hrs  T(C): 36.9 (12 Dec 2023 20:06), Max: 36.9 (12 Dec 2023 20:06)  T(F): 98.5 (12 Dec 2023 20:06), Max: 98.5 (12 Dec 2023 20:06)  HR: 72 (13 Dec 2023 06:25) (72 - 76)  BP: 101/59 (13 Dec 2023 06:25) (101/59 - 107/62)  RR: 16 (13 Dec 2023 06:25) (16 - 16)  SpO2: 97% (13 Dec 2023 06:25) (97% - 97%)    ___________________________________________________________________________    LABS                          7.8    6.07  )-----------( 369      ( 11 Dec 2023 05:30 )             24.3       12-11    138  |  105  |  12  ----------------------------<  151<H>  3.7   |  24  |  1.00    Ca    6.7<L>      11 Dec 2023 05:30    TPro  5.8<L>  /  Alb  1.6<L>  /  TBili  0.3  /  DBili  x   /  AST  50<H>  /  ALT  38  /  AlkPhos  95  12-11    ___________________________________________________________________________    MEDICATIONS  (STANDING):  aspirin enteric coated 81 milliGRAM(s) Oral daily  atorvastatin 80 milliGRAM(s) Oral at bedtime  carBAMazepine ER Tablet 300 milliGRAM(s) Oral two times a day  dextrose 5%. 1000 milliLiter(s) (50 mL/Hr) IV Continuous <Continuous>  dextrose 5%. 1000 milliLiter(s) (100 mL/Hr) IV Continuous <Continuous>  dextrose 50% Injectable 25 Gram(s) IV Push once  dextrose 50% Injectable 12.5 Gram(s) IV Push once  dextrose 50% Injectable 25 Gram(s) IV Push once  famotidine    Tablet 20 milliGRAM(s) Oral daily  glucagon  Injectable 1 milliGRAM(s) IntraMuscular once  heparin   Injectable 5000 Unit(s) SubCutaneous every 8 hours  lactulose Retention Enema 200 Gram(s) Rectal once  lactulose Syrup 10 Gram(s) Oral once  latanoprost 0.005% Ophthalmic Solution 1 Drop(s) Both EYES at bedtime  melatonin 6 milliGRAM(s) Oral at bedtime  midodrine. 5 milliGRAM(s) Oral <User Schedule>  pantoprazole    Tablet 40 milliGRAM(s) Oral daily  polyethylene glycol 3350 17 Gram(s) Oral daily  remdesivir  IVPB   IV Intermittent   remdesivir  IVPB 100 milliGRAM(s) IV Intermittent every 24 hours  senna 1 Tablet(s) Oral two times a day    MEDICATIONS  (PRN):  albuterol    90 MICROgram(s) HFA Inhaler 1 Puff(s) Inhalation every 4 hours PRN Shortness of Breath and/or Wheezing  artificial  tears Solution 1 Drop(s) Both EYES two times a day PRN Dry Eyes  dextrose Oral Gel 15 Gram(s) Oral once PRN Blood Glucose LESS THAN 70 milliGRAM(s)/deciliter  guaiFENesin Oral Liquid (Sugar-Free) 200 milliGRAM(s) Oral every 6 hours PRN Cough  simethicone 80 milliGRAM(s) Chew three times a day PRN Gas    ___________________________________________________________________________    PHYSICAL EXAM:    Gen - NAD, Comfortable  HEENT - NCAT, EOMI, MMM  Pulm - CTAB, No wheeze, No rhonchi, No crackles  Cardiovascular - RRR, S1S2  Abdomen - + tender, +BS, NIKHIL drain x 2   Extremities - No clubbing, no cyanosis, no peripheral edema, no calf tenderness  Neuro-     Cognitive - Awake, Alert, and Oriented      Motor -                     LEFT    UE - 4/5                    RIGHT UE - 5/5                    LEFT    LE - HF 5/5, KE 5/5, DF/PF 2/5- left foot drop                    RIGHT LE -  5/5        Sensory - Intact to LT     Reflexes - DTR Intact, No primitive reflexive  Psychiatric - Mood stable, Affect WNL  Skin: abdominal incisions in RUQ and umbilicus clean and dry.    ___________________________________________________________________________

## 2023-12-14 LAB
ALBUMIN SERPL ELPH-MCNC: 1.9 G/DL — LOW (ref 3.3–5)
ALBUMIN SERPL ELPH-MCNC: 1.9 G/DL — LOW (ref 3.3–5)
ALP SERPL-CCNC: 131 U/L — HIGH (ref 40–120)
ALP SERPL-CCNC: 131 U/L — HIGH (ref 40–120)
ALT FLD-CCNC: 59 U/L — HIGH (ref 10–45)
ALT FLD-CCNC: 59 U/L — HIGH (ref 10–45)
ANION GAP SERPL CALC-SCNC: 7 MMOL/L — SIGNIFICANT CHANGE UP (ref 5–17)
ANION GAP SERPL CALC-SCNC: 7 MMOL/L — SIGNIFICANT CHANGE UP (ref 5–17)
AST SERPL-CCNC: 45 U/L — HIGH (ref 10–40)
AST SERPL-CCNC: 45 U/L — HIGH (ref 10–40)
BASOPHILS # BLD AUTO: 0.04 K/UL — SIGNIFICANT CHANGE UP (ref 0–0.2)
BASOPHILS # BLD AUTO: 0.04 K/UL — SIGNIFICANT CHANGE UP (ref 0–0.2)
BASOPHILS NFR BLD AUTO: 0.8 % — SIGNIFICANT CHANGE UP (ref 0–2)
BASOPHILS NFR BLD AUTO: 0.8 % — SIGNIFICANT CHANGE UP (ref 0–2)
BILIRUB SERPL-MCNC: 0.2 MG/DL — SIGNIFICANT CHANGE UP (ref 0.2–1.2)
BILIRUB SERPL-MCNC: 0.2 MG/DL — SIGNIFICANT CHANGE UP (ref 0.2–1.2)
BUN SERPL-MCNC: 15 MG/DL — SIGNIFICANT CHANGE UP (ref 7–23)
BUN SERPL-MCNC: 15 MG/DL — SIGNIFICANT CHANGE UP (ref 7–23)
CALCIUM SERPL-MCNC: 7.4 MG/DL — LOW (ref 8.4–10.5)
CALCIUM SERPL-MCNC: 7.4 MG/DL — LOW (ref 8.4–10.5)
CHLORIDE SERPL-SCNC: 105 MMOL/L — SIGNIFICANT CHANGE UP (ref 96–108)
CHLORIDE SERPL-SCNC: 105 MMOL/L — SIGNIFICANT CHANGE UP (ref 96–108)
CO2 SERPL-SCNC: 28 MMOL/L — SIGNIFICANT CHANGE UP (ref 22–31)
CO2 SERPL-SCNC: 28 MMOL/L — SIGNIFICANT CHANGE UP (ref 22–31)
CREAT SERPL-MCNC: 1.09 MG/DL — SIGNIFICANT CHANGE UP (ref 0.5–1.3)
CREAT SERPL-MCNC: 1.09 MG/DL — SIGNIFICANT CHANGE UP (ref 0.5–1.3)
EGFR: 73 ML/MIN/1.73M2 — SIGNIFICANT CHANGE UP
EGFR: 73 ML/MIN/1.73M2 — SIGNIFICANT CHANGE UP
EOSINOPHIL # BLD AUTO: 0.22 K/UL — SIGNIFICANT CHANGE UP (ref 0–0.5)
EOSINOPHIL # BLD AUTO: 0.22 K/UL — SIGNIFICANT CHANGE UP (ref 0–0.5)
EOSINOPHIL NFR BLD AUTO: 4.7 % — SIGNIFICANT CHANGE UP (ref 0–6)
EOSINOPHIL NFR BLD AUTO: 4.7 % — SIGNIFICANT CHANGE UP (ref 0–6)
GLUCOSE SERPL-MCNC: 138 MG/DL — HIGH (ref 70–99)
GLUCOSE SERPL-MCNC: 138 MG/DL — HIGH (ref 70–99)
HCT VFR BLD CALC: 26.9 % — LOW (ref 39–50)
HCT VFR BLD CALC: 26.9 % — LOW (ref 39–50)
HGB BLD-MCNC: 8.8 G/DL — LOW (ref 13–17)
HGB BLD-MCNC: 8.8 G/DL — LOW (ref 13–17)
IMM GRANULOCYTES NFR BLD AUTO: 0.4 % — SIGNIFICANT CHANGE UP (ref 0–0.9)
IMM GRANULOCYTES NFR BLD AUTO: 0.4 % — SIGNIFICANT CHANGE UP (ref 0–0.9)
LYMPHOCYTES # BLD AUTO: 1.18 K/UL — SIGNIFICANT CHANGE UP (ref 1–3.3)
LYMPHOCYTES # BLD AUTO: 1.18 K/UL — SIGNIFICANT CHANGE UP (ref 1–3.3)
LYMPHOCYTES # BLD AUTO: 25 % — SIGNIFICANT CHANGE UP (ref 13–44)
LYMPHOCYTES # BLD AUTO: 25 % — SIGNIFICANT CHANGE UP (ref 13–44)
MCHC RBC-ENTMCNC: 32.2 PG — SIGNIFICANT CHANGE UP (ref 27–34)
MCHC RBC-ENTMCNC: 32.2 PG — SIGNIFICANT CHANGE UP (ref 27–34)
MCHC RBC-ENTMCNC: 32.7 GM/DL — SIGNIFICANT CHANGE UP (ref 32–36)
MCHC RBC-ENTMCNC: 32.7 GM/DL — SIGNIFICANT CHANGE UP (ref 32–36)
MCV RBC AUTO: 98.5 FL — SIGNIFICANT CHANGE UP (ref 80–100)
MCV RBC AUTO: 98.5 FL — SIGNIFICANT CHANGE UP (ref 80–100)
MONOCYTES # BLD AUTO: 0.51 K/UL — SIGNIFICANT CHANGE UP (ref 0–0.9)
MONOCYTES # BLD AUTO: 0.51 K/UL — SIGNIFICANT CHANGE UP (ref 0–0.9)
MONOCYTES NFR BLD AUTO: 10.8 % — SIGNIFICANT CHANGE UP (ref 2–14)
MONOCYTES NFR BLD AUTO: 10.8 % — SIGNIFICANT CHANGE UP (ref 2–14)
NEUTROPHILS # BLD AUTO: 2.75 K/UL — SIGNIFICANT CHANGE UP (ref 1.8–7.4)
NEUTROPHILS # BLD AUTO: 2.75 K/UL — SIGNIFICANT CHANGE UP (ref 1.8–7.4)
NEUTROPHILS NFR BLD AUTO: 58.3 % — SIGNIFICANT CHANGE UP (ref 43–77)
NEUTROPHILS NFR BLD AUTO: 58.3 % — SIGNIFICANT CHANGE UP (ref 43–77)
NRBC # BLD: 0 /100 WBCS — SIGNIFICANT CHANGE UP (ref 0–0)
NRBC # BLD: 0 /100 WBCS — SIGNIFICANT CHANGE UP (ref 0–0)
PLATELET # BLD AUTO: 467 K/UL — HIGH (ref 150–400)
PLATELET # BLD AUTO: 467 K/UL — HIGH (ref 150–400)
POTASSIUM SERPL-MCNC: 4.1 MMOL/L — SIGNIFICANT CHANGE UP (ref 3.5–5.3)
POTASSIUM SERPL-MCNC: 4.1 MMOL/L — SIGNIFICANT CHANGE UP (ref 3.5–5.3)
POTASSIUM SERPL-SCNC: 4.1 MMOL/L — SIGNIFICANT CHANGE UP (ref 3.5–5.3)
POTASSIUM SERPL-SCNC: 4.1 MMOL/L — SIGNIFICANT CHANGE UP (ref 3.5–5.3)
PROT SERPL-MCNC: 6.4 G/DL — SIGNIFICANT CHANGE UP (ref 6–8.3)
PROT SERPL-MCNC: 6.4 G/DL — SIGNIFICANT CHANGE UP (ref 6–8.3)
RBC # BLD: 2.73 M/UL — LOW (ref 4.2–5.8)
RBC # BLD: 2.73 M/UL — LOW (ref 4.2–5.8)
RBC # FLD: 15.1 % — HIGH (ref 10.3–14.5)
RBC # FLD: 15.1 % — HIGH (ref 10.3–14.5)
SODIUM SERPL-SCNC: 140 MMOL/L — SIGNIFICANT CHANGE UP (ref 135–145)
SODIUM SERPL-SCNC: 140 MMOL/L — SIGNIFICANT CHANGE UP (ref 135–145)
WBC # BLD: 4.72 K/UL — SIGNIFICANT CHANGE UP (ref 3.8–10.5)
WBC # BLD: 4.72 K/UL — SIGNIFICANT CHANGE UP (ref 3.8–10.5)
WBC # FLD AUTO: 4.72 K/UL — SIGNIFICANT CHANGE UP (ref 3.8–10.5)
WBC # FLD AUTO: 4.72 K/UL — SIGNIFICANT CHANGE UP (ref 3.8–10.5)

## 2023-12-14 PROCEDURE — 99232 SBSQ HOSP IP/OBS MODERATE 35: CPT

## 2023-12-14 RX ADMIN — Medication 300 MILLIGRAM(S): at 17:47

## 2023-12-14 RX ADMIN — MIDODRINE HYDROCHLORIDE 5 MILLIGRAM(S): 2.5 TABLET ORAL at 06:39

## 2023-12-14 RX ADMIN — HEPARIN SODIUM 5000 UNIT(S): 5000 INJECTION INTRAVENOUS; SUBCUTANEOUS at 13:09

## 2023-12-14 RX ADMIN — HEPARIN SODIUM 5000 UNIT(S): 5000 INJECTION INTRAVENOUS; SUBCUTANEOUS at 21:05

## 2023-12-14 RX ADMIN — SENNA PLUS 1 TABLET(S): 8.6 TABLET ORAL at 06:40

## 2023-12-14 RX ADMIN — ATORVASTATIN CALCIUM 80 MILLIGRAM(S): 80 TABLET, FILM COATED ORAL at 21:06

## 2023-12-14 RX ADMIN — Medication 6 MILLIGRAM(S): at 21:06

## 2023-12-14 RX ADMIN — SENNA PLUS 1 TABLET(S): 8.6 TABLET ORAL at 17:48

## 2023-12-14 RX ADMIN — Medication 81 MILLIGRAM(S): at 13:08

## 2023-12-14 RX ADMIN — Medication 300 MILLIGRAM(S): at 06:38

## 2023-12-14 RX ADMIN — LATANOPROST 1 DROP(S): 0.05 SOLUTION/ DROPS OPHTHALMIC; TOPICAL at 21:07

## 2023-12-14 RX ADMIN — MIDODRINE HYDROCHLORIDE 5 MILLIGRAM(S): 2.5 TABLET ORAL at 13:08

## 2023-12-14 RX ADMIN — POLYETHYLENE GLYCOL 3350 17 GRAM(S): 17 POWDER, FOR SOLUTION ORAL at 13:08

## 2023-12-14 RX ADMIN — HEPARIN SODIUM 5000 UNIT(S): 5000 INJECTION INTRAVENOUS; SUBCUTANEOUS at 06:36

## 2023-12-14 RX ADMIN — PANTOPRAZOLE SODIUM 40 MILLIGRAM(S): 20 TABLET, DELAYED RELEASE ORAL at 13:08

## 2023-12-14 RX ADMIN — FAMOTIDINE 20 MILLIGRAM(S): 10 INJECTION INTRAVENOUS at 13:08

## 2023-12-14 NOTE — PROGRESS NOTE ADULT - ASSESSMENT
Mr. Benjamin Rios is a 68 year old right handed male patient with past medical history of sinus node disorder s/p PPM, HTN, HLD, cerebral aneurysm s/p coiling, CVA w/ residual L sided weakness, seizure disorder, type 2 diabetes on metformin and gastritis who is admitted for Acute Inpatient Rehabilitation with a multidisciplinary rehab program at [facility] with functional impairments in ADLs and mobility secondary to a complaint of worsening abdominal pain with imaging remarkable for distended gallbladder with wall thickening and pericholecystic fat stranding and hemangioma treated surgically with a lap to open cholecystectomy for acute cholecystitis on 11/30 complicated by drainage of a small amount of bile and a large amount of pus from gallbladder and subsequent requirement of ICU admission due to post-operative hypotension requiring pressors and acute blood loss anemia improved with transfusion of one unit of PRBCs. Additionally, patient was found to have bile duct leakage, requiring ERCP with stent on 12/5/23.      Debility 2/2 acute cholecystitis   - complicated by bile duct leak (s/p ERCP with stent on 12/5)  - s/p 7 days of zosyn  - S/P ICU for post-operative hypotension  - Impaired ADLs and mobility  - Need for assistance with personal care   - Continue comprehensive rehab program of PT/OT - 3 hours a day, 5 days a week. P&O as needed   - Fall precautions  - monitor NIKHIL drain output  - outpatient GI f/u with Dr. Dorsey  - Repeat ERCP & stent removal in 4-8 weeks    COVID+  - patient tested positive for COVID on admission to rehab (12/7)  - isolation precautions  - monitor for symptoms  - completed Remdesivir (12/13)    Acute Blood Loss Anemia (improved)  - s/p 1 unit PRBC transfusion  - monitor H/H    Seizure Disorder  - continue home carbamazepine 300 mg BID    h/o CVA/ HLD  - residual L sided weakness  - continue atorvastatin 80 mg QHS  - continue ASA 81 mg QD    HTN  - Home med: metoprolol 25 mg BID  - required pressors in ICU post-op  - stable off anti-hypertensives  - Monitor BP    Type 2 Diabetes Mellitus  - last HbA1c 6.1  - low dose SSI  - Monitor fingersticks  - on metformin at home    Mood / Cognition  - Neuropsychology consult PRN    Sleep  - Maintain quiet hours and a low stim environment.   - Melatonin 6 mg QHS    GI / Bowel  - Senna qHS PRN  - Miralax Daily  - GI ppx: famotidine 20 mg QD     / Bladder  - BS Q8H and SC >400cc     Skin / Pressure injury  - Skin assessment on admission performed: abdominal incision with staple,   - Pressure Injury/Skin: OOB to chair, PT/OT  - nursing to monitor skin q Shift    Diet/Dysphagia:  - Diet Consistency: DASH, regular  - Nutrition consult    DVT prophylaxis:   - Heparin 5000u Q8H    Outpatient Follow-up:    Easton Carroll  Internal Medicine  [address] [place] [address]  Phone: [phone]  Fax: [phone]  Follow Up Time:     Francisco Dorsey  Gastroenterology  [address] [place] [address], [address]  [place] [address]  Phone: [phone]  Fax: [phone]  Follow Up Time    --------------- Mr. Benjamin Rios is a 68 year old right handed male patient with past medical history of sinus node disorder s/p PPM, HTN, HLD, cerebral aneurysm s/p coiling, CVA w/ residual L sided weakness, seizure disorder, type 2 diabetes on metformin and gastritis who is admitted for Acute Inpatient Rehabilitation with a multidisciplinary rehab program at [facility] with functional impairments in ADLs and mobility secondary to a complaint of worsening abdominal pain with imaging remarkable for distended gallbladder with wall thickening and pericholecystic fat stranding and hemangioma treated surgically with a lap to open cholecystectomy for acute cholecystitis on 11/30 complicated by drainage of a small amount of bile and a large amount of pus from gallbladder and subsequent requirement of ICU admission due to post-operative hypotension requiring pressors and acute blood loss anemia improved with transfusion of one unit of PRBCs. Additionally, patient was found to have bile duct leakage, requiring ERCP with stent on 12/5/23.      Debility 2/2 acute cholecystitis   - complicated by bile duct leak (s/p ERCP with stent on 12/5)  - s/p 7 days of zosyn  - S/P ICU for post-operative hypotension  - Impaired ADLs and mobility  - Need for assistance with personal care   - Continue comprehensive rehab program of PT/OT - 3 hours a day, 5 days a week. P&O as needed   - Fall precautions  - monitor NIKHIL drain output  - outpatient GI f/u with Dr. Dorsey  - Repeat ERCP & stent removal in 4-8 weeks    COVID+  - patient tested positive for COVID on admission to rehab (12/7)  - isolation precautions  - monitor for symptoms  - completed Remdesivir (12/13)    Acute Blood Loss Anemia (improved)  - s/p 1 unit PRBC transfusion  - monitor H/H    Seizure Disorder  - continue home carbamazepine 300 mg BID    h/o CVA/ HLD  - residual L sided weakness  - continue atorvastatin 80 mg QHS  - continue ASA 81 mg QD    HTN  - Home med: metoprolol 25 mg BID  - required pressors in ICU post-op  - stable off anti-hypertensives  - Monitor BP    Type 2 Diabetes Mellitus  - last HbA1c 6.1  - low dose SSI  - Monitor fingersticks  - on metformin at home    Mood / Cognition  - Neuropsychology consult PRN    Sleep  - Maintain quiet hours and a low stim environment.   - Melatonin 6 mg QHS    GI / Bowel  - Senna qHS PRN  - Miralax Daily  - GI ppx: famotidine 20 mg QD     / Bladder  - BS Q8H and SC >400cc     Skin / Pressure injury  - Skin assessment on admission performed: abdominal incision with staple,   - Pressure Injury/Skin: OOB to chair, PT/OT  - nursing to monitor skin q Shift    Diet/Dysphagia:  - Diet Consistency: DASH, regular  - Nutrition consult    DVT prophylaxis:   - Heparin 5000u Q8H    Outpatient Follow-up:    Easton Carroll  Internal Medicine  [address] [place] [address]  Phone: [phone]  Fax: [phone]  Follow Up Time:     Francisco Dorsey  Gastroenterology  [address] [place] [address], [address]  [place] [address]  Phone: [phone]  Fax: [phone]  Follow Up Time    ---------------

## 2023-12-14 NOTE — PROGRESS NOTE ADULT - SUBJECTIVE AND OBJECTIVE BOX
HPI:  Mr. Benjamin Rios is a 68 year old right handed male patient with past medical history of sinus node disorder s/p PPM, HTN, HLD, cerebral aneurysm s/p coiling, CVA w/ residual L sided weakness, seizure disorder, type 2 diabetes on metformin and gastritis admitted to [facility] on 11/28 for worsening abdominal pain since 11/24. CT chest abd/pelvis showed distended gallbladder with gallbladder wall thickening and pericholecystic fat stranding and hemangioma in T7 vertebrae. Patient underwent lap to open cholecystectomy for acute cholecystitis on 11/30. Procedure was complicated by drainage of a small amount of bile and a large amount of pus from gallbladder. Patient was upgraded to ICU due to post-operative hypotension requiring pressors then downgraded to surgical floor. Hospital course further complicated by acute blood loss anemia, improved s/p 1 unit PRBCs. Patient also found to have bile duct leakage, requiring ERCP with stent on 12/5/23. Patient requires follow up with GI and a follow up ERCP in 4-8 weeks. Patient evaluated by PT/OT and was recommended for acute inpatient rehab. Patient is medically stable for discharge to acute rehab on 12/7.  (07 Dec 2023 13:33)    TDD: 12/19 Home  ___________________________________________________________________________    SUBJECTIVE/ROS  Patient was seen and evaluated at bedside today.  Reported no overnight events and is in no acute distress.  Continues on isolation for positive COVID-19 status.  Completed Remdesivir yesterday  Patient is eager to continue participation on the recommended rehabilitation program.  Denies any CP, SOB, AUGUSTINE, palpitations, fever, chills, body aches, cough, congestion, or any other symptoms at this time.   ___________________________________________________________________________    Vital Signs Last 24 Hrs  T(C): 37 (14 Dec 2023 07:24), Max: 37 (14 Dec 2023 07:24)  T(F): 98.6 (14 Dec 2023 07:24), Max: 98.6 (14 Dec 2023 07:24)  HR: 73 (14 Dec 2023 07:24) (67 - 73)  BP: 114/72 (14 Dec 2023 07:24) (114/72 - 124/79)  RR: 14 (14 Dec 2023 07:24) (14 - 17)  SpO2: 97% (14 Dec 2023 07:24) (97% - 97%)    ___________________________________________________________________________    LAB                        8.8    4.72  )-----------( 467      ( 14 Dec 2023 05:20 )             26.9     12-14    140  |  105  |  15  ----------------------------<  138<H>  4.1   |  28  |  1.09    Ca    7.4<L>      14 Dec 2023 05:20    TPro  6.4  /  Alb  1.9<L>  /  TBili  0.2  /  DBili  x   /  AST  45<H>  /  ALT  59<H>  /  AlkPhos  131<H>  12-14    LIVER FUNCTIONS - ( 14 Dec 2023 05:20 )  Alb: 1.9 g/dL / Pro: 6.4 g/dL / ALK PHOS: 131 U/L / ALT: 59 U/L / AST: 45 U/L / GGT: x           ___________________________________________________________________________    MEDICATIONS  (STANDING):  aspirin enteric coated 81 milliGRAM(s) Oral daily  atorvastatin 80 milliGRAM(s) Oral at bedtime  carBAMazepine ER Tablet 300 milliGRAM(s) Oral two times a day  dextrose 5%. 1000 milliLiter(s) (100 mL/Hr) IV Continuous <Continuous>  dextrose 5%. 1000 milliLiter(s) (50 mL/Hr) IV Continuous <Continuous>  dextrose 50% Injectable 25 Gram(s) IV Push once  dextrose 50% Injectable 12.5 Gram(s) IV Push once  dextrose 50% Injectable 25 Gram(s) IV Push once  famotidine    Tablet 20 milliGRAM(s) Oral daily  glucagon  Injectable 1 milliGRAM(s) IntraMuscular once  heparin   Injectable 5000 Unit(s) SubCutaneous every 8 hours  lactulose Retention Enema 200 Gram(s) Rectal once  lactulose Syrup 10 Gram(s) Oral once  latanoprost 0.005% Ophthalmic Solution 1 Drop(s) Both EYES at bedtime  melatonin 6 milliGRAM(s) Oral at bedtime  midodrine. 5 milliGRAM(s) Oral <User Schedule>  pantoprazole    Tablet 40 milliGRAM(s) Oral daily  polyethylene glycol 3350 17 Gram(s) Oral daily  senna 1 Tablet(s) Oral two times a day    MEDICATIONS  (PRN):  albuterol    90 MICROgram(s) HFA Inhaler 1 Puff(s) Inhalation every 4 hours PRN Shortness of Breath and/or Wheezing  artificial  tears Solution 1 Drop(s) Both EYES two times a day PRN Dry Eyes  dextrose Oral Gel 15 Gram(s) Oral once PRN Blood Glucose LESS THAN 70 milliGRAM(s)/deciliter  guaiFENesin Oral Liquid (Sugar-Free) 200 milliGRAM(s) Oral every 6 hours PRN Cough  simethicone 80 milliGRAM(s) Chew three times a day PRN Gas    ___________________________________________________________________________    PHYSICAL EXAM:    Gen - NAD, Comfortable  HEENT - NCAT, EOMI, MMM  Pulm - CTAB, No wheeze, No rhonchi, No crackles  Cardiovascular - RRR, S1S2  Abdomen - + tender, +BS, NIKHIL drain x 2   Extremities - No clubbing, no cyanosis, no peripheral edema, no calf tenderness  Neuro-     Cognitive - Awake, Alert, and Oriented      Motor -                     LEFT    UE - 4/5                    RIGHT UE - 5/5                    LEFT    LE - HF 5/5, KE 5/5, DF/PF 2/5- left foot drop                    RIGHT LE -  5/5        Sensory - Intact to LT     Reflexes - DTR Intact, No primitive reflexive  Psychiatric - Mood stable, Affect WNL  Skin: abdominal incisions in RUQ and umbilicus clean and dry.    ___________________________________________________________________________ HPI:  Mr. Benjamin Rios is a 68 year old right handed male patient with past medical history of sinus node disorder s/p PPM, HTN, HLD, cerebral aneurysm s/p coiling, CVA w/ residual L sided weakness, seizure disorder, type 2 diabetes on metformin and gastritis admitted to [facility] on 11/28 for worsening abdominal pain since 11/24. CT chest abd/pelvis showed distended gallbladder with gallbladder wall thickening and pericholecystic fat stranding and hemangioma in T7 vertebrae. Patient underwent lap to open cholecystectomy for acute cholecystitis on 11/30. Procedure was complicated by drainage of a small amount of bile and a large amount of pus from gallbladder. Patient was upgraded to ICU due to post-operative hypotension requiring pressors then downgraded to surgical floor. Hospital course further complicated by acute blood loss anemia, improved s/p 1 unit PRBCs. Patient also found to have bile duct leakage, requiring ERCP with stent on 12/5/23. Patient requires follow up with GI and a follow up ERCP in 4-8 weeks. Patient evaluated by PT/OT and was recommended for acute inpatient rehab. Patient is medically stable for discharge to acute rehab on 12/7.  (07 Dec 2023 13:33)    TDD: 12/19 Home  ___________________________________________________________________________    SUBJECTIVE/ROS  Patient was seen and evaluated at bedside today.  Reported no overnight events and is in no acute distress.  Continues on isolation for positive COVID-19 status.  Completed Remdesivir yesterday  Patient is eager to continue participation on the recommended rehabilitation program.  Denies any CP, SOB, AUGUSTINE, palpitations, fever, chills, body aches, cough, congestion, or any other symptoms at this time.   ___________________________________________________________________________    Vital Signs Last 24 Hrs  T(C): 37 (14 Dec 2023 07:24), Max: 37 (14 Dec 2023 07:24)  T(F): 98.6 (14 Dec 2023 07:24), Max: 98.6 (14 Dec 2023 07:24)  HR: 73 (14 Dec 2023 07:24) (67 - 73)  BP: 114/72 (14 Dec 2023 07:24) (114/72 - 124/79)  RR: 14 (14 Dec 2023 07:24) (14 - 17)  SpO2: 97% (14 Dec 2023 07:24) (97% - 97%)    ___________________________________________________________________________    LAB                        8.8    4.72  )-----------( 467      ( 14 Dec 2023 05:20 )             26.9     12-14    140  |  105  |  15  ----------------------------<  138<H>  4.1   |  28  |  1.09    Ca    7.4<L>      14 Dec 2023 05:20    TPro  6.4  /  Alb  1.9<L>  /  TBili  0.2  /  DBili  x   /  AST  45<H>  /  ALT  59<H>  /  AlkPhos  131<H>  12-14    LIVER FUNCTIONS - ( 14 Dec 2023 05:20 )  Alb: 1.9 g/dL / Pro: 6.4 g/dL / ALK PHOS: 131 U/L / ALT: 59 U/L / AST: 45 U/L / GGT: x           ___________________________________________________________________________    MEDICATIONS  (STANDING):  aspirin enteric coated 81 milliGRAM(s) Oral daily  atorvastatin 80 milliGRAM(s) Oral at bedtime  carBAMazepine ER Tablet 300 milliGRAM(s) Oral two times a day  dextrose 5%. 1000 milliLiter(s) (100 mL/Hr) IV Continuous <Continuous>  dextrose 5%. 1000 milliLiter(s) (50 mL/Hr) IV Continuous <Continuous>  dextrose 50% Injectable 25 Gram(s) IV Push once  dextrose 50% Injectable 12.5 Gram(s) IV Push once  dextrose 50% Injectable 25 Gram(s) IV Push once  famotidine    Tablet 20 milliGRAM(s) Oral daily  glucagon  Injectable 1 milliGRAM(s) IntraMuscular once  heparin   Injectable 5000 Unit(s) SubCutaneous every 8 hours  lactulose Retention Enema 200 Gram(s) Rectal once  lactulose Syrup 10 Gram(s) Oral once  latanoprost 0.005% Ophthalmic Solution 1 Drop(s) Both EYES at bedtime  melatonin 6 milliGRAM(s) Oral at bedtime  midodrine. 5 milliGRAM(s) Oral <User Schedule>  pantoprazole    Tablet 40 milliGRAM(s) Oral daily  polyethylene glycol 3350 17 Gram(s) Oral daily  senna 1 Tablet(s) Oral two times a day    MEDICATIONS  (PRN):  albuterol    90 MICROgram(s) HFA Inhaler 1 Puff(s) Inhalation every 4 hours PRN Shortness of Breath and/or Wheezing  artificial  tears Solution 1 Drop(s) Both EYES two times a day PRN Dry Eyes  dextrose Oral Gel 15 Gram(s) Oral once PRN Blood Glucose LESS THAN 70 milliGRAM(s)/deciliter  guaiFENesin Oral Liquid (Sugar-Free) 200 milliGRAM(s) Oral every 6 hours PRN Cough  simethicone 80 milliGRAM(s) Chew three times a day PRN Gas    ___________________________________________________________________________    PHYSICAL EXAM:    Gen - NAD, Comfortable  HEENT - NCAT, EOMI, MMM  Pulm - CTAB, No wheeze, No rhonchi, No crackles  Cardiovascular - RRR, S1S2  Abdomen - + tender, +BS, NIKHIL drain x 2   Extremities - No clubbing, no cyanosis, no peripheral edema, no calf tenderness  Neuro-     Cognitive - Awake, Alert, and Oriented      Motor -                     LEFT    UE - 4/5                    RIGHT UE - 5/5                    LEFT    LE - HF 5/5, KE 5/5, DF/PF 2/5- left foot drop                    RIGHT LE -  5/5        Sensory - Intact to LT     Reflexes - DTR Intact, No primitive reflexive  Psychiatric - Mood stable, Affect WNL  Skin: abdominal incisions in RUQ and umbilicus clean and dry.    ___________________________________________________________________________

## 2023-12-15 PROCEDURE — 99232 SBSQ HOSP IP/OBS MODERATE 35: CPT

## 2023-12-15 RX ADMIN — PANTOPRAZOLE SODIUM 40 MILLIGRAM(S): 20 TABLET, DELAYED RELEASE ORAL at 14:22

## 2023-12-15 RX ADMIN — SENNA PLUS 1 TABLET(S): 8.6 TABLET ORAL at 17:32

## 2023-12-15 RX ADMIN — POLYETHYLENE GLYCOL 3350 17 GRAM(S): 17 POWDER, FOR SOLUTION ORAL at 14:22

## 2023-12-15 RX ADMIN — Medication 300 MILLIGRAM(S): at 17:32

## 2023-12-15 RX ADMIN — HEPARIN SODIUM 5000 UNIT(S): 5000 INJECTION INTRAVENOUS; SUBCUTANEOUS at 06:05

## 2023-12-15 RX ADMIN — Medication 6 MILLIGRAM(S): at 21:10

## 2023-12-15 RX ADMIN — HEPARIN SODIUM 5000 UNIT(S): 5000 INJECTION INTRAVENOUS; SUBCUTANEOUS at 21:11

## 2023-12-15 RX ADMIN — MIDODRINE HYDROCHLORIDE 5 MILLIGRAM(S): 2.5 TABLET ORAL at 06:05

## 2023-12-15 RX ADMIN — ATORVASTATIN CALCIUM 80 MILLIGRAM(S): 80 TABLET, FILM COATED ORAL at 21:10

## 2023-12-15 RX ADMIN — MIDODRINE HYDROCHLORIDE 5 MILLIGRAM(S): 2.5 TABLET ORAL at 14:15

## 2023-12-15 RX ADMIN — Medication 300 MILLIGRAM(S): at 06:03

## 2023-12-15 RX ADMIN — HEPARIN SODIUM 5000 UNIT(S): 5000 INJECTION INTRAVENOUS; SUBCUTANEOUS at 14:13

## 2023-12-15 RX ADMIN — FAMOTIDINE 20 MILLIGRAM(S): 10 INJECTION INTRAVENOUS at 14:22

## 2023-12-15 RX ADMIN — LATANOPROST 1 DROP(S): 0.05 SOLUTION/ DROPS OPHTHALMIC; TOPICAL at 21:11

## 2023-12-15 RX ADMIN — Medication 81 MILLIGRAM(S): at 14:13

## 2023-12-15 RX ADMIN — SENNA PLUS 1 TABLET(S): 8.6 TABLET ORAL at 06:03

## 2023-12-15 NOTE — PROGRESS NOTE ADULT - SUBJECTIVE AND OBJECTIVE BOX
HPI:  Mr. Benjamin Rios is a 68 year old right handed male patient with past medical history of sinus node disorder s/p PPM, HTN, HLD, cerebral aneurysm s/p coiling, CVA w/ residual L sided weakness, seizure disorder, type 2 diabetes on metformin and gastritis admitted to [facility] on 11/28 for worsening abdominal pain since 11/24. CT chest abd/pelvis showed distended gallbladder with gallbladder wall thickening and pericholecystic fat stranding and hemangioma in T7 vertebrae. Patient underwent lap to open cholecystectomy for acute cholecystitis on 11/30. Procedure was complicated by drainage of a small amount of bile and a large amount of pus from gallbladder. Patient was upgraded to ICU due to post-operative hypotension requiring pressors then downgraded to surgical floor. Hospital course further complicated by acute blood loss anemia, improved s/p 1 unit PRBCs. Patient also found to have bile duct leakage, requiring ERCP with stent on 12/5/23. Patient requires follow up with GI and a follow up ERCP in 4-8 weeks. Patient evaluated by PT/OT and was recommended for acute inpatient rehab. Patient is medically stable for discharge to acute rehab on 12/7.  (07 Dec 2023 13:33)    TDD: 12/19 Home  ___________________________________________________________________________    SUBJECTIVE/ROS  Patient was seen and evaluated at bedside today.  Reported no overnight events and is in no acute distress.  Continues on isolation for positive COVID-19 status.  Staples discontinued today per General Surgery.  Patient is eager to continue participation on the recommended rehabilitation program.  Denies any CP, SOB, AUGUSTINE, palpitations, fever, chills, body aches, cough, congestion, or any other symptoms at this time.   ___________________________________________________________________________    Vital Signs Last 24 Hrs  T(C): 36.8 (15 Dec 2023 08:30), Max: 37 (14 Dec 2023 20:49)  T(F): 98.2 (15 Dec 2023 08:30), Max: 98.6 (14 Dec 2023 20:49)  HR: 71 (15 Dec 2023 08:30) (71 - 77)  BP: 107/63 (15 Dec 2023 08:30) (107/63 - 113/68)  BP(mean): 82 (15 Dec 2023 05:58) (82 - 83)  RR: 16 (15 Dec 2023 08:30) (16 - 17)  SpO2: 97% (15 Dec 2023 08:30) (97% - 98%)    ___________________________________________________________________________    LAB                        8.8    4.72  )-----------( 467      ( 14 Dec 2023 05:20 )             26.9     12-14    140  |  105  |  15  ----------------------------<  138<H>  4.1   |  28  |  1.09    Ca    7.4<L>      14 Dec 2023 05:20    TPro  6.4  /  Alb  1.9<L>  /  TBili  0.2  /  DBili  x   /  AST  45<H>  /  ALT  59<H>  /  AlkPhos  131<H>  12-14    LIVER FUNCTIONS - ( 14 Dec 2023 05:20 )  Alb: 1.9 g/dL / Pro: 6.4 g/dL / ALK PHOS: 131 U/L / ALT: 59 U/L / AST: 45 U/L / GGT: x           ___________________________________________________________________________    MEDICATIONS  (STANDING):  aspirin enteric coated 81 milliGRAM(s) Oral daily  atorvastatin 80 milliGRAM(s) Oral at bedtime  carBAMazepine ER Tablet 300 milliGRAM(s) Oral two times a day  dextrose 5%. 1000 milliLiter(s) (100 mL/Hr) IV Continuous <Continuous>  dextrose 5%. 1000 milliLiter(s) (50 mL/Hr) IV Continuous <Continuous>  dextrose 50% Injectable 25 Gram(s) IV Push once  dextrose 50% Injectable 12.5 Gram(s) IV Push once  dextrose 50% Injectable 25 Gram(s) IV Push once  famotidine    Tablet 20 milliGRAM(s) Oral daily  glucagon  Injectable 1 milliGRAM(s) IntraMuscular once  heparin   Injectable 5000 Unit(s) SubCutaneous every 8 hours  lactulose Retention Enema 200 Gram(s) Rectal once  lactulose Syrup 10 Gram(s) Oral once  latanoprost 0.005% Ophthalmic Solution 1 Drop(s) Both EYES at bedtime  melatonin 6 milliGRAM(s) Oral at bedtime  midodrine. 5 milliGRAM(s) Oral <User Schedule>  pantoprazole    Tablet 40 milliGRAM(s) Oral daily  polyethylene glycol 3350 17 Gram(s) Oral daily  senna 1 Tablet(s) Oral two times a day    MEDICATIONS  (PRN):  albuterol    90 MICROgram(s) HFA Inhaler 1 Puff(s) Inhalation every 4 hours PRN Shortness of Breath and/or Wheezing  artificial  tears Solution 1 Drop(s) Both EYES two times a day PRN Dry Eyes  dextrose Oral Gel 15 Gram(s) Oral once PRN Blood Glucose LESS THAN 70 milliGRAM(s)/deciliter  guaiFENesin Oral Liquid (Sugar-Free) 200 milliGRAM(s) Oral every 6 hours PRN Cough  simethicone 80 milliGRAM(s) Chew three times a day PRN Gas    ___________________________________________________________________________    PHYSICAL EXAM:    Gen - NAD, Comfortable  HEENT - NCAT, EOMI, MMM  Pulm - CTAB, No wheeze, No rhonchi, No crackles  Cardiovascular - RRR, S1S2  Abdomen - + tender, +BS, NIKHIL drain x 2   Extremities - No clubbing, no cyanosis, no peripheral edema, no calf tenderness  Neuro-     Cognitive - Awake, Alert, and Oriented      Motor -                     LEFT    UE - 4/5                    RIGHT UE - 5/5                    LEFT    LE - HF 5/5, KE 5/5, DF/PF 2/5- left foot drop                    RIGHT LE -  5/5        Sensory - Intact to LT     Reflexes - DTR Intact, No primitive reflexive  Psychiatric - Mood stable, Affect WNL  Skin: abdominal incisions in RUQ and umbilicus clean and dry with no staples.    ___________________________________________________________________________ HPI:  Mr. Benjamin Rios is a 68 year old right handed male patient with past medical history of sinus node disorder s/p PPM, HTN, HLD, cerebral aneurysm s/p coiling, CVA w/ residual L sided weakness, seizure disorder, type 2 diabetes on metformin and gastritis admitted to [facility] on 11/28 for worsening abdominal pain since 11/24. CT chest abd/pelvis showed distended gallbladder with gallbladder wall thickening and pericholecystic fat stranding and hemangioma in T7 vertebrae. Patient underwent lap to open cholecystectomy for acute cholecystitis on 11/30. Procedure was complicated by drainage of a small amount of bile and a large amount of pus from gallbladder. Patient was upgraded to ICU due to post-operative hypotension requiring pressors then downgraded to surgical floor. Hospital course further complicated by acute blood loss anemia, improved s/p 1 unit PRBCs. Patient also found to have bile duct leakage, requiring ERCP with stent on 12/5/23. Patient requires follow up with GI and a follow up ERCP in 4-8 weeks. Patient evaluated by PT/OT and was recommended for acute inpatient rehab. Patient is medically stable for discharge to acute rehab on 12/7.  (07 Dec 2023 13:33)    TDD: 12/19 Home  ___________________________________________________________________________    SUBJECTIVE/ROS  Patient was seen and evaluated at bedside today.  Reported no overnight events and is in no acute distress.  Continues on isolation for positive COVID-19 status.  Staples discontinued today per General Surgery.  Patient is eager to continue participation on the recommended rehabilitation program.  Denies any CP, SOB, AUGUSTINE, palpitations, fever, chills, body aches, cough, congestion, or any other symptoms at this time.   ___________________________________________________________________________    Vital Signs Last 24 Hrs  T(C): 36.8 (15 Dec 2023 08:30), Max: 37 (14 Dec 2023 20:49)  T(F): 98.2 (15 Dec 2023 08:30), Max: 98.6 (14 Dec 2023 20:49)  HR: 71 (15 Dec 2023 08:30) (71 - 77)  BP: 107/63 (15 Dec 2023 08:30) (107/63 - 113/68)  BP(mean): 82 (15 Dec 2023 05:58) (82 - 83)  RR: 16 (15 Dec 2023 08:30) (16 - 17)  SpO2: 97% (15 Dec 2023 08:30) (97% - 98%)    ___________________________________________________________________________    LAB                        8.8    4.72  )-----------( 467      ( 14 Dec 2023 05:20 )             26.9     12-14    140  |  105  |  15  ----------------------------<  138<H>  4.1   |  28  |  1.09    Ca    7.4<L>      14 Dec 2023 05:20    TPro  6.4  /  Alb  1.9<L>  /  TBili  0.2  /  DBili  x   /  AST  45<H>  /  ALT  59<H>  /  AlkPhos  131<H>  12-14    LIVER FUNCTIONS - ( 14 Dec 2023 05:20 )  Alb: 1.9 g/dL / Pro: 6.4 g/dL / ALK PHOS: 131 U/L / ALT: 59 U/L / AST: 45 U/L / GGT: x           ___________________________________________________________________________    MEDICATIONS  (STANDING):  aspirin enteric coated 81 milliGRAM(s) Oral daily  atorvastatin 80 milliGRAM(s) Oral at bedtime  carBAMazepine ER Tablet 300 milliGRAM(s) Oral two times a day  dextrose 5%. 1000 milliLiter(s) (100 mL/Hr) IV Continuous <Continuous>  dextrose 5%. 1000 milliLiter(s) (50 mL/Hr) IV Continuous <Continuous>  dextrose 50% Injectable 25 Gram(s) IV Push once  dextrose 50% Injectable 12.5 Gram(s) IV Push once  dextrose 50% Injectable 25 Gram(s) IV Push once  famotidine    Tablet 20 milliGRAM(s) Oral daily  glucagon  Injectable 1 milliGRAM(s) IntraMuscular once  heparin   Injectable 5000 Unit(s) SubCutaneous every 8 hours  lactulose Retention Enema 200 Gram(s) Rectal once  lactulose Syrup 10 Gram(s) Oral once  latanoprost 0.005% Ophthalmic Solution 1 Drop(s) Both EYES at bedtime  melatonin 6 milliGRAM(s) Oral at bedtime  midodrine. 5 milliGRAM(s) Oral <User Schedule>  pantoprazole    Tablet 40 milliGRAM(s) Oral daily  polyethylene glycol 3350 17 Gram(s) Oral daily  senna 1 Tablet(s) Oral two times a day    MEDICATIONS  (PRN):  albuterol    90 MICROgram(s) HFA Inhaler 1 Puff(s) Inhalation every 4 hours PRN Shortness of Breath and/or Wheezing  artificial  tears Solution 1 Drop(s) Both EYES two times a day PRN Dry Eyes  dextrose Oral Gel 15 Gram(s) Oral once PRN Blood Glucose LESS THAN 70 milliGRAM(s)/deciliter  guaiFENesin Oral Liquid (Sugar-Free) 200 milliGRAM(s) Oral every 6 hours PRN Cough  simethicone 80 milliGRAM(s) Chew three times a day PRN Gas    ___________________________________________________________________________    PHYSICAL EXAM:    Gen - NAD, Comfortable  HEENT - NCAT, EOMI, MMM  Pulm - CTAB, No wheeze, No rhonchi, No crackles  Cardiovascular - RRR, S1S2  Abdomen - + tender, +BS, NIKHIL drain x 2   Extremities - No clubbing, no cyanosis, no peripheral edema, no calf tenderness  Neuro-     Cognitive - Awake, Alert, and Oriented      Motor -                     LEFT    UE - 4/5                    RIGHT UE - 5/5                    LEFT    LE - HF 5/5, KE 5/5, DF/PF 2/5- left foot drop                    RIGHT LE -  5/5        Sensory - Intact to LT     Reflexes - DTR Intact, No primitive reflexive  Psychiatric - Mood stable, Affect WNL  Skin: abdominal incisions in RUQ and umbilicus clean and dry with no staples.    ___________________________________________________________________________

## 2023-12-15 NOTE — PROGRESS NOTE ADULT - ASSESSMENT
Mr. Benjamin Rios is a 68 year old right handed male patient with past medical history of sinus node disorder s/p PPM, HTN, HLD, cerebral aneurysm s/p coiling, CVA w/ residual L sided weakness, seizure disorder, type 2 diabetes on metformin and gastritis who is admitted for Acute Inpatient Rehabilitation with a multidisciplinary rehab program at [facility] with functional impairments in ADLs and mobility secondary to a complaint of worsening abdominal pain with imaging remarkable for distended gallbladder with wall thickening and pericholecystic fat stranding and hemangioma treated surgically with a lap to open cholecystectomy for acute cholecystitis on 11/30 complicated by drainage of a small amount of bile and a large amount of pus from gallbladder and subsequent requirement of ICU admission due to post-operative hypotension requiring pressors and acute blood loss anemia improved with transfusion of one unit of PRBCs. Additionally, patient was found to have bile duct leakage, requiring ERCP with stent on 12/5/23.      Debility 2/2 acute cholecystitis   - complicated by bile duct leak (s/p ERCP with stent on 12/5)  - s/p 7 days of zosyn  - S/P ICU for post-operative hypotension  - Impaired ADLs and mobility  - Need for assistance with personal care   - Continue comprehensive rehab program of PT/OT - 3 hours a day, 5 days a week. P&O as needed   - Fall precautions  - outpatient GI f/u with Dr. Dorsey  - Repeat ERCP & stent removal in 4-8 weeks    COVID+  - patient tested positive for COVID on admission to rehab (12/7)  - isolation precautions  - monitor for symptoms  - completed Remdesivir (12/13)    Acute Blood Loss Anemia (improved)  - s/p 1 unit PRBC transfusion  - monitor H/H    Seizure Disorder  - continue home carbamazepine 300 mg BID    h/o CVA/ HLD  - residual L sided weakness  - continue atorvastatin 80 mg QHS  - continue ASA 81 mg QD    HTN  - Home med: metoprolol 25 mg BID  - required pressors in ICU post-op  - stable off anti-hypertensives  - Monitor BP    Type 2 Diabetes Mellitus  - last HbA1c 6.1  - low dose SSI  - Monitor fingersticks  - on metformin at home    Mood / Cognition  - Neuropsychology consult PRN    Sleep  - Maintain quiet hours and a low stim environment.   - Melatonin 6 mg QHS    GI / Bowel  - Senna qHS PRN  - Miralax Daily  - GI ppx: famotidine 20 mg QD     / Bladder  - BS Q8H and SC >400cc     Skin / Pressure injury  - Skin assessment on admission performed: abdominal incision with staple,   - Pressure Injury/Skin: OOB to chair, PT/OT  - nursing to monitor skin q Shift    Diet/Dysphagia:  - Diet Consistency: DASH, regular  - Nutrition consult    DVT prophylaxis:   - Heparin 5000u Q8H    Outpatient Follow-up:    [facility]  Internal Medicine  [address] [place] [address]  Phone: [phone]  Fax: [phone]  Follow Up Time:     Francisco Dorsey  Gastroenterology  [address] [place] [address], [address]  [place] [address]  Phone: [phone]  Fax: [phone]  Follow Up Time    --------------- Mr. Benjamin Rios is a 68 year old right handed male patient with past medical history of sinus node disorder s/p PPM, HTN, HLD, cerebral aneurysm s/p coiling, CVA w/ residual L sided weakness, seizure disorder, type 2 diabetes on metformin and gastritis who is admitted for Acute Inpatient Rehabilitation with a multidisciplinary rehab program at [facility] with functional impairments in ADLs and mobility secondary to a complaint of worsening abdominal pain with imaging remarkable for distended gallbladder with wall thickening and pericholecystic fat stranding and hemangioma treated surgically with a lap to open cholecystectomy for acute cholecystitis on 11/30 complicated by drainage of a small amount of bile and a large amount of pus from gallbladder and subsequent requirement of ICU admission due to post-operative hypotension requiring pressors and acute blood loss anemia improved with transfusion of one unit of PRBCs. Additionally, patient was found to have bile duct leakage, requiring ERCP with stent on 12/5/23.      Debility 2/2 acute cholecystitis   - complicated by bile duct leak (s/p ERCP with stent on 12/5)  - s/p 7 days of zosyn  - S/P ICU for post-operative hypotension  - Impaired ADLs and mobility  - Need for assistance with personal care   - Continue comprehensive rehab program of PT/OT - 3 hours a day, 5 days a week. P&O as needed   - Fall precautions  - outpatient GI f/u with Dr. Dorsey  - Repeat ERCP & stent removal in 4-8 weeks    COVID+  - patient tested positive for COVID on admission to rehab (12/7)  - isolation precautions  - monitor for symptoms  - completed Remdesivir (12/13)    Acute Blood Loss Anemia (improved)  - s/p 1 unit PRBC transfusion  - monitor H/H    Seizure Disorder  - continue home carbamazepine 300 mg BID    h/o CVA/ HLD  - residual L sided weakness  - continue atorvastatin 80 mg QHS  - continue ASA 81 mg QD    HTN  - Home med: metoprolol 25 mg BID  - required pressors in ICU post-op  - stable off anti-hypertensives  - Monitor BP    Type 2 Diabetes Mellitus  - last HbA1c 6.1  - low dose SSI  - Monitor fingersticks  - on metformin at home    Mood / Cognition  - Neuropsychology consult PRN    Sleep  - Maintain quiet hours and a low stim environment.   - Melatonin 6 mg QHS    GI / Bowel  - Senna qHS PRN  - Miralax Daily  - GI ppx: famotidine 20 mg QD     / Bladder  - BS Q8H and SC >400cc     Skin / Pressure injury  - Skin assessment on admission performed: abdominal incision with staple,   - Pressure Injury/Skin: OOB to chair, PT/OT  - nursing to monitor skin q Shift    Diet/Dysphagia:  - Diet Consistency: DASH, regular  - Nutrition consult    DVT prophylaxis:   - Heparin 5000u Q8H    Outpatient Follow-up:    [facility]  Internal Medicine  [address] [place] [address]  Phone: [phone]  Fax: [phone]  Follow Up Time:     Francisco Dorsey  Gastroenterology  [address] [place] [address], [address]  [place] [address]  Phone: [phone]  Fax: [phone]  Follow Up Time    ---------------

## 2023-12-15 NOTE — CHART NOTE - NSCHARTNOTEFT_GEN_A_CORE
68 YOM pmh sinus node disorder s/p PPM, HTN, HLD, cerebral aneurysm s/p coiling, CVA w/ residual L sided weakness, seizure disorder, type 2 diabetes on metformin and gastritis s/p  lap to open cholecystectomy for acute cholecystitis on 11/30 complicated by drainage of a small amount of bile and a large amount of pus from gallbladder and subsequent requirement of ICU admission due to post-operative hypotension requiring pressors and acute blood loss anemia improved with transfusion of one unit of PRBCs. Additionally, patient was found to have bile duct leakage, requiring ERCP with stent on 12/5/23.  Pt on chair comfortable, staples noted on RUQ incision    staples removed on RUQ, no drainage noted.    Pt can shower.   continue care as per primary team

## 2023-12-16 PROCEDURE — 99232 SBSQ HOSP IP/OBS MODERATE 35: CPT

## 2023-12-16 RX ADMIN — Medication 300 MILLIGRAM(S): at 17:53

## 2023-12-16 RX ADMIN — MIDODRINE HYDROCHLORIDE 5 MILLIGRAM(S): 2.5 TABLET ORAL at 05:15

## 2023-12-16 RX ADMIN — Medication 6 MILLIGRAM(S): at 21:15

## 2023-12-16 RX ADMIN — PANTOPRAZOLE SODIUM 40 MILLIGRAM(S): 20 TABLET, DELAYED RELEASE ORAL at 13:13

## 2023-12-16 RX ADMIN — Medication 81 MILLIGRAM(S): at 13:10

## 2023-12-16 RX ADMIN — SENNA PLUS 1 TABLET(S): 8.6 TABLET ORAL at 05:15

## 2023-12-16 RX ADMIN — LACTULOSE 10 GRAM(S): 10 SOLUTION ORAL at 13:17

## 2023-12-16 RX ADMIN — HEPARIN SODIUM 5000 UNIT(S): 5000 INJECTION INTRAVENOUS; SUBCUTANEOUS at 05:15

## 2023-12-16 RX ADMIN — POLYETHYLENE GLYCOL 3350 17 GRAM(S): 17 POWDER, FOR SOLUTION ORAL at 13:13

## 2023-12-16 RX ADMIN — Medication 300 MILLIGRAM(S): at 05:14

## 2023-12-16 RX ADMIN — FAMOTIDINE 20 MILLIGRAM(S): 10 INJECTION INTRAVENOUS at 13:14

## 2023-12-16 RX ADMIN — LATANOPROST 1 DROP(S): 0.05 SOLUTION/ DROPS OPHTHALMIC; TOPICAL at 21:15

## 2023-12-16 RX ADMIN — HEPARIN SODIUM 5000 UNIT(S): 5000 INJECTION INTRAVENOUS; SUBCUTANEOUS at 13:16

## 2023-12-16 RX ADMIN — HEPARIN SODIUM 5000 UNIT(S): 5000 INJECTION INTRAVENOUS; SUBCUTANEOUS at 21:15

## 2023-12-16 RX ADMIN — ATORVASTATIN CALCIUM 80 MILLIGRAM(S): 80 TABLET, FILM COATED ORAL at 21:15

## 2023-12-16 RX ADMIN — SENNA PLUS 1 TABLET(S): 8.6 TABLET ORAL at 17:53

## 2023-12-16 RX ADMIN — MIDODRINE HYDROCHLORIDE 5 MILLIGRAM(S): 2.5 TABLET ORAL at 13:15

## 2023-12-16 NOTE — PROGRESS NOTE ADULT - SUBJECTIVE AND OBJECTIVE BOX
Cc: Gait dysfunction    HPI: Patient seen and examined at bedside. No acute events overnight. Feels well.   Pain controlled, no chest pain, no N/V, no Fevers/Chills. No other new ROS  Has been tolerating rehabilitation program.    albuterol    90 MICROgram(s) HFA Inhaler 1 Puff(s) Inhalation every 4 hours PRN  artificial  tears Solution 1 Drop(s) Both EYES two times a day PRN  aspirin enteric coated 81 milliGRAM(s) Oral daily  atorvastatin 80 milliGRAM(s) Oral at bedtime  carBAMazepine ER Tablet 300 milliGRAM(s) Oral two times a day  dextrose 5%. 1000 milliLiter(s) IV Continuous <Continuous>  dextrose 5%. 1000 milliLiter(s) IV Continuous <Continuous>  dextrose 50% Injectable 25 Gram(s) IV Push once  dextrose 50% Injectable 25 Gram(s) IV Push once  dextrose 50% Injectable 12.5 Gram(s) IV Push once  dextrose Oral Gel 15 Gram(s) Oral once PRN  famotidine    Tablet 20 milliGRAM(s) Oral daily  glucagon  Injectable 1 milliGRAM(s) IntraMuscular once  guaiFENesin Oral Liquid (Sugar-Free) 200 milliGRAM(s) Oral every 6 hours PRN  heparin   Injectable 5000 Unit(s) SubCutaneous every 8 hours  lactulose Retention Enema 200 Gram(s) Rectal once  latanoprost 0.005% Ophthalmic Solution 1 Drop(s) Both EYES at bedtime  melatonin 6 milliGRAM(s) Oral at bedtime  midodrine. 5 milliGRAM(s) Oral <User Schedule>  pantoprazole    Tablet 40 milliGRAM(s) Oral daily  polyethylene glycol 3350 17 Gram(s) Oral daily  senna 1 Tablet(s) Oral two times a day  simethicone 80 milliGRAM(s) Chew three times a day PRN      T(C): 36.9 (12-16-23 @ 13:33), Max: 36.9 (12-15-23 @ 20:21)  HR: 76 (12-16-23 @ 13:33) (71 - 78)  BP: 122/82 (12-16-23 @ 13:33) (106/56 - 122/82)  RR: 16 (12-16-23 @ 13:33) (16 - 16)  SpO2: 98% (12-16-23 @ 13:33) (96% - 98%)    In NAD  HEENT- EOMI  Heart- S1S2  Lungs- CTA bl.  Abd- + BS, NT  Ext- No calf pain  Neuro- Exam unchanged    CBC 12/14/23 reviewed by me  CBC 12/11/23 reviewed by me  CMP 12/14/23 reviewed by me:       Imp: Patient with diagnosis of Debility admitted for comprehensive acute rehabilitation.    Plan:  - Continue PT/OT/SLP as indicated  - DVT prophylaxis - continue Heparin subQ  - Skin- Turn q2h, check skin daily  - Continue current medications  -Active issues-   Anemia -stable, will monitor  Elevated LFTs - stable, will monitor  - Patient is stable to continue current rehabilitation program.    Neurology   NATANAEL ROWAN 75y Male       HPI:  75M with PMH CABG, HTN, HLD who [place] [facility] [place]. Last known well was last night 10pm prior to going to bed, woke up this morning around 0500 with R sided weakness and R facial droop. Presented to [facility], code stroke initiated, NIH at [facility] reportedly 8. CTA showed LM1 occlusion. Transferred to [facility] for possible neuro IR intervention. On arrival to [facility], NIH 6. Decision made to take patient directly to neuro IR for intervention.     Initial NIH 6, mRS 0    PMH: HTN (hypertension)    HLD (hyperlipidemia)    S/P CABG x 1         PSH:       FAMILY HISTORY:      SOCIAL HISTORY:  No history of tobacco or alcohol use     Allergies    No Known Allergies    Intolerances        Vital Signs Last 24 Hrs  T(C): 37.5 (01 Aug 2022 12:00), Max: 38.7 (01 Aug 2022 07:00)  T(F): 99.5 (01 Aug 2022 12:00), Max: 101.7 (01 Aug 2022 07:00)  HR: 94 (01 Aug 2022 12:00) (89 - 106)  BP: 168/69 (01 Aug 2022 12:00) (99/50 - 168/69)  BP(mean): 98 (01 Aug 2022 12:00) (65 - 100)  RR: 31 (01 Aug 2022 12:00) (19 - 34)  SpO2: 97% (01 Aug 2022 12:00) (94% - 99%)    Parameters below as of 01 Aug 2022 12:00  Patient On (Oxygen Delivery Method): ventilator,C-pap        MEDICATIONS    aspirin  chewable 81 milliGRAM(s) Oral daily  chlorhexidine 0.12% Liquid 15 milliLiter(s) Oral Mucosa every 12 hours  chlorhexidine 2% Cloths 1 Application(s) Topical daily  ciprofloxacin     Tablet 500 milliGRAM(s) Oral every 12 hours  dextrose 5%. 1000 milliLiter(s) IV Continuous <Continuous>  dextrose 5%. 1000 milliLiter(s) IV Continuous <Continuous>  dextrose 50% Injectable 25 Gram(s) IV Push once  dextrose Oral Gel 15 Gram(s) Oral once PRN  enoxaparin Injectable 40 milliGRAM(s) SubCutaneous every 24 hours  famotidine Injectable 20 milliGRAM(s) IV Push every 12 hours  fentaNYL    Injectable 25 MICROGram(s) IV Push every 4 hours PRN  glucagon  Injectable 1 milliGRAM(s) IntraMuscular once  hydrALAZINE 10 milliGRAM(s) Oral every 6 hours  insulin lispro (ADMELOG) corrective regimen sliding scale   SubCutaneous every 6 hours  insulin NPH human recombinant 4 Unit(s) SubCutaneous every 8 hours  metoprolol tartrate 50 milliGRAM(s) Oral every 8 hours  ondansetron Injectable 4 milliGRAM(s) IV Push every 6 hours PRN         LABS:  CBC Full  -  ( 01 Aug 2022 04:54 )  WBC Count : 17.35 K/uL  RBC Count : 4.50 M/uL  Hemoglobin : 14.0 g/dL  Hematocrit : 42.9 %  Platelet Count - Automated : 317 K/uL  Mean Cell Volume : 95.3 fl  Mean Cell Hemoglobin : 31.1 pg  Mean Cell Hemoglobin Concentration : 32.6 gm/dL  Auto Neutrophil # : x  Auto Lymphocyte # : x  Auto Monocyte # : x  Auto Eosinophil # : x  Auto Basophil # : x  Auto Neutrophil % : x  Auto Lymphocyte % : x  Auto Monocyte % : x  Auto Eosinophil % : x  Auto Basophil % : x      08-01    148<H>  |  109<H>  |  126.5<H>  ----------------------------<  235<H>  4.8   |  27.0  |  1.23    Ca    8.3<L>      01 Aug 2022 04:54  Phos  4.3     08-01  Mg     3.6     08-01    TPro  7.4  /  Alb  2.8<L>  /  TBili  0.4  /  DBili  0.2  /  AST  798<H>  /  ALT  559<H>  /  AlkPhos  328<H>  07-31    LIVER FUNCTIONS - ( 31 Jul 2022 17:44 )  Alb: 2.8 g/dL / Pro: 7.4 g/dL / ALK PHOS: 328 U/L / ALT: 559 U/L / AST: 798 U/L / GGT: x           On Neurological Examination:  Patient is intubated off sedation. Neuroexam remains poor overall.  Mental Status -   There is  Eye opening to noxious.   Cranial Nerves -Pupils are 2 and reactive. , EOMs are conjugate in primary gaze and on occulocephalics.   Cough reflex is present .  Motor Exam -   Right side -trace movement to noxious. Left UE withdrawal  to noxious stim. LLE moved spontaneously.         RADIOLOGY ( All neurological imaging studies were independently reviewed and interpreted by me)  CT   CTA  CTP    IMPRESSION:    CT PERFUSION:  The CT perfusion is technically limited by truncation of the arterial input function and venous outflow function.    Core infarct (CBF < 30%:): 0 ml  Penumbra (Tmax >6s): 4 mL  Mismatched volume: 0 ml  Mismatched ratio: None    Interpretation:  Based on CBF < 30%, there is no evidence of a core infarct. At CBF < 34%, a small perfusion defect of 4 mL is located in the left inferior frontal gyrus and corresponds to matched perfusion abnormality of Tmax >6 seconds. On Tmax > 4s, there is a much larger perfusion defect throughout the majority of the left frontoparietal convexity, which may represent an ischemic penumbra in the left MCA vascular territory.        CT ANGIOGRAPHY NECK:  1. Poor opacification of the cervical vasculature.  2. Suspicion for moderate greater than 50% stenosis in the proximal right internal carotid artery. Suspicion for moderate to severe stenosis in the proximal left internal carotid artery that may be greater than 70%. No evidence of ICA occlusion in the neck.  3. The right vertebral artery is grossly patent.. The V1 segment of the left vertebral artery cannot be visualized. The V2 and V3 segments the left vertebral artery are grossly patent with multiple stenoses.  4. There is limited visualization of the common carotid artery origins and subclavian artery origins. Underlying stenoses cannot be excluded. There is suspicion for a severe stenosis in the proximal left subclavian artery.      CT ANGIOGRAPHY BRAIN:  1. Poor opacification of the intracranial vasculature.  2. There appears to be a focal filling defect at the left MCA bifurcation with distal flow. There is marked attenuation of M2 branches of the left middle cerebral artery. Vessel density analysis of the MCA territory shows a greater than 50% reduction of vessel density in the left MCA territory compared to the contralateral side.  3. There are mild to moderate stenoses in the supraclinoid segments of the internal carotid arteries bilaterally, without occlusion.  4. There are stenoses in the intradural vertebral arteries bilaterally, without occlusion.    Repeat CTA or MRI/MRA is recommended for further evaluation.  Peak arterial opacification on the CT perfusion occurs at approximately 38 seconds. If the CTA is repeated, a long delay is required after contrast injection to achieve adequate opacification of the vasculature.    The findings were discussed with JACK Light in the emergency room on 07/24/2022 at 5:45 AM. Read back verification was obtained.      SEVERO CHANEL MD; Attending Radiologist  This document has been electronically signed    MRI:    MR Head No Cont (07.26.22 @ 20:58) >  IMPRESSION:    Acute left MCA territory infarcts with hemorrhagic transformation,   grossly stable since comparison CT.    Additional punctate acute infarct involving the medial left frontal lobe   in the SUGEY territory.    ORION ANDRADE MD; Attending Radiologist        TTE  TTE Echo Complete w/ Contrast w/ Doppler (07.24.22 @ 14:01) >    Summary:   1. Endocardial visualization was enhanced with intravenous echo contrast.   2. Severely enlarged left atrium.   3. Global diffuse akinesis. Left ventricular ejection fraction, by   visual estimation, is <10%.   4. Elevated mean left atrial pressure. (>30 mm Hg)   5. Normal right atrial size.   6. Mildly enlarged right ventricle. Moderately reduced RV systolic   function.   7. Mild mitral valve regurgitation.   8. Mild tricuspid regurgitation.   9. Estimated pulmonary artery systolic pressure is 42 mmHg - mild   pulmonary hypertension.  10. There is no evidence of pericardial effusion.  11. Team informed of the findings.    MD Millie, RPVI Electronically signed on 7/24/2022 at 3:49:14 PM      < from: CT Head No Cont (07.30.22 @ 12:47) >    IMPRESSION:  Aging inferior left frontal and inferolateral left parietal infarcts.   Mild stable cortical blood products associated with the frontal infarct.    VERNELL LOYA MD; Attending Radiologist          EEG IMPRESSION/CLINICAL CORRELATE 7/31/22    Abnormal EEG study.  1. Potential epileptogenic foci in the bilateral frontotemporal regions.   2. Structural abnormality and cortical dysfunction in the left frontotemporal region.   3. Mild to moderate nonspecific diffuse or multifocal cerebral dysfunction.   4. No seizure seen.     _____________________________________________________________    Mayi Prather MD  Director, Epilepsy/[facility]       Electronic Signatures:  Mayi Prather)  (Signed 31-Jul-2022 09:06)EEG IMPRESSION/CLINICAL CORRELATE

## 2023-12-17 PROCEDURE — 99232 SBSQ HOSP IP/OBS MODERATE 35: CPT

## 2023-12-17 RX ADMIN — HEPARIN SODIUM 5000 UNIT(S): 5000 INJECTION INTRAVENOUS; SUBCUTANEOUS at 21:50

## 2023-12-17 RX ADMIN — POLYETHYLENE GLYCOL 3350 17 GRAM(S): 17 POWDER, FOR SOLUTION ORAL at 11:18

## 2023-12-17 RX ADMIN — Medication 81 MILLIGRAM(S): at 11:18

## 2023-12-17 RX ADMIN — Medication 1 DROP(S): at 17:09

## 2023-12-17 RX ADMIN — LATANOPROST 1 DROP(S): 0.05 SOLUTION/ DROPS OPHTHALMIC; TOPICAL at 21:52

## 2023-12-17 RX ADMIN — SENNA PLUS 1 TABLET(S): 8.6 TABLET ORAL at 17:19

## 2023-12-17 RX ADMIN — SENNA PLUS 1 TABLET(S): 8.6 TABLET ORAL at 05:19

## 2023-12-17 RX ADMIN — Medication 300 MILLIGRAM(S): at 17:18

## 2023-12-17 RX ADMIN — PANTOPRAZOLE SODIUM 40 MILLIGRAM(S): 20 TABLET, DELAYED RELEASE ORAL at 11:18

## 2023-12-17 RX ADMIN — MIDODRINE HYDROCHLORIDE 5 MILLIGRAM(S): 2.5 TABLET ORAL at 05:19

## 2023-12-17 RX ADMIN — HEPARIN SODIUM 5000 UNIT(S): 5000 INJECTION INTRAVENOUS; SUBCUTANEOUS at 05:19

## 2023-12-17 RX ADMIN — Medication 300 MILLIGRAM(S): at 05:19

## 2023-12-17 RX ADMIN — MIDODRINE HYDROCHLORIDE 5 MILLIGRAM(S): 2.5 TABLET ORAL at 12:36

## 2023-12-17 RX ADMIN — ATORVASTATIN CALCIUM 80 MILLIGRAM(S): 80 TABLET, FILM COATED ORAL at 21:47

## 2023-12-17 RX ADMIN — Medication 6 MILLIGRAM(S): at 21:47

## 2023-12-17 RX ADMIN — FAMOTIDINE 20 MILLIGRAM(S): 10 INJECTION INTRAVENOUS at 11:18

## 2023-12-17 RX ADMIN — SIMETHICONE 80 MILLIGRAM(S): 80 TABLET, CHEWABLE ORAL at 09:17

## 2023-12-17 RX ADMIN — HEPARIN SODIUM 5000 UNIT(S): 5000 INJECTION INTRAVENOUS; SUBCUTANEOUS at 14:07

## 2023-12-17 NOTE — PROGRESS NOTE ADULT - SUBJECTIVE AND OBJECTIVE BOX
Cc: Gait dysfunction    HPI: Patient seen and examined at bedside. No acute events overnight.   Pain controlled, no chest pain, no N/V, no Fevers/Chills. No other new ROS  Has been tolerating rehabilitation program.    albuterol    90 MICROgram(s) HFA Inhaler 1 Puff(s) Inhalation every 4 hours PRN  artificial  tears Solution 1 Drop(s) Both EYES two times a day PRN  aspirin enteric coated 81 milliGRAM(s) Oral daily  atorvastatin 80 milliGRAM(s) Oral at bedtime  carBAMazepine ER Tablet 300 milliGRAM(s) Oral two times a day  dextrose 5%. 1000 milliLiter(s) IV Continuous <Continuous>  dextrose 5%. 1000 milliLiter(s) IV Continuous <Continuous>  dextrose 50% Injectable 25 Gram(s) IV Push once  dextrose 50% Injectable 12.5 Gram(s) IV Push once  dextrose 50% Injectable 25 Gram(s) IV Push once  dextrose Oral Gel 15 Gram(s) Oral once PRN  famotidine    Tablet 20 milliGRAM(s) Oral daily  glucagon  Injectable 1 milliGRAM(s) IntraMuscular once  guaiFENesin Oral Liquid (Sugar-Free) 200 milliGRAM(s) Oral every 6 hours PRN  heparin   Injectable 5000 Unit(s) SubCutaneous every 8 hours  lactulose Retention Enema 200 Gram(s) Rectal once  latanoprost 0.005% Ophthalmic Solution 1 Drop(s) Both EYES at bedtime  melatonin 6 milliGRAM(s) Oral at bedtime  midodrine. 5 milliGRAM(s) Oral <User Schedule>  pantoprazole    Tablet 40 milliGRAM(s) Oral daily  polyethylene glycol 3350 17 Gram(s) Oral daily  senna 1 Tablet(s) Oral two times a day  simethicone 80 milliGRAM(s) Chew three times a day PRN      T(C): 36.9 (12-17-23 @ 08:33), Max: 37 (12-16-23 @ 21:10)  HR: 70 (12-17-23 @ 08:33) (70 - 76)  BP: 114/69 (12-17-23 @ 08:33) (101/54 - 122/82)  RR: 17 (12-17-23 @ 08:33) (16 - 17)  SpO2: 95% (12-17-23 @ 08:33) (95% - 98%)      CT Abd/Pelvis 11/28/23 reviewed and interpreted by me: distended gallbladder seen  ACC: [id number] EXAM: CT ABDOMEN AND PELVIS ORDERED BY: PANDA ANDREW    ACC: [id number] EXAM: CT CHEST ORDERED BY: PANDA ANDREW    PROCEDURE DATE: 11/28/2023        INTERPRETATION: CLINICAL INFORMATION: Injury, abdominal pain    COMPARISON: CT chest 12/13/2007, CT abdomen and pelvis 2/10/2023    CONTRAST/COMPLICATIONS:  IV Contrast: NONE  Oral Contrast: NONE  Complications: None reported at time of study completion    PROCEDURE:  CT of the Chest, Abdomen and Pelvis was performed.  Sagittal and coronal reformats were performed.    FINDINGS:  CHEST:  LUNGS AND LARGE AIRWAYS: Patent central airways. No consolidation. No pulmonary contusion  PLEURA: No pleural effusion.  VESSELS: Within normal limits.  HEART: Heart size is normal. No pericardial effusion. Left chest wall pacemaker with leads in the right atrium and right ventricle.  MEDIASTINUM AND MICHAEL: No lymphadenopathy.  CHEST WALL AND LOWER NECK: Within normal limits.    ABDOMEN AND PELVIS:  LIVER: Within normal limits.  BILE DUCTS: Normal caliber.  GALLBLADDER: Distended gallbladder with gallbladder wall thickening and pericholecystic fat stranding.  SPLEEN: Within normal limits.  PANCREAS: Within normal limits.  ADRENALS: Within normal limits.  KIDNEYS/URETERS: Absent right kidney. Normal left kidney.    BLADDER: Within normal limits.  REPRODUCTIVE ORGANS: Within normal limits.    BOWEL: Small periampullary duodenal diverticulum. No bowel obstruction. Reactive thickening of the colon at the hepatic flexure secondary to adjacent gallbladder inflammation. Appendix is normal.  PERITONEUM: No ascites.  VESSELS: Within normal limits.  RETROPERITONEUM/LYMPH NODES: Borderline periportal lymph nodes measuring up to 1 cm in short axis.  ABDOMINAL WALL: Within normal limits.  BONES: Degenerative changes. Hemangioma in the T7 vertebral body.    IMPRESSION:  Findings suspicious for acute cholecystitis. Correlate with patient's symptoms. Consider right upper quadrant ultrasound if clinically indicated.        --- End of Report ---      RISHABH SY DO; Attending Radiologist  This document has been electronically signed. Nov 28 2023 5:08PM      In NAD  HEENT- EOMI  Heart- S1S2  Lungs- CTA bl.  Abd- + BS, NT  Ext- No calf pain  Neuro- Exam unchanged    CBC 12/14/23 reviewed by me  CBC 12/11/23 reviewed by me  CMP 12/14/23 reviewed by me:       Imp: Patient with diagnosis of Debility admitted for comprehensive acute rehabilitation.    Plan:  - Continue PT/OT/SLP as indicated  - DVT prophylaxis - continue Heparin subQ  - Skin- Turn q2h, check skin daily  - Continue current medications  -Active issues-   Anemia -stable, will monitor, AM labs ordered  Elevated LFTs - stable, will monitor, AM labs ordered  - Patient is stable to continue current rehabilitation program.      Cc: Gait dysfunction    HPI: Patient seen and examined at bedside. No acute events overnight.   Pain controlled, no chest pain, no N/V, no Fevers/Chills. No other new ROS  Has been tolerating rehabilitation program.    albuterol    90 MICROgram(s) HFA Inhaler 1 Puff(s) Inhalation every 4 hours PRN  artificial  tears Solution 1 Drop(s) Both EYES two times a day PRN  aspirin enteric coated 81 milliGRAM(s) Oral daily  atorvastatin 80 milliGRAM(s) Oral at bedtime  carBAMazepine ER Tablet 300 milliGRAM(s) Oral two times a day  dextrose 5%. 1000 milliLiter(s) IV Continuous <Continuous>  dextrose 5%. 1000 milliLiter(s) IV Continuous <Continuous>  dextrose 50% Injectable 25 Gram(s) IV Push once  dextrose 50% Injectable 12.5 Gram(s) IV Push once  dextrose 50% Injectable 25 Gram(s) IV Push once  dextrose Oral Gel 15 Gram(s) Oral once PRN  famotidine    Tablet 20 milliGRAM(s) Oral daily  glucagon  Injectable 1 milliGRAM(s) IntraMuscular once  guaiFENesin Oral Liquid (Sugar-Free) 200 milliGRAM(s) Oral every 6 hours PRN  heparin   Injectable 5000 Unit(s) SubCutaneous every 8 hours  lactulose Retention Enema 200 Gram(s) Rectal once  latanoprost 0.005% Ophthalmic Solution 1 Drop(s) Both EYES at bedtime  melatonin 6 milliGRAM(s) Oral at bedtime  midodrine. 5 milliGRAM(s) Oral <User Schedule>  pantoprazole    Tablet 40 milliGRAM(s) Oral daily  polyethylene glycol 3350 17 Gram(s) Oral daily  senna 1 Tablet(s) Oral two times a day  simethicone 80 milliGRAM(s) Chew three times a day PRN      T(C): 36.9 (12-17-23 @ 08:33), Max: 37 (12-16-23 @ 21:10)  HR: 70 (12-17-23 @ 08:33) (70 - 76)  BP: 114/69 (12-17-23 @ 08:33) (101/54 - 122/82)  RR: 17 (12-17-23 @ 08:33) (16 - 17)  SpO2: 95% (12-17-23 @ 08:33) (95% - 98%)      CT Abd/Pelvis 11/28/23 reviewed and interpreted by me: distended gallbladder seen  ACC: [id number] EXAM: CT ABDOMEN AND PELVIS ORDERED BY: PANDA ANDREW    ACC: [id number] EXAM: CT CHEST ORDERED BY: PANDA ANDREW    PROCEDURE DATE: 11/28/2023        INTERPRETATION: CLINICAL INFORMATION: Injury, abdominal pain    COMPARISON: CT chest 12/13/2007, CT abdomen and pelvis 2/10/2023    CONTRAST/COMPLICATIONS:  IV Contrast: NONE  Oral Contrast: NONE  Complications: None reported at time of study completion    PROCEDURE:  CT of the Chest, Abdomen and Pelvis was performed.  Sagittal and coronal reformats were performed.    FINDINGS:  CHEST:  LUNGS AND LARGE AIRWAYS: Patent central airways. No consolidation. No pulmonary contusion  PLEURA: No pleural effusion.  VESSELS: Within normal limits.  HEART: Heart size is normal. No pericardial effusion. Left chest wall pacemaker with leads in the right atrium and right ventricle.  MEDIASTINUM AND MICHAEL: No lymphadenopathy.  CHEST WALL AND LOWER NECK: Within normal limits.    ABDOMEN AND PELVIS:  LIVER: Within normal limits.  BILE DUCTS: Normal caliber.  GALLBLADDER: Distended gallbladder with gallbladder wall thickening and pericholecystic fat stranding.  SPLEEN: Within normal limits.  PANCREAS: Within normal limits.  ADRENALS: Within normal limits.  KIDNEYS/URETERS: Absent right kidney. Normal left kidney.    BLADDER: Within normal limits.  REPRODUCTIVE ORGANS: Within normal limits.    BOWEL: Small periampullary duodenal diverticulum. No bowel obstruction. Reactive thickening of the colon at the hepatic flexure secondary to adjacent gallbladder inflammation. Appendix is normal.  PERITONEUM: No ascites.  VESSELS: Within normal limits.  RETROPERITONEUM/LYMPH NODES: Borderline periportal lymph nodes measuring up to 1 cm in short axis.  ABDOMINAL WALL: Within normal limits.  BONES: Degenerative changes. Hemangioma in the T7 vertebral body.    IMPRESSION:  Findings suspicious for acute cholecystitis. Correlate with patient's symptoms. Consider right upper quadrant ultrasound if clinically indicated.        --- End of Report ---      RISHABH SY DO; Attending Radiologist  This document has been electronically signed. Nov 28 2023 5:08PM      In NAD  HEENT- EOMI  Heart- S1S2  Lungs- CTA bl.  Abd- + BS, NT  Ext- No calf pain  Neuro- Exam unchanged    CBC 12/14/23 reviewed by me  CBC 12/11/23 reviewed by me  CMP 12/14/23 reviewed by me:       Imp: Patient with diagnosis of Debility admitted for comprehensive acute rehabilitation.    Plan:  - Continue PT/OT/SLP as indicated  - DVT prophylaxis - continue Heparin subQ  - Skin- Turn q2h, check skin daily  - Continue current medications  -Active issues-   Anemia -stable, will monitor, AM labs ordered  Elevated LFTs - stable, will monitor, AM labs ordered  - Patient is stable to continue current rehabilitation program.

## 2023-12-18 ENCOUNTER — TRANSCRIPTION ENCOUNTER (OUTPATIENT)
Age: 68
End: 2023-12-18

## 2023-12-18 LAB
ALBUMIN SERPL ELPH-MCNC: 2.2 G/DL — LOW (ref 3.3–5)
ALBUMIN SERPL ELPH-MCNC: 2.2 G/DL — LOW (ref 3.3–5)
ALP SERPL-CCNC: 157 U/L — HIGH (ref 40–120)
ALP SERPL-CCNC: 157 U/L — HIGH (ref 40–120)
ALT FLD-CCNC: 55 U/L — HIGH (ref 10–45)
ALT FLD-CCNC: 55 U/L — HIGH (ref 10–45)
ANION GAP SERPL CALC-SCNC: 9 MMOL/L — SIGNIFICANT CHANGE UP (ref 5–17)
ANION GAP SERPL CALC-SCNC: 9 MMOL/L — SIGNIFICANT CHANGE UP (ref 5–17)
AST SERPL-CCNC: 38 U/L — SIGNIFICANT CHANGE UP (ref 10–40)
AST SERPL-CCNC: 38 U/L — SIGNIFICANT CHANGE UP (ref 10–40)
BASOPHILS # BLD AUTO: 0.06 K/UL — SIGNIFICANT CHANGE UP (ref 0–0.2)
BASOPHILS # BLD AUTO: 0.06 K/UL — SIGNIFICANT CHANGE UP (ref 0–0.2)
BASOPHILS NFR BLD AUTO: 1.4 % — SIGNIFICANT CHANGE UP (ref 0–2)
BASOPHILS NFR BLD AUTO: 1.4 % — SIGNIFICANT CHANGE UP (ref 0–2)
BILIRUB SERPL-MCNC: 0.3 MG/DL — SIGNIFICANT CHANGE UP (ref 0.2–1.2)
BILIRUB SERPL-MCNC: 0.3 MG/DL — SIGNIFICANT CHANGE UP (ref 0.2–1.2)
BUN SERPL-MCNC: 18 MG/DL — SIGNIFICANT CHANGE UP (ref 7–23)
BUN SERPL-MCNC: 18 MG/DL — SIGNIFICANT CHANGE UP (ref 7–23)
CALCIUM SERPL-MCNC: 8.6 MG/DL — SIGNIFICANT CHANGE UP (ref 8.4–10.5)
CALCIUM SERPL-MCNC: 8.6 MG/DL — SIGNIFICANT CHANGE UP (ref 8.4–10.5)
CHLORIDE SERPL-SCNC: 103 MMOL/L — SIGNIFICANT CHANGE UP (ref 96–108)
CHLORIDE SERPL-SCNC: 103 MMOL/L — SIGNIFICANT CHANGE UP (ref 96–108)
CO2 SERPL-SCNC: 25 MMOL/L — SIGNIFICANT CHANGE UP (ref 22–31)
CO2 SERPL-SCNC: 25 MMOL/L — SIGNIFICANT CHANGE UP (ref 22–31)
CREAT SERPL-MCNC: 0.96 MG/DL — SIGNIFICANT CHANGE UP (ref 0.5–1.3)
CREAT SERPL-MCNC: 0.96 MG/DL — SIGNIFICANT CHANGE UP (ref 0.5–1.3)
EGFR: 85 ML/MIN/1.73M2 — SIGNIFICANT CHANGE UP
EGFR: 85 ML/MIN/1.73M2 — SIGNIFICANT CHANGE UP
EOSINOPHIL # BLD AUTO: 0.23 K/UL — SIGNIFICANT CHANGE UP (ref 0–0.5)
EOSINOPHIL # BLD AUTO: 0.23 K/UL — SIGNIFICANT CHANGE UP (ref 0–0.5)
EOSINOPHIL NFR BLD AUTO: 5.3 % — SIGNIFICANT CHANGE UP (ref 0–6)
EOSINOPHIL NFR BLD AUTO: 5.3 % — SIGNIFICANT CHANGE UP (ref 0–6)
GLUCOSE SERPL-MCNC: 130 MG/DL — HIGH (ref 70–99)
GLUCOSE SERPL-MCNC: 130 MG/DL — HIGH (ref 70–99)
HCT VFR BLD CALC: 32.1 % — LOW (ref 39–50)
HCT VFR BLD CALC: 32.1 % — LOW (ref 39–50)
HGB BLD-MCNC: 10.2 G/DL — LOW (ref 13–17)
HGB BLD-MCNC: 10.2 G/DL — LOW (ref 13–17)
IMM GRANULOCYTES NFR BLD AUTO: 0.5 % — SIGNIFICANT CHANGE UP (ref 0–0.9)
IMM GRANULOCYTES NFR BLD AUTO: 0.5 % — SIGNIFICANT CHANGE UP (ref 0–0.9)
LYMPHOCYTES # BLD AUTO: 1.25 K/UL — SIGNIFICANT CHANGE UP (ref 1–3.3)
LYMPHOCYTES # BLD AUTO: 1.25 K/UL — SIGNIFICANT CHANGE UP (ref 1–3.3)
LYMPHOCYTES # BLD AUTO: 28.9 % — SIGNIFICANT CHANGE UP (ref 13–44)
LYMPHOCYTES # BLD AUTO: 28.9 % — SIGNIFICANT CHANGE UP (ref 13–44)
MCHC RBC-ENTMCNC: 31.8 GM/DL — LOW (ref 32–36)
MCHC RBC-ENTMCNC: 31.8 GM/DL — LOW (ref 32–36)
MCHC RBC-ENTMCNC: 32.4 PG — SIGNIFICANT CHANGE UP (ref 27–34)
MCHC RBC-ENTMCNC: 32.4 PG — SIGNIFICANT CHANGE UP (ref 27–34)
MCV RBC AUTO: 101.9 FL — HIGH (ref 80–100)
MCV RBC AUTO: 101.9 FL — HIGH (ref 80–100)
MONOCYTES # BLD AUTO: 0.61 K/UL — SIGNIFICANT CHANGE UP (ref 0–0.9)
MONOCYTES # BLD AUTO: 0.61 K/UL — SIGNIFICANT CHANGE UP (ref 0–0.9)
MONOCYTES NFR BLD AUTO: 14.1 % — HIGH (ref 2–14)
MONOCYTES NFR BLD AUTO: 14.1 % — HIGH (ref 2–14)
NEUTROPHILS # BLD AUTO: 2.15 K/UL — SIGNIFICANT CHANGE UP (ref 1.8–7.4)
NEUTROPHILS # BLD AUTO: 2.15 K/UL — SIGNIFICANT CHANGE UP (ref 1.8–7.4)
NEUTROPHILS NFR BLD AUTO: 49.8 % — SIGNIFICANT CHANGE UP (ref 43–77)
NEUTROPHILS NFR BLD AUTO: 49.8 % — SIGNIFICANT CHANGE UP (ref 43–77)
NRBC # BLD: 0 /100 WBCS — SIGNIFICANT CHANGE UP (ref 0–0)
NRBC # BLD: 0 /100 WBCS — SIGNIFICANT CHANGE UP (ref 0–0)
PLATELET # BLD AUTO: 416 K/UL — HIGH (ref 150–400)
PLATELET # BLD AUTO: 416 K/UL — HIGH (ref 150–400)
POTASSIUM SERPL-MCNC: 4.4 MMOL/L — SIGNIFICANT CHANGE UP (ref 3.5–5.3)
POTASSIUM SERPL-MCNC: 4.4 MMOL/L — SIGNIFICANT CHANGE UP (ref 3.5–5.3)
POTASSIUM SERPL-SCNC: 4.4 MMOL/L — SIGNIFICANT CHANGE UP (ref 3.5–5.3)
POTASSIUM SERPL-SCNC: 4.4 MMOL/L — SIGNIFICANT CHANGE UP (ref 3.5–5.3)
PROT SERPL-MCNC: 6.9 G/DL — SIGNIFICANT CHANGE UP (ref 6–8.3)
PROT SERPL-MCNC: 6.9 G/DL — SIGNIFICANT CHANGE UP (ref 6–8.3)
RBC # BLD: 3.15 M/UL — LOW (ref 4.2–5.8)
RBC # BLD: 3.15 M/UL — LOW (ref 4.2–5.8)
RBC # FLD: 15.8 % — HIGH (ref 10.3–14.5)
RBC # FLD: 15.8 % — HIGH (ref 10.3–14.5)
SODIUM SERPL-SCNC: 137 MMOL/L — SIGNIFICANT CHANGE UP (ref 135–145)
SODIUM SERPL-SCNC: 137 MMOL/L — SIGNIFICANT CHANGE UP (ref 135–145)
WBC # BLD: 4.32 K/UL — SIGNIFICANT CHANGE UP (ref 3.8–10.5)
WBC # BLD: 4.32 K/UL — SIGNIFICANT CHANGE UP (ref 3.8–10.5)
WBC # FLD AUTO: 4.32 K/UL — SIGNIFICANT CHANGE UP (ref 3.8–10.5)
WBC # FLD AUTO: 4.32 K/UL — SIGNIFICANT CHANGE UP (ref 3.8–10.5)

## 2023-12-18 PROCEDURE — 99232 SBSQ HOSP IP/OBS MODERATE 35: CPT | Mod: GC

## 2023-12-18 PROCEDURE — 99232 SBSQ HOSP IP/OBS MODERATE 35: CPT

## 2023-12-18 RX ORDER — FAMOTIDINE 10 MG/ML
1 INJECTION INTRAVENOUS
Qty: 0 | Refills: 0 | DISCHARGE
Start: 2023-12-18

## 2023-12-18 RX ORDER — LATANOPROST 0.05 MG/ML
1 SOLUTION/ DROPS OPHTHALMIC; TOPICAL
Qty: 0 | Refills: 0 | DISCHARGE
Start: 2023-12-18

## 2023-12-18 RX ORDER — SIMETHICONE 80 MG/1
1 TABLET, CHEWABLE ORAL
Qty: 0 | Refills: 0 | DISCHARGE
Start: 2023-12-18

## 2023-12-18 RX ORDER — FAMOTIDINE 10 MG/ML
1 INJECTION INTRAVENOUS
Qty: 30 | Refills: 0
Start: 2023-12-18 | End: 2024-01-16

## 2023-12-18 RX ORDER — CARBAMAZEPINE 200 MG
3 TABLET ORAL
Qty: 0 | Refills: 0 | DISCHARGE
Start: 2023-12-18

## 2023-12-18 RX ORDER — METFORMIN HYDROCHLORIDE 850 MG/1
1 TABLET ORAL
Qty: 0 | Refills: 0 | DISCHARGE

## 2023-12-18 RX ORDER — MIDODRINE HYDROCHLORIDE 2.5 MG/1
1 TABLET ORAL
Qty: 0 | Refills: 0 | DISCHARGE
Start: 2023-12-18

## 2023-12-18 RX ORDER — ASPIRIN/CALCIUM CARB/MAGNESIUM 324 MG
1 TABLET ORAL
Qty: 0 | Refills: 0 | DISCHARGE
Start: 2023-12-18

## 2023-12-18 RX ORDER — POLYETHYLENE GLYCOL 3350 17 G/17G
17 POWDER, FOR SOLUTION ORAL
Qty: 0 | Refills: 0 | DISCHARGE
Start: 2023-12-18

## 2023-12-18 RX ORDER — PANTOPRAZOLE SODIUM 20 MG/1
1 TABLET, DELAYED RELEASE ORAL
Qty: 30 | Refills: 0
Start: 2023-12-18 | End: 2024-01-16

## 2023-12-18 RX ORDER — PANTOPRAZOLE SODIUM 20 MG/1
1 TABLET, DELAYED RELEASE ORAL
Qty: 0 | Refills: 0 | DISCHARGE
Start: 2023-12-18

## 2023-12-18 RX ORDER — FAMOTIDINE 10 MG/ML
1 INJECTION INTRAVENOUS
Qty: 0 | Refills: 0 | DISCHARGE

## 2023-12-18 RX ORDER — MIDODRINE HYDROCHLORIDE 2.5 MG/1
1 TABLET ORAL
Qty: 60 | Refills: 0
Start: 2023-12-18 | End: 2024-01-16

## 2023-12-18 RX ORDER — SENNA PLUS 8.6 MG/1
1 TABLET ORAL
Qty: 0 | Refills: 0 | DISCHARGE
Start: 2023-12-18

## 2023-12-18 RX ORDER — FLUTICASONE PROPIONATE 50 MCG
1 SPRAY, SUSPENSION NASAL
Qty: 0 | Refills: 0 | DISCHARGE

## 2023-12-18 RX ORDER — ATORVASTATIN CALCIUM 80 MG/1
1 TABLET, FILM COATED ORAL
Qty: 30 | Refills: 0
Start: 2023-12-18 | End: 2024-01-16

## 2023-12-18 RX ORDER — ATORVASTATIN CALCIUM 80 MG/1
1 TABLET, FILM COATED ORAL
Qty: 0 | Refills: 0 | DISCHARGE

## 2023-12-18 RX ORDER — CARBAMAZEPINE 200 MG
3 TABLET ORAL
Qty: 180 | Refills: 0
Start: 2023-12-18 | End: 2024-01-16

## 2023-12-18 RX ORDER — ASPIRIN/CALCIUM CARB/MAGNESIUM 324 MG
1 TABLET ORAL
Qty: 0 | Refills: 0 | DISCHARGE

## 2023-12-18 RX ORDER — ATORVASTATIN CALCIUM 80 MG/1
1 TABLET, FILM COATED ORAL
Qty: 0 | Refills: 0 | DISCHARGE
Start: 2023-12-18

## 2023-12-18 RX ADMIN — Medication 300 MILLIGRAM(S): at 17:51

## 2023-12-18 RX ADMIN — MIDODRINE HYDROCHLORIDE 5 MILLIGRAM(S): 2.5 TABLET ORAL at 12:47

## 2023-12-18 RX ADMIN — HEPARIN SODIUM 5000 UNIT(S): 5000 INJECTION INTRAVENOUS; SUBCUTANEOUS at 23:17

## 2023-12-18 RX ADMIN — MIDODRINE HYDROCHLORIDE 5 MILLIGRAM(S): 2.5 TABLET ORAL at 06:13

## 2023-12-18 RX ADMIN — Medication 300 MILLIGRAM(S): at 06:13

## 2023-12-18 RX ADMIN — SENNA PLUS 1 TABLET(S): 8.6 TABLET ORAL at 06:16

## 2023-12-18 RX ADMIN — PANTOPRAZOLE SODIUM 40 MILLIGRAM(S): 20 TABLET, DELAYED RELEASE ORAL at 12:47

## 2023-12-18 RX ADMIN — ATORVASTATIN CALCIUM 80 MILLIGRAM(S): 80 TABLET, FILM COATED ORAL at 23:16

## 2023-12-18 RX ADMIN — Medication 6 MILLIGRAM(S): at 23:16

## 2023-12-18 RX ADMIN — HEPARIN SODIUM 5000 UNIT(S): 5000 INJECTION INTRAVENOUS; SUBCUTANEOUS at 06:13

## 2023-12-18 RX ADMIN — LATANOPROST 1 DROP(S): 0.05 SOLUTION/ DROPS OPHTHALMIC; TOPICAL at 23:17

## 2023-12-18 RX ADMIN — HEPARIN SODIUM 5000 UNIT(S): 5000 INJECTION INTRAVENOUS; SUBCUTANEOUS at 13:23

## 2023-12-18 RX ADMIN — SIMETHICONE 80 MILLIGRAM(S): 80 TABLET, CHEWABLE ORAL at 17:51

## 2023-12-18 RX ADMIN — FAMOTIDINE 20 MILLIGRAM(S): 10 INJECTION INTRAVENOUS at 12:47

## 2023-12-18 RX ADMIN — POLYETHYLENE GLYCOL 3350 17 GRAM(S): 17 POWDER, FOR SOLUTION ORAL at 12:47

## 2023-12-18 RX ADMIN — Medication 81 MILLIGRAM(S): at 12:48

## 2023-12-18 NOTE — DISCHARGE NOTE PROVIDER - HOSPITAL COURSE
HPI:  Mr. Benjamin Rios is a 68 year old right handed male patient with past medical history of sinus node disorder s/p PPM, HTN, HLD, cerebral aneurysm s/p coiling, CVA w/ residual L sided weakness, seizure disorder, type 2 diabetes on metformin and gastritis admitted to [facility] on 11/28 for worsening abdominal pain since 11/24. CT chest abd/pelvis showed distended gallbladder with gallbladder wall thickening and pericholecystic fat stranding and hemangioma in T7 vertebrae. Patient underwent lap to open cholecystectomy for acute cholecystitis on 11/30. Procedure was complicated by drainage of a small amount of bile and a large amount of pus from gallbladder. Patient was upgraded to ICU due to post-operative hypotension requiring pressors then downgraded to surgical floor. Hospital course further complicated by acute blood loss anemia, improved s/p 1 unit PRBCs. Patient also found to have bile duct leakage, requiring ERCP with stent on 12/5/23. Patient requires follow up with GI and a follow up ERCP in 4-8 weeks. Patient evaluated by PT/OT and was recommended for acute inpatient rehab. Patient is medically stable for discharge to acute rehab on 12/7.   (07 Dec 2023 13:33)    REHAB COURSE:  Patient participated in daily therapies and made good functional gains.  Patient COVID positive on admission to rehab and completed Remdesivir and isolation precautions. Staples removed from RUQ by surgery on 12/15.     Patient tolerated course of inpatient PT/OT/SLP rehab with significant functional improvements. Patient seen and examined on day of discharge.  Medications, medication side effects, and discharge instructions were reviewed with the patient, who expressed understanding of all information.  Patient was medically and functionally optimized and cleared for discharge.     HPI:  Mr. Benjamin Rios is a 68 year old right handed male patient with past medical history of sinus node disorder s/p PPM, HTN, HLD, cerebral aneurysm s/p coiling, CVA w/ residual L sided weakness, seizure disorder, type 2 diabetes on metformin and gastritis admitted to [facility] on 11/28 for worsening abdominal pain since 11/24. CT chest abd/pelvis showed distended gallbladder with gallbladder wall thickening and pericholecystic fat stranding and hemangioma in T7 vertebrae. Patient underwent lap to open cholecystectomy for acute cholecystitis on 11/30. Procedure was complicated by drainage of a small amount of bile and a large amount of pus from gallbladder. Patient was upgraded to ICU due to post-operative hypotension requiring pressors then downgraded to surgical floor. Hospital course further complicated by acute blood loss anemia, improved s/p 1 unit PRBCs. Patient also found to have bile duct leakage, requiring ERCP with stent on 12/5/23. Patient requires follow up with GI and a follow up ERCP in 4-8 weeks. Patient evaluated by PT/OT and was recommended for acute inpatient rehab. Patient is medically stable for discharge to acute rehab on 12/7.   (07 Dec 2023 13:33)    REHAB COURSE:  Patient participated in daily therapies and made good functional gains.  Patient COVID positive on admission to rehab and completed Remdesivir and isolation precautions. Staples removed from RUQ by surgery on 12/15.     Patient tolerated course of inpatient PT/OT/SLP rehab with significant functional improvements. Patient seen and examined on day of discharge.  Medications, medication side effects, and discharge instructions were reviewed with the patient, who expressed understanding of all information.  Patient was medically and functionally optimized and cleared for discharge.     HPI:  Mr. Benjamin Rios is a 68 year old right handed male patient with past medical history of sinus node disorder s/p PPM, HTN, HLD, cerebral aneurysm s/p coiling, CVA w/ residual L sided weakness, seizure disorder, type 2 diabetes on metformin and gastritis admitted to [facility] on 11/28 for worsening abdominal pain since 11/24. CT chest abd/pelvis showed distended gallbladder with gallbladder wall thickening and pericholecystic fat stranding and hemangioma in T7 vertebrae. Patient underwent lap to open cholecystectomy for acute cholecystitis on 11/30. Procedure was complicated by drainage of a small amount of bile and a large amount of pus from gallbladder. Patient was upgraded to ICU due to post-operative hypotension requiring pressors then downgraded to surgical floor. Hospital course further complicated by acute blood loss anemia, improved s/p 1 unit PRBCs. Patient also found to have bile duct leakage, requiring ERCP with stent on 12/5/23. Patient requires follow up with GI and a follow up ERCP in 4-8 weeks. Patient evaluated by PT/OT and was recommended for acute inpatient rehab. Patient is medically stable for discharge to acute rehab on 12/7.      REHAB COURSE:  Patient participated in daily therapies and made good functional gains.  Patient COVID positive on admission to rehab and completed Remdesivir and isolation precautions. Staples removed from RUQ by surgery on 12/15.     Patient tolerated course of inpatient PT/OT/SLP rehab with significant functional improvements. Patient seen and examined on day of discharge.  Medications, medication side effects, and discharge instructions were reviewed with the patient, who expressed understanding of all information.  Patient was medically and functionally optimized and cleared for discharge.     HPI:  Mr. Benjamin Rios is a 68 year old right handed male patient with past medical history of sinus node disorder s/p PPM, HTN, HLD, cerebral aneurysm s/p coiling, CVA w/ residual L sided weakness, seizure disorder, type 2 diabetes on metformin and gastritis admitted to [facility] on 11/28 for worsening abdominal pain since 11/24. CT chest abd/pelvis showed distended gallbladder with gallbladder wall thickening and pericholecystic fat stranding and hemangioma in T7 vertebrae. Patient underwent lap to open cholecystectomy for acute cholecystitis on 11/30. Procedure was complicated by drainage of a small amount of bile and a large amount of pus from gallbladder. Patient was upgraded to ICU due to post-operative hypotension requiring pressors then downgraded to surgical floor. Hospital course further complicated by acute blood loss anemia, improved s/p 1 unit PRBCs. Patient also found to have bile duct leakage, requiring ERCP with stent on 12/5/23. Patient requires follow up with GI and a follow up ERCP in 4-8 weeks. Patient evaluated by PT/OT and was recommended for acute inpatient rehab. Patient is medically stable for discharge to acute rehab on 12/7.      REHAB COURSE:  Patient participated in daily therapies and made good functional gains.  Patient COVID positive on admission to rehab and completed Remdesivir and isolation precautions. Staples removed from RUQ by surgery on 12/15.     Patient tolerated course of inpatient PT/OT/SLP rehab with significant functional improvements. Patient seen and examined on day of discharge.  Medications, medication side effects, and discharge instructions were reviewed with the patient, who expressed understanding of all information.  Patient was medically and functionally optimized and cleared for discharge.

## 2023-12-18 NOTE — PROGRESS NOTE ADULT - ASSESSMENT
68 year old right handed male patient with past medical history of sinus node disorder s/p PPM, HTN, HLD, cerebral aneurysm s/p coiling, CVA w/ residual L sided weakness, seizure disorder, type 2 diabetes on metformin and gastritis who is admitted for Acute Inpatient Rehabilitation with a multidisciplinary rehab program at [facility] with functional impairments in ADLs and mobility secondary to a complaint of worsening abdominal pain with imaging remarkable for distended gallbladder with wall thickening and pericholecystic fat stranding and hemangioma treated surgically with a lap to open cholecystectomy for acute cholecystitis on 11/30 complicated by drainage of a small amount of bile and a large amount of pus from gallbladder and subsequent requirement of ICU admission due to post-operative hypotension requiring pressors and acute blood loss anemia improved with transfusion of one unit of PRBCs. Additionally, patient was found to have bile duct leakage, requiring ERCP with stent on 12/5/23.    Debility 2/2 acute cholecystitis complicated by bile duct leak (s/p ERCP with stent on 12/5)  - monitor NIKHIL drain output  - outpatient GI f/u with Dr. Dorsey  - Repeat ERCP & stent removal in 4-8 weeks    COVID+  - patient tested positive for COVID on admission to rehab (12/7)  - completed Remdesivir 5 day course   - no need for decadron   - Guifenesin prn, albuterol prn    Acute Blood Loss Anemia  - s/p 1 unit PRBC transfusion  - monitor H/H    Seizure Disorder  - continue home carbamazepine 300 mg BID    CVA/ HLD  - residual L sided weakness  -ASA/Statin    HTN  Orthostatic Hypotension  - Home med: metoprolol 25 mg BID  - off anti-hypertensives   - Continue Midodrine. Goal to wean off as tolerated  - Monitor BP    T2DM  - HbA1c 6.1  - low dose SSI  - Monitor fingersticks  - on metformin at home    DVT prophylaxis: Heparin subq       68 year old right handed male patient with past medical history of sinus node disorder s/p PPM, HTN, HLD, cerebral aneurysm s/p coiling, CVA w/ residual L sided weakness, seizure disorder, type 2 diabetes on metformin and gastritis who is admitted for Acute Inpatient Rehabilitation with a multidisciplinary rehab program at [facility] with functional impairments in ADLs and mobility secondary to a complaint of worsening abdominal pain with imaging remarkable for distended gallbladder with wall thickening and pericholecystic fat stranding and hemangioma treated surgically with a lap to open cholecystectomy for acute cholecystitis on 11/30 complicated by drainage of a small amount of bile and a large amount of pus from gallbladder and subsequent requirement of ICU admission due to post-operative hypotension requiring pressors and acute blood loss anemia improved with transfusion of one unit of PRBCs. Additionally, patient was found to have bile duct leakage, requiring ERCP with stent on 12/5/23.    Debility 2/2 acute cholecystitis complicated by bile duct leak (s/p ERCP with stent on 12/5)  - monitor NIKHIL drain output  - outpatient GI f/u with Dr. Dorsey  - Repeat ERCP & stent removal in 4-8 weeks    COVID+  - patient tested positive for COVID on admission to rehab (12/7)  - completed Remdesivir 5 day course   - no need for decadron   - Guifenesin prn, albuterol prn    Acute Blood Loss Anemia  - s/p 1 unit PRBC transfusion  - monitor H/H    Seizure Disorder  - continue home carbamazepine 300 mg BID    CVA/ HLD  - residual L sided weakness  -ASA/Statin    HTN  Orthostatic Hypotension  - Home med: metoprolol 25 mg BID  - off anti-hypertensives   - Continue Midodrine. Goal to wean off as tolerated  - Monitor BP    T2DM  - HbA1c 6.1  - low dose SSI  - Monitor fingersticks  - on metformin at home    DVT prophylaxis: Heparin subq

## 2023-12-18 NOTE — PROGRESS NOTE ADULT - SUBJECTIVE AND OBJECTIVE BOX
Patient is a 68y old  Male who presents with a chief complaint of Debility 2/2 acute cholecystitis (17 Dec 2023 10:49)      SUBJECTIVE / OVERNIGHT EVENTS:  Pt seen and examined at bedside. No acute events overnight.  Pt denies cp, palpitations, sob, abd pain, N/V, fever, chills.    ROS:  All other review of systems negative    Allergies    No Known Allergies    Intolerances        MEDICATIONS  (STANDING):  aspirin enteric coated 81 milliGRAM(s) Oral daily  atorvastatin 80 milliGRAM(s) Oral at bedtime  carBAMazepine ER Tablet 300 milliGRAM(s) Oral two times a day  dextrose 5%. 1000 milliLiter(s) (100 mL/Hr) IV Continuous <Continuous>  dextrose 5%. 1000 milliLiter(s) (50 mL/Hr) IV Continuous <Continuous>  dextrose 50% Injectable 25 Gram(s) IV Push once  dextrose 50% Injectable 12.5 Gram(s) IV Push once  dextrose 50% Injectable 25 Gram(s) IV Push once  famotidine    Tablet 20 milliGRAM(s) Oral daily  glucagon  Injectable 1 milliGRAM(s) IntraMuscular once  heparin   Injectable 5000 Unit(s) SubCutaneous every 8 hours  lactulose Retention Enema 200 Gram(s) Rectal once  latanoprost 0.005% Ophthalmic Solution 1 Drop(s) Both EYES at bedtime  melatonin 6 milliGRAM(s) Oral at bedtime  midodrine. 5 milliGRAM(s) Oral <User Schedule>  pantoprazole    Tablet 40 milliGRAM(s) Oral daily  polyethylene glycol 3350 17 Gram(s) Oral daily  senna 1 Tablet(s) Oral two times a day    MEDICATIONS  (PRN):  albuterol    90 MICROgram(s) HFA Inhaler 1 Puff(s) Inhalation every 4 hours PRN Shortness of Breath and/or Wheezing  artificial  tears Solution 1 Drop(s) Both EYES two times a day PRN Dry Eyes  dextrose Oral Gel 15 Gram(s) Oral once PRN Blood Glucose LESS THAN 70 milliGRAM(s)/deciliter  guaiFENesin Oral Liquid (Sugar-Free) 200 milliGRAM(s) Oral every 6 hours PRN Cough  simethicone 80 milliGRAM(s) Chew three times a day PRN Gas      Vital Signs Last 24 Hrs  T(C): 36.9 (17 Dec 2023 19:51), Max: 36.9 (17 Dec 2023 08:33)  T(F): 98.5 (17 Dec 2023 19:51), Max: 98.5 (17 Dec 2023 08:33)  HR: 73 (18 Dec 2023 06:11) (69 - 73)  BP: 108/65 (18 Dec 2023 06:11) (108/65 - 120/70)  BP(mean): --  RR: 16 (17 Dec 2023 19:51) (16 - 17)  SpO2: 98% (17 Dec 2023 19:51) (95% - 98%)    Parameters below as of 17 Dec 2023 19:51  Patient On (Oxygen Delivery Method): room air      CAPILLARY BLOOD GLUCOSE        I&O's Summary      PHYSICAL EXAM:  GENERAL: NAD, well-developed  HEAD:  Atraumatic, Normocephalic  EYES: EOMI, PERRLA, conjunctiva and sclera clear  NECK: Supple, No JVD  CHEST/LUNG: Clear to auscultation bilaterally; No wheeze, nonlabored breathing  HEART: Regular rate and rhythm; No murmurs, rubs, or gallops  ABDOMEN: Soft, Nontender, Nondistended; Bowel sounds present  EXTREMITIES:  2+ Peripheral Pulses, No clubbing, cyanosis, or edema  NEUROLOGY: AAOx3, non-focal  PSYCH: calm, appropriate mood  SKIN: No rashes or lesions, warm intact    LABS:                        10.2   4.32  )-----------( 416      ( 18 Dec 2023 05:17 )             32.1                     RADIOLOGY & ADDITIONAL TESTS:  Results Reviewed:   Imaging Personally Reviewed:  Electrocardiogram Personally Reviewed:    COORDINATION OF CARE:  Care Discussed with Consultants/Other Providers [Y/N]:  Prior or Outpatient Records Reviewed [Y/N]:

## 2023-12-18 NOTE — DISCHARGE NOTE NURSING/CASE MANAGEMENT/SOCIAL WORK - NSDCPEFALRISK_GEN_ALL_CORE
For information on Fall & Injury Prevention, visit: [email or web address] OR  [email or web address] OR  https://www.cdc.gov/steadi/patient.html For information on Fall & Injury Prevention, visit: [email or web address]-[email or web address]-mobility OR  [email or web address]-[email or web address]-injury OR  https://www.cdc.gov/steadi/patient.html

## 2023-12-18 NOTE — DISCHARGE NOTE PROVIDER - NSDCFUSCHEDAPPT_GEN_ALL_CORE_FT
Aster Jones  [facility]  ENDOCRIN [address] [place] [address]  Scheduled Appointment: 12/19/2023     Aster Jones  [facility]  ENDOCRIN [address] [place] [address]  Scheduled Appointment: 12/19/2023

## 2023-12-18 NOTE — DISCHARGE NOTE PROVIDER - NSDCMRMEDTOKEN_GEN_ALL_CORE_FT
Aspirin Enteric Coated 81 mg oral delayed release tablet: 1 tab(s) orally once a day  Hospital     atorvastatin 80 mg oral tablet: 1 tab(s) orally once a day (at bedtime)  Hospital  carBAMazepine 100 mg oral tablet, extended release: 3 tab(s) orally 2 times a day  famotidine 20 mg oral tablet: 1 tab(s) orally 2 times a day  HOME HOSPITAL   fluticasone 50 mcg/inh nasal spray: 1 spray(s) nasal once a day  HOME  latanoprost 0.005% ophthalmic solution: 1 drop(s) to each affected eye once a day (at bedtime)  HOME   MetFORMIN (Eqv-Fortamet) 500 mg oral tablet, extended release: 1 tab(s) orally once a day  metoprolol tartrate 25 mg oral tablet: 1 tab(s) orally once a day HOME /HOSPITAL  Occupational Therapy: Occupational Therapy  Please evaluate and treat  Dx: Physical debility after complicated open cholecystectomy  Frequency: 2-3 times per week  Duration: 6 weeks  Precautions: Universal and post-surgical  Physical Therapy: Physical Therapy  Please evaluate and treat  Dx: Physical debility after complicated open cholecystectomy  Frequency: 2-3 times per week  Duration: 6 weeks  Precautions: Universal and post-surgical   aspirin 81 mg oral delayed release tablet: 1 tab(s) orally once a day  atorvastatin 80 mg oral tablet: 1 tab(s) orally once a day (at bedtime)  carBAMazepine 100 mg oral tablet, extended release: 3 tab(s) orally 2 times a day  famotidine 20 mg oral tablet: 1 tab(s) orally once a day  latanoprost 0.005% ophthalmic solution: 1 drop(s) to each affected eye once a day (at bedtime)  midodrine 5 mg oral tablet: 1 tab(s) orally  Occupational Therapy: Occupational Therapy  Please evaluate and treat  Dx: Physical debility after complicated open cholecystectomy  Frequency: 2-3 times per week  Duration: 6 weeks  Precautions: Universal and post-surgical  pantoprazole 40 mg oral delayed release tablet: 1 tab(s) orally once a day  Physical Therapy: Physical Therapy  Please evaluate and treat  Dx: Physical debility after complicated open cholecystectomy  Frequency: 2-3 times per week  Duration: 6 weeks  Precautions: Universal and post-surgical  polyethylene glycol 3350 oral powder for reconstitution: 17 gram(s) orally once a day  senna leaf extract oral tablet: 1 tab(s) orally 2 times a day  simethicone 80 mg oral tablet, chewable: 1 tab(s) orally 3 times a day As needed Gas   aspirin 81 mg oral delayed release tablet: 1 tab(s) orally once a day  atorvastatin 80 mg oral tablet: 1 tab(s) orally once a day (at bedtime)  carBAMazepine 100 mg oral tablet, extended release: 3 tab(s) orally 2 times a day  famotidine 20 mg oral tablet: 1 tab(s) orally once a day  latanoprost 0.005% ophthalmic solution: 1 drop(s) to each affected eye once a day (at bedtime)  midodrine 5 mg oral tablet: 1 tab(s) orally 2 times a day at 7 AM and 1 PM  Occupational Therapy: Occupational Therapy  Please evaluate and treat  Dx: Physical debility after complicated open cholecystectomy  Frequency: 2-3 times per week  Duration: 6 weeks  Precautions: Universal and post-surgical  pantoprazole 40 mg oral delayed release tablet: 1 tab(s) orally once a day  Physical Therapy: Physical Therapy  Please evaluate and treat  Dx: Physical debility after complicated open cholecystectomy  Frequency: 2-3 times per week  Duration: 6 weeks  Precautions: Universal and post-surgical  polyethylene glycol 3350 oral powder for reconstitution: 17 gram(s) orally once a day  senna leaf extract oral tablet: 1 tab(s) orally 2 times a day  simethicone 80 mg oral tablet, chewable: 1 tab(s) orally 3 times a day As needed Gas   aspirin 81 mg oral delayed release tablet: 1 tab(s) orally once a day  atorvastatin 80 mg oral tablet: 1 tab(s) orally once a day (at bedtime)  carBAMazepine 100 mg oral tablet, extended release: 3 tab(s) orally 2 times a day  famotidine 20 mg oral tablet: 1 tab(s) orally once a day  latanoprost 0.005% ophthalmic solution: 1 drop(s) to each affected eye once a day (at bedtime)  midodrine 5 mg oral tablet: 1 tab(s) orally 2 times a day at 7 AM and 1 PM  pantoprazole 40 mg oral delayed release tablet: 1 tab(s) orally once a day  polyethylene glycol 3350 oral powder for reconstitution: 17 gram(s) orally once a day  senna leaf extract oral tablet: 1 tab(s) orally 2 times a day  simethicone 80 mg oral tablet, chewable: 1 tab(s) orally 3 times a day As needed Gas

## 2023-12-18 NOTE — PROGRESS NOTE ADULT - ASSESSMENT
Mr. Benjamin Rios is a 68 year old right handed male patient with past medical history of sinus node disorder s/p PPM, HTN, HLD, cerebral aneurysm s/p coiling, CVA w/ residual L sided weakness, seizure disorder, type 2 diabetes on metformin and gastritis who is admitted for Acute Inpatient Rehabilitation with a multidisciplinary rehab program at [facility] with functional impairments in ADLs and mobility secondary to a complaint of worsening abdominal pain with imaging remarkable for distended gallbladder with wall thickening and pericholecystic fat stranding and hemangioma treated surgically with a lap to open cholecystectomy for acute cholecystitis on 11/30 complicated by drainage of a small amount of bile and a large amount of pus from gallbladder and subsequent requirement of ICU admission due to post-operative hypotension requiring pressors and acute blood loss anemia improved with transfusion of one unit of PRBCs. Additionally, patient was found to have bile duct leakage, requiring ERCP with stent on 12/5/23.      Debility 2/2 acute cholecystitis   - complicated by bile duct leak (s/p ERCP with stent on 12/5)  - s/p 7 days of zosyn  - S/P ICU for post-operative hypotension  - Impaired ADLs and mobility  - Need for assistance with personal care   - Continue comprehensive rehab program of PT/OT - 3 hours a day, 5 days a week. P&O as needed   - Fall precautions  - outpatient GI f/u with Dr. Dorsey  - Repeat ERCP & stent removal in 4-8 weeks    COVID+  - patient tested positive for COVID on admission to rehab (12/7)  - isolation precautions --> off isolation (12/18)   - completed Remdesivir (12/13)    Acute Blood Loss Anemia (improved)  - s/p 1 unit PRBC transfusion  - monitor H/H, 10.2/32.1 (12/18)     Seizure Disorder  - continue home carbamazepine 300 mg BID    h/o CVA/ HLD  - residual L sided weakness  - continue atorvastatin 80 mg QHS  - continue ASA 81 mg QD    HTN  - Home med: metoprolol 25 mg BID  - required pressors in ICU post-op  - stable off anti-hypertensives  - Monitor BP, (12/18) 108/65 - 124/61    Type 2 Diabetes Mellitus  - last HbA1c 6.1  - low dose SSI  - Monitor fingersticks  - on metformin at home    Mood / Cognition  - Neuropsychology consult PRN    Sleep  - Maintain quiet hours and a low stim environment.   - Melatonin 6 mg QHS    GI / Bowel  - Senna qHS PRN  - Miralax Daily  - GI ppx: famotidine 20 mg QD     / Bladder  - BS Q8H and SC >400cc     Skin / Pressure injury  - Skin assessment on admission performed: abdominal incision with staple,   - Pressure Injury/Skin: OOB to chair, PT/OT  - nursing to monitor skin q Shift    Diet:  - Diet Consistency: DASH, regular  - Nutrition consult    DVT prophylaxis:   - Heparin 5000u Q8H    Outpatient Follow-up:    Easton Carroll  Internal Medicine  [address] [place] [address]  Phone: [phone]  Fax: [phone]  Follow Up Time:     Francisco Dorsey  Gastroenterology  [address] [place] [address], [address]  [place] [address]  Phone: [phone]  Fax: [phone]  Follow Up Time    --------------- Mr. Benjamin Rios is a 68 year old right handed male patient with past medical history of sinus node disorder s/p PPM, HTN, HLD, cerebral aneurysm s/p coiling, CVA w/ residual L sided weakness, seizure disorder, type 2 diabetes on metformin and gastritis who is admitted for Acute Inpatient Rehabilitation with a multidisciplinary rehab program at [facility] with functional impairments in ADLs and mobility secondary to a complaint of worsening abdominal pain with imaging remarkable for distended gallbladder with wall thickening and pericholecystic fat stranding and hemangioma treated surgically with a lap to open cholecystectomy for acute cholecystitis on 11/30 complicated by drainage of a small amount of bile and a large amount of pus from gallbladder and subsequent requirement of ICU admission due to post-operative hypotension requiring pressors and acute blood loss anemia improved with transfusion of one unit of PRBCs. Additionally, patient was found to have bile duct leakage, requiring ERCP with stent on 12/5/23.      Debility 2/2 acute cholecystitis   - complicated by bile duct leak (s/p ERCP with stent on 12/5)  - s/p 7 days of zosyn  - S/P ICU for post-operative hypotension  - Impaired ADLs and mobility  - Need for assistance with personal care   - Continue comprehensive rehab program of PT/OT - 3 hours a day, 5 days a week. P&O as needed   - Fall precautions  - outpatient GI f/u with Dr. Dorsey  - Repeat ERCP & stent removal in 4-8 weeks    COVID+  - patient tested positive for COVID on admission to rehab (12/7)  - isolation precautions --> off isolation (12/18)   - completed Remdesivir (12/13)    Acute Blood Loss Anemia (improved)  - s/p 1 unit PRBC transfusion  - monitor H/H, 10.2/32.1 (12/18)     Seizure Disorder  - continue home carbamazepine 300 mg BID    h/o CVA/ HLD  - residual L sided weakness  - continue atorvastatin 80 mg QHS  - continue ASA 81 mg QD    HTN  - Home med: metoprolol 25 mg BID  - required pressors in ICU post-op  - stable off anti-hypertensives  - Monitor BP, (12/18) 108/65 - 124/61    Type 2 Diabetes Mellitus  - last HbA1c 6.1  - low dose SSI  - Monitor fingersticks  - on metformin at home    Mood / Cognition  - Neuropsychology consult PRN    Sleep  - Maintain quiet hours and a low stim environment.   - Melatonin 6 mg QHS    GI / Bowel  - Senna qHS PRN  - Miralax Daily  - GI ppx: famotidine 20 mg QD     / Bladder  - BS Q8H and SC >400cc     Skin / Pressure injury  - Skin assessment on admission performed: abdominal incision with staple,   - Pressure Injury/Skin: OOB to chair, PT/OT  - nursing to monitor skin q Shift    Diet:  - Diet Consistency: DASH, regular  - Nutrition consult    DVT prophylaxis:   - Heparin 5000u Q8H    Outpatient Follow-up:    Easton Carroll  Internal Medicine  [address] [place] [address]  Phone: [phone]  Fax: [phone]  Follow Up Time:     Francisco Dorsey  Gastroenterology  [address] [place] [address], [address]  [place] [address]  Phone: [phone]  Fax: [phone]  Follow Up Time    ---------------

## 2023-12-18 NOTE — DISCHARGE NOTE PROVIDER - DETAILS OF MALNUTRITION DIAGNOSIS/DIAGNOSES
This patient has been assessed with a concern for Malnutrition and was treated during this hospitalization for the following Nutrition diagnosis/diagnoses:     -  12/08/2023: Moderate protein-calorie malnutrition

## 2023-12-18 NOTE — DISCHARGE NOTE PROVIDER - CARE PROVIDER_API CALL
Easton Carroll  Internal Medicine  [address] [place] [address]  Phone: [phone]  Fax: [phone]  Follow Up Time: 2 weeks    Francisco Dorsey  Gastroenterology  [address] [facility], [address]  [place] [address]  Phone: [phone]  Fax: [phone]  Follow Up Time: 2 weeks    Alexander Cordova  Physical/Rehab Medicine  [address] [place] [address]  Phone: [phone]  Fax: [phone]  Follow Up Time: 1 month   Easton Carroll  Internal Medicine  [address] [place] [address]  Phone: [phone]  Fax: [phone]  Follow Up Time: 2 weeks    Francisco Dorsey  Gastroenterology  [address] [facility], [address]  [place] [address]  Phone: [phone]  Fax: [phone]  Follow Up Time: 2 weeks    Alexander Cordova  Physical/Rehab Medicine  [address] [place] [address]  Phone: [phone]  Fax: [phone]  Follow Up Time: 1 month

## 2023-12-18 NOTE — DISCHARGE NOTE NURSING/CASE MANAGEMENT/SOCIAL WORK - PATIENT PORTAL LINK FT
You can access the FollowMyHealth Patient Portal offered by [facility] by registering at the following website: [email or web address]. By joining [employer]’s FollowMyHealth portal, you will also be able to view your health information using other applications (apps) compatible with our system. You can access the FollowMyHealth Patient Portal offered by [facility] by registering at the following website: [email or web address]. By joining [employer]’s FollowMyHealth portal, you will also be able to view your health information using other applications (apps) compatible with our system.

## 2023-12-18 NOTE — PROGRESS NOTE ADULT - SUBJECTIVE AND OBJECTIVE BOX
HPI:  Mr. Benjamin Rios is a 68 year old right handed male patient with past medical history of sinus node disorder s/p PPM, HTN, HLD, cerebral aneurysm s/p coiling, CVA w/ residual L sided weakness, seizure disorder, type 2 diabetes on metformin and gastritis admitted to [facility] on 11/28 for worsening abdominal pain since 11/24. CT chest abd/pelvis showed distended gallbladder with gallbladder wall thickening and pericholecystic fat stranding and hemangioma in T7 vertebrae. Patient underwent lap to open cholecystectomy for acute cholecystitis on 11/30. Procedure was complicated by drainage of a small amount of bile and a large amount of pus from gallbladder. Patient was upgraded to ICU due to post-operative hypotension requiring pressors then downgraded to surgical floor. Hospital course further complicated by acute blood loss anemia, improved s/p 1 unit PRBCs. Patient also found to have bile duct leakage, requiring ERCP with stent on 12/5/23. Patient requires follow up with GI and a follow up ERCP in 4-8 weeks. Patient evaluated by PT/OT and was recommended for acute inpatient rehab. Patient is medically stable for discharge to acute rehab on 12/7.  (07 Dec 2023 13:33)    TDD: 12/19 Home  ___________________________________________________________________________    SUBJECTIVE/ROS  Patient was seen and evaluated at bedside today, comfortable in his bed   Reported no overnight events and is in no acute distress.  Off isolation for COVID-19 status.  Staples discontinued today per General Surgery.  Patient is eager to continue participation on the recommended rehabilitation program.  ROS: Denies any CP, SOB, AUGUSTINE, palpitations, fever, chills, body aches, cough, congestion, or any other symptoms at this time.   ___________________________________________________________________________    Vital Signs Last 24 Hrs  T(C): 36.6 (18 Dec 2023 08:31), Max: 36.9 (17 Dec 2023 19:51)  T(F): 97.9 (18 Dec 2023 08:31), Max: 98.5 (17 Dec 2023 19:51)  HR: 76 (18 Dec 2023 08:31) (69 - 76)  BP: 124/61 (18 Dec 2023 08:31) (108/65 - 124/61)  RR: 16 (18 Dec 2023 08:31) (16 - 16)  SpO2: 96% (18 Dec 2023 08:31) (96% - 98%)    ___________________________________________________________________________     LAB                        10.2   4.32  )-----------( 416      ( 18 Dec 2023 05:17 )             32.1     12-18    137  |  103  |  18  ----------------------------<  130<H>  4.4   |  25  |  0.96    Ca    8.6      18 Dec 2023 05:17    TPro  6.9  /  Alb  2.2<L>  /  TBili  0.3  /  DBili  x   /  AST  38  /  ALT  55<H>  /  AlkPhos  157<H>  12-18    LIVER FUNCTIONS - ( 18 Dec 2023 05:17 )  Alb: 2.2 g/dL / Pro: 6.9 g/dL / ALK PHOS: 157 U/L / ALT: 55 U/L / AST: 38 U/L / GGT: x           ___________________________________________________________________________    MEDICATIONS  (STANDING):  aspirin enteric coated 81 milliGRAM(s) Oral daily  atorvastatin 80 milliGRAM(s) Oral at bedtime  carBAMazepine ER Tablet 300 milliGRAM(s) Oral two times a day  dextrose 5%. 1000 milliLiter(s) (100 mL/Hr) IV Continuous <Continuous>  dextrose 5%. 1000 milliLiter(s) (50 mL/Hr) IV Continuous <Continuous>  dextrose 50% Injectable 25 Gram(s) IV Push once  dextrose 50% Injectable 12.5 Gram(s) IV Push once  dextrose 50% Injectable 25 Gram(s) IV Push once  famotidine    Tablet 20 milliGRAM(s) Oral daily  glucagon  Injectable 1 milliGRAM(s) IntraMuscular once  heparin   Injectable 5000 Unit(s) SubCutaneous every 8 hours  lactulose Retention Enema 200 Gram(s) Rectal once  latanoprost 0.005% Ophthalmic Solution 1 Drop(s) Both EYES at bedtime  melatonin 6 milliGRAM(s) Oral at bedtime  midodrine. 5 milliGRAM(s) Oral <User Schedule>  pantoprazole    Tablet 40 milliGRAM(s) Oral daily  polyethylene glycol 3350 17 Gram(s) Oral daily  senna 1 Tablet(s) Oral two times a day    MEDICATIONS  (PRN):  albuterol    90 MICROgram(s) HFA Inhaler 1 Puff(s) Inhalation every 4 hours PRN Shortness of Breath and/or Wheezing  artificial  tears Solution 1 Drop(s) Both EYES two times a day PRN Dry Eyes  dextrose Oral Gel 15 Gram(s) Oral once PRN Blood Glucose LESS THAN 70 milliGRAM(s)/deciliter  guaiFENesin Oral Liquid (Sugar-Free) 200 milliGRAM(s) Oral every 6 hours PRN Cough  simethicone 80 milliGRAM(s) Chew three times a day PRN Gas  ___________________________________________________________________________    PHYSICAL EXAM:  Gen - NAD, Comfortable  HEENT - NCAT, EOMI, CRISTY  Pulm - CTAB,  No crackles  Cardiovascular - RRR, S1S2  Abdomen - NT, ND +BS   Extremities - No clubbing, no cyanosis, no peripheral edema, no calf tenderness  Neuro-     Cognitive - Awake, Alert, and Oriented      Motor -                     LEFT    UE - 4/5                    RIGHT UE - 5/5                    LEFT    LE - HF 5/5, KE 5/5, DF/PF 2/5- left foot drop                    RIGHT LE -  5/5        Sensory - Intact to LT     Reflexes - DTR Intact, No primitive reflexive  Psychiatric - Mood stable, Affect WNL  Skin: abdominal incisions in RUQ and umbilicus clean and dry with no staples.    ___________________________________________________________________________ HPI:  Mr. Benjamin Rios is a 68 year old right handed male patient with past medical history of sinus node disorder s/p PPM, HTN, HLD, cerebral aneurysm s/p coiling, CVA w/ residual L sided weakness, seizure disorder, type 2 diabetes on metformin and gastritis admitted to [facility] on 11/28 for worsening abdominal pain since 11/24. CT chest abd/pelvis showed distended gallbladder with gallbladder wall thickening and pericholecystic fat stranding and hemangioma in T7 vertebrae. Patient underwent lap to open cholecystectomy for acute cholecystitis on 11/30. Procedure was complicated by drainage of a small amount of bile and a large amount of pus from gallbladder. Patient was upgraded to ICU due to post-operative hypotension requiring pressors then downgraded to surgical floor. Hospital course further complicated by acute blood loss anemia, improved s/p 1 unit PRBCs. Patient also found to have bile duct leakage, requiring ERCP with stent on 12/5/23. Patient requires follow up with GI and a follow up ERCP in 4-8 weeks. Patient evaluated by PT/OT and was recommended for acute inpatient rehab. Patient is medically stable for discharge to acute rehab on 12/7.  (07 Dec 2023 13:33)    TDD: 12/19 Home  ___________________________________________________________________________    SUBJECTIVE/ROS  Patient was seen and evaluated at bedside today, comfortable in his bed   Reported no overnight events and is in no acute distress.  Off isolation for COVID-19 status.  Staples discontinued today per General Surgery.  Patient is eager to continue participation on the recommended rehabilitation program.  ROS: Denies any CP, SOB, AUGUSTINE, palpitations, fever, chills, body aches, cough, congestion, or any other symptoms at this time.   ___________________________________________________________________________    Vital Signs Last 24 Hrs  T(C): 36.6 (18 Dec 2023 08:31), Max: 36.9 (17 Dec 2023 19:51)  T(F): 97.9 (18 Dec 2023 08:31), Max: 98.5 (17 Dec 2023 19:51)  HR: 76 (18 Dec 2023 08:31) (69 - 76)  BP: 124/61 (18 Dec 2023 08:31) (108/65 - 124/61)  RR: 16 (18 Dec 2023 08:31) (16 - 16)  SpO2: 96% (18 Dec 2023 08:31) (96% - 98%)    ___________________________________________________________________________     LAB                        10.2   4.32  )-----------( 416      ( 18 Dec 2023 05:17 )             32.1     12-18    137  |  103  |  18  ----------------------------<  130<H>  4.4   |  25  |  0.96    Ca    8.6      18 Dec 2023 05:17    TPro  6.9  /  Alb  2.2<L>  /  TBili  0.3  /  DBili  x   /  AST  38  /  ALT  55<H>  /  AlkPhos  157<H>  12-18    LIVER FUNCTIONS - ( 18 Dec 2023 05:17 )  Alb: 2.2 g/dL / Pro: 6.9 g/dL / ALK PHOS: 157 U/L / ALT: 55 U/L / AST: 38 U/L / GGT: x           ___________________________________________________________________________    MEDICATIONS  (STANDING):  aspirin enteric coated 81 milliGRAM(s) Oral daily  atorvastatin 80 milliGRAM(s) Oral at bedtime  carBAMazepine ER Tablet 300 milliGRAM(s) Oral two times a day  dextrose 5%. 1000 milliLiter(s) (100 mL/Hr) IV Continuous <Continuous>  dextrose 5%. 1000 milliLiter(s) (50 mL/Hr) IV Continuous <Continuous>  dextrose 50% Injectable 25 Gram(s) IV Push once  dextrose 50% Injectable 12.5 Gram(s) IV Push once  dextrose 50% Injectable 25 Gram(s) IV Push once  famotidine    Tablet 20 milliGRAM(s) Oral daily  glucagon  Injectable 1 milliGRAM(s) IntraMuscular once  heparin   Injectable 5000 Unit(s) SubCutaneous every 8 hours  lactulose Retention Enema 200 Gram(s) Rectal once  latanoprost 0.005% Ophthalmic Solution 1 Drop(s) Both EYES at bedtime  melatonin 6 milliGRAM(s) Oral at bedtime  midodrine. 5 milliGRAM(s) Oral <User Schedule>  pantoprazole    Tablet 40 milliGRAM(s) Oral daily  polyethylene glycol 3350 17 Gram(s) Oral daily  senna 1 Tablet(s) Oral two times a day    MEDICATIONS  (PRN):  albuterol    90 MICROgram(s) HFA Inhaler 1 Puff(s) Inhalation every 4 hours PRN Shortness of Breath and/or Wheezing  artificial  tears Solution 1 Drop(s) Both EYES two times a day PRN Dry Eyes  dextrose Oral Gel 15 Gram(s) Oral once PRN Blood Glucose LESS THAN 70 milliGRAM(s)/deciliter  guaiFENesin Oral Liquid (Sugar-Free) 200 milliGRAM(s) Oral every 6 hours PRN Cough  simethicone 80 milliGRAM(s) Chew three times a day PRN Gas  ___________________________________________________________________________    PHYSICAL EXAM:  Gen - NAD, Comfortable  HEENT - NCAT, EOMI, CRISTY  Pulm - CTAB,  No crackles  Cardiovascular - RRR, S1S2  Abdomen - NT, ND +BS   Extremities - No clubbing, no cyanosis, no peripheral edema, no calf tenderness  Neuro-     Cognitive - Awake, Alert, and Oriented      Motor -                     LEFT    UE - 4/5                    RIGHT UE - 5/5                    LEFT    LE - HF 5/5, KE 5/5, DF/PF 2/5- left foot drop                    RIGHT LE -  5/5        Sensory - Intact to LT     Reflexes - DTR Intact, No primitive reflexive  Psychiatric - Mood stable, Affect WNL  Skin: abdominal incisions in RUQ and umbilicus clean and dry with no staples.    ___________________________________________________________________________

## 2023-12-18 NOTE — DISCHARGE NOTE PROVIDER - CARE PROVIDERS DIRECT ADDRESSES
,DirectAddress_Unknown,[email or web address],DirectAddress_Unknown ,DirectAddress_Unknown,[email or web address],DirectAddress_Unknown

## 2023-12-18 NOTE — DISCHARGE NOTE PROVIDER - PROVIDER TOKENS
PROVIDER:[TOKEN:[[id number]],FOLLOWUP:[2 weeks]],PROVIDER:[TOKEN:[[id number]],FOLLOWUP:[2 weeks]],PROVIDER:[TOKEN:[[id number]],FOLLOWUP:[1 month]] PROVIDER:[TOKEN:[[id number]],FOLLOWUP:[2 weeks]],PROVIDER:[TOKEN:[[id number]],FOLLOWUP:[2 weeks]],PROVIDER:[TOKEN:[[id number]],FOLLOWUP:[1 month]]

## 2023-12-18 NOTE — DISCHARGE NOTE PROVIDER - NSDCCPCAREPLAN_GEN_ALL_CORE_FT
PRINCIPAL DISCHARGE DIAGNOSIS  Diagnosis: Debility  Assessment and Plan of Treatment: You were admitted to [facility] due to weakness and functional impairments after your acute cholecystitis. You participated in daily therapies and made functional gains throughout your stay. Take your medications as prescribed. Follow up with neurology, PM&R, and your primary care doctor upon discharge.      SECONDARY DISCHARGE DIAGNOSES  Diagnosis: Acute cholecystitis  Assessment and Plan of Treatment: Please follow up with GI and primary care doctor upon discharge. You are recommended to undergo repeat ERCP in 4-8 weeks.     PRINCIPAL DISCHARGE DIAGNOSIS  Diagnosis: Debility  Assessment and Plan of Treatment: You were admitted to [facility] due to weakness and functional impairments after your acute cholecystitis. You participated in daily therapies and made functional gains throughout your stay. Take your medications as prescribed. Follow up with neurology, PM&R, and your primary care doctor upon discharge.      SECONDARY DISCHARGE DIAGNOSES  Diagnosis: Acute cholecystitis  Assessment and Plan of Treatment: Please follow up with GI and primary care doctor upon discharge. You are recommended to undergo repeat ERCP in 4-8 weeks.     PRINCIPAL DISCHARGE DIAGNOSIS  Diagnosis: Debility  Assessment and Plan of Treatment: You were admitted to [facility] due to weakness and functional impairments after your acute cholecystitis. You participated in daily therapies and made functional gains throughout your stay. Take your medications as prescribed. Follow up with neurology, PM&R, and your primary care doctor upon discharge.      SECONDARY DISCHARGE DIAGNOSES  Diagnosis: Acute cholecystitis  Assessment and Plan of Treatment: Please follow up with GI and primary care doctor upon discharge. You are recommended to undergo repeat ERCP in 4-8 weeks.    Diagnosis: Type 2 diabetes mellitus  Assessment and Plan of Treatment: You have a history of diabetes which is a disease which affects how the body uses blood sugar (glucose). Glucose is an important source of energy for the cells that make up the muscles and tissues. It's also the brain's main source of fuel. Your metformin was held during your hospitalization and you did not require additional coverage with insulin. Please follow up with your primary care doctor to discuss restarting metformin outpatient.     PRINCIPAL DISCHARGE DIAGNOSIS  Diagnosis: Debility  Assessment and Plan of Treatment: You were admitted to [facility] due to weakness and functional impairments after your acute cholecystitis. You participated in daily therapies and made functional gains throughout your stay. Take your medications as prescribed. Follow up with neurology, PM&R, and your primary care doctor upon discharge.      SECONDARY DISCHARGE DIAGNOSES  Diagnosis: Acute cholecystitis  Assessment and Plan of Treatment: Please follow up with GI and primary care doctor upon discharge. You are recommended to undergo repeat ERCP in 4-8 weeks.    Diagnosis: Type 2 diabetes mellitus  Assessment and Plan of Treatment: You have a history of diabetes which is a disease which affects how the body uses blood sugar (glucose). Glucose is an important source of energy for the cells that make up the muscles and tissues. It's also the brain's main source of fuel. Your metformin was held during your hospitalization and you did not require additional coverage with insulin. Please follow up with your primary care doctor to discuss restarting metformin outpatient.     PRINCIPAL DISCHARGE DIAGNOSIS  Diagnosis: Debility  Assessment and Plan of Treatment: You were admitted to [facility] due to weakness and functional impairments after your acute cholecystitis. You participated in daily therapies and made functional gains throughout your stay. Take your medications as prescribed. Follow up with neurology, PM&R, and your primary care doctor upon discharge.      SECONDARY DISCHARGE DIAGNOSES  Diagnosis: Acute cholecystitis  Assessment and Plan of Treatment: Please follow up with GI and primary care doctor upon discharge. You are recommended to undergo repeat ERCP in 4-8 weeks.    Diagnosis: Type 2 diabetes mellitus  Assessment and Plan of Treatment: You have a history of diabetes which is a disease which affects how the body uses blood sugar (glucose). Glucose is an important source of energy for the cells that make up the muscles and tissues. It's also the brain's main source of fuel. Your metformin was held during your hospitalization and you did not require additional coverage with insulin. Please follow up with your primary care doctor to discuss restarting metformin outpatient.    Diagnosis: Orthostatic hypotension  Assessment and Plan of Treatment: You were found to have orthostatic hypotension, this means that your blood pressure falls dependent on the position of your body. When you stand up, your body takes a few seconds to adjust in order to keep your blood pressure at a normal level. When your blood pressure does not adjust quickly, you can develop symptoms such as dizziness, lightheadedness, and sometimes fainting. Orthostatic hypotension can have causes that aren't due to underlying disease, such as dehydration, standing up too quickly, medication side effects, or aging. In order to prevent this, you should maintain good hydration. Stand up slowly and give your body time to adjust before you start walking. Continue taking your medications as prescribed. Follow up with your primary care physician to discuss weaning off midodrine.     PRINCIPAL DISCHARGE DIAGNOSIS  Diagnosis: Debility  Assessment and Plan of Treatment: You were admitted to [facility] due to weakness and functional impairments after your acute cholecystitis. You participated in daily therapies and made functional gains throughout your stay. Take your medications as prescribed. Follow up with neurology, PM&R, and your primary care doctor upon discharge.      SECONDARY DISCHARGE DIAGNOSES  Diagnosis: Acute cholecystitis  Assessment and Plan of Treatment: Please follow up with GI and primary care doctor upon discharge. You are recommended to undergo repeat ERCP in 4-8 weeks.    Diagnosis: Type 2 diabetes mellitus  Assessment and Plan of Treatment: You have a history of diabetes which is a disease which affects how the body uses blood sugar (glucose). Glucose is an important source of energy for the cells that make up the muscles and tissues. It's also the brain's main source of fuel. Your metformin was held during your hospitalization and you did not require additional coverage with insulin. Please follow up with your primary care doctor to discuss restarting metformin outpatient.    Diagnosis: Orthostatic hypotension  Assessment and Plan of Treatment: You were found to have orthostatic hypotension, this means that your blood pressure falls dependent on the position of your body. When you stand up, your body takes a few seconds to adjust in order to keep your blood pressure at a normal level. When your blood pressure does not adjust quickly, you can develop symptoms such as dizziness, lightheadedness, and sometimes fainting. Orthostatic hypotension can have causes that aren't due to underlying disease, such as dehydration, standing up too quickly, medication side effects, or aging. In order to prevent this, you should maintain good hydration. Stand up slowly and give your body time to adjust before you start walking. Continue taking your medications as prescribed. Follow up with your primary care physician to discuss weaning off midodrine.

## 2023-12-19 VITALS
DIASTOLIC BLOOD PRESSURE: 77 MMHG | OXYGEN SATURATION: 96 % | TEMPERATURE: 98 F | RESPIRATION RATE: 16 BRPM | HEART RATE: 69 BPM | SYSTOLIC BLOOD PRESSURE: 130 MMHG

## 2023-12-19 PROCEDURE — 97167 OT EVAL HIGH COMPLEX 60 MIN: CPT

## 2023-12-19 PROCEDURE — 97530 THERAPEUTIC ACTIVITIES: CPT

## 2023-12-19 PROCEDURE — 85025 COMPLETE CBC W/AUTO DIFF WBC: CPT

## 2023-12-19 PROCEDURE — 99239 HOSP IP/OBS DSCHRG MGMT >30: CPT | Mod: GC

## 2023-12-19 PROCEDURE — 99232 SBSQ HOSP IP/OBS MODERATE 35: CPT

## 2023-12-19 PROCEDURE — 36415 COLL VENOUS BLD VENIPUNCTURE: CPT

## 2023-12-19 PROCEDURE — 80053 COMPREHEN METABOLIC PANEL: CPT

## 2023-12-19 PROCEDURE — 97535 SELF CARE MNGMENT TRAINING: CPT

## 2023-12-19 PROCEDURE — 97163 PT EVAL HIGH COMPLEX 45 MIN: CPT

## 2023-12-19 PROCEDURE — 97110 THERAPEUTIC EXERCISES: CPT

## 2023-12-19 PROCEDURE — 82962 GLUCOSE BLOOD TEST: CPT

## 2023-12-19 PROCEDURE — 85027 COMPLETE CBC AUTOMATED: CPT

## 2023-12-19 PROCEDURE — 87635 SARS-COV-2 COVID-19 AMP PRB: CPT

## 2023-12-19 PROCEDURE — 97112 NEUROMUSCULAR REEDUCATION: CPT

## 2023-12-19 PROCEDURE — 97116 GAIT TRAINING THERAPY: CPT

## 2023-12-19 RX ADMIN — Medication 81 MILLIGRAM(S): at 12:02

## 2023-12-19 RX ADMIN — HEPARIN SODIUM 5000 UNIT(S): 5000 INJECTION INTRAVENOUS; SUBCUTANEOUS at 06:30

## 2023-12-19 RX ADMIN — PANTOPRAZOLE SODIUM 40 MILLIGRAM(S): 20 TABLET, DELAYED RELEASE ORAL at 12:02

## 2023-12-19 RX ADMIN — Medication 300 MILLIGRAM(S): at 06:30

## 2023-12-19 RX ADMIN — FAMOTIDINE 20 MILLIGRAM(S): 10 INJECTION INTRAVENOUS at 12:02

## 2023-12-19 RX ADMIN — MIDODRINE HYDROCHLORIDE 5 MILLIGRAM(S): 2.5 TABLET ORAL at 06:30

## 2023-12-19 RX ADMIN — SENNA PLUS 1 TABLET(S): 8.6 TABLET ORAL at 06:30

## 2023-12-19 RX ADMIN — MIDODRINE HYDROCHLORIDE 5 MILLIGRAM(S): 2.5 TABLET ORAL at 12:03

## 2023-12-19 RX ADMIN — POLYETHYLENE GLYCOL 3350 17 GRAM(S): 17 POWDER, FOR SOLUTION ORAL at 12:03

## 2023-12-19 NOTE — PROGRESS NOTE ADULT - PROVIDER SPECIALTY LIST ADULT
Hospitalist
Physiatry
Surgery
Hospitalist
Physiatry
Rehab Medicine
Hospitalist
Physiatry
Hospitalist
Physiatry
Physiatry
Hospitalist
Physiatry
Rehab Medicine
Physiatry
Hospitalist

## 2023-12-19 NOTE — PROGRESS NOTE ADULT - ASSESSMENT
Mr. Benjamin Rios is a 68 year old right handed male patient with past medical history of sinus node disorder s/p PPM, HTN, HLD, cerebral aneurysm s/p coiling, CVA w/ residual L sided weakness, seizure disorder, type 2 diabetes on metformin and gastritis who is admitted for Acute Inpatient Rehabilitation with a multidisciplinary rehab program at [facility] with functional impairments in ADLs and mobility secondary to a complaint of worsening abdominal pain with imaging remarkable for distended gallbladder with wall thickening and pericholecystic fat stranding and hemangioma treated surgically with a lap to open cholecystectomy for acute cholecystitis on 11/30 complicated by drainage of a small amount of bile and a large amount of pus from gallbladder and subsequent requirement of ICU admission due to post-operative hypotension requiring pressors and acute blood loss anemia improved with transfusion of one unit of PRBCs. Additionally, patient was found to have bile duct leakage, requiring ERCP with stent on 12/5/23.    Debility 2/2 acute cholecystitis   - complicated by bile duct leak (s/p ERCP with stent on 12/5)  - s/p 7 days of zosyn  - S/P ICU for post-operative hypotension  - Impaired ADLs and mobility  - Need for assistance with personal care   - Continue comprehensive rehab program of PT/OT - 3 hours a day, 5 days a week. P&O as needed   - Fall precautions  - outpatient GI f/u with Dr. Dorsey  - Repeat ERCP & stent removal in 4-8 weeks    COVID+  - patient tested positive for COVID on admission to rehab (12/7)  - isolation precautions --> off isolation (12/18)   - completed Remdesivir (12/13)    Acute Blood Loss Anemia (improved)  - s/p 1 unit PRBC transfusion  - monitor H/H, 10.2/32.1 (12/18)     Seizure Disorder  - continue home carbamazepine 300 mg BID    h/o CVA/ HLD  - residual L sided weakness  - continue atorvastatin 80 mg QHS  - continue ASA 81 mg QD    HTN  - Home med: metoprolol 25 mg BID  - required pressors in ICU post-op  - stable off anti-hypertensives  - Monitor BP (12/19) 108/70 - 130/77    Type 2 Diabetes Mellitus  - last HbA1c 6.1  - low dose SSI  - Monitor fingersticks  - on metformin at home    Mood / Cognition  - Neuropsychology consult PRN    Sleep  - Maintain quiet hours and a low stim environment.   - Melatonin 6 mg QHS    GI / Bowel  - Senna qHS PRN  - Miralax Daily  - GI ppx: famotidine 20 mg QD     / Bladder  - BS Q8H and SC >400cc     Skin / Pressure injury  - Skin assessment on admission performed: abdominal incision with staple,   - Pressure Injury/Skin: OOB to chair, PT/OT  - nursing to monitor skin q Shift    Diet:  - Diet Consistency: DASH, regular  - Nutrition consult    DVT prophylaxis:   - Heparin 5000u Q8H    Outpatient Follow-up:    Easotn Carroll  Internal Medicine  [address] [place] [address]  Phone: [phone]  Fax: [phone]  Follow Up Time:     Francisco Dorsey  Gastroenterology  [address] [place] [address], [address]  [place] [address]  Phone: [phone]  Fax: [phone]  Follow Up Time    --------------- Mr. Benjamin Rios is a 68 year old right handed male patient with past medical history of sinus node disorder s/p PPM, HTN, HLD, cerebral aneurysm s/p coiling, CVA w/ residual L sided weakness, seizure disorder, type 2 diabetes on metformin and gastritis who is admitted for Acute Inpatient Rehabilitation with a multidisciplinary rehab program at [facility] with functional impairments in ADLs and mobility secondary to a complaint of worsening abdominal pain with imaging remarkable for distended gallbladder with wall thickening and pericholecystic fat stranding and hemangioma treated surgically with a lap to open cholecystectomy for acute cholecystitis on 11/30 complicated by drainage of a small amount of bile and a large amount of pus from gallbladder and subsequent requirement of ICU admission due to post-operative hypotension requiring pressors and acute blood loss anemia improved with transfusion of one unit of PRBCs. Additionally, patient was found to have bile duct leakage, requiring ERCP with stent on 12/5/23.    Debility 2/2 acute cholecystitis   - complicated by bile duct leak (s/p ERCP with stent on 12/5)  - s/p 7 days of zosyn  - S/P ICU for post-operative hypotension  - Impaired ADLs and mobility  - Need for assistance with personal care   - Continue comprehensive rehab program of PT/OT - 3 hours a day, 5 days a week. P&O as needed   - Fall precautions  - outpatient GI f/u with Dr. Dorsey  - Repeat ERCP & stent removal in 4-8 weeks    COVID+  - patient tested positive for COVID on admission to rehab (12/7)  - isolation precautions --> off isolation (12/18)   - completed Remdesivir (12/13)    Acute Blood Loss Anemia (improved)  - s/p 1 unit PRBC transfusion  - monitor H/H, 10.2/32.1 (12/18)     Seizure Disorder  - continue home carbamazepine 300 mg BID    h/o CVA/ HLD  - residual L sided weakness  - continue atorvastatin 80 mg QHS  - continue ASA 81 mg QD    HTN  - Home med: metoprolol 25 mg BID  - required pressors in ICU post-op  - stable off anti-hypertensives  - Monitor BP (12/19) 108/70 - 130/77    Type 2 Diabetes Mellitus  - last HbA1c 6.1  - low dose SSI  - Monitor fingersticks  - on metformin at home    Mood / Cognition  - Neuropsychology consult PRN    Sleep  - Maintain quiet hours and a low stim environment.   - Melatonin 6 mg QHS    GI / Bowel  - Senna qHS PRN  - Miralax Daily  - GI ppx: famotidine 20 mg QD     / Bladder  - BS Q8H and SC >400cc     Skin / Pressure injury  - Skin assessment on admission performed: abdominal incision with staple,   - Pressure Injury/Skin: OOB to chair, PT/OT  - nursing to monitor skin q Shift    Diet:  - Diet Consistency: DASH, regular  - Nutrition consult    DVT prophylaxis:   - Heparin 5000u Q8H    Outpatient Follow-up:    Easton Carroll  Internal Medicine  [address] [place] [address]  Phone: [phone]  Fax: [phone]  Follow Up Time:     Francisco Dorsey  Gastroenterology  [address] [place] [address], [address]  [place] [address]  Phone: [phone]  Fax: [phone]  Follow Up Time    --------------- Mr. Benjamin Rios is a 68 year old right handed male patient with past medical history of sinus node disorder s/p PPM, HTN, HLD, cerebral aneurysm s/p coiling, CVA w/ residual L sided weakness, seizure disorder, type 2 diabetes on metformin and gastritis who is admitted for Acute Inpatient Rehabilitation with a multidisciplinary rehab program at [facility] with functional impairments in ADLs and mobility secondary to a complaint of worsening abdominal pain with imaging remarkable for distended gallbladder with wall thickening and pericholecystic fat stranding and hemangioma treated surgically with a lap to open cholecystectomy for acute cholecystitis on 11/30 complicated by drainage of a small amount of bile and a large amount of pus from gallbladder and subsequent requirement of ICU admission due to post-operative hypotension requiring pressors and acute blood loss anemia improved with transfusion of one unit of PRBCs. Additionally, patient was found to have bile duct leakage, requiring ERCP with stent on 12/5/23.    Debility 2/2 acute cholecystitis   - complicated by bile duct leak (s/p ERCP with stent on 12/5)  - s/p 7 days of zosyn  - S/P ICU for post-operative hypotension  - Impaired ADLs and mobility  - Need for assistance with personal care   - Continue comprehensive rehab program of PT/OT - 3 hours a day, 5 days a week. P&O as needed   - Fall precautions  - outpatient GI f/u with Dr. Dorsey  - Repeat ERCP & stent removal in 4-8 weeks    COVID+  - patient tested positive for COVID on admission to rehab (12/7)  - isolation precautions --> off isolation (12/18)   - completed Remdesivir (12/13)    Acute Blood Loss Anemia (improved)  - s/p 1 unit PRBC transfusion  - monitor H/H, 10.2/32.1 (12/18)     Seizure Disorder  - continue home carbamazepine 300 mg BID    h/o CVA/ HLD  - residual L sided weakness  - continue atorvastatin 80 mg QHS  - continue ASA 81 mg QD    HTN  - Home med: metoprolol 25 mg BID  - required pressors in ICU post-op  - stable off anti-hypertensives  - Monitor BP (12/19) 108/70 - 130/77, (12/19) 108/70 - 130/77    Type 2 Diabetes Mellitus  - last HbA1c 6.1  - low dose SSI  - Monitor fingersticks  - on metformin at home    Mood / Cognition  - Neuropsychology consult PRN    Sleep  - Maintain quiet hours and a low stim environment.   - Melatonin 6 mg QHS    GI / Bowel  - Senna qHS PRN  - Miralax Daily  - GI ppx: famotidine 20 mg QD     / Bladder  - BS Q8H and SC >400cc     Skin / Pressure injury  - Skin assessment on admission performed: abdominal incision with staple,   - Pressure Injury/Skin: OOB to chair, PT/OT  - nursing to monitor skin q Shift    Diet:  - Diet Consistency: DASH, regular  - Nutrition consult    DVT prophylaxis:   - Heparin 5000u Q8H    Outpatient Follow-up:    Easton Carroll  Internal Medicine  [address] [place] [address]  Phone: [phone]  Fax: [phone]  Follow Up Time:     Francisco Dorsey  Gastroenterology  [address] [place] [address], [address]  [place] [address]  Phone: [phone]  Fax: [phone]  Follow Up Time    --------------- Mr. Benjamin Rios is a 68 year old right handed male patient with past medical history of sinus node disorder s/p PPM, HTN, HLD, cerebral aneurysm s/p coiling, CVA w/ residual L sided weakness, seizure disorder, type 2 diabetes on metformin and gastritis who is admitted for Acute Inpatient Rehabilitation with a multidisciplinary rehab program at [facility] with functional impairments in ADLs and mobility secondary to a complaint of worsening abdominal pain with imaging remarkable for distended gallbladder with wall thickening and pericholecystic fat stranding and hemangioma treated surgically with a lap to open cholecystectomy for acute cholecystitis on 11/30 complicated by drainage of a small amount of bile and a large amount of pus from gallbladder and subsequent requirement of ICU admission due to post-operative hypotension requiring pressors and acute blood loss anemia improved with transfusion of one unit of PRBCs. Additionally, patient was found to have bile duct leakage, requiring ERCP with stent on 12/5/23.    Debility 2/2 acute cholecystitis   - complicated by bile duct leak (s/p ERCP with stent on 12/5)  - s/p 7 days of zosyn  - S/P ICU for post-operative hypotension  - Impaired ADLs and mobility  - Need for assistance with personal care   - Continue comprehensive rehab program of PT/OT - 3 hours a day, 5 days a week. P&O as needed   - Fall precautions  - outpatient GI f/u with Dr. Dorsey  - Repeat ERCP & stent removal in 4-8 weeks    COVID+  - patient tested positive for COVID on admission to rehab (12/7)  - isolation precautions --> off isolation (12/18)   - completed Remdesivir (12/13)    Acute Blood Loss Anemia (improved)  - s/p 1 unit PRBC transfusion  - monitor H/H, 10.2/32.1 (12/18)     Seizure Disorder  - continue home carbamazepine 300 mg BID    h/o CVA/ HLD  - residual L sided weakness  - continue atorvastatin 80 mg QHS  - continue ASA 81 mg QD    HTN  - Home med: metoprolol 25 mg BID  - required pressors in ICU post-op  - stable off anti-hypertensives  - Monitor BP (12/19) 108/70 - 130/77, (12/19) 108/70 - 130/77    Type 2 Diabetes Mellitus  - last HbA1c 6.1  - low dose SSI  - Monitor fingersticks  - on metformin at home    Mood / Cognition  - Neuropsychology consult PRN    Sleep  - Maintain quiet hours and a low stim environment.   - Melatonin 6 mg QHS    GI / Bowel  - Senna qHS PRN  - Miralax Daily  - GI ppx: famotidine 20 mg QD     / Bladder  - BS Q8H and SC >400cc     Skin / Pressure injury  - Skin assessment on admission performed: abdominal incision with staple,   - Pressure Injury/Skin: OOB to chair, PT/OT  - nursing to monitor skin q Shift    Diet:  - Diet Consistency: DASH, regular  - Nutrition consult    DVT prophylaxis:   - Heparin 5000u Q8H    Outpatient Follow-up:    Easton Carroll  Internal Medicine  [address] [place] [address]  Phone: [phone]  Fax: [phone]  Follow Up Time:     Francisco Dorsey  Gastroenterology  [address] [place] [address], [address]  [place] [address]  Phone: [phone]  Fax: [phone]  Follow Up Time    ---------------

## 2023-12-19 NOTE — PROGRESS NOTE ADULT - NUTRITIONAL ASSESSMENT
This patient has been assessed with a concern for Malnutrition and has been determined to have a diagnosis/diagnoses of Moderate protein-calorie malnutrition.    The following pending diet order is being considered for treatment of Moderate protein-calorie malnutrition:  Diet DASH/TLC-  Sodium & Cholesterol Restricted  Supplement Feeding Modality:  Oral  Glucerna Shake Cans or Servings Per Day:  1       Frequency:  Two Times a day  Entered: Dec 12 2023  1:55PM  
This patient has been assessed with a concern for Malnutrition and has been determined to have a diagnosis/diagnoses of Moderate protein-calorie malnutrition.    This patient is being managed with:   Diet DASH/TLC-  Sodium & Cholesterol Restricted  Supplement Feeding Modality:  Oral  Glucerna Shake Cans or Servings Per Day:  1       Frequency:  Daily  Entered: Dec  8 2023  3:15PM  
This patient has been assessed with a concern for Malnutrition and has been determined to have a diagnosis/diagnoses of Moderate protein-calorie malnutrition.    This patient is being managed with:   Diet DASH/TLC-  Sodium & Cholesterol Restricted  Supplement Feeding Modality:  Oral  Glucerna Shake Cans or Servings Per Day:  1       Frequency:  Daily  Entered: Dec  8 2023  3:15PM  
This patient has been assessed with a concern for Malnutrition and has been determined to have a diagnosis/diagnoses of Moderate protein-calorie malnutrition.    This patient is being managed with:   Diet DASH/TLC-  Sodium & Cholesterol Restricted  Supplement Feeding Modality:  Oral  Glucerna Shake Cans or Servings Per Day:  1       Frequency:  Two Times a day  Entered: Dec 12 2023  1:55PM  
This patient has been assessed with a concern for Malnutrition and has been determined to have a diagnosis/diagnoses of Moderate protein-calorie malnutrition.    This patient is being managed with:   Diet DASH/TLC-  Sodium & Cholesterol Restricted  Supplement Feeding Modality:  Oral  Glucerna Shake Cans or Servings Per Day:  1       Frequency:  Two Times a day  Entered: Dec 12 2023  1:55PM    Diet DASH/TLC-  Sodium & Cholesterol Restricted  Supplement Feeding Modality:  Oral  Glucerna Shake Cans or Servings Per Day:  1       Frequency:  Daily  Entered: Dec  8 2023  3:15PM    The following pending diet order is being considered for treatment of Moderate protein-calorie malnutrition:null
This patient has been assessed with a concern for Malnutrition and has been determined to have a diagnosis/diagnoses of Moderate protein-calorie malnutrition.    This patient is being managed with:   Diet Regular-  DASH/TLC {Sodium & Cholesterol Restricted}  Entered: Dec  7 2023  7:32PM    The following pending diet order is being considered for treatment of Moderate protein-calorie malnutrition:  Diet DASH/TLC-  Sodium & Cholesterol Restricted  Supplement Feeding Modality:  Oral  Glucerna Shake Cans or Servings Per Day:  1       Frequency:  Daily  Entered: Dec  8 2023  3:15PM  
This patient has been assessed with a concern for Malnutrition and has been determined to have a diagnosis/diagnoses of Moderate protein-calorie malnutrition.    This patient is being managed with:   Diet DASH/TLC-  Sodium & Cholesterol Restricted  Supplement Feeding Modality:  Oral  Glucerna Shake Cans or Servings Per Day:  1       Frequency:  Two Times a day  Entered: Dec 12 2023  1:55PM    Diet DASH/TLC-  Sodium & Cholesterol Restricted  Supplement Feeding Modality:  Oral  Glucerna Shake Cans or Servings Per Day:  1       Frequency:  Daily  Entered: Dec  8 2023  3:15PM    The following pending diet order is being considered for treatment of Moderate protein-calorie malnutrition:null
This patient has been assessed with a concern for Malnutrition and has been determined to have a diagnosis/diagnoses of Moderate protein-calorie malnutrition.    This patient is being managed with:   Diet DASH/TLC-  Sodium & Cholesterol Restricted  Supplement Feeding Modality:  Oral  Glucerna Shake Cans or Servings Per Day:  1       Frequency:  Daily  Entered: Dec  8 2023  3:15PM  
This patient has been assessed with a concern for Malnutrition and has been determined to have a diagnosis/diagnoses of Moderate protein-calorie malnutrition.    This patient is being managed with:   Diet DASH/TLC-  Sodium & Cholesterol Restricted  Supplement Feeding Modality:  Oral  Glucerna Shake Cans or Servings Per Day:  1       Frequency:  Two Times a day  Entered: Dec 12 2023  1:55PM    Diet DASH/TLC-  Sodium & Cholesterol Restricted  Supplement Feeding Modality:  Oral  Glucerna Shake Cans or Servings Per Day:  1       Frequency:  Daily  Entered: Dec  8 2023  3:15PM    The following pending diet order is being considered for treatment of Moderate protein-calorie malnutrition:null
This patient has been assessed with a concern for Malnutrition and has been determined to have a diagnosis/diagnoses of Moderate protein-calorie malnutrition.    The following pending diet order is being considered for treatment of Moderate protein-calorie malnutrition:  Diet DASH/TLC-  Sodium & Cholesterol Restricted  Supplement Feeding Modality:  Oral  Glucerna Shake Cans or Servings Per Day:  1       Frequency:  Two Times a day  Entered: Dec 12 2023  1:55PM  
This patient has been assessed with a concern for Malnutrition and has been determined to have a diagnosis/diagnoses of Moderate protein-calorie malnutrition.    This patient is being managed with:   Diet DASH/TLC-  Sodium & Cholesterol Restricted  Supplement Feeding Modality:  Oral  Glucerna Shake Cans or Servings Per Day:  1       Frequency:  Two Times a day  Entered: Dec 12 2023  1:55PM    Diet DASH/TLC-  Sodium & Cholesterol Restricted  Supplement Feeding Modality:  Oral  Glucerna Shake Cans or Servings Per Day:  1       Frequency:  Daily  Entered: Dec  8 2023  3:15PM    The following pending diet order is being considered for treatment of Moderate protein-calorie malnutrition:null
This patient has been assessed with a concern for Malnutrition and has been determined to have a diagnosis/diagnoses of Moderate protein-calorie malnutrition.    This patient is being managed with:   Diet DASH/TLC-  Sodium & Cholesterol Restricted  Supplement Feeding Modality:  Oral  Glucerna Shake Cans or Servings Per Day:  1       Frequency:  Daily  Entered: Dec  8 2023  3:15PM  
This patient has been assessed with a concern for Malnutrition and has been determined to have a diagnosis/diagnoses of Moderate protein-calorie malnutrition.    This patient is being managed with:   Diet DASH/TLC-  Sodium & Cholesterol Restricted  Supplement Feeding Modality:  Oral  Glucerna Shake Cans or Servings Per Day:  1       Frequency:  Two Times a day  Entered: Dec 12 2023  1:55PM

## 2023-12-19 NOTE — PROGRESS NOTE ADULT - ASSESSMENT
68 year old right handed male patient with past medical history of sinus node disorder s/p PPM, HTN, HLD, cerebral aneurysm s/p coiling, CVA w/ residual L sided weakness, seizure disorder, type 2 diabetes on metformin and gastritis who is admitted for Acute Inpatient Rehabilitation with a multidisciplinary rehab program at [facility] with functional impairments in ADLs and mobility secondary to a complaint of worsening abdominal pain with imaging remarkable for distended gallbladder with wall thickening and pericholecystic fat stranding and hemangioma treated surgically with a lap to open cholecystectomy for acute cholecystitis on 11/30 complicated by drainage of a small amount of bile and a large amount of pus from gallbladder and subsequent requirement of ICU admission due to post-operative hypotension requiring pressors and acute blood loss anemia improved with transfusion of one unit of PRBCs. Additionally, patient was found to have bile duct leakage, requiring ERCP with stent on 12/5/23.    Debility 2/2 acute cholecystitis complicated by bile duct leak (s/p ERCP with stent on 12/5)  - monitor NIKHIL drain output  - outpatient GI f/u with Dr. Dorsey  - Repeat ERCP & stent removal in 4-8 weeks    COVID+  -Resolved  - completed Remdesivir 5 day course   - no need for decadron   - Guifenesin prn, albuterol prn    Acute Blood Loss Anemia  - s/p 1 unit PRBC transfusion  - monitor H/H    Seizure Disorder  - continue home carbamazepine 300 mg BID    CVA/ HLD  - residual L sided weakness  -ASA/Statin    HTN  Orthostatic Hypotension  - Home med: metoprolol 25 mg BID  - off anti-hypertensives   - Continue Midodrine. Goal to wean off as tolerated  - Monitor BP    T2DM  - HbA1c 6.1  - low dose SSI  - Monitor fingersticks  - on metformin at home    DVT prophylaxis: Heparin subq 68 year old right handed male patient with past medical history of sinus node disorder s/p PPM, HTN, HLD, cerebral aneurysm s/p coiling, CVA w/ residual L sided weakness, seizure disorder, type 2 diabetes on metformin and gastritis who is admitted for Acute Inpatient Rehabilitation with a multidisciplinary rehab program at [facility] with functional impairments in ADLs and mobility secondary to a complaint of worsening abdominal pain with imaging remarkable for distended gallbladder with wall thickening and pericholecystic fat stranding and hemangioma treated surgically with a lap to open cholecystectomy for acute cholecystitis on 11/30 complicated by drainage of a small amount of bile and a large amount of pus from gallbladder and subsequent requirement of ICU admission due to post-operative hypotension requiring pressors and acute blood loss anemia improved with transfusion of one unit of PRBCs. Additionally, patient was found to have bile duct leakage, requiring ERCP with stent on 12/5/23.    Debility 2/2 acute cholecystitis complicated by bile duct leak (s/p ERCP with stent on 12/5)  - monitor NIKHIL drain output  - outpatient GI f/u with Dr. Dorsey  - Repeat ERCP & stent removal in 4-8 weeks    COVID+  -Resolved  - completed Remdesivir 5 day course   - no need for decadron   - Guifenesin prn, albuterol prn    Acute Blood Loss Anemia  - s/p 1 unit PRBC transfusion  - monitor H/H    Seizure Disorder  - continue home carbamazepine 300 mg BID    CVA/ HLD  - residual L sided weakness  -ASA/Statin    HTN  Orthostatic Hypotension  - Home med: metoprolol 25 mg BID  - off anti-hypertensives   - Continue Midodrine. Goal to wean off as tolerated  - Monitor BP    T2DM  - HbA1c 6.1  - low dose SSI  - Monitor fingersticks  - on metformin at home    DVT prophylaxis: Heparin subq

## 2023-12-19 NOTE — PROGRESS NOTE ADULT - NSPROGADDITIONALINFOA_GEN_ALL_CORE
Spent 35 minutes on face-face visit, evaluate, examine and treat
Spent 50 minutes on face-face visit, evaluating, examining, preparing discharge papers, discuss discharge meds and F/U visits.  Excluding teaching time.

## 2023-12-19 NOTE — PROGRESS NOTE ADULT - REASON FOR ADMISSION
Debility 2/2 acute cholecystitis

## 2023-12-19 NOTE — PROGRESS NOTE ADULT - SUBJECTIVE AND OBJECTIVE BOX
HPI:  Mr. Benjamin Rios is a 68 year old right handed male patient with past medical history of sinus node disorder s/p PPM, HTN, HLD, cerebral aneurysm s/p coiling, CVA w/ residual L sided weakness, seizure disorder, type 2 diabetes on metformin and gastritis admitted to [facility] on 11/28 for worsening abdominal pain since 11/24. CT chest abd/pelvis showed distended gallbladder with gallbladder wall thickening and pericholecystic fat stranding and hemangioma in T7 vertebrae. Patient underwent lap to open cholecystectomy for acute cholecystitis on 11/30. Procedure was complicated by drainage of a small amount of bile and a large amount of pus from gallbladder. Patient was upgraded to ICU due to post-operative hypotension requiring pressors then downgraded to surgical floor. Hospital course further complicated by acute blood loss anemia, improved s/p 1 unit PRBCs. Patient also found to have bile duct leakage, requiring ERCP with stent on 12/5/23. Patient requires follow up with GI and a follow up ERCP in 4-8 weeks. Patient evaluated by PT/OT and was recommended for acute inpatient rehab. Patient is medically stable for discharge to acute rehab on 12/7.  (07 Dec 2023 13:33)    TDD: 12/19 Home  ___________________________________________________________________________    SUBJECTIVE/ROS  Patient was seen and evaluated at bedside today, comfortable in his bed   Reported no overnight events and is in no acute distress.  Patient is looking forward to being discharged today and continuing participation on the recommended rehabilitation program outpatient.  ROS: Denies any CP, SOB, AUGUSTINE, palpitations, fever, chills, body aches, cough, congestion, or any other symptoms at this time.   ___________________________________________________________________________    Vital Signs Last 24 Hrs  T(C): 36.8 (19 Dec 2023 07:41), Max: 37.2 (18 Dec 2023 20:48)  T(F): 98.2 (19 Dec 2023 07:41), Max: 98.9 (18 Dec 2023 20:48)  HR: 69 (19 Dec 2023 07:41) (69 - 86)  BP: 130/77 (19 Dec 2023 07:41) (108/70 - 130/77)  BP(mean): --  RR: 16 (19 Dec 2023 07:41) (16 - 16)  SpO2: 96% (19 Dec 2023 07:41) (95% - 96%)    ___________________________________________________________________________     LAB                        10.2   4.32  )-----------( 416      ( 18 Dec 2023 05:17 )             32.1     12-18    137  |  103  |  18  ----------------------------<  130<H>  4.4   |  25  |  0.96    Ca    8.6      18 Dec 2023 05:17    TPro  6.9  /  Alb  2.2<L>  /  TBili  0.3  /  DBili  x   /  AST  38  /  ALT  55<H>  /  AlkPhos  157<H>  12-18    LIVER FUNCTIONS - ( 18 Dec 2023 05:17 )  Alb: 2.2 g/dL / Pro: 6.9 g/dL / ALK PHOS: 157 U/L / ALT: 55 U/L / AST: 38 U/L / GGT: x           ___________________________________________________________________________    MEDICATIONS  (STANDING):  aspirin enteric coated 81 milliGRAM(s) Oral daily  atorvastatin 80 milliGRAM(s) Oral at bedtime  carBAMazepine ER Tablet 300 milliGRAM(s) Oral two times a day  dextrose 5%. 1000 milliLiter(s) (100 mL/Hr) IV Continuous <Continuous>  dextrose 5%. 1000 milliLiter(s) (50 mL/Hr) IV Continuous <Continuous>  dextrose 50% Injectable 25 Gram(s) IV Push once  dextrose 50% Injectable 12.5 Gram(s) IV Push once  dextrose 50% Injectable 25 Gram(s) IV Push once  famotidine    Tablet 20 milliGRAM(s) Oral daily  glucagon  Injectable 1 milliGRAM(s) IntraMuscular once  heparin   Injectable 5000 Unit(s) SubCutaneous every 8 hours  lactulose Retention Enema 200 Gram(s) Rectal once  latanoprost 0.005% Ophthalmic Solution 1 Drop(s) Both EYES at bedtime  melatonin 6 milliGRAM(s) Oral at bedtime  midodrine. 5 milliGRAM(s) Oral <User Schedule>  pantoprazole    Tablet 40 milliGRAM(s) Oral daily  polyethylene glycol 3350 17 Gram(s) Oral daily  senna 1 Tablet(s) Oral two times a day    MEDICATIONS  (PRN):  albuterol    90 MICROgram(s) HFA Inhaler 1 Puff(s) Inhalation every 4 hours PRN Shortness of Breath and/or Wheezing  artificial  tears Solution 1 Drop(s) Both EYES two times a day PRN Dry Eyes  dextrose Oral Gel 15 Gram(s) Oral once PRN Blood Glucose LESS THAN 70 milliGRAM(s)/deciliter  guaiFENesin Oral Liquid (Sugar-Free) 200 milliGRAM(s) Oral every 6 hours PRN Cough  simethicone 80 milliGRAM(s) Chew three times a day PRN Gas    ___________________________________________________________________________    PHYSICAL EXAM:  Gen - NAD, Comfortable  HEENT - NCAT, EOMI, CRISTY  Pulm - CTAB,  No crackles  Cardiovascular - RRR, S1S2  Abdomen - NT, ND +BS   Extremities - No clubbing, no cyanosis, no peripheral edema, no calf tenderness  Neuro-     Cognitive - Awake, Alert, and Oriented      Motor -                     LEFT    UE - 4/5                    RIGHT UE - 5/5                    LEFT    LE - HF 5/5, KE 5/5, DF/PF 2/5- left foot drop                    RIGHT LE -  5/5        Sensory - Intact to LT     Reflexes - DTR Intact, No primitive reflexive  Psychiatric - Mood stable, Affect WNL  Skin: abdominal incisions in RUQ and umbilicus clean and dry with no staples.    ___________________________________________________________________________ HPI:  Mr. Benjamin Rios is a 68 year old right handed male patient with past medical history of sinus node disorder s/p PPM, HTN, HLD, cerebral aneurysm s/p coiling, CVA w/ residual L sided weakness, seizure disorder, type 2 diabetes on metformin and gastritis admitted to [facility] on 11/28 for worsening abdominal pain since 11/24. CT chest abd/pelvis showed distended gallbladder with gallbladder wall thickening and pericholecystic fat stranding and hemangioma in T7 vertebrae. Patient underwent lap to open cholecystectomy for acute cholecystitis on 11/30. Procedure was complicated by drainage of a small amount of bile and a large amount of pus from gallbladder. Patient was upgraded to ICU due to post-operative hypotension requiring pressors then downgraded to surgical floor. Hospital course further complicated by acute blood loss anemia, improved s/p 1 unit PRBCs. Patient also found to have bile duct leakage, requiring ERCP with stent on 12/5/23. Patient requires follow up with GI and a follow up ERCP in 4-8 weeks. Patient evaluated by PT/OT and was recommended for acute inpatient rehab. Patient is medically stable for discharge to acute rehab on 12/7.      TDD: 12/19 Home  ___________________________________________________________________________    SUBJECTIVE/ROS  Patient was seen and evaluated at bedside today, comfortable in his bed   Reported no overnight events and is in no acute distress.  Patient is looking forward to being discharged today and continuing participation on the recommended rehabilitation program outpatient.  ROS: Denies any CP, SOB, AUGUSTINE, palpitations, fever, chills, body aches, cough, congestion, or any other symptoms at this time.   ___________________________________________________________________________    Vital Signs Last 24 Hrs  T(C): 36.8 (19 Dec 2023 07:41), Max: 37.2 (18 Dec 2023 20:48)  T(F): 98.2 (19 Dec 2023 07:41), Max: 98.9 (18 Dec 2023 20:48)  HR: 69 (19 Dec 2023 07:41) (69 - 86)  BP: 130/77 (19 Dec 2023 07:41) (108/70 - 130/77)  RR: 16 (19 Dec 2023 07:41) (16 - 16)  SpO2: 96% (19 Dec 2023 07:41) (95% - 96%)    ___________________________________________________________________________     LAB                        10.2   4.32  )-----------( 416      ( 18 Dec 2023 05:17 )             32.1     12-18    137  |  103  |  18  ----------------------------<  130<H>  4.4   |  25  |  0.96    Ca    8.6      18 Dec 2023 05:17    TPro  6.9  /  Alb  2.2<L>  /  TBili  0.3  /  DBili  x   /  AST  38  /  ALT  55<H>  /  AlkPhos  157<H>  12-18    LIVER FUNCTIONS - ( 18 Dec 2023 05:17 )  Alb: 2.2 g/dL / Pro: 6.9 g/dL / ALK PHOS: 157 U/L / ALT: 55 U/L / AST: 38 U/L / GGT: x           ___________________________________________________________________________    MEDICATIONS  (STANDING):  aspirin enteric coated 81 milliGRAM(s) Oral daily  atorvastatin 80 milliGRAM(s) Oral at bedtime  carBAMazepine ER Tablet 300 milliGRAM(s) Oral two times a day  dextrose 5%. 1000 milliLiter(s) (100 mL/Hr) IV Continuous <Continuous>  dextrose 5%. 1000 milliLiter(s) (50 mL/Hr) IV Continuous <Continuous>  dextrose 50% Injectable 25 Gram(s) IV Push once  dextrose 50% Injectable 12.5 Gram(s) IV Push once  dextrose 50% Injectable 25 Gram(s) IV Push once  famotidine    Tablet 20 milliGRAM(s) Oral daily  glucagon  Injectable 1 milliGRAM(s) IntraMuscular once  heparin   Injectable 5000 Unit(s) SubCutaneous every 8 hours  lactulose Retention Enema 200 Gram(s) Rectal once  latanoprost 0.005% Ophthalmic Solution 1 Drop(s) Both EYES at bedtime  melatonin 6 milliGRAM(s) Oral at bedtime  midodrine. 5 milliGRAM(s) Oral <User Schedule>  pantoprazole    Tablet 40 milliGRAM(s) Oral daily  polyethylene glycol 3350 17 Gram(s) Oral daily  senna 1 Tablet(s) Oral two times a day    MEDICATIONS  (PRN):  albuterol    90 MICROgram(s) HFA Inhaler 1 Puff(s) Inhalation every 4 hours PRN Shortness of Breath and/or Wheezing  artificial  tears Solution 1 Drop(s) Both EYES two times a day PRN Dry Eyes  dextrose Oral Gel 15 Gram(s) Oral once PRN Blood Glucose LESS THAN 70 milliGRAM(s)/deciliter  guaiFENesin Oral Liquid (Sugar-Free) 200 milliGRAM(s) Oral every 6 hours PRN Cough  simethicone 80 milliGRAM(s) Chew three times a day PRN Gas    ___________________________________________________________________________    PHYSICAL EXAM:  Gen - NAD, Comfortable, happy  HEENT - NCAT, EOMI, CRISTY  Pulm - CTAB,  No crackles  Cardiovascular - RRR, S1S2  Abdomen - NT, ND +BS   Extremities - No clubbing, no cyanosis, no peripheral edema, no calf tenderness  Neuro-     Cognitive - Awake, Alert, and Oriented      Motor -                     LEFT    UE - 4/5                    RIGHT UE - 5/5                    LEFT    LE - HF 5/5, KE 5/5, DF/PF 2/5- left foot drop                    RIGHT LE -  5/5        Sensory - Intact to LT     Reflexes - DTR Intact, No primitive reflexive  Psychiatric - Mood stable, Affect WNL  Skin: abdominal incisions in RUQ and umbilicus clean and dry with no staples.    ___________________________________________________________________________ HPI:  Mr. Benjamin Rios is a 68 year old right handed male patient with past medical history of sinus node disorder s/p PPM, HTN, HLD, cerebral aneurysm s/p coiling, CVA w/ residual L sided weakness, seizure disorder, type 2 diabetes on metformin and gastritis admitted to [facility] on 11/28 for worsening abdominal pain since 11/24. CT chest abd/pelvis showed distended gallbladder with gallbladder wall thickening and pericholecystic fat stranding and hemangioma in T7 vertebrae. Patient underwent lap to open cholecystectomy for acute cholecystitis on 11/30. Procedure was complicated by drainage of a small amount of bile and a large amount of pus from gallbladder. Patient was upgraded to ICU due to post-operative hypotension requiring pressors then downgraded to surgical floor. Hospital course further complicated by acute blood loss anemia, improved s/p 1 unit PRBCs. Patient also found to have bile duct leakage, requiring ERCP with stent on 12/5/23. Patient requires follow up with GI and a follow up ERCP in 4-8 weeks. Patient evaluated by PT/OT and was recommended for acute inpatient rehab. Patient is medically stable for discharge to acute rehab on 12/7.      TDD: 12/19 Home  ___________________________________________________________________________    SUBJECTIVE/ROS  Patient was seen and evaluated at bedside today, comfortable in his bed   Reported no overnight events and is in no acute distress.  Patient is looking forward to being discharged today and continuing participation on the recommended rehabilitation program outpatient.  ROS: Denies any CP, SOB, AUGUSTINE, palpitations, fever, chills, body aches, cough, congestion, or any other symptoms at this time.   ___________________________________________________________________________    Vital Signs Last 24 Hrs  T(C): 36.8 (19 Dec 2023 07:41), Max: 37.2 (18 Dec 2023 20:48)  T(F): 98.2 (19 Dec 2023 07:41), Max: 98.9 (18 Dec 2023 20:48)  HR: 69 (19 Dec 2023 07:41) (69 - 86)  BP: 130/77 (19 Dec 2023 07:41) (108/70 - 130/77)  RR: 16 (19 Dec 2023 07:41) (16 - 16)  SpO2: 96% (19 Dec 2023 07:41) (95% - 96%)    ___________________________________________________________________________     LAB                        10.2   4.32  )-----------( 416      ( 18 Dec 2023 05:17 )             32.1     12-18    137  |  103  |  18  ----------------------------<  130<H>  4.4   |  25  |  0.96    Ca    8.6      18 Dec 2023 05:17    TPro  6.9  /  Alb  2.2<L>  /  TBili  0.3  /  DBili  x   /  AST  38  /  ALT  55<H>  /  AlkPhos  157<H>  12-18    LIVER FUNCTIONS - ( 18 Dec 2023 05:17 )  Alb: 2.2 g/dL / Pro: 6.9 g/dL / ALK PHOS: 157 U/L / ALT: 55 U/L / AST: 38 U/L / GGT: x           ___________________________________________________________________________    MEDICATIONS  (STANDING):  aspirin enteric coated 81 milliGRAM(s) Oral daily  atorvastatin 80 milliGRAM(s) Oral at bedtime  carBAMazepine ER Tablet 300 milliGRAM(s) Oral two times a day  dextrose 5%. 1000 milliLiter(s) (100 mL/Hr) IV Continuous <Continuous>  dextrose 5%. 1000 milliLiter(s) (50 mL/Hr) IV Continuous <Continuous>  dextrose 50% Injectable 25 Gram(s) IV Push once  dextrose 50% Injectable 12.5 Gram(s) IV Push once  dextrose 50% Injectable 25 Gram(s) IV Push once  famotidine    Tablet 20 milliGRAM(s) Oral daily  glucagon  Injectable 1 milliGRAM(s) IntraMuscular once  heparin   Injectable 5000 Unit(s) SubCutaneous every 8 hours  lactulose Retention Enema 200 Gram(s) Rectal once  latanoprost 0.005% Ophthalmic Solution 1 Drop(s) Both EYES at bedtime  melatonin 6 milliGRAM(s) Oral at bedtime  midodrine. 5 milliGRAM(s) Oral <User Schedule>  pantoprazole    Tablet 40 milliGRAM(s) Oral daily  polyethylene glycol 3350 17 Gram(s) Oral daily  senna 1 Tablet(s) Oral two times a day    MEDICATIONS  (PRN):  albuterol    90 MICROgram(s) HFA Inhaler 1 Puff(s) Inhalation every 4 hours PRN Shortness of Breath and/or Wheezing  artificial  tears Solution 1 Drop(s) Both EYES two times a day PRN Dry Eyes  dextrose Oral Gel 15 Gram(s) Oral once PRN Blood Glucose LESS THAN 70 milliGRAM(s)/deciliter  guaiFENesin Oral Liquid (Sugar-Free) 200 milliGRAM(s) Oral every 6 hours PRN Cough  simethicone 80 milliGRAM(s) Chew three times a day PRN Gas    ___________________________________________________________________________    PHYSICAL EXAM:  Gen - NAD, Comfortable, happy  HEENT - NCAT, EOMI, CRISTY  Pulm - CTAB,  No crackles  Cardiovascular - RRR, S1S2  Abdomen - NT, ND +BS   Extremities - No clubbing, no cyanosis, no peripheral edema, no calf tenderness  Neuro-     Cognitive - Awake, Alert, and Oriented      Motor -                     LEFT    UE - 4/5                    RIGHT UE - 5/5                    LEFT    LE - HF 5/5, KE 5/5, DF/PF 2/5- left foot drop                    RIGHT LE -  5/5        Sensory - Intact to LT     Reflexes - DTR Intact, No primitive reflexive  Psychiatric - Mood stable, Affect WNL  Skin: abdominal incisions in RUQ and umbilicus clean and dry with no staples.    ___________________________________________________________________________

## 2023-12-19 NOTE — PROGRESS NOTE ADULT - SUBJECTIVE AND OBJECTIVE BOX
Patient is a 68y old  Male who presents with a chief complaint of Debility 2/2 acute cholecystitis (19 Dec 2023 09:28)      SUBJECTIVE / OVERNIGHT EVENTS:  Pt seen and examined at bedside. No acute events overnight.  Pt denies cp, palpitations, sob, abd pain, N/V, fever, chills.    ROS:  All other review of systems negative    Allergies    No Known Allergies    Intolerances        MEDICATIONS  (STANDING):  aspirin enteric coated 81 milliGRAM(s) Oral daily  atorvastatin 80 milliGRAM(s) Oral at bedtime  carBAMazepine ER Tablet 300 milliGRAM(s) Oral two times a day  dextrose 5%. 1000 milliLiter(s) (50 mL/Hr) IV Continuous <Continuous>  dextrose 5%. 1000 milliLiter(s) (100 mL/Hr) IV Continuous <Continuous>  dextrose 50% Injectable 25 Gram(s) IV Push once  dextrose 50% Injectable 12.5 Gram(s) IV Push once  dextrose 50% Injectable 25 Gram(s) IV Push once  famotidine    Tablet 20 milliGRAM(s) Oral daily  glucagon  Injectable 1 milliGRAM(s) IntraMuscular once  heparin   Injectable 5000 Unit(s) SubCutaneous every 8 hours  lactulose Retention Enema 200 Gram(s) Rectal once  latanoprost 0.005% Ophthalmic Solution 1 Drop(s) Both EYES at bedtime  melatonin 6 milliGRAM(s) Oral at bedtime  midodrine. 5 milliGRAM(s) Oral <User Schedule>  pantoprazole    Tablet 40 milliGRAM(s) Oral daily  polyethylene glycol 3350 17 Gram(s) Oral daily  senna 1 Tablet(s) Oral two times a day    MEDICATIONS  (PRN):  albuterol    90 MICROgram(s) HFA Inhaler 1 Puff(s) Inhalation every 4 hours PRN Shortness of Breath and/or Wheezing  artificial  tears Solution 1 Drop(s) Both EYES two times a day PRN Dry Eyes  dextrose Oral Gel 15 Gram(s) Oral once PRN Blood Glucose LESS THAN 70 milliGRAM(s)/deciliter  guaiFENesin Oral Liquid (Sugar-Free) 200 milliGRAM(s) Oral every 6 hours PRN Cough  simethicone 80 milliGRAM(s) Chew three times a day PRN Gas      Vital Signs Last 24 Hrs  T(C): 36.8 (19 Dec 2023 07:41), Max: 37.2 (18 Dec 2023 20:48)  T(F): 98.2 (19 Dec 2023 07:41), Max: 98.9 (18 Dec 2023 20:48)  HR: 69 (19 Dec 2023 07:41) (69 - 86)  BP: 130/77 (19 Dec 2023 07:41) (108/70 - 130/77)  BP(mean): --  RR: 16 (19 Dec 2023 07:41) (16 - 16)  SpO2: 96% (19 Dec 2023 07:41) (95% - 96%)    Parameters below as of 19 Dec 2023 07:41  Patient On (Oxygen Delivery Method): room air      CAPILLARY BLOOD GLUCOSE        I&O's Summary      PHYSICAL EXAM:  GENERAL: NAD, well-developed male  HEAD:  Atraumatic, Normocephalic  NECK: Supple, No JVD  CHEST/LUNG: Clear to auscultation bilaterally; No wheeze, nonlabored breathing  HEART: Regular rate and rhythm; No murmurs, rubs, or gallops  ABDOMEN: Soft, Nontender, Nondistended; Bowel sounds present  EXTREMITIES:  No clubbing, cyanosis, or edema  PSYCH: calm, appropriate mood      LABS:                        10.2   4.32  )-----------( 416      ( 18 Dec 2023 05:17 )             32.1     12-18    137  |  103  |  18  ----------------------------<  130<H>  4.4   |  25  |  0.96    Ca    8.6      18 Dec 2023 05:17    TPro  6.9  /  Alb  2.2<L>  /  TBili  0.3  /  DBili  x   /  AST  38  /  ALT  55<H>  /  AlkPhos  157<H>  12-18          Urinalysis Basic - ( 18 Dec 2023 05:17 )    Color: x / Appearance: x / SG: x / pH: x  Gluc: 130 mg/dL / Ketone: x  / Bili: x / Urobili: x   Blood: x / Protein: x / Nitrite: x   Leuk Esterase: x / RBC: x / WBC x   Sq Epi: x / Non Sq Epi: x / Bacteria: x        RADIOLOGY & ADDITIONAL TESTS:  Results Reviewed:   Imaging Personally Reviewed:  Electrocardiogram Personally Reviewed:    COORDINATION OF CARE:  Care Discussed with Consultants/Other Providers [Y/N]:  Prior or Outpatient Records Reviewed [Y/N]:

## 2024-01-01 NOTE — H&P PST ADULT - GASTROINTESTINAL DETAILS
2024 no distention/soft/nontender/bowel sounds normal bowel sounds normal/no distention/soft/nontender/no masses palpable

## 2024-01-02 ENCOUNTER — EMERGENCY (EMERGENCY)
Facility: HOSPITAL | Age: 69
LOS: 1 days | Discharge: LEFT BEFORE DISCHARGE | End: 2024-01-02
Admitting: EMERGENCY MEDICINE
Payer: MEDICARE

## 2024-01-02 VITALS
HEART RATE: 75 BPM | OXYGEN SATURATION: 100 % | SYSTOLIC BLOOD PRESSURE: 125 MMHG | RESPIRATION RATE: 19 BRPM | DIASTOLIC BLOOD PRESSURE: 75 MMHG

## 2024-01-02 VITALS
WEIGHT: 147.93 LBS | SYSTOLIC BLOOD PRESSURE: 120 MMHG | HEIGHT: 65 IN | DIASTOLIC BLOOD PRESSURE: 77 MMHG | RESPIRATION RATE: 18 BRPM | HEART RATE: 79 BPM | OXYGEN SATURATION: 100 % | TEMPERATURE: 98 F

## 2024-01-02 DIAGNOSIS — Z98.890 OTHER SPECIFIED POSTPROCEDURAL STATES: Chronic | ICD-10-CM

## 2024-01-02 DIAGNOSIS — Z98.89 OTHER SPECIFIED POSTPROCEDURAL STATES: Chronic | ICD-10-CM

## 2024-01-02 DIAGNOSIS — Z98.49 CATARACT EXTRACTION STATUS, UNSPECIFIED EYE: Chronic | ICD-10-CM

## 2024-01-02 DIAGNOSIS — Z95.0 PRESENCE OF CARDIAC PACEMAKER: Chronic | ICD-10-CM

## 2024-01-02 PROCEDURE — L9991: CPT

## 2024-01-02 NOTE — ED ADULT NURSE NOTE - NSFALLHARMRISKINTERV_ED_ALL_ED
Assistance OOB with selected safe patient handling equipment if applicable/Assistance with ambulation/Communicate risk of Fall with Harm to all staff, patient, and family/Monitor gait and stability/Provide patient with walking aids/Provide visual cue: red socks, yellow wristband, yellow gown, etc/Reinforce activity limits and safety measures with patient and family/Bed in lowest position, wheels locked, appropriate side rails in place/Call bell, personal items and telephone in reach/Instruct patient to call for assistance before getting out of bed/chair/stretcher/Non-slip footwear applied when patient is off stretcher/[place] to call system/Physically safe environment - no spills, clutter or unnecessary equipment/Purposeful Proactive Rounding/Room/bathroom lighting operational, light cord in reach Assistance OOB with selected safe patient handling equipment if applicable/Assistance with ambulation/Communicate risk of Fall with Harm to all staff, patient, and family/Monitor gait and stability/Provide patient with walking aids/Provide visual cue: red socks, yellow wristband, yellow gown, etc/Reinforce activity limits and safety measures with patient and family/Bed in lowest position, wheels locked, appropriate side rails in place/Call bell, personal items and telephone in reach/Instruct patient to call for assistance before getting out of bed/chair/stretcher/Non-slip footwear applied when patient is off stretcher/[place] to call system/Physically safe environment - no spills, clutter or unnecessary equipment/Purposeful Proactive Rounding/Room/bathroom lighting operational, light cord in reach

## 2024-01-02 NOTE — ED ADULT NURSE NOTE - OBJECTIVE STATEMENT
Patient sent in by PCP for urinary retention. Patient endorses last urine out put was very small and at 3pm today. Patient denies pain and fever.

## 2024-01-02 NOTE — ED ADULT NURSE NOTE - NS_ED_FAMBEDSIDE4_ED_ALL_ED
Attempted to contact patient x 2. Message left for patient to please contact office with information regarding recent results.   EDUARDO Jackson

## 2024-01-02 NOTE — ED ADULT NURSE REASSESSMENT NOTE - NS ED NURSE REASSESS COMMENT FT1
Patient left and stated "I am tired I want to go home, I don't want to wait." Patient denies fever, pain with urination and blood in urine. EDUARDO Jackson notified. Patient left and stated "I am tired I want to go home, I don't want to wait." Patient denies fever, pain with urination and blood in urine. Medical screen offered, patient declined. EDUARDO Jackson notified.

## 2024-01-08 ENCOUNTER — APPOINTMENT (OUTPATIENT)
Dept: CARDIOLOGY | Facility: CLINIC | Age: 69
End: 2024-01-08
Payer: MEDICARE

## 2024-01-08 ENCOUNTER — APPOINTMENT (OUTPATIENT)
Dept: GASTROENTEROLOGY | Facility: CLINIC | Age: 69
End: 2024-01-08
Payer: MEDICARE

## 2024-01-08 VITALS
TEMPERATURE: 97.6 F | DIASTOLIC BLOOD PRESSURE: 81 MMHG | HEART RATE: 69 BPM | SYSTOLIC BLOOD PRESSURE: 124 MMHG | OXYGEN SATURATION: 98 % | BODY MASS INDEX: 24.66 KG/M2 | WEIGHT: 148 LBS | HEIGHT: 65 IN

## 2024-01-08 VITALS
DIASTOLIC BLOOD PRESSURE: 76 MMHG | HEIGHT: 65 IN | WEIGHT: 150 LBS | HEART RATE: 60 BPM | BODY MASS INDEX: 24.99 KG/M2 | OXYGEN SATURATION: 97 % | SYSTOLIC BLOOD PRESSURE: 128 MMHG

## 2024-01-08 DIAGNOSIS — K91.89 OTHER POSTPROCEDURAL COMPLICATIONS AND DISORDERS OF DIGESTIVE SYSTEM: ICD-10-CM

## 2024-01-08 DIAGNOSIS — K83.8 OTHER POSTPROCEDURAL COMPLICATIONS AND DISORDERS OF DIGESTIVE SYSTEM: ICD-10-CM

## 2024-01-08 DIAGNOSIS — Z98.890 OTHER SPECIFIED POSTPROCEDURAL STATES: ICD-10-CM

## 2024-01-08 PROCEDURE — 99214 OFFICE O/P EST MOD 30 MIN: CPT

## 2024-01-08 PROCEDURE — 93000 ELECTROCARDIOGRAM COMPLETE: CPT

## 2024-01-08 PROCEDURE — 99214 OFFICE O/P EST MOD 30 MIN: CPT | Mod: 25

## 2024-01-08 NOTE — ASSESSMENT
[FreeTextEntry1] :  in summary the patient is a 68-year-old man with a recent complicated surgical course.  Due to probable sepsis and anemia he was hypotensive.  At this time he is normotensive on the midodrine.  I have advised him to decrease the midodrine to 5 mg/day.  I have told him to continue to remain off metoprolol  I have told him to return for follow-up in 1 week's time

## 2024-01-08 NOTE — REASON FOR VISIT
[Hypertension] : hypertension [Other: ____] : [unfilled] [FreeTextEntry1] : Patient referred back by Dr. Carroll  for blood pressure management.  the patient was hospitalized for gallbladder surgery and apparently had difficulty and needed to be in the hospital for 3 weeks and was on rehab.  According to him he required blood transfusion as well.  At that time he states metoprolol was stopped and midodrine 5 mg twice per day was being done.  He feels fairly well at this time he denies chest discomfort shortness of breath palpitations dizziness or syncope.  Unfortunately hospital records cannot be accessed at this time.  There is blood work dated December 30, 2023 which reveals a hemoglobin of 10 and a hematocrit of 30.7

## 2024-01-10 ENCOUNTER — APPOINTMENT (OUTPATIENT)
Dept: ENDOCRINOLOGY | Facility: CLINIC | Age: 69
End: 2024-01-10
Payer: MEDICARE

## 2024-01-10 VITALS
RESPIRATION RATE: 17 BRPM | DIASTOLIC BLOOD PRESSURE: 80 MMHG | BODY MASS INDEX: 24.66 KG/M2 | TEMPERATURE: 97.4 F | OXYGEN SATURATION: 99 % | SYSTOLIC BLOOD PRESSURE: 116 MMHG | HEIGHT: 65 IN | HEART RATE: 88 BPM | WEIGHT: 148 LBS

## 2024-01-10 PROCEDURE — 99214 OFFICE O/P EST MOD 30 MIN: CPT

## 2024-01-10 NOTE — REASON FOR VISIT
[Follow - Up] : a follow-up visit [Hypogonadism] : hypogonadism [[employer] ] : provided by [employer]   [Interpreters_IDNumber] : [id number]

## 2024-01-10 NOTE — PHYSICAL EXAM
[de-identified] : General: No distress, well nourished Eyes: Normal Sclera, EOMI, PERRL ENT: Normal appearance of the nose, normal oropharynx Neck/Thyroid: No cervical lymphadenopathy, thyroid gland 20 g in size, no thyroid nodules, non-tender Respiratory: No use of accessory muscles of respiration, vesicular breath sounds heard bilaterally, no crepitations or ronchi Cardiovascular: S1 and S2 heard and normal, no S3 or S4, no murmurs, radial pulse normal bilaterally Abdomen: soft, non-tender, no masses, normal bowel sounds Musculoskeletal: No swelling or deformities of joints of hands, no pedal edema Neurological: Normal range of motion in the hands, Normal brachioradialis reflexes bilaterally Psychiatry: Patient converses normally, good judgement and insight Skin: No rashes in hands, no nodules palpated in hands

## 2024-01-10 NOTE — ASSESSMENT
[FreeTextEntry1] : Target - Free Testosterone level in the normal range  The SHBG level in Feb 2023 was elevated so I will monitor his free testosterone level.  Last free testosterone level was at goal.  This patient has primary hypogonadism based on labs from Feb/March 2023 - in Jan 2023 his serum ferritin was normal. Of note, the patient has a history of childhood testicular disorder.  Testicular US done in May 2023 revealed a right inguinal testis and a 3 mm hyperechoic lesion in the right testis of uncertain etiology. He followed up with Urology for this.  LABS FROM AUGUST 2023 REVEALED HYPONATREMIA AND PATIENT SAYS HE DRINKS A LOT OF FLUID - I ASKED HIM TO DECREASE HIS FLUID INTAKE AND IN OCTOBER 2023, HIS SERUM NA WAS IMPROVED  BUT STILL MILDLY LOW.   Plan: 1. Continue androgel 5 pumps topically daily 2. No labs ordered 3. Follow up in 4 weeks - patient requires an  so in-person visit is preferred.

## 2024-01-17 ENCOUNTER — APPOINTMENT (OUTPATIENT)
Dept: CARDIOLOGY | Facility: CLINIC | Age: 69
End: 2024-01-17
Payer: MEDICARE

## 2024-01-17 ENCOUNTER — NON-APPOINTMENT (OUTPATIENT)
Age: 69
End: 2024-01-17

## 2024-01-17 VITALS
HEIGHT: 65 IN | HEART RATE: 67 BPM | WEIGHT: 148 LBS | OXYGEN SATURATION: 99 % | SYSTOLIC BLOOD PRESSURE: 143 MMHG | BODY MASS INDEX: 24.66 KG/M2 | DIASTOLIC BLOOD PRESSURE: 78 MMHG

## 2024-01-17 VITALS — HEART RATE: 64 BPM | DIASTOLIC BLOOD PRESSURE: 70 MMHG | SYSTOLIC BLOOD PRESSURE: 130 MMHG

## 2024-01-17 PROCEDURE — 93000 ELECTROCARDIOGRAM COMPLETE: CPT

## 2024-01-17 PROCEDURE — 99214 OFFICE O/P EST MOD 30 MIN: CPT | Mod: 25

## 2024-01-17 NOTE — REASON FOR VISIT
[Hypertension] : hypertension [Other: ____] : [unfilled] [FreeTextEntry1] : Patient returns for follow-up.  He is doing fairly well.  At his last office visits at another physician he was normotensive.  He denies dizziness or syncope.

## 2024-01-17 NOTE — ASSESSMENT
[FreeTextEntry1] : Impression: 1.  Patient with a history of hypertension who had been on metoprolol now with recent hypotension due to sepsis post gallbladder surgery now appears to be returning to his normal state  Plan: 1.  Discontinue midodrine 2.  Continue to hold off metoprolol 3.  Return for follow-up in 2 weeks time

## 2024-01-30 ENCOUNTER — APPOINTMENT (OUTPATIENT)
Dept: CARDIOLOGY | Facility: CLINIC | Age: 69
End: 2024-01-30
Payer: MEDICARE

## 2024-01-30 VITALS
OXYGEN SATURATION: 98 % | HEART RATE: 67 BPM | WEIGHT: 143 LBS | DIASTOLIC BLOOD PRESSURE: 76 MMHG | BODY MASS INDEX: 23.82 KG/M2 | HEIGHT: 65 IN | SYSTOLIC BLOOD PRESSURE: 133 MMHG

## 2024-01-30 VITALS — SYSTOLIC BLOOD PRESSURE: 120 MMHG | HEART RATE: 60 BPM | DIASTOLIC BLOOD PRESSURE: 72 MMHG

## 2024-01-30 PROCEDURE — 99214 OFFICE O/P EST MOD 30 MIN: CPT | Mod: 25

## 2024-01-30 PROCEDURE — 93000 ELECTROCARDIOGRAM COMPLETE: CPT

## 2024-01-31 NOTE — ASSESSMENT
[FreeTextEntry1] : Impression: 1.  Patient with hypertension who had been on beta-blocker in the past.  He had been hypotensive for some time after a bout with sepsis.  At this point he is normotensive off all medication.  For now we will continue off medication.  As time goes on it is very possible that he will become hypertensive once again and the metoprolol will need to be restarted.  Will reassess in 1 month's time

## 2024-01-31 NOTE — REASON FOR VISIT
[Hypertension] : hypertension [Other: ____] : [unfilled] [FreeTextEntry1] : Patient returns for followup. Feeling well. Offers no complaints of chest discomfort shortness of breath palpitations dizziness or syncope.

## 2024-02-07 ENCOUNTER — APPOINTMENT (OUTPATIENT)
Dept: ENDOCRINOLOGY | Facility: CLINIC | Age: 69
End: 2024-02-07
Payer: MEDICARE

## 2024-02-07 VITALS
SYSTOLIC BLOOD PRESSURE: 124 MMHG | HEART RATE: 80 BPM | DIASTOLIC BLOOD PRESSURE: 64 MMHG | WEIGHT: 145 LBS | OXYGEN SATURATION: 99 % | BODY MASS INDEX: 24.16 KG/M2 | TEMPERATURE: 97.5 F | HEIGHT: 65 IN | RESPIRATION RATE: 17 BRPM

## 2024-02-07 PROCEDURE — 99214 OFFICE O/P EST MOD 30 MIN: CPT

## 2024-02-07 NOTE — ASSESSMENT
[FreeTextEntry1] : Target - Free Testosterone level in the normal range  The SHBG level in Feb 2023 was elevated so I will monitor his free testosterone level.  Last free testosterone level was at goal.  This patient has primary hypogonadism based on labs from Feb/March 2023 - in Jan 2023 his serum ferritin was normal. Of note, the patient has a history of childhood testicular disorder.  Testicular US done in May 2023 revealed a right inguinal testis and a 3 mm hyperechoic lesion in the right testis of uncertain etiology. He followed up with Urology for this.  LABS FROM AUGUST 2023 REVEALED HYPONATREMIA AND PATIENT SAYS HE DRINKS A LOT OF FLUID - I ASKED HIM TO DECREASE HIS FLUID INTAKE AND IN OCTOBER 2023, HIS SERUM NA WAS IMPROVED  BUT STILL MILDLY LOW.   Plan: 1. Continue androgel 5 pumps topically daily 2. Labs to be done in 4 weeks - see below 3. Follow up in 4 weeks - patient requires an  so in-person visit is preferred.

## 2024-02-07 NOTE — PHYSICAL EXAM
[de-identified] : General: No distress, well nourished Eyes: Normal Sclera, EOMI, PERRL ENT: Normal appearance of the nose, normal oropharynx Neck/Thyroid: No cervical lymphadenopathy, thyroid gland 20 g in size, no thyroid nodules, non-tender Respiratory: No use of accessory muscles of respiration, vesicular breath sounds heard bilaterally, no crepitations or ronchi Cardiovascular: S1 and S2 heard and normal, no S3 or S4, no murmurs, radial pulse normal bilaterally Abdomen: soft, non-tender, no masses, normal bowel sounds Musculoskeletal: No swelling or deformities of joints of hands, no pedal edema Neurological: Normal range of motion in the hands, Normal brachioradialis reflexes bilaterally Psychiatry: Patient converses normally, good judgement and insight Skin: No rashes in hands, no nodules palpated in hands

## 2024-02-07 NOTE — REASON FOR VISIT
[Follow - Up] : a follow-up visit [Hypogonadism] : hypogonadism [[employer] ] : provided by [employer]   [Interpreters_IDNumber] : [id number]

## 2024-02-26 ENCOUNTER — APPOINTMENT (OUTPATIENT)
Dept: PHYSICAL MEDICINE AND REHAB | Facility: CLINIC | Age: 69
End: 2024-02-26
Payer: MEDICARE

## 2024-02-26 ENCOUNTER — NON-APPOINTMENT (OUTPATIENT)
Age: 69
End: 2024-02-26

## 2024-02-26 VITALS
HEART RATE: 81 BPM | WEIGHT: 145 LBS | SYSTOLIC BLOOD PRESSURE: 132 MMHG | HEIGHT: 65 IN | DIASTOLIC BLOOD PRESSURE: 83 MMHG | OXYGEN SATURATION: 99 % | BODY MASS INDEX: 24.16 KG/M2 | TEMPERATURE: 97.3 F

## 2024-02-26 PROCEDURE — 99213 OFFICE O/P EST LOW 20 MIN: CPT

## 2024-02-26 NOTE — PHYSICAL EXAM
[FreeTextEntry1] : Gen - NAD, Comfortable, happy HEENT - NCAT, EOMI, CRISTY Pulm - CTAB,  No crackles Cardiovascular - RRR, S1S2 Abdomen - NT, ND +BS  Extremities - No clubbing, no cyanosis, no peripheral edema, no calf tenderness Neuro-    Cognitive - Awake, Alert, and Oriented     Motor -                    LEFT    UE - 4/5                   RIGHT UE - 5/5                   LEFT    LE - HF 5/5, KE 5/5, DF/PF 2/5- left foot drop                   RIGHT LE -  5/5       Sensory - Intact to LT    Reflexes - DTR Intact, No primitive reflexive Psychiatric - Mood stable, Affect WNL

## 2024-02-26 NOTE — HISTORY OF PRESENT ILLNESS
[FreeTextEntry1] : Mr. Benjamin Rios is a 68-year-old right-handed male patient with past medical history of sinus node disorder s/p PPM, HTN, HLD, cerebral aneurysm s/p coiling, CVA w/ residual L sided weakness, seizure disorder, type 2 diabetes on metformin and gastritis admitted to [facility] on 11/28 for worsening abdominal pain since 11/24. CT chest abd/pelvis showed distended gallbladder with gallbladder wall thickening and pericholecystic fat stranding and hemangioma in T7 vertebrae. Patient underwent lap to open cholecystectomy for acute cholecystitis on 11/30. Procedure was complicated by drainage of a small amount of bile and a large amount of pus from gallbladder. Patient was upgraded to ICU due to post-operative hypotension requiring pressors then downgraded to surgical floor. Hospital course further complicated by acute blood loss anemia, improved s/p 1 unit PRBCs. Patient also found to have bile duct leakage, requiring ERCP with stent on 12/5/23. Patient requires follow up with GI and a follow up ERCP in 4-8 weeks. Patient evaluated by PT/OT and was recommended for acute inpatient rehab. Patient was medically stable for admission to acute rehab on 12/7. His rehabilitation course was significant for good functional gains and he was COVID positive on admission to rehab and completed Remdesivir and isolation precautions. Staples were removed on RUQ by surgery on 12/15/23. he arrives today for a follow-up evaluation stating has had continued improvements.

## 2024-02-26 NOTE — ASSESSMENT
[FreeTextEntry1] : Mr. Benjamin Rios is a 68 year old right handed male patient with past medical history of sinus node disorder s/p PPM, HTN, HLD, cerebral aneurysm s/p coiling, CVA w/ residual L sided weakness, seizure disorder, type 2 diabetes on metformin and gastritis who is admitted for Acute Inpatient Rehabilitation with a multidisciplinary rehab program at [facility] with functional impairments in ADLs and mobility secondary to a complaint of worsening abdominal pain with imaging remarkable for distended gallbladder with wall thickening and pericholecystic fat stranding and hemangioma treated surgically with a lap to open cholecystectomy for acute cholecystitis on 11/30 complicated by drainage of a small amount of bile and a large amount of pus from gallbladder and subsequent requirement of ICU admission due to post-operative hypotension requiring pressors and acute blood loss anemia improved with transfusion of one unit of PRBCs. Additionally, patient was found to have bile duct leakage, requiring ERCP with stent on 12/5/23.  Plan - Continue PT as outpatient - Follow-up with PM&R in 8 weeks

## 2024-02-26 NOTE — REVIEW OF SYSTEMS
[FreeTextEntry1] : Constitutional: No fever, No Chills, No fatigue HEENT: No eye pain, No visual disturbances, No difficulty hearing Pulm: No cough, No shortness of breath Cardio: No chest pain, No palpitations GI:  + abdominal pain, No nausea, No vomiting, No diarrhea, No constipation : No dysuria, No frequency, No hematuria Neuro: No headaches, No memory loss, + loss of strength, no numbness, No tremors Skin: No itching, No rashes, No lesions  Endo: No temperature intolerance MSK: No joint pain, No joint swelling, No muscle pain, No Neck or back pain, left foot drop Psych:  No depression, + anxiety

## 2024-02-26 NOTE — REASON FOR VISIT
Pt sees Dr Ewa Díaz    Pt is calling stating that she has an appt on 8-16 for her rt knee and would like to know what she can do for the pain in the mean time    CB#[phone] or  [phone] [Follow-Up] : a follow-up visit [Spouse] : spouse

## 2024-02-28 ENCOUNTER — APPOINTMENT (OUTPATIENT)
Dept: CARDIOLOGY | Facility: CLINIC | Age: 69
End: 2024-02-28
Payer: MEDICARE

## 2024-02-28 ENCOUNTER — NON-APPOINTMENT (OUTPATIENT)
Age: 69
End: 2024-02-28

## 2024-02-28 VITALS — WEIGHT: 144 LBS | OXYGEN SATURATION: 98 % | BODY MASS INDEX: 23.99 KG/M2 | HEIGHT: 65 IN | HEART RATE: 82 BPM

## 2024-02-28 VITALS — DIASTOLIC BLOOD PRESSURE: 70 MMHG | SYSTOLIC BLOOD PRESSURE: 118 MMHG

## 2024-02-28 DIAGNOSIS — E86.1 HYPOVOLEMIA: ICD-10-CM

## 2024-02-28 PROCEDURE — 99214 OFFICE O/P EST MOD 30 MIN: CPT | Mod: 25

## 2024-02-28 PROCEDURE — 93000 ELECTROCARDIOGRAM COMPLETE: CPT

## 2024-02-28 PROCEDURE — G2211 COMPLEX E/M VISIT ADD ON: CPT | Mod: NC,1L

## 2024-02-28 NOTE — PHYSICAL EXAM
[Well Developed] : well developed [No Acute Distress] : no acute distress [Well Nourished] : well nourished [Normal Venous Pressure] : normal venous pressure [Normal Conjunctiva] : normal conjunctiva [No Carotid Bruit] : no carotid bruit [Normal S1, S2] : normal S1, S2 [No Murmur] : no murmur [No Gallop] : no gallop [No Rub] : no rub [Clear Lung Fields] : clear lung fields [Good Air Entry] : good air entry [Soft] : abdomen soft [No Respiratory Distress] : no respiratory distress  [Non Tender] : non-tender [No Masses/organomegaly] : no masses/organomegaly [Normal Bowel Sounds] : normal bowel sounds [Normal Gait] : normal gait [No Edema] : no edema [No Cyanosis] : no cyanosis [No Clubbing] : no clubbing [No Varicosities] : no varicosities [No Rash] : no rash [No Skin Lesions] : no skin lesions [Moves all extremities] : moves all extremities [No Focal Deficits] : no focal deficits [Normal Speech] : normal speech [Alert and Oriented] : alert and oriented [Normal memory] : normal memory

## 2024-02-28 NOTE — ASSESSMENT
[FreeTextEntry1] : Impression: 1.  Patient with history of hypertension who had to have his metoprolol discontinued during the perioperative period because of hypotension and who had been on midodrine for some time.  More recently he had been normotensive off both drugs given the fact that he is normotensive at this time with a blood pressure systolic of 118 would keep off antihypertensives for now and will reassess in 1 month's time

## 2024-02-28 NOTE — REASON FOR VISIT
[Hypertension] : hypertension [FreeTextEntry1] : Patient returns for follow-up he was seen by a nephrologist who found his blood pressure to be 160 systolic, told him and his wife that that was too high and began him on valsartan 160 mg/day.  Of note is the fact that in the past the patient had been on metoprolol but this was discontinued when he became hypotensive after he became ill during his perioperative period he took the medication for 5 days.  He was then told by an insurance nurse according to his wife to stop the medicine because it might be making his blood pressure too low.  Documented blood pressures at the endocrinologist office without the medication was in the normotensive range.  Apparently when he was on the medication he was seen at the [facility]NR office with a normotensive reading.  The patient has no symptoms of chest discomfort shortness of breath palpitations dizziness or syncope.

## 2024-03-06 LAB
ALBUMIN SERPL ELPH-MCNC: 4.6 G/DL
ALP BLD-CCNC: 117 U/L
ALT SERPL-CCNC: 29 U/L
ANION GAP SERPL CALC-SCNC: 12 MMOL/L
AST SERPL-CCNC: 27 U/L
BILIRUB SERPL-MCNC: <0.2 MG/DL
BUN SERPL-MCNC: 15 MG/DL
CALCIUM SERPL-MCNC: 9 MG/DL
CHLORIDE SERPL-SCNC: 94 MMOL/L
CO2 SERPL-SCNC: 25 MMOL/L
CREAT SERPL-MCNC: 0.92 MG/DL
EGFR: 90 ML/MIN/1.73M2
GLUCOSE SERPL-MCNC: 104 MG/DL
HCT VFR BLD CALC: 36.6 %
HGB BLD-MCNC: 11.9 G/DL
MCHC RBC-ENTMCNC: 32.2 PG
MCHC RBC-ENTMCNC: 32.5 GM/DL
MCV RBC AUTO: 99.2 FL
PLATELET # BLD AUTO: 204 K/UL
POTASSIUM SERPL-SCNC: 4.6 MMOL/L
PROT SERPL-MCNC: 7.1 G/DL
PSA SERPL-MCNC: 0.17 NG/ML
RBC # BLD: 3.69 M/UL
RBC # FLD: 12.6 %
SODIUM SERPL-SCNC: 132 MMOL/L
WBC # FLD AUTO: 5.39 K/UL

## 2024-03-07 LAB
TESTOST FREE SERPL-MCNC: 1.3 PG/ML
TESTOST SERPL-MCNC: 337 NG/DL

## 2024-03-13 ENCOUNTER — APPOINTMENT (OUTPATIENT)
Dept: ENDOCRINOLOGY | Facility: CLINIC | Age: 69
End: 2024-03-13
Payer: MEDICARE

## 2024-03-13 VITALS
HEART RATE: 97 BPM | WEIGHT: 146 LBS | SYSTOLIC BLOOD PRESSURE: 124 MMHG | TEMPERATURE: 97.2 F | BODY MASS INDEX: 24.32 KG/M2 | DIASTOLIC BLOOD PRESSURE: 76 MMHG | OXYGEN SATURATION: 98 % | HEIGHT: 65 IN | RESPIRATION RATE: 17 BRPM

## 2024-03-13 PROCEDURE — 99214 OFFICE O/P EST MOD 30 MIN: CPT

## 2024-03-13 NOTE — ASSESSMENT
[FreeTextEntry1] : Target - Free Testosterone level in the normal range  The SHBG level in Feb 2023 was elevated so I will monitor his free testosterone level.  Last free testosterone level was below goal but patient was not fully compliant with his testosterone - I advised the patient to be compliant with this.   This patient has primary hypogonadism based on labs from Feb/March 2023 - in Jan 2023 his serum ferritin was normal. Of note, the patient has a history of childhood testicular disorder.  Testicular US done in May 2023 revealed a right inguinal testis and a 3 mm hyperechoic lesion in the right testis of uncertain etiology. He followed up with Urology for this.  LABS FROM AUGUST 2023 REVEALED HYPONATREMIA AND PATIENT SAYS HE DRINKS A LOT OF FLUID - I ASKED HIM TO DECREASE HIS FLUID INTAKE AND IN OCTOBER 2023, HIS SERUM NA WAS IMPROVED  BUT STILL MILDLY LOW.   Plan: 1. Continue androgel 5 pumps topically daily 2. Labs to be done in 4 weeks - see below 3. Follow up in 4 weeks - patient requires an  so in-person visit is preferred.

## 2024-03-13 NOTE — PHYSICAL EXAM
[de-identified] : General: No distress, well nourished Eyes: Normal Sclera, EOMI, PERRL ENT: Normal appearance of the nose, normal oropharynx Neck/Thyroid: No cervical lymphadenopathy, thyroid gland 20 g in size, no thyroid nodules, non-tender Respiratory: No use of accessory muscles of respiration, vesicular breath sounds heard bilaterally, no crepitations or ronchi Cardiovascular: S1 and S2 heard and normal, no S3 or S4, no murmurs, radial pulse normal bilaterally Abdomen: soft, non-tender, no masses, normal bowel sounds Musculoskeletal: No swelling or deformities of joints of hands, no pedal edema Neurological: Normal range of motion in the hands, Normal brachioradialis reflexes bilaterally Psychiatry: Patient converses normally, good judgement and insight Skin: No rashes in hands, no nodules palpated in hands

## 2024-03-29 NOTE — ED PROVIDER NOTE - TEMPLATE, MLM
Physical Therapy  Facility/Department: [address] PROGRESSIVE CARE  Physical Therapy Initial Assessment    Name: Maren Meza  : 1957  MRN: [id number]  Date of Service: 3/29/2024    Discharge Recommendations:  Home with assist PRN, Home with Home health PT   PT Equipment Recommendations  Equipment Needed: No      Maren Meza scored a 19/24 on the AM-PAC short mobility form. Current research shows that an AM-PAC score of 18 or greater is typically associated with a discharge to the patient's home setting. Based on the patient's AM-PAC score and their current functional mobility deficits, it is recommended that the patient have 2-3 sessions per week of Physical Therapy at d/c to increase the patient's independence.  At this time, this patient demonstrates the endurance and safety to discharge home with Home PT and a follow up treatment frequency of 2-3x/wk.  Please see assessment section for further patient specific details.    If patient discharges prior to next session this note will serve as a discharge summary.  Please see below for the latest assessment towards goals.       Patient Diagnosis(es): The primary encounter diagnosis was Chest pain, unspecified type. Diagnoses of Elevated troponin and Acute kidney injury superimposed on chronic kidney disease (HCC) were also pertinent to this visit.  Past Medical History:  has a past medical history of Acid reflux, Anemia, Anxiety and depression, Arthritis, Asthma, Atrial fibrillation ([facility]), CAD (coronary artery disease), Cerebral artery occlusion with cerebral infarction ([facility]), CHF (congestive heart failure) ([facility]), Chronic kidney disease--stage III, COPD (chronic obstructive pulmonary disease) ([facility]), DM2 (diabetes mellitus, type 2) ([facility]), Dysarthria, Fibromyalgia, Headache(784.0), Hemisensory loss, History of blood transfusion, Hyperlipidemia, Hypertension, IBS (irritable bowel syndrome), Inferior vena cava occlusion (HCC), Keratitis, Meningioma (HCC), MI,  old, Neuropathy, Superior vena cava obstruction, Temporal arteritis (HCC), and Wears glasses.  Past Surgical History:  has a past surgical history that includes Tunneled venous port placement; Cholecystectomy; ablation of dysrhythmic focus (1999  and 11/2020); Cataract removal (Bilateral); joint replacement (Right); Hysterectomy; Artery Biopsy (Right, 04/23/2014); Coronary angioplasty with stent (2020); Knee arthroscopy (Right); Percutaneous Transluminal Coronary Angio (10/2019); Upper gastrointestinal endoscopy (N/A, 7/6/2020); Carotid artery surgery (Left); Colonoscopy (N/A, 4/9/2021); and Colonoscopy (N/A, 10/15/2021).    Assessment   Body Structures, Functions, Activity Limitations Requiring Skilled Therapeutic Intervention: Decreased functional mobility   Assessment: pt is a 67 yo female who was adm to hospitals with chest pain and likely seizure this date (3-29).  Pt at baseline lives alone and is Ind with intermittently using a rollator when her fibromyalgia is causing pain.  Pt will be safe to return home with PRN assist and home PT  Therapy Prognosis: Good  Decision Making: Medium Complexity  Requires PT Follow-Up: Yes  Activity Tolerance  Activity Tolerance: Patient tolerated evaluation without incident  Activity Tolerance Comments: limited by nausea     Plan   Physical Therapy Plan  General Plan: 3-5 times per week  Current Treatment Recommendations: Functional mobility training, Transfer training, Balance training, Gait training, Endurance training, Strengthening  Safety Devices  Type of Devices: Call light within reach, Nurse notified, Left in chair, Gait belt, Chair alarm in place     Restrictions  Restrictions/Precautions  Restrictions/Precautions: Fall Risk  Position Activity Restriction  Other position/activity restrictions: seizures     Subjective   General  Chart Reviewed: Yes  Patient assessed for rehabilitation services?: Yes  Additional Pertinent Hx: pt is a 67 yo female who was adm to hospitals with  Neuro

## 2024-04-01 ENCOUNTER — APPOINTMENT (OUTPATIENT)
Dept: CARDIOLOGY | Facility: CLINIC | Age: 69
End: 2024-04-01
Payer: MEDICARE

## 2024-04-01 ENCOUNTER — NON-APPOINTMENT (OUTPATIENT)
Age: 69
End: 2024-04-01

## 2024-04-01 VITALS — BODY MASS INDEX: 24.32 KG/M2 | OXYGEN SATURATION: 98 % | HEIGHT: 65 IN | WEIGHT: 146 LBS | HEART RATE: 71 BPM

## 2024-04-01 VITALS — HEART RATE: 64 BPM | DIASTOLIC BLOOD PRESSURE: 62 MMHG | SYSTOLIC BLOOD PRESSURE: 104 MMHG

## 2024-04-01 PROCEDURE — 93000 ELECTROCARDIOGRAM COMPLETE: CPT

## 2024-04-01 PROCEDURE — G2211 COMPLEX E/M VISIT ADD ON: CPT | Mod: NC,1L

## 2024-04-01 PROCEDURE — 99213 OFFICE O/P EST LOW 20 MIN: CPT | Mod: 25

## 2024-04-10 LAB
ALBUMIN SERPL ELPH-MCNC: 4.6 G/DL
ALP BLD-CCNC: 140 U/L
ALT SERPL-CCNC: 27 U/L
ANION GAP SERPL CALC-SCNC: 11 MMOL/L
AST SERPL-CCNC: 23 U/L
BASOPHILS # BLD AUTO: 0.03 K/UL
BASOPHILS NFR BLD AUTO: 0.6 %
BILIRUB SERPL-MCNC: 0.2 MG/DL
BUN SERPL-MCNC: 22 MG/DL
CALCIUM SERPL-MCNC: 9.2 MG/DL
CHLORIDE SERPL-SCNC: 100 MMOL/L
CO2 SERPL-SCNC: 25 MMOL/L
CREAT SERPL-MCNC: 1.05 MG/DL
EGFR: 77 ML/MIN/1.73M2
EOSINOPHIL # BLD AUTO: 0.1 K/UL
EOSINOPHIL NFR BLD AUTO: 2 %
GLUCOSE SERPL-MCNC: 136 MG/DL
HCT VFR BLD CALC: 37 %
HGB BLD-MCNC: 11.8 G/DL
IMM GRANULOCYTES NFR BLD AUTO: 0.2 %
LYMPHOCYTES # BLD AUTO: 1.82 K/UL
LYMPHOCYTES NFR BLD AUTO: 36.3 %
MAN DIFF?: NORMAL
MCHC RBC-ENTMCNC: 31.6 PG
MCHC RBC-ENTMCNC: 31.9 GM/DL
MCV RBC AUTO: 98.9 FL
MONOCYTES # BLD AUTO: 0.49 K/UL
MONOCYTES NFR BLD AUTO: 9.8 %
NEUTROPHILS # BLD AUTO: 2.56 K/UL
NEUTROPHILS NFR BLD AUTO: 51.1 %
PLATELET # BLD AUTO: 215 K/UL
POTASSIUM SERPL-SCNC: 4 MMOL/L
PROT SERPL-MCNC: 7.2 G/DL
PSA SERPL-MCNC: 0.19 NG/ML
RBC # BLD: 3.74 M/UL
RBC # FLD: 12.1 %
SODIUM SERPL-SCNC: 137 MMOL/L
WBC # FLD AUTO: 5.01 K/UL

## 2024-04-12 LAB
TESTOST FREE SERPL-MCNC: 0.1 PG/ML
TESTOST SERPL-MCNC: 481 NG/DL

## 2024-04-15 ENCOUNTER — APPOINTMENT (OUTPATIENT)
Dept: PHYSICAL MEDICINE AND REHAB | Facility: CLINIC | Age: 69
End: 2024-04-15
Payer: MEDICARE

## 2024-04-15 VITALS
BODY MASS INDEX: 23.32 KG/M2 | DIASTOLIC BLOOD PRESSURE: 77 MMHG | WEIGHT: 140 LBS | SYSTOLIC BLOOD PRESSURE: 120 MMHG | OXYGEN SATURATION: 98 % | HEART RATE: 74 BPM | HEIGHT: 65 IN | TEMPERATURE: 98.1 F

## 2024-04-15 DIAGNOSIS — Z74.09 OTHER REDUCED MOBILITY: ICD-10-CM

## 2024-04-15 DIAGNOSIS — Z78.9 OTHER SPECIFIED HEALTH STATUS: ICD-10-CM

## 2024-04-15 DIAGNOSIS — Z78.9 OTHER REDUCED MOBILITY: ICD-10-CM

## 2024-04-15 PROCEDURE — 99213 OFFICE O/P EST LOW 20 MIN: CPT

## 2024-04-17 ENCOUNTER — APPOINTMENT (OUTPATIENT)
Dept: ENDOCRINOLOGY | Facility: CLINIC | Age: 69
End: 2024-04-17
Payer: MEDICARE

## 2024-04-17 VITALS
DIASTOLIC BLOOD PRESSURE: 80 MMHG | WEIGHT: 140 LBS | RESPIRATION RATE: 18 BRPM | HEART RATE: 80 BPM | SYSTOLIC BLOOD PRESSURE: 144 MMHG | TEMPERATURE: 97.3 F | BODY MASS INDEX: 23.32 KG/M2 | HEIGHT: 65 IN | OXYGEN SATURATION: 99 %

## 2024-04-17 PROCEDURE — 99214 OFFICE O/P EST MOD 30 MIN: CPT

## 2024-04-17 RX ORDER — TESTOSTERONE 16.2 MG/G
20.25 MG/ACT GEL TRANSDERMAL
Qty: 3 | Refills: 0 | Status: ACTIVE | COMMUNITY
Start: 2023-03-17 | End: 1900-01-01

## 2024-04-17 NOTE — ASSESSMENT
[FreeTextEntry1] : Target - Free Testosterone level in the normal range  The SHBG level in Feb 2023 was elevated so I will monitor his free testosterone level.  Last free testosterone level was below goal but this is not a fully accurate test so I ordered a Free testosterone level via equilibrium dialysis.  This patient has primary hypogonadism based on labs from Feb/March 2023 - in Jan 2023 his serum ferritin was normal. Of note, the patient has a history of childhood testicular disorder.  Testicular US done in May 2023 revealed a right inguinal testis and a 3 mm hyperechoic lesion in the right testis of uncertain etiology. He followed up with Urology for this.  LABS FROM AUGUST 2023 REVEALED HYPONATREMIA AND PATIENT SAYS HE DRINKS A LOT OF FLUID - I ASKED HIM TO DECREASE HIS FLUID INTAKE AND IN OCTOBER 2023, HIS SERUM NA WAS IMPROVED  BUT STILL MILDLY LOW - Sodium level was normal in April 2024.    Plan: 1. Continue androgel 5 pumps topically daily 2. Labs to be done today - Free testosterone level via equilibrium dialysis.  3. Follow up in 4 weeks - patient requires an  so in-person visit is preferred.

## 2024-04-17 NOTE — PHYSICAL EXAM
[de-identified] : General: No distress, well nourished Eyes: Normal Sclera, EOMI, PERRL ENT: Normal appearance of the nose, normal oropharynx Neck/Thyroid: No cervical lymphadenopathy, thyroid gland 20 g in size, no thyroid nodules, non-tender Respiratory: No use of accessory muscles of respiration, vesicular breath sounds heard bilaterally, no crepitations or ronchi Cardiovascular: S1 and S2 heard and normal, no S3 or S4, no murmurs, radial pulse normal bilaterally Abdomen: soft, non-tender, no masses, normal bowel sounds Musculoskeletal: No swelling or deformities of joints of hands, no pedal edema Neurological: Normal range of motion in the hands, Normal brachioradialis reflexes bilaterally Psychiatry: Patient converses normally, good judgement and insight Skin: No rashes in hands, no nodules palpated in hands

## 2024-04-19 ENCOUNTER — APPOINTMENT (OUTPATIENT)
Dept: OTOLARYNGOLOGY | Facility: CLINIC | Age: 69
End: 2024-04-19

## 2024-04-19 VITALS — TEMPERATURE: 97.2 F | HEART RATE: 90 BPM | OXYGEN SATURATION: 96 %

## 2024-04-19 DIAGNOSIS — H61.23 IMPACTED CERUMEN, BILATERAL: ICD-10-CM

## 2024-04-19 DIAGNOSIS — H91.8X1 OTHER SPECIFIED HEARING LOSS, RIGHT EAR: ICD-10-CM

## 2024-04-19 PROCEDURE — 99203 OFFICE O/P NEW LOW 30 MIN: CPT | Mod: 25

## 2024-04-19 PROCEDURE — 69210 REMOVE IMPACTED EAR WAX UNI: CPT | Mod: 50

## 2024-04-22 ENCOUNTER — NON-APPOINTMENT (OUTPATIENT)
Age: 69
End: 2024-04-22

## 2024-04-27 ENCOUNTER — APPOINTMENT (OUTPATIENT)
Dept: RADIOLOGY | Facility: HOSPITAL | Age: 69
End: 2024-04-27
Payer: MEDICARE

## 2024-04-27 ENCOUNTER — OUTPATIENT (OUTPATIENT)
Dept: OUTPATIENT SERVICES | Facility: HOSPITAL | Age: 69
LOS: 1 days | End: 2024-04-27
Payer: MEDICARE

## 2024-04-27 DIAGNOSIS — Z98.49 CATARACT EXTRACTION STATUS, UNSPECIFIED EYE: Chronic | ICD-10-CM

## 2024-04-27 DIAGNOSIS — Z00.8 ENCOUNTER FOR OTHER GENERAL EXAMINATION: ICD-10-CM

## 2024-04-27 DIAGNOSIS — Z98.89 OTHER SPECIFIED POSTPROCEDURAL STATES: Chronic | ICD-10-CM

## 2024-04-27 DIAGNOSIS — Z95.0 PRESENCE OF CARDIAC PACEMAKER: Chronic | ICD-10-CM

## 2024-04-27 PROCEDURE — 77080 DXA BONE DENSITY AXIAL: CPT | Mod: 26

## 2024-04-27 PROCEDURE — 77080 DXA BONE DENSITY AXIAL: CPT

## 2024-04-29 ENCOUNTER — NON-APPOINTMENT (OUTPATIENT)
Age: 69
End: 2024-04-29

## 2024-04-29 ENCOUNTER — OUTPATIENT (OUTPATIENT)
Dept: OUTPATIENT SERVICES | Facility: HOSPITAL | Age: 69
LOS: 1 days | End: 2024-04-29
Payer: MEDICARE

## 2024-04-29 ENCOUNTER — APPOINTMENT (OUTPATIENT)
Dept: GASTROENTEROLOGY | Facility: HOSPITAL | Age: 69
End: 2024-04-29

## 2024-04-29 DIAGNOSIS — Z98.89 OTHER SPECIFIED POSTPROCEDURAL STATES: Chronic | ICD-10-CM

## 2024-04-29 DIAGNOSIS — Z98.890 OTHER SPECIFIED POSTPROCEDURAL STATES: ICD-10-CM

## 2024-04-29 DIAGNOSIS — Z95.0 PRESENCE OF CARDIAC PACEMAKER: Chronic | ICD-10-CM

## 2024-04-29 DIAGNOSIS — Z98.890 OTHER SPECIFIED POSTPROCEDURAL STATES: Chronic | ICD-10-CM

## 2024-04-29 DIAGNOSIS — Z98.49 CATARACT EXTRACTION STATUS, UNSPECIFIED EYE: Chronic | ICD-10-CM

## 2024-04-29 DIAGNOSIS — K91.89 OTHER POSTPROCEDURAL COMPLICATIONS AND DISORDERS OF DIGESTIVE SYSTEM: ICD-10-CM

## 2024-04-29 LAB
% FREE TESTOSTERONE (DIALYSIS): 1.4 %
% FREE TESTOSTERONE - ESO: 1.4 %
FREE TESTOSTERONE - ESO: 52 PG/ML
FREE TESTOSTERONE, SERUM: 32 PG/ML
SHBG-ESOTERIX: 51.2 NMOL/L
TESTOSTERONE SERUM - ESO: 373 NG/DL
TESTOSTERONE: 229 NG/DL

## 2024-04-29 PROCEDURE — 43264 ERCP REMOVE DUCT CALCULI: CPT

## 2024-04-29 PROCEDURE — 43275 ERCP REMOVE FORGN BODY DUCT: CPT | Mod: 59

## 2024-04-29 PROCEDURE — 43275 ERCP REMOVE FORGN BODY DUCT: CPT

## 2024-04-29 PROCEDURE — 74330 X-RAY BILE/PANC ENDOSCOPY: CPT

## 2024-04-29 PROCEDURE — C1773: CPT

## 2024-04-29 DEVICE — HYDRATOME 44: Type: IMPLANTABLE DEVICE | Status: FUNCTIONAL

## 2024-04-29 DEVICE — CATH BLLN BIL RX 9-12CM: Type: IMPLANTABLE DEVICE | Status: FUNCTIONAL

## 2024-04-29 RX ORDER — CEFAZOLIN SODIUM 1 G
2000 VIAL (EA) INJECTION ONCE
Refills: 0 | Status: COMPLETED | OUTPATIENT
Start: 2024-04-29 | End: 2024-04-29

## 2024-04-29 RX ORDER — SODIUM CHLORIDE 9 MG/ML
500 INJECTION INTRAMUSCULAR; INTRAVENOUS; SUBCUTANEOUS
Refills: 0 | Status: COMPLETED | OUTPATIENT
Start: 2024-04-29 | End: 2024-04-29

## 2024-04-29 RX ADMIN — SODIUM CHLORIDE 75 MILLILITER(S): 9 INJECTION INTRAMUSCULAR; INTRAVENOUS; SUBCUTANEOUS at 15:39

## 2024-04-30 NOTE — REVIEW OF SYSTEMS
[FreeTextEntry2] : Constitutional: No Fever Eyes: No visual difficulty ENT: no difficulty hearing Cardiovascular: No palpitations Respiratory: No Cough Gastrointestinal: No nausea Genitourinary: No dysuria Musculoskeletal: No joint pain Neurological: No headaches Psychiatry: No sleep abnormalities Skin: No rashes Hematology: No bleeding 
10.7

## 2024-05-01 ENCOUNTER — APPOINTMENT (OUTPATIENT)
Dept: CARDIOLOGY | Facility: CLINIC | Age: 69
End: 2024-05-01
Payer: MEDICARE

## 2024-05-01 PROCEDURE — 93288 INTERROG EVL PM/LDLS PM IP: CPT

## 2024-05-01 NOTE — PROCEDURE
[Voltage: ___ volts] : Voltage was [unfilled] volts [Longevity: ___ months] : The estimated remaining battery life is [unfilled] months [Lead Imp:  ___ohms] : lead impedance was [unfilled] ohms [Sensing Amplitude ___mv] : sensing amplitude was [unfilled] mv [___V @] : [unfilled] V [___ ms] : [unfilled] ms [Asense-Vsense ___ %] : Asense-Vsense [unfilled]% [Asense-Vpace ___ %] : Asense-Vpace [unfilled]% [Apace-Vsense ___ %] : Apace-Vsense [unfilled]% [de-identified] : [employer] [de-identified] : ADVISA  MRI [id number] [de-identified] : 12/23/2016 [de-identified] : AAIR-DDDR [de-identified] :  [de-identified] : NO EVENTS\par  NORMAL FUNCTION

## 2024-05-20 ENCOUNTER — APPOINTMENT (OUTPATIENT)
Dept: ENDOCRINOLOGY | Facility: CLINIC | Age: 69
End: 2024-05-20
Payer: MEDICARE

## 2024-05-20 VITALS
DIASTOLIC BLOOD PRESSURE: 66 MMHG | WEIGHT: 142 LBS | BODY MASS INDEX: 23.66 KG/M2 | SYSTOLIC BLOOD PRESSURE: 110 MMHG | OXYGEN SATURATION: 98 % | HEART RATE: 77 BPM | RESPIRATION RATE: 14 BRPM | TEMPERATURE: 97.3 F | HEIGHT: 65 IN

## 2024-05-20 DIAGNOSIS — Z79.890 HORMONE REPLACEMENT THERAPY: ICD-10-CM

## 2024-05-20 DIAGNOSIS — E29.1 TESTICULAR HYPOFUNCTION: ICD-10-CM

## 2024-05-20 PROCEDURE — 99214 OFFICE O/P EST MOD 30 MIN: CPT

## 2024-05-20 NOTE — REASON FOR VISIT
[Follow - Up] : a follow-up visit [Hypogonadism] : hypogonadism [[employer] ] : provided by [employer]   [Interpreters_IDNumber] : [id number]

## 2024-05-20 NOTE — REVIEW OF SYSTEMS
[FreeTextEntry2] : Constitutional: No Fever Eyes: No visual difficulty ENT: no difficulty hearing Cardiovascular: No palpitations Respiratory: No Cough Gastrointestinal: No nausea Genitourinary: No dysuria Musculoskeletal: No joint pain Neurological: No headaches Psychiatry: No sleep abnormalities Skin: No rashes Hematology: No bleeding 
Home

## 2024-05-20 NOTE — ASSESSMENT
[FreeTextEntry1] : Target - Free Testosterone level in the normal range  The SHBG level in Feb 2023 was elevated so I will monitor his free testosterone level.  Last esoterix free testosterone level done in April 2024 was normal.   This patient has primary hypogonadism based on labs from Feb/March 2023 - in Jan 2023 his serum ferritin was normal. Of note, the patient has a history of childhood testicular disorder.  Testicular US done in May 2023 revealed a right inguinal testis and a 3 mm hyperechoic lesion in the right testis of uncertain etiology. He followed up with Urology for this.  LABS FROM AUGUST 2023 REVEALED HYPONATREMIA AND PATIENT SAYS HE DRINKS A LOT OF FLUID - I ASKED HIM TO DECREASE HIS FLUID INTAKE AND IN OCTOBER 2023, HIS SERUM NA WAS IMPROVED  BUT STILL MILDLY LOW - Sodium level was normal in April 2024.   Plan: 1. Continue androgel 5 pumps topically daily - patient says he has medication for one month so he did not need a prescription - he may not be fully compliant with his testosterone then as he should not have extra testosterone at home. Patient counseled on compliance with this. Patient says he has six bottles of testosterone at home. 2. No labs ordered  3. Follow up in 8 weeks - patient requires an  so in-person visit is preferred.

## 2024-05-20 NOTE — PHYSICAL EXAM
[de-identified] : General: No distress, well nourished Eyes: Normal Sclera, EOMI, PERRL ENT: Normal appearance of the nose, normal oropharynx Neck/Thyroid: No cervical lymphadenopathy, thyroid gland 20 g in size, no thyroid nodules, non-tender Respiratory: No use of accessory muscles of respiration, vesicular breath sounds heard bilaterally, no crepitations or ronchi Cardiovascular: S1 and S2 heard and normal, no S3 or S4, no murmurs, radial pulse normal bilaterally Abdomen: soft, non-tender, no masses, normal bowel sounds Musculoskeletal: No swelling or deformities of joints of hands, no pedal edema Neurological: Normal range of motion in the hands, Normal brachioradialis reflexes bilaterally Psychiatry: Patient converses normally, good judgement and insight Skin: No rashes in hands, no nodules palpated in hands

## 2024-06-03 NOTE — PHYSICAL THERAPY INITIAL EVALUATION ADULT - MARITAL STATUS
Spouse called with medication question on Metoprolol 50 mg daily. Med rec with spouse. Please see updated abstraction in chart.They report not having medication list at last visit 05/2024.   Single

## 2024-06-10 ENCOUNTER — APPOINTMENT (OUTPATIENT)
Dept: CARDIOLOGY | Facility: CLINIC | Age: 69
End: 2024-06-10
Payer: MEDICARE

## 2024-06-10 VITALS — SYSTOLIC BLOOD PRESSURE: 110 MMHG | DIASTOLIC BLOOD PRESSURE: 80 MMHG | HEART RATE: 72 BPM

## 2024-06-10 DIAGNOSIS — I95.0 IDIOPATHIC HYPOTENSION: ICD-10-CM

## 2024-06-10 DIAGNOSIS — I10 ESSENTIAL (PRIMARY) HYPERTENSION: ICD-10-CM

## 2024-06-10 PROBLEM — Z78.9 IMPAIRED ACTIVITIES OF DAILY LIVING: Status: ACTIVE | Noted: 2024-04-15

## 2024-06-10 PROBLEM — Z74.09 IMPAIRED MOBILITY AND ADLS: Status: ACTIVE | Noted: 2024-02-26

## 2024-06-10 PROCEDURE — 93000 ELECTROCARDIOGRAM COMPLETE: CPT

## 2024-06-10 PROCEDURE — 99213 OFFICE O/P EST LOW 20 MIN: CPT | Mod: 25

## 2024-06-10 NOTE — HISTORY OF PRESENT ILLNESS
[FreeTextEntry1] : Mr. Benjamin Rios is a 68-year-old right-handed male patient with past medical history of sinus node disorder s/p PPM, HTN, HLD, cerebral aneurysm s/p coiling, CVA w/ residual L sided weakness, seizure disorder, type 2 diabetes on metformin and gastritis admitted to [facility] on 11/28 for worsening abdominal pain since 11/24. CT chest abd/pelvis showed distended gallbladder with gallbladder wall thickening and pericholecystic fat stranding and hemangioma in T7 vertebrae. Patient underwent lap to open cholecystectomy for acute cholecystitis on 11/30. Procedure was complicated by drainage of a small amount of bile and a large amount of pus from gallbladder. Patient was upgraded to ICU due to post-operative hypotension requiring pressors then downgraded to surgical floor. Hospital course further complicated by acute blood loss anemia, improved s/p 1 unit PRBCs. Patient also found to have bile duct leakage, requiring ERCP with stent on 12/5/23. Patient requires follow up with GI and a follow up ERCP in 4-8 weeks. Patient evaluated by PT/OT and was recommended for acute inpatient rehab. Patient was medically stable for admission to acute rehab on 12/7. His rehabilitation course was significant for good functional gains and he was COVID positive on admission to rehab and completed Remdesivir and isolation precautions. Staples were removed on RUQ by surgery on 12/15/23. He arrives today for a follow-up evaluation stating has had continued improvements.

## 2024-06-10 NOTE — PHYSICAL EXAM
[FreeTextEntry1] : Gen - NAD, Comfortable, happy HEENT - NCAT, EOMI, CRISTY Pulm - CTAB, No crackles Cardiovascular - RRR, S1S2 Abdomen - NT, ND +BS Extremities - No clubbing, no cyanosis, no peripheral edema, no calf tenderness Neuro-  Cognitive - Awake, Alert, and Oriented  Motor -  LEFT UE - 4/5  RIGHT UE - 5/5  LEFT LE - HF 5/5, KE 5/5, DF/PF 2/5- left foot drop  RIGHT LE - 5/5  Sensory - Intact to LT  Reflexes - DTR Intact, No primitive reflexive Psychiatric - Mood stable, Affect WNL.

## 2024-06-10 NOTE — ASSESSMENT
[FreeTextEntry1] : Impression: 1 patient with a history of hypertension, hypotensive following surgery, normotensive without medication for the last several weeks no symptoms  Plan: 1.  Continue off all antihypertensives 2.  Will reassess in 2 months time

## 2024-06-10 NOTE — REASON FOR VISIT
[Hypertension] : hypertension [Other: ____] : [unfilled] [FreeTextEntry1] : Patient returns for followup. Feeling well. Offers no complaints of chest discomfort shortness of breath palpitations dizziness or syncope.  He remains off all antihypertensives

## 2024-06-10 NOTE — ASSESSMENT
[FreeTextEntry1] : Mr. Benjamin Rios is a 68 year old right handed male patient with past medical history of sinus node disorder s/p PPM, HTN, HLD, cerebral aneurysm s/p coiling, CVA w/ residual L sided weakness, seizure disorder, type 2 diabetes on metformin and gastritis who is admitted for Acute Inpatient Rehabilitation with a multidisciplinary rehab program at [facility] with functional impairments in ADLs and mobility secondary to a complaint of worsening abdominal pain with imaging remarkable for distended gallbladder with wall thickening and pericholecystic fat stranding and hemangioma treated surgically with a lap to open cholecystectomy for acute cholecystitis on 11/30 complicated by drainage of a small amount of bile and a large amount of pus from gallbladder and subsequent requirement of ICU admission due to post-operative hypotension requiring pressors and acute blood loss anemia improved with transfusion of one unit of PRBCs. Additionally, patient was found to have bile duct leakage, requiring ERCP with stent on 12/5/23. Has persisted with functional improvement.  Plan - Continue PT as outpatient - Follow-up with PM&R in 8 weeks.

## 2024-06-10 NOTE — REVIEW OF SYSTEMS
[FreeTextEntry1] : Constitutional: No fever, No Chills, No fatigue HEENT: No eye pain, No visual disturbances, No difficulty hearing Pulm: No cough, No shortness of breath Cardio: No chest pain, No palpitations GI: + abdominal pain, No nausea, No vomiting, No diarrhea, No constipation : No dysuria, No frequency, No hematuria Neuro: No headaches, No memory loss, + loss of strength, no numbness, No tremors Skin: No itching, No rashes, No lesions Endo: No temperature intolerance MSK: No joint pain, No joint swelling, No muscle pain, No Neck or back pain, left foot drop Psych: No depression, + anxiety.

## 2024-06-17 ENCOUNTER — APPOINTMENT (OUTPATIENT)
Dept: PHYSICAL MEDICINE AND REHAB | Facility: CLINIC | Age: 69
End: 2024-06-17
Payer: MEDICARE

## 2024-06-17 VITALS
TEMPERATURE: 97.9 F | HEIGHT: 65 IN | WEIGHT: 145 LBS | DIASTOLIC BLOOD PRESSURE: 73 MMHG | SYSTOLIC BLOOD PRESSURE: 114 MMHG | OXYGEN SATURATION: 98 % | BODY MASS INDEX: 24.16 KG/M2 | HEART RATE: 82 BPM

## 2024-06-17 DIAGNOSIS — Z87.19 PERSONAL HISTORY OF OTHER DISEASES OF THE DIGESTIVE SYSTEM: ICD-10-CM

## 2024-06-17 DIAGNOSIS — R53.81 OTHER MALAISE: ICD-10-CM

## 2024-06-17 PROCEDURE — 99213 OFFICE O/P EST LOW 20 MIN: CPT

## 2024-06-17 NOTE — PHYSICAL EXAM
[FreeTextEntry1] : Physical Exam    Gen - NAD, Comfortable, happy HEENT - NCAT, EOMI, CRISTY Pulm - CTAB, No crackles Cardiovascular - RRR, S1S2 Abdomen - NT, ND +BS Extremities - No clubbing, no cyanosis, no peripheral edema, no calf tenderness Neuro-  Cognitive - Awake, Alert, and Oriented  Motor -      LEFT UE - 5/5      RIGHT UE - 5/5      LEFT LE - HF 5/5, KE 5/5, DF/PF 2/5- left foot drop      RIGHT LE - 5/5  Sensory - Intact to LT  Reflexes - DTR Intact, No primitive reflexive Psychiatric - Mood stable, Affect WNL.

## 2024-06-17 NOTE — REVIEW OF SYSTEMS
[FreeTextEntry1] : Constitutional: No fever, No Chills, No fatigue HEENT: No eye pain, No visual disturbances, No difficulty hearing Pulm: No cough, No shortness of breath Cardio: No chest pain, No palpitations GI: + abdominal pain, No nausea, No vomiting, No diarrhea, No constipation : No dysuria, No frequency, No hematuria Neuro: No headaches, No memory loss, no numbness, No tremors Skin: No itching, No rashes, No lesions Endo: No temperature intolerance MSK: No joint pain, No joint swelling, No muscle pain, No Neck or back pain, left foot drop Psych: No depression, + anxiety.

## 2024-06-17 NOTE — HISTORY OF PRESENT ILLNESS
[FreeTextEntry1] : Mr. Benjamin Rios is a 68-year-old right-handed male patient with past medical history of sinus node disorder s/p PPM, HTN, HLD, cerebral aneurysm s/p coiling, CVA w/ residual L sided weakness, seizure disorder, type 2 diabetes on metformin and gastritis admitted to [facility] on 11/28 for worsening abdominal pain since 11/24. CT chest abd/pelvis showed distended gallbladder with gallbladder wall thickening and pericholecystic fat stranding and hemangioma in T7 vertebrae. Patient underwent lap to open cholecystectomy for acute cholecystitis on 11/30. Procedure was complicated by drainage of a small amount of bile and a large amount of pus from gallbladder. Patient was upgraded to ICU due to post-operative hypotension requiring pressors then downgraded to surgical floor. Hospital course further complicated by acute blood loss anemia, improved s/p 1 unit PRBCs. Patient also found to have bile duct leakage, requiring ERCP with stent on 12/5/23. Patient requires follow up with GI and a follow up ERCP in 4-8 weeks. Patient evaluated by PT/OT and was recommended for acute inpatient rehab. Patient was medically stable for admission to acute rehab on 12/7. His rehabilitation course was significant for good functional gains and he was COVID positive on admission to rehab and completed Remdesivir and isolation precautions. Staples were removed on RUQ by surgery on 12/15/23. He arrives today for additional follow-up noting continued improvements. He states his most recent therapy session was on 6/14/24. He receives [facility] 6 hours per day on weekdays and 5.5 hours on weekends. His perception is of return to baseline.

## 2024-06-17 NOTE — ASSESSMENT
[FreeTextEntry1] : Mr. Benjamin Rios is a 68 year old right handed male patient with past medical history of sinus node disorder s/p PPM, HTN, HLD, cerebral aneurysm s/p coiling, CVA w/ residual L sided weakness, seizure disorder, type 2 diabetes on metformin and gastritis who is admitted for Acute Inpatient Rehabilitation with a multidisciplinary rehab program at [facility] with functional impairments in ADLs and mobility secondary to a complaint of worsening abdominal pain with imaging remarkable for distended gallbladder with wall thickening and pericholecystic fat stranding and hemangioma treated surgically with a lap to open cholecystectomy for acute cholecystitis on 11/30 complicated by drainage of a small amount of bile and a large amount of pus from gallbladder and subsequent requirement of ICU admission due to post-operative hypotension requiring pressors and acute blood loss anemia improved with transfusion of one unit of PRBCs. Additionally, patient was found to have bile duct leakage, requiring ERCP with stent on 12/5/23. Has persisted with functional improvement now up to premorbid baseline.  Plan - Continue daily activities as usual - Follow-up with PM&R as-needed

## 2024-06-17 NOTE — DISCHARGE NOTE NURSING/CASE MANAGEMENT/SOCIAL WORK - NSDCPEPTCAREGIVEDUMATLIST _GEN_ALL_CORE
Diabetes/Coronavirus/COVID19 Is This A New Presentation, Or A Follow-Up?: Skin Lesion Has Your Skin Lesion Been Treated?: not been treated

## 2024-07-22 ENCOUNTER — APPOINTMENT (OUTPATIENT)
Dept: ENDOCRINOLOGY | Facility: CLINIC | Age: 69
End: 2024-07-22
Payer: MEDICARE

## 2024-07-22 VITALS
BODY MASS INDEX: 23.66 KG/M2 | OXYGEN SATURATION: 97 % | SYSTOLIC BLOOD PRESSURE: 110 MMHG | HEIGHT: 65 IN | DIASTOLIC BLOOD PRESSURE: 72 MMHG | TEMPERATURE: 95.7 F | HEART RATE: 85 BPM | RESPIRATION RATE: 14 BRPM | WEIGHT: 142 LBS

## 2024-07-22 DIAGNOSIS — E29.1 TESTICULAR HYPOFUNCTION: ICD-10-CM

## 2024-07-22 DIAGNOSIS — Z79.890 HORMONE REPLACEMENT THERAPY: ICD-10-CM

## 2024-07-22 PROCEDURE — 99214 OFFICE O/P EST MOD 30 MIN: CPT

## 2024-07-22 RX ORDER — TAMSULOSIN HYDROCHLORIDE 0.4 MG/1
0.4 CAPSULE ORAL
Refills: 0 | Status: ACTIVE | COMMUNITY

## 2024-07-22 NOTE — ASSESSMENT
[FreeTextEntry1] : Target - Free Testosterone level in the normal range  The SHBG level in Feb 2023 was elevated so I will monitor his free testosterone level.  Last esoterix free testosterone level done in April 2024 was normal.  This patient has primary hypogonadism based on labs from Feb/March 2023 - in Jan 2023 his serum ferritin was normal. Of note, the patient has a history of childhood testicular disorder.  Testicular US done in May 2023 revealed a right inguinal testis and a 3 mm hyperechoic lesion in the right testis of uncertain etiology. He followed up with Urology for this.  LABS FROM AUGUST 2023 REVEALED HYPONATREMIA AND PATIENT SAYS HE DRINKS A LOT OF FLUID - I ASKED HIM TO DECREASE HIS FLUID INTAKE AND IN OCTOBER 2023, HIS SERUM NA WAS IMPROVED  BUT STILL MILDLY LOW - Sodium level was normal in April 2024.   Plan: 1. Continue androgel 5 pumps topically daily - patient says he has medication for one month - he may not have been fully compliant with his testosterone in the past but he appears to be fully compliant now.   2. Labs to be done in 6 weeks - see below 3. Follow up in 6 weeks - patient requires an  so in-person visit is preferred.

## 2024-07-22 NOTE — PHYSICAL EXAM
[de-identified] : General: No distress, well nourished Eyes: Normal Sclera, EOMI, PERRL ENT: Normal appearance of the nose, normal oropharynx Neck/Thyroid: No cervical lymphadenopathy, thyroid gland 20 g in size, no thyroid nodules, non-tender Respiratory: No use of accessory muscles of respiration, vesicular breath sounds heard bilaterally, no crepitations or ronchi Cardiovascular: S1 and S2 heard and normal, no S3 or S4, no murmurs, radial pulse normal bilaterally Abdomen: soft, non-tender, no masses, normal bowel sounds Musculoskeletal: No swelling or deformities of joints of hands, no pedal edema Neurological: Normal range of motion in the hands, Normal brachioradialis reflexes bilaterally Psychiatry: Patient converses normally, good judgement and insight Skin: No rashes in hands, no nodules palpated in hands

## 2024-08-12 ENCOUNTER — APPOINTMENT (OUTPATIENT)
Dept: RADIOLOGY | Facility: HOSPITAL | Age: 69
End: 2024-08-12
Payer: MEDICARE

## 2024-08-12 PROCEDURE — 72110 X-RAY EXAM L-2 SPINE 4/>VWS: CPT | Mod: 26

## 2024-08-28 ENCOUNTER — APPOINTMENT (OUTPATIENT)
Dept: ENDOCRINOLOGY | Facility: CLINIC | Age: 69
End: 2024-08-28
Payer: MEDICARE

## 2024-08-28 VITALS
RESPIRATION RATE: 17 BRPM | DIASTOLIC BLOOD PRESSURE: 76 MMHG | HEIGHT: 65 IN | SYSTOLIC BLOOD PRESSURE: 124 MMHG | OXYGEN SATURATION: 98 % | BODY MASS INDEX: 24.16 KG/M2 | TEMPERATURE: 97.5 F | HEART RATE: 87 BPM | WEIGHT: 145 LBS

## 2024-08-28 DIAGNOSIS — E29.1 TESTICULAR HYPOFUNCTION: ICD-10-CM

## 2024-08-28 DIAGNOSIS — Z79.890 HORMONE REPLACEMENT THERAPY: ICD-10-CM

## 2024-08-28 PROCEDURE — 99214 OFFICE O/P EST MOD 30 MIN: CPT

## 2024-08-28 NOTE — ASSESSMENT
[FreeTextEntry1] : Target - Free Testosterone level in the normal range  The SHBG level in Feb 2023 was elevated so I will monitor his esoterix free testosterone level.  Last esoterix free testosterone level done in April 2024 was normal.  This patient has primary hypogonadism based on labs from Feb/March 2023 - in Jan 2023 his serum ferritin was normal. Of note, the patient has a history of childhood testicular disorder.  Testicular US done in May 2023 revealed a right inguinal testis and a 3 mm hyperechoic lesion in the right testis of uncertain etiology. He followed up with Urology for this.  LABS FROM AUGUST 2023 REVEALED HYPONATREMIA AND PATIENT SAYS HE DRINKS A LOT OF FLUID - I ASKED HIM TO DECREASE HIS FLUID INTAKE AND IN OCTOBER 2023, HIS SERUM NA WAS IMPROVED  BUT STILL MILDLY LOW - Sodium level was normal in April 2024.   Plan: 1. Continue androgel 5 pumps topically daily - patient says he three bottles of testosterone left so he did not need a prescription 2. No labs ordered 3. Follow up in 4 weeks - in person visit

## 2024-08-28 NOTE — PHYSICAL EXAM
[de-identified] : General: No distress, well nourished Eyes: Normal Sclera, EOMI, PERRL ENT: Normal appearance of the nose, normal oropharynx Neck/Thyroid: No cervical lymphadenopathy, thyroid gland 20 g in size, no thyroid nodules, non-tender Respiratory: No use of accessory muscles of respiration, vesicular breath sounds heard bilaterally, no crepitations or ronchi Cardiovascular: S1 and S2 heard and normal, no S3 or S4, no murmurs, radial pulse normal bilaterally Abdomen: soft, non-tender, no masses, normal bowel sounds Musculoskeletal: No swelling or deformities of joints of hands, no pedal edema Neurological: Normal range of motion in the hands, Normal brachioradialis reflexes bilaterally Psychiatry: Patient converses normally, good judgement and insight Skin: No rashes in hands, no nodules palpated in hands

## 2024-09-09 ENCOUNTER — APPOINTMENT (OUTPATIENT)
Dept: CARDIOLOGY | Facility: CLINIC | Age: 69
End: 2024-09-09
Payer: MEDICARE

## 2024-09-09 ENCOUNTER — NON-APPOINTMENT (OUTPATIENT)
Age: 69
End: 2024-09-09

## 2024-09-09 VITALS — BODY MASS INDEX: 24.49 KG/M2 | HEIGHT: 65 IN | WEIGHT: 147 LBS | HEART RATE: 88 BPM | OXYGEN SATURATION: 100 %

## 2024-09-09 VITALS — DIASTOLIC BLOOD PRESSURE: 70 MMHG | HEART RATE: 84 BPM | SYSTOLIC BLOOD PRESSURE: 110 MMHG

## 2024-09-09 PROCEDURE — 99213 OFFICE O/P EST LOW 20 MIN: CPT | Mod: 25

## 2024-09-09 PROCEDURE — 93000 ELECTROCARDIOGRAM COMPLETE: CPT

## 2024-09-09 NOTE — ASSESSMENT
[FreeTextEntry1] : Impression: 1 patient with a history of hypertension, hypotensive following surgery, normotensive without medication  Plan: 1.  Continue off all antihypertensives 2.  Will reassess in 3 months time

## 2024-10-03 ENCOUNTER — APPOINTMENT (OUTPATIENT)
Dept: ENDOCRINOLOGY | Facility: CLINIC | Age: 69
End: 2024-10-03
Payer: MEDICARE

## 2024-10-03 VITALS
TEMPERATURE: 97.3 F | OXYGEN SATURATION: 98 % | RESPIRATION RATE: 17 BRPM | HEIGHT: 65 IN | WEIGHT: 145 LBS | BODY MASS INDEX: 24.16 KG/M2 | SYSTOLIC BLOOD PRESSURE: 116 MMHG | HEART RATE: 84 BPM | DIASTOLIC BLOOD PRESSURE: 72 MMHG

## 2024-10-03 DIAGNOSIS — E29.1 TESTICULAR HYPOFUNCTION: ICD-10-CM

## 2024-10-03 DIAGNOSIS — Z79.890 HORMONE REPLACEMENT THERAPY: ICD-10-CM

## 2024-10-03 PROCEDURE — 99214 OFFICE O/P EST MOD 30 MIN: CPT

## 2024-10-03 NOTE — PHYSICAL EXAM
[de-identified] : General: No distress, well nourished Eyes: Normal Sclera, EOMI, PERRL ENT: Normal appearance of the nose, normal oropharynx Neck/Thyroid: No cervical lymphadenopathy, thyroid gland 20 g in size, no thyroid nodules, non-tender Respiratory: No use of accessory muscles of respiration, vesicular breath sounds heard bilaterally, no crepitations or ronchi Cardiovascular: S1 and S2 heard and normal, no S3 or S4, no murmurs, radial pulse normal bilaterally Abdomen: soft, non-tender, no masses, normal bowel sounds Musculoskeletal: No swelling or deformities of joints of hands, no pedal edema Neurological: Normal range of motion in the hands, Normal brachioradialis reflexes bilaterally Psychiatry: Patient converses normally, good judgement and insight Skin: No rashes in hands, no nodules palpated in hands

## 2024-10-03 NOTE — ASSESSMENT
[FreeTextEntry1] : Target - Free Testosterone level in the normal range  The SHBG level in Feb 2023 was elevated so I will monitor his esoterix free testosterone level.  Last esoterix free testosterone level was mildly low in August 2024 but SHBG level and total testosterone level was normal then so I will accept this level  This patient has primary hypogonadism based on labs from Feb/March 2023 - in Jan 2023 his serum ferritin was normal. Of note, the patient has a history of childhood testicular disorder.  Testicular US done in May 2023 revealed a right inguinal testis and a 3 mm hyperechoic lesion in the right testis of uncertain etiology. He followed up with Urology for this.   Plan: 1. Continue androgel 5 pumps topically daily  2. No labs ordered 3. Follow up in 4 weeks - in person visit

## 2024-11-13 ENCOUNTER — APPOINTMENT (OUTPATIENT)
Dept: ENDOCRINOLOGY | Facility: CLINIC | Age: 69
End: 2024-11-13
Payer: MEDICARE

## 2024-11-13 VITALS
HEART RATE: 94 BPM | OXYGEN SATURATION: 99 % | TEMPERATURE: 97.1 F | DIASTOLIC BLOOD PRESSURE: 70 MMHG | BODY MASS INDEX: 24.16 KG/M2 | HEIGHT: 65 IN | WEIGHT: 145 LBS | SYSTOLIC BLOOD PRESSURE: 118 MMHG

## 2024-11-13 DIAGNOSIS — E29.1 TESTICULAR HYPOFUNCTION: ICD-10-CM

## 2024-11-13 DIAGNOSIS — Z79.890 HORMONE REPLACEMENT THERAPY: ICD-10-CM

## 2024-11-13 PROCEDURE — 99214 OFFICE O/P EST MOD 30 MIN: CPT

## 2024-11-18 ENCOUNTER — APPOINTMENT (OUTPATIENT)
Dept: CARDIOLOGY | Facility: CLINIC | Age: 69
End: 2024-11-18

## 2024-11-27 ENCOUNTER — APPOINTMENT (OUTPATIENT)
Dept: CARDIOLOGY | Facility: CLINIC | Age: 69
End: 2024-11-27
Payer: MEDICARE

## 2024-11-27 PROCEDURE — 93288 INTERROG EVL PM/LDLS PM IP: CPT

## 2024-12-04 ENCOUNTER — APPOINTMENT (OUTPATIENT)
Dept: HOME HEALTH SERVICES | Facility: HOME HEALTH | Age: 69
End: 2024-12-04

## 2024-12-04 ENCOUNTER — NON-APPOINTMENT (OUTPATIENT)
Age: 69
End: 2024-12-04

## 2024-12-06 ENCOUNTER — APPOINTMENT (OUTPATIENT)
Dept: FAMILY MEDICINE | Facility: CLINIC | Age: 69
End: 2024-12-06
Payer: MEDICARE

## 2024-12-06 VITALS
DIASTOLIC BLOOD PRESSURE: 84 MMHG | TEMPERATURE: 97.3 F | OXYGEN SATURATION: 98 % | WEIGHT: 146 LBS | BODY MASS INDEX: 24.32 KG/M2 | SYSTOLIC BLOOD PRESSURE: 126 MMHG | HEIGHT: 65 IN | HEART RATE: 102 BPM | RESPIRATION RATE: 16 BRPM

## 2024-12-06 DIAGNOSIS — R53.1 WEAKNESS: ICD-10-CM

## 2024-12-06 DIAGNOSIS — Z90.49 ACQUIRED ABSENCE OF OTHER SPECIFIED PARTS OF DIGESTIVE TRACT: ICD-10-CM

## 2024-12-06 DIAGNOSIS — F51.01 PRIMARY INSOMNIA: ICD-10-CM

## 2024-12-06 DIAGNOSIS — R73.03 PREDIABETES.: ICD-10-CM

## 2024-12-06 DIAGNOSIS — Q63.9 CONGENITAL MALFORMATION OF KIDNEY, UNSPECIFIED: ICD-10-CM

## 2024-12-06 DIAGNOSIS — F32.A DEPRESSION, UNSPECIFIED: ICD-10-CM

## 2024-12-06 DIAGNOSIS — I63.9 CEREBRAL INFARCTION, UNSPECIFIED: ICD-10-CM

## 2024-12-06 DIAGNOSIS — H40.9 UNSPECIFIED GLAUCOMA: ICD-10-CM

## 2024-12-06 PROBLEM — M81.0 OSTEOPOROSIS: Status: ACTIVE | Noted: 2024-12-06

## 2024-12-06 PROCEDURE — 99214 OFFICE O/P EST MOD 30 MIN: CPT

## 2024-12-06 PROCEDURE — 99204 OFFICE O/P NEW MOD 45 MIN: CPT

## 2024-12-06 RX ORDER — DORZOLAMIDE HYDROCHLORIDE TIMOLOL MALEATE 20; 5 MG/ML; MG/ML
2-0.5 SOLUTION/ DROPS OPHTHALMIC
Refills: 0 | Status: ACTIVE | COMMUNITY
Start: 2024-12-06

## 2024-12-06 RX ORDER — CARBAMAZEPINE 300 MG/1
300 CAPSULE, EXTENDED RELEASE ORAL
Refills: 0 | Status: ACTIVE | COMMUNITY
Start: 2024-12-06

## 2024-12-09 ENCOUNTER — NON-APPOINTMENT (OUTPATIENT)
Age: 69
End: 2024-12-09

## 2024-12-09 ENCOUNTER — APPOINTMENT (OUTPATIENT)
Dept: CARDIOLOGY | Facility: CLINIC | Age: 69
End: 2024-12-09
Payer: MEDICARE

## 2024-12-09 VITALS
DIASTOLIC BLOOD PRESSURE: 68 MMHG | HEIGHT: 65 IN | RESPIRATION RATE: 17 BRPM | BODY MASS INDEX: 23.99 KG/M2 | SYSTOLIC BLOOD PRESSURE: 106 MMHG | OXYGEN SATURATION: 98 % | WEIGHT: 144 LBS | HEART RATE: 70 BPM

## 2024-12-09 VITALS — HEART RATE: 64 BPM | SYSTOLIC BLOOD PRESSURE: 110 MMHG | DIASTOLIC BLOOD PRESSURE: 64 MMHG

## 2024-12-09 DIAGNOSIS — I95.0 IDIOPATHIC HYPOTENSION: ICD-10-CM

## 2024-12-09 DIAGNOSIS — I10 ESSENTIAL (PRIMARY) HYPERTENSION: ICD-10-CM

## 2024-12-09 DIAGNOSIS — E86.1 HYPOVOLEMIA: ICD-10-CM

## 2024-12-09 PROCEDURE — 99214 OFFICE O/P EST MOD 30 MIN: CPT | Mod: 25

## 2024-12-09 PROCEDURE — 93000 ELECTROCARDIOGRAM COMPLETE: CPT

## 2024-12-11 ENCOUNTER — APPOINTMENT (OUTPATIENT)
Dept: ENDOCRINOLOGY | Facility: CLINIC | Age: 69
End: 2024-12-11
Payer: MEDICARE

## 2024-12-11 VITALS
DIASTOLIC BLOOD PRESSURE: 70 MMHG | HEIGHT: 65 IN | BODY MASS INDEX: 23.99 KG/M2 | WEIGHT: 144 LBS | SYSTOLIC BLOOD PRESSURE: 110 MMHG | OXYGEN SATURATION: 98 % | HEART RATE: 85 BPM | TEMPERATURE: 96.9 F

## 2024-12-11 DIAGNOSIS — Z79.890 HORMONE REPLACEMENT THERAPY: ICD-10-CM

## 2024-12-11 DIAGNOSIS — Z87.39 PERSONAL HISTORY OF OTHER DISEASES OF THE MUSCULOSKELETAL SYSTEM AND CONNECTIVE TISSUE: ICD-10-CM

## 2024-12-11 DIAGNOSIS — E29.1 TESTICULAR HYPOFUNCTION: ICD-10-CM

## 2024-12-11 DIAGNOSIS — M85.80 OTHER SPECIFIED DISORDERS OF BONE DENSITY AND STRUCTURE, UNSPECIFIED SITE: ICD-10-CM

## 2024-12-11 PROCEDURE — 99215 OFFICE O/P EST HI 40 MIN: CPT

## 2024-12-11 RX ORDER — ALENDRONATE SODIUM 70 MG/1
70 TABLET ORAL
Qty: 12 | Refills: 3 | Status: ACTIVE | COMMUNITY
Start: 2024-12-11 | End: 1900-01-01

## 2024-12-11 RX ORDER — TESTOSTERONE CYPIONATE 200 MG/ML
200 VIAL (ML) INTRAMUSCULAR
Qty: 2 | Refills: 0 | Status: ACTIVE | COMMUNITY
Start: 2024-12-11 | End: 1900-01-01

## 2024-12-11 RX ORDER — SYRINGE, DISPOSABLE, 1 ML
3 ML SYRINGE, EMPTY DISPOSABLE MISCELLANEOUS
Qty: 2 | Refills: 11 | Status: ACTIVE | COMMUNITY
Start: 2024-12-11 | End: 1900-01-01

## 2024-12-11 RX ORDER — NEEDLES, SAFETY 22GX1 1/2"
18G X 1-1/2" NEEDLE, DISPOSABLE MISCELLANEOUS
Qty: 2 | Refills: 11 | Status: ACTIVE | COMMUNITY
Start: 2024-12-11 | End: 1900-01-01

## 2024-12-28 NOTE — PROGRESS NOTE ADULT - SUBJECTIVE AND OBJECTIVE BOX
coverage for dr guy    pod#3      patient without comlaints, would like to try to advance diet    bp 149/58    tmax 99 now 98    looks comfortable    afebrile    Heent-sclera anicteric    abdomen-soft, non distended, non tender  one J tucker drain now filling with bile  other J tucker-serous    extrem-ok    labs:    12/2    h/h 7.3/22.3@6am      repeated at 12 noon 7.2/22      wbc 6.8      K=3.8    creat 1.19 Normal volume, rate, productivity, spontaneity and articulation

## 2024-12-30 NOTE — ED PROVIDER NOTE - OBJECTIVE STATEMENT
[facility]                                      Progress Note    Patient Name: Wandy Ann  Date: 2024         Service Type: Individual      Session Start Time: 1202     Session End Time: 1257     Session Length: 53+    Session #: 89    Attendees: Client    Service Modality:    Video Visit:      Provider verified identity through the following two step process.  Patient provided: Patient  and Patient previous known to provider     Telemedicine Visit: The patient's condition can be safely assessed and treated via synchronous audio and visual telemedicine encounter.       Reason for Telemedicine Visit: Patient has requested telehealth visit     Originating Site (Patient Location): Patient's home     Distant Site (Provider Location): Provider Remote Setting- Home Office     Consent:  The patient/guardian has verbally consented to: the potential risks and benefits of telemedicine (video visit) versus in person care; bill my insurance or make self-payment for services provided; and responsibility for payment of non-covered services.      Patient would like the video invitation sent by: email/text     Mode of Communication: Video Conference via [employer]     Distant Location (Provider): Off-site     As the provider I attest to compliance with applicable laws and regulations related to telemedicine.    DATA  Interactive Complexity: No  Crisis: No  Extended Session (53+ minutes):   - High distress and under complex circumstances.  See Data section for details          Progress Since Last Session (Related to Symptoms / Goals / Homework):   Symptoms: Worsening conflict and stressors      Homework: Achieved / completed to satisfaction  Partially completed        Episode of Care Goals: Satisfactory progress - ACTION (Actively working towards change); Intervened by reinforcing change plan / affirming steps taken       Current / Ongoing Stressors and Concerns:   past trauma, developmental hx  and current stressors      Treatment Objective(s) Addressed in This Session:   Patient will demonstrate control of impulses and ability to use positive coping tools to manage strong emotions that can be safely addressed at a lower level of care. Absence of persistent SI and report of reduced frequency and intensity of SI and absence of SI to acceptable levels, report subjective improved mood for a period of 90 days, within 6 months as clinically observed and by patient self-report.  Patient will demonstrate and report a level of depression that can be managed at a lower level of care.  Absence of persistent depression mood and report of reduced frequency and intensity of low mood and absence of persistent low energy and motivation to acceptable levels, report subjective improved motivation and increased energy for a period of 90 days, within 6 months as clinically observed and by patient self-report.  Patient will demonstrate and report a level of anxiety that can be managed at a lower level of care.  Absence of persistent anxious mood and report of reduced frequency and intensity of worry and absence of persistent anxious mood to acceptable levels, no panic attacks, report subjective comfort with rumination for a period of 90 days, within 6 months as clinically observed and by patient self-report.       Intervention:  Therapist met with patient to review goals and interventions. Therapist utilized reflected listening as patient gave brief reflection of week. Patient reported high stressors and conflict and processed. Therapist supported patient as she processed incident with partner's mother. Therapist validated patient and encouraged patient to take some time away to process there relationship and allow self care to balance logic and emotion. Therapist provided coaching and education with setting healthy and firm boundaries along with effective communications with expectations and setting those boundaries.    Patient presented with an anxious affect. Patient was engaged in session and open to feedback. Patient contracted for safety          There has been demonstrated improvement in functioning while patient has been engaged in psychotherapy/psychological service- if withdrawn the patient would deteriorate and/or relapse.     Assessments completed prior to visit:  The following assessments were completed by patient for this visit:  PHQ9:       1/15/2024     7:47 PM 4/25/2024     3:40 PM 7/11/2024    12:42 PM 8/12/2024     1:07 AM 9/24/2024    11:00 AM 12/1/2024     7:23 PM 12/4/2024    11:30 AM   PHQ-9 SCORE   PHQ-9 Total Score MyChart 11 (Moderate depression) 6 (Mild depression) 12 (Moderate depression) 8 (Mild depression)  11 (Moderate depression) 11 (Moderate depression)   PHQ-9 Total Score 11 6 12 8    8 14 11  11        Patient-reported     GAD7:       4/20/2023    12:41 PM 10/10/2023     1:57 PM 1/15/2024     7:48 PM 7/10/2024     1:35 PM 7/12/2024    11:48 AM 9/24/2024    11:00 AM 12/4/2024    11:30 AM   YESSI-7 SCORE   Total Score 13 (moderate anxiety) 17 (severe anxiety) 15 (severe anxiety)  17 (severe anxiety)  13 (moderate anxiety)   Total Score 13 17 15 17 17 12 13        Patient-reported   PROMIS-10 Scores        1/15/2024     7:49 PM 8/19/2024     3:00 PM 12/4/2024    11:31 AM   PROMIS-10 Total Score w/o Sub Scores   PROMIS TOTAL - SUBSCORES 24 29 26        Patient-reported                   ASSESSMENT: Current Emotional / Mental Status (status of significant symptoms):   Risk status (Self / Other harm or suicidal ideation)   Patient denies current fears or concerns for personal safety.   Patient denies current or recent suicidal ideation or behaviors.   Patient denies current or recent homicidal ideation or behaviors.   Patient denies current or recent self injurious behavior or ideation.   Patient denies other safety concerns.   Patient reports there has been no change in risk factors since their last  session.     Patient reports there has been no change in protective factors since their last session.     A safety and risk management plan has been developed including: Patient consented to co-developed safety plan on 2.21.2022.  Safety and risk management plan was reviewed.   Patient agreed to use safety plan should any safety concerns arise.  A copy was made available to the patient.     Appearance:   Appropriate     Eye Contact:   Fair     Psychomotor Behavior: Restless     Attitude:   Cooperative    Orientation:   All   Speech    Rate / Production: Emotional Talkative    Volume:  Normal    Mood:    Anxious  Depressed    Affect:    Worrisome    Thought Content:  Clear    Thought Form:  Coherent    Insight:    Fair      Medication Review:   No changes to current psychiatric medication(s)     Medication Compliance:   Yes     Changes in Health Issues:   None reported     Chemical Use Review:   Substance Use: Chemical use reviewed, no active concerns identified      Tobacco Use: No current tobacco use.      Diagnosis:  1. Bipolar affective disorder, currently depressed, moderate (H)    2. Generalized anxiety disorder    3. ADHD (attention deficit hyperactivity disorder), combined type    4. PTSD (post-traumatic stress disorder)        Collateral Reports Completed:   Not Applicable    PLAN: (Patient Tasks / Therapist Tasks / Other)        Call [phone] if feeling SI has increase or go to ED or empath   Utilize coping skills when feeling impulsive.   Replace distortions with positive attributes  Look at intention rather than perception  Do not avoid people  TMS  Elimination diet  Don't avoid situation due to anxiety  Ground self   Patient reported high stressors and conflict and processed. Therapist supported patient as she processed incident with partner's mother. Therapist validated patient and encouraged patient to take some time away to process there relationship and allow self care to balance logic and emotion.  Therapist provided coaching and education with setting healthy and firm boundaries along with effective communications with expectations and setting those boundaries.     Katie GRIMMAdri Tateayda, [facility]  12/30/2024                                                         ______________________________________________________________________    Individual Treatment Plan    Patient's Name: Wandy Ann  YOB: 2001    Date of Creation: 2.22.2021  Date Treatment Plan Last Reviewed/Revised:  11/20/2024 due 2/20/2024    DSM5 Diagnoses: 296.52 Bipolar I Disorder Current or Most Recent Episode Depressed, Moderate, 300.02 (F41.1) Generalized Anxiety Disorder or 309.81 (F43.10) Posttraumatic Stress Disorder (includes Posttraumatic Stress Disorder for Children 6 Years and Younger)  Without dissociative symptoms  Psychosocial / Contextual Factors: past trauma, developmental hx and current stressors   PROMIS (reviewed every 90 days):12/4/2024    Referral / Collaboration:  Referral to another professional/service is not indicated at this time..    Anticipated number of session for this episode of care: 26  Anticipation frequency of session: Biweekly  Anticipated Duration of each session: 38-52 minutes  Treatment plan will be reviewed in 90 days or when goals have been changed.       MeasurableTreatment Goal(s) related to diagnosis / functional impairment(s)  Goal 1: Patient will report absence of persistent SI and report of reduced frequency and intensity of SI and absence of SI to acceptable levels, report subjective improved mood for a period of 90 days, within 6 months as clinically observed and by patient self-report    I will know I've met my goal when I can see the difference between a bad moment and what I want in th future.      Objective #A (Patient Action)    Patient will demonstrate control of impulses and ability to use positive coping tools to manage strong emotions that can be safely addressed at a  lower level of care. Absence of persistent SI and report of reduced frequency and intensity of SI and absence of SI to acceptable levels, report subjective improved mood for a period of 90 days, within 6 months as clinically observed and by patient self-report.  Status: Continued - Date(s):  11/20/2024    Intervention(s)  Therapist will provide individual therapy to identify triggers to SI urges, gain feedback on helpful coping strategies, and identify ways that family can offer support. Tx to discuss current stressors and interpersonal conflicts and how to cope with these, coaching, diagnostic testing, referral for medication as indicated, use prescribed medication, cognitive restructuring, interpersonal family therapy, supportive therapy services.        Goal 2: Patient will report absence of persistent depression mood and report of reduced frequency and intensity of low mood and absence of persistent low energy and motivation to acceptable levels, report subjective improved motivation and increased energy for a period of 90 days, within 6 months as clinically observed and by patient self-report    I will know I've met my goal when I no longer feel sad.      Objective #A (Patient Action)    Status: Continued - Date(s):  11/20/2024    Patient will demonstrate and report a level of depression that can be managed at a lower level of care.  Absence of persistent depression mood and report of reduced frequency and intensity of low mood and absence of persistent low energy and motivation to acceptable levels, report subjective improved motivation and increased energy for a period of 90 days, within 6 months as clinically observed and by patient self-report.    Intervention(s)  Therapist will provide individual therapy to identify triggers to depression, gain feedback on helpful coping tools and thought-reframing techniques, and identify preferred way of being.  Tx to include discussion of current stressors and  interpersonal conflicts and how to cope with these, coaching, diagnostic testing, referral for medication as indicated, use prescribed medication, cognitive restructuring, interpersonal, family therapy, supportive therapy services.        Goal 3: Patiient will report absence of persistent anxiety mood and report of reduced frequency and intensity of worry and absence of persistent anxious mood to acceptable levels, no panic attacks, report subjective comfort with rumination for a period of 90 days. Within 6 months as clinically observed and by patient self-report    I will know I've met my goal when I can go days without worrying about little things or shaking.      Objective #A (Patient Action)    Status: Continued - Date(s):  11/20/2024    Patient will demonstrate and report a level of anxiety that can be managed at a lower level of care.  Absence of persistent anxious mood and report of reduced frequency and intensity of worry and absence of persistent anxious mood to acceptable levels, no panic attacks, report subjective comfort with rumination for a period of 90 days, within 6 months as clinically observed and by patient self-report.    Intervention(s)  Therapist will provide individual therapy to identify triggers to anxiety, gain feedback on helpful coping tools and thought-reframing techniques, and identify preferred way of being. Tx to include discuss current stressors and interpersonal conflicts and how to cope with these, coaching, diagnostic testing , referral for medication as indicated, use prescribed medication, cognitive restructuring, interpersonal,   family therapy, supportive therapy services.        Patient has reviewed and agreed to the above plan.      Katie Faulkner, [facility]   11/20/2024                                                   Wandy Ann            SAFETY PLAN:  Step 1: Warning signs / cues (Thoughts, images, mood, situation, behavior) that a crisis may be  "developing:  Thoughts: thoughts of not wanting to be here  Images: no images  Thinking Processes: intrusive thoughts (bothersome, unwanted thoughts that come out of nowhere): get irritated  Mood: intense anger and agitation  Behaviors: isolating/withdrawing   Situations: trauma    Step 2: Coping strategies - Things I can do to take my mind off of my problems without contacting another person (relaxation technique, physical activity):  Distress Tolerance Strategies:  arts and crafts: drawing and painting  Physical Activities: exercise: working out  Focus on helpful thoughts:  \"This is temporary\", \"It always passes\" and \"Ride the wave\"  Step 3: People and social settings that provide distraction:                 Name: Jennifer     Phone: [phone]                 Name: Antonina         Phone: [phone]                 Name: panchito       Phone: [phone]  friends house or car   Step 4: Remind myself of people and things that are important to me and worth living for:  Best friend and cat        Step 5: When I am in crisis, I can ask these people to help me use my safety plan:                 Name: Jennifer     Phone: [phone]                 Name: Antonina         Phone: [phone]                 Name: panchito       Phone: [phone]  Step 6: Making the environment safe:   be around others  Step 7: Professionals or agencies I can contact during a crisis:  [facility] Daytime Number: [phone]  Suicide Prevention Lifeline: 4-571-403-TALK (8245)  Crisis Text Line Service (available 24 hours a day, 7 days a week): Text [place] to 023571  Local Crisis Services: 988     Call 911 or go to my nearest emergency department.       I helped develop this safety plan and agree to use it when needed.  I have been given a copy of this plan.       Client signature _________________________________________________________________  Today s date:  2/22/2021  Adapted from Safety Plan Template 2008 Marisol Gauthier" LINDA Flores is reprinted with the express permission of the authors.  No portion of the Safety Plan Template may be reproduced without the express, written permission.  You can contact the authors at [email or web address] or yoel@mail.[facility].   66 y/o male with h/o CVA , DM, pace maker, BIB ems called by pt by pressing his medical alert for pt vomiting since 10 PM, as per pt he is not feeling well since Thursday , c/o chest pain and also he feels weak. EMS found pt very hypotensive, systolic  74 , ems gave him zofran 4 mg and lit of IV fluid and BP is 110 now.  [id number] anil   68 y/o male with h/o CVA , DM, pace maker, BIB ems called by pt by pressing his medical alert for pt vomiting and abdominal pain since 9 or10 PM, as per pt he is not feeling well since evening , c/o chest pain, abdominal pain, and headache also. C/o pain is mostly upper abdomen and radiated to back . EMS found pt very hypotensive, systolic  74 , ems gave him zofran 4 mg and lit of IV fluid and BP is 110 now. As per ems even though they found him hypotensive and pale he was able to walk to door and open door for them.

## 2024-12-31 ENCOUNTER — APPOINTMENT (OUTPATIENT)
Dept: ENDOCRINOLOGY | Facility: CLINIC | Age: 69
End: 2024-12-31
Payer: MEDICARE

## 2024-12-31 PROCEDURE — 96372 THER/PROPH/DIAG INJ SC/IM: CPT

## 2025-01-08 ENCOUNTER — APPOINTMENT (OUTPATIENT)
Dept: ENDOCRINOLOGY | Facility: CLINIC | Age: 70
End: 2025-01-08
Payer: MEDICARE

## 2025-01-08 VITALS
HEART RATE: 88 BPM | OXYGEN SATURATION: 98 % | RESPIRATION RATE: 16 BRPM | TEMPERATURE: 98.4 F | SYSTOLIC BLOOD PRESSURE: 110 MMHG | DIASTOLIC BLOOD PRESSURE: 74 MMHG

## 2025-01-08 DIAGNOSIS — M85.80 OTHER SPECIFIED DISORDERS OF BONE DENSITY AND STRUCTURE, UNSPECIFIED SITE: ICD-10-CM

## 2025-01-08 DIAGNOSIS — Z79.890 HORMONE REPLACEMENT THERAPY: ICD-10-CM

## 2025-01-08 DIAGNOSIS — E29.1 TESTICULAR HYPOFUNCTION: ICD-10-CM

## 2025-01-08 PROCEDURE — 99214 OFFICE O/P EST MOD 30 MIN: CPT

## 2025-01-17 ENCOUNTER — NON-APPOINTMENT (OUTPATIENT)
Age: 70
End: 2025-01-17

## 2025-01-22 ENCOUNTER — APPOINTMENT (OUTPATIENT)
Dept: ENDOCRINOLOGY | Facility: CLINIC | Age: 70
End: 2025-01-22
Payer: MEDICARE

## 2025-01-22 PROCEDURE — 96372 THER/PROPH/DIAG INJ SC/IM: CPT

## 2025-02-03 DIAGNOSIS — I70.0 ATHEROSCLEROSIS OF AORTA: ICD-10-CM

## 2025-02-05 ENCOUNTER — APPOINTMENT (OUTPATIENT)
Dept: ENDOCRINOLOGY | Facility: CLINIC | Age: 70
End: 2025-02-05
Payer: MEDICARE

## 2025-02-05 PROCEDURE — 96372 THER/PROPH/DIAG INJ SC/IM: CPT

## 2025-02-07 ENCOUNTER — APPOINTMENT (OUTPATIENT)
Dept: FAMILY MEDICINE | Facility: CLINIC | Age: 70
End: 2025-02-07
Payer: MEDICARE

## 2025-02-07 VITALS
DIASTOLIC BLOOD PRESSURE: 72 MMHG | RESPIRATION RATE: 15 BRPM | HEIGHT: 65 IN | SYSTOLIC BLOOD PRESSURE: 126 MMHG | BODY MASS INDEX: 25.33 KG/M2 | HEART RATE: 86 BPM | TEMPERATURE: 97.6 F | WEIGHT: 152 LBS | OXYGEN SATURATION: 98 %

## 2025-02-07 DIAGNOSIS — I10 ESSENTIAL (PRIMARY) HYPERTENSION: ICD-10-CM

## 2025-02-07 DIAGNOSIS — F32.A DEPRESSION, UNSPECIFIED: ICD-10-CM

## 2025-02-07 DIAGNOSIS — G40.909 EPILEPSY, UNSPECIFIED, NOT INTRACTABLE, W/OUT STATUS EPILEPTICUS: ICD-10-CM

## 2025-02-07 DIAGNOSIS — Z00.00 ENCOUNTER FOR GENERAL ADULT MEDICAL EXAMINATION W/OUT ABNORMAL FINDINGS: ICD-10-CM

## 2025-02-07 DIAGNOSIS — E78.5 HYPERLIPIDEMIA, UNSPECIFIED: ICD-10-CM

## 2025-02-07 DIAGNOSIS — Z79.890 HORMONE REPLACEMENT THERAPY: ICD-10-CM

## 2025-02-07 PROCEDURE — G0439: CPT

## 2025-02-07 RX ORDER — DENOSUMAB 60 MG/ML
60 INJECTION SUBCUTANEOUS
Qty: 1 | Refills: 0 | Status: COMPLETED | COMMUNITY
Start: 2025-01-22

## 2025-02-08 LAB
ALBUMIN SERPL ELPH-MCNC: 4 G/DL
ALP BLD-CCNC: 122 U/L
ALT SERPL-CCNC: 23 U/L
ANION GAP SERPL CALC-SCNC: 11 MMOL/L
AST SERPL-CCNC: 18 U/L
BASOPHILS # BLD AUTO: 0.03 K/UL
BASOPHILS NFR BLD AUTO: 0.7 %
BILIRUB SERPL-MCNC: <0.2 MG/DL
BUN SERPL-MCNC: 15 MG/DL
CALCIUM SERPL-MCNC: 8.6 MG/DL
CHLORIDE SERPL-SCNC: 103 MMOL/L
CHOLEST SERPL-MCNC: 101 MG/DL
CO2 SERPL-SCNC: 23 MMOL/L
CREAT SERPL-MCNC: 1.07 MG/DL
EGFR: 75 ML/MIN/1.73M2
EOSINOPHIL # BLD AUTO: 0.1 K/UL
EOSINOPHIL NFR BLD AUTO: 2.2 %
ESTIMATED AVERAGE GLUCOSE: 117 MG/DL
GLUCOSE SERPL-MCNC: 132 MG/DL
HBA1C MFR BLD HPLC: 5.7 %
HCT VFR BLD CALC: 35.8 %
HDLC SERPL-MCNC: 39 MG/DL
HGB BLD-MCNC: 11.7 G/DL
IMM GRANULOCYTES NFR BLD AUTO: 0.2 %
LDLC SERPL CALC-MCNC: 34 MG/DL
LYMPHOCYTES # BLD AUTO: 1.49 K/UL
LYMPHOCYTES NFR BLD AUTO: 32.3 %
MAN DIFF?: NORMAL
MCHC RBC-ENTMCNC: 32.7 G/DL
MCHC RBC-ENTMCNC: 32.9 PG
MCV RBC AUTO: 100.6 FL
MONOCYTES # BLD AUTO: 0.59 K/UL
MONOCYTES NFR BLD AUTO: 12.8 %
NEUTROPHILS # BLD AUTO: 2.39 K/UL
NEUTROPHILS NFR BLD AUTO: 51.8 %
NONHDLC SERPL-MCNC: 61 MG/DL
PLATELET # BLD AUTO: 181 K/UL
POTASSIUM SERPL-SCNC: 3.8 MMOL/L
PROT SERPL-MCNC: 6.5 G/DL
PSA SERPL-MCNC: 0.12 NG/ML
RBC # BLD: 3.56 M/UL
RBC # FLD: 12.3 %
SODIUM SERPL-SCNC: 138 MMOL/L
TRIGL SERPL-MCNC: 160 MG/DL
WBC # FLD AUTO: 4.61 K/UL

## 2025-02-09 LAB
HCV AB SER QL: NONREACTIVE
HCV S/CO RATIO: 0.07 S/CO

## 2025-02-10 LAB
TESTOST FREE SERPL-MCNC: 11 PG/ML
TESTOST SERPL-MCNC: 630 NG/DL

## 2025-02-15 ENCOUNTER — NON-APPOINTMENT (OUTPATIENT)
Age: 70
End: 2025-02-15

## 2025-02-19 ENCOUNTER — APPOINTMENT (OUTPATIENT)
Dept: ENDOCRINOLOGY | Facility: CLINIC | Age: 70
End: 2025-02-19
Payer: MEDICARE

## 2025-02-19 PROCEDURE — 96372 THER/PROPH/DIAG INJ SC/IM: CPT

## 2025-03-05 ENCOUNTER — APPOINTMENT (OUTPATIENT)
Dept: ENDOCRINOLOGY | Facility: CLINIC | Age: 70
End: 2025-03-05
Payer: MEDICARE

## 2025-03-05 PROCEDURE — 96372 THER/PROPH/DIAG INJ SC/IM: CPT

## 2025-03-06 ENCOUNTER — NON-APPOINTMENT (OUTPATIENT)
Age: 70
End: 2025-03-06

## 2025-03-19 ENCOUNTER — APPOINTMENT (OUTPATIENT)
Dept: ENDOCRINOLOGY | Facility: CLINIC | Age: 70
End: 2025-03-19
Payer: MEDICARE

## 2025-03-19 PROCEDURE — 96372 THER/PROPH/DIAG INJ SC/IM: CPT

## 2025-03-27 ENCOUNTER — APPOINTMENT (OUTPATIENT)
Dept: FAMILY MEDICINE | Facility: CLINIC | Age: 70
End: 2025-03-27
Payer: MEDICARE

## 2025-03-27 VITALS
BODY MASS INDEX: 25.83 KG/M2 | OXYGEN SATURATION: 98 % | HEART RATE: 84 BPM | WEIGHT: 155 LBS | SYSTOLIC BLOOD PRESSURE: 134 MMHG | RESPIRATION RATE: 16 BRPM | TEMPERATURE: 97.3 F | DIASTOLIC BLOOD PRESSURE: 80 MMHG | HEIGHT: 65 IN

## 2025-03-27 DIAGNOSIS — I63.9 CEREBRAL INFARCTION, UNSPECIFIED: ICD-10-CM

## 2025-03-27 DIAGNOSIS — R73.03 PREDIABETES.: ICD-10-CM

## 2025-03-27 DIAGNOSIS — M85.80 OTHER SPECIFIED DISORDERS OF BONE DENSITY AND STRUCTURE, UNSPECIFIED SITE: ICD-10-CM

## 2025-03-27 DIAGNOSIS — M25.561 PAIN IN RIGHT KNEE: ICD-10-CM

## 2025-03-27 DIAGNOSIS — M25.511 PAIN IN RIGHT SHOULDER: ICD-10-CM

## 2025-03-27 DIAGNOSIS — F32.A DEPRESSION, UNSPECIFIED: ICD-10-CM

## 2025-03-27 DIAGNOSIS — G40.909 EPILEPSY, UNSPECIFIED, NOT INTRACTABLE, W/OUT STATUS EPILEPTICUS: ICD-10-CM

## 2025-03-27 PROCEDURE — 99214 OFFICE O/P EST MOD 30 MIN: CPT

## 2025-03-27 RX ORDER — CELECOXIB 200 MG/1
200 CAPSULE ORAL TWICE DAILY
Qty: 60 | Refills: 1 | Status: ACTIVE | COMMUNITY
Start: 2025-03-27 | End: 1900-01-01

## 2025-04-09 ENCOUNTER — APPOINTMENT (OUTPATIENT)
Dept: ENDOCRINOLOGY | Facility: CLINIC | Age: 70
End: 2025-04-09
Payer: MEDICARE

## 2025-04-09 PROCEDURE — 96372 THER/PROPH/DIAG INJ SC/IM: CPT

## 2025-04-28 ENCOUNTER — APPOINTMENT (OUTPATIENT)
Dept: ENDOCRINOLOGY | Facility: CLINIC | Age: 70
End: 2025-04-28
Payer: MEDICARE

## 2025-04-28 VITALS
WEIGHT: 155 LBS | SYSTOLIC BLOOD PRESSURE: 122 MMHG | OXYGEN SATURATION: 98 % | HEIGHT: 65 IN | RESPIRATION RATE: 16 BRPM | HEART RATE: 73 BPM | TEMPERATURE: 98.3 F | DIASTOLIC BLOOD PRESSURE: 64 MMHG | BODY MASS INDEX: 25.83 KG/M2

## 2025-04-28 DIAGNOSIS — E29.1 TESTICULAR HYPOFUNCTION: ICD-10-CM

## 2025-04-28 DIAGNOSIS — M85.80 OTHER SPECIFIED DISORDERS OF BONE DENSITY AND STRUCTURE, UNSPECIFIED SITE: ICD-10-CM

## 2025-04-28 PROCEDURE — 99215 OFFICE O/P EST HI 40 MIN: CPT | Mod: 25

## 2025-04-28 PROCEDURE — 96372 THER/PROPH/DIAG INJ SC/IM: CPT

## 2025-04-28 RX ORDER — TESTOSTERONE CYPIONATE 200 MG/ML
200 VIAL (ML) INTRAMUSCULAR
Qty: 0 | Refills: 0 | Status: COMPLETED | OUTPATIENT
Start: 2025-04-28

## 2025-04-28 RX ADMIN — Medication 0 MG/ML: at 00:00

## 2025-05-14 ENCOUNTER — APPOINTMENT (OUTPATIENT)
Dept: ENDOCRINOLOGY | Facility: CLINIC | Age: 70
End: 2025-05-14
Payer: MEDICARE

## 2025-05-14 ENCOUNTER — NON-APPOINTMENT (OUTPATIENT)
Age: 70
End: 2025-05-14

## 2025-05-14 VITALS
BODY MASS INDEX: 25.66 KG/M2 | TEMPERATURE: 97.6 F | DIASTOLIC BLOOD PRESSURE: 78 MMHG | OXYGEN SATURATION: 96 % | RESPIRATION RATE: 16 BRPM | SYSTOLIC BLOOD PRESSURE: 124 MMHG | HEIGHT: 65 IN | WEIGHT: 154 LBS | HEART RATE: 83 BPM

## 2025-05-14 PROCEDURE — 96372 THER/PROPH/DIAG INJ SC/IM: CPT

## 2025-05-28 ENCOUNTER — APPOINTMENT (OUTPATIENT)
Dept: ENDOCRINOLOGY | Facility: CLINIC | Age: 70
End: 2025-05-28
Payer: MEDICARE

## 2025-05-28 ENCOUNTER — APPOINTMENT (OUTPATIENT)
Dept: CARDIOLOGY | Facility: CLINIC | Age: 70
End: 2025-05-28
Payer: MEDICARE

## 2025-05-28 VITALS
TEMPERATURE: 97.1 F | SYSTOLIC BLOOD PRESSURE: 120 MMHG | BODY MASS INDEX: 25.66 KG/M2 | HEIGHT: 65 IN | DIASTOLIC BLOOD PRESSURE: 70 MMHG | OXYGEN SATURATION: 98 % | HEART RATE: 87 BPM | WEIGHT: 154 LBS

## 2025-05-28 DIAGNOSIS — I63.9 CEREBRAL INFARCTION, UNSPECIFIED: ICD-10-CM

## 2025-05-28 DIAGNOSIS — E29.1 TESTICULAR HYPOFUNCTION: ICD-10-CM

## 2025-05-28 DIAGNOSIS — R79.89 OTHER SPECIFIED ABNORMAL FINDINGS OF BLOOD CHEMISTRY: ICD-10-CM

## 2025-05-28 DIAGNOSIS — Z79.890 HORMONE REPLACEMENT THERAPY: ICD-10-CM

## 2025-05-28 PROCEDURE — 96372 THER/PROPH/DIAG INJ SC/IM: CPT

## 2025-05-28 PROCEDURE — 93288 INTERROG EVL PM/LDLS PM IP: CPT

## 2025-05-28 RX ORDER — TESTOSTERONE CYPIONATE 200 MG/ML
200 VIAL (ML) INTRAMUSCULAR
Refills: 0 | Status: COMPLETED | OUTPATIENT
Start: 2025-05-28

## 2025-05-28 RX ADMIN — Medication 0 MG/ML: at 00:00

## 2025-06-16 ENCOUNTER — NON-APPOINTMENT (OUTPATIENT)
Age: 70
End: 2025-06-16

## 2025-06-16 ENCOUNTER — APPOINTMENT (OUTPATIENT)
Dept: CARDIOLOGY | Facility: CLINIC | Age: 70
End: 2025-06-16
Payer: MEDICARE

## 2025-06-16 ENCOUNTER — RX RENEWAL (OUTPATIENT)
Age: 70
End: 2025-06-16

## 2025-06-16 VITALS — WEIGHT: 156 LBS | OXYGEN SATURATION: 98 % | HEIGHT: 65 IN | BODY MASS INDEX: 25.99 KG/M2 | HEART RATE: 76 BPM

## 2025-06-16 VITALS — SYSTOLIC BLOOD PRESSURE: 100 MMHG | HEART RATE: 64 BPM | DIASTOLIC BLOOD PRESSURE: 70 MMHG

## 2025-06-16 PROCEDURE — 93000 ELECTROCARDIOGRAM COMPLETE: CPT

## 2025-06-16 PROCEDURE — 99213 OFFICE O/P EST LOW 20 MIN: CPT | Mod: 25

## 2025-06-17 ENCOUNTER — APPOINTMENT (OUTPATIENT)
Dept: GASTROENTEROLOGY | Facility: CLINIC | Age: 70
End: 2025-06-17
Payer: MEDICARE

## 2025-06-17 VITALS
HEART RATE: 79 BPM | TEMPERATURE: 97.8 F | SYSTOLIC BLOOD PRESSURE: 98 MMHG | OXYGEN SATURATION: 96 % | HEIGHT: 65 IN | DIASTOLIC BLOOD PRESSURE: 64 MMHG | WEIGHT: 156 LBS | RESPIRATION RATE: 16 BRPM | BODY MASS INDEX: 25.99 KG/M2

## 2025-06-17 PROBLEM — Z12.11 SCREEN FOR COLON CANCER: Status: RESOLVED | Noted: 2022-10-26 | Resolved: 2025-06-17

## 2025-06-17 PROCEDURE — G2211 COMPLEX E/M VISIT ADD ON: CPT

## 2025-06-17 PROCEDURE — 99214 OFFICE O/P EST MOD 30 MIN: CPT

## 2025-06-17 RX ORDER — POLYETHYLENE GLYCOL 3350 17 G/17G
17 POWDER, FOR SOLUTION ORAL DAILY
Qty: 1 | Refills: 2 | Status: ACTIVE | COMMUNITY
Start: 2025-06-17 | End: 2025-09-15

## 2025-06-18 ENCOUNTER — APPOINTMENT (OUTPATIENT)
Dept: ENDOCRINOLOGY | Facility: CLINIC | Age: 70
End: 2025-06-18
Payer: MEDICARE

## 2025-06-18 VITALS
HEART RATE: 89 BPM | SYSTOLIC BLOOD PRESSURE: 126 MMHG | WEIGHT: 156 LBS | BODY MASS INDEX: 25.99 KG/M2 | DIASTOLIC BLOOD PRESSURE: 70 MMHG | RESPIRATION RATE: 16 BRPM | OXYGEN SATURATION: 97 % | HEIGHT: 65 IN | TEMPERATURE: 97.3 F

## 2025-06-18 PROCEDURE — 96372 THER/PROPH/DIAG INJ SC/IM: CPT

## 2025-06-18 RX ORDER — TESTOSTERONE CYPIONATE 200 MG/ML
200 VIAL (ML) INTRAMUSCULAR
Refills: 0 | Status: COMPLETED | OUTPATIENT
Start: 2025-06-18

## 2025-06-18 RX ADMIN — Medication 0 MG/ML: at 00:00

## 2025-06-19 ENCOUNTER — APPOINTMENT (OUTPATIENT)
Dept: FAMILY MEDICINE | Facility: CLINIC | Age: 70
End: 2025-06-19
Payer: MEDICARE

## 2025-06-19 VITALS
BODY MASS INDEX: 26.49 KG/M2 | TEMPERATURE: 97.4 F | WEIGHT: 159 LBS | HEIGHT: 65 IN | OXYGEN SATURATION: 98 % | DIASTOLIC BLOOD PRESSURE: 70 MMHG | RESPIRATION RATE: 15 BRPM | HEART RATE: 77 BPM | SYSTOLIC BLOOD PRESSURE: 120 MMHG

## 2025-06-19 PROBLEM — K59.09 CHRONIC CONSTIPATION: Status: ACTIVE | Noted: 2025-06-17

## 2025-06-19 PROBLEM — J30.1 SEASONAL ALLERGIC RHINITIS DUE TO POLLEN: Status: ACTIVE | Noted: 2025-06-19

## 2025-06-19 PROCEDURE — 99215 OFFICE O/P EST HI 40 MIN: CPT

## 2025-06-19 RX ORDER — POLYETHYLENE GLYCOL 3350 17 G/17G
17 POWDER, FOR SOLUTION ORAL
Qty: 510 | Refills: 3 | Status: ACTIVE | COMMUNITY
Start: 2025-06-19 | End: 1900-01-01

## 2025-06-19 RX ORDER — MIRTAZAPINE 7.5 MG/1
7.5 TABLET, FILM COATED ORAL
Qty: 90 | Refills: 1 | Status: ACTIVE | COMMUNITY
Start: 2025-06-19 | End: 1900-01-01

## 2025-06-30 ENCOUNTER — APPOINTMENT (OUTPATIENT)
Dept: NEUROLOGY | Facility: CLINIC | Age: 70
End: 2025-06-30
Payer: MEDICARE

## 2025-06-30 VITALS
HEIGHT: 65 IN | DIASTOLIC BLOOD PRESSURE: 78 MMHG | BODY MASS INDEX: 26.49 KG/M2 | HEART RATE: 78 BPM | WEIGHT: 159 LBS | SYSTOLIC BLOOD PRESSURE: 138 MMHG

## 2025-06-30 PROCEDURE — 99205 OFFICE O/P NEW HI 60 MIN: CPT

## 2025-06-30 PROCEDURE — 95816 EEG AWAKE AND DROWSY: CPT

## 2025-06-30 RX ORDER — LACOSAMIDE 150 MG/1
150 TABLET ORAL
Qty: 60 | Refills: 5 | Status: ACTIVE | COMMUNITY
Start: 2025-06-30 | End: 1900-01-01

## 2025-06-30 RX ORDER — LEVETIRACETAM 750 MG/1
750 TABLET, EXTENDED RELEASE ORAL
Qty: 180 | Refills: 11 | Status: ACTIVE | COMMUNITY
Start: 2025-06-30 | End: 1900-01-01

## 2025-07-02 ENCOUNTER — NON-APPOINTMENT (OUTPATIENT)
Age: 70
End: 2025-07-02

## 2025-07-02 ENCOUNTER — APPOINTMENT (OUTPATIENT)
Dept: ENDOCRINOLOGY | Facility: CLINIC | Age: 70
End: 2025-07-02
Payer: MEDICARE

## 2025-07-02 VITALS
BODY MASS INDEX: 25.66 KG/M2 | HEIGHT: 65 IN | RESPIRATION RATE: 15 BRPM | TEMPERATURE: 97.6 F | OXYGEN SATURATION: 98 % | WEIGHT: 154 LBS | HEART RATE: 78 BPM | SYSTOLIC BLOOD PRESSURE: 130 MMHG | DIASTOLIC BLOOD PRESSURE: 78 MMHG

## 2025-07-02 PROCEDURE — 96372 THER/PROPH/DIAG INJ SC/IM: CPT

## 2025-07-03 ENCOUNTER — NON-APPOINTMENT (OUTPATIENT)
Age: 70
End: 2025-07-03

## 2025-07-16 ENCOUNTER — APPOINTMENT (OUTPATIENT)
Dept: ENDOCRINOLOGY | Facility: CLINIC | Age: 70
End: 2025-07-16
Payer: MEDICARE

## 2025-07-16 VITALS
TEMPERATURE: 97.5 F | BODY MASS INDEX: 25.66 KG/M2 | RESPIRATION RATE: 1 BRPM | OXYGEN SATURATION: 96 % | HEART RATE: 72 BPM | SYSTOLIC BLOOD PRESSURE: 126 MMHG | DIASTOLIC BLOOD PRESSURE: 72 MMHG | WEIGHT: 154 LBS | HEIGHT: 65 IN

## 2025-07-16 PROCEDURE — 96372 THER/PROPH/DIAG INJ SC/IM: CPT

## 2025-07-16 RX ORDER — TESTOSTERONE CYPIONATE 200 MG/ML
200 VIAL (ML) INTRAMUSCULAR
Qty: 0 | Refills: 0 | Status: COMPLETED | OUTPATIENT
Start: 2025-07-16

## 2025-07-16 RX ADMIN — Medication 0 MG/ML: at 00:00

## 2025-07-24 DIAGNOSIS — K21.9 GASTRO-ESOPHAGEAL REFLUX DISEASE W/OUT ESOPHAGITIS: ICD-10-CM

## 2025-07-24 RX ORDER — FAMOTIDINE 20 MG/1
20 TABLET, FILM COATED ORAL
Qty: 180 | Refills: 3 | Status: ACTIVE | COMMUNITY
Start: 2025-07-24 | End: 1900-01-01

## 2025-07-30 ENCOUNTER — APPOINTMENT (OUTPATIENT)
Dept: ENDOCRINOLOGY | Facility: CLINIC | Age: 70
End: 2025-07-30
Payer: MEDICARE

## 2025-07-30 VITALS
BODY MASS INDEX: 26.16 KG/M2 | HEART RATE: 77 BPM | HEIGHT: 65 IN | SYSTOLIC BLOOD PRESSURE: 124 MMHG | TEMPERATURE: 97.6 F | WEIGHT: 157 LBS | OXYGEN SATURATION: 97 % | DIASTOLIC BLOOD PRESSURE: 76 MMHG | RESPIRATION RATE: 16 BRPM

## 2025-07-30 DIAGNOSIS — M85.80 OTHER SPECIFIED DISORDERS OF BONE DENSITY AND STRUCTURE, UNSPECIFIED SITE: ICD-10-CM

## 2025-07-30 DIAGNOSIS — E29.1 TESTICULAR HYPOFUNCTION: ICD-10-CM

## 2025-07-30 PROCEDURE — 99215 OFFICE O/P EST HI 40 MIN: CPT

## 2025-07-30 RX ORDER — TESTOSTERONE CYPIONATE 200 MG/ML
200 VIAL (ML) INTRAMUSCULAR
Refills: 0 | Status: COMPLETED | OUTPATIENT
Start: 2025-07-30

## 2025-07-30 RX ADMIN — Medication 0 MG/ML: at 00:00

## 2025-08-05 RX ORDER — CALCIUM CARBONATE 160(400)MG
TABLET,CHEWABLE ORAL
Qty: 1 | Refills: 0 | Status: ACTIVE | OUTPATIENT
Start: 2025-08-05

## 2025-08-13 ENCOUNTER — APPOINTMENT (OUTPATIENT)
Dept: ENDOCRINOLOGY | Facility: CLINIC | Age: 70
End: 2025-08-13
Payer: MEDICARE

## 2025-08-13 PROCEDURE — 96372 THER/PROPH/DIAG INJ SC/IM: CPT

## 2025-08-13 RX ORDER — TESTOSTERONE CYPIONATE 200 MG/ML
200 VIAL (ML) INTRAMUSCULAR
Qty: 0 | Refills: 0 | Status: COMPLETED | OUTPATIENT
Start: 2025-08-13

## 2025-08-13 RX ADMIN — Medication 0 MG/ML: at 00:00

## 2025-08-27 ENCOUNTER — APPOINTMENT (OUTPATIENT)
Dept: ENDOCRINOLOGY | Facility: CLINIC | Age: 70
End: 2025-08-27
Payer: MEDICARE

## 2025-08-27 DIAGNOSIS — E29.1 TESTICULAR HYPOFUNCTION: ICD-10-CM

## 2025-08-27 PROCEDURE — 96372 THER/PROPH/DIAG INJ SC/IM: CPT

## 2025-08-27 RX ORDER — LEVETIRACETAM 750 MG/1
750 TABLET, FILM COATED ORAL
Refills: 0 | Status: ACTIVE | COMMUNITY

## 2025-08-27 RX ORDER — TESTOSTERONE CYPIONATE 200 MG/ML
200 VIAL (ML) INTRAMUSCULAR
Refills: 0 | Status: COMPLETED | OUTPATIENT
Start: 2025-08-27

## 2025-08-27 RX ORDER — LACOSAMIDE 150 MG/1
150 TABLET ORAL
Refills: 0 | Status: ACTIVE | COMMUNITY

## 2025-08-27 RX ADMIN — Medication 0 MG/ML: at 00:00

## 2025-09-09 ENCOUNTER — APPOINTMENT (OUTPATIENT)
Dept: GASTROENTEROLOGY | Facility: CLINIC | Age: 70
End: 2025-09-09
Payer: MEDICARE

## 2025-09-09 VITALS
HEIGHT: 65 IN | OXYGEN SATURATION: 97 % | BODY MASS INDEX: 27.66 KG/M2 | RESPIRATION RATE: 16 BRPM | WEIGHT: 166 LBS | SYSTOLIC BLOOD PRESSURE: 127 MMHG | TEMPERATURE: 98.5 F | DIASTOLIC BLOOD PRESSURE: 70 MMHG | HEART RATE: 80 BPM

## 2025-09-09 DIAGNOSIS — K59.09 OTHER CONSTIPATION: ICD-10-CM

## 2025-09-09 DIAGNOSIS — R10.13 EPIGASTRIC PAIN: ICD-10-CM

## 2025-09-09 DIAGNOSIS — K21.9 GASTRO-ESOPHAGEAL REFLUX DISEASE W/OUT ESOPHAGITIS: ICD-10-CM

## 2025-09-09 PROCEDURE — 99214 OFFICE O/P EST MOD 30 MIN: CPT

## 2025-09-10 ENCOUNTER — APPOINTMENT (OUTPATIENT)
Dept: ENDOCRINOLOGY | Facility: CLINIC | Age: 70
End: 2025-09-10
Payer: MEDICARE

## 2025-09-10 VITALS
SYSTOLIC BLOOD PRESSURE: 120 MMHG | DIASTOLIC BLOOD PRESSURE: 70 MMHG | HEART RATE: 80 BPM | WEIGHT: 166 LBS | RESPIRATION RATE: 17 BRPM | TEMPERATURE: 98.4 F | OXYGEN SATURATION: 97 % | BODY MASS INDEX: 27.66 KG/M2 | HEIGHT: 65 IN

## 2025-09-10 PROCEDURE — 96372 THER/PROPH/DIAG INJ SC/IM: CPT

## 2025-09-10 RX ORDER — TESTOSTERONE CYPIONATE 200 MG/ML
200 VIAL (ML) INTRAMUSCULAR
Qty: 0 | Refills: 0 | Status: COMPLETED | OUTPATIENT
Start: 2025-09-10

## 2025-09-10 RX ADMIN — Medication 0 MG/ML: at 00:00

## 2025-09-24 PROBLEM — Z23 ENCOUNTER FOR IMMUNIZATION: Status: ACTIVE | Noted: 2025-09-24 | Resolved: 2025-10-08

## (undated) DEVICE — LOK DVC RX AND BIOPSY

## (undated) DEVICE — LAP PAD 18 X 18"

## (undated) DEVICE — SOL IRR POUR H2O 1000ML

## (undated) DEVICE — SPECIMEN CONTAINER PET

## (undated) DEVICE — INSUFFLATION NDL COVIDIEN STEP 14G FOR STEP/VERSASTEP

## (undated) DEVICE — KIT ENDO PROCEDURE CUST W/VLV

## (undated) DEVICE — VENODYNE/SCD SLEEVE CALF MEDIUM

## (undated) DEVICE — TROCAR COVIDIEN VERSAONE BLADED FIXATION 11MM STANDARD

## (undated) DEVICE — LIGASURE BLUNT TIP 37CM

## (undated) DEVICE — GLV 6.5 PROTEXIS (WHITE)

## (undated) DEVICE — FORCEP RADIAL JAW 4 240CM DISP

## (undated) DEVICE — INJ SYS RAP REFIL

## (undated) DEVICE — TUBING CAP SET ERBEFLO CLEVERCAP HYBRID CO2 FOR OLYMPUS SCOPES AND UCR

## (undated) DEVICE — PACK MINOR

## (undated) DEVICE — WARMING BLANKET UPPER ADULT

## (undated) DEVICE — TROCAR COVIDIEN VERSASTEP 5MM STANDARD

## (undated) DEVICE — Device

## (undated) DEVICE — POSITIONER FOAM HEAD CRADLE (PINK)

## (undated) DEVICE — OLYMPUS DISTAL COVER ENDOSCOPE

## (undated) DEVICE — POLY TRAP ETRAP

## (undated) DEVICE — FORCEP RADIAL JAW 4 W NDL 2.4MM 2.8MM 240CM ORANGE DISP

## (undated) DEVICE — DVC AUTO CAP RX LOKG

## (undated) DEVICE — SOL IRR POUR H2O 1500ML

## (undated) DEVICE — CONTAINER FORMALIN BUFF 10% 60ML

## (undated) DEVICE — TUBING HYDRO-SURG PLUS IRRIGATOR W SMOKEVAC & PROBE

## (undated) DEVICE — TROCAR COVIDIEN VERSASTEP PLUS 11MM STANDARD

## (undated) DEVICE — SNARE EXACTO COLD 9MMX230CM

## (undated) DEVICE — PRESSURE INFUSOR BAG 1000ML

## (undated) DEVICE — DRAPE LIGHT HANDLE COVER (GREEN)

## (undated) DEVICE — DRAIN JACKSON PRATT 10MM FLAT FULL NO TROCAR

## (undated) DEVICE — TUBING IV EXTENSION 30"

## (undated) DEVICE — PREP CHLORAPREP HI-LITE ORANGE 26ML

## (undated) DEVICE — ENDOCATCH 10MM SPECIMEN POUCH

## (undated) DEVICE — SOLIDIFIER CANN EXPRESS 3K

## (undated) DEVICE — GLV 7.5 PROTEXIS (WHITE)

## (undated) DEVICE — PLATE NESSY 170

## (undated) DEVICE — CANISTER SUCTION LID GUARD 3000CC

## (undated) DEVICE — POSITIONER STRAP ARMBOARD VELCRO TS-30

## (undated) DEVICE — SUT POLYSORB 0 30" GS-23

## (undated) DEVICE — ORGANIZER MIO MEDICAL DEVICE DISP

## (undated) DEVICE — DRSG STERISTRIPS 0.5 X 4"

## (undated) DEVICE — SNARE POLYP SENS SM 13MM 240CM

## (undated) DEVICE — TUBING W FILTER STERILE FOR INSUFFLATION

## (undated) DEVICE — SCOPE WARMER SEAL DISP

## (undated) DEVICE — DISSECTOR ENDO PEANUT 5MM

## (undated) DEVICE — SOL IRR POUR NS 0.9% 500ML

## (undated) DEVICE — TUBING INSUFLATOR VIDEO TOWER NON HEATED

## (undated) DEVICE — SNARE OVAL LOOP MICOR

## (undated) DEVICE — GLV 8 PROTEXIS (WHITE)

## (undated) DEVICE — ENDOCUFF VISION SZ 2 LG GRN